# Patient Record
Sex: FEMALE | Race: WHITE | NOT HISPANIC OR LATINO | Employment: OTHER | URBAN - METROPOLITAN AREA
[De-identification: names, ages, dates, MRNs, and addresses within clinical notes are randomized per-mention and may not be internally consistent; named-entity substitution may affect disease eponyms.]

---

## 2021-02-06 ENCOUNTER — OFFICE VISIT (OUTPATIENT)
Dept: URGENT CARE | Facility: URGENT CARE | Age: 71
End: 2021-02-06
Payer: COMMERCIAL

## 2021-02-06 VITALS
OXYGEN SATURATION: 94 % | TEMPERATURE: 98.4 F | DIASTOLIC BLOOD PRESSURE: 83 MMHG | WEIGHT: 142 LBS | RESPIRATION RATE: 22 BRPM | HEART RATE: 110 BPM | SYSTOLIC BLOOD PRESSURE: 127 MMHG

## 2021-02-06 DIAGNOSIS — L03.818 CELLULITIS OF OTHER SPECIFIED SITE: ICD-10-CM
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
## 2021-02-06 PROCEDURE — 90471 IMMUNIZATION ADMIN: CPT | Performed by: PHYSICIAN ASSISTANT

## 2021-02-06 PROCEDURE — 90714 TD VACC NO PRESV 7 YRS+ IM: CPT | Performed by: PHYSICIAN ASSISTANT

## 2021-02-06 PROCEDURE — 99203 OFFICE O/P NEW LOW 30 MIN: CPT | Mod: 25 | Performed by: PHYSICIAN ASSISTANT

## 2021-02-06 RX ORDER — MIRTAZAPINE 15 MG/1
7.5 TABLET, FILM COATED ORAL AT BEDTIME
Status: ON HOLD | COMMUNITY
Start: 2021-01-25 | End: 2023-04-27

## 2021-02-06 RX ORDER — LEVALBUTEROL TARTRATE 45 UG/1
2 AEROSOL, METERED ORAL EVERY 4 HOURS PRN
COMMUNITY
Start: 2021-01-25

## 2021-02-06 RX ORDER — IPRATROPIUM BROMIDE AND ALBUTEROL SULFATE 2.5; .5 MG/3ML; MG/3ML
1 SOLUTION RESPIRATORY (INHALATION) EVERY 6 HOURS PRN
COMMUNITY
Start: 2021-01-25

## 2021-02-06 RX ORDER — CLOBETASOL PROPIONATE 0.5 MG/G
CREAM TOPICAL
COMMUNITY
Start: 2019-09-30 | End: 2023-03-19

## 2021-02-06 RX ORDER — LEVOTHYROXINE SODIUM 88 UG/1
88 TABLET ORAL DAILY
COMMUNITY
Start: 2021-01-25

## 2021-02-06 RX ORDER — ZOLPIDEM TARTRATE 5 MG/1
5 TABLET ORAL
Status: ON HOLD | COMMUNITY
Start: 2021-01-25 | End: 2023-04-07

## 2021-02-06 RX ORDER — ATORVASTATIN CALCIUM 10 MG/1
10 TABLET, FILM COATED ORAL DAILY
COMMUNITY
Start: 2021-01-25

## 2021-02-06 RX ORDER — TRIAMCINOLONE ACETONIDE 1 MG/G
CREAM TOPICAL
COMMUNITY
Start: 2020-01-09 | End: 2023-03-19

## 2021-02-06 RX ORDER — ALBUTEROL SULFATE 0.83 MG/ML
2.5 SOLUTION RESPIRATORY (INHALATION) 3 TIMES DAILY PRN
COMMUNITY
Start: 2021-01-25

## 2021-02-06 RX ORDER — ALPRAZOLAM 0.5 MG
0.25 TABLET ORAL DAILY PRN
Status: ON HOLD | COMMUNITY
Start: 2021-01-25 | End: 2023-04-07

## 2021-02-06 RX ORDER — ALENDRONATE SODIUM 70 MG/1
70 TABLET ORAL
COMMUNITY
Start: 2020-01-09

## 2021-02-06 RX ORDER — DIAZEPAM 5 MG
TABLET ORAL
COMMUNITY
Start: 2020-09-14 | End: 2023-03-19

## 2021-02-06 RX ORDER — AMOXICILLIN AND CLAVULANATE POTASSIUM 500; 125 MG/1; MG/1
1 TABLET, FILM COATED ORAL 3 TIMES DAILY
Qty: 30 TABLET | Refills: 0 | Status: SHIPPED | OUTPATIENT
Start: 2021-02-06 | End: 2021-02-16

## 2021-02-06 RX ORDER — HYDROXYZINE HYDROCHLORIDE 10 MG/1
10 TABLET, FILM COATED ORAL EVERY 8 HOURS PRN
COMMUNITY
Start: 2021-01-25

## 2021-02-06 RX ORDER — PREDNISONE 20 MG/1
TABLET ORAL
COMMUNITY
Start: 2020-04-28 | End: 2023-03-19

## 2021-02-06 RX ORDER — CLOBETASOL PROPIONATE 0.5 MG/G
OINTMENT TOPICAL
COMMUNITY
Start: 2021-01-25 | End: 2023-03-19

## 2021-02-06 SDOH — HEALTH STABILITY: MENTAL HEALTH: HOW OFTEN DO YOU HAVE 6 OR MORE DRINKS ON ONE OCCASION?: NOT ASKED

## 2021-02-06 SDOH — HEALTH STABILITY: MENTAL HEALTH: HOW OFTEN DO YOU HAVE A DRINK CONTAINING ALCOHOL?: NOT ASKED

## 2021-02-06 SDOH — HEALTH STABILITY: MENTAL HEALTH: HOW MANY STANDARD DRINKS CONTAINING ALCOHOL DO YOU HAVE ON A TYPICAL DAY?: NOT ASKED

## 2021-02-06 NOTE — PATIENT INSTRUCTIONS
Patient Education     Cat Bite    A cat bite can cause a wound deep enough to break the skin. In such cases, the wound is cleaned and then sometimes closed. If the wound is closed it is usually not closed completely. This is so that fluid can drain if the wound becomes infected. Often the wound is left open to heal. In addition to wound care, a tetanus shot may be given, if needed.  Home care    Wash your hands well with soap and warm water before and after caring for the wound. This helps lower the risk of infection.    Care for the wound as directed. If a dressing was applied to the wound, be sure to change it as directed.    If the wound bleeds, place a clean, soft cloth on the wound. Then firmly apply pressure until the bleeding stops. This may take up to 5 minutes. Don't release the pressure and look at the wound during this time.    Always get medical attention for cat bites on the hand. They are highly likely to become infected.    Most wounds heal within 10 days. But an infection can occur even with proper treatment. So be sure to check the wound daily for signs of infection (see below).    Antibiotics may be prescribed. These help prevent or treat infection. If you re given antibiotics, take them as directed. Also be sure to complete the medicines.  Rabies prevention  Rabies is a virus that can be carried in certain animals. These can include domestic animals such as cats and dogs. Pets fully vaccinated against rabies (2 shots) are at very low risk of infection. But because human rabies is almost always fatal, any biting pet should be confined for 10 days as an extra precaution. In general, if there is a risk for rabies, the following steps may need to be taken:    If someone s pet cat has bitten you, it should be kept in a secure area for the next 10 days to watch for signs of illness. If the pet owner won t allow this, contact your local animal control center. If the cat becomes ill or dies during that  time, contact your local animal control center at once so the animal may be tested for rabies. If the cat stays healthy for the next 10 days, there is no danger of rabies in the animal or you.    If a stray cat bit you, contact your local animal control center. They can give information on capture, quarantine, and animal rabies testing.    If you can t find the animal that bit you in the next 2 days, and if rabies exists in your area, you may need to receive the rabies vaccine series. Call your healthcare provider right away. Or return to the emergency department promptly.    All animal bites should be reported to the local animal control center. If you were not given a form to fill out, you can report this yourself.  Follow-up care  Follow up with your healthcare provider, or as directed.  When to seek medical advice  Call your healthcare provider right away if any of these occur:    Signs of infection:  ? Spreading redness or warmth from the wound  ? Increased pain or swelling  ? Fever of 100.4 F (38 C) or higher, or as directed by your healthcare provider  ? Colored fluid or pus draining from the wound  ? Enlarged lymph nodes above the area that was bitten, such as lymph nodes in the armpit if you were bitten on the hand or arm. This may be a sign of cat-scratch disease (cat-scratch fever).    Signs of rabies infection:  ? Headache  ? Confusion  ? Strange behavior  ? Increased salivating or drooling  ? Seizure    Decreased ability to move any body part near the bite area    Bleeding that can't be stopped after 5 minutes of firm pressure  Ryan last reviewed this educational content on 5/1/2018 2000-2020 [employer]. [address] [place] [address], [place] [address]. All rights reserved. This information is not intended as a substitute for professional medical care. Always follow your healthcare professional's instructions.

## 2021-02-06 NOTE — PROGRESS NOTES
Assessment & Plan     Cat bite, initial encounter  Cat bite with infection  Started on Augmentin  Td updated  Monitor for any worsening symptoms, follow up appropriately  - amoxicillin-clavulanate (AUGMENTIN) 500-125 MG tablet; Take 1 tablet by mouth 3 times daily for 10 days  - TD PRESERV FREE, IM (7+ YRS)    Cellulitis of other specified site  Start on augmentin  - amoxicillin-clavulanate (AUGMENTIN) 500-125 MG tablet; Take 1 tablet by mouth 3 times daily for 10 days      Independent interpretation of a test performed by another physician/other qualified health care professional (not separately reported) - CMP      As needed for worsening symptoms    Lobo James PA-C  [facility] JOSHUA Burton is a 70 year old who presents to clinic today for the following health issues  accompanied by her daughter:    HPI     Cat bite  Infection, started yesterday    Review of Systems   Constitutional, HEENT, cardiovascular, pulmonary, gi and gu systems are negative, except as otherwise noted.      Objective    /83   Pulse 110   Temp 98.4  F (36.9  C)   Resp 22   Wt 64.4 kg (142 lb)   SpO2 94%   There is no height or weight on file to calculate BMI.  Physical Exam   GENERAL: healthy, alert and no distress  EYES: Eyes grossly normal to inspection, PERRL and conjunctivae and sclerae normal  HENT: ear canals and TM's normal, nose and mouth without ulcers or lesions  NECK: no adenopathy, no asymmetry, masses, or scars and thyroid normal to palpation  MS: Positive for left hand tenderness  SKIN: Positive for erythema, warmth and tenderness of left hand  NEURO: Normal strength and tone, mentation intact and speech normal  PSYCH: mentation appears normal, affect normal/bright  LYMPH: no cervical, supraclavicular, axillary, or inguinal adenopathy

## 2021-03-05 ENCOUNTER — IMMUNIZATION (OUTPATIENT)
Dept: NURSING | Facility: CLINIC | Age: 71
End: 2021-03-05
Payer: COMMERCIAL

## 2021-03-05 PROCEDURE — 0011A PR COVID VAC MODERNA 100 MCG/0.5 ML IM: CPT

## 2021-03-05 PROCEDURE — 91301 PR COVID VAC MODERNA 100 MCG/0.5 ML IM: CPT

## 2021-04-02 ENCOUNTER — IMMUNIZATION (OUTPATIENT)
Dept: NURSING | Facility: CLINIC | Age: 71
End: 2021-04-02
Attending: INTERNAL MEDICINE
Payer: COMMERCIAL

## 2021-04-02 PROCEDURE — 91301 PR COVID VAC MODERNA 100 MCG/0.5 ML IM: CPT

## 2021-04-02 PROCEDURE — 0012A PR COVID VAC MODERNA 100 MCG/0.5 ML IM: CPT

## 2022-03-08 DIAGNOSIS — Z11.59 ENCOUNTER FOR SCREENING FOR OTHER VIRAL DISEASES: Primary | ICD-10-CM

## 2022-03-23 ENCOUNTER — OFFICE VISIT (OUTPATIENT)
Dept: URGENT CARE | Facility: URGENT CARE | Age: 72
End: 2022-03-23
Payer: COMMERCIAL

## 2022-03-23 ENCOUNTER — ANCILLARY PROCEDURE (OUTPATIENT)
Dept: GENERAL RADIOLOGY | Facility: CLINIC | Age: 72
End: 2022-03-23
Attending: FAMILY MEDICINE
Payer: COMMERCIAL

## 2022-03-23 VITALS
SYSTOLIC BLOOD PRESSURE: 116 MMHG | RESPIRATION RATE: 26 BRPM | HEART RATE: 105 BPM | OXYGEN SATURATION: 94 % | DIASTOLIC BLOOD PRESSURE: 75 MMHG | TEMPERATURE: 99.9 F

## 2022-03-23 DIAGNOSIS — R05.9 COUGH: ICD-10-CM

## 2022-03-23 DIAGNOSIS — R05.8 PRODUCTIVE COUGH: Primary | ICD-10-CM

## 2022-03-23 DIAGNOSIS — J44.1 COPD EXACERBATION (H): ICD-10-CM

## 2022-03-23 DIAGNOSIS — R07.0 THROAT PAIN: ICD-10-CM

## 2022-03-23 DIAGNOSIS — R50.9 FEVER AND CHILLS: ICD-10-CM

## 2022-03-23 LAB
DEPRECATED S PYO AG THROAT QL EIA: NEGATIVE
FLUAV AG SPEC QL IA: NEGATIVE
FLUBV AG SPEC QL IA: NEGATIVE

## 2022-03-23 PROCEDURE — U0003 INFECTIOUS AGENT DETECTION BY NUCLEIC ACID (DNA OR RNA); SEVERE ACUTE RESPIRATORY SYNDROME CORONAVIRUS 2 (SARS-COV-2) (CORONAVIRUS DISEASE [COVID-19]), AMPLIFIED PROBE TECHNIQUE, MAKING USE OF HIGH THROUGHPUT TECHNOLOGIES AS DESCRIBED BY CMS-2020-01-R: HCPCS | Performed by: FAMILY MEDICINE

## 2022-03-23 PROCEDURE — U0005 INFEC AGEN DETEC AMPLI PROBE: HCPCS | Performed by: FAMILY MEDICINE

## 2022-03-23 PROCEDURE — 99214 OFFICE O/P EST MOD 30 MIN: CPT | Mod: CS | Performed by: FAMILY MEDICINE

## 2022-03-23 PROCEDURE — 71046 X-RAY EXAM CHEST 2 VIEWS: CPT | Performed by: RADIOLOGY

## 2022-03-23 PROCEDURE — 87651 STREP A DNA AMP PROBE: CPT | Performed by: FAMILY MEDICINE

## 2022-03-23 PROCEDURE — 87804 INFLUENZA ASSAY W/OPTIC: CPT | Performed by: FAMILY MEDICINE

## 2022-03-23 RX ORDER — CODEINE PHOSPHATE AND GUAIFENESIN 10; 100 MG/5ML; MG/5ML
1-2 SOLUTION ORAL EVERY 4 HOURS PRN
Qty: 118 ML | Refills: 0 | Status: SHIPPED | OUTPATIENT
Start: 2022-03-23 | End: 2022-03-28

## 2022-03-23 RX ORDER — DOXYCYCLINE 100 MG/1
100 TABLET ORAL 2 TIMES DAILY
Qty: 20 TABLET | Refills: 0 | Status: SHIPPED | OUTPATIENT
Start: 2022-03-23 | End: 2022-04-02

## 2022-03-24 LAB
GROUP A STREP BY PCR: NOT DETECTED
SARS-COV-2 RNA RESP QL NAA+PROBE: NEGATIVE

## 2022-03-24 NOTE — PROGRESS NOTES
SUBJECTIVE: Josephine Nicole is a 72 year old female presenting with a chief complaint of cough .  Onset of symptoms was 3 day(s) ago.  Course of illness is worsening.    Severity moderate  Current and Associated symptoms: cough - productive  Treatment measures tried include Inhaler (name: alb) and prednisone 20mg once yesterday.  Predisposing factors include HX of COPD.    No past medical history on file.  Allergies   Allergen Reactions     Bupropion Anaphylaxis, Hives and Shortness Of Breath     Social History     Tobacco Use     Smoking status: Former Smoker     Years: 50.00     Types: Cigarettes     Smokeless tobacco: Never Used   Substance Use Topics     Alcohol use: Not Currently       ROS:  SKIN: no rash  GI: no vomiting    OBJECTIVE:  /75   Pulse 105   Temp 99.9  F (37.7  C)   Resp 26   SpO2 94% GENERAL APPEARANCE: healthy, alert and no distress  EYES: EOMI,  PERRL, conjunctiva clear  HENT: ear canals and TM's normal.  Nose and mouth without ulcers, erythema or lesions  RESP: lungs clear to auscultation - no rales, rhonchi or wheezes  SKIN: no suspicious lesions or rashes      ICD-10-CM    1. Productive cough  R05.8 XR Chest 2 Views     doxycycline monohydrate (ADOXA) 100 MG tablet     guaiFENesin-codeine (ROBITUSSIN AC) 100-10 MG/5ML solution   2. COPD exacerbation (H)  J44.1 XR Chest 2 Views     doxycycline monohydrate (ADOXA) 100 MG tablet     guaiFENesin-codeine (ROBITUSSIN AC) 100-10 MG/5ML solution   3. Fever and chills  R50.9 doxycycline monohydrate (ADOXA) 100 MG tablet   4. Cough  R05.9 Symptomatic; Unknown COVID-19 Virus (Coronavirus) by PCR Nose     Influenza A & B Antigen     XR Chest 2 Views     guaiFENesin-codeine (ROBITUSSIN AC) 100-10 MG/5ML solution   5. Throat pain  R07.0 Influenza A & B Antigen     Streptococcus A Rapid Scr w Reflx to PCR - Lab Collect     Group A Streptococcus PCR Throat Swab     Prednisone 20mg bid for 5 days  Cont inhalers and O2  Fluids/Rest, f/u if worse/not  any better

## 2022-04-15 ENCOUNTER — LAB (OUTPATIENT)
Dept: URGENT CARE | Facility: URGENT CARE | Age: 72
End: 2022-04-15
Attending: INTERNAL MEDICINE
Payer: COMMERCIAL

## 2022-04-15 DIAGNOSIS — Z11.59 ENCOUNTER FOR SCREENING FOR OTHER VIRAL DISEASES: ICD-10-CM

## 2022-04-15 PROCEDURE — U0003 INFECTIOUS AGENT DETECTION BY NUCLEIC ACID (DNA OR RNA); SEVERE ACUTE RESPIRATORY SYNDROME CORONAVIRUS 2 (SARS-COV-2) (CORONAVIRUS DISEASE [COVID-19]), AMPLIFIED PROBE TECHNIQUE, MAKING USE OF HIGH THROUGHPUT TECHNOLOGIES AS DESCRIBED BY CMS-2020-01-R: HCPCS

## 2022-04-15 PROCEDURE — U0005 INFEC AGEN DETEC AMPLI PROBE: HCPCS

## 2022-04-16 LAB — SARS-COV-2 RNA RESP QL NAA+PROBE: NEGATIVE

## 2022-04-18 ENCOUNTER — ANESTHESIA EVENT (OUTPATIENT)
Dept: GASTROENTEROLOGY | Facility: CLINIC | Age: 72
End: 2022-04-18
Payer: COMMERCIAL

## 2022-04-18 ENCOUNTER — HOSPITAL ENCOUNTER (OUTPATIENT)
Facility: CLINIC | Age: 72
Discharge: HOME | End: 2022-04-18
Attending: INTERNAL MEDICINE | Admitting: INTERNAL MEDICINE
Payer: COMMERCIAL

## 2022-04-18 ENCOUNTER — ANESTHESIA (OUTPATIENT)
Dept: GASTROENTEROLOGY | Facility: CLINIC | Age: 72
End: 2022-04-18
Payer: COMMERCIAL

## 2022-04-18 VITALS
BODY MASS INDEX: 27.05 KG/M2 | HEART RATE: 80 BPM | HEIGHT: 62 IN | DIASTOLIC BLOOD PRESSURE: 84 MMHG | OXYGEN SATURATION: 96 % | SYSTOLIC BLOOD PRESSURE: 124 MMHG | WEIGHT: 147 LBS | RESPIRATION RATE: 16 BRPM

## 2022-04-18 LAB — COLONOSCOPY: NORMAL

## 2022-04-18 PROCEDURE — 250N000009 HC RX 250: Performed by: REGISTERED NURSE

## 2022-04-18 PROCEDURE — 250N000011 HC RX IP 250 OP 636: Performed by: REGISTERED NURSE

## 2022-04-18 PROCEDURE — 45378 DIAGNOSTIC COLONOSCOPY: CPT | Performed by: INTERNAL MEDICINE

## 2022-04-18 PROCEDURE — 999N000010 HC STATISTIC ANES STAT CODE-CRNA PER MINUTE: Performed by: INTERNAL MEDICINE

## 2022-04-18 PROCEDURE — G0105 COLORECTAL SCRN; HI RISK IND: HCPCS | Performed by: INTERNAL MEDICINE

## 2022-04-18 PROCEDURE — 370N000017 HC ANESTHESIA TECHNICAL FEE, PER MIN: Performed by: INTERNAL MEDICINE

## 2022-04-18 PROCEDURE — 258N000003 HC RX IP 258 OP 636: Performed by: REGISTERED NURSE

## 2022-04-18 RX ORDER — ONDANSETRON 2 MG/ML
4 INJECTION INTRAMUSCULAR; INTRAVENOUS
Status: DISCONTINUED | OUTPATIENT
Start: 2022-04-18 | End: 2022-04-18 | Stop reason: HOSPADM

## 2022-04-18 RX ORDER — SODIUM CHLORIDE, SODIUM LACTATE, POTASSIUM CHLORIDE, CALCIUM CHLORIDE 600; 310; 30; 20 MG/100ML; MG/100ML; MG/100ML; MG/100ML
INJECTION, SOLUTION INTRAVENOUS CONTINUOUS PRN
Status: DISCONTINUED | OUTPATIENT
Start: 2022-04-18 | End: 2022-04-18

## 2022-04-18 RX ORDER — PROPOFOL 10 MG/ML
INJECTION, EMULSION INTRAVENOUS CONTINUOUS PRN
Status: DISCONTINUED | OUTPATIENT
Start: 2022-04-18 | End: 2022-04-18

## 2022-04-18 RX ORDER — NALOXONE HYDROCHLORIDE 0.4 MG/ML
0.4 INJECTION, SOLUTION INTRAMUSCULAR; INTRAVENOUS; SUBCUTANEOUS
Status: CANCELLED | OUTPATIENT
Start: 2022-04-18

## 2022-04-18 RX ORDER — ONDANSETRON 4 MG/1
4 TABLET, ORALLY DISINTEGRATING ORAL EVERY 6 HOURS PRN
Status: CANCELLED | OUTPATIENT
Start: 2022-04-18

## 2022-04-18 RX ORDER — ONDANSETRON 2 MG/ML
INJECTION INTRAMUSCULAR; INTRAVENOUS PRN
Status: DISCONTINUED | OUTPATIENT
Start: 2022-04-18 | End: 2022-04-18

## 2022-04-18 RX ORDER — FLUMAZENIL 0.1 MG/ML
0.2 INJECTION, SOLUTION INTRAVENOUS
Status: CANCELLED | OUTPATIENT
Start: 2022-04-18 | End: 2022-04-18

## 2022-04-18 RX ORDER — LIDOCAINE 40 MG/G
CREAM TOPICAL
Status: DISCONTINUED | OUTPATIENT
Start: 2022-04-18 | End: 2022-04-18 | Stop reason: HOSPADM

## 2022-04-18 RX ORDER — NALOXONE HYDROCHLORIDE 0.4 MG/ML
0.2 INJECTION, SOLUTION INTRAMUSCULAR; INTRAVENOUS; SUBCUTANEOUS
Status: CANCELLED | OUTPATIENT
Start: 2022-04-18

## 2022-04-18 RX ORDER — ONDANSETRON 2 MG/ML
4 INJECTION INTRAMUSCULAR; INTRAVENOUS EVERY 6 HOURS PRN
Status: CANCELLED | OUTPATIENT
Start: 2022-04-18

## 2022-04-18 RX ORDER — PROCHLORPERAZINE MALEATE 5 MG
5 TABLET ORAL EVERY 6 HOURS PRN
Status: CANCELLED | OUTPATIENT
Start: 2022-04-18

## 2022-04-18 RX ORDER — LIDOCAINE HYDROCHLORIDE 20 MG/ML
INJECTION, SOLUTION INFILTRATION; PERINEURAL PRN
Status: DISCONTINUED | OUTPATIENT
Start: 2022-04-18 | End: 2022-04-18

## 2022-04-18 RX ADMIN — SODIUM CHLORIDE, POTASSIUM CHLORIDE, SODIUM LACTATE AND CALCIUM CHLORIDE: 600; 310; 30; 20 INJECTION, SOLUTION INTRAVENOUS at 08:59

## 2022-04-18 RX ADMIN — ONDANSETRON 4 MG: 2 INJECTION INTRAMUSCULAR; INTRAVENOUS at 09:15

## 2022-04-18 RX ADMIN — PROPOFOL 150 MCG/KG/MIN: 10 INJECTION, EMULSION INTRAVENOUS at 09:05

## 2022-04-18 RX ADMIN — LIDOCAINE HYDROCHLORIDE 50 MG: 20 INJECTION, SOLUTION INFILTRATION; PERINEURAL at 09:05

## 2022-04-18 ASSESSMENT — LIFESTYLE VARIABLES: TOBACCO_USE: 1

## 2022-04-18 ASSESSMENT — COPD QUESTIONNAIRES: COPD: 1

## 2022-04-18 NOTE — ANESTHESIA POSTPROCEDURE EVALUATION
Patient: Josephine Nicole    Procedure: Procedure(s):  COLONOSCOPY       Anesthesia Type:  MAC    Note:  Disposition: Outpatient   Postop Pain Control: Uneventful            Sign Out: Well controlled pain   PONV: No   Neuro/Psych: Uneventful            Sign Out: Acceptable/Baseline neuro status   Airway/Respiratory: Uneventful            Sign Out: Acceptable/Baseline resp. status   CV/Hemodynamics: Uneventful            Sign Out: Acceptable CV status; No obvious hypovolemia; No obvious fluid overload   Other NRE: NONE   DID A NON-ROUTINE EVENT OCCUR? No           Last vitals:  Vitals Value Taken Time   /82 04/18/22 1010   Temp     Pulse 73 04/18/22 1010   Resp 23 04/18/22 1004   SpO2 79 % 04/18/22 1004   Vitals shown include unvalidated device data.    Electronically Signed By: Aldo Bauer MD  April 18, 2022  12:12 PM

## 2022-04-18 NOTE — ANESTHESIA CARE TRANSFER NOTE
Patient: Josephine Nicole    Procedure: Procedure(s):  COLONOSCOPY       Diagnosis: Screen for colon cancer [Z12.11]  Diagnosis Additional Information: No value filed.    Anesthesia Type:   MAC     Note:    Oropharynx: oropharynx clear of all foreign objects  Level of Consciousness: drowsy  Oxygen Supplementation: nasal cannula  Level of Supplemental Oxygen (L/min / FiO2): 2  Independent Airway: airway patency satisfactory and stable  Dentition: dentition unchanged  Vital Signs Stable: post-procedure vital signs reviewed and stable  Report to RN Given: handoff report given  Patient transferred to: PACU  Comments: Patient comfortable  Handoff Report: Identifed the Patient, Identified the Reponsible Provider, Reviewed the pertinent medical history, Discussed the surgical course, Reviewed Intra-OP anesthesia mangement and issues during anesthesia, Set expectations for post-procedure period and Allowed opportunity for questions and acknowledgement of understanding      Vitals:  Vitals Value Taken Time   BP     Temp     Pulse     Resp     SpO2         Electronically Signed By: MIGNON Laughlin CRNA  April 18, 2022  9:35 AM

## 2022-04-18 NOTE — ANESTHESIA PREPROCEDURE EVALUATION
Anesthesia Pre-Procedure Evaluation    Patient: Josephine Nicole   MRN: [id number] : 1950        Procedure : Procedure(s):  COLONOSCOPY          No past medical history on file.   No past surgical history on file.   Allergies   Allergen Reactions     Bupropion Anaphylaxis, Hives and Shortness Of Breath      Social History     Tobacco Use     Smoking status: Former Smoker     Years: 50.00     Types: Cigarettes     Smokeless tobacco: Never Used   Substance Use Topics     Alcohol use: Not Currently      Wt Readings from Last 1 Encounters:   21 64.4 kg (142 lb)        Anesthesia Evaluation   Pt has had prior anesthetic.     No history of anesthetic complications       ROS/MED HX  ENT/Pulmonary:     (+) tobacco use, Past use, COPD, O2 dependent, 1-2L liters/min,  (-) sleep apnea   Neurologic: Comment: Meningioma      Cardiovascular:     (+) Dyslipidemia -----    METS/Exercise Tolerance:     Hematologic:       Musculoskeletal:       GI/Hepatic:    (-) GERD   Renal/Genitourinary:       Endo:     (+) thyroid problem, hypothyroidism,     Psychiatric/Substance Use:     (+) psychiatric history depression     Infectious Disease:       Malignancy:       Other:            Physical Exam    Airway        Mallampati: II   TM distance: > 3 FB   Neck ROM: full   Mouth opening: > 3 cm    Respiratory Devices and Support         Dental  no notable dental history         Cardiovascular   cardiovascular exam normal       Rhythm and rate: regular and normal     Pulmonary           (+) decreased breath sounds           OUTSIDE LABS:  CBC: No results found for: WBC, HGB, HCT, PLT  BMP: No results found for: NA, POTASSIUM, CHLORIDE, CO2, BUN, CR, GLC  COAGS: No results found for: PTT, INR, FIBR  POC: No results found for: BGM, HCG, HCGS  HEPATIC: No results found for: ALBUMIN, PROTTOTAL, ALT, AST, GGT, ALKPHOS, BILITOTAL, BILIDIRECT, DELON  OTHER: No results found for: PH, LACT, A1C, CHAO, PHOS, MAG, LIPASE, AMYLASE, TSH, T4, T3,  CRP, SED    Anesthesia Plan    ASA Status:  3   NPO Status:  NPO Appropriate    Anesthesia Type: MAC.     - Reason for MAC: straight local not clinically adequate              Consents    Anesthesia Plan(s) and associated risks, benefits, and realistic alternatives discussed. Questions answered and patient/representative(s) expressed understanding.    - Discussed:     - Discussed with:  Patient         Postoperative Care    Pain management: IV analgesics.   PONV prophylaxis: Ondansetron (or other 5HT-3)     Comments:    Other Comments: H&P reviewed            Aldo Bauer MD

## 2022-04-18 NOTE — H&P
PRE-PROCEDURE H&P    CHIEF COMPLAINT / REASON FOR PROCEDURE:  Colon cancer screening, history of polyp    PERTINENT HISTORY :    No past medical history on file.   No past surgical history on file.      Bleeding tendencies:  No    Relevant Family History:  NONE     Relevant Social History:  NONE      A relevant review of systems was performed and was negative    Current symptoms include: none    ALLERGIES/SENSITIVITIES:   Allergies   Allergen Reactions     Bupropion Anaphylaxis, Hives and Shortness Of Breath       CURRENT MEDICATIONS:   Prior to Admission Medications   Prescriptions Last Dose Informant Patient Reported? Taking?   ALPRAZolam (XANAX) 0.5 MG tablet   Yes No   Sig: Take one half of the tablet for anxiety once daily as needed.   albuterol (PROVENTIL) (2.5 MG/3ML) 0.083% neb solution   Yes No   Sig: Inhale 2.5 mg into the lungs   alendronate (FOSAMAX) 70 MG tablet   Yes No   Sig: Take 70 mg by mouth   atorvastatin (LIPITOR) 10 MG tablet   Yes No   Sig: Take 10 mg by mouth   clobetasol (TEMOVATE) 0.05 % external cream   Yes No   clobetasol (TEMOVATE) 0.05 % external ointment   Yes No   diazepam (VALIUM) 5 MG tablet   Yes No   Sig: TAKE 1 TABLET BY MOUTH 30 MINUTES PRIOR TO MRI. MAY REPEAT 30 MINUTES LATER IF NEEDED.   fluticasone-salmeterol (ADVAIR) 500-50 MCG/DOSE inhaler   Yes No   Sig: Inhale 1 puff into the lungs   hydrOXYzine (ATARAX) 10 MG tablet   Yes No   Sig: Take 10 mg by mouth   ipratropium - albuterol 0.5 mg/2.5 mg/3 mL (DUONEB) 0.5-2.5 (3) MG/3ML neb solution   Yes No   Si neb every 4-6 hours as needed.   Patient not taking: Reported on 3/23/2022   levalbuterol (XOPENEX HFA) 45 MCG/ACT inhaler   Yes No   Sig: Inhale 2 puffs into the lungs   levothyroxine (SYNTHROID/LEVOTHROID) 88 MCG tablet   Yes No   Sig: Take 88 mcg by mouth   mirtazapine (REMERON) 15 MG tablet   Yes No   Sig: Take 15 mg by mouth   predniSONE (DELTASONE) 20 MG tablet   Yes No   Sig: TK 1 T PO QD WITH A MEAL. TAKES ONLY  1-2 TIMES PER YEAR FOR EXACERBATIONS   triamcinolone (KENALOG) 0.1 % external cream   Yes No   zolpidem (AMBIEN) 5 MG tablet   Yes No   Sig: Take 5 mg by mouth      Facility-Administered Medications: None        PRE-SEDATION ASSESSMENT:    Lung Exam: on oxygen, decreased lung sounds bilaterally  Heart Exam:  Rrr, diminished heart sounds  Airway Exam: normal  Previous reaction to anesthesia/sedation:   No  Sedation plan based on assessment: mac  ASA Classification:  4 - Severe systemic disease that is a constant threat to life    Comments: COPD, oxygen dependent.  History of colon polyp.    IMPRESSION:  Rule out polyps    PLAN:  colonoscopy     Abimbola Handley MD, MD  [facility]  Office: [phone]

## 2022-05-24 ENCOUNTER — ANCILLARY PROCEDURE (OUTPATIENT)
Dept: GENERAL RADIOLOGY | Facility: CLINIC | Age: 72
End: 2022-05-24
Attending: PHYSICIAN ASSISTANT
Payer: COMMERCIAL

## 2022-05-24 ENCOUNTER — OFFICE VISIT (OUTPATIENT)
Dept: URGENT CARE | Facility: URGENT CARE | Age: 72
End: 2022-05-24
Payer: COMMERCIAL

## 2022-05-24 VITALS
TEMPERATURE: 98.7 F | HEART RATE: 112 BPM | DIASTOLIC BLOOD PRESSURE: 70 MMHG | WEIGHT: 145 LBS | SYSTOLIC BLOOD PRESSURE: 120 MMHG | BODY MASS INDEX: 26.52 KG/M2 | OXYGEN SATURATION: 92 % | RESPIRATION RATE: 18 BRPM

## 2022-05-24 DIAGNOSIS — R05.8 PRODUCTIVE COUGH: ICD-10-CM

## 2022-05-24 DIAGNOSIS — R05.8 PRODUCTIVE COUGH: Primary | ICD-10-CM

## 2022-05-24 DIAGNOSIS — J44.1 COPD EXACERBATION (H): ICD-10-CM

## 2022-05-24 PROCEDURE — U0005 INFEC AGEN DETEC AMPLI PROBE: HCPCS | Performed by: PHYSICIAN ASSISTANT

## 2022-05-24 PROCEDURE — 71046 X-RAY EXAM CHEST 2 VIEWS: CPT | Mod: TC | Performed by: RADIOLOGY

## 2022-05-24 PROCEDURE — U0003 INFECTIOUS AGENT DETECTION BY NUCLEIC ACID (DNA OR RNA); SEVERE ACUTE RESPIRATORY SYNDROME CORONAVIRUS 2 (SARS-COV-2) (CORONAVIRUS DISEASE [COVID-19]), AMPLIFIED PROBE TECHNIQUE, MAKING USE OF HIGH THROUGHPUT TECHNOLOGIES AS DESCRIBED BY CMS-2020-01-R: HCPCS | Performed by: PHYSICIAN ASSISTANT

## 2022-05-24 PROCEDURE — 99214 OFFICE O/P EST MOD 30 MIN: CPT | Mod: CS | Performed by: PHYSICIAN ASSISTANT

## 2022-05-24 RX ORDER — AZITHROMYCIN 250 MG/1
TABLET, FILM COATED ORAL
Qty: 6 TABLET | Refills: 0 | Status: SHIPPED | OUTPATIENT
Start: 2022-05-24 | End: 2022-05-29

## 2022-05-25 LAB — SARS-COV-2 RNA RESP QL NAA+PROBE: POSITIVE

## 2022-05-25 NOTE — PROGRESS NOTES
Assessment & Plan     Productive cough  Acute problem.  On exam patient is in no acute respiratory distress.  Chest x-ray is negative for acute infiltrates.  We have elected to treat for acute bacterial bronchitis given length and worsening of symptoms.  Zithromax is prescribed.  Covid test is pending. Keep monitoring symptoms.  Follow-up if any worsening symptoms.  Patient agrees with the plan.  - Symptomatic; Yes; 5/21/2022 COVID-19 Virus (Coronavirus) by PCR Nose  - XR Chest 2 Views  - azithromycin (ZITHROMAX Z-NADEEM) 250 MG tablet  Dispense: 6 tablet; Refill: 0    COPD exacerbation (H)  Okay to continue prednisone 20 mg daily for an additional 4 days. Keep monitoring symptoms.  Follow-up if any worsening symptoms.  Patient agrees with the plan.     30 minutes spent on the date of the encounter doing chart review, history and exam, documentation and further activities per the note        Return in about 1 week (around 5/31/2022) for Symptoms failing to improve.    Estefania Adorno PA-C  [facility] is a 72 year old female who presents to clinic today for the following health issues:  Chief Complaint   Patient presents with     Pneumonia     HPI      URI Adult    Onset of symptoms was 1 week(s) ago.  Course of illness is worsening.    Severity moderate  Current and Associated symptoms: cough - productive of green phlegm, sob. Had a fever last week. None since then.   Treatment measures tried include: Took 20 mg of prednisone last night.  Predisposing factors include HX of COPD. On Advair, Xopenex, and  Duoneb. On 1 L oxygen at home. Her daughter tested positive for Covid last week.         Review of Systems  Constitutional, HEENT, cardiovascular, pulmonary, GI, , musculoskeletal, neuro, skin, endocrine and psych systems are negative, except as otherwise noted.      Objective    /70 (BP Location: Right arm, Patient Position: Chair, Cuff Size: Adult Small)    Pulse 112   Temp 98.7  F (37.1  C) (Oral)   Resp 18   Wt 65.8 kg (145 lb)   SpO2 92%   Breastfeeding No   BMI 26.52 kg/m    Physical Exam   GENERAL: healthy, alert and no distress, on 1.5 L of oxygen via nasal cannula   HENT: ear canals and TM's normal, mouth without ulcers or lesions  RESP: lungs clear to auscultation - no rales, rhonchi or wheezes  CV: regular rate and rhythm, normal S1 S2  MS: no gross musculoskeletal defects noted, no edema  SKIN: no suspicious lesions or rashes    EXAM: XR CHEST 2 VW  LOCATION: [facility]  DATE/TIME: 5/24/2022 8:07 PM     INDICATION:  Productive cough  COMPARISON: 03/23/2022                                                                      IMPRESSION: Heart size and pulmonary vascularity normal. Emphysema with destructive changes both upper lung fields, unchanged. No acute infiltrates or pleural effusions.

## 2022-05-26 ENCOUNTER — TELEPHONE (OUTPATIENT)
Dept: NURSING | Facility: CLINIC | Age: 72
End: 2022-05-26
Payer: COMMERCIAL

## 2022-05-26 NOTE — TELEPHONE ENCOUNTER
Coronavirus (COVID-19) Notification    Caller Name (Patient, parent, daughter/son, grandparent, etc)  Patient    Reason for call  Notify of Positive Coronavirus (COVID-19) lab results, assess symptoms,  review [facility] recommendations    Lab Result    Lab test:  2019-nCoV rRt-PCR or SARS-CoV-2 PCR    Oropharyngeal AND/OR nasopharyngeal swabs is POSITIVE for 2019-nCoV RNA/SARS-COV-2 PCR (COVID-19 virus)      Gather patient reported symptoms   Assessment   Current Symptoms at time of phone call, reported by patient: (if no symptoms, document: No symptoms] Cough   Date of symptom(s) onset (if applicable) 5/18     If at time of call, Patients symptoms have worsened, the Patient should contact 911 or have someone drive them to Emergency Dept promptly:      If Patient calling 911, inform 911 personal that you have tested positive for the Coronavirus (COVID-19).  Place mask on and await 911 to arrive.    If Emergency Dept, If possible, please have another adult drive you to the Emergency Dept but you need to wear mask when in contact with other people.      Treatment Options:   Patient classified as COVID treatment eligible by Epic high risk algorithm: Yes  Is the patient symptomatic at the time of result notification? Yes. Was the onset of symptoms within the last 5 days? No. Was the onset of symptoms within the last 7 days? No.     Review information with Patient    Your result was positive. This means you have COVID-19 (coronavirus).    How can I protect others?    These guidelines are for isolating before returning to work, school or .    If you DO have symptoms    Stay home and away from others     For at least 5 days after your symptoms started, AND    You are fever free for 24 hours (with no medicine that reduces fever), AND    Your other symptoms are better    Wear a mask for 10 full days anytime you are around others    If you DON'T have symptoms    Stay home and away from others for at least 5  days after your positive test    Wear a mask for 10 full days anytime you are around others    There may be different guidelines for healthcare facilities.  Please check with the specific sites before arriving.    If you have been told by a doctor that you were severely ill with COVID-19 or are immunocompromised, you should isolate for at least 10 days.    You should not go back to work until you meet the guidelines above for ending your home isolation. You don't need to be retested for COVID-19 before going back to work--studies show that you won't spread the virus if it's been at least 10 days since your symptoms started (or 20 days, if you have a weak immune system).    Employers, schools, and daycares: This is an official notice for this person's medical guidelines for returning in-person.  They must meet the above guidelines before going back to work, school or  in person.    You will receive a positive COVID-19 letter via [employer] or the mail soon with additional self-care information (exception, no letters will be sent to presurgical/preprocedure patients).    Would you like me to review some of that information with you now?  No    If you were tested for an upcoming procedure, please contact your provider for next steps.    Concha Marie

## 2022-11-21 ENCOUNTER — OFFICE VISIT (OUTPATIENT)
Dept: URGENT CARE | Facility: URGENT CARE | Age: 72
End: 2022-11-21
Attending: FAMILY MEDICINE
Payer: COMMERCIAL

## 2022-11-21 VITALS
HEART RATE: 96 BPM | RESPIRATION RATE: 18 BRPM | OXYGEN SATURATION: 98 % | DIASTOLIC BLOOD PRESSURE: 60 MMHG | SYSTOLIC BLOOD PRESSURE: 119 MMHG | TEMPERATURE: 99.6 F

## 2022-11-21 DIAGNOSIS — R05.1 ACUTE COUGH: Primary | ICD-10-CM

## 2022-11-21 DIAGNOSIS — J44.1 COPD EXACERBATION (H): ICD-10-CM

## 2022-11-21 LAB
FLUAV AG SPEC QL IA: NEGATIVE
FLUBV AG SPEC QL IA: NEGATIVE

## 2022-11-21 PROCEDURE — U0003 INFECTIOUS AGENT DETECTION BY NUCLEIC ACID (DNA OR RNA); SEVERE ACUTE RESPIRATORY SYNDROME CORONAVIRUS 2 (SARS-COV-2) (CORONAVIRUS DISEASE [COVID-19]), AMPLIFIED PROBE TECHNIQUE, MAKING USE OF HIGH THROUGHPUT TECHNOLOGIES AS DESCRIBED BY CMS-2020-01-R: HCPCS | Performed by: PHYSICIAN ASSISTANT

## 2022-11-21 PROCEDURE — 87804 INFLUENZA ASSAY W/OPTIC: CPT | Performed by: PHYSICIAN ASSISTANT

## 2022-11-21 PROCEDURE — 99214 OFFICE O/P EST MOD 30 MIN: CPT | Mod: CS | Performed by: PHYSICIAN ASSISTANT

## 2022-11-21 PROCEDURE — U0005 INFEC AGEN DETEC AMPLI PROBE: HCPCS | Performed by: PHYSICIAN ASSISTANT

## 2022-11-21 RX ORDER — PREDNISONE 20 MG/1
TABLET ORAL
Qty: 13 TABLET | Refills: 0 | Status: SHIPPED | OUTPATIENT
Start: 2022-11-21 | End: 2023-03-19

## 2022-11-21 RX ORDER — DOXYCYCLINE 100 MG/1
100 CAPSULE ORAL 2 TIMES DAILY
Qty: 20 CAPSULE | Refills: 0 | Status: SHIPPED | OUTPATIENT
Start: 2022-11-21 | End: 2023-03-19

## 2022-11-21 NOTE — PROGRESS NOTES
Assessment & Plan     Acute cough    Coughing from bronchial infection  Concern for bacterial bronchitis with exacerbation of COPD  Start on antibiotics    - Symptomatic; Yes; 11/21/2022 COVID-19 Virus (Coronavirus) by PCR Nose  - Influenza A/B antigen    COPD exacerbation (H)    You have been diagnosed with chronic obstructive pulmonary disease (COPD). This is a name given to a group of diseases that limit the flow of air in and out of your lungs. This makes it harder to breathe. With COPD, you are also more likely to get lung infections. COPD includes chronic bronchitis and emphysema. COPD is most often caused by heavy, long-term cigarette smoking.  Home care  Quit smoking    If you smoke, get help to quit. It's the best thing you can do for your COPD and your overall health.    Join a stop-smoking program. There are even telephone, text message, and online programs to help you quit.    Ask your healthcare provider about medicines or other methods to help you quit.    Ask family members to quit smoking as well.    Don't allow people to smoke in your home, in your car, or when they are around you.    Don't use e-cigarettes or vaping products because they have harmful side effects.    Bronchitis is an infection of the air passages (bronchial tubes) in your lungs. It often occurs when you have a cold. This illness is contagious during the first few days and is spread through the air by coughing and sneezing, or by direct contact (touching the sick person and then touching your own eyes, nose, or mouth).  Symptoms of bronchitis include cough with mucus (phlegm) and low-grade fever. Bronchitis usually lasts 7 to 14 days. Mild cases can be treated with simple home remedies. More severe infection is treated with an antibiotic.    - doxycycline monohydrate (MONODOX) 100 MG capsule; Take 1 capsule (100 mg) by mouth 2 times daily  - predniSONE (DELTASONE) 20 MG tablet; 1 tab po bid for 5 days, then 1 tab po every day for  "2 days, then 1/2 tab po every day for 2 days      BMI:   Estimated body mass index is 26.52 kg/m  as calculated from the following:    Height as of 4/18/22: 1.575 m (5' 2\").    Weight as of 5/24/22: 65.8 kg (145 lb).       At today's visit with Josephine Nicole , we discussed results, diagnosis, medications and formulated a plan.  We also discussed red flags for immediate return to clinic/ER, as well as indications for follow up with PCP if not improved in 3 days. Patient understood and agreed to plan. Josephine Nicole was discharged with stable vitals and has no further questions.       No follow-ups on file.    Lobo James, [facility], PA-C  [facility] URGENT [facility]    Fredo Burton is a 72 year old, presenting for the following health issues:  Cough (Cough and shortness of breath X 1 day (Pt is on COPD). Pt is coughing up thick green mucus )      HPI   Review of Systems   Constitutional, HEENT, cardiovascular, pulmonary, GI, , musculoskeletal, neuro, skin, endocrine and psych systems are negative, except as otherwise noted.      Objective    /60   Pulse 96   Temp 99.6  F (37.6  C) (Tympanic)   Resp 18   SpO2 98%   There is no height or weight on file to calculate BMI.  Physical Exam   GENERAL: healthy, alert and no distress  EYES: Eyes grossly normal to inspection, PERRL and conjunctivae and sclerae normal  HENT: ear canals and TM's normal, nose and mouth without ulcers or lesions  NECK: no adenopathy, no asymmetry, masses, or scars and thyroid normal to palpation  RESP: expiratory wheezes generalized  CV: regular rate and rhythm, normal S1 S2, no S3 or S4, no murmur, click or rub, no peripheral edema and peripheral pulses strong  MS: no gross musculoskeletal defects noted, no edema  SKIN: no suspicious lesions or rashes  NEURO: Normal strength and tone, mentation intact and speech normal  PSYCH: mentation appears normal, affect normal/bright        Results for orders placed or performed " in visit on 11/21/22   Influenza A/B antigen     Status: Normal    Specimen: Nose; Swab   Result Value Ref Range    Influenza A antigen Negative Negative    Influenza B antigen Negative Negative    Narrative    Test results must be correlated with clinical data. If necessary, results should be confirmed by a molecular assay or viral culture.

## 2022-11-22 LAB — SARS-COV-2 RNA RESP QL NAA+PROBE: NEGATIVE

## 2023-03-19 ENCOUNTER — APPOINTMENT (OUTPATIENT)
Dept: CT IMAGING | Facility: CLINIC | Age: 73
DRG: 521 | End: 2023-03-19
Attending: EMERGENCY MEDICINE
Payer: COMMERCIAL

## 2023-03-19 ENCOUNTER — APPOINTMENT (OUTPATIENT)
Dept: GENERAL RADIOLOGY | Facility: CLINIC | Age: 73
DRG: 521 | End: 2023-03-19
Attending: PHYSICIAN ASSISTANT
Payer: COMMERCIAL

## 2023-03-19 ENCOUNTER — APPOINTMENT (OUTPATIENT)
Dept: GENERAL RADIOLOGY | Facility: CLINIC | Age: 73
DRG: 521 | End: 2023-03-19
Attending: STUDENT IN AN ORGANIZED HEALTH CARE EDUCATION/TRAINING PROGRAM
Payer: COMMERCIAL

## 2023-03-19 ENCOUNTER — APPOINTMENT (OUTPATIENT)
Dept: GENERAL RADIOLOGY | Facility: CLINIC | Age: 73
DRG: 521 | End: 2023-03-19
Attending: EMERGENCY MEDICINE
Payer: COMMERCIAL

## 2023-03-19 ENCOUNTER — HOSPITAL ENCOUNTER (INPATIENT)
Facility: CLINIC | Age: 73
LOS: 4 days | Discharge: ANOTHER HOSPITAL | DRG: 521 | End: 2023-03-23
Attending: EMERGENCY MEDICINE | Admitting: INTERNAL MEDICINE
Payer: COMMERCIAL

## 2023-03-19 DIAGNOSIS — D32.0 CEREBRAL MENINGIOMA (H): ICD-10-CM

## 2023-03-19 DIAGNOSIS — S72.001A CLOSED DISPLACED FRACTURE OF RIGHT FEMORAL NECK (H): ICD-10-CM
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
## 2023-03-19 LAB
ABO/RH(D): NORMAL
ANION GAP SERPL CALCULATED.3IONS-SCNC: 8 MMOL/L (ref 7–15)
ANTIBODY SCREEN: NEGATIVE
APTT PPP: 29 SECONDS (ref 22–38)
BASOPHILS # BLD AUTO: 0 10E3/UL (ref 0–0.2)
BASOPHILS NFR BLD AUTO: 0 %
BUN SERPL-MCNC: 9.2 MG/DL (ref 8–23)
CALCIUM SERPL-MCNC: 8.9 MG/DL (ref 8.8–10.2)
CHLORIDE SERPL-SCNC: 98 MMOL/L (ref 98–107)
CREAT SERPL-MCNC: 0.52 MG/DL (ref 0.51–0.95)
DEPRECATED HCO3 PLAS-SCNC: 33 MMOL/L (ref 22–29)
EOSINOPHIL # BLD AUTO: 0.1 10E3/UL (ref 0–0.7)
EOSINOPHIL NFR BLD AUTO: 2 %
ERYTHROCYTE [DISTWIDTH] IN BLOOD BY AUTOMATED COUNT: 12.1 % (ref 10–15)
GFR SERPL CREATININE-BSD FRML MDRD: >90 ML/MIN/1.73M2
GLUCOSE SERPL-MCNC: 122 MG/DL (ref 70–99)
HCT VFR BLD AUTO: 33.8 % (ref 35–47)
HGB BLD-MCNC: 10.5 G/DL (ref 11.7–15.7)
IMM GRANULOCYTES # BLD: 0.2 10E3/UL
IMM GRANULOCYTES NFR BLD: 2 %
INR PPP: 0.96 (ref 0.85–1.15)
LYMPHOCYTES # BLD AUTO: 1.1 10E3/UL (ref 0.8–5.3)
LYMPHOCYTES NFR BLD AUTO: 13 %
MAGNESIUM SERPL-MCNC: 1.6 MG/DL (ref 1.7–2.3)
MCH RBC QN AUTO: 29.8 PG (ref 26.5–33)
MCHC RBC AUTO-ENTMCNC: 31.1 G/DL (ref 31.5–36.5)
MCV RBC AUTO: 96 FL (ref 78–100)
MONOCYTES # BLD AUTO: 0.7 10E3/UL (ref 0–1.3)
MONOCYTES NFR BLD AUTO: 8 %
NEUTROPHILS # BLD AUTO: 6.2 10E3/UL (ref 1.6–8.3)
NEUTROPHILS NFR BLD AUTO: 75 %
NRBC # BLD AUTO: 0 10E3/UL
NRBC BLD AUTO-RTO: 0 /100
PLATELET # BLD AUTO: 273 10E3/UL (ref 150–450)
POTASSIUM SERPL-SCNC: 3.9 MMOL/L (ref 3.4–5.3)
RBC # BLD AUTO: 3.52 10E6/UL (ref 3.8–5.2)
SODIUM SERPL-SCNC: 139 MMOL/L (ref 136–145)
SPECIMEN EXPIRATION DATE: NORMAL
WBC # BLD AUTO: 8.3 10E3/UL (ref 4–11)

## 2023-03-19 PROCEDURE — G0463 HOSPITAL OUTPT CLINIC VISIT: HCPCS

## 2023-03-19 PROCEDURE — 99223 1ST HOSP IP/OBS HIGH 75: CPT | Mod: AI | Performed by: PHYSICIAN ASSISTANT

## 2023-03-19 PROCEDURE — 86901 BLOOD TYPING SEROLOGIC RH(D): CPT | Performed by: EMERGENCY MEDICINE

## 2023-03-19 PROCEDURE — 99285 EMERGENCY DEPT VISIT HI MDM: CPT | Mod: 25

## 2023-03-19 PROCEDURE — 258N000003 HC RX IP 258 OP 636: Performed by: PHYSICIAN ASSISTANT

## 2023-03-19 PROCEDURE — 80048 BASIC METABOLIC PNL TOTAL CA: CPT | Performed by: EMERGENCY MEDICINE

## 2023-03-19 PROCEDURE — 120N000001 HC R&B MED SURG/OB

## 2023-03-19 PROCEDURE — 250N000011 HC RX IP 250 OP 636: Performed by: PHYSICIAN ASSISTANT

## 2023-03-19 PROCEDURE — 250N000011 HC RX IP 250 OP 636: Performed by: NURSE PRACTITIONER

## 2023-03-19 PROCEDURE — 85025 COMPLETE CBC W/AUTO DIFF WBC: CPT | Performed by: EMERGENCY MEDICINE

## 2023-03-19 PROCEDURE — 93005 ELECTROCARDIOGRAM TRACING: CPT

## 2023-03-19 PROCEDURE — 99222 1ST HOSP IP/OBS MODERATE 55: CPT | Performed by: NURSE PRACTITIONER

## 2023-03-19 PROCEDURE — 36415 COLL VENOUS BLD VENIPUNCTURE: CPT | Performed by: EMERGENCY MEDICINE

## 2023-03-19 PROCEDURE — 250N000011 HC RX IP 250 OP 636: Performed by: EMERGENCY MEDICINE

## 2023-03-19 PROCEDURE — 250N000011 HC RX IP 250 OP 636: Performed by: INTERNAL MEDICINE

## 2023-03-19 PROCEDURE — 94640 AIRWAY INHALATION TREATMENT: CPT

## 2023-03-19 PROCEDURE — 96375 TX/PRO/DX INJ NEW DRUG ADDON: CPT

## 2023-03-19 PROCEDURE — 85610 PROTHROMBIN TIME: CPT | Performed by: EMERGENCY MEDICINE

## 2023-03-19 PROCEDURE — 999N000157 HC STATISTIC RCP TIME EA 10 MIN

## 2023-03-19 PROCEDURE — 71046 X-RAY EXAM CHEST 2 VIEWS: CPT

## 2023-03-19 PROCEDURE — 73502 X-RAY EXAM HIP UNI 2-3 VIEWS: CPT

## 2023-03-19 PROCEDURE — 70450 CT HEAD/BRAIN W/O DYE: CPT

## 2023-03-19 PROCEDURE — 96374 THER/PROPH/DIAG INJ IV PUSH: CPT

## 2023-03-19 PROCEDURE — 250N000009 HC RX 250: Performed by: PHYSICIAN ASSISTANT

## 2023-03-19 PROCEDURE — 258N000003 HC RX IP 258 OP 636: Performed by: INTERNAL MEDICINE

## 2023-03-19 PROCEDURE — 96376 TX/PRO/DX INJ SAME DRUG ADON: CPT

## 2023-03-19 PROCEDURE — 250N000013 HC RX MED GY IP 250 OP 250 PS 637: Performed by: PHYSICIAN ASSISTANT

## 2023-03-19 PROCEDURE — 73552 X-RAY EXAM OF FEMUR 2/>: CPT | Mod: RT

## 2023-03-19 PROCEDURE — 85730 THROMBOPLASTIN TIME PARTIAL: CPT | Performed by: EMERGENCY MEDICINE

## 2023-03-19 PROCEDURE — 83735 ASSAY OF MAGNESIUM: CPT | Performed by: PHYSICIAN ASSISTANT

## 2023-03-19 RX ORDER — ACETAMINOPHEN 325 MG/1
975 TABLET ORAL EVERY 8 HOURS
Status: DISCONTINUED | OUTPATIENT
Start: 2023-03-19 | End: 2023-03-20 | Stop reason: DRUGHIGH

## 2023-03-19 RX ORDER — MORPHINE SULFATE 4 MG/ML
4 INJECTION, SOLUTION INTRAMUSCULAR; INTRAVENOUS
Status: DISCONTINUED | OUTPATIENT
Start: 2023-03-19 | End: 2023-03-19

## 2023-03-19 RX ORDER — DEXAMETHASONE SODIUM PHOSPHATE 10 MG/ML
10 INJECTION, SOLUTION INTRAMUSCULAR; INTRAVENOUS ONCE
Status: COMPLETED | OUTPATIENT
Start: 2023-03-19 | End: 2023-03-19

## 2023-03-19 RX ORDER — AMOXICILLIN 250 MG
2 CAPSULE ORAL 2 TIMES DAILY PRN
Status: DISCONTINUED | OUTPATIENT
Start: 2023-03-19 | End: 2023-03-23

## 2023-03-19 RX ORDER — DEXAMETHASONE SODIUM PHOSPHATE 4 MG/ML
4 INJECTION, SOLUTION INTRA-ARTICULAR; INTRALESIONAL; INTRAMUSCULAR; INTRAVENOUS; SOFT TISSUE ONCE
Status: COMPLETED | OUTPATIENT
Start: 2023-03-19 | End: 2023-03-19

## 2023-03-19 RX ORDER — SERTRALINE HYDROCHLORIDE 25 MG/1
25 TABLET, FILM COATED ORAL EVERY MORNING
Status: DISCONTINUED | OUTPATIENT
Start: 2023-03-19 | End: 2023-03-22

## 2023-03-19 RX ORDER — HYDROMORPHONE HCL IN WATER/PF 6 MG/30 ML
0.4 PATIENT CONTROLLED ANALGESIA SYRINGE INTRAVENOUS
Status: DISCONTINUED | OUTPATIENT
Start: 2023-03-19 | End: 2023-03-20

## 2023-03-19 RX ORDER — SERTRALINE HYDROCHLORIDE 25 MG/1
25 TABLET, FILM COATED ORAL EVERY MORNING
COMMUNITY

## 2023-03-19 RX ORDER — FLUTICASONE FUROATE AND VILANTEROL 200; 25 UG/1; UG/1
1 POWDER RESPIRATORY (INHALATION) DAILY
Status: DISCONTINUED | OUTPATIENT
Start: 2023-03-19 | End: 2023-03-23 | Stop reason: HOSPADM

## 2023-03-19 RX ORDER — HYDROMORPHONE HCL IN WATER/PF 6 MG/30 ML
0.2 PATIENT CONTROLLED ANALGESIA SYRINGE INTRAVENOUS
Status: DISCONTINUED | OUTPATIENT
Start: 2023-03-19 | End: 2023-03-20

## 2023-03-19 RX ORDER — MIRTAZAPINE 7.5 MG/1
7.5 TABLET, FILM COATED ORAL AT BEDTIME
Status: DISCONTINUED | OUTPATIENT
Start: 2023-03-19 | End: 2023-03-22

## 2023-03-19 RX ORDER — AMOXICILLIN 250 MG
1 CAPSULE ORAL 2 TIMES DAILY PRN
Status: DISCONTINUED | OUTPATIENT
Start: 2023-03-19 | End: 2023-03-23

## 2023-03-19 RX ORDER — SODIUM CHLORIDE, SODIUM LACTATE, POTASSIUM CHLORIDE, CALCIUM CHLORIDE 600; 310; 30; 20 MG/100ML; MG/100ML; MG/100ML; MG/100ML
INJECTION, SOLUTION INTRAVENOUS CONTINUOUS
Status: DISCONTINUED | OUTPATIENT
Start: 2023-03-19 | End: 2023-03-20

## 2023-03-19 RX ORDER — IPRATROPIUM BROMIDE AND ALBUTEROL SULFATE 2.5; .5 MG/3ML; MG/3ML
1 SOLUTION RESPIRATORY (INHALATION)
Status: DISCONTINUED | OUTPATIENT
Start: 2023-03-19 | End: 2023-03-23 | Stop reason: HOSPADM

## 2023-03-19 RX ORDER — CYANOCOBALAMIN 1000 UG/ML
1 INJECTION, SOLUTION INTRAMUSCULAR; SUBCUTANEOUS
COMMUNITY

## 2023-03-19 RX ORDER — SODIUM CHLORIDE 9 MG/ML
INJECTION, SOLUTION INTRAVENOUS CONTINUOUS
Status: DISCONTINUED | OUTPATIENT
Start: 2023-03-19 | End: 2023-03-19

## 2023-03-19 RX ORDER — LIDOCAINE 40 MG/G
CREAM TOPICAL
Status: DISCONTINUED | OUTPATIENT
Start: 2023-03-19 | End: 2023-03-20

## 2023-03-19 RX ORDER — DEXAMETHASONE SODIUM PHOSPHATE 4 MG/ML
2 INJECTION, SOLUTION INTRA-ARTICULAR; INTRALESIONAL; INTRAMUSCULAR; INTRAVENOUS; SOFT TISSUE DAILY
Status: DISCONTINUED | OUTPATIENT
Start: 2023-03-20 | End: 2023-03-21

## 2023-03-19 RX ORDER — ONDANSETRON 2 MG/ML
4 INJECTION INTRAMUSCULAR; INTRAVENOUS ONCE
Status: COMPLETED | OUTPATIENT
Start: 2023-03-19 | End: 2023-03-19

## 2023-03-19 RX ORDER — LIDOCAINE 40 MG/G
CREAM TOPICAL
Status: DISCONTINUED | OUTPATIENT
Start: 2023-03-19 | End: 2023-03-23 | Stop reason: HOSPADM

## 2023-03-19 RX ORDER — LEVOTHYROXINE SODIUM 88 UG/1
88 TABLET ORAL
Status: DISCONTINUED | OUTPATIENT
Start: 2023-03-19 | End: 2023-03-22

## 2023-03-19 RX ORDER — ALBUTEROL SULFATE 0.83 MG/ML
2.5 SOLUTION RESPIRATORY (INHALATION)
Status: DISCONTINUED | OUTPATIENT
Start: 2023-03-19 | End: 2023-03-23 | Stop reason: HOSPADM

## 2023-03-19 RX ORDER — ONDANSETRON 2 MG/ML
4 INJECTION INTRAMUSCULAR; INTRAVENOUS EVERY 6 HOURS PRN
Status: DISCONTINUED | OUTPATIENT
Start: 2023-03-19 | End: 2023-03-20 | Stop reason: DRUGHIGH

## 2023-03-19 RX ORDER — OXYCODONE HYDROCHLORIDE 5 MG/1
5 TABLET ORAL EVERY 4 HOURS PRN
Status: DISCONTINUED | OUTPATIENT
Start: 2023-03-19 | End: 2023-03-20 | Stop reason: DRUGHIGH

## 2023-03-19 RX ORDER — BUTALBITAL, ACETAMINOPHEN AND CAFFEINE 50; 325; 40 MG/1; MG/1; MG/1
1 TABLET ORAL EVERY 6 HOURS PRN
Status: ON HOLD | COMMUNITY
End: 2023-04-27

## 2023-03-19 RX ORDER — FAMOTIDINE 20 MG/1
20 TABLET, FILM COATED ORAL 2 TIMES DAILY
Status: DISCONTINUED | OUTPATIENT
Start: 2023-03-19 | End: 2023-03-23 | Stop reason: HOSPADM

## 2023-03-19 RX ORDER — ONDANSETRON 4 MG/1
4 TABLET, ORALLY DISINTEGRATING ORAL EVERY 6 HOURS PRN
Status: DISCONTINUED | OUTPATIENT
Start: 2023-03-19 | End: 2023-03-20 | Stop reason: DRUGHIGH

## 2023-03-19 RX ADMIN — SODIUM CHLORIDE: 900 INJECTION INTRAVENOUS at 10:33

## 2023-03-19 RX ADMIN — IPRATROPIUM BROMIDE AND ALBUTEROL SULFATE 3 ML: 2.5; .5 SOLUTION RESPIRATORY (INHALATION) at 15:28

## 2023-03-19 RX ADMIN — HYDROMORPHONE HYDROCHLORIDE 0.2 MG: 0.2 INJECTION, SOLUTION INTRAMUSCULAR; INTRAVENOUS; SUBCUTANEOUS at 13:45

## 2023-03-19 RX ADMIN — MORPHINE SULFATE 4 MG: 4 INJECTION, SOLUTION INTRAMUSCULAR; INTRAVENOUS at 05:22

## 2023-03-19 RX ADMIN — DEXAMETHASONE SODIUM PHOSPHATE 10 MG: 10 INJECTION INTRAMUSCULAR; INTRAVENOUS at 20:55

## 2023-03-19 RX ADMIN — IPRATROPIUM BROMIDE AND ALBUTEROL SULFATE 3 ML: 2.5; .5 SOLUTION RESPIRATORY (INHALATION) at 10:24

## 2023-03-19 RX ADMIN — MIRTAZAPINE 7.5 MG: 7.5 TABLET, FILM COATED ORAL at 22:25

## 2023-03-19 RX ADMIN — FAMOTIDINE 20 MG: 10 INJECTION, SOLUTION INTRAVENOUS at 20:55

## 2023-03-19 RX ADMIN — DEXAMETHASONE SODIUM PHOSPHATE 4 MG: 4 INJECTION, SOLUTION INTRAMUSCULAR; INTRAVENOUS at 07:19

## 2023-03-19 RX ADMIN — ONDANSETRON 4 MG: 2 INJECTION INTRAMUSCULAR; INTRAVENOUS at 05:22

## 2023-03-19 RX ADMIN — MORPHINE SULFATE 4 MG: 4 INJECTION, SOLUTION INTRAMUSCULAR; INTRAVENOUS at 06:22

## 2023-03-19 RX ADMIN — IPRATROPIUM BROMIDE AND ALBUTEROL SULFATE 3 ML: 2.5; .5 SOLUTION RESPIRATORY (INHALATION) at 19:16

## 2023-03-19 RX ADMIN — SODIUM CHLORIDE, POTASSIUM CHLORIDE, SODIUM LACTATE AND CALCIUM CHLORIDE: 600; 310; 30; 20 INJECTION, SOLUTION INTRAVENOUS at 14:41

## 2023-03-19 RX ADMIN — IPRATROPIUM BROMIDE AND ALBUTEROL SULFATE 3 ML: 2.5; .5 SOLUTION RESPIRATORY (INHALATION) at 13:44

## 2023-03-19 RX ADMIN — HYDROMORPHONE HYDROCHLORIDE 0.2 MG: 0.2 INJECTION, SOLUTION INTRAMUSCULAR; INTRAVENOUS; SUBCUTANEOUS at 22:57

## 2023-03-19 ASSESSMENT — ACTIVITIES OF DAILY LIVING (ADL)
ADLS_ACUITY_SCORE: 33
ADLS_ACUITY_SCORE: 35

## 2023-03-19 ASSESSMENT — ENCOUNTER SYMPTOMS
DIZZINESS: 1
ARTHRALGIAS: 1
SHORTNESS OF BREATH: 0
HEADACHES: 0

## 2023-03-19 NOTE — H&P
[facility]  Internal Medicine  History and Physical      Patient Name: Josephine Nicole MRN# [id number]   Age: 73 year old YOB: 1950     Date of Admission:3/19/2023    Primary care provider: No Ref-Primary, Physician  Date of Service: 3/19/2023         Assessment and Plan:   Josephine Nicole is a 73 year old female with a history of B12 Deficiency, Melanoma, COPD, Former Smoker, Oxygen Dependent, Depression, Anxiety, HLD, Hypothyroidism, Osteoporosis, Cerebral Meningioma who presented to the ED today with right hip pain after a fall.      Displaced Right Femoral Neck Fracture - s/p mechanical fall.  Xray revealed a displaced right femoral neck fracture and a subtle irregularity of the parasymphyseal aspect of the left pubic bone, potentially representing an age indeterminant fracture.   - non weight bearing to RLE  - analgesics prn  - Orthopedic Surgery consult    Encephalopathy  Cerebral Meningioma - pt was diagnosed 9/2019 with a cerebral meningioma and monitored.  Given progression of the tumor, patient underwent 15 sessions of radiation 11/2022.  She is followed by Dr. Godoy, a radiation Oncologist through [facility].  Pt was on oral steroids which were weaned off about 4 weeks ago.  Since that time, patient has had increased confusion, poor balance, changes in mood.  Recent MRI Brain 3/17/23 revealed enlargement of meningioma up to 45 mm with a significant mass effect and the midline shift right to left.  Patient was instructed by her PCP to follow up with outpatient Neurology and Radiation Oncology this week.  - CT head on admission today with no acute hemorrhage, but notes the known mass surrounding vasogenic edema predominantly involving the right frontal lobe with right to left midline shift/subfalcine herniation measuring up to 1.1 cm.  - Neurosurgery was consulted in the ED, ok to stay at [facility] and start IV Decadron  - formal Neurosurgery consult requested  - [facility]  "consult    COPD, Oxygen Dependent  Acute on Chronic Hypoxic Respiratory Failure - former smoker of 50 years; quit around 5 years go.  On 2 liters of oxygen at baseline for the past 5 years.  Requiring 3-4 liters on admission.  No significant wheezing, increased sedation from baseline due to brain mass and after narcotics is likely contributing.  - continue pta Advair, schedule Duonebs, Albuterol neb prn  - obtain CXR     Chronic Anemia - hgb 10.5 at baseline 10-11.     HLD - resume Atorvastatin upon discharge    Hypothyroidism - continue Levothyroxine    Depression/Anxiety - continue Sertraline and Remeron.  Hold prn Ambien, prn Atarax, prn Xanax pta due to increased sedation currently after receiving narcotics.    CODE: full  Diet/IVF: npo, NS  DVT ppx: scd    Jovana Ceja MS PA-C  Physician Assistant   Hospitalist Service  Pager: [phone]           Chief Complaint:   Right Hip pain, fall         HPI:   73 year old female with a history of B12 Deficiency, Melanoma, COPD, Former Smoker, Oxygen Dependent, Depression, Anxiety, HLD, Hypothyroidism, Osteoporosis, Cerebral Meningioma who presented to the ED today with right hip pain after a fall.    Patient lives with her daughter.  History obtained from the daughter as patient is not able to give a history.  Patient is arousable, follows simple commands, knows she is at [place] and oriented to self and daughter.  Has baseline confusion and has received IV Morphine.    Per daughter, patient was diagnosed with a meningioma in 2019.  This was monitored by her providers.  She continued to have headaches and progression of the mass and eventually required 15 sessions of radiation which was completed in 11/2022.  She was on oral steroids which were tapered off about 4 weeks ago.  Since being off the steroids, patient has had increased confusion, poor memory, some changes in her mood, poor balance, \"nodding off\", \"not making sense\".  She was evaluated by her PCP last week " for these concerns and an MRI was ordered and completed on 3/17 which revealed enlargement of meningioma up to 45 mm with a significant mass effect and the midline shift right to left.  Patient was instructed by her PCP to follow up with outpatient Neurology and Radiation Oncology this week.      Patient lives with her daughter and sleeps in a recliner.  Early this morning around 3am, patient was walking and tripped over her own feet and feel on to her right side.  Daughter notes increased sedation currently from her baseline after receiving narcotics.         Past Medical History:     Past Medical History:   Diagnosis Date     Cerebral meningioma      COPD (chronic obstructive pulmonary disease)      Depressive disorder      Eczema      Eczema      Hyperlipidemia      Hypothyroidism      Melanoma of skin      Osteoporosis           Past Surgical History:     Past Surgical History:   Procedure Laterality Date     COLONOSCOPY N/A 04/18/2022    Procedure: COLONOSCOPY;  Surgeon: Abimbola Handley MD;  Location:  GI     Tubal ligation NOS            Social History:     Social History     Socioeconomic History     Marital status:      Spouse name: Not on file     Number of children: Not on file     Years of education: Not on file     Highest education level: Not on file   Occupational History     Not on file   Tobacco Use     Smoking status: Former     Years: 50.00     Types: Cigarettes     Smokeless tobacco: Never   Substance and Sexual Activity     Alcohol use: Not Currently     Drug use: Never     Sexual activity: Not on file   Other Topics Concern     Not on file   Social History Narrative     Not on file     Social Determinants of Health     Financial Resource Strain: Not on file   Food Insecurity: Not on file   Transportation Needs: Not on file   Physical Activity: Not on file   Stress: Not on file   Social Connections: Not on file   Intimate Partner Violence: Not on file   Housing Stability: Not on  file          Family History:   No family history on file.       Allergies:      Allergies   Allergen Reactions     Bupropion Anaphylaxis, Hives and Shortness Of Breath          Medications:     Prior to Admission medications    Medication Sig Last Dose Taking? Auth Provider Long Term End Date   albuterol (PROVENTIL) (2.5 MG/3ML) 0.083% neb solution Inhale 2.5 mg into the lungs   Reported, Patient Yes    alendronate (FOSAMAX) 70 MG tablet Take 70 mg by mouth   Reported, Patient Yes    ALPRAZolam (XANAX) 0.5 MG tablet Take one half of the tablet for anxiety once daily as needed.   Reported, Patient     atorvastatin (LIPITOR) 10 MG tablet Take 10 mg by mouth   Reported, Patient Yes    clobetasol (TEMOVATE) 0.05 % external cream    Reported, Patient     clobetasol (TEMOVATE) 0.05 % external ointment    Reported, Patient     diazepam (VALIUM) 5 MG tablet TAKE 1 TABLET BY MOUTH 30 MINUTES PRIOR TO MRI. MAY REPEAT 30 MINUTES LATER IF NEEDED.   Reported, Patient     fluticasone-salmeterol (ADVAIR) 500-50 MCG/DOSE inhaler Inhale 1 puff into the lungs   Reported, Patient Yes    hydrOXYzine (ATARAX) 10 MG tablet Take 10 mg by mouth   Reported, Patient     ipratropium - albuterol 0.5 mg/2.5 mg/3 mL (DUONEB) 0.5-2.5 (3) MG/3ML neb solution 1 neb every 4-6 hours as needed.   Reported, Patient Yes    levalbuterol (XOPENEX HFA) 45 MCG/ACT inhaler Inhale 2 puffs into the lungs   Reported, Patient Yes    levothyroxine (SYNTHROID/LEVOTHROID) 88 MCG tablet Take 88 mcg by mouth   Reported, Patient Yes    mirtazapine (REMERON) 15 MG tablet Take 15 mg by mouth   Reported, Patient Yes    triamcinolone (KENALOG) 0.1 % external cream    Reported, Patient     zolpidem (AMBIEN) 5 MG tablet Take 5 mg by mouth   Reported, Patient            Review of Systems:   A complete ROS was performed and is negative other than what is stated in the HPI.       Physical Exam:   Blood pressure 110/76, pulse 86, temperature 98.6  F (37  C), temperature source  Temporal, resp. rate 18, weight 65.8 kg (145 lb), SpO2 91 %, not currently breastfeeding.  General: sleeping, awakens to voice, quickly drifts back to sleep  HEENT: AT/NC, sclera anicteric, PERRL  Chest/Resp: clear to auscultation bilaterally, no crackles or wheezes  Heart/CV: regular rate and rhythm, no murmur  Abdomen/GI: Soft, nontender, nondistended. +BS.  No rebound or guarding.  Extremities/MSK: Trace LE edema.  Right lower extremity is externally rotated and shortened.  Intermittent twitching of BLE  Skin: Warm and dry  Neuro: Alert & oriented to person, family and place.  Able to follow simple commands.  Not oriented to time,  Unable to give a history or tell me how she fell.  Quickly drifts back to sleep.         Labs:   ROUTINE ICU LABS (Last four results)  CMP  Recent Labs   Lab 03/19/23 0518      POTASSIUM 3.9   CHLORIDE 98   CO2 33*   ANIONGAP 8   *   BUN 9.2   CR 0.52   GFRESTIMATED >90   CHAO 8.9     CBC  Recent Labs   Lab 03/19/23 0518   WBC 8.3   RBC 3.52*   HGB 10.5*   HCT 33.8*   MCV 96   MCH 29.8   MCHC 31.1*   RDW 12.1        INR  Recent Labs   Lab 03/19/23 0518   INR 0.96     Arterial Blood GasNo lab results found in last 7 days.       Imaging/Procedures:     Results for orders placed or performed during the hospital encounter of 03/19/23   Head CT w/o contrast    Narrative    EXAM: CT HEAD WITHOUT CONTRAST  LOCATION: [facility]  DATE/TIME: 03/19/2023, 5:56 AM    INDICATION: Fall. Hip pain. Traumatic injury. Known brain tumor.  COMPARISON: None.  TECHNIQUE: Routine CT Head without IV contrast. Multiplanar reformats. Dose reduction techniques were used.    FINDINGS:  INTRACRANIAL CONTENTS: No acute hemorrhage or abnormal extra-axial fluid collection. There is a large partially calcified and mildly hyperdense presumed extra-axial mass directly abutting the right anterior/inferior falx measuring 3.8 cm in AP   dimensions, 2.8 cm in transverse dimensions  and 3.2 cm in craniocaudal dimensions. There is moderate surrounding vasogenic edema, predominantly involving the right frontal lobe. There is associated mass effect with right to left subfalcine herniation   measuring approximately 1.1 cm. The anterior horns of the bilateral lateral ventricles are effaced, right worse than left. No definite hydrocephalus identified. No CT evidence of acute infarct. Minimal presumed chronic small vessel ischemic changes. Mild   generalized volume loss. No hydrocephalus.     VISUALIZED ORBITS/SINUSES/MASTOIDS: No intraorbital abnormality. No paranasal sinus mucosal disease. Trace left mastoid effusion.    BONES/SOFT TISSUES: No calvarial fracture. Bilateral temporomandibular joint osteoarthritis. There is partial subluxation of the bilateral mandibular condyles anteriorly, likely related to open-mouth positioning versus fixed subluxation.        Impression    IMPRESSION:  1.  No acute intracranial hemorrhage or abnormal extra-axial fluid collection.  2.  Prominent partially calcified extra-axial mass along the right anterior/inferior falx measuring up to 3.8 cm in greatest axial dimensions. There is moderate surrounding vasogenic edema predominantly involving the right frontal lobe with right to left   midline shift/subfalcine herniation measuring up to 1.1 cm. Recommend comparison with prior imaging to assess for stability. Consider neurosurgical consultation for further assessment, as clinically indicated.  3.  Partial subluxation of the bilateral mandible condyles anteriorly, possibly related to open-mouth positioning versus fixed subluxation. Correlation with physical examination is recommended.      Dr. Mino Hartley discussed results with DR. KIERA Britton on 03/19/2023 at 6:11 AM.       XR Pelvis w Hip Right 1 View    Narrative    EXAM: XR PELVIS AND HIP RIGHT 1 VIEW  LOCATION: [facility]  DATE/TIME: 3/19/2023 6:14 AM    INDICATION: Fall, right hip  pain  COMPARISON: None.      Impression    IMPRESSION: There is a displaced right femoral neck fracture. The femoral head remains seated within the acetabulum. The left hip is intact. Subtle irregularity of the parasymphyseal aspect of the left pubic bone, potentially representing an age   indeterminant fracture. CT may be beneficial for further evaluation.

## 2023-03-19 NOTE — PHARMACY-ADMISSION MEDICATION HISTORY
Admission medication history interview status for this patient is complete. See [facility] admission navigator for allergy information, prior to admission medications and immunization status.     Medication history interview done, indicate source(s): Daughter in room   Medication history resources (including written lists, pill bottles, clinic record):[facility] Care Everywhere  Pharmacy: [id number] - [place], [place] - [address] [place] [facility] [address]    Changes made to PTA medication list:  Added: sertarline  Changed: added sigs to majority of med entries   Reported as Not Taking: none  Removed: clobetasol cream/ointment, triamcinolone cream    Actions taken by pharmacist (provider contacted, etc):Left provider sticky note     Additional medication history information: Daughter in room stated that pt manages her own medications but pt was too confused to review medications. Daughter had medication list that was same list as in Epic, she was able to review most medications with me, though could not provide more information than medication name. States pt gets majority of medications via mail order which would explain lack of surescripts information.   -Pt had a wellness visit at [facility] in Jan 2023 so updated PTA list to match [facility] medication list.     -Daughter reports pt only uses nebs when ill.   -Has been off steroids for 2-3 weeks.       Medication reconciliation/reorder completed by provider prior to medication history?  N   (Y/N)     Medication Affordability: Unable to assess.     Prior to Admission medications    Medication Sig Last Dose Taking? Auth Provider Long Term End Date   alendronate (FOSAMAX) 70 MG tablet Take 70 mg by mouth every 7 days Past Week at - Yes Reported, Patient Yes    atorvastatin (LIPITOR) 10 MG tablet Take 10 mg by mouth daily 3/18/2023 at am Yes Reported, Patient Yes    butalbital-acetaminophen-caffeine (ESGIC) -40 MG tablet Take 1 tablet by mouth every 6 hours  as needed for headaches Max 6 doses per day. prn at prn Yes Unknown, Entered By History     cyanocobalamin (CYANOCOBALAMIN) 1000 MCG/ML injection Inject 1 mL into the muscle every 30 days due for injection at due for injection Yes Unknown, Entered By History     fluticasone-salmeterol (ADVAIR) 500-50 MCG/DOSE inhaler Inhale 1 puff into the lungs 2 times daily 3/18/2023 at am Yes Reported, Patient Yes    levothyroxine (SYNTHROID/LEVOTHROID) 88 MCG tablet Take 88 mcg by mouth daily 3/18/2023 at am Yes Reported, Patient Yes    mirtazapine (REMERON) 15 MG tablet Take 7.5 mg by mouth At Bedtime 3/17/2023 at hs Yes Reported, Patient Yes    sertraline (ZOLOFT) 25 MG tablet Take 25 mg by mouth every morning 3/19/2023 at am Yes Unknown, Entered By History Yes    zolpidem (AMBIEN) 5 MG tablet Take 5 mg by mouth nightly as needed for sleep Past Week at - Yes Reported, Patient     albuterol (PROVENTIL) (2.5 MG/3ML) 0.083% neb solution Take 2.5 mg by nebulization 3 times daily as needed for shortness of breath, wheezing or cough prn at prn  Reported, Patient Yes    ALPRAZolam (XANAX) 0.5 MG tablet Take 0.25 mg by mouth daily as needed for anxiety prn at prn  Reported, Patient     hydrOXYzine (ATARAX) 10 MG tablet Take 10 mg by mouth every 8 hours as needed for anxiety prn at prn  Reported, Patient     ipratropium - albuterol 0.5 mg/2.5 mg/3 mL (DUONEB) 0.5-2.5 (3) MG/3ML neb solution Take 1 vial by nebulization every 6 hours as needed for shortness of breath, wheezing or cough prn at prn  Reported, Patient Yes    levalbuterol (XOPENEX HFA) 45 MCG/ACT inhaler Inhale 2 puffs into the lungs every 4 hours as needed for shortness of breath or wheezing prn at prn  Reported, Patient Yes

## 2023-03-19 NOTE — ED NOTES
"[facility] [place]  ED Nurse Handoff Report    Josephine Nicole is a 73 year old female   ED Chief complaint: Fall  . ED Diagnosis:   Final diagnoses:   Fall, initial encounter   Closed displaced fracture of right femoral neck (H)   Cerebral meningioma (H)     Allergies:   Allergies   Allergen Reactions     Bupropion Anaphylaxis, Hives and Shortness Of Breath       Code Status: Full Code  Activity level - Baseline/Home:  Independent. Activity Level - Current:   Assist X 2. Lift room needed: No. Bariatric: No   Needed: No   Isolation: No. Infection: Not Applicable.     Vital Signs:   Vitals:    03/19/23 0730 03/19/23 0750 03/19/23 0800 03/19/23 0820   BP: 102/58 101/70 109/67 101/65   Pulse: 88  94 97   Resp:       Temp:       TempSrc:       SpO2: 93% 91% (!) 87% 91%   Weight:           Cardiac Rhythm:  ,      Pain level:    Patient confused: Yes. Patient Falls Risk: Yes.   Elimination Status: has not yet voided; purewick in place   Patient Report - Initial Complaint: fall, right hip pain. Focused Assessment: Josephine Nicole is a 73 year old female with a history of cerebral meningioma s/p radiation therapy, hyperlipidemia, and hypothyroidism who presents following a fall with right medial hip pain. Denies hitting her head. She was standing at her card table and turned around, and states \"something happened with my feet,\" causing her to fall. She fell directly onto her right side. EMS delivered 25 of fentanyl en route. Denies any shortness of breath, chest pain, or headache prior to falling. She reports she had another fall about a month ago.      Independent Historian: Her daughter states the patient is not on blood thinners. She reports she underwent radiation therapy for the brain tumor 4 months ago. An MRI on Monday showed the tumor had grown since stopping radiation therapy. They stopped the steroid therapy she had been on about 4 weeks ago.  Since that time, she has been having increasing " balance issues and confusion.   MRI was to help determine plan of action in terms of next steps in treatment regarding steroids and possible neurosurgical consultation if needed.    Tests Performed: imaging, labs. Abnormal Results:   XR Pelvis w Hip Right 1 View   Final Result   IMPRESSION: There is a displaced right femoral neck fracture. The femoral head remains seated within the acetabulum. The left hip is intact. Subtle irregularity of the parasymphyseal aspect of the left pubic bone, potentially representing an age    indeterminant fracture. CT may be beneficial for further evaluation.      Head CT w/o contrast   Final Result   IMPRESSION:   1.  No acute intracranial hemorrhage or abnormal extra-axial fluid collection.   2.  Prominent partially calcified extra-axial mass along the right anterior/inferior falx measuring up to 3.8 cm in greatest axial dimensions. There is moderate surrounding vasogenic edema predominantly involving the right frontal lobe with right to left    midline shift/subfalcine herniation measuring up to 1.1 cm. Recommend comparison with prior imaging to assess for stability. Consider neurosurgical consultation for further assessment, as clinically indicated.   3.  Partial subluxation of the bilateral mandible condyles anteriorly, possibly related to open-mouth positioning versus fixed subluxation. Correlation with physical examination is recommended.         Dr. Mino Hartley discussed results with DR. KIERA Britton on 03/19/2023 at 6:11 AM.              Labs Ordered and Resulted from Time of ED Arrival to Time of ED Departure   BASIC METABOLIC PANEL - Abnormal       Result Value    Sodium 139      Potassium 3.9      Chloride 98      Carbon Dioxide (CO2) 33 (*)     Anion Gap 8      Urea Nitrogen 9.2      Creatinine 0.52      Calcium 8.9      Glucose 122 (*)     GFR Estimate >90     CBC WITH PLATELETS AND DIFFERENTIAL - Abnormal    WBC Count 8.3      RBC Count 3.52 (*)     Hemoglobin 10.5  (*)     Hematocrit 33.8 (*)     MCV 96      MCH 29.8      MCHC 31.1 (*)     RDW 12.1      Platelet Count 273      % Neutrophils 75      % Lymphocytes 13      % Monocytes 8      % Eosinophils 2      % Basophils 0      % Immature Granulocytes 2      NRBCs per 100 WBC 0      Absolute Neutrophils 6.2      Absolute Lymphocytes 1.1      Absolute Monocytes 0.7      Absolute Eosinophils 0.1      Absolute Basophils 0.0      Absolute Immature Granulocytes 0.2      Absolute NRBCs 0.0     INR - Normal    INR 0.96     PARTIAL THROMBOPLASTIN TIME - Normal    aPTT 29     TYPE AND SCREEN, ADULT    ABO/RH(D) A POS      Antibody Screen Negative      SPECIMEN EXPIRATION DATE [id number]     ABO/RH TYPE AND SCREEN   .   Treatments provided: decadron, zofran, morphine  Family Comments: daughter with pt  OBS brochure/video discussed/provided to patient:  N/A  ED Medications:   Medications   lidocaine 1 % 0.1-1 mL (has no administration in time range)   lidocaine (LMX4) cream (has no administration in time range)   sodium chloride (PF) 0.9% PF flush 3 mL (3 mLs Intracatheter $Given 3/19/23 0720)   sodium chloride (PF) 0.9% PF flush 3 mL (has no administration in time range)   morphine (PF) injection 4 mg (4 mg Intravenous $Given 3/19/23 0622)   ondansetron (ZOFRAN) injection 4 mg (4 mg Intravenous $Given 3/19/23 0522)   dexamethasone (DECADRON) injection 4 mg (4 mg Intravenous $Given 3/19/23 0719)     Drips infusing:  No  For the majority of the shift, the patient's behavior Green. Interventions performed were NA.    Sepsis treatment initiated: No     Patient tested for COVID 19 prior to admission: NO    ED Nurse Name/Phone Number: Kathy Mg RN,   8:35 AM    RECEIVING UNIT ED HANDOFF REVIEW    Above ED Nurse Handoff Report was reviewed: Yes  Reviewed by: Lyn Caicedo RN on March 19, 2023 at 5:34 PM

## 2023-03-19 NOTE — PROGRESS NOTES
Spoke to Jovana Ceja from primary team.  Patient is hospitalized due to complications from an enlarging meningioma with vasogenic edema.  There is unfortunately nothing we can offer from a medical oncology standpoint.    The patient has seen Dr. Godoy from radiation oncology at [facility].  We will update Dr. Godoy's team and put her in touch with neurosurgery on Monday.  Agree with starting IV steroids.  If in person radiation oncology consult is needed this weekend, would place urgent consult for O radiation oncology.    Jorge Jean M.D.  [facility]  [phone]

## 2023-03-19 NOTE — PLAN OF CARE
Orthopedic surgery    Josephine Nicole is a 73-year-old female with a history of meningioma with vasogenic edema, COPD, hypothyroid, chronic anemia who presents with a right femoral neck fracture.  Patient lives with her daughter and is ambulatory.  She tripped over her own feet and fell on her right side.  Radiographs reveal a displaced femoral neck fracture.  Recommend a right hip hemiarthroplasty to allow for function and pain relief.  Full consult to follow.  Plan for potential surgery tomorrow pending medical optimization    Update:  I spoke with the family regarding her femoral neck fracture.  They informed me she has not been ambulating much recently and has had frequent falls recently.  We discussed possible treatment options including nonoperative and operative intervention along with the expected risks and recovery.  After thorough discussion, they are in agreement to proceed with surgery.     Plan for a right hip hemiarthroplasty tomorrow pending medical optimization   NPO at midnight      JEREMIAH ZAVALA MD

## 2023-03-19 NOTE — ED PROVIDER NOTES
"  History     Chief Complaint:  Fall     The history is provided by the patient and a relative.      Josephine Nicole is a 73 year old female with a history of cerebral meningioma s/p radiation therapy, hyperlipidemia, and hypothyroidism who presents following a fall with right medial hip pain. Denies hitting her head. She was standing at her card table and turned around, and states \"something happened with my feet,\" causing her to fall. She fell directly onto her right side. EMS delivered 25 of fentanyl en route. Denies any shortness of breath, chest pain, or headache prior to falling. She reports she had another fall about a month ago.     Independent Historian: Her daughter states the patient is not on blood thinners. She reports she underwent radiation therapy for the brain tumor 4 months ago. An MRI on Monday showed the tumor had grown since stopping radiation therapy. They stopped the steroid therapy she had been on about 4 weeks ago.  Since that time, she has been having increasing balance issues and confusion.   MRI was to help determine plan of action in terms of next steps in treatment regarding steroids and possible neurosurgical consultation if needed.     Review of External Notes: I reviewed Care Everywhere.  MRI result from 3/17 in [facility] system reviewed.     Review of Systems   Respiratory: Negative for shortness of breath.    Cardiovascular: Negative for chest pain.   Musculoskeletal: Positive for arthralgias (right hip).   Neurological: Positive for dizziness. Negative for headaches.   All other systems reviewed and are negative.    Allergies:  Bupropion     Medications:    Proventil   Fosamax  Xanax  Lipitor  Valium   Advair  Atarax  Duoneb  Xopenex HFA  Synthroid  Remeron  Ambien    Past Medical History:    Cerebral meningioma  COPD   Depressive disorder   Eczema  Hyperlipidemia   Hypothyroidism   Melanoma  Osteoporosis   Anxiety disorder     Past Surgical History:    Colonoscopy   Tubal ligation "     Family History:    Brother- diabetes mellitus, hypothyroidism  Daughter- hypothyroidism   Mother- osteoporosis, scoliosis, stroke  Sister- lung cancer     Social History:  The patient presents to the ED with her daughter and two other family members via EMS.  Patient does not drink alcohol.  PCP: No Ref-Primary, Physician     Physical Exam     Patient Vitals for the past 24 hrs:   BP Temp Temp src Pulse Resp SpO2 Weight   03/19/23 0631 -- -- -- -- -- 93 % --   03/19/23 0630 95/56 -- -- 80 -- 93 % --   03/19/23 0508 142/78 98.6  F (37  C) Temporal 103 24 90 % 65.8 kg (145 lb)      Physical Exam  Nursing note and vitals reviewed.  Constitutional: Cooperative.   HENT:   Mouth/Throat: Mucous membranes are normal.   Full ROM of neck.   Eyes: Pupils are equal, round, and reactive to light. EOMI  Cardiovascular: Normal rate, regular rhythm and normal heart sounds.  No murmur.  Pulmonary/Chest: Effort normal and breath sounds normal. No respiratory distress. No wheezes. No rales.   Abdominal: Soft. Normal appearance. There is no tenderness. There is no rigidity and no guarding.   Musculoskeletal: Normal range of motion of UE's and LLE. Right lower extremity is externally rotated and foreshortened.  Neurological: Alert. GCS 15. Oriented x4.  LLE: Normal sensation. Able to wiggle toes.  Skin: Skin is warm and dry.   Psychiatric: Normal mood and affect.     Emergency Department Course     Imaging:  XR Pelvis w Hip Right 1 View   Final Result   IMPRESSION: There is a displaced right femoral neck fracture. The femoral head remains seated within the acetabulum. The left hip is intact. Subtle irregularity of the parasymphyseal aspect of the left pubic bone, potentially representing an age    indeterminant fracture. CT may be beneficial for further evaluation.      Head CT w/o contrast   Preliminary Result   IMPRESSION:   1.  No acute intracranial hemorrhage or abnormal extra-axial fluid collection.   2.  Prominent partially  calcified extra-axial mass along the right anterior/inferior falx measuring up to 3.8 cm in greatest axial dimensions. There is moderate surrounding vasogenic edema predominantly involving the right frontal lobe with right to left    midline shift/subfalcine herniation measuring up to 1.1 cm. Recommend comparison with prior imaging to assess for stability. Consider neurosurgical consultation for further assessment.   3.  Partial subluxation of the bilateral mandible condyles anteriorly, possibly related to open-mouth positioning versus fixed subluxation. Correlation with physical examination is recommended.   Report per radiology    Laboratory:  Labs Ordered and Resulted from Time of ED Arrival to Time of ED Departure   BASIC METABOLIC PANEL - Abnormal       Result Value    Sodium 139      Potassium 3.9      Chloride 98      Carbon Dioxide (CO2) 33 (*)     Anion Gap 8      Urea Nitrogen 9.2      Creatinine 0.52      Calcium 8.9      Glucose 122 (*)     GFR Estimate >90     CBC WITH PLATELETS AND DIFFERENTIAL - Abnormal    WBC Count 8.3      RBC Count 3.52 (*)     Hemoglobin 10.5 (*)     Hematocrit 33.8 (*)     MCV 96      MCH 29.8      MCHC 31.1 (*)     RDW 12.1      Platelet Count 273      % Neutrophils 75      % Lymphocytes 13      % Monocytes 8      % Eosinophils 2      % Basophils 0      % Immature Granulocytes 2      NRBCs per 100 WBC 0      Absolute Neutrophils 6.2      Absolute Lymphocytes 1.1      Absolute Monocytes 0.7      Absolute Eosinophils 0.1      Absolute Basophils 0.0      Absolute Immature Granulocytes 0.2      Absolute NRBCs 0.0     INR - Normal    INR 0.96     PARTIAL THROMBOPLASTIN TIME - Normal    aPTT 29     TYPE AND SCREEN, ADULT    ABO/RH(D) A POS      Antibody Screen Negative      SPECIMEN EXPIRATION DATE [id number]          ED Course as of 03/19/23 0650   Sun Mar 19, 2023   0508 I obtained the history and examined the patient as noted above.    0616 I rechecked and updated the patient  and family.    0645 I rechecked and updated the patient and family.      Interventions:  Medications   morphine (PF) injection 4 mg (4 mg Intravenous $Given 3/19/23 0622)   dexamethasone (DECADRON) injection 4 mg (has no administration in time range)   ondansetron (ZOFRAN) injection 4 mg (4 mg Intravenous $Given 3/19/23 0522)      Independent Interpretation (X-rays, CTs, rhythm strip):  Hip X-ray reviewed:  Femoral neck displaced fracture.    CT Head reviewed: Left frontal tumor with vasogenic edema and shift.     Consultations/Discussion of Management or Tests:  0611 I spoke with [facility] regarding the patient.  0628 I spoke with Jovana Perdomo NP, of the neurosurgery team, regarding the patient.  0715 I consulted with the hospitalist team, regarding the patient. They accepted the patient for admission.     Social Determinants of Health affecting care:  None     Disposition:  The patient was admitted to the hospital.     Impression & Plan      Medical Decision Making:  Ms. Nicole is a 73 year old female who presents after a mechanical fall. Sounds like she simply got her feet crossed. No concern for an acute cardiogenic or neurologic cause. That said, she is being managed for an enlarging right meningioma with surrounding edema which has likely been increasing since cessation of steroid therapy 4 weeks ago. Her MRI from Friday showed midline shift of 1.1 cm, which is consistent with today's CT. There is no new acute bleed or fracture today as a result of the fall. She is otherwise neuro intact and at her baseline. It sounds like the plan was to follow up with their oncology team to discuss further options later this week. I have discussed the findings with our neurosurgical PA consultants. From their perspective after initial discussion the patient would be stable to stay at [facility] as there does not appear to be an acute neurosurgical need for transfer. This will be confirmed with the neurosurgical  attending. I will start IV Decadron given the clinical story to help reduce edema.  Patient's pain has been well-controlled in regards to her hip fracture. She's neurovascularly intact distally in that extremity and will be likely admitted later today for orthopedic consultation.    Diagnosis:    ICD-10-CM    1. Fall, initial encounter  W19.XXXA       2. Closed displaced fracture of right femoral neck (H)  S72.001A       3. Cerebral meningioma (H)  D32.0           Scribe Disclosure:  I, Brooklyn Cross, am serving as a scribe at 6:14 AM on 3/19/2023 to document services personally performed by Nate Britton MD based on my observations and the provider's statements to me.     3/19/2023   Nate Britton MD Amdahl, John, MD  03/19/23 0709

## 2023-03-19 NOTE — ED NOTES
[facility]  ED Nurse Handoff Report    Josephine Nicole is a 73 year old female   ED Chief complaint: Fall  . ED Diagnosis:   Final diagnoses:   None     Allergies:   Allergies   Allergen Reactions    Bupropion Anaphylaxis, Hives and Shortness Of Breath       Code Status: Full Code  Activity level - Baseline/Home:  Independent. Activity Level - Current:   Total Care. Lift room needed: No. Bariatric: No   Needed: No   Isolation: No. Infection: Not Applicable.     Vital Signs:   Vitals:    03/19/23 0508   BP: (!) 142/78   Pulse: 103   Resp: 24   Temp: 98.6  F (37  C)   TempSrc: Temporal   SpO2: 90%   Weight: 65.8 kg (145 lb)       Cardiac Rhythm:  ,      Pain level:    Patient confused: No. Patient Falls Risk: Yes.   Elimination Status:  has purewik in place    Patient Report - Initial Complaint:   Pt was turning to walk to the bathroom and fell. Pt c/o right hip pain. Pt has hx of COPD on O2 at 2liters per NC    . Focused Assessment: fall right hip pain  Tests Performed:   Labs Ordered and Resulted from Time of ED Arrival to Time of ED Departure   BASIC METABOLIC PANEL - Abnormal       Result Value    Sodium 139      Potassium 3.9      Chloride 98      Carbon Dioxide (CO2) 33 (*)     Anion Gap 8      Urea Nitrogen 9.2      Creatinine 0.52      Calcium 8.9      Glucose 122 (*)     GFR Estimate >90     CBC WITH PLATELETS AND DIFFERENTIAL - Abnormal    WBC Count 8.3      RBC Count 3.52 (*)     Hemoglobin 10.5 (*)     Hematocrit 33.8 (*)     MCV 96      MCH 29.8      MCHC 31.1 (*)     RDW 12.1      Platelet Count 273      % Neutrophils 75      % Lymphocytes 13      % Monocytes 8      % Eosinophils 2      % Basophils 0      % Immature Granulocytes 2      NRBCs per 100 WBC 0      Absolute Neutrophils 6.2      Absolute Lymphocytes 1.1      Absolute Monocytes 0.7      Absolute Eosinophils 0.1      Absolute Basophils 0.0      Absolute Immature Granulocytes 0.2      Absolute NRBCs 0.0     INR - Normal     INR 0.96     PARTIAL THROMBOPLASTIN TIME - Normal    aPTT 29     TYPE AND SCREEN, ADULT    ABO/RH(D) A POS      Antibody Screen Negative      SPECIMEN EXPIRATION DATE [id number]     ABO/RH TYPE AND SCREEN      . Abnormal Results:   XR Pelvis w Hip Right 1 View   Final Result   IMPRESSION: There is a displaced right femoral neck fracture. The femoral head remains seated within the acetabulum. The left hip is intact. Subtle irregularity of the parasymphyseal aspect of the left pubic bone, potentially representing an age    indeterminant fracture. CT may be beneficial for further evaluation.      Head CT w/o contrast   Preliminary Result   IMPRESSION:   1.  No acute intracranial hemorrhage or abnormal extra-axial fluid collection.   2.  Prominent partially calcified extra-axial mass along the right anterior/inferior falx measuring up to 3.8 cm in greatest axial dimensions. There is moderate surrounding vasogenic edema predominantly involving the right frontal lobe with right to left    midline shift/subfalcine herniation measuring up to 1.1 cm. Recommend comparison with prior imaging to assess for stability. Consider neurosurgical consultation for further assessment.   3.  Partial subluxation of the bilateral mandible condyles anteriorly, possibly related to open-mouth positioning versus fixed subluxation. Correlation with physical examination is recommended.         Dr. Mino Hartley discussed results with on 03/19/2023 at 6:08 AM.                 Treatments provided: EMS gave 25mcg of fentanyl and Morphine   Family Comments:   OBS brochure/video discussed/provided to patient:  N/A  ED Medications:   Medications   lidocaine 1 % 0.1-1 mL (has no administration in time range)   lidocaine (LMX4) cream (has no administration in time range)   sodium chloride (PF) 0.9% PF flush 3 mL (has no administration in time range)   sodium chloride (PF) 0.9% PF flush 3 mL (has no administration in time range)   morphine (PF)  injection 4 mg (4 mg Intravenous $Given 3/19/23 0522)   ondansetron (ZOFRAN) injection 4 mg (4 mg Intravenous $Given 3/19/23 0522)     Drips infusing:  No  For the majority of the shift, the patient's behavior Green. Interventions performed were .    Sepsis treatment initiated: No     Patient tested for COVID 19 prior to admission: NO    ED Nurse Name/Phone Number: Tierra Matthews RN,   5:48 AM

## 2023-03-19 NOTE — ED TRIAGE NOTES
Pt was turning to walk to the bathroom and fell. Pt c/o right hip pain. Pt has hx of COPD on O2 at 2liters per NC.

## 2023-03-19 NOTE — CONSULTS
[facility]    Neurosurgery Consultation     Date of Admission:  3/19/2023  Date of Consult (When I saw the patient): 03/19/23    Assessment & Plan   Josephine Nicole is a 73 year old female who was admitted on 3/19/2023 with right hip pain and right femoral neck fracture s/p mechanical fall. Neurosurgery was consulted for known cerebral meningioma. Radiographic findings include an extra-axial mass along the right anterior/inferior falx measuring up to 3.8 cm, moderate surrounding vasogenic edema and mass effect. Patient follows with radiation oncologist Dr. Godoy at [facility]. Per family and chart review, patient had radiation therapy about 4 months ago and stopped steroids about 4 weeks ago. Since that time, patient has had balance issues, confusion, and increased headaches. Discussed options with patient's family. Dr. Murphy recommends surgery for resection of meningioma. Surgery could be scheduled on Wednesday, patient would need to transfer to [facility] on Tuesday. Ortho likely planning for right hip surgery tomorrow. Family would like to further discuss with Oncology tomorrow. Will plan to continue Decadron and closely monitor neuro exam. Will also consult Neurology for further evaluation of tremors. Brain MRI was completed on 3/17/23 at [facility], care team getting images pushed to PACS. NSG will continue to follow.     Principal Problem:    Cerebral meningioma (H)  Active Problems:    Fall, initial encounter    Closed displaced fracture of right femoral neck (H)    I have discussed the following assessment and plan with Dr. Murphy.     Candace Perdomo, CNP  [facility] Neurosurgery  [address] [address]  Tel [phone]  Pager [phone]    Code Status    Full Code    Reason for Consult   Reason for consult: I was asked by Jovana Ceja PA-C to evaluate this patient for cerebral meningioma.    Primary Care Physician    Physician No Ref-Primary    Chief Complaint   Fall     History is obtained from the electronic health record, emergency department physician and patient's family    History of Present Illness   Josephine Nicole is a 73 year old female with history of cerebral meningioma, melanoma, COPD, depression, anxiety, osteoporosis, who was admitted on 3/19/2023 with right hip pain s/p mechanical fall. Per chart review, patient was standing at her table, turned around, and then fell. She fell on her right side and developed right hip pain. She presented to the ED via EMS. Imaging showed a right femoral neck fracture. Head CT was completed due to the fall and showed an extra-axial mass along the right anterior/inferior falx measuring up to 3.8 cm, moderate surrounding vasogenic edema and mass effect. This is a known meningioma, and patient had recent visit and MRI with [facility] last week. Patient follows with radiation oncologist Dr. Godoy at [facility]. Per family and chart review, patient had radiation therapy about 4 months ago and stopped steroids about 4 weeks ago. Since that time, patient has had balance issues, confusion, increased headaches, and tremors.      EXAM: CT HEAD WITHOUT CONTRAST  LOCATION: [facility]  DATE/TIME: 03/19/2023, 5:56 AM  IMPRESSION:  1.  No acute intracranial hemorrhage or abnormal extra-axial fluid collection.  2.  Prominent partially calcified extra-axial mass along the right anterior/inferior falx measuring up to 3.8 cm in greatest axial dimensions. There is moderate surrounding vasogenic edema predominantly involving the right frontal lobe with right to left   midline shift/subfalcine herniation measuring up to 1.1 cm. Recommend comparison with prior imaging to assess for stability. Consider neurosurgical consultation for further assessment, as clinically indicated.  3.  Partial subluxation of the bilateral mandible condyles anteriorly, possibly related to  open-mouth positioning versus fixed subluxation. Correlation with physical examination is recommended.     Past Medical History   I have reviewed this patient's medical history and updated it with pertinent information if needed.   Past Medical History:   Diagnosis Date     Cerebral meningioma      COPD (chronic obstructive pulmonary disease)      Depressive disorder      Eczema      Eczema      Hyperlipidemia      Hypothyroidism      Melanoma of skin      Osteoporosis        Past Surgical History   I have reviewed this patient's surgical history and updated it with pertinent information if needed.  Past Surgical History:   Procedure Laterality Date     COLONOSCOPY N/A 04/18/2022    Procedure: COLONOSCOPY;  Surgeon: Abimbola Handley MD;  Location:  GI     Tubal ligation NOS         Prior to Admission Medications   Prior to Admission Medications   Prescriptions Last Dose Informant Patient Reported? Taking?   ALPRAZolam (XANAX) 0.5 MG tablet prn at prn  Yes No   Sig: Take 0.25 mg by mouth daily as needed for anxiety   albuterol (PROVENTIL) (2.5 MG/3ML) 0.083% neb solution prn at prn  Yes No   Sig: Take 2.5 mg by nebulization 3 times daily as needed for shortness of breath, wheezing or cough   alendronate (FOSAMAX) 70 MG tablet Past Week at -  Yes Yes   Sig: Take 70 mg by mouth every 7 days   atorvastatin (LIPITOR) 10 MG tablet 3/18/2023 at am  Yes Yes   Sig: Take 10 mg by mouth daily   butalbital-acetaminophen-caffeine (ESGIC) -40 MG tablet prn at prn  Yes Yes   Sig: Take 1 tablet by mouth every 6 hours as needed for headaches Max 6 doses per day.   cyanocobalamin (CYANOCOBALAMIN) 1000 MCG/ML injection due for injection at due for injection  Yes Yes   Sig: Inject 1 mL into the muscle every 30 days   fluticasone-salmeterol (ADVAIR) 500-50 MCG/DOSE inhaler 3/18/2023 at am  Yes Yes   Sig: Inhale 1 puff into the lungs 2 times daily   hydrOXYzine (ATARAX) 10 MG tablet prn at prn  Yes No   Sig: Take 10 mg by  mouth every 8 hours as needed for anxiety   ipratropium - albuterol 0.5 mg/2.5 mg/3 mL (DUONEB) 0.5-2.5 (3) MG/3ML neb solution prn at prn  Yes No   Sig: Take 1 vial by nebulization every 6 hours as needed for shortness of breath, wheezing or cough   levalbuterol (XOPENEX HFA) 45 MCG/ACT inhaler prn at prn  Yes No   Sig: Inhale 2 puffs into the lungs every 4 hours as needed for shortness of breath or wheezing   levothyroxine (SYNTHROID/LEVOTHROID) 88 MCG tablet 3/18/2023 at am  Yes Yes   Sig: Take 88 mcg by mouth daily   mirtazapine (REMERON) 15 MG tablet 3/17/2023 at hs  Yes Yes   Sig: Take 7.5 mg by mouth At Bedtime   sertraline (ZOLOFT) 25 MG tablet 3/19/2023 at am  Yes Yes   Sig: Take 25 mg by mouth every morning   zolpidem (AMBIEN) 5 MG tablet Past Week at -  Yes Yes   Sig: Take 5 mg by mouth nightly as needed for sleep      Facility-Administered Medications: None     Allergies   Allergies   Allergen Reactions     Bupropion Anaphylaxis, Hives and Shortness Of Breath       Social History   I have reviewed this patient's social history and updated it with pertinent information if needed. Josephine Nicole  reports that she has quit smoking. Her smoking use included cigarettes. She has never used smokeless tobacco. She reports that she does not currently use alcohol. She reports that she does not use drugs.    Family History   I have reviewed this patient's family history and updated it with pertinent information if needed.   No family history on file.    Review of Systems   10 point ROS negative other than symptoms noted in HPI.    Physical Exam   Temp: 98.1  F (36.7  C) Temp src: Temporal BP: 122/58 Pulse: 106   Resp: 18 SpO2: 92 % O2 Device: Nasal cannula Oxygen Delivery: 7 LPM  Vital Signs with Ranges  Temp:  [98.1  F (36.7  C)-98.6  F (37  C)] 98.1  F (36.7  C)  Pulse:  [] 106  Resp:  [18-24] 18  BP: ()/(56-96) 122/58  SpO2:  [86 %-98 %] 92 %  145 lbs 0 oz     , Blood pressure 122/58, pulse 106,  temperature 98.1  F (36.7  C), temperature source Temporal, resp. rate 18, weight 65.8 kg (145 lb), SpO2 92 %, not currently breastfeeding.  145 lbs 0 oz    NEUROLOGICAL EXAMINATION:   Drowsy   Opens eyes to voice  PERRL  Minimal speech   Able to follow some simple commands  Moves BUE and LLE with appropriate strength  RLE exam limited due to right femoral neck fracture     Data     CBC RESULTS:   Recent Labs   Lab Test 03/19/23 0518   WBC 8.3   RBC 3.52*   HGB 10.5*   HCT 33.8*   MCV 96   MCH 29.8   MCHC 31.1*   RDW 12.1        Basic Metabolic Panel:  Lab Results   Component Value Date     03/19/2023      Lab Results   Component Value Date    POTASSIUM 3.9 03/19/2023     Lab Results   Component Value Date    CHLORIDE 98 03/19/2023     Lab Results   Component Value Date    CHAO 8.9 03/19/2023     Lab Results   Component Value Date    CO2 33 03/19/2023     Lab Results   Component Value Date    BUN 9.2 03/19/2023     Lab Results   Component Value Date    CR 0.52 03/19/2023     Lab Results   Component Value Date     03/19/2023     INR:  Lab Results   Component Value Date    INR 0.96 03/19/2023

## 2023-03-20 ENCOUNTER — APPOINTMENT (OUTPATIENT)
Dept: GENERAL RADIOLOGY | Facility: CLINIC | Age: 73
DRG: 521 | End: 2023-03-20
Attending: STUDENT IN AN ORGANIZED HEALTH CARE EDUCATION/TRAINING PROGRAM
Payer: COMMERCIAL

## 2023-03-20 ENCOUNTER — ANESTHESIA (OUTPATIENT)
Dept: SURGERY | Facility: CLINIC | Age: 73
DRG: 521 | End: 2023-03-20
Payer: COMMERCIAL

## 2023-03-20 ENCOUNTER — ANESTHESIA EVENT (OUTPATIENT)
Dept: SURGERY | Facility: CLINIC | Age: 73
DRG: 521 | End: 2023-03-20
Payer: COMMERCIAL

## 2023-03-20 LAB
ANION GAP SERPL CALCULATED.3IONS-SCNC: 7 MMOL/L (ref 7–15)
ATRIAL RATE - MUSE: 94 BPM
BASE EXCESS BLDV CALC-SCNC: 3.2 MMOL/L (ref -7.7–1.9)
BASE EXCESS BLDV CALC-SCNC: 8.2 MMOL/L (ref -7.7–1.9)
BUN SERPL-MCNC: 14.5 MG/DL (ref 8–23)
CALCIUM SERPL-MCNC: 8.5 MG/DL (ref 8.8–10.2)
CHLORIDE SERPL-SCNC: 101 MMOL/L (ref 98–107)
CREAT SERPL-MCNC: 0.62 MG/DL (ref 0.51–0.95)
CREAT SERPL-MCNC: 0.77 MG/DL (ref 0.51–0.95)
DEPRECATED HCO3 PLAS-SCNC: 33 MMOL/L (ref 22–29)
DIASTOLIC BLOOD PRESSURE - MUSE: NORMAL MMHG
ERYTHROCYTE [DISTWIDTH] IN BLOOD BY AUTOMATED COUNT: 12.2 % (ref 10–15)
ERYTHROCYTE [DISTWIDTH] IN BLOOD BY AUTOMATED COUNT: 12.4 % (ref 10–15)
GFR SERPL CREATININE-BSD FRML MDRD: 81 ML/MIN/1.73M2
GFR SERPL CREATININE-BSD FRML MDRD: >90 ML/MIN/1.73M2
GLUCOSE SERPL-MCNC: 137 MG/DL (ref 70–99)
HCO3 BLDV-SCNC: 32 MMOL/L (ref 21–28)
HCO3 BLDV-SCNC: 35 MMOL/L (ref 21–28)
HCT VFR BLD AUTO: 30.6 % (ref 35–47)
HCT VFR BLD AUTO: 31.6 % (ref 35–47)
HGB BLD-MCNC: 9.3 G/DL (ref 11.7–15.7)
HGB BLD-MCNC: 9.8 G/DL (ref 11.7–15.7)
INTERPRETATION ECG - MUSE: NORMAL
LACTATE SERPL-SCNC: 3.3 MMOL/L (ref 0.7–2)
MAGNESIUM SERPL-MCNC: 1.6 MG/DL (ref 1.7–2.3)
MCH RBC QN AUTO: 29.9 PG (ref 26.5–33)
MCH RBC QN AUTO: 30.3 PG (ref 26.5–33)
MCHC RBC AUTO-ENTMCNC: 30.4 G/DL (ref 31.5–36.5)
MCHC RBC AUTO-ENTMCNC: 31 G/DL (ref 31.5–36.5)
MCV RBC AUTO: 98 FL (ref 78–100)
MCV RBC AUTO: 98 FL (ref 78–100)
O2/TOTAL GAS SETTING VFR VENT: 6 %
O2/TOTAL GAS SETTING VFR VENT: 70 %
P AXIS - MUSE: 72 DEGREES
PCO2 BLDV: 60 MM HG (ref 40–50)
PCO2 BLDV: 72 MM HG (ref 40–50)
PH BLDV: 7.26 [PH] (ref 7.32–7.43)
PH BLDV: 7.37 [PH] (ref 7.32–7.43)
PLATELET # BLD AUTO: 210 10E3/UL (ref 150–450)
PLATELET # BLD AUTO: 215 10E3/UL (ref 150–450)
PO2 BLDV: 39 MM HG (ref 25–47)
PO2 BLDV: 89 MM HG (ref 25–47)
POTASSIUM SERPL-SCNC: 4.6 MMOL/L (ref 3.4–5.3)
PR INTERVAL - MUSE: 166 MS
QRS DURATION - MUSE: 64 MS
QT - MUSE: 362 MS
QTC - MUSE: 452 MS
R AXIS - MUSE: 50 DEGREES
RBC # BLD AUTO: 3.11 10E6/UL (ref 3.8–5.2)
RBC # BLD AUTO: 3.23 10E6/UL (ref 3.8–5.2)
SODIUM SERPL-SCNC: 141 MMOL/L (ref 136–145)
SYSTOLIC BLOOD PRESSURE - MUSE: NORMAL MMHG
T AXIS - MUSE: 78 DEGREES
VENTRICULAR RATE- MUSE: 94 BPM
WBC # BLD AUTO: 13.5 10E3/UL (ref 4–11)
WBC # BLD AUTO: 19.5 10E3/UL (ref 4–11)

## 2023-03-20 PROCEDURE — 120N000001 HC R&B MED SURG/OB

## 2023-03-20 PROCEDURE — 36415 COLL VENOUS BLD VENIPUNCTURE: CPT | Performed by: HOSPITALIST

## 2023-03-20 PROCEDURE — 258N000003 HC RX IP 258 OP 636: Performed by: ANESTHESIOLOGY

## 2023-03-20 PROCEDURE — 360N000077 HC SURGERY LEVEL 4, PER MIN: Performed by: STUDENT IN AN ORGANIZED HEALTH CARE EDUCATION/TRAINING PROGRAM

## 2023-03-20 PROCEDURE — G0463 HOSPITAL OUTPT CLINIC VISIT: HCPCS

## 2023-03-20 PROCEDURE — 250N000011 HC RX IP 250 OP 636

## 2023-03-20 PROCEDURE — 94660 CPAP INITIATION&MGMT: CPT

## 2023-03-20 PROCEDURE — 36415 COLL VENOUS BLD VENIPUNCTURE: CPT | Performed by: INTERNAL MEDICINE

## 2023-03-20 PROCEDURE — 85027 COMPLETE CBC AUTOMATED: CPT | Performed by: PHYSICIAN ASSISTANT

## 2023-03-20 PROCEDURE — 250N000011 HC RX IP 250 OP 636: Performed by: INTERNAL MEDICINE

## 2023-03-20 PROCEDURE — 250N000011 HC RX IP 250 OP 636: Performed by: STUDENT IN AN ORGANIZED HEALTH CARE EDUCATION/TRAINING PROGRAM

## 2023-03-20 PROCEDURE — 250N000025 HC SEVOFLURANE, PER MIN: Performed by: STUDENT IN AN ORGANIZED HEALTH CARE EDUCATION/TRAINING PROGRAM

## 2023-03-20 PROCEDURE — 94640 AIRWAY INHALATION TREATMENT: CPT | Mod: 76

## 2023-03-20 PROCEDURE — C1776 JOINT DEVICE (IMPLANTABLE): HCPCS | Performed by: STUDENT IN AN ORGANIZED HEALTH CARE EDUCATION/TRAINING PROGRAM

## 2023-03-20 PROCEDURE — C1713 ANCHOR/SCREW BN/BN,TIS/BN: HCPCS | Performed by: STUDENT IN AN ORGANIZED HEALTH CARE EDUCATION/TRAINING PROGRAM

## 2023-03-20 PROCEDURE — 250N000011 HC RX IP 250 OP 636: Performed by: NURSE PRACTITIONER

## 2023-03-20 PROCEDURE — 85027 COMPLETE CBC AUTOMATED: CPT | Performed by: INTERNAL MEDICINE

## 2023-03-20 PROCEDURE — 250N000009 HC RX 250: Performed by: NURSE ANESTHETIST, CERTIFIED REGISTERED

## 2023-03-20 PROCEDURE — 250N000011 HC RX IP 250 OP 636: Performed by: NURSE ANESTHETIST, CERTIFIED REGISTERED

## 2023-03-20 PROCEDURE — 250N000009 HC RX 250: Performed by: INTERNAL MEDICINE

## 2023-03-20 PROCEDURE — 258N000001 HC RX 258: Performed by: STUDENT IN AN ORGANIZED HEALTH CARE EDUCATION/TRAINING PROGRAM

## 2023-03-20 PROCEDURE — 94640 AIRWAY INHALATION TREATMENT: CPT

## 2023-03-20 PROCEDURE — 258N000003 HC RX IP 258 OP 636: Performed by: NURSE ANESTHETIST, CERTIFIED REGISTERED

## 2023-03-20 PROCEDURE — 258N000003 HC RX IP 258 OP 636: Performed by: INTERNAL MEDICINE

## 2023-03-20 PROCEDURE — 710N000010 HC RECOVERY PHASE 1, LEVEL 2, PER MIN: Performed by: STUDENT IN AN ORGANIZED HEALTH CARE EDUCATION/TRAINING PROGRAM

## 2023-03-20 PROCEDURE — 36415 COLL VENOUS BLD VENIPUNCTURE: CPT | Performed by: STUDENT IN AN ORGANIZED HEALTH CARE EDUCATION/TRAINING PROGRAM

## 2023-03-20 PROCEDURE — 272N000001 HC OR GENERAL SUPPLY STERILE: Performed by: STUDENT IN AN ORGANIZED HEALTH CARE EDUCATION/TRAINING PROGRAM

## 2023-03-20 PROCEDURE — 250N000009 HC RX 250: Performed by: PHYSICIAN ASSISTANT

## 2023-03-20 PROCEDURE — 370N000017 HC ANESTHESIA TECHNICAL FEE, PER MIN: Performed by: STUDENT IN AN ORGANIZED HEALTH CARE EDUCATION/TRAINING PROGRAM

## 2023-03-20 PROCEDURE — 80048 BASIC METABOLIC PNL TOTAL CA: CPT | Performed by: PHYSICIAN ASSISTANT

## 2023-03-20 PROCEDURE — 99233 SBSQ HOSP IP/OBS HIGH 50: CPT | Performed by: HOSPITALIST

## 2023-03-20 PROCEDURE — 82803 BLOOD GASES ANY COMBINATION: CPT | Performed by: INTERNAL MEDICINE

## 2023-03-20 PROCEDURE — 250N000013 HC RX MED GY IP 250 OP 250 PS 637: Performed by: ANESTHESIOLOGY

## 2023-03-20 PROCEDURE — 999N000157 HC STATISTIC RCP TIME EA 10 MIN

## 2023-03-20 PROCEDURE — 88300 SURGICAL PATH GROSS: CPT | Mod: TC | Performed by: STUDENT IN AN ORGANIZED HEALTH CARE EDUCATION/TRAINING PROGRAM

## 2023-03-20 PROCEDURE — 250N000009 HC RX 250: Performed by: STUDENT IN AN ORGANIZED HEALTH CARE EDUCATION/TRAINING PROGRAM

## 2023-03-20 PROCEDURE — 36415 COLL VENOUS BLD VENIPUNCTURE: CPT | Performed by: PHYSICIAN ASSISTANT

## 2023-03-20 PROCEDURE — 99231 SBSQ HOSP IP/OBS SF/LOW 25: CPT | Performed by: PHYSICIAN ASSISTANT

## 2023-03-20 PROCEDURE — 0SRR0J9 REPLACEMENT OF RIGHT HIP JOINT, FEMORAL SURFACE WITH SYNTHETIC SUBSTITUTE, CEMENTED, OPEN APPROACH: ICD-10-PCS | Performed by: STUDENT IN AN ORGANIZED HEALTH CARE EDUCATION/TRAINING PROGRAM

## 2023-03-20 PROCEDURE — 999N000156 HC STATISTIC RCP CONSULT EA 30 MIN

## 2023-03-20 PROCEDURE — 250N000011 HC RX IP 250 OP 636: Performed by: PHYSICIAN ASSISTANT

## 2023-03-20 PROCEDURE — 82565 ASSAY OF CREATININE: CPT | Performed by: STUDENT IN AN ORGANIZED HEALTH CARE EDUCATION/TRAINING PROGRAM

## 2023-03-20 PROCEDURE — 83735 ASSAY OF MAGNESIUM: CPT | Performed by: HOSPITALIST

## 2023-03-20 PROCEDURE — 88300 SURGICAL PATH GROSS: CPT | Mod: 26 | Performed by: PATHOLOGY

## 2023-03-20 PROCEDURE — 999N000065 XR PELVIS AND HIP PORTABLE RIGHT 1 VIEW

## 2023-03-20 PROCEDURE — 83605 ASSAY OF LACTIC ACID: CPT | Performed by: HOSPITALIST

## 2023-03-20 PROCEDURE — 258N000003 HC RX IP 258 OP 636: Performed by: STUDENT IN AN ORGANIZED HEALTH CARE EDUCATION/TRAINING PROGRAM

## 2023-03-20 PROCEDURE — 999N000141 HC STATISTIC PRE-PROCEDURE NURSING ASSESSMENT: Performed by: STUDENT IN AN ORGANIZED HEALTH CARE EDUCATION/TRAINING PROGRAM

## 2023-03-20 PROCEDURE — 250N000013 HC RX MED GY IP 250 OP 250 PS 637: Performed by: PHYSICIAN ASSISTANT

## 2023-03-20 DEVICE — BONE CEMENT SIMPLEX FULL DOSE 6191-1-001: Type: IMPLANTABLE DEVICE | Site: HIP | Status: FUNCTIONAL

## 2023-03-20 DEVICE — IMPLANTABLE DEVICE: Type: IMPLANTABLE DEVICE | Site: HIP | Status: FUNCTIONAL

## 2023-03-20 DEVICE — IMP SPACER OSTEONICS DISTAL CEMENT 10MM 1067-0010: Type: IMPLANTABLE DEVICE | Site: HIP | Status: FUNCTIONAL

## 2023-03-20 DEVICE — BONE CEMENT RESTRICTOR BUCK FEMORAL 18.5MM 129418: Type: IMPLANTABLE DEVICE | Site: HIP | Status: FUNCTIONAL

## 2023-03-20 RX ORDER — ONDANSETRON 2 MG/ML
4 INJECTION INTRAMUSCULAR; INTRAVENOUS EVERY 30 MIN PRN
Status: DISCONTINUED | OUTPATIENT
Start: 2023-03-20 | End: 2023-03-20 | Stop reason: HOSPADM

## 2023-03-20 RX ORDER — ACETAMINOPHEN 325 MG/1
975 TABLET ORAL EVERY 8 HOURS
Status: COMPLETED | OUTPATIENT
Start: 2023-03-20 | End: 2023-03-23

## 2023-03-20 RX ORDER — ALBUTEROL SULFATE 0.83 MG/ML
2.5 SOLUTION RESPIRATORY (INHALATION) EVERY 4 HOURS PRN
Status: DISCONTINUED | OUTPATIENT
Start: 2023-03-20 | End: 2023-03-20 | Stop reason: HOSPADM

## 2023-03-20 RX ORDER — ONDANSETRON 4 MG/1
4 TABLET, ORALLY DISINTEGRATING ORAL EVERY 30 MIN PRN
Status: DISCONTINUED | OUTPATIENT
Start: 2023-03-20 | End: 2023-03-20 | Stop reason: HOSPADM

## 2023-03-20 RX ORDER — HYDROMORPHONE HCL IN WATER/PF 6 MG/30 ML
0.4 PATIENT CONTROLLED ANALGESIA SYRINGE INTRAVENOUS
Status: DISCONTINUED | OUTPATIENT
Start: 2023-03-20 | End: 2023-03-21

## 2023-03-20 RX ORDER — CEFAZOLIN SODIUM 2 G/100ML
2 INJECTION, SOLUTION INTRAVENOUS
Status: COMPLETED | OUTPATIENT
Start: 2023-03-20 | End: 2023-03-20

## 2023-03-20 RX ORDER — MAGNESIUM SULFATE HEPTAHYDRATE 40 MG/ML
INJECTION, SOLUTION INTRAVENOUS PRN
Status: DISCONTINUED | OUTPATIENT
Start: 2023-03-20 | End: 2023-03-20

## 2023-03-20 RX ORDER — SODIUM CHLORIDE, SODIUM LACTATE, POTASSIUM CHLORIDE, CALCIUM CHLORIDE 600; 310; 30; 20 MG/100ML; MG/100ML; MG/100ML; MG/100ML
INJECTION, SOLUTION INTRAVENOUS CONTINUOUS
Status: DISCONTINUED | OUTPATIENT
Start: 2023-03-20 | End: 2023-03-20 | Stop reason: DRUGHIGH

## 2023-03-20 RX ORDER — SODIUM CHLORIDE, SODIUM LACTATE, POTASSIUM CHLORIDE, CALCIUM CHLORIDE 600; 310; 30; 20 MG/100ML; MG/100ML; MG/100ML; MG/100ML
INJECTION, SOLUTION INTRAVENOUS CONTINUOUS
Status: DISCONTINUED | OUTPATIENT
Start: 2023-03-20 | End: 2023-03-20 | Stop reason: HOSPADM

## 2023-03-20 RX ORDER — NALOXONE HYDROCHLORIDE 0.4 MG/ML
0.2 INJECTION, SOLUTION INTRAMUSCULAR; INTRAVENOUS; SUBCUTANEOUS
Status: DISCONTINUED | OUTPATIENT
Start: 2023-03-20 | End: 2023-03-23 | Stop reason: HOSPADM

## 2023-03-20 RX ORDER — LABETALOL HYDROCHLORIDE 5 MG/ML
10 INJECTION, SOLUTION INTRAVENOUS
Status: DISCONTINUED | OUTPATIENT
Start: 2023-03-20 | End: 2023-03-20 | Stop reason: HOSPADM

## 2023-03-20 RX ORDER — HALOPERIDOL 5 MG/ML
2 INJECTION INTRAMUSCULAR EVERY 6 HOURS PRN
Status: DISCONTINUED | OUTPATIENT
Start: 2023-03-20 | End: 2023-03-20

## 2023-03-20 RX ORDER — HALOPERIDOL 5 MG/ML
2 INJECTION INTRAMUSCULAR EVERY 4 HOURS PRN
Status: DISCONTINUED | OUTPATIENT
Start: 2023-03-20 | End: 2023-03-23 | Stop reason: HOSPADM

## 2023-03-20 RX ORDER — METOPROLOL TARTRATE 1 MG/ML
1-2 INJECTION, SOLUTION INTRAVENOUS EVERY 5 MIN PRN
Status: DISCONTINUED | OUTPATIENT
Start: 2023-03-20 | End: 2023-03-20 | Stop reason: HOSPADM

## 2023-03-20 RX ORDER — PROCHLORPERAZINE MALEATE 5 MG
5 TABLET ORAL EVERY 6 HOURS PRN
Status: DISCONTINUED | OUTPATIENT
Start: 2023-03-20 | End: 2023-03-23 | Stop reason: HOSPADM

## 2023-03-20 RX ORDER — ACETAMINOPHEN 325 MG/1
650 TABLET ORAL EVERY 4 HOURS PRN
Status: DISCONTINUED | OUTPATIENT
Start: 2023-03-23 | End: 2023-03-23 | Stop reason: HOSPADM

## 2023-03-20 RX ORDER — ACETAMINOPHEN 325 MG/1
975 TABLET ORAL ONCE
Status: COMPLETED | OUTPATIENT
Start: 2023-03-20 | End: 2023-03-20

## 2023-03-20 RX ORDER — VANCOMYCIN HYDROCHLORIDE 1 G/20ML
INJECTION, POWDER, LYOPHILIZED, FOR SOLUTION INTRAVENOUS PRN
Status: DISCONTINUED | OUTPATIENT
Start: 2023-03-20 | End: 2023-03-20 | Stop reason: HOSPADM

## 2023-03-20 RX ORDER — ONDANSETRON 4 MG/1
4 TABLET, ORALLY DISINTEGRATING ORAL EVERY 6 HOURS PRN
Status: DISCONTINUED | OUTPATIENT
Start: 2023-03-20 | End: 2023-03-23 | Stop reason: HOSPADM

## 2023-03-20 RX ORDER — MAGNESIUM SULFATE HEPTAHYDRATE 40 MG/ML
2 INJECTION, SOLUTION INTRAVENOUS
Status: DISCONTINUED | OUTPATIENT
Start: 2023-03-20 | End: 2023-03-20 | Stop reason: HOSPADM

## 2023-03-20 RX ORDER — KETOROLAC TROMETHAMINE 15 MG/ML
15 INJECTION, SOLUTION INTRAMUSCULAR; INTRAVENOUS
Status: DISCONTINUED | OUTPATIENT
Start: 2023-03-20 | End: 2023-03-20 | Stop reason: HOSPADM

## 2023-03-20 RX ORDER — BISACODYL 10 MG
10 SUPPOSITORY, RECTAL RECTAL DAILY PRN
Status: DISCONTINUED | OUTPATIENT
Start: 2023-03-20 | End: 2023-03-23 | Stop reason: HOSPADM

## 2023-03-20 RX ORDER — KETOROLAC TROMETHAMINE 30 MG/ML
INJECTION, SOLUTION INTRAMUSCULAR; INTRAVENOUS PRN
Status: DISCONTINUED | OUTPATIENT
Start: 2023-03-20 | End: 2023-03-20

## 2023-03-20 RX ORDER — HYDRALAZINE HYDROCHLORIDE 20 MG/ML
2.5-5 INJECTION INTRAMUSCULAR; INTRAVENOUS EVERY 10 MIN PRN
Status: DISCONTINUED | OUTPATIENT
Start: 2023-03-20 | End: 2023-03-20 | Stop reason: HOSPADM

## 2023-03-20 RX ORDER — NALOXONE HYDROCHLORIDE 0.4 MG/ML
0.4 INJECTION, SOLUTION INTRAMUSCULAR; INTRAVENOUS; SUBCUTANEOUS
Status: DISCONTINUED | OUTPATIENT
Start: 2023-03-20 | End: 2023-03-23 | Stop reason: HOSPADM

## 2023-03-20 RX ORDER — CEFAZOLIN SODIUM 1 G/3ML
1 INJECTION, POWDER, FOR SOLUTION INTRAMUSCULAR; INTRAVENOUS EVERY 8 HOURS
Status: COMPLETED | OUTPATIENT
Start: 2023-03-20 | End: 2023-03-21

## 2023-03-20 RX ORDER — CEFAZOLIN SODIUM 2 G/100ML
2 INJECTION, SOLUTION INTRAVENOUS SEE ADMIN INSTRUCTIONS
Status: DISCONTINUED | OUTPATIENT
Start: 2023-03-20 | End: 2023-03-20

## 2023-03-20 RX ORDER — LIDOCAINE HYDROCHLORIDE 20 MG/ML
INJECTION, SOLUTION INFILTRATION; PERINEURAL PRN
Status: DISCONTINUED | OUTPATIENT
Start: 2023-03-20 | End: 2023-03-20

## 2023-03-20 RX ORDER — METHADONE HYDROCHLORIDE 10 MG/ML
INJECTION, SOLUTION INTRAMUSCULAR; INTRAVENOUS; SUBCUTANEOUS PRN
Status: DISCONTINUED | OUTPATIENT
Start: 2023-03-20 | End: 2023-03-20

## 2023-03-20 RX ORDER — AMOXICILLIN 250 MG
1 CAPSULE ORAL 2 TIMES DAILY
Status: DISCONTINUED | OUTPATIENT
Start: 2023-03-20 | End: 2023-03-23 | Stop reason: HOSPADM

## 2023-03-20 RX ORDER — OXYCODONE HYDROCHLORIDE 5 MG/1
5 TABLET ORAL EVERY 4 HOURS PRN
Status: DISCONTINUED | OUTPATIENT
Start: 2023-03-20 | End: 2023-03-23 | Stop reason: HOSPADM

## 2023-03-20 RX ORDER — TRANEXAMIC ACID 10 MG/ML
1 INJECTION, SOLUTION INTRAVENOUS ONCE
Status: COMPLETED | OUTPATIENT
Start: 2023-03-20 | End: 2023-03-20

## 2023-03-20 RX ORDER — LIDOCAINE 40 MG/G
CREAM TOPICAL
Status: DISCONTINUED | OUTPATIENT
Start: 2023-03-20 | End: 2023-03-20

## 2023-03-20 RX ORDER — GLYCOPYRROLATE 0.2 MG/ML
INJECTION, SOLUTION INTRAMUSCULAR; INTRAVENOUS PRN
Status: DISCONTINUED | OUTPATIENT
Start: 2023-03-20 | End: 2023-03-20

## 2023-03-20 RX ORDER — FENTANYL CITRATE 50 UG/ML
25 INJECTION, SOLUTION INTRAMUSCULAR; INTRAVENOUS EVERY 5 MIN PRN
Status: DISCONTINUED | OUTPATIENT
Start: 2023-03-20 | End: 2023-03-20 | Stop reason: HOSPADM

## 2023-03-20 RX ORDER — FENTANYL CITRATE 50 UG/ML
50 INJECTION, SOLUTION INTRAMUSCULAR; INTRAVENOUS EVERY 5 MIN PRN
Status: DISCONTINUED | OUTPATIENT
Start: 2023-03-20 | End: 2023-03-20

## 2023-03-20 RX ORDER — OXYCODONE HYDROCHLORIDE 5 MG/1
10 TABLET ORAL EVERY 4 HOURS PRN
Status: DISCONTINUED | OUTPATIENT
Start: 2023-03-20 | End: 2023-03-23 | Stop reason: HOSPADM

## 2023-03-20 RX ORDER — DEXAMETHASONE SODIUM PHOSPHATE 4 MG/ML
INJECTION, SOLUTION INTRA-ARTICULAR; INTRALESIONAL; INTRAMUSCULAR; INTRAVENOUS; SOFT TISSUE PRN
Status: DISCONTINUED | OUTPATIENT
Start: 2023-03-20 | End: 2023-03-20

## 2023-03-20 RX ORDER — ONDANSETRON 2 MG/ML
INJECTION INTRAMUSCULAR; INTRAVENOUS PRN
Status: DISCONTINUED | OUTPATIENT
Start: 2023-03-20 | End: 2023-03-20

## 2023-03-20 RX ORDER — METHADONE HYDROCHLORIDE 10 MG/ML
2 INJECTION, SOLUTION INTRAMUSCULAR; INTRAVENOUS; SUBCUTANEOUS 3 TIMES DAILY PRN
Status: DISCONTINUED | OUTPATIENT
Start: 2023-03-20 | End: 2023-03-20 | Stop reason: HOSPADM

## 2023-03-20 RX ORDER — HYDROMORPHONE HCL IN WATER/PF 6 MG/30 ML
0.4 PATIENT CONTROLLED ANALGESIA SYRINGE INTRAVENOUS EVERY 5 MIN PRN
Status: DISCONTINUED | OUTPATIENT
Start: 2023-03-20 | End: 2023-03-20

## 2023-03-20 RX ORDER — EPHEDRINE SULFATE 50 MG/ML
INJECTION, SOLUTION INTRAMUSCULAR; INTRAVENOUS; SUBCUTANEOUS PRN
Status: DISCONTINUED | OUTPATIENT
Start: 2023-03-20 | End: 2023-03-20

## 2023-03-20 RX ORDER — PROPOFOL 10 MG/ML
INJECTION, EMULSION INTRAVENOUS PRN
Status: DISCONTINUED | OUTPATIENT
Start: 2023-03-20 | End: 2023-03-20

## 2023-03-20 RX ORDER — LIDOCAINE 40 MG/G
CREAM TOPICAL
Status: DISCONTINUED | OUTPATIENT
Start: 2023-03-20 | End: 2023-03-22

## 2023-03-20 RX ORDER — HALOPERIDOL 5 MG/ML
INJECTION INTRAMUSCULAR
Status: COMPLETED
Start: 2023-03-20 | End: 2023-03-20

## 2023-03-20 RX ORDER — IPRATROPIUM BROMIDE AND ALBUTEROL SULFATE 2.5; .5 MG/3ML; MG/3ML
3 SOLUTION RESPIRATORY (INHALATION)
Status: DISCONTINUED | OUTPATIENT
Start: 2023-03-20 | End: 2023-03-23

## 2023-03-20 RX ORDER — SODIUM CHLORIDE, SODIUM LACTATE, POTASSIUM CHLORIDE, CALCIUM CHLORIDE 600; 310; 30; 20 MG/100ML; MG/100ML; MG/100ML; MG/100ML
INJECTION, SOLUTION INTRAVENOUS CONTINUOUS
Status: DISCONTINUED | OUTPATIENT
Start: 2023-03-20 | End: 2023-03-23

## 2023-03-20 RX ORDER — LORAZEPAM 2 MG/ML
0.5 INJECTION INTRAMUSCULAR EVERY 6 HOURS PRN
Status: DISCONTINUED | OUTPATIENT
Start: 2023-03-20 | End: 2023-03-21

## 2023-03-20 RX ORDER — POLYETHYLENE GLYCOL 3350 17 G/17G
17 POWDER, FOR SOLUTION ORAL DAILY
Status: DISCONTINUED | OUTPATIENT
Start: 2023-03-21 | End: 2023-03-23 | Stop reason: HOSPADM

## 2023-03-20 RX ORDER — ONDANSETRON 2 MG/ML
4 INJECTION INTRAMUSCULAR; INTRAVENOUS EVERY 6 HOURS PRN
Status: DISCONTINUED | OUTPATIENT
Start: 2023-03-20 | End: 2023-03-23 | Stop reason: HOSPADM

## 2023-03-20 RX ORDER — ENOXAPARIN SODIUM 100 MG/ML
40 INJECTION SUBCUTANEOUS EVERY 24 HOURS
Status: DISCONTINUED | OUTPATIENT
Start: 2023-03-21 | End: 2023-03-23 | Stop reason: HOSPADM

## 2023-03-20 RX ORDER — NEOSTIGMINE METHYLSULFATE 1 MG/ML
VIAL (ML) INJECTION PRN
Status: DISCONTINUED | OUTPATIENT
Start: 2023-03-20 | End: 2023-03-20

## 2023-03-20 RX ORDER — HYDROMORPHONE HCL IN WATER/PF 6 MG/30 ML
0.2 PATIENT CONTROLLED ANALGESIA SYRINGE INTRAVENOUS EVERY 5 MIN PRN
Status: DISCONTINUED | OUTPATIENT
Start: 2023-03-20 | End: 2023-03-20

## 2023-03-20 RX ORDER — HYDROMORPHONE HCL IN WATER/PF 6 MG/30 ML
0.2 PATIENT CONTROLLED ANALGESIA SYRINGE INTRAVENOUS
Status: DISCONTINUED | OUTPATIENT
Start: 2023-03-20 | End: 2023-03-23 | Stop reason: HOSPADM

## 2023-03-20 RX ADMIN — ONDANSETRON 4 MG: 2 INJECTION INTRAMUSCULAR; INTRAVENOUS at 16:15

## 2023-03-20 RX ADMIN — SODIUM CHLORIDE, POTASSIUM CHLORIDE, SODIUM LACTATE AND CALCIUM CHLORIDE: 600; 310; 30; 20 INJECTION, SOLUTION INTRAVENOUS at 14:26

## 2023-03-20 RX ADMIN — HALOPERIDOL LACTATE 2 MG: 5 INJECTION, SOLUTION INTRAMUSCULAR at 20:40

## 2023-03-20 RX ADMIN — IPRATROPIUM BROMIDE AND ALBUTEROL SULFATE 3 ML: 2.5; .5 SOLUTION RESPIRATORY (INHALATION) at 21:01

## 2023-03-20 RX ADMIN — PHENYLEPHRINE HYDROCHLORIDE 100 MCG: 10 INJECTION INTRAVENOUS at 15:15

## 2023-03-20 RX ADMIN — DEXMEDETOMIDINE HYDROCHLORIDE 0.5 MCG/KG/HR: 100 INJECTION, SOLUTION INTRAVENOUS at 14:47

## 2023-03-20 RX ADMIN — IPRATROPIUM BROMIDE AND ALBUTEROL SULFATE 3 ML: 2.5; .5 SOLUTION RESPIRATORY (INHALATION) at 05:47

## 2023-03-20 RX ADMIN — TRANEXAMIC ACID 1 G: 10 INJECTION, SOLUTION INTRAVENOUS at 14:39

## 2023-03-20 RX ADMIN — Medication 10 MG: at 15:53

## 2023-03-20 RX ADMIN — Medication 5 MG: at 16:24

## 2023-03-20 RX ADMIN — SODIUM CHLORIDE, POTASSIUM CHLORIDE, SODIUM LACTATE AND CALCIUM CHLORIDE: 600; 310; 30; 20 INJECTION, SOLUTION INTRAVENOUS at 15:53

## 2023-03-20 RX ADMIN — PHENYLEPHRINE HYDROCHLORIDE 150 MCG: 10 INJECTION INTRAVENOUS at 15:31

## 2023-03-20 RX ADMIN — DEXAMETHASONE SODIUM PHOSPHATE 2 MG: 4 INJECTION, SOLUTION INTRAMUSCULAR; INTRAVENOUS at 08:31

## 2023-03-20 RX ADMIN — SODIUM CHLORIDE, POTASSIUM CHLORIDE, SODIUM LACTATE AND CALCIUM CHLORIDE: 600; 310; 30; 20 INJECTION, SOLUTION INTRAVENOUS at 21:07

## 2023-03-20 RX ADMIN — IPRATROPIUM BROMIDE AND ALBUTEROL SULFATE 3 ML: 2.5; .5 SOLUTION RESPIRATORY (INHALATION) at 07:12

## 2023-03-20 RX ADMIN — LEVOTHYROXINE SODIUM 88 MCG: 0.09 TABLET ORAL at 08:32

## 2023-03-20 RX ADMIN — Medication 5 MG: at 16:08

## 2023-03-20 RX ADMIN — HYDROMORPHONE HYDROCHLORIDE 0.2 MG: 0.2 INJECTION, SOLUTION INTRAMUSCULAR; INTRAVENOUS; SUBCUTANEOUS at 04:30

## 2023-03-20 RX ADMIN — IPRATROPIUM BROMIDE AND ALBUTEROL SULFATE 3 ML: 2.5; .5 SOLUTION RESPIRATORY (INHALATION) at 19:39

## 2023-03-20 RX ADMIN — PHENYLEPHRINE HYDROCHLORIDE 100 MCG: 10 INJECTION INTRAVENOUS at 15:04

## 2023-03-20 RX ADMIN — SODIUM CHLORIDE, POTASSIUM CHLORIDE, SODIUM LACTATE AND CALCIUM CHLORIDE 250 ML: 600; 310; 30; 20 INJECTION, SOLUTION INTRAVENOUS at 22:31

## 2023-03-20 RX ADMIN — PHENYLEPHRINE HYDROCHLORIDE 100 MCG: 10 INJECTION INTRAVENOUS at 14:44

## 2023-03-20 RX ADMIN — MAGNESIUM SULFATE HEPTAHYDRATE 2 G: 40 INJECTION, SOLUTION INTRAVENOUS at 14:47

## 2023-03-20 RX ADMIN — PHENYLEPHRINE HYDROCHLORIDE 200 MCG: 10 INJECTION INTRAVENOUS at 14:37

## 2023-03-20 RX ADMIN — TRANEXAMIC ACID 1 G: 10 INJECTION, SOLUTION INTRAVENOUS at 16:09

## 2023-03-20 RX ADMIN — ACETAMINOPHEN 975 MG: 325 TABLET ORAL at 14:09

## 2023-03-20 RX ADMIN — LIDOCAINE HYDROCHLORIDE 50 MG: 20 INJECTION, SOLUTION INFILTRATION; PERINEURAL at 14:33

## 2023-03-20 RX ADMIN — Medication 10 MG: at 14:33

## 2023-03-20 RX ADMIN — PHENYLEPHRINE HYDROCHLORIDE 150 MCG: 10 INJECTION INTRAVENOUS at 15:40

## 2023-03-20 RX ADMIN — FAMOTIDINE 20 MG: 10 INJECTION, SOLUTION INTRAVENOUS at 08:31

## 2023-03-20 RX ADMIN — HYDROMORPHONE HYDROCHLORIDE 0.2 MG: 0.2 INJECTION, SOLUTION INTRAMUSCULAR; INTRAVENOUS; SUBCUTANEOUS at 23:56

## 2023-03-20 RX ADMIN — ROCURONIUM BROMIDE 50 MG: 50 INJECTION, SOLUTION INTRAVENOUS at 14:33

## 2023-03-20 RX ADMIN — CEFAZOLIN SODIUM 2 G: 2 INJECTION, SOLUTION INTRAVENOUS at 14:26

## 2023-03-20 RX ADMIN — CEFAZOLIN 1 G: 1 INJECTION, POWDER, FOR SOLUTION INTRAMUSCULAR; INTRAVENOUS at 21:53

## 2023-03-20 RX ADMIN — DEXAMETHASONE SODIUM PHOSPHATE 8 MG: 4 INJECTION, SOLUTION INTRA-ARTICULAR; INTRALESIONAL; INTRAMUSCULAR; INTRAVENOUS; SOFT TISSUE at 14:33

## 2023-03-20 RX ADMIN — GLYCOPYRROLATE 0.4 MG: 0.2 INJECTION, SOLUTION INTRAMUSCULAR; INTRAVENOUS at 16:25

## 2023-03-20 RX ADMIN — PHENYLEPHRINE HYDROCHLORIDE 100 MCG: 10 INJECTION INTRAVENOUS at 14:50

## 2023-03-20 RX ADMIN — SERTRALINE HYDROCHLORIDE 25 MG: 25 TABLET ORAL at 08:32

## 2023-03-20 RX ADMIN — KETOROLAC TROMETHAMINE 15 MG: 30 INJECTION, SOLUTION INTRAMUSCULAR at 14:33

## 2023-03-20 RX ADMIN — PHENYLEPHRINE HYDROCHLORIDE 100 MCG: 10 INJECTION INTRAVENOUS at 15:43

## 2023-03-20 RX ADMIN — NEOSTIGMINE METHYLSULFATE 3 MG: 1 INJECTION, SOLUTION INTRAVENOUS at 16:25

## 2023-03-20 RX ADMIN — PROPOFOL 140 MG: 10 INJECTION, EMULSION INTRAVENOUS at 14:33

## 2023-03-20 ASSESSMENT — LIFESTYLE VARIABLES: TOBACCO_USE: 1

## 2023-03-20 ASSESSMENT — ACTIVITIES OF DAILY LIVING (ADL)
ADLS_ACUITY_SCORE: 35
ADLS_ACUITY_SCORE: 39
ADLS_ACUITY_SCORE: 39
ADLS_ACUITY_SCORE: 37
ADLS_ACUITY_SCORE: 35
ADLS_ACUITY_SCORE: 37
ADLS_ACUITY_SCORE: 35
ADLS_ACUITY_SCORE: 35

## 2023-03-20 ASSESSMENT — COPD QUESTIONNAIRES
CAT_SEVERITY: SEVERE
COPD: 1

## 2023-03-20 NOTE — PROGRESS NOTES
RT note: paged to PACU for cpap, due to WOB and an increase in sats switched from CPAP to BiPAP. Placed pt on 10/5 50% via small face mask for sats 89-94%. Skin integrity intact.

## 2023-03-20 NOTE — PROVIDER NOTIFICATION
RCAT Treatment Plan    Patient Score: 14    Patient Acuity: 3    Clinical Indication for Therapy: history of COPD    Therapy Ordered: Duoneb QID    Assessment Summary:        03/19/23 1932   RCAT Assessment   Reason for Assessment COPD  (history of COPD, here for ortho surgery`)   Pulmonary Status 3   Surgical Status 0   Chest X-ray 3   Respiratory Pattern 0   Mental Status 2   Breath Sounds 2   Cough Effectiveness 0   Level of Activity 1   O2 Required for SpO2>=92% 3   Acuity Level (points) 14   Acuity Level  3   Re-eval Interval Guideline Every 3 days   Re-evaluation Date 03/22/23     Molly Kelsey, RT  3/19/2023

## 2023-03-20 NOTE — ANESTHESIA CARE TRANSFER NOTE
Patient: Josephine Nicole    Procedure: Procedure(s):  HEMIARTHROPLASTY, RIGHT HIP, UNIPOLAR       Diagnosis: Closed displaced fracture of right femoral neck (H) [S72.001A]  Diagnosis Additional Information: No value filed.    Anesthesia Type:   General     Note:    Oropharynx: oral airway in place  Level of Consciousness: awake and drowsy  Oxygen Supplementation: face mask  Level of Supplemental Oxygen (L/min / FiO2): 6  Independent Airway: airway patency satisfactory and stable  Dentition: dentition unchanged  Vital Signs Stable: post-procedure vital signs reviewed and stable  Report to RN Given: handoff report given  Patient transferred to: PACU    Handoff Report: Identifed the Patient, Identified the Reponsible Provider, Reviewed the pertinent medical history, Discussed the surgical course, Reviewed Intra-OP anesthesia mangement and issues during anesthesia, Set expectations for post-procedure period and Allowed opportunity for questions and acknowledgement of understanding      Vitals:  Vitals Value Taken Time   /67 03/20/23 1653   Temp 97.9  F (36.6  C) 03/20/23 1653   Pulse 90 03/20/23 1655   Resp 16 03/20/23 1655   SpO2 95 % 03/20/23 1655   Vitals shown include unvalidated device data.    Electronically Signed By: MIGNON Gonzalez CRNA  March 20, 2023  4:56 PM

## 2023-03-20 NOTE — ANESTHESIA PREPROCEDURE EVALUATION
Anesthesia Pre-Procedure Evaluation    Patient: Josephine Nicole   MRN: [id number] : 1950        Procedure : Procedure(s):  HEMIARTHROPLASTY, RIGHT HIP, UNIPOLAR          Past Medical History:   Diagnosis Date     Cerebral meningioma      COPD (chronic obstructive pulmonary disease)      Depressive disorder      Eczema      Eczema      Hyperlipidemia      Hypothyroidism      Melanoma of skin      Osteoporosis       Past Surgical History:   Procedure Laterality Date     COLONOSCOPY N/A 2022    Procedure: COLONOSCOPY;  Surgeon: Abimbola Handley MD;  Location:  GI     Tubal ligation NOS        Allergies   Allergen Reactions     Bupropion Anaphylaxis, Hives and Shortness Of Breath      Social History     Tobacco Use     Smoking status: Former     Years: 50.00     Types: Cigarettes     Smokeless tobacco: Never   Substance Use Topics     Alcohol use: Not Currently      Wt Readings from Last 1 Encounters:   23 65.8 kg (145 lb)        Anesthesia Evaluation            ROS/MED HX  ENT/Pulmonary: Comment: COPD, Oxygen Dependent. Acute on Chronic Hypoxic Respiratory Failure - former smoker of 50 years; quit around 5 years go.  On 2-3 liters of oxygen at baseline for the past 5 years.  Requiring 3-4 liters on admission with nocturnal hypoxemia noted 3/19-3/20.  Chest XR, blood gas and elevation of bicarb on BMP all indicate relatively severe, chronic baseline COPD.  She is not having significant wheezing and no significant WOB on 3/20.      (+) tobacco use, Past use, severe,  COPD, O2 dependent, during Both,     Neurologic: Comment: Cerebral meningioma    (+) delerium, resolved No.     Cardiovascular:       METS/Exercise Tolerance:     Hematologic: Comments: Lab Test        23                       0610          0518          WBC          13.5*        8.3           HGB          9.8*         10.5*         MCV          98           96            PLT          215          273            INR           --          0.96           Lab Test        03/20/23 03/19/23                       0610          0518          NA           141          139           POTASSIUM    4.6          3.9           CHLORIDE     101          98            CO2          33*          33*           BUN          14.5         9.2           CR           0.62         0.52          ANIONGAP     7            8             CHAO          8.5*         8.9           GLC          137*         122*              (+) anemia,     Musculoskeletal: Comment: Osteoporosis  (+) arthritis, fracture, Fracture location: RLE,     GI/Hepatic:  - neg GI/hepatic ROS     Renal/Genitourinary:  - neg Renal ROS     Endo:     (+) thyroid problem, hypothyroidism,     Psychiatric/Substance Use:     (+) psychiatric history depression and anxiety     Infectious Disease:  - neg infectious disease ROS     Malignancy:  - neg malignancy ROS (+) Malignancy, History of Skin.Skin CA Remission status post Surgery.        Other:            Physical Exam    Airway        Mallampati: II   TM distance: > 3 FB   Neck ROM: full   Mouth opening: > 3 cm    Respiratory Devices and Support         Dental       (+) Modest Abnormalities - crowns, retainers, 1 or 2 missing teeth      Cardiovascular   cardiovascular exam normal          Pulmonary   pulmonary exam normal                OUTSIDE LABS:  CBC:   Lab Results   Component Value Date    WBC 13.5 (H) 03/20/2023    WBC 8.3 03/19/2023    HGB 9.8 (L) 03/20/2023    HGB 10.5 (L) 03/19/2023    HCT 31.6 (L) 03/20/2023    HCT 33.8 (L) 03/19/2023     03/20/2023     03/19/2023     BMP:   Lab Results   Component Value Date     03/20/2023     03/19/2023    POTASSIUM 4.6 03/20/2023    POTASSIUM 3.9 03/19/2023    CHLORIDE 101 03/20/2023    CHLORIDE 98 03/19/2023    CO2 33 (H) 03/20/2023    CO2 33 (H) 03/19/2023    BUN 14.5 03/20/2023    BUN 9.2 03/19/2023    CR 0.62 03/20/2023    CR 0.52 03/19/2023     (H)  03/20/2023     (H) 03/19/2023     COAGS:   Lab Results   Component Value Date    PTT 29 03/19/2023    INR 0.96 03/19/2023     POC: No results found for: BGM, HCG, HCGS  HEPATIC: No results found for: ALBUMIN, PROTTOTAL, ALT, AST, GGT, ALKPHOS, BILITOTAL, BILIDIRECT, DELON  OTHER:   Lab Results   Component Value Date    CHAO 8.5 (L) 03/20/2023    MAG 1.6 (L) 03/20/2023       Anesthesia Plan    ASA Status:  4   NPO Status:  NPO Appropriate    Anesthesia Type: General.     - Airway: ETT   Induction: Propofol.   Maintenance: Balanced.   Techniques and Equipment:       - Drips/Meds: Dexmed. infusion     Consents    Anesthesia Plan(s) and associated risks, benefits, and realistic alternatives discussed. Questions answered and patient/representative(s) expressed understanding.    - Discussed:     - Discussed with:  Patient      - Extended Intubation/Ventilatory Support Discussed: No.      - Patient is DNR/DNI Status: No    Use of blood products discussed: Yes.     - Discussed with: Patient.     - Consented: consented to blood products            Reason for refusal: other.     Postoperative Care    Pain management: IV analgesics, Oral pain medications, Multi-modal analgesia.     - Plan for long acting post-op opioid use   PONV prophylaxis: Ondansetron (or other 5HT-3), Dexamethasone or Solumedrol     Comments:                Dev Don MD

## 2023-03-20 NOTE — PLAN OF CARE
Oriented to self and time. VSS except on 3L of O2 and sats at 90%. New IV placed in L arm and is SL. NPO. Bedrest. Lungs clear. Bowel sounds hypoactive. Bladder scanned for 156.     Goal Outcome Evaluation:      Plan of Care Reviewed With: patient, child, family, sibling    Overall Patient Progress: no change    Outcome Evaluation: Pt transported to pre-op at 13:00.

## 2023-03-20 NOTE — PROGRESS NOTES
Neurosurgery progress note    Ms. Nicole is seen today lying in bed, plans are for hip surgery later today.  Her sister is with her and provides most of the history.  She does not report any acute events overnight.  The patient herself does not answer questions readily, and is mostly looking about the room and fidgeting in bed.    Exam    Awake, alert, does not respond verbally to questions, not following commands  Moving all extremities    Assessment    Right anterior/inferior falx meningioma with moderate surrounding vasogenic edema and mass effect  Status post radiation treatment 4 months ago    Plan    Awaiting on final decision from family as to whether to proceed with surgery or not, if family decides to proceed with surgery would recommend transfer to [facility] with surgery that could be performed on Wednesday.  Continue low-dose Decadron 2 mg daily.  Family wanting to speak with her outpatient radiation oncologist prior to making further decisions.    If the family decides to hold off on surgery, they could certainly discharge on low-dose Decadron, and follow-up either with our clinic or clinic of your choosing.  We will await their further decision making.    Discussed with Dr. Murphy

## 2023-03-20 NOTE — CONSULTS
[facility]    Orthopedics Consultation    Date of Admission:  3/19/2023    Assessment & Plan     PLAN:     Josephine Nicole  is a 73 year old female with a history of meningioma with vasogenic edema, COPD, hypothyroid, chronic anemia who presents with a right femoral neck fracture.  She's a minimal ambulator at baseline.  Radiographs reveal a displaced femoral neck fracture.  We discussed possible treatment options including nonoperative and operative intervention along with the expected risks and recovery.   I recommended a right hip hemiarthroplasty to allow for pain relief and improved function.  They are aware she is at risk for surgery due to her comorbidities and surgery would come with risk to her health and life.  However they are aware of the benefits and quality of life surgery could provide.     Plan for right hip hemiarthroplasty pending medical optimization  NPO    Principal Problem:    Cerebral meningioma (H)  Active Problems:    Fall, initial encounter    Closed displaced fracture of right femoral neck (H)    JEREMIAH ZAVALA MD     Code Status    Full Code    Reason for Consult   Reason for consult: Right hip pain    Primary Care Physician   Physician No Ref-Primary    Chief Complaint   Right hip fracture    History is obtained from family    History of Present Illness   Josephine Nicole is a 73 year old female who presents with a right hip femoral neck fracture after a fall.  She has a history of meningioma.  Neurosurgery is involved regarding its progression and has been considering possible resection.  She also has a history of COPD and is on oxygen.  She presented to [facility] emergency department where radiographs revealed her fracture.  She previously has been ambulatory but has had a decline in her ambulatory status over the past few weeks.  Her family reports she does not walk much anymore.  She sustained a which presented her to the hospital.  Unable to obtain  history from the patient.    Past Medical History   I have reviewed this patient's medical history and updated it with pertinent information if needed.   Past Medical History:   Diagnosis Date     Cerebral meningioma      COPD (chronic obstructive pulmonary disease)      Depressive disorder      Eczema      Eczema      Hyperlipidemia      Hypothyroidism      Melanoma of skin      Osteoporosis        Past Surgical History   I have reviewed this patient's surgical history and updated it with pertinent information if needed.  Past Surgical History:   Procedure Laterality Date     COLONOSCOPY N/A 04/18/2022    Procedure: COLONOSCOPY;  Surgeon: Abimbola Handley MD;  Location:  GI     Tubal ligation NOS         Prior to Admission Medications   Prior to Admission Medications   Prescriptions Last Dose Informant Patient Reported? Taking?   ALPRAZolam (XANAX) 0.5 MG tablet prn at prn  Yes No   Sig: Take 0.25 mg by mouth daily as needed for anxiety   albuterol (PROVENTIL) (2.5 MG/3ML) 0.083% neb solution prn at prn  Yes No   Sig: Take 2.5 mg by nebulization 3 times daily as needed for shortness of breath, wheezing or cough   alendronate (FOSAMAX) 70 MG tablet Past Week at -  Yes Yes   Sig: Take 70 mg by mouth every 7 days   atorvastatin (LIPITOR) 10 MG tablet 3/18/2023 at am  Yes Yes   Sig: Take 10 mg by mouth daily   butalbital-acetaminophen-caffeine (ESGIC) -40 MG tablet prn at prn  Yes Yes   Sig: Take 1 tablet by mouth every 6 hours as needed for headaches Max 6 doses per day.   cyanocobalamin (CYANOCOBALAMIN) 1000 MCG/ML injection due for injection at due for injection  Yes Yes   Sig: Inject 1 mL into the muscle every 30 days   fluticasone-salmeterol (ADVAIR) 500-50 MCG/DOSE inhaler 3/18/2023 at am  Yes Yes   Sig: Inhale 1 puff into the lungs 2 times daily   hydrOXYzine (ATARAX) 10 MG tablet prn at prn  Yes No   Sig: Take 10 mg by mouth every 8 hours as needed for anxiety   ipratropium - albuterol 0.5 mg/2.5  mg/3 mL (DUONEB) 0.5-2.5 (3) MG/3ML neb solution prn at prn  Yes No   Sig: Take 1 vial by nebulization every 6 hours as needed for shortness of breath, wheezing or cough   levalbuterol (XOPENEX HFA) 45 MCG/ACT inhaler prn at prn  Yes No   Sig: Inhale 2 puffs into the lungs every 4 hours as needed for shortness of breath or wheezing   levothyroxine (SYNTHROID/LEVOTHROID) 88 MCG tablet 3/18/2023 at am  Yes Yes   Sig: Take 88 mcg by mouth daily   mirtazapine (REMERON) 15 MG tablet 3/17/2023 at hs  Yes Yes   Sig: Take 7.5 mg by mouth At Bedtime   sertraline (ZOLOFT) 25 MG tablet 3/19/2023 at am  Yes Yes   Sig: Take 25 mg by mouth every morning   zolpidem (AMBIEN) 5 MG tablet Past Week at -  Yes Yes   Sig: Take 5 mg by mouth nightly as needed for sleep      Facility-Administered Medications: None     Allergies   Allergies   Allergen Reactions     Bupropion Anaphylaxis, Hives and Shortness Of Breath       Social History   I have reviewed this patient's social history and updated it with pertinent information if needed. Josephine Nicole  reports that she has quit smoking. Her smoking use included cigarettes. She has never used smokeless tobacco. She reports that she does not currently use alcohol. She reports that she does not use drugs.    Family History   I have reviewed this patient's family history and updated it with pertinent information if needed.   No family history on file.    Review of Systems   The 10 point Review of Systems is negative other than noted in the HPI or here.     Physical Exam   Temp: 98.1  F (36.7  C) Temp src: Temporal BP: 134/50 Pulse: 99   Resp: 18 SpO2: 97 % O2 Device: Oxymizer cannula Oxygen Delivery: 5 LPM  Vital Signs with Ranges  Temp:  [98.1  F (36.7  C)] 98.1  F (36.7  C)  Pulse:  [] 99  Resp:  [18-20] 18  BP: ()/(50-96) 134/50  SpO2:  [70 %-98 %] 97 %  145 lbs 0 oz    Constitutional: awake, alert, cooperative, no apparent distress, and appears stated age  Eyes: extra-ocular  muscles intact, no scleral icterus  ENT: normocepalic, atraumatic without obvious abnormality  Respiratory: no increased work of breathing, symmetric chest wall expansion    MSK:  Right lower extremity:  Skin is intact.  Diffuse swelling around the GT  Tenderness to palpation over the GT  Hip pain with logroll  Hip range of motion limited secondary to pain  Sensations: unable to assess  Grossly moving foot  Dorsalis pedis pulse 2+, good capillary refill    Left lower extremity:  Skin is intact.  No tenderness to palpation over the long bones or joints.  No pain with range of motion of the hip, knee, ankle  Sensations: unable to assess  Grossly moving foot  Dorsalis pedis pulse 2+, good capillary refill      Data   Most Recent 3 CBC's:Recent Labs   Lab Test 03/20/23  0610 03/19/23  0518   WBC 13.5* 8.3   HGB 9.8* 10.5*   MCV 98 96    273     Most Recent 3 BMP's:Recent Labs   Lab Test 03/20/23  0610 03/19/23  0518    139   POTASSIUM 4.6 3.9   CHLORIDE 101 98   CO2 33* 33*   BUN 14.5 9.2   CR 0.62 0.52   ANIONGAP 7 8   CHAO 8.5* 8.9   * 122*     Most Recent 3 INR's:Recent Labs   Lab Test 03/19/23  0518   INR 0.96

## 2023-03-20 NOTE — CONSULTS
Neurology Consult Note  [facility]  [2023]     Admission Date:  3/19/2023  Hospital Day: 2  Code Status: Full Code    Patient: Josephine Nicole     : 1950  MRN: [id number]     CC:      Chief Complaint   Patient presents with     Fall     Consult Request:  Referring Provider:  Jaciel Laurent MD    Indication for Consultation:   Which Neurology Group will follow this patient? [facility]   Patient to be seen Routine within 24 hrs   Reason for Consult tremors, history of meningioma   Note: Specific question for consultant   Requesting provider? Other supervising provider   Name neurosurgery     Primary Care Provider:  No Ref-Primary, Physician MD           HPI:  I came to evaluate Josephine Nicole, a 73 year old year old woman admitted for hip pain after a fall. I was asked to evaluate her for tremor. ER evaluation documented a closed displaced fracture of her right femoral neck, and she was off the floor in surgery. I will return to evaluate her tomorrow for her tremor.        Gary Moncada M.D., Ph.D.    [facility]      A complete review of symptoms was performed including vascular, infectious, cardiovascular, pulmonary, gastrointestinal, endocrinological, hematologic, dermatologic, musculoskeletal, and neurological. All were normal except as above.    PAST MEDICAL HISTORY:  Allergy:   Allergies   Allergen Reactions     Bupropion Anaphylaxis, Hives and Shortness Of Breath     Tobacco:   History   Smoking Status     Former     Years: 50.00     Types: Cigarettes   Smokeless Tobacco     Never     Alcohol: Social History    Substance and Sexual Activity      Alcohol use: Not Currently      MEDICATIONS:       Currently Scheduled Medications     acetaminophen  975 mg Oral Q8H     dexamethasone  2 mg Intravenous Daily     famotidine  20 mg Intravenous Q12H     [Held by provider] famotidine  20 mg Oral BID      fluticasone-vilanterol  1 puff Inhalation Daily     ipratropium - albuterol 0.5 mg/2.5 mg/3 mL  1 vial Nebulization 4x daily     levothyroxine  88 mcg Oral QAM AC     mirtazapine  7.5 mg Oral At Bedtime     sertraline  25 mg Oral QAM     sodium chloride (PF)  3 mL Intracatheter Q8H     sodium chloride (PF)  3 mL Intracatheter Q8H          Home Medications  Medications Prior to Admission   Medication Sig Dispense Refill Last Dose     alendronate (FOSAMAX) 70 MG tablet Take 70 mg by mouth every 7 days   Past Week at -     atorvastatin (LIPITOR) 10 MG tablet Take 10 mg by mouth daily   3/18/2023 at am     butalbital-acetaminophen-caffeine (ESGIC) -40 MG tablet Take 1 tablet by mouth every 6 hours as needed for headaches Max 6 doses per day.   prn at prn     cyanocobalamin (CYANOCOBALAMIN) 1000 MCG/ML injection Inject 1 mL into the muscle every 30 days   due for injection at due for injection     fluticasone-salmeterol (ADVAIR) 500-50 MCG/DOSE inhaler Inhale 1 puff into the lungs 2 times daily   3/18/2023 at am     levothyroxine (SYNTHROID/LEVOTHROID) 88 MCG tablet Take 88 mcg by mouth daily   3/18/2023 at am     mirtazapine (REMERON) 15 MG tablet Take 7.5 mg by mouth At Bedtime   3/17/2023 at hs     sertraline (ZOLOFT) 25 MG tablet Take 25 mg by mouth every morning   3/19/2023 at am     zolpidem (AMBIEN) 5 MG tablet Take 5 mg by mouth nightly as needed for sleep   Past Week at -     albuterol (PROVENTIL) (2.5 MG/3ML) 0.083% neb solution Take 2.5 mg by nebulization 3 times daily as needed for shortness of breath, wheezing or cough   prn at prn     ALPRAZolam (XANAX) 0.5 MG tablet Take 0.25 mg by mouth daily as needed for anxiety   prn at prn     hydrOXYzine (ATARAX) 10 MG tablet Take 10 mg by mouth every 8 hours as needed for anxiety   prn at prn     ipratropium - albuterol 0.5 mg/2.5 mg/3 mL (DUONEB) 0.5-2.5 (3) MG/3ML neb solution Take 1 vial by nebulization every 6 hours as needed for shortness of breath,  "wheezing or cough   prn at prn     levalbuterol (XOPENEX HFA) 45 MCG/ACT inhaler Inhale 2 puffs into the lungs every 4 hours as needed for shortness of breath or wheezing   prn at prn     MEDICAL HISTORY  Past Medical History:   Diagnosis Date     Cerebral meningioma      COPD (chronic obstructive pulmonary disease)      Depressive disorder      Eczema      Eczema      Hyperlipidemia      Hypothyroidism      Melanoma of skin      Osteoporosis      SURGICAL HISTORY  Past Surgical History:   Procedure Laterality Date     COLONOSCOPY N/A 04/18/2022    Procedure: COLONOSCOPY;  Surgeon: Abimbola Handley MD;  Location:  GI     Tubal ligation NOS       FAMILY HISTORY    No family history on file.  SOCIAL HISTORY  Social History     Socioeconomic History     Marital status:    Tobacco Use     Smoking status: Former     Years: 50.00     Types: Cigarettes     Smokeless tobacco: Never   Substance and Sexual Activity     Alcohol use: Not Currently     Drug use: Never       GENERAL EXAMINATION    She is a middle-aged / an elderly, well-developed, well-nourished woman, Data Unavailable and 145 lbs 0 oz. Blood pressure is 105/53. Her peripheral pulses are intact at 99 and regular. She has no carotid bruits. Conjunctivae are normal. Her oropharynx is without lesions or inflammation. Skin examination is unremarkable. She has no pretibial edema, rashes, or unusual lesions. Nail examination shows no clubbing, cyanosis, or deformities. Respiratory examination is unremarkable, with rate of 18, unlabored. She is afebrile. She has no CVA tenderness to percussion.              Temp: 97.4  F (36.3  C)   Weight: 65.8 kg (145 lb)   Temp src: Temporal         BP: 105/53         Estimated body mass index is 26.52 kg/m  as calculated from the following:    Height as of 4/18/22: 1.575 m (5' 2\").    Weight as of this encounter: 65.8 kg (145 lb).    Resp: Resp: 18   SpO2: SpO2: 90 %   O2 D: O2 Device: Oxymizer cannula "     NEUROLOGICAL EXAMINATION  Mental Status:  She is awake, alert, and oriented X3. Her speech is spontaneous and fluent. She has no dysarthria or aphasia. Short- and long-term memory are intact. Fund of knowledge is appropriate. Mood is normal, and affect is appropriate. Judgment and insight appear intact.    Station and Gait: She has a narrow-based gait with free arm swings. She can stand from a seated position without pushing off with her hands.     Skull and Spine: Her head is atraumatic. Her spine is non-tender to palpation. She has no cervical sensory level. She has no tenderness to palpation over her lumbar spine.     Cranial Nerves: Her visual fields are full to confrontation. Extraocular movements are intact. Pursuits are smooth, and saccades are of normal speed. Her versions and ductions are normal. She has no nystagmus in the horizontal or vertical planes. Her pupils are reactive to light. Her face is symmetric at rest and with movement. She has no ptosis. Sensation is normal over V1, V2, and V3 bilaterally. Her tongue is midline, with normal strength and speed. Her palate elevates equally. Shoulder shrug is symmetric. Orbicularis oculi and oris are normal. Neck extension strength is normal. Hearing is intact.    Motor: Muscle bulk is good, and tone is normal. Muscles are non-tender to palpation. Deep tendon reflexes are 2+ and symmetric in her arms, knees, and ankles. Plantars are flexor. She has no pronator drift. Muscle percussion causes no contractions or fasciculations. She has pathological post-percussion contractions in ___ , with/ but no post-percussion fasciculations. Individual muscle testing shows no focal or fatigable weakness proximally or distally in her arms or legs.     Sensory: Sensation is symmetric in her arms and legs. Cold and pinprick perception is normal in her arms and legs.  She has a moderate/severe symmetric loss of cold and pinprick perception distally in her arms and legs.  Vibration perception by quantitative Rydel-Seiffer tuning fork testing is 10/10 in her index fingers and 10/10 in her great toes. Radicular sensory loss is not present/ noted over the X dermatome. Tinel s is positive/negative over her wrists, ulnar grooves, fibular heads, and tarsal tunnels.     Coordination: She has no resting, postural, or action tremor. She can perform finger-nose-finger quickly and accurately, and can touch her nose with eyes closed without difficulty.    LABORATORY RESULTS    SMA-7:  Recent Labs   Lab Test 03/20/23  0610 03/19/23 0518    139   POTASSIUM 4.6 3.9   CHLORIDE 101 98   CO2 33* 33*   * 122*   BUN 14.5 9.2   CR 0.62 0.52   CHAO 8.5* 8.9     CMP:  Recent Labs   Lab 03/20/23  0758 03/20/23 0610 03/19/23 0518   CHAO  --  8.5* 8.9   MAG 1.6*  --  1.6*     CBC:    Recent Labs   Lab 03/20/23 0610 03/19/23 0518   WBC 13.5* 8.3   RBC 3.23* 3.52*   HGB 9.8* 10.5*   HCT 31.6* 33.8*   MCV 98 96    273   )@  @LABALLVALUES(WBC:1,HGB:1,HCT:1,PLT:1,MCV:1,MCH:1,MCHC:1,RDW:1,NEUT  P:1,LYMPHP:1,MONOSP:1,EOSP:1,BASOP:1,NEUTABS:1,LYMPHABS:1,MONOABS:  1,EOSABS:1,BASOABS:1)@  No results for input(s): IRON, IRONSAT, RETICABSCT, RETP, FEB, MARY, FOLIC, EPOE, MORPH in the last 168 hours.  U/A:    No components found for: URINE    No results for input(s): COLOR, APPEARANCE, URINEGLC, URINEBILI, URINEKETONE, SG, UBLD, URINEPH, PROTEIN, UROBILINOGEN, NITRITE, LEUKEST, RBCU, WBCU in the last 58001 hours.  No results found for: MICROALBUMIN, CREATCONC  Cholesterol Panel:  No results found for: CHOL, HDL, LDL, TRIG, CHOLHDLRATIO  Lipase: No results found for: LIPASE  HgA1c: No results found for: A1C  TSH: No results for input(s): TSH in the last 168 hours.  CK: No results found for: CKTOTAL  Ammonia:  No lab results found.  Vitamin B12: No lab results found.  Homocysteine: No results found for: HOMOCYSTEINE  Vitamin D: No results for input(s): VITDT in the last 168 hours.  RPR:  @LABALLVALUES(RPR:1)@  ESR: No lab results found.  CRP: No results found for: CRP  LUZMARIA: No lab results found.    ANCA: No lab results found.   No results for input(s): NTBNPI, NTBNP in the last 168 hours.  No lab results found in last 7 days.    Invalid input(s): TROP, TROPONINIES    INR:    Recent Labs   Lab 03/19/23  0518   INR 0.96     Antithrombin III: No results found for: ANTCH  Protein C:  No results found for: PCCH  Activated Protein C: No results found for: ARPCR, No results found for: ARPCN, No results found for: APCINT  Protein S:  No results found for: PSF  Cardiolipin Antibodies: No results found for: CARG No results found for: ERNESTO    ABG:   Recent Labs   Lab 03/20/23  0453   O2PER 6     Blood Cultures: No results for input(s): CULT in the last 168 hours.  CSF Results: No results found for: CTYP, No results found for: CSFPROTEIN, No components found for: CGLU1      Unresulted Labs Ordered in the Past 30 Days of this Admission     No orders found for last 31 day(s).          Depakote level: No lab results found.  Dilantin Level: No lab results found.  Keppra  Level:  No lab results found.  Lamotrigine  Level:  No lab results found.  Lamotrigine (Free) Level:  No lab results found.  Phenobarbital Level: No lab results found.  Tegretol level: No lab results found.    IMAGING RESULTS   XR Chest 2 Views    Result Date: 3/19/2023  EXAM: XR CHEST 2 VIEWS LOCATION: [facility] DATE/TIME: 3/19/2023 1:21 PM INDICATION: acute on chronic hypoxic respiratory failure. Hypoxia. COMPARISON: 05/24/2022     IMPRESSION: Large lung volumes compatible with COPD. Prominence bullous changes in the upper lungs is unchanged. Normal heart size. Pulmonary vascularity is not well assessed. There are increased interstitial opacities in the perihilar and lower lungs when compared to the previous exam. This pattern could be seen with pulmonary edema and/or infectious infiltrates. No significant pleural  effusions are identified. Bones are demineralized.    XR Pelvis w Hip Right 1 View    Result Date: 3/19/2023  EXAM: XR PELVIS AND HIP RIGHT 1 VIEW LOCATION: [facility] DATE/TIME: 3/19/2023 6:14 AM INDICATION: Fall, right hip pain COMPARISON: None.     IMPRESSION: There is a displaced right femoral neck fracture. The femoral head remains seated within the acetabulum. The left hip is intact. Subtle irregularity of the parasymphyseal aspect of the left pubic bone, potentially representing an age indeterminant fracture. CT may be beneficial for further evaluation.    XR Femur Right 2 Views    Result Date: 3/19/2023  EXAM: XR FEMUR RIGHT 2 VIEWS LOCATION: [facility] DATE/TIME: 3/19/2023 9:54 PM INDICATION: pre op planning COMPARISON: Same-day radiograph.     IMPRESSION: Again seen is a displaced right femoral neck fracture with impaction and varus angulation. Bones are demineralized. No distal femoral fracture. Mild underlying degenerative changes of the right hip. Nondisplaced age-indeterminate fracture of the right inferior pubic ramus.    Head CT w/o contrast    Result Date: 3/19/2023  EXAM: CT HEAD WITHOUT CONTRAST LOCATION: [facility] DATE/TIME: 03/19/2023, 5:56 AM INDICATION: Fall. Hip pain. Traumatic injury. Known brain tumor. COMPARISON: None. TECHNIQUE: Routine CT Head without IV contrast. Multiplanar reformats. Dose reduction techniques were used. FINDINGS: INTRACRANIAL CONTENTS: No acute hemorrhage or abnormal extra-axial fluid collection. There is a large partially calcified and mildly hyperdense presumed extra-axial mass directly abutting the right anterior/inferior falx measuring 3.8 cm in AP dimensions, 2.8 cm in transverse dimensions and 3.2 cm in craniocaudal dimensions. There is moderate surrounding vasogenic edema, predominantly involving the right frontal lobe. There is associated mass effect with right to left subfalcine herniation  measuring approximately 1.1 cm. The anterior horns of the bilateral lateral ventricles are effaced, right worse than left. No definite hydrocephalus identified. No CT evidence of acute infarct. Minimal presumed chronic small vessel ischemic changes. Mild  generalized volume loss. No hydrocephalus. VISUALIZED ORBITS/SINUSES/MASTOIDS: No intraorbital abnormality. No paranasal sinus mucosal disease. Trace left mastoid effusion. BONES/SOFT TISSUES: No calvarial fracture. Bilateral temporomandibular joint osteoarthritis. There is partial subluxation of the bilateral mandibular condyles anteriorly, likely related to open-mouth positioning versus fixed subluxation.      IMPRESSION: 1.  No acute intracranial hemorrhage or abnormal extra-axial fluid collection. 2.  Prominent partially calcified extra-axial mass along the right anterior/inferior falx measuring up to 3.8 cm in greatest axial dimensions. There is moderate surrounding vasogenic edema predominantly involving the right frontal lobe with right to left  midline shift/subfalcine herniation measuring up to 1.1 cm. Recommend comparison with prior imaging to assess for stability. Consider neurosurgical consultation for further assessment, as clinically indicated. 3.  Partial subluxation of the bilateral mandible condyles anteriorly, possibly related to open-mouth positioning versus fixed subluxation. Correlation with physical examination is recommended. Dr. Mino Hartley discussed results with DR. KIERA Britton on 03/19/2023 at 6:11 AM.     Recent Results (from the past 24 hour(s))   XR Chest 2 Views    Narrative    EXAM: XR CHEST 2 VIEWS  LOCATION: [facility]  DATE/TIME: 3/19/2023 1:21 PM    INDICATION: acute on chronic hypoxic respiratory failure. Hypoxia.  COMPARISON: 05/24/2022      Impression    IMPRESSION: Large lung volumes compatible with COPD. Prominence bullous changes in the upper lungs is unchanged. Normal heart size. Pulmonary  vascularity is not well assessed. There are increased interstitial opacities in the perihilar and lower lungs   when compared to the previous exam. This pattern could be seen with pulmonary edema and/or infectious infiltrates. No significant pleural effusions are identified. Bones are demineralized.   XR Femur Right 2 Views    Narrative    EXAM: XR FEMUR RIGHT 2 VIEWS  LOCATION: [facility]  DATE/TIME: 3/19/2023 9:54 PM    INDICATION: pre op planning  COMPARISON: Same-day radiograph.      Impression    IMPRESSION: Again seen is a displaced right femoral neck fracture with impaction and varus angulation. Bones are demineralized. No distal femoral fracture. Mild underlying degenerative changes of the right hip.     Nondisplaced age-indeterminate fracture of the right inferior pubic ramus.

## 2023-03-20 NOTE — PROGRESS NOTES
[facility]    Hospitalist Progress Note      Assessment & Plan   Josephine Nicole is a 73 year old female with a history of B12 Deficiency, Melanoma, COPD, Former Smoker, Oxygen Dependent, Depression, Anxiety, HLD, Hypothyroidism, Osteoporosis, Cerebral Meningioma who presented to the ED with right hip pain after a fall.     #Displaced Right Femoral Neck Fracture - s/p mechanical fall.  Xray revealed a displaced right femoral neck fracture and a subtle irregularity of the parasymphyseal aspect of the left pubic bone, potentially representing an age indeterminant fracture.   - non weight bearing to RLE.  Analgesics prn  - Orthopedic Surgery consulted. Planning for OR today.  - Pt does have baseline severe COPD at baseline requiring 2-3L via NC chronically.  Her labs indicate chronic retention of CO2 with blood gas 7.37/60.  Her CXR done on admission shows findings also c/w severe COPD with bullous changes.  She is already on steroids as below.  She is also on scheduled nebulizer treatments as I'm not sure she would be able to manage her inhalers at this time given encephalopathy.    - Pt did have some nocturnal hypoxemia but weaned back down to 3-4L via NC on AM of 3/20.  She is moving air fairly well and I don't think other intervention to optimize prior to surgery.      #Encephalopathy. Cerebral Meningioma - pt was diagnosed 9/2019 with a cerebral meningioma and monitored.  Given progression of the tumor, patient underwent 15 sessions of radiation 11/2022.  She is followed by Dr. Godoy, a radiation Oncologist through [facility].  Pt was on oral steroids which were weaned off about 4 weeks ago.  Since that time, patient has had increased confusion, poor balance, changes in mood.  -Recent MRI Brain 3/17/23 revealed enlargement of meningioma up to 45 mm with a significant mass effect and the midline shift right to left.  Patient was instructed by her PCP to follow up with outpatient Neurology  and Radiation Oncology this week.  - CT head on admission with no acute hemorrhage, but notes the known mass surrounding vasogenic edema predominantly involving the right frontal lobe with right to left midline shift/subfalcine herniation measuring up to 1.1 cm.  - Neurosurgery was consulted.  They recommend transfer to [facility] on Tuesday for planned surgical resection of meningioma on Wednesday, 3/22.   -I discussed with on call NSG on 3/20, they do not recommend increasing dose of dexamethasone at this time or initiating anti-epileptics for ppx. They want to await neurology eval.   - Neurosurgery has also consulted neurology for evaluation of tremors which is likely related to her meningioma.    - [facility] consulted.  Preliminarily they agree with steroids but no other recommendations from medical oncology standpoint.      #COPD, Oxygen Dependent. Acute on Chronic Hypoxic Respiratory Failure - former smoker of 50 years; quit around 5 years go.  On 2-3 liters of oxygen at baseline for the past 5 years.  Requiring 3-4 liters on admission with nocturnal hypoxemia noted 3/19-3/20.  Chest XR, blood gas and elevation of bicarb on BMP all indicate relatively severe, chronic baseline COPD.  She is not having significant wheezing and no significant WOB on 3/20.    - She is on steroids as above. Schedule duonebs as unsure she is able to get full benefit from home inhalers.       #Chronic Anemia - Monitor blood counts in setting of surgery.   #HLD - resume Atorvastatin upon discharge  #Hypothyroidism - continue Levothyroxine  #Depression/Anxiety - continue Sertraline and Remeron.  Hold prn Ambien, prn Atarax, prn Xanax pta due to increased sedation currently after receiving narcotics.     CODE: full  Dispo: Pt will need to transfer to [facility] on Tuesday, 3/21 per NSG team.  I have reached out to patient placement on AM of 3/20.  Plan for OR for hip fracture on 3/20 here at [facility].   DVT ppx: scd    I updated  sister at bedside.     Antwan Cadet MD    Interval History   Seen at bedside. Pt weaned to 3-4L via oxymizer.  Not in distress but only nodding to questions and periodically smiling.  Does not appear to be in pain. Sister at bedside and provide much of history.     -Data reviewed today: I reviewed all new labs and imaging results over the last 24 hours.     Physical Exam   Temp: 98.1  F (36.7  C) Temp src: Temporal BP: 134/50 Pulse: 99   Resp: 18 SpO2: 97 % O2 Device: Oxymizer cannula Oxygen Delivery: 5 LPM  Vitals:    03/19/23 0508   Weight: 65.8 kg (145 lb)     Vital Signs with Ranges  Temp:  [98.1  F (36.7  C)] 98.1  F (36.7  C)  Pulse:  [] 99  Resp:  [18-20] 18  BP: ()/(50-96) 134/50  SpO2:  [70 %-98 %] 97 %  No intake/output data recorded.    General: Awake. Nods and shakes head to questions. She does answer simple questions (yes/no) and is able to tell me her name and knows she is in [place].   HEENT: AT/NC, sclera anicteric,No elevation of JVD noted.   Chest/Resp: Nl WOB. Distant BS bilaterally. No wheeze with prolonged expiratory phase noted. No crackles.   Heart/CV: regular rate and rhythm, no murmur  Abdomen/GI: Soft, nontender, nondistended. +BS.  No rebound or guarding.  Extremities/MSK: Trace LE edema. Keeping RLE still secondary to fracture.   Neuro: Some periodic tremor noted. Moves extremities but keeps RLE still due to pain. A&Ox2. Able to state her name and knows she is in [place].  Does not know year. Confused to situation.     Medications     lactated ringers 75 mL/hr at 03/19/23 1848       acetaminophen  975 mg Oral Q8H     dexamethasone  2 mg Intravenous Daily     famotidine  20 mg Intravenous Q12H     [Held by provider] famotidine  20 mg Oral BID     fluticasone-vilanterol  1 puff Inhalation Daily     ipratropium - albuterol 0.5 mg/2.5 mg/3 mL  1 vial Nebulization 4x daily     levothyroxine  88 mcg Oral QAM AC     mirtazapine  7.5 mg Oral At Bedtime     sertraline  25 mg Oral  QAM     sodium chloride (PF)  3 mL Intracatheter Q8H     sodium chloride (PF)  3 mL Intracatheter Q8H       Data   Recent Labs   Lab 03/20/23  0610 03/19/23  0518   WBC 13.5* 8.3   HGB 9.8* 10.5*   MCV 98 96    273   INR  --  0.96    139   POTASSIUM 4.6 3.9   CHLORIDE 101 98   CO2 33* 33*   BUN 14.5 9.2   CR 0.62 0.52   ANIONGAP 7 8   CHAO 8.5* 8.9   * 122*       Recent Results (from the past 24 hour(s))   XR Chest 2 Views    Narrative    EXAM: XR CHEST 2 VIEWS  LOCATION: [facility]  DATE/TIME: 3/19/2023 1:21 PM    INDICATION: acute on chronic hypoxic respiratory failure. Hypoxia.  COMPARISON: 05/24/2022      Impression    IMPRESSION: Large lung volumes compatible with COPD. Prominence bullous changes in the upper lungs is unchanged. Normal heart size. Pulmonary vascularity is not well assessed. There are increased interstitial opacities in the perihilar and lower lungs   when compared to the previous exam. This pattern could be seen with pulmonary edema and/or infectious infiltrates. No significant pleural effusions are identified. Bones are demineralized.   XR Femur Right 2 Views    Narrative    EXAM: XR FEMUR RIGHT 2 VIEWS  LOCATION: [facility]  DATE/TIME: 3/19/2023 9:54 PM    INDICATION: pre op planning  COMPARISON: Same-day radiograph.      Impression    IMPRESSION: Again seen is a displaced right femoral neck fracture with impaction and varus angulation. Bones are demineralized. No distal femoral fracture. Mild underlying degenerative changes of the right hip.     Nondisplaced age-indeterminate fracture of the right inferior pubic ramus.

## 2023-03-20 NOTE — PROGRESS NOTES
Upon pt. Arriving to the floor pt. Had tremors and was  lethargic. NP and nurse assessed pt. Pt. Was unable to answer questions or follow prompts. VSS except O2. Pt. Was ranging from 80%-90% with use of NC or oxymask on 10 LPM. With use of oximyzer pt. Was able to wean to 5 LPM. RT in the room.     B/P: 122/58, T: 98.1, P: 105, R: 20, O2 5 LPM oxymizer cannula - 96%

## 2023-03-20 NOTE — ANESTHESIA PROCEDURE NOTES
Airway       Patient location during procedure: OR       Procedure Start/Stop Times: 3/20/2023 2:35 PM  Staff -        CRNA: Kelsey Gabriel APRN CRNA       Performed By: CRNA  Consent for Airway        Urgency: elective  Indications and Patient Condition       Indications for airway management: murphy-procedural       Induction type:intravenous       Mask difficulty assessment: 1 - vent by mask    Final Airway Details       Final airway type: endotracheal airway       Successful airway: ETT - single  Endotracheal Airway Details        ETT size (mm): 7.0       Cuffed: yes       Successful intubation technique: direct laryngoscopy       DL Blade Type: Clark 2       Grade View of Cords: 1       Adjucts: stylet       Position: Right       Measured from: gums/teeth       Secured at (cm): 21       Bite block used: None    Post intubation assessment        Placement verified by: capnometry, equal breath sounds and chest rise        Number of attempts at approach: 1       Number of other approaches attempted: 0       Secured with: plastic tape       Ease of procedure: easy       Dentition: Intact and Unchanged    Medication(s) Administered   Medication Administration Time: 3/20/2023 2:35 PM

## 2023-03-20 NOTE — PROGRESS NOTES
Cross cover.  Notified patient was hypoxic desatting 70s on 2 L work.  Patient history of COPD and oxygen dependent at baseline.  -Order stat ABG.  -DuoNeb every 2 hours.  -RT is already called per RN  -If he is retaining CO2 may require BiPAP.

## 2023-03-20 NOTE — PLAN OF CARE
Goal Outcome Evaluation:    Pt Alert disoriented x4, VS stable except pt hypoxic desating to 70's  needed 15L of 02. 02 improved to upper 80 and 90's on 6L now. PRN deunob given. Deep breathing and cough encouraged. RT aware and seen pt. Pt confused and removing O2. Mitts in place to help prevent pt from removing the oxygen. Pt received dilaudid x2 for anticipated pain.@2040. Straight cath 600ml. Last BS amount 26ml @6:00am. Family at bedside. Surgical bath and javier wipes completed. Pt NPO for surgery this morning.       Plan of Care Reviewed With: patient

## 2023-03-20 NOTE — OR NURSING
Patient had visible signs of retraction when breathing with assisted oral airway present. When moved, patient began to flail about and oxygen desaturation and tachycardia ensued. [facility] notified. Patient was bag/mask assisted breathing and respiratory was called.  Patient placed initially on CPAP but this was inadequate.  Patient now resting comfortably on BiPAP at 50% oxygen. Continuing to monitor.

## 2023-03-20 NOTE — ANESTHESIA POSTPROCEDURE EVALUATION
Patient: Josephine Nicole    Procedure: Procedure(s):  HEMIARTHROPLASTY, RIGHT HIP, UNIPOLAR       Anesthesia Type:  General    Note:  Disposition: Inpatient   Postop Pain Control: Uneventful            Sign Out: Well controlled pain   PONV: No   Neuro/Psych:             Sign Out: Acceptable/Baseline neuro status (pt reportedly not communicative but cooperative)   Airway/Respiratory:             Sign Out: O2 supplementation; AIRWAY IN SITU/Resp. Support               Oxygen: Face mask               Airway in situ/Resp. Support: Nasal airway; CPAP/BIPAP via mask                 Reason: Unplanned   CV/Hemodynamics: Uneventful            Sign Out: Acceptable CV status; No obvious hypovolemia; No obvious fluid overload   Other NRE: NONE   DID A NON-ROUTINE EVENT OCCUR? No    Event details/Postop Comments:  Requiring BiPAP temporarily in PACU. Able to wean to simple facemask, duo-neb, sats intermittently dip but but mainly maintained low 90's. This appears to be consistent with pre-operative baseline respiratory and neuro status.            Last vitals:  Vitals Value Taken Time   BP 99/60 03/20/23 1755   Temp 97.9  F (36.6  C) 03/20/23 1653   Pulse 98 03/20/23 1756   Resp 11 03/20/23 1756   SpO2 89 % 03/20/23 1756   Vitals shown include unvalidated device data.    Electronically Signed By: Mino Cisneros MD  March 20, 2023  5:58 PM

## 2023-03-20 NOTE — OP NOTE
[facility]  Orthopedic Operative Note    Anterior Lateral Hip Hemiarthroplasty     Josephine Nicole MRN# [id number]   YOB: 1950  Procedure Date:  3/20/2023  Age: 73 year old       PREOPERATIVE DIAGNOSIS:    1. Displaced femoral neck fracture, right hip.  2. Pathologic fracture secondary to osteoporosis    POSTOPERATIVE DIAGNOSIS:    Same    PROCEDURE PERFORMED:  Right hip hemiarthroplasty.  Anterolateral approach.    SURGEON:  JEREMIAH ZAVALA MD    FIRST ASSISTANT:  LUDIN Carrasquillo  whose was critical for positioning, retraction during exposure, placement of implants, and closure.     ANESTHESIA:  General      ESTIMATED BLOOD LOSS:  250 mL.       IMPLANTS:  .  Implant Name Type Inv. Item Serial No.  Lot No. LRB No. Used Action   BONE CEMENT SIMPLEX FULL DOSE 6191-1-001 - [id number] Cement, Bone BONE CEMENT SIMPLEX FULL DOSE 6191-1-001  [employer] [id number] Right 2 Implanted   BONE CEMENT RESTRICTOR BOLAND FEMORAL 18.5MM [id number] - [id number] Cement, Bone BONE CEMENT RESTRICTOR BOLAND FEMORAL 18.5MM [id number]  [employer]-R [id number] Right 1 Implanted   IMP STEM FEMORAL ELIZABETH PANCHITO OMNIFIT SZ 5 127DEG 0475-6725 - [id number] Total Joint Component/Insert IMP STEM FEMORAL ELIZABETH PANCHITO OMNIFIT SZ 5 127DEG 7227-0435  [employer] [id number] Right 1 Implanted   IMP SPACER OSTEONICS DISTAL CEMENT 10MM 4719-6109 - [id number] Metallic Hardware/[place] IMP SPACER OSTEONICS DISTAL CEMENT 10MM 5831-8722  [employer] [id number] Right 1 Implanted   IMP INSERT SLEEVE STRK UNITRAX C-TAPER +5MM 6942-7-075 - [id number] Total Joint Component/Insert IMP INSERT SLEEVE STRK UNITRAX C-TAPER +5MM 6942-7-075  [employer] [id number] Right 1 Implanted   IMP HEAD STRK ENDO UNITRAX MODULAR 46MM 6942-5-046 - [id number] Total Joint Component/Insert IMP HEAD STRK ENDO UNITRAX MODULAR 46MM 6942-5-046  [employer]  Right 1 Implanted        INDICATIONS FOR PROCEDURE:  Josephine GARCIA  Bonnie is a 73 year old female with a history of meningioma with vasogenic edema, COPD, hypothyroid, chronic anemia who presents with a right femoral neck fracture.  She's a minimal ambulator at baseline.  Radiographs reveal a displaced femoral neck fracture.  We discussed possible treatment options including nonoperative and operative intervention along with the expected risks and recovery.   I recommended a right hip hemiarthroplasty to allow for pain relief and improved function.  They are aware she is at risk for surgery due to her comorbidities and surgery would come with risk to her health and life.  However they are aware of the benefits and quality of life surgery could provide.     We discussed nonoperative and various operative treatment options including hemiarthroplasty and total hip arthroplasty and I recommended a hip hemiarthroplasty due to the patients activity level, prior hip function, and minimal osteoarthritis.    Risks of bleeding, infection, damage to surrounding neurovascular structures, hip dislocation, intraoperative or postoperative periprosthetic fracture, leg length inequality, blood clots including deep vein thrombosis, acetabular arthritis and need for revision to a total hip arthroplasty, revision surgery, and pulmonary embolism and anesthetic complications were discussed with patient.  Benefits of surgery discussed included improved function, reduction medical risk of hip fractures, and pain relief.  Alternatives include nonoperative management, which was not recommended.  The patient and family understands and wishes to proceed.      Consent signed by patient.      DESCRIPTION OF PROCEDURE:  The patient was identified in the preoperative holding area per hospital policy and the correct operative site marked. Patient was brought into the to the operating room and placed supine on the operating room table.  After induction of anesthesia he was placed into a lateral decubitus position  on hip positioner and all bony prominences well-padded. Chlorhexidine prescrub was performed followed by prepping and draping in normal sterile fashion.  Antibiotic administration (Ancef) and transexamic acid administration were confirmed and timeout was performed per standard protocol.       A lateral incision was made centered over the greater trochanter proceeding sharply the skin and subtenons tissue down the fascia.  Fascia was incised in line with the incision and a Charnley retractor was placed. A bursectomy was performed.  The anterior one third of the abductors were lifted off the anterior aspect of the femur using cautery and distally inline with the vastus lateralis.   A #5 Ethibond was used to tag of the gluteus medius.  A T-capsulotomy was performed.  The gluteus minimus and capsule were tagged with a #5 Ethibond.  Posteromedial release was performed extending to the superior aspect the lesser trochanter.  The femoral neck was cut maintaining approximately 12 mm of neck proximal to the lesser trochanter. The head was removed with a Schanz pin and Cob.  The hip was found to have no significant osteoarthritis or loss of cartilage.       We then turned our attention to preparation of the acetabulum. The femoral head was measured and femoral head trials were placed. A 46 femoral head was found to have good fit and suction seal.       We then turned our attention to preparation of the femur.  A femoral elevator and a pointed Hohmann were used to visualize the proximal end of the femur and protect the abductors.  The greater trochanter was identified and a  was utilized to remove the lateral femoral neck bone.  The femur was then instrumented with a canal finder followed by a lateralizing reamer.  The femoral canal was then reamed up sequentially to a size 5 and broached to a size 5.  A trial stem was placed on the broach.  The tip of the greater trochanter was in line with the center of the tip of  the trial stem.    The femoral canal was then irrigated and prepared for cementing. A cement plug was placed about 2cm distal to the end of the stem. Bone cement was placed with a cement gun from distal to proximal. The final size 5 stem was then placed. The stem was held in place until the cement was hard.     A femoral head trail was placed and again found to be stable in all direction with good length length. The final 46+5mm size femoral head implant  was then placed and tested to confirm it was secure. The hip was reduced gently.  The hip was ranged in flexion/extension as well as internal and external rotation and felt to be stable in all directions.  There was no impingement.  The patient was found to have good leg lengths.     The wound was then copiously irrigated with pulsavac.  We repaired the capsule with #5 Ethibond.  Vancomycin powder was placed the gluteus medius and minimus were then repaired with 3 Ethibond sutures which were placed in a South-Arian stitch fashion and passed through 3 bone tunnels.  This layer was then oversewn with figure 8 Ethibond suture and a running #1 Ethibond.  The IT band was then closed with #1 Vicryl sutures followed by a running #1 Stratafix suture to create a water-tight seal.  The wound was then copiously irrigated with pulsavac.  The abductors and deep layers of the hip were then injected with potion consisting of bupivacaine, Toradol and tranexamic acid.  The superficial layers were then closed with 0 Vicryl 2-0 Monocryl running 3-0 Monocryl.  Dermabond was placed on the skin.  An Aquacel dressing was then placed over the skin      The patient was awoken from anesthesia and transported to the recovery room in stable condition, sustaining no complications.     Postoperatively:   1.  Ancef x 24 hours.   2.  Lovenox for DVT prophylaxis.   3.  PACU x-ray.  4.  Weightbearing as tolerated with a walker   5.  Physical therapy/occupational therapy.   6.  Keep dressing on for  2 weeks.  OK to shower.  No submerging wound.  7.  Osteoporosis workup and treatment with primary care provider within 2 months.   8.  Discharge:  Case management social work consult for placement     FOLLOWUP:     1.  Plan 2-week wound check with x-rays (AP and Lateral Xrays).  This could also be done at patients rehab facility with x-rays sent to me at [facility].   2.  six-week followup with X-rays.    Please call as soon as possible to make an appointment to be seen in Dr. Shun Cuevas's clinic in 2 weeks.     Dr. Cuevas's care coordinator is Neha Way. Please contact her at [phone] to schedule an appointment.      Dr. Cuevas sees patients at 2 clinic locations:    [facility]  o [address], [place] [id number] [place] [address] Orthoped[facility]   o [address] , [address], [place] [address]      Please call the on-call phone number [phone] during evenings, nights and weekends for any urgent needs. Prescription refills must be done during business hours by calling [phone]    Shun Cuevas MD  [facility]       SHUN CUEVAS MD

## 2023-03-21 ENCOUNTER — APPOINTMENT (OUTPATIENT)
Dept: GENERAL RADIOLOGY | Facility: CLINIC | Age: 73
DRG: 521 | End: 2023-03-21
Attending: HOSPITALIST
Payer: COMMERCIAL

## 2023-03-21 LAB
ANION GAP SERPL CALCULATED.3IONS-SCNC: 11 MMOL/L (ref 7–15)
BASE EXCESS BLDV CALC-SCNC: 6.6 MMOL/L (ref -7.7–1.9)
BUN SERPL-MCNC: 22 MG/DL (ref 8–23)
CALCIUM SERPL-MCNC: 8.2 MG/DL (ref 8.8–10.2)
CHLORIDE SERPL-SCNC: 100 MMOL/L (ref 98–107)
CREAT SERPL-MCNC: 0.71 MG/DL (ref 0.51–0.95)
DEPRECATED HCO3 PLAS-SCNC: 29 MMOL/L (ref 22–29)
GFR SERPL CREATININE-BSD FRML MDRD: 89 ML/MIN/1.73M2
GLUCOSE BLDC GLUCOMTR-MCNC: 103 MG/DL (ref 70–99)
GLUCOSE BLDC GLUCOMTR-MCNC: 110 MG/DL (ref 70–99)
GLUCOSE SERPL-MCNC: 109 MG/DL (ref 70–99)
GLUCOSE SERPL-MCNC: 117 MG/DL (ref 70–99)
HCO3 BLDV-SCNC: 33 MMOL/L (ref 21–28)
HGB BLD-MCNC: 8.8 G/DL (ref 11.7–15.7)
LACTATE SERPL-SCNC: 1.2 MMOL/L (ref 0.7–2)
LACTATE SERPL-SCNC: 2.1 MMOL/L (ref 0.7–2)
MAGNESIUM SERPL-MCNC: 2.1 MG/DL (ref 1.7–2.3)
O2/TOTAL GAS SETTING VFR VENT: 45 %
PCO2 BLDV: 56 MM HG (ref 40–50)
PH BLDV: 7.38 [PH] (ref 7.32–7.43)
PO2 BLDV: 56 MM HG (ref 25–47)
POTASSIUM SERPL-SCNC: 4.6 MMOL/L (ref 3.4–5.3)
POTASSIUM SERPL-SCNC: 4.6 MMOL/L (ref 3.4–5.3)
SODIUM SERPL-SCNC: 140 MMOL/L (ref 136–145)

## 2023-03-21 PROCEDURE — 82947 ASSAY GLUCOSE BLOOD QUANT: CPT | Performed by: STUDENT IN AN ORGANIZED HEALTH CARE EDUCATION/TRAINING PROGRAM

## 2023-03-21 PROCEDURE — 250N000009 HC RX 250: Performed by: STUDENT IN AN ORGANIZED HEALTH CARE EDUCATION/TRAINING PROGRAM

## 2023-03-21 PROCEDURE — 258N000003 HC RX IP 258 OP 636: Performed by: STUDENT IN AN ORGANIZED HEALTH CARE EDUCATION/TRAINING PROGRAM

## 2023-03-21 PROCEDURE — 71045 X-RAY EXAM CHEST 1 VIEW: CPT

## 2023-03-21 PROCEDURE — 94640 AIRWAY INHALATION TREATMENT: CPT

## 2023-03-21 PROCEDURE — 36415 COLL VENOUS BLD VENIPUNCTURE: CPT | Performed by: STUDENT IN AN ORGANIZED HEALTH CARE EDUCATION/TRAINING PROGRAM

## 2023-03-21 PROCEDURE — 83735 ASSAY OF MAGNESIUM: CPT | Performed by: STUDENT IN AN ORGANIZED HEALTH CARE EDUCATION/TRAINING PROGRAM

## 2023-03-21 PROCEDURE — 83605 ASSAY OF LACTIC ACID: CPT | Performed by: STUDENT IN AN ORGANIZED HEALTH CARE EDUCATION/TRAINING PROGRAM

## 2023-03-21 PROCEDURE — 85014 HEMATOCRIT: CPT | Performed by: STUDENT IN AN ORGANIZED HEALTH CARE EDUCATION/TRAINING PROGRAM

## 2023-03-21 PROCEDURE — 82947 ASSAY GLUCOSE BLOOD QUANT: CPT | Performed by: HOSPITALIST

## 2023-03-21 PROCEDURE — 250N000009 HC RX 250: Performed by: PHYSICIAN ASSISTANT

## 2023-03-21 PROCEDURE — 999N000157 HC STATISTIC RCP TIME EA 10 MIN

## 2023-03-21 PROCEDURE — 83605 ASSAY OF LACTIC ACID: CPT | Performed by: HOSPITALIST

## 2023-03-21 PROCEDURE — 85018 HEMOGLOBIN: CPT | Performed by: STUDENT IN AN ORGANIZED HEALTH CARE EDUCATION/TRAINING PROGRAM

## 2023-03-21 PROCEDURE — 250N000011 HC RX IP 250 OP 636: Performed by: STUDENT IN AN ORGANIZED HEALTH CARE EDUCATION/TRAINING PROGRAM

## 2023-03-21 PROCEDURE — 82310 ASSAY OF CALCIUM: CPT | Performed by: HOSPITALIST

## 2023-03-21 PROCEDURE — 258N000003 HC RX IP 258 OP 636: Performed by: HOSPITALIST

## 2023-03-21 PROCEDURE — 94640 AIRWAY INHALATION TREATMENT: CPT | Mod: 76

## 2023-03-21 PROCEDURE — 36415 COLL VENOUS BLD VENIPUNCTURE: CPT | Performed by: HOSPITALIST

## 2023-03-21 PROCEDURE — 120N000001 HC R&B MED SURG/OB

## 2023-03-21 PROCEDURE — 94660 CPAP INITIATION&MGMT: CPT

## 2023-03-21 PROCEDURE — 82803 BLOOD GASES ANY COMBINATION: CPT | Performed by: HOSPITALIST

## 2023-03-21 PROCEDURE — 250N000011 HC RX IP 250 OP 636: Performed by: INTERNAL MEDICINE

## 2023-03-21 PROCEDURE — 80053 COMPREHEN METABOLIC PANEL: CPT | Performed by: STUDENT IN AN ORGANIZED HEALTH CARE EDUCATION/TRAINING PROGRAM

## 2023-03-21 PROCEDURE — 99291 CRITICAL CARE FIRST HOUR: CPT | Performed by: HOSPITALIST

## 2023-03-21 PROCEDURE — 84132 ASSAY OF SERUM POTASSIUM: CPT | Performed by: STUDENT IN AN ORGANIZED HEALTH CARE EDUCATION/TRAINING PROGRAM

## 2023-03-21 RX ORDER — DEXAMETHASONE SODIUM PHOSPHATE 4 MG/ML
4 INJECTION, SOLUTION INTRA-ARTICULAR; INTRALESIONAL; INTRAMUSCULAR; INTRAVENOUS; SOFT TISSUE DAILY
Status: DISCONTINUED | OUTPATIENT
Start: 2023-03-22 | End: 2023-03-21

## 2023-03-21 RX ORDER — ACETAMINOPHEN 325 MG/1
975 TABLET ORAL EVERY 8 HOURS PRN
Qty: 100 TABLET | Refills: 0 | Status: ON HOLD | DISCHARGE
Start: 2023-03-21 | End: 2023-04-03

## 2023-03-21 RX ORDER — POLYETHYLENE GLYCOL 3350 17 G/17G
17 POWDER, FOR SOLUTION ORAL DAILY
Qty: 510 G | Refills: 0 | Status: ON HOLD | DISCHARGE
Start: 2023-03-21 | End: 2023-04-27

## 2023-03-21 RX ORDER — DEXAMETHASONE SODIUM PHOSPHATE 4 MG/ML
1 INJECTION, SOLUTION INTRA-ARTICULAR; INTRALESIONAL; INTRAMUSCULAR; INTRAVENOUS; SOFT TISSUE DAILY
Status: DISCONTINUED | OUTPATIENT
Start: 2023-03-22 | End: 2023-03-22

## 2023-03-21 RX ORDER — ENOXAPARIN SODIUM 100 MG/ML
40 INJECTION SUBCUTANEOUS EVERY 24 HOURS
Qty: 12 ML | Refills: 0 | Status: ON HOLD | DISCHARGE
Start: 2023-03-22 | End: 2023-04-27

## 2023-03-21 RX ADMIN — FAMOTIDINE 20 MG: 10 INJECTION, SOLUTION INTRAVENOUS at 19:11

## 2023-03-21 RX ADMIN — HALOPERIDOL LACTATE 2 MG: 5 INJECTION, SOLUTION INTRAMUSCULAR at 23:39

## 2023-03-21 RX ADMIN — LORAZEPAM 0.5 MG: 2 INJECTION INTRAMUSCULAR; INTRAVENOUS at 07:20

## 2023-03-21 RX ADMIN — SODIUM CHLORIDE, POTASSIUM CHLORIDE, SODIUM LACTATE AND CALCIUM CHLORIDE: 600; 310; 30; 20 INJECTION, SOLUTION INTRAVENOUS at 19:19

## 2023-03-21 RX ADMIN — HYDROMORPHONE HYDROCHLORIDE 0.2 MG: 0.2 INJECTION, SOLUTION INTRAMUSCULAR; INTRAVENOUS; SUBCUTANEOUS at 01:57

## 2023-03-21 RX ADMIN — ENOXAPARIN SODIUM 40 MG: 40 INJECTION SUBCUTANEOUS at 08:33

## 2023-03-21 RX ADMIN — IPRATROPIUM BROMIDE AND ALBUTEROL SULFATE 3 ML: 2.5; .5 SOLUTION RESPIRATORY (INHALATION) at 15:42

## 2023-03-21 RX ADMIN — IPRATROPIUM BROMIDE AND ALBUTEROL SULFATE 3 ML: 2.5; .5 SOLUTION RESPIRATORY (INHALATION) at 07:20

## 2023-03-21 RX ADMIN — IPRATROPIUM BROMIDE AND ALBUTEROL SULFATE 3 ML: 2.5; .5 SOLUTION RESPIRATORY (INHALATION) at 11:47

## 2023-03-21 RX ADMIN — DEXAMETHASONE SODIUM PHOSPHATE 2 MG: 4 INJECTION, SOLUTION INTRAMUSCULAR; INTRAVENOUS at 08:33

## 2023-03-21 RX ADMIN — HYDROMORPHONE HYDROCHLORIDE 0.2 MG: 0.2 INJECTION, SOLUTION INTRAMUSCULAR; INTRAVENOUS; SUBCUTANEOUS at 19:11

## 2023-03-21 RX ADMIN — FAMOTIDINE 20 MG: 10 INJECTION, SOLUTION INTRAVENOUS at 08:37

## 2023-03-21 RX ADMIN — SODIUM CHLORIDE, POTASSIUM CHLORIDE, SODIUM LACTATE AND CALCIUM CHLORIDE: 600; 310; 30; 20 INJECTION, SOLUTION INTRAVENOUS at 08:44

## 2023-03-21 RX ADMIN — HYDROMORPHONE HYDROCHLORIDE 0.4 MG: 0.2 INJECTION, SOLUTION INTRAMUSCULAR; INTRAVENOUS; SUBCUTANEOUS at 05:31

## 2023-03-21 RX ADMIN — HALOPERIDOL LACTATE 2 MG: 5 INJECTION, SOLUTION INTRAMUSCULAR at 18:20

## 2023-03-21 RX ADMIN — HYDROMORPHONE HYDROCHLORIDE 0.4 MG: 0.2 INJECTION, SOLUTION INTRAMUSCULAR; INTRAVENOUS; SUBCUTANEOUS at 07:45

## 2023-03-21 RX ADMIN — HYDROMORPHONE HYDROCHLORIDE 0.2 MG: 0.2 INJECTION, SOLUTION INTRAMUSCULAR; INTRAVENOUS; SUBCUTANEOUS at 01:56

## 2023-03-21 RX ADMIN — IPRATROPIUM BROMIDE AND ALBUTEROL SULFATE 3 ML: 2.5; .5 SOLUTION RESPIRATORY (INHALATION) at 19:38

## 2023-03-21 RX ADMIN — CEFAZOLIN 1 G: 1 INJECTION, POWDER, FOR SOLUTION INTRAMUSCULAR; INTRAVENOUS at 05:38

## 2023-03-21 ASSESSMENT — ACTIVITIES OF DAILY LIVING (ADL)
ADLS_ACUITY_SCORE: 37
ADLS_ACUITY_SCORE: 45
ADLS_ACUITY_SCORE: 49
ADLS_ACUITY_SCORE: 37
ADLS_ACUITY_SCORE: 37
DEPENDENT_IADLS:: CLEANING;COOKING;LAUNDRY;SHOPPING;MEAL PREPARATION;MEDICATION MANAGEMENT;TRANSPORTATION
ADLS_ACUITY_SCORE: 37
ADLS_ACUITY_SCORE: 37
ADLS_ACUITY_SCORE: 45
ADLS_ACUITY_SCORE: 37

## 2023-03-21 NOTE — PROGRESS NOTES
A BiPAP of  14/6 @ 60% was applied to the pt via the mask for an increase in WOB and/or SOB.  The bridge of the nose is intact. Pt not tolerating well. Difficulty keeping mask on due to patient moving constantly and attempting to remove mask. Placed on total face mask which is staying in place somewhat better. Will continue to monitor and assess the pt's current respiratory status and needs.    Molly Kelsey, RT

## 2023-03-21 NOTE — PROGRESS NOTES
Neurology Quick Note  [facility]       [2023]                                                                                       Admission Date: 3/19/2023  Hospital Day: 3  Code Status: Full Code    Patient: Josephine Nicole      : 1950  MRN:  [id number]     Patient now S/P hip surgery. She remains encephalopathic and non-interactive.    I will continue to monitor, and evaluate her tremor once her mental status has improved from the recent surgery.        Gary Moncada M.D., Ph.D.    [facility]

## 2023-03-21 NOTE — PROGRESS NOTES
Pt was on BIPAP until 1115.  At 1115, pt came off BIPAP to 10L oxymask. Sat 91-93%, RR 20-24.  Received Duoneb as order. BS-diminish/clear.  RT to follow.

## 2023-03-21 NOTE — PROGRESS NOTES
[facility]    Hospitalist Progress Note      Assessment & Plan   Josephine Nicole is a 73 year old female with a history of B12 Deficiency, Melanoma, COPD, Former Smoker, Oxygen Dependent, Depression, Anxiety, HLD, Hypothyroidism, Osteoporosis, Cerebral Meningioma who presented to the ED with right hip pain after a fall.    Patient very complex with severe medical issues as below.  She underwent surgical repair for right femoral neck fracture on 3/20 with postoperative complications including worsening respiratory failure, hypotension.  She remains on BiPAP therapy as of the a.m. of 3/21.  She has worsening of her cerebral meningioma which also will require surgical resection but we need to wait for her to clinically improve prior to transfer to [facility] for this procedure.     #Displaced Right Femoral Neck Fracture s/p surgical repair on 3/20 - s/p mechanical fall.  Xray revealed a displaced right femoral neck fracture and a subtle irregularity of the parasymphyseal aspect of the left pubic bone, potentially representing an age indeterminant fracture.   - Orthopedic Surgery consulted. Underwent surgical repair with right hip hemiarthroplasty on 3/20.   -Post-op, pt had increased WOB, hypotension, lactic acidosis and delirium as detailed below.  She had to be placed on bipap support.  She remains on bipap support at 45% FiO2 on 3/21 AM and intermittently agitated.     -prn haldol for agitation. Prn pain meds also available.  Avoid ativan if at all possible.   -She did receive lovenox for ppx on 3/21.  Will place hold on this pending [facility] plans for meningioma.   -We will continue IV fluids as patient on continuous BiPAP and not able to tolerate p.o.  -Roman catheter placed on 3/21 for concern of urinary retention.  We will keep this in place for now particularly given possible need for surgery for meningioma     #Encephalopathy. Cerebral Meningioma.  Suspected acute toxic metabolic  encephalopathy likely from post-op status, steroids, pain meds - pt was diagnosed 9/2019 with a cerebral meningioma and monitored.  Given progression of the tumor, patient underwent 15 sessions of radiation 11/2022.  She is followed by Dr. Godoy, a radiation Oncologist through [facility].  Pt was on oral steroids which were weaned off about 4 weeks ago.  Since that time, patient has had increased confusion, poor balance, changes in mood.  -Recent MRI Brain 3/17/23 revealed enlargement of meningioma up to 45 mm with a significant mass effect and the midline shift right to left.  Patient was instructed by her PCP to follow up with outpatient Neurology and Radiation Oncology this week.  - CT head on admission with no acute hemorrhage, but notes the known mass surrounding vasogenic edema predominantly involving the right frontal lobe with right to left midline shift/subfalcine herniation measuring up to 1.1 cm.  - Neurosurgery was consulted.  They did recommend transfer to [facility] for planned surgical resection of meningioma on Wednesday, 3/22 but given post-op delirium with hypotension, WOB likely will need to be post-poned.  She did receive ppx lovenox on AM of 3/21 and given possible need for meningioma resection will hold this tomorrow until clearer surgical plan.  -Prn haldol for agitation. Pain management. She does have glove restraints as repeatedly pulling off bipap.  Bedside sitter ordered.   - Neurosurgery has also consulted neurology for evaluation of tremors which is likely related to her meningioma.  They are planning to see patient on 3/21.  - [facility] consulted.  Preliminarily they agree with steroids but no other recommendations from medical oncology standpoint.    -I also did discuss with Dr. Godoy from radiation oncology and she discussed with Dr. Murphy.  She agrees with surgery at this point given advancement of tumor.      #Acute on chronic mixed respiratory failure secondary to COPD in  post-operative status: former smoker of 50 years; quit around 5 years go.  On 2-3 liters of oxygen at baseline for the past 5 years. Chest XR, blood gas and elevation of bicarb on BMP on admission all indicate relatively severe, chronic baseline COPD.  -Post-op, patient with increased WOB and hypercarbia requiring bipap therapy.  -On 3/21, she is on 45% FiO2 on bipap.  Will repeat CXR this AM and obtain VBG.  Try to wean to high flow if possible.  She is moving air fairly well.      -Suspect related to severity of baseline COPD with anesthesia, pain meds suppressing respiratory drive. Cautious with pain meds.   -Continue steroids as above. Schedule duonebs. Home inhalers ordered but she is not coordinated enough to be able to perform these tasks.     #Lactic acidosis: Post-operative setting with increased WOB.  Improved AM of 3/21.  We will continue IV fluids given need for BiPAP and no p.o. intake.     #Acute on Chronic Anemia - Monitor blood counts in setting of surgery. Down a bit on 3/21.    #HLD - resume Atorvastatin upon discharge  #Hypothyroidism - continue Levothyroxine  #Depression/Anxiety - continue Sertraline and Remeron.  Hold prn Ambien, prn Atarax, prn Xanax pta due to increased sedation currently after receiving narcotics.     CODE: full  Dispo: Unclear. Will need to stabilize post-hip procedure prior to another major operation for meningioma.  If she remains on bipap early afternoon will transfer to [facility] vs. ICU.   DVT ppx: Pt did receive ppx dose of lovenox on 3/21 per post-op orders.  Given possible need for meningioma resection I will place hold on this.     Sister updated extensively at bedside.    40 minutes of critical care time spent in care of this patient.    Antwan Cadet MD    Interval History   RRT called post-op due to hypotension and WOB. Placed on bipap.  She remains encephalopathic and less interactive than yesterday but moving extremities. Sister at bedside.     -Data reviewed today: I  reviewed all new labs and imaging results over the last 24 hours.     Physical Exam   Temp: 97  F (36.1  C) Temp src: Temporal BP: 114/55 Pulse: 102   Resp: 24 SpO2: (!) 88 % O2 Device: BiPAP/CPAP Oxygen Delivery: 15 LPM  Vitals:    03/19/23 0508   Weight: 65.8 kg (145 lb)     Vital Signs with Ranges  Temp:  [96.1  F (35.6  C)-98.6  F (37  C)] 97  F (36.1  C)  Pulse:  [] 102  Resp:  [11-46] 24  BP: ()/() 114/55  FiO2 (%):  [45 %-70 %] 45 %  SpO2:  [73 %-100 %] 88 %  I/O last 3 completed shifts:  In: 1400 [I.V.:1400]  Out: 250 [Blood:250]    Constitutional: Bipap in place. Delirious. Moves arms and legs but not following commands.   HEENT: Bipap in place, full mask. No elevation of JVD  Respiratory: Distant BS. No crackles. Prolonged expiratory phase but moving air bilaterally.   Cardiovascular: Regular, no murmur  GI: BS+, NT, ND  Skin/Integumen: WWP, dressing clean.   Neuro: Delirious. Moves extremities but does not answer questions.     Medications     lactated ringers 75 mL/hr at 03/21/23 0844       acetaminophen  975 mg Oral Q8H     dexamethasone  2 mg Intravenous Daily     enoxaparin ANTICOAGULANT  40 mg Subcutaneous Q24H     famotidine  20 mg Intravenous Q12H     [Held by provider] famotidine  20 mg Oral BID     fluticasone-vilanterol  1 puff Inhalation Daily     ipratropium - albuterol 0.5 mg/2.5 mg/3 mL  1 vial Nebulization 4x daily     levothyroxine  88 mcg Oral QAM AC     mirtazapine  7.5 mg Oral At Bedtime     polyethylene glycol  17 g Oral Daily     senna-docusate  1 tablet Oral BID     sertraline  25 mg Oral QAM     sodium chloride (PF)  3 mL Intracatheter Q8H     sodium chloride (PF)  3 mL Intracatheter Q8H       Data   Recent Labs   Lab 03/21/23  0631 03/20/23 2053 03/20/23 2042 03/20/23  0610 03/19/23  0518   WBC  --   --  19.5* 13.5* 8.3   HGB 8.8*  --  9.3* 9.8* 10.5*   MCV  --   --  98 98 96   PLT  --   --  210 215 273   INR  --   --   --   --  0.96     --   --  141 139    POTASSIUM 4.6  4.6  --   --  4.6 3.9   CHLORIDE 100  --   --  101 98   CO2 29  --   --  33* 33*   BUN 22.0  --   --  14.5 9.2   CR 0.71 0.77  --  0.62 0.52   ANIONGAP 11  --   --  7 8   CHAO 8.2*  --   --  8.5* 8.9   *  --   --  137* 122*       Recent Results (from the past 24 hour(s))   XR Pelvis w Hip Port Right 1 View    Narrative    EXAM: XR PELVIS AND HIP PORTABLE RIGHT 1 VIEW  LOCATION: [facility]  DATE/TIME: 3/20/2023 7:19 PM    INDICATION: Status post Hip surgery  COMPARISON: 03/19/2023      Impression    IMPRESSION: Recent bipolar right hip arthroplasty. Alignment is satisfactory. Acute displaced fractures of the superior pubic ramus laterally along the pubic acetabular junction and the right inferior pubic ramus have developed. Mildly displaced fracture   of the left pubic bone and adjacent superior pubic ramus. Postoperative gas in the soft tissues adjacent to the right hip..

## 2023-03-21 NOTE — PROGRESS NOTES
0.6 mg dilaudid administered at 0745 in error.  Patient monitored, no adverse effects to dose of dilaudid. Note left for provider. Compass entered.

## 2023-03-21 NOTE — PLAN OF CARE
Sitter at bedside due to restlessness and agitation; pt continuously waving her arms around. Ativan given this AM without any reduction in restlessness. Ativan has since been discontinued.   Unable to assess orientation; pt not answering questions appropriately and usually does not respond by saying anything. IV infusing. Lungs have wheezes. Drsg CDI. Hypoactive bowel sounds.     Patient vital signs are at baseline: No,  Reason:  Pt weaned off of Bipap to 1oL with the oxymask. HR Tachy.   Patient able to ambulate as they were prior to admission or with assist devices provided by therapies during their stay:  No,  Reason:  Pt not ambulating at this time. Repositioning in bed.   Patient MUST void prior to discharge:  No,  Reason:  Pt has not voided since arriving to the hospital. Straight cathed x1 before surgery. Bladder scanned for 321. Indwelling baldwin placed. Due to pt agitation and inability to understand what is going on around her, 6 staff members assisted in baldwin insertion and calming the pt during the procedure.   Patient able to tolerate oral intake:  No,  Reason:  Pt has had sips of water today since Bipap was removed.   Pain has adequate pain control using Oral analgesics:  No,  Reason:  Pt unable to verbalize pain; pt continuously restless even with IV Dilaudid.   Does patient have an identified :  No,  Reason:  Discharge plans pending based on pt progress.   Has goal D/C date and time been discussed with patient:  No,  Reason:  Discharge plans pending based on pt progress.       Goal Outcome Evaluation:      Plan of Care Reviewed With: patient    Overall Patient Progress: improving    Outcome Evaluation: Transfer to [facility] pending pt progress and stability.

## 2023-03-21 NOTE — PLAN OF CARE
Goal Outcome Evaluation:    Patient vital signs are at baseline: Yes  Patient able to ambulate as they were prior to admission or with assist devices provided by therapies during their stay:  No,  Reason:  bedrest  Patient MUST void prior to discharge:  No,  Reason:  due to void  Patient able to tolerate oral intake: yes  Pain has adequate pain control using Oral analgesics:  Yes  Does patient have an identified :  Yes  Has goal D/C date and time been discussed with patient:  No,  Reason:  TBD    Patient is alert but not respond verbally to questions, pt on  BiPAP RT is aware and seen pt for the night, Pt was put in restraints for protection from pulling off IV and BiPAP. Prn Iv dilaudid given for pain management, Lactic acid was checked and was elevated at 2.1. Pt was bladder scanned for 240 ml,  Pt is due to void pure wick in placed. Family at bedside.   Will continue to provide supportive care.        Plan of Care Reviewed With: patient, family

## 2023-03-21 NOTE — PROGRESS NOTES
[facility]  Neurosurgery Daily Progress Note    Assessment & Plan   Josephine Nicole is a 73 year old female who was admitted on 3/19/2023 with right hip pain and right femoral neck fracture s/p mechanical fall. Neurosurgery was consulted for known cerebral meningioma. Radiographic findings include an extra-axial mass along the right anterior/inferior falx measuring up to 3.8 cm, moderate surrounding vasogenic edema and mass effect. Patient follows with radiation oncologist Dr. Godoy at [facility]. Patient had radiation therapy about 4 months ago and stopped steroids about 4 weeks ago. Since that time, patient has had balance issues, confusion, and increased headaches.    Patient is now POD1 right hip hemiarthroplasty with Ortho. RRT was called last night due to agitation, hypoxia, hypotension, and encephalopathy after surgery. Patient is currently on BiPAP. She appears restless, opens eyes to voice, minimal speech, able to lift BUE to command, negative pronator drift. Family is hesitant to proceed with any surgery for meningioma. They are waiting to hear back from Oncology as well.     Plan:  - No surgical intervention planned at this time  - NSG will continue to follow over the next few days and re-discuss possible surgery once patient is medically stable   - Continue Decadron 1mg daily   - Continue to monitor neuro exam   - Appreciate assistance from specialties     Discussed with Dr. Murphy and family    Candace Perdomo CNP  [facility]  [address] [place] [id number]  Tel [phone]  Pager [phone]    Interval History   RRT called last night due to agitation and hypoxia after hip surgery     Physical Exam   Temp: 97  F (36.1  C) Temp src: Temporal BP: 114/55 Pulse: 102   Resp: 24 SpO2: (!) 88 % O2 Device: BiPAP/CPAP Oxygen Delivery: 15 LPM  Vitals:    03/19/23 0508   Weight: 145 lb (65.8 kg)     Appears restless,  on BiPAP  Opens eyes to voice  Minimal speech   Able to lift BUE to command  Negative pronator drift   Moving feet spontaneously     Medications     lactated ringers 75 mL/hr at 03/21/23 0844        acetaminophen  975 mg Oral Q8H     [START ON 3/22/2023] dexamethasone  4 mg Intravenous Daily     [Held by provider] enoxaparin ANTICOAGULANT  40 mg Subcutaneous Q24H     famotidine  20 mg Intravenous Q12H     [Held by provider] famotidine  20 mg Oral BID     fluticasone-vilanterol  1 puff Inhalation Daily     ipratropium - albuterol 0.5 mg/2.5 mg/3 mL  1 vial Nebulization 4x daily     levothyroxine  88 mcg Oral QAM AC     mirtazapine  7.5 mg Oral At Bedtime     polyethylene glycol  17 g Oral Daily     senna-docusate  1 tablet Oral BID     sertraline  25 mg Oral QAM     sodium chloride (PF)  3 mL Intracatheter Q8H     sodium chloride (PF)  3 mL Intracatheter Q8H

## 2023-03-21 NOTE — SIGNIFICANT EVENT
DATE:  3/20/2023   TIME OF RECEIPT FROM LAB:  2113  LAB TEST:  Lactic  LAB VALUE: 3.3  RESULTS GIVEN WITH READ-BACK TO (PROVIDER):  Bedside SAIGE Johnson paged Dr Cadet  TIME LAB VALUE REPORTED TO PROVIDER:   2114

## 2023-03-21 NOTE — CONSULTS
Care Management Initial Consult    General Information  Assessment completed with: Family, SisterAlise  Type of CM/SW Visit: Initial Assessment    Primary Care Provider verified and updated as needed: No (sister unsure who primary is)   Readmission within the last 30 days: no previous admission in last 30 days      Reason for Consult: discharge planning, care coordination/care conference  Advance Care Planning:            Communication Assessment  Patient's communication style: spoken language (English or Bilingual)             Cognitive  Cognitive/Neuro/Behavioral: .WDL except  Level of Consciousness: other (see comments) (FRANCK; pt not speaking at this time)  Arousal Level: opens eyes spontaneously  Orientation: other (see comments) (FRANCK; pt not speaking at this time)  Mood/Behavior: agitated  Best Language: 0 - No aphasia  Speech: FRANCK (unable to assess) (pt not speaking at this time)    Living Environment:   People in home: child(tristen), adult  Jordan Burton  Current living Arrangements: house      Able to return to prior arrangements: other (see comments)  Living Arrangement Comments: TBD    Family/Social Support:  Care provided by: self  Provides care for: no one     Children, Sibling(s)          Description of Support System: Involved, Supportive    Support Assessment: Adequate social supports, Adequate family and caregiver support    Current Resources:   Patient receiving home care services: No     Community Resources: None  Equipment currently used at home: none  Supplies currently used at home: None            Lifestyle & Psychosocial Needs:  Social Determinants of Health     Tobacco Use: Medium Risk     Smoking Tobacco Use: Former     Smokeless Tobacco Use: Never     Passive Exposure: Not on file   Alcohol Use: Not on file   Financial Resource Strain: Not on file   Food Insecurity: Not on file   Transportation Needs: Not on file   Physical Activity: Not on file   Stress: Not on file   Social Connections:  Not on file   Intimate Partner Violence: Not on file   Depression: Not on file   Housing Stability: Not on file       Functional Status:  Prior to admission patient needed assistance:   Dependent ADLs:: Independent  Dependent IADLs:: Cleaning, Cooking, Laundry, Shopping, Meal Preparation, Medication Management, Transportation  Assesssment of Functional Status: Not at  functional baseline, Not at baseline with mobility, Not at baseline with ADL Functioning                 Additional Information:  Patient admitted with Right hip pain after fall. She underwent surgery on 3/20/23.  She also has a cerebral meningioma which will require surgical resection once clinically improved and patient can transfer to [facility] for this surgery.  Met with patient's sister Alise at the bedside. Patient is agitated and unable to respond to questions. Alise reports prior to admission, patient was independent in ADL's.  Her daughter, Jordan, whom she lives with, assisted with IADL's- cooking, cleaning, laundry, shopping. The last 4 weeks, she reports the patient was only ambulating short distances in the home without any assistive devices. Transportation to be determined.Information was verified with call to daughter Jordan.  Jordan stated she knows her mom will need to go to rehab after second surgery.  CM will continue to follow for discharge planning.    Bea Hardy, RN, BSN, CM  Inpatient Care Coordination  [facility]  [phone]

## 2023-03-21 NOTE — PROGRESS NOTES
Orthopedic Surgery  Josephine Nicole  03/21/2023     Admit Date:  3/19/2023    POD: 1 Day Post-Op   Procedure(s):  Right hip hemiarthroplasty anterolateral approach    Patient with meningioma and worsening mental status/uncoordinated motions.  Discussed postoperative expectations with patient's sister at bedside.  Patient requiring increased O2 support.   Plans for transfer to [facility] for neurosurgery management unclear and likely postponed at this time until patient stabilizations following right hemiarthroplasty.    Temp:  [96.1  F (35.6  C)-98.6  F (37  C)] 97  F (36.1  C)  Pulse:  [] 108  Resp:  [11-46] 20  BP: ()/() 114/55  FiO2 (%):  [45 %-70 %] 45 %  SpO2:  [73 %-100 %] 91 %    Patient restless and agitated, uncoordinated movements of bilateral upper and lower extremities, opens eyes at times to voice.  Right hip dressing is clean, dry, and intact.   Abduction pillow in place.  No erythema of the surrounding skin.   Bilateral calves are soft.  Spontaneously wiggles toes at times.  DP pulse palpable.    Labs:  Recent Labs   Lab Test 03/21/23  0631 03/20/23  2042 03/20/23  0610 03/19/23  0518   WBC  --  19.5* 13.5* 8.3   HGB 8.8* 9.3* 9.8* 10.5*   PLT  --  210 215 273     Recent Labs   Lab Test 03/19/23  0518   INR 0.96     A/P    1. S/p right hip hemiarthoplasty for a femoral neck fracture   Continue Lovenox for DVT prophylaxis.     Mobilize with PT/OT when able.   WBAT RLE with walker whenever the patient is able.   Continue current pain regimen.   Abduction pillow should remain in place at all times aside from Q8H skin checks and hygiene due to altered mental status to avoid extremes of motion.    Dressings: Keep intact. Change if >50% saturated or peeling off.    Follow-up: 2 weeks post-op with Dr. Shun Cuevas    2. Disposition   Anticipate d/c to TCU when medically cleared and progressing in PT.     *Patient with vasogenic edema and mass effect from a large meningioma pending possible  transfer to [facility] and surgery with neurosurgery team. Patient also requiring increased respiratory support due to hypoxia and COPD.    Guerline Ac PA-C  [facility]

## 2023-03-21 NOTE — PROGRESS NOTES
Sepsis Evaluation     I was called to see Josephine Nicole due to a change in vital signs. She is not known to have an infection.     PHYSICAL EXAM  Vital Signs:  Temp: (!) 96.1  F (35.6  C) Temp src: Temporal BP: (!) 82/55 Pulse: 93   Resp: 20 SpO2: 99 % O2 Device: BiPAP/CPAP Oxygen Delivery: 15 LPM    General: acutely ill appearing  Mental Status: altered level of consciousness based on Agitation difficulty answering questions.     Remainder of physical exam is significant for difficulty breathing, pulling at lines and BiPAP mask, and agitation    DATA  Lactic Acid   Date Value Ref Range Status   03/20/2023 3.3 (H) 0.7 - 2.0 mmol/L Final       ASSESSMENT AND PLAN  NO EVIDENCE OF SEPSIS at this time.  Vital sign, physical exam, and lab findings are due to COPD exacerbation and encephalopathy in the postoperative setting.    Disposition: The patient will remain on the current unit. We will continue to monitor this patient closely..  Jaciel Laurent MD  03/20/23, 9:22 PM    Sepsis Criteria   Sepsis: The body's generalized inflammatory state as a response to an infection. Sepsis Predictive Model includes >80 variable to alert to potential sepsis.  Severe Sepsis: Sepsis plus one or more variables of acute organ dysfunction (Note: lactic acid >2 or acute encephalopathy each qualify as organ dysfunction)  Septic Shock: Sepsis AND hypotension despite adequate volume resuscitation with crystalloid or lactic acid >=4  Note: HYPOTENSION is defined as 2 BP readings measured 3 hrs apart that have a SBP <90, MAP <65, or decrease >40 mmHg, occurring 6 hrs before or after t-zero

## 2023-03-21 NOTE — PROGRESS NOTES
I responded to a code rapid response was called for agitation, hypoxia, and hypotension after surgery.  Patient is a 73-year-old female with history of B12 Deficiency, Melanoma, COPD, Former Smoker, Oxygen Dependent, Depression, Anxiety, HLD, Hypothyroidism, Osteoporosis, Cerebral Meningioma who presented to the ED with right hip pain after a fall.  She has recently had radiation therapy and weaned off of steroids for her cerebral meningioma.  In the last several weeks after steroids were weaned she began having symptoms.  She was to have neurosurgery follow-up.  She presented on 3/19/2023 after a fall.  She was found to have right femoral neck fracture and also worsening cerebral meningioma.  She was started on IV Decadron.  She was admitted to the hospital.  She had surgical repair of right femoral neck fracture earlier today.  In the PACU she required BiPAP.  She had some agitation and some soft blood pressure.  She transferred to the floor.  Upon arrival to the floor she was somewhat agitated, dyspneic, hypoxic, and hypotensive.  Code rapid response was called.  Patient was given lactated ringer bolus.  She was put in restraints for protection from pulling off BiPAP and pulling out IVs.  She was given Haldol 2 mg IV.  Lactic acid was checked and was elevated at 3.3.  With cares patient seemed to calm down was doing better on BiPAP.  Patient is ill with worsening meningioma and hip fracture and surgical repair today.  She has advanced COPD.  I believe she has some degree of encephalopathy after surgery.  Plan is to support her tonight on BiPAP.  I think she will need some Haldol intermittently for agitation.  She may also need some IV Ativan but I would be more cautious with this.  She may need some intermittent IV fluid boluses for hypotension.  If she remains stable she can stay on the floor.  If she is requiring more care she may need to transfer her to a higher level of care.

## 2023-03-21 NOTE — OR NURSING
Patient weaned to oxymask and sats intermediately between 80-90 percent.  No signs of distress or pain.    OK to transfer patient to floor per MDA.

## 2023-03-22 ENCOUNTER — APPOINTMENT (OUTPATIENT)
Dept: GENERAL RADIOLOGY | Facility: CLINIC | Age: 73
DRG: 521 | End: 2023-03-22
Attending: STUDENT IN AN ORGANIZED HEALTH CARE EDUCATION/TRAINING PROGRAM
Payer: COMMERCIAL

## 2023-03-22 LAB
ALBUMIN SERPL BCG-MCNC: 3.3 G/DL (ref 3.5–5.2)
ALLEN'S TEST: YES
ALLEN'S TEST: YES
ALP SERPL-CCNC: 74 U/L (ref 35–104)
ALT SERPL W P-5'-P-CCNC: 16 U/L (ref 10–35)
ANION GAP SERPL CALCULATED.3IONS-SCNC: 11 MMOL/L (ref 7–15)
ANION GAP SERPL CALCULATED.3IONS-SCNC: 9 MMOL/L (ref 7–15)
AST SERPL W P-5'-P-CCNC: 51 U/L (ref 10–35)
BASE EXCESS BLDA CALC-SCNC: 5.1 MMOL/L (ref -9–1.8)
BASE EXCESS BLDA CALC-SCNC: 6.1 MMOL/L (ref -9–1.8)
BILIRUB SERPL-MCNC: 0.4 MG/DL
BUN SERPL-MCNC: 24.8 MG/DL (ref 8–23)
BUN SERPL-MCNC: 25.5 MG/DL (ref 8–23)
CALCIUM SERPL-MCNC: 8.4 MG/DL (ref 8.8–10.2)
CALCIUM SERPL-MCNC: 8.5 MG/DL (ref 8.8–10.2)
CHLORIDE SERPL-SCNC: 102 MMOL/L (ref 98–107)
CHLORIDE SERPL-SCNC: 99 MMOL/L (ref 98–107)
CREAT SERPL-MCNC: 0.54 MG/DL (ref 0.51–0.95)
CREAT SERPL-MCNC: 0.54 MG/DL (ref 0.51–0.95)
DEPRECATED HCO3 PLAS-SCNC: 28 MMOL/L (ref 22–29)
DEPRECATED HCO3 PLAS-SCNC: 30 MMOL/L (ref 22–29)
ERYTHROCYTE [DISTWIDTH] IN BLOOD BY AUTOMATED COUNT: 12.6 % (ref 10–15)
ERYTHROCYTE [DISTWIDTH] IN BLOOD BY AUTOMATED COUNT: 12.7 % (ref 10–15)
GFR SERPL CREATININE-BSD FRML MDRD: >90 ML/MIN/1.73M2
GFR SERPL CREATININE-BSD FRML MDRD: >90 ML/MIN/1.73M2
GLUCOSE BLDC GLUCOMTR-MCNC: 113 MG/DL (ref 70–99)
GLUCOSE BLDC GLUCOMTR-MCNC: 118 MG/DL (ref 70–99)
GLUCOSE BLDC GLUCOMTR-MCNC: 118 MG/DL (ref 70–99)
GLUCOSE BLDC GLUCOMTR-MCNC: 124 MG/DL (ref 70–99)
GLUCOSE BLDC GLUCOMTR-MCNC: 135 MG/DL (ref 70–99)
GLUCOSE BLDC GLUCOMTR-MCNC: 140 MG/DL (ref 70–99)
GLUCOSE SERPL-MCNC: 117 MG/DL (ref 70–99)
GLUCOSE SERPL-MCNC: 149 MG/DL (ref 70–99)
HCO3 BLD-SCNC: 31 MMOL/L (ref 21–28)
HCO3 BLD-SCNC: 32 MMOL/L (ref 21–28)
HCT VFR BLD AUTO: 28 % (ref 35–47)
HCT VFR BLD AUTO: 29.9 % (ref 35–47)
HGB BLD-MCNC: 8.6 G/DL (ref 11.7–15.7)
HGB BLD-MCNC: 9.1 G/DL (ref 11.7–15.7)
LACTATE SERPL-SCNC: 2.3 MMOL/L (ref 0.7–2)
MAGNESIUM SERPL-MCNC: 1.9 MG/DL (ref 1.7–2.3)
MCH RBC QN AUTO: 30.2 PG (ref 26.5–33)
MCH RBC QN AUTO: 30.3 PG (ref 26.5–33)
MCHC RBC AUTO-ENTMCNC: 30.4 G/DL (ref 31.5–36.5)
MCHC RBC AUTO-ENTMCNC: 30.7 G/DL (ref 31.5–36.5)
MCV RBC AUTO: 100 FL (ref 78–100)
MCV RBC AUTO: 98 FL (ref 78–100)
O2/TOTAL GAS SETTING VFR VENT: 40 %
O2/TOTAL GAS SETTING VFR VENT: 45 %
PATH REPORT.COMMENTS IMP SPEC: NORMAL
PATH REPORT.COMMENTS IMP SPEC: NORMAL
PATH REPORT.FINAL DX SPEC: NORMAL
PATH REPORT.GROSS SPEC: NORMAL
PATH REPORT.MICROSCOPIC SPEC OTHER STN: NORMAL
PATH REPORT.RELEVANT HX SPEC: NORMAL
PCO2 BLD: 50 MM HG (ref 35–45)
PCO2 BLD: 53 MM HG (ref 35–45)
PH BLD: 7.38 [PH] (ref 7.35–7.45)
PH BLD: 7.41 [PH] (ref 7.35–7.45)
PHOTO IMAGE: NORMAL
PLATELET # BLD AUTO: 158 10E3/UL (ref 150–450)
PLATELET # BLD AUTO: 186 10E3/UL (ref 150–450)
PO2 BLD: 54 MM HG (ref 80–105)
PO2 BLD: 67 MM HG (ref 80–105)
POTASSIUM SERPL-SCNC: 4.5 MMOL/L (ref 3.4–5.3)
POTASSIUM SERPL-SCNC: 4.6 MMOL/L (ref 3.4–5.3)
PROCALCITONIN SERPL IA-MCNC: 0.12 NG/ML
PROT SERPL-MCNC: 5.6 G/DL (ref 6.4–8.3)
RBC # BLD AUTO: 2.85 10E6/UL (ref 3.8–5.2)
RBC # BLD AUTO: 3 10E6/UL (ref 3.8–5.2)
SODIUM SERPL-SCNC: 138 MMOL/L (ref 136–145)
SODIUM SERPL-SCNC: 141 MMOL/L (ref 136–145)
WBC # BLD AUTO: 14.3 10E3/UL (ref 4–11)
WBC # BLD AUTO: 17.5 10E3/UL (ref 4–11)

## 2023-03-22 PROCEDURE — 999N000185 HC STATISTIC TRANSPORT TIME EA 15 MIN

## 2023-03-22 PROCEDURE — 94640 AIRWAY INHALATION TREATMENT: CPT | Mod: 76

## 2023-03-22 PROCEDURE — 36600 WITHDRAWAL OF ARTERIAL BLOOD: CPT

## 2023-03-22 PROCEDURE — 200N000001 HC R&B ICU

## 2023-03-22 PROCEDURE — 250N000011 HC RX IP 250 OP 636: Performed by: STUDENT IN AN ORGANIZED HEALTH CARE EDUCATION/TRAINING PROGRAM

## 2023-03-22 PROCEDURE — 250N000009 HC RX 250: Performed by: STUDENT IN AN ORGANIZED HEALTH CARE EDUCATION/TRAINING PROGRAM

## 2023-03-22 PROCEDURE — 250N000009 HC RX 250: Performed by: HOSPITALIST

## 2023-03-22 PROCEDURE — 82803 BLOOD GASES ANY COMBINATION: CPT | Performed by: STUDENT IN AN ORGANIZED HEALTH CARE EDUCATION/TRAINING PROGRAM

## 2023-03-22 PROCEDURE — 99291 CRITICAL CARE FIRST HOUR: CPT | Performed by: STUDENT IN AN ORGANIZED HEALTH CARE EDUCATION/TRAINING PROGRAM

## 2023-03-22 PROCEDURE — 36415 COLL VENOUS BLD VENIPUNCTURE: CPT | Performed by: HOSPITALIST

## 2023-03-22 PROCEDURE — 71045 X-RAY EXAM CHEST 1 VIEW: CPT

## 2023-03-22 PROCEDURE — 83735 ASSAY OF MAGNESIUM: CPT | Performed by: HOSPITALIST

## 2023-03-22 PROCEDURE — 99233 SBSQ HOSP IP/OBS HIGH 50: CPT | Performed by: HOSPITALIST

## 2023-03-22 PROCEDURE — 80048 BASIC METABOLIC PNL TOTAL CA: CPT | Performed by: HOSPITALIST

## 2023-03-22 PROCEDURE — 258N000003 HC RX IP 258 OP 636: Performed by: HOSPITALIST

## 2023-03-22 PROCEDURE — 250N000011 HC RX IP 250 OP 636: Performed by: HOSPITALIST

## 2023-03-22 PROCEDURE — 85027 COMPLETE CBC AUTOMATED: CPT | Performed by: HOSPITALIST

## 2023-03-22 PROCEDURE — 99231 SBSQ HOSP IP/OBS SF/LOW 25: CPT | Performed by: PHYSICIAN ASSISTANT

## 2023-03-22 PROCEDURE — 84145 PROCALCITONIN (PCT): CPT | Performed by: HOSPITALIST

## 2023-03-22 PROCEDURE — 94660 CPAP INITIATION&MGMT: CPT

## 2023-03-22 PROCEDURE — 94640 AIRWAY INHALATION TREATMENT: CPT

## 2023-03-22 PROCEDURE — 999N000157 HC STATISTIC RCP TIME EA 10 MIN

## 2023-03-22 RX ORDER — HALOPERIDOL 5 MG/ML
2 INJECTION INTRAMUSCULAR ONCE
Status: COMPLETED | OUTPATIENT
Start: 2023-03-22 | End: 2023-03-22

## 2023-03-22 RX ORDER — CEFTRIAXONE 1 G/1
1 INJECTION, POWDER, FOR SOLUTION INTRAMUSCULAR; INTRAVENOUS EVERY 24 HOURS
Status: DISCONTINUED | OUTPATIENT
Start: 2023-03-22 | End: 2023-03-23 | Stop reason: HOSPADM

## 2023-03-22 RX ORDER — AZITHROMYCIN 500 MG/5ML
500 INJECTION, POWDER, LYOPHILIZED, FOR SOLUTION INTRAVENOUS EVERY 24 HOURS
Status: DISCONTINUED | OUTPATIENT
Start: 2023-03-22 | End: 2023-03-23 | Stop reason: HOSPADM

## 2023-03-22 RX ORDER — DEXAMETHASONE SODIUM PHOSPHATE 10 MG/ML
6 INJECTION, SOLUTION INTRAMUSCULAR; INTRAVENOUS DAILY
Status: DISCONTINUED | OUTPATIENT
Start: 2023-03-22 | End: 2023-03-23 | Stop reason: HOSPADM

## 2023-03-22 RX ORDER — LEVOTHYROXINE SODIUM 20 UG/ML
65 INJECTION, SOLUTION INTRAVENOUS DAILY
Status: DISCONTINUED | OUTPATIENT
Start: 2023-03-22 | End: 2023-03-23 | Stop reason: HOSPADM

## 2023-03-22 RX ADMIN — AZITHROMYCIN MONOHYDRATE 500 MG: 500 INJECTION, POWDER, LYOPHILIZED, FOR SOLUTION INTRAVENOUS at 10:39

## 2023-03-22 RX ADMIN — SODIUM CHLORIDE, POTASSIUM CHLORIDE, SODIUM LACTATE AND CALCIUM CHLORIDE: 600; 310; 30; 20 INJECTION, SOLUTION INTRAVENOUS at 04:26

## 2023-03-22 RX ADMIN — DEXAMETHASONE SODIUM PHOSPHATE 6 MG: 10 INJECTION INTRAMUSCULAR; INTRAVENOUS at 09:20

## 2023-03-22 RX ADMIN — CEFTRIAXONE 1 G: 1 INJECTION, POWDER, FOR SOLUTION INTRAMUSCULAR; INTRAVENOUS at 09:14

## 2023-03-22 RX ADMIN — SODIUM CHLORIDE, POTASSIUM CHLORIDE, SODIUM LACTATE AND CALCIUM CHLORIDE: 600; 310; 30; 20 INJECTION, SOLUTION INTRAVENOUS at 20:38

## 2023-03-22 RX ADMIN — IPRATROPIUM BROMIDE AND ALBUTEROL SULFATE 3 ML: 2.5; .5 SOLUTION RESPIRATORY (INHALATION) at 12:19

## 2023-03-22 RX ADMIN — SODIUM CHLORIDE, POTASSIUM CHLORIDE, SODIUM LACTATE AND CALCIUM CHLORIDE: 600; 310; 30; 20 INJECTION, SOLUTION INTRAVENOUS at 11:08

## 2023-03-22 RX ADMIN — HALOPERIDOL LACTATE 2 MG: 5 INJECTION, SOLUTION INTRAMUSCULAR at 02:43

## 2023-03-22 RX ADMIN — FAMOTIDINE 20 MG: 10 INJECTION, SOLUTION INTRAVENOUS at 09:23

## 2023-03-22 RX ADMIN — HYDROMORPHONE HYDROCHLORIDE 0.2 MG: 0.2 INJECTION, SOLUTION INTRAMUSCULAR; INTRAVENOUS; SUBCUTANEOUS at 06:26

## 2023-03-22 RX ADMIN — IPRATROPIUM BROMIDE AND ALBUTEROL SULFATE 3 ML: 2.5; .5 SOLUTION RESPIRATORY (INHALATION) at 07:18

## 2023-03-22 RX ADMIN — ALBUTEROL SULFATE 2.5 MG: 2.5 SOLUTION RESPIRATORY (INHALATION) at 02:39

## 2023-03-22 RX ADMIN — HYDROMORPHONE HYDROCHLORIDE 0.2 MG: 0.2 INJECTION, SOLUTION INTRAMUSCULAR; INTRAVENOUS; SUBCUTANEOUS at 23:10

## 2023-03-22 RX ADMIN — IPRATROPIUM BROMIDE AND ALBUTEROL SULFATE 3 ML: 2.5; .5 SOLUTION RESPIRATORY (INHALATION) at 19:28

## 2023-03-22 RX ADMIN — ALBUTEROL SULFATE 2.5 MG: 2.5 SOLUTION RESPIRATORY (INHALATION) at 23:39

## 2023-03-22 RX ADMIN — FAMOTIDINE 20 MG: 10 INJECTION, SOLUTION INTRAVENOUS at 20:41

## 2023-03-22 RX ADMIN — IPRATROPIUM BROMIDE AND ALBUTEROL SULFATE 3 ML: 2.5; .5 SOLUTION RESPIRATORY (INHALATION) at 15:59

## 2023-03-22 RX ADMIN — HYDROMORPHONE HYDROCHLORIDE 0.2 MG: 0.2 INJECTION, SOLUTION INTRAMUSCULAR; INTRAVENOUS; SUBCUTANEOUS at 16:14

## 2023-03-22 RX ADMIN — LEVOTHYROXINE SODIUM 65 MCG: 20 INJECTION, SOLUTION INTRAVENOUS at 10:45

## 2023-03-22 ASSESSMENT — ACTIVITIES OF DAILY LIVING (ADL)
ADLS_ACUITY_SCORE: 49
ADLS_ACUITY_SCORE: 45
ADLS_ACUITY_SCORE: 45
ADLS_ACUITY_SCORE: 49
ADLS_ACUITY_SCORE: 45

## 2023-03-22 NOTE — PLAN OF CARE
Goal Outcome Evaluation:        Patient vital signs are at baseline: Yes  Patient able to ambulate as they were prior to admission or with assist devices provided by therapies during their stay:  No,  Reason:  Up with assist of two and lift device.  Patient MUST void prior to discharge:  No,  Reason:  Roman in place, patent  Patient able to tolerate oral intake:  No,  Reason:  Refusing PO meds  Pain has adequate pain control using Oral analgesics:  Yes  Does patient have an identified :  Yes  Has goal D/C date and time been discussed with patient:  Yes

## 2023-03-22 NOTE — PLAN OF CARE
Goal Outcome Evaluation:  Pt agitated most of shift, psc at bedside , mitts on.  Skin , oral and murphy  and baldwin cares done.  Turned, repositioned, ice chips offered.  Pt refusing to take anything orally.  Medicated with iv dilaudid for pain, dressing to right hip intact, abductor pillow in place.  Good capillary refill to toes, pcd's on.  Medicated with iv haldol for agitation.  Lungs diminished, on Oxymizer at hs ,with O2 at 15 lpm with capnography monitoring.  RT following and reevaluating pt.    Plan of Care Reviewed With: patient, child, family

## 2023-03-22 NOTE — PROGRESS NOTES
[facility]    Hospitalist Progress Note      Assessment & Plan   Josephine Nicole is a 73 year old female with a history of B12 Deficiency, Melanoma, COPD, Former Smoker, Oxygen Dependent, Depression, Anxiety, HLD, Hypothyroidism, Osteoporosis, Cerebral Meningioma who presented to the ED with right hip pain after a fall.     Patient very complex with severe medical issues as below.  She underwent surgical repair for right femoral neck fracture on 3/20 with postoperative complications including worsening respiratory failure, hypotension, lactic acidosis, worsening encephalopathy.  She has worsening of her cerebral meningioma which also will require surgical resection but we need to wait for her to clinically improve prior to transfer to [facility] for this procedure.     #Displaced Right Femoral Neck Fracture s/p surgical repair on 3/20 - s/p mechanical fall.  Xray revealed a displaced right femoral neck fracture and a subtle irregularity of the parasymphyseal aspect of the left pubic bone, potentially representing an age indeterminant fracture.   - Orthopedic Surgery consulted. Underwent surgical repair with right hip hemiarthroplasty on 3/20.   -Post-op, pt had increased WOB, hypotension, lactic acidosis and delirium as detailed below.  She had to be placed on bipap support.    She was able to be weaned off BiPAP during the day but again needed BiPAP administration with a repeat RRT called overnight on 3/21 - 3/22.  -prn haldol for agitation. Prn pain meds also available.  Avoid ativan if at all possible.  Cautious with medications given somnolence with repeated Haldol dosing overnight likely contributing to worsening respiratory failure.  -She did receive lovenox for ppx on 3/21.  Given no immediate plan for neurosurgery, we will continue for now.  -We will continue IV fluids as patient on continuous BiPAP and not able to tolerate p.o.  -Roman catheter placed on 3/21 for concern of urinary  retention.  We will keep this in place for now.    #Acute on chronic mixed respiratory failure secondary to COPD in post-operative status: former smoker of 50 years; quit around 5 years ago.  On 2-3 liters of oxygen at baseline for the past 5 years. Chest XR, blood gas and elevation of bicarb on BMP on admission all indicate relatively severe, chronic baseline COPD.  -Post-op, patient with increased WOB and hypercarbia requiring bipap therapy.  -Patient was able to be weaned to oxime mask on the afternoon of 3/21 but again needed BiPAP therapy after repeat RRT called overnight on 3/21 - 3/22 for hypoxemia.  Blood gases seem to show adequate exchange and chest x-ray without any convincing interval change with a few hazy opacities that are nonspecific.  Hyperinflated lungs again noted.  -On 3/22, patient does seem to be more wheezy.  She is on 50% FiO2 and satting in the high 90s.  Going to increase her dose of dexamethasone to 6 mg and continue scheduled DuoNebs to treat probable COPD exacerbation.  We will also add ceftriaxone and azithromycin and check procalcitonin.  -Wean off BiPAP as able but suspect she will need BiPAP anytime she is asleep for respiratory support as she continues to recover postoperatively.     #Encephalopathy. Cerebral Meningioma.  Suspected acute toxic metabolic encephalopathy likely from post-op status, steroids, pain meds - pt was diagnosed 9/2019 with a cerebral meningioma and monitored.  Given progression of the tumor, patient underwent 15 sessions of radiation 11/2022.  She is followed by Dr. Godoy, a radiation Oncologist through [facility].  Pt was on oral steroids which were weaned off about 4 weeks ago.  Since that time, patient has had increased confusion, poor balance, changes in mood.  -Recent MRI Brain 3/17/23 revealed enlargement of meningioma up to 45 mm with a significant mass effect and the midline shift right to left.  Patient was instructed by her PCP to follow up with  outpatient Neurology and Radiation Oncology this week.  - CT head on admission with no acute hemorrhage, but notes the known mass surrounding vasogenic edema predominantly involving the right frontal lobe with right to left midline shift/subfalcine herniation measuring up to 1.1 cm.  - Neurosurgery was consulted.  They did recommend transfer to [facility] for planned surgical resection of meningioma on Wednesday, 3/22 but given post-op delirium with hypotension, WOB will need to be post-poned.    -On the afternoon of 3/21 patient did seem to be doing better and was actually having some interaction with family at bedside and talking some.  -Cautious with medications as to prevent oversedation.  Obviously this is very difficult for this patient given her relatively narrow window of being comfortable but also not compromising respiratory status.  Prn haldol for agitation. Pain management. She does have glove restraints as repeatedly pulling off bipap.  Bedside sitter ordered.   - Neurosurgery has also consulted neurology for evaluation of tremors which is likely related to her meningioma.  I do not have any new recommendations.  - [facility] consulted.  Preliminarily they agree with steroids but no other recommendations from medical oncology standpoint.    -I also did discuss with Dr. Godoy from radiation oncology on 3/21 and she discussed with Dr. Murphy.  She agrees with surgery at this point given advancement of tumor.      #Lactic acidosis: Post-operative setting with increased WOB.  Improved AM of 3/21.  We will continue IV fluids given need for BiPAP and no p.o. intake.    #Goals of care: I have had extensive discussions with patient's sister at bedside.  We discussed the gravity of Ms. Nicole condition.  I expressed my concern for her ability to tolerate another major surgery given her baseline poor functional status and severe COPD.  She did voice understanding.  At this point, we are trying to stabilize her  to the point that she is close to her baseline oxygen requirements where she would be able to potentially transfer to [facility] for definitive management of her meningioma which is at the crux of her delirium as well.     #Acute on Chronic Anemia - Monitor blood counts in setting of surgery. Down a bit on 3/21.    Repeat CBC on 3/22.  #HLD - resume Atorvastatin upon discharge  #Hypothyroidism -we will transition to IV levothyroxine until patient reliably able to take p.o.  #Depression/Anxiety -we will hold p.o. low-dose sertraline and mirtazapine until able to reliably take p.o.     CODE: full  Dispo: Unclear. Will need to stabilize post-hip procedure prior to another major operation for meningioma.  Transferring to [facility] on 3/22.  DVT ppx: We will resume prophylactic Lovenox given no immediate plan for meningioma resection per neurosurgery.     Sister updated at bedside.     40 minutes of critical care time spent in care of this patient.    Antwan Cadet MD    Interval History   Repeat RRT called overnight for hypoxemia in the setting of getting multiple doses of Haldol along with pain meds.  Patient asleep today and minimally arousable.  Blood gases are showing adequate exchange.  Sister remains at bedside.    -Data reviewed today: I reviewed all new labs and imaging results over the last 24 hours.     Physical Exam   Temp: (!) 96.6  F (35.9  C) Temp src: Temporal BP: 136/75 Pulse: 108   Resp: 25 SpO2: 99 % O2 Device: BiPAP/CPAP Oxygen Delivery: 15 LPM  Vitals:    03/19/23 0508   Weight: 65.8 kg (145 lb)     Vital Signs with Ranges  Temp:  [96.6  F (35.9  C)-98.6  F (37  C)] 96.6  F (35.9  C)  Pulse:  [103-136] 108  Resp:  [16-29] 25  BP: ()/() 136/75  FiO2 (%):  [45 %-50 %] 45 %  SpO2:  [67 %-100 %] 99 %  I/O last 3 completed shifts:  In: 4345 [P.O.:20; I.V.:4325]  Out: 750 [Urine:750]    Constitutional: Bipap in place. Delirious.  Continues to moves arms and legs but not following commands.   HEENT:  Bipap in place, full mask. No elevation of JVD  Respiratory: Distant BS. No crackles. Prolonged expiratory phase but moving air bilaterally.   Cardiovascular: Regular, no murmur  GI: BS+, NT, ND  Skin/Integumen: WWP, dressing clean.   Neuro: Delirious. Moves extremities but does not answer questions.     Medications     lactated ringers 100 mL/hr at 03/22/23 0845       acetaminophen  975 mg Oral Q8H     azithromycin  500 mg Intravenous Q24H     cefTRIAXone  1 g Intravenous Q24H     dexamethasone  6 mg Intravenous Daily     enoxaparin ANTICOAGULANT  40 mg Subcutaneous Q24H     famotidine  20 mg Intravenous Q12H     [Held by provider] famotidine  20 mg Oral BID     fluticasone-vilanterol  1 puff Inhalation Daily     ipratropium - albuterol 0.5 mg/2.5 mg/3 mL  1 vial Nebulization 4x daily     levothyroxine  88 mcg Oral QAM AC     mirtazapine  7.5 mg Oral At Bedtime     polyethylene glycol  17 g Oral Daily     senna-docusate  1 tablet Oral BID     sertraline  25 mg Oral QAM     sodium chloride (PF)  3 mL Intracatheter Q8H     sodium chloride (PF)  3 mL Intracatheter Q8H       Data   Recent Labs   Lab 03/22/23  0654 03/22/23  0229 03/21/23  2210 03/21/23  1000 03/21/23  0631 03/21/23  0230 03/20/23  2053 03/20/23  2042 03/20/23  0610 03/19/23  0518   WBC  --   --   --   --   --  17.5*  --  19.5* 13.5* 8.3   HGB  --   --   --   --  8.8* 9.1*  --  9.3* 9.8* 10.5*   MCV  --   --   --   --   --  100  --  98 98 96   PLT  --   --   --   --   --  186  --  210 215 273   INR  --   --   --   --   --   --   --   --   --  0.96     --   --   --  140 138  --   --  141 139   POTASSIUM 4.5  --   --   --  4.6  4.6 4.6  --   --  4.6 3.9   CHLORIDE 102  --   --   --  100 99  --   --  101 98   CO2 30*  --   --   --  29 28  --   --  33* 33*   BUN 25.5*  --   --   --  22.0 24.8*  --   --  14.5 9.2   CR 0.54  --   --   --  0.71 0.54   < >  --  0.62 0.52   ANIONGAP 9  --   --   --  11 11  --   --  7 8   CHAO 8.5*  --   --   --  8.2* 8.4*   --   --  8.5* 8.9   * 140* 110*   < > 117* 149*  --   --  137* 122*   ALBUMIN  --   --   --   --   --  3.3*  --   --   --   --    PROTTOTAL  --   --   --   --   --  5.6*  --   --   --   --    BILITOTAL  --   --   --   --   --  0.4  --   --   --   --    ALKPHOS  --   --   --   --   --  74  --   --   --   --    ALT  --   --   --   --   --  16  --   --   --   --    AST  --   --   --   --   --  51*  --   --   --   --     < > = values in this interval not displayed.       Recent Results (from the past 24 hour(s))   XR Chest Port 1 View    Narrative    CHEST ONE VIEW PORTABLE March 21, 2023 11:30 AM     HISTORY: Shortness of breath.    COMPARISON: 3/19/2023.      Impression    IMPRESSION: No significant interval change. Mild hyperinflation both  lungs, likely related to chronic obstructive pulmonary disease. Mild  bibasilar opacities, greater on the right, are unchanged. No  pneumothorax. Heart size is stable. Pulmonary vascularity is within  normal limits. Degenerative changes of the right glenohumeral joint.    ALCYE BETANCOURT MD         SYSTEM ID:  [id number]   XR Chest Port 1 View    Narrative    EXAM: CHEST SINGLE VIEW PORTABLE  LOCATION: [facility]  DATE/TIME: 3/22/2023 2:51 AM    INDICATION: Acute hypoxia.  COMPARISON: 3/21/2023 at 1115 hours.      Impression    IMPRESSION:   1. No convincing interval change since the recent comparison study.  2. A few hazy opacities in bilateral lung bases, right side greater than left. These are nonspecific, but could relate to atelectasis, pneumonia or scarring.  3. A few curvilinear opacities scattered in both lungs likely represent scarring.   4. Hyperinflated lungs, suggestive of chronic obstructive pulmonary disease.

## 2023-03-22 NOTE — PROGRESS NOTES
Orthopedic Surgery  Josephine Nicole  03/22/2023     Admit Date:  3/19/2023    POD: 2 Days Post-Op   Procedure(s):  Right hip hemiarthroplasty anterolateral approach    Patient with meningioma and worsening mental status/uncoordinated motions.  Patient's sister remians at bedside.  Rapid response called overnight for respiratory distress event.  Josephine is currently being transferred to ICU for COPD exacerbation and stabilization.   Plans for transfer to [facility] for neurosurgery management unclear and likely postponed at this time until patient stabilizations following right hemiarthroplasty.    Temp:  [96.6  F (35.9  C)-98.6  F (37  C)] 96.6  F (35.9  C)  Pulse:  [103-136] 113  Resp:  [16-29] 28  BP: ()/() 136/75  FiO2 (%):  [45 %-50 %] 50 %  SpO2:  [67 %-100 %] 98 %    Patient restless and agitated, uncoordinated movements of bilateral upper and lower extremities, opens eyes at times to voice.  Right hip dressing is clean, dry, and intact.   Abduction pillow in place.  No erythema of the surrounding skin.   Bilateral calves are soft.  Spontaneously wiggles toes at times.  DP pulse palpable.    Labs:  Recent Labs   Lab Test 03/21/23  0631 03/21/23  0230 03/20/23  2042 03/20/23  0610   WBC  --  17.5* 19.5* 13.5*   HGB 8.8* 9.1* 9.3* 9.8*   PLT  --  186 210 215     Recent Labs   Lab Test 03/19/23  0518   INR 0.96     A/P    1. S/p right hip hemiarthoplasty for a femoral neck fracture   Continue Lovenox for DVT prophylaxis.     Mobilize with PT/OT when able.   WBAT RLE with walker whenever the patient is able.   Continue current pain regimen.   Abduction pillow should remain in place at all times aside from Q8H skin checks and hygiene due to altered mental status to avoid extremes of motion.    Dressings: Keep intact. Change if >50% saturated or peeling off.    Follow-up: 2 weeks post-op with Dr. Shun Cuevas    2. Disposition   Anticipate d/c to TCU when medically cleared and progressing in PT.     *Patient  with vasogenic edema and mass effect from a large meningioma pending possible transfer to [facility] and surgery with neurosurgery team. Patient also requiring increased respiratory support due to hypoxia and COPD.    Grace Barton PA-C  [facility]

## 2023-03-22 NOTE — PROGRESS NOTES
Responded to RRT for patient dropping sats to the 60's on 10-15 L oxymask. Placed patient back on BIPAP and sats recovered. On 14/6 and 45%. ABG results: 7.38/53/54/31. PRN albuterol given BS diminished with expiratory wheezing. RT will monitor.    Molly Kelsey, RT

## 2023-03-22 NOTE — SIGNIFICANT EVENT
RN called for O2 sat drop in room 628. Sat at 59% when RN assessed pt. RRT called 0210, arrival time 0215. Interventions included chest XR, blood glucose, neb solution. BIPAP was also started. Pt stable, O2 sat 94%. RRT ended 0255. Sitter at bedside, will continue to monitor O2 sat and provide supportive care.

## 2023-03-22 NOTE — PROGRESS NOTES
An ABG was completed on the pt's right wrist on an FIO2 of 45%  with no complications noted during the procedure.    Molly Kelsey RT

## 2023-03-22 NOTE — PLAN OF CARE
Goal Outcome Evaluation:      Plan of Care Reviewed With: family  ICU End of Shift Summary.  For vital signs and complete assessments, please see documentation flowsheets.      Pertinent assessments: Restless. Lethargic. Confused. Does not follow commands. Opens eyes to speech. Withdrawals from pain. COPT with dilaudid given x 1 and effective for an hour. Afebrile. Tele shows ST. BPs stable. HFNC. Lungs diminished on MOHSEN and clear RUL/RLL.  Roman in place with adequate UOP. BS faint/hypoactive. NPO/oral cares. Upper arms blanchable redness. Pale throughout. Rt hip incision with dressing. Ice applied. PIV x2.     Major Shift Events: -Weaning off O2 as able                                    -Mentation slightly improving    Plan (Upcoming Events): Neurosurgery following to assess for procedure.   Discharge/Transfer Needs: [facility] for procedure     Bedside Shift Report Completed : yes  Bedside Safety Check Completed: yes

## 2023-03-22 NOTE — PROGRESS NOTES
Neurosurgery progress note    Ms. Nicole is seen today after recently being transferred to the intensive care unit for increased respiratory support.  She underwent hip surgery on Monday.  She is seen with her BiPAP machine on, moving all of her extremities rather sporadically and without much control.    Exam    Awake, alert, does not respond verbally to questions, not following commands  Moving all extremities    Assessment    Right anterior/inferior falx meningioma with moderate surrounding vasogenic edema and mass effect  Status post radiation treatment 4 months ago    Plan    Patient was transferred to the intensive care unit today for further support of respiratory issues.  Working on medical stability for possible transfer to [facility] for surgical intervention regarding her meningioma.  Continue Decadron 1 mg daily.

## 2023-03-22 NOTE — PLAN OF CARE
Patient vital signs are at baseline: No,  Reason: Tachycardic. Other VS are stable.  Patient able to ambulate as they were prior to admission or with assist devices provided by therapies during their stay:  No,  Reason: was restless, confused, and unable to walk with PT.  Patient MUST void prior to discharge:  No,  Reason:  Has urinary Catheter due to retention.  Patient able to tolerate oral intake:  No,  Reason:  Unstable and on Bipap due to hypoxia.  Pain has adequate pain control using Oral analgesics:  No,  Reason: On and off restless.  Does patient have an identified :  Yes   Has goal D/C date and time been discussed with patient:  No, To be determined. Redness noted to the left inner upper arm due to Bipap rubbing. Unstable: Report given to ICU nurse and Transferred to ICU. One-to- one and sister at the bed side, Reparatory following, on Bi pap.

## 2023-03-22 NOTE — PROGRESS NOTES
Rapid Response Team Note    Assessment   In assessment a rapid response was called on Josephine Nicole due to respiratory distress and hypoxia . This presentation is likely due to underlying COPD, post operative narcotics decreasing respiratory drive among others. Patient's saturation probe appeared to be worn off and when held down seemed to have better readings. A new probe was placed with some improvement in saturations although continued to have intermittent dips. Patient was placed back on BIPAP and responded well to this. Considered PE vs fat emboli in post operative state, patient with restlessness and some baseline tremor, unable to safely obtain CT scan. Repeat CXR obtained to rule out pneumothorax vs other new pulm process as lung sounds were difficult to auscultate on L. CXR similar to prior. ABG as below. Suspect the patient's oxygenation issues are related to underlying lung disease and she will benefit from increased pressure with BIPAP overnight. Updated bedside and charge nurse who were comfortable with the plan     Patient's family at bedside updated throughout the course of the RRT.     Plan   -  BIPAP overnight   - additional nebs   - additional dose of haldol to assist with restlessness on BIPAP mask   - repeat ABG in AM   -  Disposition: The patient will remain on the current unit. We will continue to monitor this patient closely.  -  Reassessment and plan follow-up will be performed by the primary team    Josephine is critically ill with acute hypoxic respiratory failure. These features are alarming and indicate that the patient is at imminent risk of life-threatening organ failure. Acute deterioration is being managed by the following critical interventions: oxygen by BIPAP and inhaled bronchodilators    Total Critical Care time spent by me, excluding procedures, was 35 minutes.  Molly Cardoso DO  [facility] Paging/[facility]    Hospital Course   Brief Summary of events leading to rapid response:    Hypoxia     Admission Diagnosis:   Cerebral meningioma (H) [D32.0]  Fall, initial encounter [W19.XXXA]  Closed displaced fracture of right femoral neck (H) [S72.001A]    Physical Exam   Temp: 97  F (36.1  C) Temp  Min: 97  F (36.1  C)  Max: 98.6  F (37  C)  Resp: 29 Resp  Min: 16  Max: 29  SpO2: 92 % SpO2  Min: 67 %  Max: 100 %  Pulse: 115 Pulse  Min: 95  Max: 136    No data recorded  BP: (!) 140/101 Systolic (24hrs), Av , Min:92 , Max:140   Diastolic (24hrs), Av, Min:55, Max:101     I/Os: I/O last 3 completed shifts:  In: 4345 [P.O.:20; I.V.:4325]  Out: 525 [Urine:525]     Exam:   Constitutional: chronically ill appearing female, in mild distress, appears quite restless, does not respond to questioning   HEENT: Normocephalic, atraumatic  Wearing high flow mask initially   Respiratory: increased work of breathing, distant lung sounds, decreased L as compared to right, faint expiratory wheezing noted.    Cardiovascular: Regular rate and rhythm, no murmurs appreciated  GI: Soft and nontender to palpation, nondistended, no rebound or guarding  Skin: normal skin color, texture, turgor  Musculoskeletal: No deformities, no edema present  Neurologic: restless, tremor present   Neuropsychiatric: unable to assess       Significant Results and Procedures   Lactic Acid:   Recent Labs   Lab Test 23  0631 23  0230 23  0002   LACT 1.2 2.3* 2.1*     CBC:   Recent Labs   Lab Test 23  0631 23  2042 23  0610 23  0518   WBC  --  19.5* 13.5* 8.3   HGB 8.8* 9.3* 9.8* 10.5*   HCT  --  30.6* 31.6* 33.8*   PLT  --  210 215 273        Sepsis Evaluation   The patient is not known to have an infection.    Molly Cardoso DO 4:24 AM

## 2023-03-23 ENCOUNTER — HOSPITAL ENCOUNTER (INPATIENT)
Facility: CLINIC | Age: 73
LOS: 15 days | Discharge: NURSING, REHAB OR LONG-TERM CARE | DRG: 025 | End: 2023-04-07
Attending: INTERNAL MEDICINE | Admitting: STUDENT IN AN ORGANIZED HEALTH CARE EDUCATION/TRAINING PROGRAM
Payer: COMMERCIAL

## 2023-03-23 ENCOUNTER — APPOINTMENT (OUTPATIENT)
Dept: GENERAL RADIOLOGY | Facility: CLINIC | Age: 73
DRG: 521 | End: 2023-03-23
Attending: HOSPITALIST
Payer: COMMERCIAL

## 2023-03-23 VITALS
OXYGEN SATURATION: 98 % | HEART RATE: 118 BPM | BODY MASS INDEX: 26.98 KG/M2 | DIASTOLIC BLOOD PRESSURE: 87 MMHG | SYSTOLIC BLOOD PRESSURE: 121 MMHG | RESPIRATION RATE: 37 BRPM | TEMPERATURE: 98.7 F | WEIGHT: 147.49 LBS

## 2023-03-23 DIAGNOSIS — D32.0 CEREBRAL MENINGIOMA (H): ICD-10-CM

## 2023-03-23 DIAGNOSIS — K21.9 GASTROESOPHAGEAL REFLUX DISEASE WITHOUT ESOPHAGITIS: ICD-10-CM

## 2023-03-23 DIAGNOSIS — S72.001A CLOSED DISPLACED FRACTURE OF RIGHT FEMORAL NECK (H): Primary | ICD-10-CM

## 2023-03-23 DIAGNOSIS — R60.9 EDEMA, UNSPECIFIED TYPE: ICD-10-CM

## 2023-03-23 DIAGNOSIS — R09.81 NASAL CONGESTION: ICD-10-CM

## 2023-03-23 LAB
ANION GAP SERPL CALCULATED.3IONS-SCNC: 11 MMOL/L (ref 7–15)
BASE EXCESS BLDV CALC-SCNC: 4.8 MMOL/L (ref -7.7–1.9)
BUN SERPL-MCNC: 25.5 MG/DL (ref 8–23)
CALCIUM SERPL-MCNC: 8.3 MG/DL (ref 8.8–10.2)
CHLORIDE SERPL-SCNC: 105 MMOL/L (ref 98–107)
CREAT SERPL-MCNC: 0.53 MG/DL (ref 0.51–0.95)
DEPRECATED HCO3 PLAS-SCNC: 28 MMOL/L (ref 22–29)
ERYTHROCYTE [DISTWIDTH] IN BLOOD BY AUTOMATED COUNT: 12.8 % (ref 10–15)
GFR SERPL CREATININE-BSD FRML MDRD: >90 ML/MIN/1.73M2
GLUCOSE BLDC GLUCOMTR-MCNC: 102 MG/DL (ref 70–99)
GLUCOSE BLDC GLUCOMTR-MCNC: 113 MG/DL (ref 70–99)
GLUCOSE BLDC GLUCOMTR-MCNC: 86 MG/DL (ref 70–99)
GLUCOSE BLDC GLUCOMTR-MCNC: 89 MG/DL (ref 70–99)
GLUCOSE BLDC GLUCOMTR-MCNC: 90 MG/DL (ref 70–99)
GLUCOSE BLDC GLUCOMTR-MCNC: 96 MG/DL (ref 70–99)
GLUCOSE SERPL-MCNC: 102 MG/DL (ref 70–99)
HCO3 BLDV-SCNC: 31 MMOL/L (ref 21–28)
HCT VFR BLD AUTO: 27.2 % (ref 35–47)
HGB BLD-MCNC: 8.2 G/DL (ref 11.7–15.7)
MAGNESIUM SERPL-MCNC: 1.8 MG/DL (ref 1.7–2.3)
MCH RBC QN AUTO: 30 PG (ref 26.5–33)
MCHC RBC AUTO-ENTMCNC: 30.1 G/DL (ref 31.5–36.5)
MCV RBC AUTO: 100 FL (ref 78–100)
O2/TOTAL GAS SETTING VFR VENT: 35 %
PCO2 BLDV: 54 MM HG (ref 40–50)
PH BLDV: 7.37 [PH] (ref 7.32–7.43)
PLATELET # BLD AUTO: 159 10E3/UL (ref 150–450)
PO2 BLDV: 39 MM HG (ref 25–47)
POTASSIUM SERPL-SCNC: 4.8 MMOL/L (ref 3.4–5.3)
RBC # BLD AUTO: 2.73 10E6/UL (ref 3.8–5.2)
SODIUM SERPL-SCNC: 144 MMOL/L (ref 136–145)
WBC # BLD AUTO: 15.5 10E3/UL (ref 4–11)

## 2023-03-23 PROCEDURE — 99221 1ST HOSP IP/OBS SF/LOW 40: CPT | Performed by: ANESTHESIOLOGY

## 2023-03-23 PROCEDURE — 99223 1ST HOSP IP/OBS HIGH 75: CPT | Performed by: PHYSICIAN ASSISTANT

## 2023-03-23 PROCEDURE — 250N000009 HC RX 250: Performed by: HOSPITALIST

## 2023-03-23 PROCEDURE — 85027 COMPLETE CBC AUTOMATED: CPT | Performed by: HOSPITALIST

## 2023-03-23 PROCEDURE — 99239 HOSP IP/OBS DSCHRG MGMT >30: CPT | Performed by: HOSPITALIST

## 2023-03-23 PROCEDURE — 999N000157 HC STATISTIC RCP TIME EA 10 MIN

## 2023-03-23 PROCEDURE — 94640 AIRWAY INHALATION TREATMENT: CPT

## 2023-03-23 PROCEDURE — 94660 CPAP INITIATION&MGMT: CPT

## 2023-03-23 PROCEDURE — 258N000003 HC RX IP 258 OP 636: Performed by: SURGERY

## 2023-03-23 PROCEDURE — 94640 AIRWAY INHALATION TREATMENT: CPT | Mod: 76

## 2023-03-23 PROCEDURE — 250N000013 HC RX MED GY IP 250 OP 250 PS 637: Performed by: PHYSICIAN ASSISTANT

## 2023-03-23 PROCEDURE — 80048 BASIC METABOLIC PNL TOTAL CA: CPT | Performed by: HOSPITALIST

## 2023-03-23 PROCEDURE — 250N000009 HC RX 250: Performed by: PHYSICIAN ASSISTANT

## 2023-03-23 PROCEDURE — 258N000003 HC RX IP 258 OP 636: Performed by: PHYSICIAN ASSISTANT

## 2023-03-23 PROCEDURE — 250N000011 HC RX IP 250 OP 636: Performed by: SURGERY

## 2023-03-23 PROCEDURE — 99231 SBSQ HOSP IP/OBS SF/LOW 25: CPT | Performed by: PHYSICIAN ASSISTANT

## 2023-03-23 PROCEDURE — 71045 X-RAY EXAM CHEST 1 VIEW: CPT

## 2023-03-23 PROCEDURE — 200N000001 HC R&B ICU

## 2023-03-23 PROCEDURE — G0463 HOSPITAL OUTPT CLINIC VISIT: HCPCS

## 2023-03-23 PROCEDURE — 36415 COLL VENOUS BLD VENIPUNCTURE: CPT | Performed by: HOSPITALIST

## 2023-03-23 PROCEDURE — 258N000003 HC RX IP 258 OP 636: Performed by: HOSPITALIST

## 2023-03-23 PROCEDURE — 250N000011 HC RX IP 250 OP 636: Performed by: INTERNAL MEDICINE

## 2023-03-23 PROCEDURE — 250N000011 HC RX IP 250 OP 636: Performed by: HOSPITALIST

## 2023-03-23 PROCEDURE — 5A09457 ASSISTANCE WITH RESPIRATORY VENTILATION, 24-96 CONSECUTIVE HOURS, CONTINUOUS POSITIVE AIRWAY PRESSURE: ICD-10-PCS | Performed by: PHYSICIAN ASSISTANT

## 2023-03-23 PROCEDURE — 83735 ASSAY OF MAGNESIUM: CPT | Performed by: HOSPITALIST

## 2023-03-23 PROCEDURE — 250N000011 HC RX IP 250 OP 636: Performed by: PHYSICIAN ASSISTANT

## 2023-03-23 PROCEDURE — 82803 BLOOD GASES ANY COMBINATION: CPT | Performed by: HOSPITALIST

## 2023-03-23 PROCEDURE — 250N000011 HC RX IP 250 OP 636: Performed by: STUDENT IN AN ORGANIZED HEALTH CARE EDUCATION/TRAINING PROGRAM

## 2023-03-23 PROCEDURE — 250N000009 HC RX 250: Performed by: STUDENT IN AN ORGANIZED HEALTH CARE EDUCATION/TRAINING PROGRAM

## 2023-03-23 PROCEDURE — 82962 GLUCOSE BLOOD TEST: CPT

## 2023-03-23 RX ORDER — NALOXONE HYDROCHLORIDE 0.4 MG/ML
0.4 INJECTION, SOLUTION INTRAMUSCULAR; INTRAVENOUS; SUBCUTANEOUS
Status: DISCONTINUED | OUTPATIENT
Start: 2023-03-23 | End: 2023-04-07 | Stop reason: HOSPADM

## 2023-03-23 RX ORDER — ONDANSETRON 4 MG/1
4 TABLET, ORALLY DISINTEGRATING ORAL EVERY 6 HOURS PRN
Status: DISCONTINUED | OUTPATIENT
Start: 2023-03-23 | End: 2023-03-30

## 2023-03-23 RX ORDER — LORAZEPAM 2 MG/ML
0.5 INJECTION INTRAMUSCULAR EVERY 4 HOURS PRN
Status: DISCONTINUED | OUTPATIENT
Start: 2023-03-23 | End: 2023-03-23 | Stop reason: HOSPADM

## 2023-03-23 RX ORDER — NALOXONE HYDROCHLORIDE 0.4 MG/ML
0.2 INJECTION, SOLUTION INTRAMUSCULAR; INTRAVENOUS; SUBCUTANEOUS
Status: DISCONTINUED | OUTPATIENT
Start: 2023-03-23 | End: 2023-04-07 | Stop reason: HOSPADM

## 2023-03-23 RX ORDER — AZITHROMYCIN 500 MG/1
500 INJECTION, POWDER, LYOPHILIZED, FOR SOLUTION INTRAVENOUS EVERY 24 HOURS
Status: DISCONTINUED | OUTPATIENT
Start: 2023-03-24 | End: 2023-03-27

## 2023-03-23 RX ORDER — KETOROLAC TROMETHAMINE 15 MG/ML
15 INJECTION, SOLUTION INTRAMUSCULAR; INTRAVENOUS EVERY 6 HOURS PRN
Status: DISCONTINUED | OUTPATIENT
Start: 2023-03-23 | End: 2023-03-23 | Stop reason: HOSPADM

## 2023-03-23 RX ORDER — CEFTRIAXONE 2 G/1
2 INJECTION, POWDER, FOR SOLUTION INTRAMUSCULAR; INTRAVENOUS EVERY 24 HOURS
Status: DISCONTINUED | OUTPATIENT
Start: 2023-03-24 | End: 2023-03-28

## 2023-03-23 RX ORDER — AMOXICILLIN 250 MG
2 CAPSULE ORAL 2 TIMES DAILY PRN
Status: DISCONTINUED | OUTPATIENT
Start: 2023-03-23 | End: 2023-04-07 | Stop reason: HOSPADM

## 2023-03-23 RX ORDER — LIDOCAINE 40 MG/G
CREAM TOPICAL
Status: DISCONTINUED | OUTPATIENT
Start: 2023-03-23 | End: 2023-03-30

## 2023-03-23 RX ORDER — HYDROMORPHONE HCL IN WATER/PF 6 MG/30 ML
0.2 PATIENT CONTROLLED ANALGESIA SYRINGE INTRAVENOUS
Status: DISCONTINUED | OUTPATIENT
Start: 2023-03-23 | End: 2023-03-31 | Stop reason: DRUGHIGH

## 2023-03-23 RX ORDER — SODIUM CHLORIDE, SODIUM LACTATE, POTASSIUM CHLORIDE, CALCIUM CHLORIDE 600; 310; 30; 20 MG/100ML; MG/100ML; MG/100ML; MG/100ML
INJECTION, SOLUTION INTRAVENOUS CONTINUOUS
Status: DISCONTINUED | OUTPATIENT
Start: 2023-03-23 | End: 2023-03-23

## 2023-03-23 RX ORDER — PROCHLORPERAZINE MALEATE 5 MG
5 TABLET ORAL EVERY 6 HOURS PRN
Status: DISCONTINUED | OUTPATIENT
Start: 2023-03-23 | End: 2023-03-30

## 2023-03-23 RX ORDER — LEVOTHYROXINE SODIUM ANHYDROUS 100 UG/5ML
65 INJECTION, POWDER, LYOPHILIZED, FOR SOLUTION INTRAVENOUS DAILY
Status: DISCONTINUED | OUTPATIENT
Start: 2023-03-24 | End: 2023-03-29

## 2023-03-23 RX ORDER — CARBOXYMETHYLCELLULOSE SODIUM 5 MG/ML
1 SOLUTION/ DROPS OPHTHALMIC
Status: DISCONTINUED | OUTPATIENT
Start: 2023-03-23 | End: 2023-04-07 | Stop reason: HOSPADM

## 2023-03-23 RX ORDER — HALOPERIDOL 5 MG/ML
2 INJECTION INTRAMUSCULAR EVERY 6 HOURS PRN
Status: DISCONTINUED | OUTPATIENT
Start: 2023-03-23 | End: 2023-04-07 | Stop reason: HOSPADM

## 2023-03-23 RX ORDER — PROCHLORPERAZINE 25 MG
12.5 SUPPOSITORY, RECTAL RECTAL EVERY 12 HOURS PRN
Status: DISCONTINUED | OUTPATIENT
Start: 2023-03-23 | End: 2023-03-30

## 2023-03-23 RX ORDER — IPRATROPIUM BROMIDE AND ALBUTEROL SULFATE 2.5; .5 MG/3ML; MG/3ML
3 SOLUTION RESPIRATORY (INHALATION)
Status: DISCONTINUED | OUTPATIENT
Start: 2023-03-23 | End: 2023-03-28

## 2023-03-23 RX ORDER — ONDANSETRON 2 MG/ML
4 INJECTION INTRAMUSCULAR; INTRAVENOUS EVERY 6 HOURS PRN
Status: DISCONTINUED | OUTPATIENT
Start: 2023-03-23 | End: 2023-03-30

## 2023-03-23 RX ORDER — DEXTROSE MONOHYDRATE 25 G/50ML
25-50 INJECTION, SOLUTION INTRAVENOUS
Status: DISCONTINUED | OUTPATIENT
Start: 2023-03-23 | End: 2023-04-07 | Stop reason: HOSPADM

## 2023-03-23 RX ORDER — NITROGLYCERIN 0.4 MG/1
0.4 TABLET SUBLINGUAL EVERY 5 MIN PRN
Status: DISCONTINUED | OUTPATIENT
Start: 2023-03-23 | End: 2023-04-07 | Stop reason: HOSPADM

## 2023-03-23 RX ORDER — CEFTRIAXONE 1 G/1
1 INJECTION, POWDER, FOR SOLUTION INTRAMUSCULAR; INTRAVENOUS EVERY 24 HOURS
Status: DISCONTINUED | OUTPATIENT
Start: 2023-03-24 | End: 2023-03-23

## 2023-03-23 RX ORDER — NICOTINE POLACRILEX 4 MG
15-30 LOZENGE BUCCAL
Status: DISCONTINUED | OUTPATIENT
Start: 2023-03-23 | End: 2023-04-07 | Stop reason: HOSPADM

## 2023-03-23 RX ORDER — KETOROLAC TROMETHAMINE 15 MG/ML
15 INJECTION, SOLUTION INTRAMUSCULAR; INTRAVENOUS ONCE
Status: COMPLETED | OUTPATIENT
Start: 2023-03-23 | End: 2023-03-23

## 2023-03-23 RX ORDER — DEXAMETHASONE SODIUM PHOSPHATE 4 MG/ML
6 INJECTION, SOLUTION INTRA-ARTICULAR; INTRALESIONAL; INTRAMUSCULAR; INTRAVENOUS; SOFT TISSUE DAILY
Status: DISCONTINUED | OUTPATIENT
Start: 2023-03-24 | End: 2023-03-28

## 2023-03-23 RX ORDER — MAGNESIUM HYDROXIDE/ALUMINUM HYDROXICE/SIMETHICONE 120; 1200; 1200 MG/30ML; MG/30ML; MG/30ML
30 SUSPENSION ORAL EVERY 4 HOURS PRN
Status: DISCONTINUED | OUTPATIENT
Start: 2023-03-23 | End: 2023-04-07 | Stop reason: HOSPADM

## 2023-03-23 RX ORDER — SODIUM CHLORIDE 9 MG/ML
INJECTION, SOLUTION INTRAVENOUS CONTINUOUS
Status: DISCONTINUED | OUTPATIENT
Start: 2023-03-23 | End: 2023-03-24

## 2023-03-23 RX ORDER — AMOXICILLIN 250 MG
1 CAPSULE ORAL 2 TIMES DAILY PRN
Status: DISCONTINUED | OUTPATIENT
Start: 2023-03-23 | End: 2023-04-07 | Stop reason: HOSPADM

## 2023-03-23 RX ADMIN — LEVOTHYROXINE SODIUM 65 MCG: 20 INJECTION, SOLUTION INTRAVENOUS at 09:22

## 2023-03-23 RX ADMIN — SODIUM CHLORIDE 500 ML: 9 INJECTION, SOLUTION INTRAVENOUS at 03:58

## 2023-03-23 RX ADMIN — FAMOTIDINE 20 MG: 10 INJECTION, SOLUTION INTRAVENOUS at 09:23

## 2023-03-23 RX ADMIN — ENOXAPARIN SODIUM 40 MG: 40 INJECTION SUBCUTANEOUS at 09:22

## 2023-03-23 RX ADMIN — CEFTRIAXONE 1 G: 1 INJECTION, POWDER, FOR SOLUTION INTRAMUSCULAR; INTRAVENOUS at 09:11

## 2023-03-23 RX ADMIN — IPRATROPIUM BROMIDE AND ALBUTEROL SULFATE 3 ML: 2.5; .5 SOLUTION RESPIRATORY (INHALATION) at 20:09

## 2023-03-23 RX ADMIN — IPRATROPIUM BROMIDE AND ALBUTEROL SULFATE 3 ML: 2.5; .5 SOLUTION RESPIRATORY (INHALATION) at 08:08

## 2023-03-23 RX ADMIN — IPRATROPIUM BROMIDE AND ALBUTEROL SULFATE 3 ML: 2.5; .5 SOLUTION RESPIRATORY (INHALATION) at 12:17

## 2023-03-23 RX ADMIN — DEXAMETHASONE SODIUM PHOSPHATE 6 MG: 10 INJECTION INTRAMUSCULAR; INTRAVENOUS at 09:16

## 2023-03-23 RX ADMIN — SODIUM CHLORIDE 500 ML: 9 INJECTION, SOLUTION INTRAVENOUS at 02:15

## 2023-03-23 RX ADMIN — FAMOTIDINE 20 MG: 10 INJECTION INTRAVENOUS at 21:02

## 2023-03-23 RX ADMIN — CARBOXYMETHYLCELLULOSE SODIUM 1 DROP: 5 SOLUTION/ DROPS OPHTHALMIC at 17:49

## 2023-03-23 RX ADMIN — IPRATROPIUM BROMIDE AND ALBUTEROL SULFATE 3 ML: 2.5; .5 SOLUTION RESPIRATORY (INHALATION) at 16:33

## 2023-03-23 RX ADMIN — AZITHROMYCIN MONOHYDRATE 500 MG: 500 INJECTION, POWDER, LYOPHILIZED, FOR SOLUTION INTRAVENOUS at 09:23

## 2023-03-23 RX ADMIN — SODIUM CHLORIDE, PRESERVATIVE FREE: 5 INJECTION INTRAVENOUS at 17:02

## 2023-03-23 RX ADMIN — LORAZEPAM 0.5 MG: 2 INJECTION INTRAMUSCULAR; INTRAVENOUS at 13:37

## 2023-03-23 RX ADMIN — KETOROLAC TROMETHAMINE 15 MG: 15 INJECTION, SOLUTION INTRAMUSCULAR; INTRAVENOUS at 13:37

## 2023-03-23 RX ADMIN — KETOROLAC TROMETHAMINE 15 MG: 15 INJECTION, SOLUTION INTRAMUSCULAR; INTRAVENOUS at 01:12

## 2023-03-23 RX ADMIN — HALOPERIDOL LACTATE 2 MG: 5 INJECTION, SOLUTION INTRAMUSCULAR at 02:15

## 2023-03-23 ASSESSMENT — ACTIVITIES OF DAILY LIVING (ADL)
ADLS_ACUITY_SCORE: 33
ADLS_ACUITY_SCORE: 49
ADLS_ACUITY_SCORE: 45
ADLS_ACUITY_SCORE: 49
ADLS_ACUITY_SCORE: 45
ADLS_ACUITY_SCORE: 49
ADLS_ACUITY_SCORE: 49
ADLS_ACUITY_SCORE: 45

## 2023-03-23 NOTE — PLAN OF CARE
Goal Outcome Evaluation:     Plan of Care Reviewed With: family  ICU End of Shift Summary.  For vital signs and complete assessments, please see documentation flowsheets.      Pertinent assessments: Restless. Lethargic. Confused. Intermittently follows commands. Began using words and speaking small phrases. Withdrawals from pain. Denies pain and COPT 0. Low grade fevers. Tele shows ST. BPs stable. HFNC. Lungs diminished. Roman in place with low UOP. Urine tea-colored. BS faint/hypoactive. NPO/oral cares. Upper arms blanchable redness. Pale throughout. Rt hip incision with dressing. Ice applied. PIV x2.      Major Shift Events: -Weaning off O2 as able                                    -Mentation improving                                   -Transferred to [facility] ICU     Plan (Upcoming Events): Neurosurgery following to assess for procedure.   Discharge/Transfer Needs: [facility] for procedure     1145- Telephone report given to nurse Costello at [facility]. Family informed of transfer plans  1400- EMS transferred patient

## 2023-03-23 NOTE — CONSULTS
73-year-old female admitted to the ICU on the hospitalist service status post right hip fracture with postsurgical repair, progressive altered mental status secondary to meningioma.  Patient also has an acute mixed respiratory failure in setting of severe O2 dependent COPD at baseline.  I saw the patient given concern that the patient was requiring noninvasive ventilation and possible worsening mental status.  When I examined the patient, I found a frail-appearing 73-year-old female lying in bed she was able to wiggle toes to command but was not following on upper extremities.  She would not open her eyes.  She was on noninvasive ventilation, BiPAP 14/5 and maintaining O2 saturations.  I was concerned about her altered mental status and I was notified that the patient had been given benzodiazepine prior to transfer.  Patient did not verbalize any complaints, I was unable to do review of systems secondary to patient's altered mental status.  Upon chart review she also has a history of tobacco use, anxiety, hyperlipidemia, hypothyroidism  Temp: 99.5  F (37.5  C)   BP: 94/58 Pulse: 103     SpO2: 100 %      Frail-appearing female lying in bed in no acute distress  BiPAP mask in place  Does not open eyes to commands will only follow commands on lower extremities  Chest clear to auscultation with diminished breath sounds  CV regular rate and rhythm  Abdomen soft       Lab Results   Component Value Date     03/23/2023    Lab Results   Component Value Date    CHLORIDE 105 03/23/2023    Lab Results   Component Value Date    BUN 25.5 03/23/2023      Lab Results   Component Value Date    POTASSIUM 4.8 03/23/2023    Lab Results   Component Value Date    CO2 28 03/23/2023    Lab Results   Component Value Date    CR 0.53 03/23/2023      Assessment and plan 73-year-old woman status post hip repair with past medical history of COPD and meningioma.  She has worsening mental status necessitating transfer to [facility] for neuro  monitoring and neurosurgical evaluation of meningioma.  Overall the patient is minimally responsive however she does appear to protect airway at this time.  She appears to be ventilating adequately.  Given her meningioma and her age would avoid any further benzodiazepines and try to allow patient to awaken to assess true neuro exam.  Would consider Decadron given meningioma.  We will need to follow respiratory status if patient gets to the point where she cannot protect her airway or is unable to adequately ventilate we will need an artificial airway.  Urine output appears marginal with dark concentrated urine.  Would recommend continue gentle fluid hydration with normal saline given vasogenic edema around meningioma.  There is a concern for protein calorie malnutritio given the patient's current mental status issue been able to tolerate p.o.  If patient's mental status does not improve would recommend placing nasojejunal tube and starting tube feeds.  We will continue to follow from the periphery while the patient is in the ICU.    Mino Latham MD  Critical Care Medicine

## 2023-03-23 NOTE — PROGRESS NOTES
Neurosurgery progress note    Patient seen today after arriving transferring [facility] intensive care unit.  Per RN report oxygen ran out during the transport and the patient's oxygen saturations in the 70s for period time.  She is now on BiPAP.  She is resting calmly.  The patient's sister said earlier in the day she spoke a few words to her, and states she has been better today than last few days.    Exam    Sleeping, grimaces and withdraws from sternal rub    Assessment    Right anterior/inferior falx meningioma with moderate surrounding vasogenic edema and mass effect  Status post radiation treatment 4 months ago    Plan    Patient was transferred to [facility] today.  Here for further medical support, once medically stable surgical intervention for her brain tumor could be considered possibly next week.  Neurosurgery will continue to follow.    Discussed with Dr. Murphy

## 2023-03-23 NOTE — PHARMACY-ADMISSION MEDICATION HISTORY
Admission medication history completed at [facility]. Please see Pharmacy - Admission Medication History note from 03/19/2023.    Note:  Acetaminophen, Enoxaparin and Miralax are new orders on discharge from [facility] at time of transfer

## 2023-03-23 NOTE — PROGRESS NOTES
Pt remains on a BiPAP of  14/6 @ 40% was applied to the pt via the mask for an increase in WOB and/or SOB.  The bridge of the nose looks good at this time. Pt was on HFNC 60-80%, 40LPM for about 6 hours pt is tolerating it well. Will continue to monitor and assess the pt's current respiratory status and needs.    Beatris Owens, RT, RT  3/22/2023 11:00 PM

## 2023-03-23 NOTE — PROGRESS NOTES
Orthopedic Surgery  Josephine Nicole  03/23/2023     Admit Date:  3/19/2023    POD: 3 Days Post-Op   Procedure(s):  Right hip hemiarthroplasty anterolateral approach    Patient with meningioma and resulting vasogenic edema and mass effect. Possible future to [facility] for neurosurgery management once patient stabilizes.  Sister outside of patient's room on phone.  Patient transferred to ICU yesterday due to needing increased respiratory support.  Pain is seemingly well-controlled.   Afebrile.    Temp:  [98  F (36.7  C)-98.3  F (36.8  C)] 98.1  F (36.7  C)  Pulse:  [] 110  Resp:  [13-57] 34  BP: ()/() 148/84  FiO2 (%):  [40 %-80 %] 60 %  SpO2:  [79 %-100 %] 98 %    Patient restless, uncoordinated movements of bilateral upper and lower extremities, opening eyes more often than previously.   Right hip dressing is clean, dry, and intact.   Abduction pillow in place.  SCDs in place.  No erythema of the surrounding skin.   Bilateral calves are soft through the SCDs.  Spontaneously wiggles toes and DF and PF the ankles.  No grimace with passive ankle DF and PF, bilaterally.  DP pulse palpable.  Toes are warm and well-perfused.    Labs:  Recent Labs   Lab Test 03/23/23  0508 03/22/23  1045 03/21/23  0631 03/21/23  0230   WBC 15.5* 14.3*  --  17.5*   HGB 8.2* 8.6* 8.8* 9.1*    158  --  186     Recent Labs   Lab Test 03/19/23  0518   INR 0.96     A/P    1. S/p right hip hemiarthoplasty for a femoral neck fracture   Continue Lovenox for DVT prophylaxis.     Continue ceftriaxone and azithromycin for community-acquired pneumonia per primary team.   Mobilize with PT/OT when able.   WBAT RLE with walker whenever the patient is able.   Continue current pain regimen.   Abduction pillow should remain in place at all times aside from Q8H skin checks and hygiene due to altered mental status to avoid extremes of motion.    Dressings: Keep intact. Change if >50% saturated or peeling off.    Follow-up: 2 weeks post-op  with Dr. Shun Cuevas    2. Disposition   Anticipate d/c to TCU when medically cleared and progressing in PT.     *Patient with vasogenic edema and mass effect from a large meningioma pending possible transfer to [facility] and surgery with neurosurgery team.    Guerline Ac PA-C  [facility]

## 2023-03-23 NOTE — PROGRESS NOTES
Pt arrives to [facility] ICU at approx 1445. Pt on BIPAP at 90% . Transitioned to in room BIPAP. Report received from EMS.  Neuro:Pt follows some simple commands, not opening eyes or verbalizing. ROBI. Bilateral mitts in place upon arrival. Pt lifts arms off bed with purpose but not to command. Bilateral arms are sv ecchymotic/reddened.   Resp: as above. LS dim with exp wz.   GI: abdo with dim BS.  : baldwin in place. Returns are darkly tea colored, red tinged.  CV: ST. CMS+2.   Surgical: right hip drsg CDI. Ecchymotic. Edema 1-2+.  Social: pts sister arrives shortly after pt. Updated as able.  Skin: bilateral facial abrasions. Being followed by WOC at [facility] will request them to follow here as   Report given to bedside SAIGE Robles MD called for admission orders, baldwin orders, WOC orders. csccrn

## 2023-03-23 NOTE — PROGRESS NOTES
0150: Tele Hub called, Pt low UOP Approximately 25cc/hr.     500cc bolus ordered     0345: Tele hub called. Pt has 25CC UOP after administration of 500cc bolus. Poor skin turgor, LS are  Clear, no coarseness. +1 Edema in inner thigh.     0350: Another 500cc Bolus ordered.

## 2023-03-23 NOTE — PROGRESS NOTES
A BiPAP of  14/6 @ 45% was applied to the pt via the mask for an increase in WOB and/or SOB.  The bridge of the nose is intact.Pt is tolerating it well. Will continue to monitor and assess the pt's current respiratory status and needs.    Molly Kelsey, RT

## 2023-03-23 NOTE — PROGRESS NOTES
"Pt on Bipap 14/6 12RR 50%, skin intact, neb given inline. RT will continue to monitor.   Vital signs:  Temp: 99.4  F (37.4  C) Temp src: Axillary BP: 92/77 Pulse: 98   Resp: 17 SpO2: 100 % O2 Device: BiPAP/CPAP        Estimated body mass index is 26.98 kg/m  as calculated from the following:    Height as of 4/18/22: 1.575 m (5' 2\").    Weight as of 3/22/23: 66.9 kg (147 lb 7.8 oz).      Radha Jennings, RT 4:59 PM 03/23/23    "

## 2023-03-23 NOTE — DISCHARGE SUMMARY
[facility]    Discharge Summary  Hospitalist    Date of Admission:  3/19/2023  Date of Discharge:  3/23/2023  Discharging Provider: Antwan Cadet MD  Date of Service (when I saw the patient): 03/23/23    Discharge Diagnoses   #Acute mixed respiratory failure in the setting of severe, O2 dependent baseline COPD in postoperative setting  #Fall complicated by right hip fracture status postsurgical repair on 3/20  #Progressive meningioma with associated altered mental status and worsening functional status at home  #History of tobacco use disorder  #History of anxiety  #History of hyperlipidemia  #History of hypothyroidism    Hospital Course   Josephine Nicole is a 73 year old female with a history of B12 Deficiency, Melanoma, COPD, Former Smoker, Oxygen Dependent, Depression, Anxiety, HLD, Hypothyroidism, Osteoporosis, Cerebral Meningioma who presented to the ED with right hip pain after a fall.     Patient very complex with severe medical issues as below.  She underwent surgical repair for right femoral neck fracture on 3/20 with postoperative complications including worsening respiratory failure, hypotension, lactic acidosis, worsening encephalopathy.  She has worsening of her cerebral meningioma which also will require surgical resection.  In discussion with family, they note over the last 3 to 4 weeks she has significantly worsened functionally.  She has had worsening confusion and coordination issues including the cause of her fall at home.    I discussed with Dr. Murphy from neurosurgery on 3/23.  He does agree with transfer to [facility] and tentatively planning for OR early next week.  He would like her respiratory status optimized, obviously prior to going to the OR is much as possible.  I discussed with Dr. Narvaez from the hospitalist service who does accept the patient to ICU at [facility] given need for BiPAP and concern for respiratory issues particularly in the setting of  neurosurgery imminent.     #Displaced Right Femoral Neck Fracture s/p surgical repair on 3/20 - s/p mechanical fall.  Xray revealed a displaced right femoral neck fracture and a subtle irregularity of the parasymphyseal aspect of the left pubic bone, potentially representing an age indeterminant fracture.   - Orthopedic Surgery consulted. Underwent surgical repair with right hip hemiarthroplasty on 3/20.   -Post-op, pt had increased WOB, hypotension, lactic acidosis and delirium as detailed below.  She had to be placed on bipap support.    She was able to be weaned off BiPAP during the day but again needed BiPAP administration with a repeat RRT called overnight on 3/21 - 3/22.  She was transferred to the ICU on 3/22 for closer monitoring  -prn haldol for agitation. Prn pain meds also available.  Avoid ativan if at all possible patient does have some baseline anxiety.  -She has been on Lovenox for prophylaxis given immobility postoperatively.  Depending on timing of surgery will need to hold prior to surgery for meningioma.  -Roman catheter placed on 3/21 for concern of urinary retention.  We will keep this in place for now.     #Acute on chronic mixed respiratory failure secondary to COPD in post-operative status: former smoker of 50 years; quit around 5 years ago.  On 2-3 liters of oxygen at baseline for the past 5 years. Chest XR, blood gas and elevation of bicarb on BMP on admission all indicate relatively severe, chronic baseline COPD.  -Post-op, patient with increased WOB and hypercarbia requiring bipap therapy.  -Patient was able to be weaned to oxime mask on the afternoon of 3/21 but again needed BiPAP therapy after repeat RRT called overnight on 3/21 - 3/22 for hypoxemia.  Blood gases seem to show adequate exchange and chest x-ray without any convincing interval change with a few hazy opacities that are nonspecific.  Hyperinflated lungs again noted.  -Patient was transferred to the ICU on 3/22 for closer  monitoring.  Increased dose of her dexamethasone to 6 mg daily.  Added ceftriaxone and azithromycin.  Continue nebulizers to help airway management.  Her repeat blood gases have shown adequate gas exchange.  -Patient did have another episode of sudden desaturation on the a.m. of 3/23 while on high flow nasal cannula.  She needed to be placed back on BiPAP with recovery.  Repeat chest x-ray without any significant change.  -Continue to treat for COPD with probable exacerbation in the postoperative setting.  Wean O2 as able.     #Encephalopathy. Cerebral Meningioma.  Suspected acute toxic metabolic encephalopathy likely from post-op status, steroids, pain meds - pt was diagnosed 9/2019 with a cerebral meningioma and monitored.  Given progression of the tumor, patient underwent 15 sessions of radiation 11/2022.  She is followed by Dr. Godoy, a radiation Oncologist through [facility].  Pt was on oral steroids which were weaned off about 4 weeks ago.  Since that time, patient has had increased confusion, poor balance, changes in mood.  -Recent MRI Brain 3/17/23 revealed enlargement of meningioma up to 45 mm with a significant mass effect and the midline shift right to left.  Patient was instructed by her PCP to follow up with outpatient Neurology and Radiation Oncology this week.  - CT head on admission with no acute hemorrhage, but notes the known mass surrounding vasogenic edema predominantly involving the right frontal lobe with right to left midline shift/subfalcine herniation measuring up to 1.1 cm.  - Neurosurgery was consulted.  They did recommend transfer to [facility] for planned surgical resection of meningioma on Wednesday, 3/22 but given post-op delirium with hypotension, WOB needed to be post-poned.    -Patient has had postop delirium at times quite agitated and confused but then will become somnolent.  This did seem to improve over the last couple of days at the time of transfer.  She was more  interactive with her sister at bedside and was starting to verbally respond to questions as well.  -Cautious with medications as to prevent oversedation.  Obviously this is very difficult for this patient given her relatively narrow window of being comfortable but also not compromising respiratory status.  Prn haldol for agitation. Pain management. She does have glove restraints as repeatedly pulling off bipap.  Bedside sitter ordered.   - Neurosurgery has also consulted neurology for evaluation of tremors which is likely related to her meningioma.  They do not have any new recommendations.  - [facility] consulted.  Preliminarily they agree with steroids but no other recommendations from medical oncology standpoint.    -I also did discuss with Dr. Godoy from radiation oncology on 3/21 and she discussed with Dr. Murphy.  She agrees with surgery at this point given advancement of tumor.   -I discussed with Dr. Plascencia from neurosurgery.  Given her functional decline with confusion at home in the weeks prior to her presentation, principal concern is for meningioma as a large contributor to her poor functional status and confusion.  Transferring to [facility] as above.     #Lactic acidosis: Post-operative setting with increased WOB.  Improved AM of 3/21.  We will continue IV fluids given need for BiPAP and no p.o. intake.     #Goals of care: I have had extensive discussions with patient's sister at bedside.  We discussed the gravity of Ms. Nicole condition.  I expressed my concern for her ability to tolerate another major surgery given her baseline poor functional status and severe COPD.  She did voice understanding.  At this point, we are trying to stabilize her to the point that she is close to her baseline oxygen requirements where she would be able to potentially transfer to [facility] for definitive management of her meningioma which is at the crux of her delirium as well.     #Acute on Chronic Anemia - Monitor blood  counts in setting of surgery. Down a bit on 3/21.    Repeat CBC on 3/22.  #HLD - resume Atorvastatin upon discharge  #Hypothyroidism -we will transition to IV levothyroxine until patient reliably able to take p.o.  #Depression/Anxiety -we will hold p.o. low-dose sertraline and mirtazapine until able to reliably take p.o.     CODE: full  Dispo: Unclear. Will need to stabilize post-hip procedure prior to another major operation for meningioma.  Transferring to [facility] on 3/22.  DVT ppx: We will resume prophylactic Lovenox given no immediate plan for meningioma resection per neurosurgery.     Sister updated at bedside.    Antwan Cadet MD    Code Status   Full Code       Primary Care Physician   Physician No Ref-Primary    Physical Exam   Temp: 98.4  F (36.9  C) Temp src: Temporal BP: (!) 127/93 Pulse: 113   Resp: (!) 31 SpO2: 100 % O2 Device: BiPAP/CPAP (placing on at this time) Oxygen Delivery: 40 LPM  Vitals:    03/19/23 0508 03/22/23 1100   Weight: 65.8 kg (145 lb) 66.9 kg (147 lb 7.8 oz)     Vital Signs with Ranges  Temp:  [98.1  F (36.7  C)-98.4  F (36.9  C)] 98.4  F (36.9  C)  Pulse:  [] 113  Resp:  [13-57] 31  BP: ()/() 127/93  FiO2 (%):  [40 %-100 %] 100 %  SpO2:  [67 %-100 %] 100 %  I/O last 3 completed shifts:  In: 3225.34 [I.V.:3225.34]  Out: 785 [Urine:785]    Patient is awake with high flow in place.  She does make meaningful movements of her arms today periodically scratching her forehead.  She has also periodically verbally answered nursing staff and sister at bedside.  Right lung with improved aeration but prolonged expiratory phase.  Left lung with less air movement and wheeze noted.  Heart is tachycardic but regular.  Abdomen is soft and nondistended.  Extremities are warm and well-perfused.    Discharge Disposition   Transferred to [facility]    Consultations This Hospital Stay   ORTHOPEDIC SURGERY IP CONSULT  NEUROSURGERY IP CONSULT  HEMATOLOGY & ONCOLOGY IP CONSULT  PHYSICAL  THERAPY ADULT IP CONSULT  SOCIAL WORK IP CONSULT  NEUROLOGY IP CONSULT  CARE MANAGEMENT / SOCIAL WORK IP CONSULT  PHYSICAL THERAPY ADULT IP CONSULT  OCCUPATIONAL THERAPY ADULT IP CONSULT  PHYSICAL THERAPY ADULT IP CONSULT  OCCUPATIONAL THERAPY ADULT IP CONSULT  WOUND OSTOMY CONTINENCE NURSE  IP CONSULT    Time Spent on this Encounter   IAntwan MD, personally saw the patient today and spent greater than 30 minutes discharging this patient.    Discharge Orders      General info for SNF    Length of Stay Estimate: Short Term Care: Estimated # of Days <30  Condition at Discharge: Declining  Level of care:skilled   Rehabilitation Potential: Poor  Admission H&P remains valid and up-to-date: Yes  Recent Chemotherapy: N/A  Use Nursing Home Standing Orders: Yes     Mantoux instructions    Give two-step Mantoux (PPD) Per Facility Policy Yes     Follow Up and recommended labs and tests    Please call as soon as possible to make an appointment to be seen in Dr. Shun Cuevas's clinic at 2 weeks postop for a recheck of your surgical site, possible repeat x-rays, and wound care. If you are at a TCU at this time and they have x-ray capabilities, you may complete your wound care and x-rays at your TCU and have them send your images to Dr. Cuevas's office.     Dr. Cuevas's care coordinator is Neha Way. Please contact her at [phone] to schedule an appointment.       Dr. Cuevas sees patients at 2 clinic locations:  [facility]  [address] [place] [address]  [facility]   [address], [address], [place] [address]        Please call the on-call phone number [phone] during evenings, nights and weekends for any urgent needs. Prescription refills must be done during business hours by calling [phone].     Reason for your hospital stay    S/p right hemiarthroplasty for a femoral neck fracture (DOS: 3/20/23)     Wound care    Please leave dressing  intact for 2 weeks postoperatively. Okay to change if saturated >50% or peeling. Okay to shower. No soaking or submersion. Please contact your orthopedic surgical team if persistent drainage or redness develops around the surgical site.     Additional Discharge Instructions    Pain after surgery is normal and expected.  You will have some amount of pain for several weeks after surgery.  Your pain will improve with time.  There are several things you can do to help reduce your pain including: rest, ice, elevation, and using pain medications as needed. Contact your Surgeon Team if you have pain that persists or worsens after surgery despite rest, ice, elevation, and taking your medication(s) as prescribed. Contact your Surgeon Team if you have new numbness, tingling, or weakness in your operative extremity.    Swelling and/or bruising of the surgical extremity is common and may persist for several months after surgery. In addition to frequent icing and elevation, gentle compressive support with an ACE wrap or tubigrip may help with swelling. Apply compression regularly, removing at least twice daily to perform skin checks. Contact your Surgeon Team if your swelling increases and is NOT associated with an increase in your activity level, or if your swelling increases and is associated with redness and pain.    Ice can be used to control swelling and discomfort after surgery. Place a thin towel over your operative site and apply the ice pack overtop. Leave ice pack in place for 20 minutes, then remove for 20 minutes. Repeat this 20 minutes on/20 minutes off routine as often as tolerated.    Please contact your surgical team with any concerns for infection including increasing redness around your surgical site, pus-like drainage, fever, chills, or flu-like symptoms.     Activity - Up with assistive device     Activity - Up with nursing assistance     Weight bearing status    WBAT RLE with walker.    Abduction pillow in  place while sleeping and while having decreased mental status.     Physical Therapy Adult Consult    Evaluate and treat as clinically indicated.    Reason: S/p right hemiarthroplasty for a femoral neck fracture (DOS: 3/20/23)     Occupational Therapy Adult Consult    Evaluate and treat as clinically indicated.    Reason: S/p right hemiarthroplasty for a femoral neck fracture (DOS: 3/20/23)     Fall precautions     Crutches DME    DME Documentation: Describe the reason for need to support medical necessity: Impaired gait status post hip surgery. I, the undersigned, certify that the above prescribed supplies are medically necessary for this patient and is both reasonable and necessary in reference to accepted standards of medical practice in the treatment of this patient's condition and is not prescribed as a convenience.     Cane DME    DME Documentation: Describe the reason for need to support medical necessity: Impaired gait status post hip surgery. I, the undersigned, certify that the above prescribed supplies are medically necessary for this patient and is both reasonable and necessary in reference to accepted standards of medical practice in the treatment of this patient's condition and is not prescribed as a convenience.     Walker DME    : DME Documentation: Describe the reason for need to support medical necessity: Impaired gait status post hip surgery. I, the undersigned, certify that the above prescribed supplies are medically necessary for this patient and is both reasonable and necessary in reference to accepted standards of medical practice in the treatment of this patient's condition and is not prescribed as a convenience.     Discharge Medications   Current Discharge Medication List      START taking these medications    Details   acetaminophen (TYLENOL) 325 MG tablet Take 3 tablets (975 mg) by mouth every 8 hours as needed for mild pain  Qty: 100 tablet, Refills: 0    Associated Diagnoses: Closed  displaced fracture of right femoral neck (H)      enoxaparin ANTICOAGULANT (LOVENOX) 40 MG/0.4ML syringe Inject 0.4 mLs (40 mg) Subcutaneous every 24 hours for 30 days  Qty: 12 mL, Refills: 0    Associated Diagnoses: Closed displaced fracture of right femoral neck (H)      polyethylene glycol (MIRALAX) 17 GM/Dose powder Take 17 g by mouth daily  Qty: 510 g, Refills: 0    Associated Diagnoses: Closed displaced fracture of right femoral neck (H)         CONTINUE these medications which have NOT CHANGED    Details   alendronate (FOSAMAX) 70 MG tablet Take 70 mg by mouth every 7 days      atorvastatin (LIPITOR) 10 MG tablet Take 10 mg by mouth daily      butalbital-acetaminophen-caffeine (ESGIC) -40 MG tablet Take 1 tablet by mouth every 6 hours as needed for headaches Max 6 doses per day.      cyanocobalamin (CYANOCOBALAMIN) 1000 MCG/ML injection Inject 1 mL into the muscle every 30 days      fluticasone-salmeterol (ADVAIR) 500-50 MCG/DOSE inhaler Inhale 1 puff into the lungs 2 times daily      levothyroxine (SYNTHROID/LEVOTHROID) 88 MCG tablet Take 88 mcg by mouth daily      mirtazapine (REMERON) 15 MG tablet Take 7.5 mg by mouth At Bedtime      sertraline (ZOLOFT) 25 MG tablet Take 25 mg by mouth every morning      zolpidem (AMBIEN) 5 MG tablet Take 5 mg by mouth nightly as needed for sleep      albuterol (PROVENTIL) (2.5 MG/3ML) 0.083% neb solution Take 2.5 mg by nebulization 3 times daily as needed for shortness of breath, wheezing or cough      ALPRAZolam (XANAX) 0.5 MG tablet Take 0.25 mg by mouth daily as needed for anxiety      hydrOXYzine (ATARAX) 10 MG tablet Take 10 mg by mouth every 8 hours as needed for anxiety      ipratropium - albuterol 0.5 mg/2.5 mg/3 mL (DUONEB) 0.5-2.5 (3) MG/3ML neb solution Take 1 vial by nebulization every 6 hours as needed for shortness of breath, wheezing or cough      levalbuterol (XOPENEX HFA) 45 MCG/ACT inhaler Inhale 2 puffs into the lungs every 4 hours as needed for  shortness of breath or wheezing           Allergies   Allergies   Allergen Reactions     Bupropion Anaphylaxis, Hives and Shortness Of Breath     Data   Most Recent 3 CBC's:Recent Labs   Lab Test 03/23/23  0508 03/22/23  1045 03/21/23  0631 03/21/23  0230   WBC 15.5* 14.3*  --  17.5*   HGB 8.2* 8.6* 8.8* 9.1*    98  --  100    158  --  186      Most Recent 3 BMP's:  Recent Labs   Lab Test 03/23/23  0957 03/23/23  0508 03/23/23  0400 03/22/23  1013 03/22/23  0654 03/21/23  1000 03/21/23  0631   NA  --  144  --   --  141  --  140   POTASSIUM  --  4.8  --   --  4.5  --  4.6  4.6   CHLORIDE  --  105  --   --  102  --  100   CO2  --  28  --   --  30*  --  29   BUN  --  25.5*  --   --  25.5*  --  22.0   CR  --  0.53  --   --  0.54  --  0.71   ANIONGAP  --  11  --   --  9  --  11   CHAO  --  8.3*  --   --  8.5*  --  8.2*   GLC 86 102* 102*   < > 117*   < > 117*    < > = values in this interval not displayed.     Most Recent 2 LFT's:  Recent Labs   Lab Test 03/21/23  0230   AST 51*   ALT 16   ALKPHOS 74   BILITOTAL 0.4     Most Recent INR's and Anticoagulation Dosing History:  Anticoagulation Dose History     Recent Dosing and Labs Latest Ref Rng & Units 3/19/2023    INR 0.85 - 1.15 0.96        Most Recent 3 Troponin's:No lab results found.  Most Recent Cholesterol Panel:No lab results found.  Most Recent 6 Bacteria Isolates From Any Culture (See EPIC Reports for Culture Details):No lab results found.  Most Recent TSH, T4 and A1c Labs:No lab results found.

## 2023-03-23 NOTE — PLAN OF CARE
ICU End of Shift Summary.  For vital signs and complete assessments, please see documentation flowsheets.      Pertinent assessments: Confused, restless, does not follow command, frequent involuntary movements of arms. LS clear and diminished, normotensive tachy throughout night, gets up in the 130s. Roman in place, with marginal UOP despite bolus administered. Poor skin turgor, +edema on inner thighs. No BM this shift.   Major Shift Events: Haldox1 gven for agitation with HR in the 140s. IV dilaudid x1 given for pain. Called to have  Toradol ordered as pain management since pt Is not able to take any PO tylenol  Plan (Upcoming Events): Continue to  monitor pt. Wean of BIPAP as able.   Discharge/Transfer Needs: TBD     Bedside Shift Report Completed : Y  Bedside Safety Check Completed: Y

## 2023-03-23 NOTE — CONSULTS
[facility] Nurse Inpatient Assessment     Consulted for: Bilateral cheeks     Patient History (according to provider note(s):      Josephine Nicole is a 73 year old female with a history of B12 Deficiency, Melanoma, COPD, Former Smoker, Oxygen Dependent, Depression, Anxiety, HLD, Hypothyroidism, Osteoporosis, Cerebral Meningioma who presented to the ED with right hip pain after a fall.     Patient very complex with severe medical issues as below.  She underwent surgical repair for right femoral neck fracture on 3/20 with postoperative complications including worsening respiratory failure, hypotension, lactic acidosis, worsening encephalopathy.  She has worsening of her cerebral meningioma which also will require surgical resection.  In discussion with family, they note over the last 3 to 4 weeks she has significantly worsened functionally.  She has had worsening confusion and coordination issues including the cause of her fall at home.    Areas Assessed:      Areas visualized during today's visit: Focused:    Wound location: Bilateral cheeks  Last photo: 3/23/23    Left cheek      Right cheek    Wound due to: Friction  Wound history/plan of care: Friction injury which occurred when patient was wearing total face BiPAP mask.  Spoke with RN who reports patient was constantly pulling on mask and trying to remove.    Wound base: Left is 4x0.7cm, 70% pink reepithelializing tissue, 30% dry drainage.  Right is 2x1cm ruptured serous blister, 90% pink moist tissue, 10% dry drainage     Palpation of the wound bed: normal      Drainage: small     Description of drainage: serosanguinous     Measurements (length x width x depth, in cm): see above     Tunneling: N/A     Undermining: N/A  Periwound skin: Intact      Color: normal and consistent with surrounding tissue      Temperature: normal   Odor: none  Pain: unable to assess due to  sedation   Pain interventions prior to dressing change:  N/A  Treatment goal: Heal  and Protection  STATUS: initial assessment  Supplies ordered: supplies stored on unit and discussed with RN       Treatment Plan:     Bilateral cheek wound(s): BID-assess under dressing BID, change dressing every other day   1. Cleanse with wound cleanser and dry  2. Apply Mepilex border lite (\[facility]\""#[id number])     Orders: Written    RECOMMEND PRIMARY TEAM ORDER: None, at this time  Education provided: plan of care and Off-loading pressure  Discussed plan of care with: Patient and Nurse  [facility] nurse follow-up plan: weekly  Notify WO if wound(s) deteriorate.  Nursing to notify the Provider(s) and re-consult the [facility] Nurse if new skin concern.    DATA:     Current support surface: Standard  Low air loss (ASHISH pump, Isolibrium, Pulsate, skin guard, etc)  Containment of urine/stool: Indwelling catheter  BMI: Body mass index is 26.98 kg/m .   Active diet order: Orders Placed This Encounter      Advance Diet as Tolerated: Regular Diet Adult     Output: I/O last 3 completed shifts:  In: 3225.34 [I.V.:3225.34]  Out: 785 [Urine:785]     Labs: Recent Labs   Lab 03/23/23  0508 03/21/23  0631 03/21/23  0230 03/20/23  0610 03/19/23  0518   ALBUMIN  --   --  3.3*  --   --    HGB 8.2*   < > 9.1*   < > 10.5*   INR  --   --   --   --  0.96   WBC 15.5*   < > 17.5*   < > 8.3    < > = values in this interval not displayed.     Pressure injury risk assessment:   Sensory Perception: 2-->very limited  Moisture: 4-->rarely moist  Activity: 1-->bedfast  Mobility: 2-->very limited  Nutrition: 1-->very poor  Friction and Shear: 2-->potential problem  Jose Score: 12    SAIGE Calvo [facility] [employer]  Dept. Office Number: [phone]

## 2023-03-23 NOTE — H&P
[facility]    History and Physical  Hospitalist       Date of Admission:  3/23/2023  Date of Service (when I saw the patient): 03/23/23    Assessment & Plan    Josephine Nicole is a 73 year old female with a history of B12 Deficiency, Melanoma, COPD, Former Smoker, Oxygen Dependent, Depression, Anxiety, HLD, Hypothyroidism, Osteoporosis, Cerebral Meningioma who presented to the [facility] ED on 3/19/2023 with right hip pain after a fall.     Patient has a very complex medical history.  She underwent surgical repair for right femoral neck fracture on 3/20 with postoperative complications including worsening respiratory failure, hypotension, lactic acidosis, worsening encephalopathy.  She has worsening of her cerebral meningioma which also requires surgical resection.  Per discharge summary, hospitalist provider had discussed with the family, and they noted that over the last 3-4 weeks the patient has had functionally significantly worsened.  She has had worsening confusion and coordination issues including the cause of her fall at home.     Dr. Murphy from neurosurgery was contacted on 3/23.  He recommended transfer to [facility] and tentatively planning for OR early next week for resection of meningioma.  He would like her respiratory status optimized, obviously prior to going to the OR is much as possible. Patient has been admitted to the ICU at [facility] given need for BiPAP and concern for respiratory issues particularly in the setting of imminent neurosurgery.    #Displaced Right Femoral Neck Fracture s/p surgical repair on 3/20 - s/p mechanical fall.  Xray revealed a displaced right femoral neck fracture and a subtle irregularity of the parasymphyseal aspect of the left pubic bone, potentially representing an age indeterminant fracture.   - Orthopedic Surgery consulted. Underwent surgical repair with right hip hemiarthroplasty on 3/20.   - Post-op, pt had  increased WOB, hypotension, lactic acidosis and delirium as detailed below.  She had to be placed on bipap support.    She was able to be weaned off BiPAP during the day but again needed BiPAP administration with a repeat RRT called overnight on 3/21 - 3/22.  She was transferred to the ICU on 3/22 for closer monitoring  - prn haldol for agitation.   - Prn pain meds also available.  Avoid ativan if at all possible.  -She has been on Lovenox for prophylaxis given immobility postoperatively.  Depending on timing of surgery will need to hold prior to surgery for meningioma.  -Roman catheter placed on 3/21 for concern of urinary retention.  We will keep this in place for now.     #Acute on chronic mixed respiratory failure secondary to COPD in post-operative status: former smoker of 50 years; quit around 5 years ago.  On 2-3 liters of oxygen at baseline for the past 5 years. Chest XR, blood gas and elevation of bicarb on previous admission all indicate relatively severe, chronic baseline COPD.  -Post-op, patient with increased WOB and hypercarbia requiring bipap therapy.  -Patient was able to be weaned to oxygen mask on the afternoon of 3/21 but again needed BiPAP therapy after repeat RRT called overnight on 3/21 - 3/22 for hypoxemia.  Blood gases seem to show adequate exchange and chest x-ray without any convincing interval change with a few hazy opacities that are nonspecific.  Hyperinflated lungs again noted.  -Patient was transferred to the ICU on 3/22 for closer monitoring.  Increased dose of her dexamethasone to 6 mg daily.    - Added ceftriaxone and azithromycin, will continue these here.    - Continue nebulizers to help airway management.    - Her repeat blood gases have shown adequate gas exchange.  RT consulted to monitor.  -Patient did have another episode of sudden desaturation on the a.m. of 3/23 while on high flow nasal cannula.  She needed to be placed back on BiPAP with recovery.  Repeat chest x-ray  without any significant change.  -Continue to treat for COPD with probable exacerbation in the postoperative setting.  Wean O2 as able.  -Appreciate evaluation by intensivist  -Currently patient is full code per discussion with the patient's sister at bedside.  Family plans to discuss CODE STATUS further in the coming days.     #Encephalopathy. Cerebral Meningioma.  Suspected acute toxic metabolic encephalopathy likely from post-op status, steroids, pain meds   Pt was diagnosed 9/2019 with a cerebral meningioma and monitored.  Given progression of the tumor, patient underwent 15 sessions of radiation 11/2022.  She is followed by Dr. Godoy, a Radiation Oncologist through [facility].  Pt was on oral steroids which were weaned off about 4 weeks ago.  Since that time, patient has had increased confusion, poor balance, changes in mood.  * Recent MRI Brain 3/17/23 revealed enlargement of meningioma up to 45 mm with a significant mass effect and the midline shift right to left.  Patient was instructed by her PCP to follow up with outpatient Neurology and Radiation Oncology this week.  * CT head on admission with no acute hemorrhage, but notes the known mass surrounding vasogenic edema predominantly involving the right frontal lobe with right to left midline shift/subfalcine herniation measuring up to 1.1 cm.  - Neurosurgery was consulted, and recommended transfer to [facility] for planned surgical resection of meningioma on Wednesday, 3/22 but given post-op delirium with hypotension, needed to be post-poned.    - Patient has had postop delirium at times quite agitated and confused but then will become somnolent.  This had seemed to improve over the last couple of days at the time of transfer per previous hospitalist report.  She was more interactive with her sister at bedside and was starting to verbally respond to questions as well.  - Cautious with medications as to prevent oversedation.  Obviously this is very  difficult for this patient given her relatively narrow window of being comfortable but also not compromising respiratory status.  Prn haldol for agitation. Pain management. She does have glove restraints as repeatedly pulling off bipap.  Patient requires one-to-one bedside attendant.  - Neurosurgery consulted neurology for evaluation of tremors which is likely related to her meningioma.  They do not have any new recommendations.  - [facility] consulted.  Preliminarily they agree with steroids but no other recommendations from medical oncology standpoint.    - Discussion with Dr. Godoy from radiation oncology on 3/21 and she discussed with Dr. Murphy.  She agrees with surgery at this point given advancement of tumor.   - Per neurosurgery- given her functional decline with confusion at home in the weeks prior to her presentation, principal concern is for meningioma as a large contributor to her poor functional status and confusion.       #Lactic acidosis: Post-operative setting with increased WOB.  Improved AM of 3/21 down to 1.2.  - Continue IV fluids given need for BiPAP and no p.o. intake.  - Repeat lactic acid now     #Goals of care: Previous hospitalist/care team had extensive discussions with patient's sister at bedside, expressing concern for her ability to tolerate another major surgery given her baseline poor functional status and severe COPD.  Reported that family voiced understanding, and I confirmed with sister at bedside on 3/23.  At this point, we are trying to stabilize her to the point that she is close to her baseline oxygen requirements where she would be able to potentially get definitive management of her meningioma which is at the crux of her delirium as well.     #Acute on Chronic Anemia - Monitor blood counts in setting of surgery.   Recent Labs   Lab 03/23/23  0508 03/22/23  1045 03/21/23  0631 03/21/23  0230 03/20/23  2042   HGB 8.2* 8.6* 8.8* 9.1* 9.3*     #HLD - resume Atorvastatin when  able to take p.o.  #Hypothyroidism -transitioned to IV levothyroxine until patient reliably able to take p.o.  #Depression/Anxiety -hold p.o. low-dose sertraline and mirtazapine until able to reliably take p.o.      Diet: NPO for Medical/Clinical Reasons Except for: Meds, Ice Chips    DVT Prophylaxis: Pneumatic Compression Devices, Lovenox on hold  Orman Catheter: PRESENT, indication: Retention  Lines: None     Cardiac Monitoring: ACTIVE order. Indication: ICU  Code Status: Currently patient is full code per discussion with the patient's sister at bedside.  Family plans to discuss CODE STATUS again in the coming days.    Disposition Plan      Expected Discharge Date: 03/25/2023                  Inpatient status, anticipate greater than 2 days of hospitalization on top of her previous admission time at the outside hospital.    Clinically Significant Risk Factors Present on Admission               # Drug Induced Coagulation Defect: home medication list includes an anticoagulant medication      # Non-Invasive mechanical ventilation: current O2 Device: BiPAP/CPAP  # Acute hypoxic respiratory failure: continue supplemental O2 as needed           The patient's care was discussed with the Bedside Nurse, Patient and Patient's Family.    The patient has been discussed with Dr. Holt, who agrees with the assessment and plan at this time.     Securely message with Vocera (more info)  Text page via [facility] Paging/Directory     ABIEL Wilson    Primary Care Physician   *Physician No Ref-Primary    Chief Complaint   Progressive meningioma with associated altered mental status and worsening functional status requiring surgery    History of Present Illness   Josephine Nicole is a 73 year old female with a history of B12 Deficiency, Melanoma, COPD, Former Smoker, Oxygen Dependent, Depression, Anxiety, HLD, Hypothyroidism, Osteoporosis, Cerebral Meningioma who presented to the ED with right hip pain after a  fall.     Patient very complex with severe medical issues as below.  She underwent surgical repair for right femoral neck fracture on 3/20 with postoperative complications including worsening respiratory failure, hypotension, lactic acidosis, worsening encephalopathy.  She has worsening of her cerebral meningioma which also will require surgical resection.  In discussion with family, they note over the last 3 to 4 weeks she has significantly worsened functionally.  She has had worsening confusion and coordination issues including the cause of her fall at home.     I discussed with Dr. Murphy from neurosurgery on 3/23.  He does agree with transfer to [facility] and tentatively planning for OR early next week.  He would like her respiratory status optimized, obviously prior to going to the OR is much as possible.  I discussed with Dr. Narvaez from the hospitalist service who does accept the patient to ICU at [facility] given need for BiPAP and concern for respiratory issues particularly in the setting of neurosurgery imminent.    On arrival to the ICU, patient is on BiPAP.  She is obtunded, briefly awakens but with altered mental status.  Sister is present at bedside.  She provides history consistent with previous providers' documentation.  Patient's vital signs are currently stable, albeit with some low BP in the 90s over 70s.  Temperature is mildly elevated 99.5 Fahrenheit, and pulse is ranging in the 90s to low 100s.  She has been seen by the intensivist as well.    Past Medical History    I have reviewed this patient's medical history and updated it with pertinent information if needed.   Past Medical History:   Diagnosis Date     Cerebral meningioma      COPD (chronic obstructive pulmonary disease)      Depressive disorder      Eczema      Eczema      Hyperlipidemia      Hypothyroidism      Melanoma of skin      Osteoporosis        Past Surgical History   I have reviewed this patient's surgical  history and updated it with pertinent information if needed.  Past Surgical History:   Procedure Laterality Date     COLONOSCOPY N/A 04/18/2022    Procedure: COLONOSCOPY;  Surgeon: Abimbola Handley MD;  Location:  GI     OPEN REDUCTION INTERNAL FIXATION HIP UNIPOLAR Right 3/20/2023    Procedure: Right hip hemiarthroplasty anterolateral approach;  Surgeon: Shun Cuevas MD;  Location: [facility] OR     Tubal ligation NOS         Social History   I have reviewed this patient's social history and updated it with pertinent information if needed.    Social History     Tobacco Use     Smoking status: Former     Years: 50.00     Types: Cigarettes     Smokeless tobacco: Never   Substance Use Topics     Alcohol use: Not Currently     Drug use: Never         Medications   Prior to Admission Medications   Prescriptions Last Dose Informant Patient Reported? Taking?   ALPRAZolam (XANAX) 0.5 MG tablet   Yes Yes   Sig: Take 0.25 mg by mouth daily as needed for anxiety   acetaminophen (TYLENOL) 325 MG tablet   No No   Sig: Take 3 tablets (975 mg) by mouth every 8 hours as needed for mild pain   albuterol (PROVENTIL) (2.5 MG/3ML) 0.083% neb solution   Yes Yes   Sig: Take 2.5 mg by nebulization 3 times daily as needed for shortness of breath, wheezing or cough   alendronate (FOSAMAX) 70 MG tablet   Yes Yes   Sig: Take 70 mg by mouth every 7 days   atorvastatin (LIPITOR) 10 MG tablet   Yes Yes   Sig: Take 10 mg by mouth daily   butalbital-acetaminophen-caffeine (ESGIC) -40 MG tablet   Yes Yes   Sig: Take 1 tablet by mouth every 6 hours as needed for headaches Max 6 doses per day.   cyanocobalamin (CYANOCOBALAMIN) 1000 MCG/ML injection   Yes Yes   Sig: Inject 1 mL into the muscle every 30 days   enoxaparin ANTICOAGULANT (LOVENOX) 40 MG/0.4ML syringe   No No   Sig: Inject 0.4 mLs (40 mg) Subcutaneous every 24 hours for 30 days   fluticasone-salmeterol (ADVAIR) 500-50 MCG/DOSE inhaler   Yes Yes   Sig: Inhale 1 puff into the  lungs 2 times daily   hydrOXYzine (ATARAX) 10 MG tablet   Yes Yes   Sig: Take 10 mg by mouth every 8 hours as needed for anxiety   ipratropium - albuterol 0.5 mg/2.5 mg/3 mL (DUONEB) 0.5-2.5 (3) MG/3ML neb solution   Yes Yes   Sig: Take 1 vial by nebulization every 6 hours as needed for shortness of breath, wheezing or cough   levalbuterol (XOPENEX HFA) 45 MCG/ACT inhaler   Yes Yes   Sig: Inhale 2 puffs into the lungs every 4 hours as needed for shortness of breath or wheezing   levothyroxine (SYNTHROID/LEVOTHROID) 88 MCG tablet   Yes Yes   Sig: Take 88 mcg by mouth daily   mirtazapine (REMERON) 15 MG tablet   Yes Yes   Sig: Take 7.5 mg by mouth At Bedtime   polyethylene glycol (MIRALAX) 17 GM/Dose powder   No No   Sig: Take 17 g by mouth daily   sertraline (ZOLOFT) 25 MG tablet   Yes Yes   Sig: Take 25 mg by mouth every morning   zolpidem (AMBIEN) 5 MG tablet   Yes Yes   Sig: Take 5 mg by mouth nightly as needed for sleep      Facility-Administered Medications: None     Allergies   Allergies   Allergen Reactions     Bupropion Anaphylaxis, Hives and Shortness Of Breath       Review of Systems   The 10 point Review of Systems is negative other than noted in the HPI.    Physical Exam   Temp: 99.4  F (37.4  C) Temp src: Axillary BP: 92/77 Pulse: 98   Resp: 17 SpO2: 100 % O2 Device: BiPAP/CPAP    Vital Signs with Ranges  Temp:  [98.1  F (36.7  C)-99.5  F (37.5  C)] 99.4  F (37.4  C)  Pulse:  [] 98  Resp:  [13-57] 17  BP: ()/() 92/77  FiO2 (%):  [40 %-100 %] 50 %  SpO2:  [67 %-100 %] 100 %  0 lbs 0 oz    Constitutional: Obtunded, on BiPAP, lying in bed in NAD.  Unable to answer questions or follow commands currently.  ENT: Normocephalic, atraumatic.  Neck: Supple, symmetrical, trachea midline, no adenopathy.  Pulmonary: On BiPAP.  Bibasilar expiratory wheezes, right greater than left.  Cardiovascular: Regular rhythm, mildly tachycardic, normal S1 and S2, and no murmur noted.  GI: Normal bowel sounds,  soft, non-distended.  Skin/Integumen: Warm, dry, no rashes on exposed skin.  Neuro: Unable to assess secondary to mental status.  Psych: Unable to assess secondary to mental status.  Extremities: Warm and well-perfused.    Data   Data reviewed today:  I personally reviewed telemetry monitoring, lab results, and imaging reports from the last 24 hours.  Recent Labs   Lab 03/23/23  1446 03/23/23  1212 03/23/23  0957 03/23/23  0508 03/22/23  1149 03/22/23  1045 03/22/23  1013 03/22/23  0654 03/21/23  1000 03/21/23  0631 03/21/23  0230 03/20/23  0610 03/19/23  0518   WBC  --   --   --  15.5*  --  14.3*  --   --   --   --  17.5*   < > 8.3   HGB  --   --   --  8.2*  --  8.6*  --   --   --  8.8* 9.1*   < > 10.5*   MCV  --   --   --  100  --  98  --   --   --   --  100   < > 96   PLT  --   --   --  159  --  158  --   --   --   --  186   < > 273   INR  --   --   --   --   --   --   --   --   --   --   --   --  0.96   NA  --   --   --  144  --   --   --  141  --  140 138   < > 139   POTASSIUM  --   --   --  4.8  --   --   --  4.5  --  4.6  4.6 4.6   < > 3.9   CHLORIDE  --   --   --  105  --   --   --  102  --  100 99   < > 98   CO2  --   --   --  28  --   --   --  30*  --  29 28   < > 33*   BUN  --   --   --  25.5*  --   --   --  25.5*  --  22.0 24.8*   < > 9.2   CR  --   --   --  0.53  --   --   --  0.54  --  0.71 0.54   < > 0.52   ANIONGAP  --   --   --  11  --   --   --  9  --  11 11   < > 8   CHAO  --   --   --  8.3*  --   --   --  8.5*  --  8.2* 8.4*   < > 8.9   * 89 86 102*   < >  --    < > 117*   < > 117* 149*   < > 122*   ALBUMIN  --   --   --   --   --   --   --   --   --   --  3.3*  --   --    PROTTOTAL  --   --   --   --   --   --   --   --   --   --  5.6*  --   --    BILITOTAL  --   --   --   --   --   --   --   --   --   --  0.4  --   --    ALKPHOS  --   --   --   --   --   --   --   --   --   --  74  --   --    ALT  --   --   --   --   --   --   --   --   --   --  16  --   --    AST  --   --   --   --   --    --   --   --   --   --  51*  --   --     < > = values in this interval not displayed.         I have personally reviewed the following data over the past 24 hrs:    15.5 (H)  \   8.2 (L)   / 159     144 105 25.5 (H) /  113 (H)   4.8 28 0.53 \       Imaging results reviewed over the past 24 hrs:   Recent Results (from the past 24 hour(s))   XR Chest Port 1 View    Narrative    CHEST ONE VIEW PORTABLE   3/23/2023 11:10 AM     HISTORY: Shortness of breath.    COMPARISON: 3/22/2023.      Impression    IMPRESSION: No significant interval change. Hazy opacities at both  lung bases, greater on the right. Mild hyperinflation both lungs,  likely related to COPD. No pneumothorax. The heart size is stable.  Pulmonary vascularity is within normal limits. Degenerative changes in  the right glenohumeral joint.    ALYCE BETANCOURT MD         SYSTEM ID:  [id number]

## 2023-03-23 NOTE — PROGRESS NOTES
Tele-ICU   DOS: 3/23/2023     Called re: postoperative pain. RN concerned about giving hydromorphone because of patient mental status. OK for single dose of ketorolac as renal function appears to be good. Order placed.    Mino George MD, PhD  Surgical critical care  3/23/2023, 12:33 AM    Addendum 1:56 AM, 3/23/2023  Called for low UOP.  Pt has made ~25 mL/2h over the last four hours or so.  Wrote for gentle fluid challenge.  Follow for effect.  -RB    Addendum 3/23/2023, 3:48 AM  Not much change in UOP after fluid.  I/O show +6L, but patient didn't have baldwin until 3/21 and it's unclear if output was being closely tracked; weight is only up ~1 kg.   Can repeat 500 mL fluid.  -RB

## 2023-03-24 ENCOUNTER — APPOINTMENT (OUTPATIENT)
Dept: GENERAL RADIOLOGY | Facility: CLINIC | Age: 73
DRG: 025 | End: 2023-03-24
Attending: INTERNAL MEDICINE
Payer: COMMERCIAL

## 2023-03-24 LAB
ANION GAP SERPL CALCULATED.3IONS-SCNC: 11 MMOL/L (ref 7–15)
BUN SERPL-MCNC: 25.1 MG/DL (ref 8–23)
CALCIUM SERPL-MCNC: 8.4 MG/DL (ref 8.8–10.2)
CHLORIDE SERPL-SCNC: 106 MMOL/L (ref 98–107)
CREAT SERPL-MCNC: 0.51 MG/DL (ref 0.51–0.95)
DEPRECATED HCO3 PLAS-SCNC: 27 MMOL/L (ref 22–29)
ERYTHROCYTE [DISTWIDTH] IN BLOOD BY AUTOMATED COUNT: 12.7 % (ref 10–15)
GFR SERPL CREATININE-BSD FRML MDRD: >90 ML/MIN/1.73M2
GLUCOSE BLDC GLUCOMTR-MCNC: 122 MG/DL (ref 70–99)
GLUCOSE BLDC GLUCOMTR-MCNC: 75 MG/DL (ref 70–99)
GLUCOSE BLDC GLUCOMTR-MCNC: 75 MG/DL (ref 70–99)
GLUCOSE BLDC GLUCOMTR-MCNC: 81 MG/DL (ref 70–99)
GLUCOSE BLDC GLUCOMTR-MCNC: 82 MG/DL (ref 70–99)
GLUCOSE BLDC GLUCOMTR-MCNC: 95 MG/DL (ref 70–99)
GLUCOSE BLDC GLUCOMTR-MCNC: 95 MG/DL (ref 70–99)
GLUCOSE SERPL-MCNC: 85 MG/DL (ref 70–99)
HCT VFR BLD AUTO: 28.5 % (ref 35–47)
HGB BLD-MCNC: 8.9 G/DL (ref 11.7–15.7)
LACTATE SERPL-SCNC: 1.2 MMOL/L (ref 0.7–2)
MCH RBC QN AUTO: 30.7 PG (ref 26.5–33)
MCHC RBC AUTO-ENTMCNC: 31.2 G/DL (ref 31.5–36.5)
MCV RBC AUTO: 98 FL (ref 78–100)
PLATELET # BLD AUTO: 251 10E3/UL (ref 150–450)
POTASSIUM SERPL-SCNC: 4.8 MMOL/L (ref 3.4–5.3)
RBC # BLD AUTO: 2.9 10E6/UL (ref 3.8–5.2)
SODIUM SERPL-SCNC: 144 MMOL/L (ref 136–145)
WBC # BLD AUTO: 18.5 10E3/UL (ref 4–11)

## 2023-03-24 PROCEDURE — 85027 COMPLETE CBC AUTOMATED: CPT | Performed by: PHYSICIAN ASSISTANT

## 2023-03-24 PROCEDURE — 250N000009 HC RX 250: Performed by: PHYSICIAN ASSISTANT

## 2023-03-24 PROCEDURE — 80048 BASIC METABOLIC PNL TOTAL CA: CPT | Performed by: PHYSICIAN ASSISTANT

## 2023-03-24 PROCEDURE — 258N000001 HC RX 258: Performed by: HOSPITALIST

## 2023-03-24 PROCEDURE — 999N000157 HC STATISTIC RCP TIME EA 10 MIN

## 2023-03-24 PROCEDURE — 250N000011 HC RX IP 250 OP 636: Performed by: PHYSICIAN ASSISTANT

## 2023-03-24 PROCEDURE — 200N000001 HC R&B ICU

## 2023-03-24 PROCEDURE — 71045 X-RAY EXAM CHEST 1 VIEW: CPT

## 2023-03-24 PROCEDURE — 94660 CPAP INITIATION&MGMT: CPT

## 2023-03-24 PROCEDURE — 36415 COLL VENOUS BLD VENIPUNCTURE: CPT | Performed by: PHYSICIAN ASSISTANT

## 2023-03-24 PROCEDURE — 99233 SBSQ HOSP IP/OBS HIGH 50: CPT | Performed by: HOSPITALIST

## 2023-03-24 PROCEDURE — 94640 AIRWAY INHALATION TREATMENT: CPT

## 2023-03-24 PROCEDURE — 94640 AIRWAY INHALATION TREATMENT: CPT | Mod: 76

## 2023-03-24 PROCEDURE — 83605 ASSAY OF LACTIC ACID: CPT | Performed by: PHYSICIAN ASSISTANT

## 2023-03-24 PROCEDURE — 250N000011 HC RX IP 250 OP 636: Performed by: HOSPITALIST

## 2023-03-24 RX ORDER — ACETAMINOPHEN 325 MG/1
650 TABLET ORAL EVERY 4 HOURS PRN
Status: DISCONTINUED | OUTPATIENT
Start: 2023-03-24 | End: 2023-03-30

## 2023-03-24 RX ORDER — ACETAMINOPHEN 650 MG/1
650 SUPPOSITORY RECTAL EVERY 4 HOURS PRN
Status: DISCONTINUED | OUTPATIENT
Start: 2023-03-24 | End: 2023-03-30

## 2023-03-24 RX ORDER — ENOXAPARIN SODIUM 100 MG/ML
40 INJECTION SUBCUTANEOUS EVERY 24 HOURS
Status: DISCONTINUED | OUTPATIENT
Start: 2023-03-24 | End: 2023-03-29

## 2023-03-24 RX ADMIN — IPRATROPIUM BROMIDE AND ALBUTEROL SULFATE 3 ML: 2.5; .5 SOLUTION RESPIRATORY (INHALATION) at 15:54

## 2023-03-24 RX ADMIN — AZITHROMYCIN MONOHYDRATE 500 MG: 500 INJECTION, POWDER, LYOPHILIZED, FOR SOLUTION INTRAVENOUS at 09:59

## 2023-03-24 RX ADMIN — DEXTROSE AND SODIUM CHLORIDE: 5; 450 INJECTION, SOLUTION INTRAVENOUS at 15:24

## 2023-03-24 RX ADMIN — DEXAMETHASONE SODIUM PHOSPHATE 6 MG: 4 INJECTION, SOLUTION INTRAMUSCULAR; INTRAVENOUS at 08:26

## 2023-03-24 RX ADMIN — IPRATROPIUM BROMIDE AND ALBUTEROL SULFATE 3 ML: 2.5; .5 SOLUTION RESPIRATORY (INHALATION) at 10:06

## 2023-03-24 RX ADMIN — FAMOTIDINE 20 MG: 10 INJECTION INTRAVENOUS at 19:42

## 2023-03-24 RX ADMIN — IPRATROPIUM BROMIDE AND ALBUTEROL SULFATE 3 ML: 2.5; .5 SOLUTION RESPIRATORY (INHALATION) at 07:37

## 2023-03-24 RX ADMIN — CEFTRIAXONE SODIUM 2 G: 2 INJECTION, POWDER, FOR SOLUTION INTRAMUSCULAR; INTRAVENOUS at 08:53

## 2023-03-24 RX ADMIN — IPRATROPIUM BROMIDE AND ALBUTEROL SULFATE 3 ML: 2.5; .5 SOLUTION RESPIRATORY (INHALATION) at 19:55

## 2023-03-24 RX ADMIN — HYDROMORPHONE HYDROCHLORIDE 0.2 MG: 0.2 INJECTION, SOLUTION INTRAMUSCULAR; INTRAVENOUS; SUBCUTANEOUS at 14:04

## 2023-03-24 RX ADMIN — HYDROMORPHONE HYDROCHLORIDE 0.2 MG: 0.2 INJECTION, SOLUTION INTRAMUSCULAR; INTRAVENOUS; SUBCUTANEOUS at 04:17

## 2023-03-24 RX ADMIN — FAMOTIDINE 20 MG: 10 INJECTION INTRAVENOUS at 08:07

## 2023-03-24 RX ADMIN — ENOXAPARIN SODIUM 40 MG: 40 INJECTION SUBCUTANEOUS at 12:30

## 2023-03-24 RX ADMIN — LEVOTHYROXINE SODIUM 65 MCG: 20 INJECTION, SOLUTION INTRAVENOUS at 09:28

## 2023-03-24 ASSESSMENT — ACTIVITIES OF DAILY LIVING (ADL)
ADLS_ACUITY_SCORE: 45
ADLS_ACUITY_SCORE: 45
ADLS_ACUITY_SCORE: 49
ADLS_ACUITY_SCORE: 45
ADLS_ACUITY_SCORE: 45
ADLS_ACUITY_SCORE: 49
ADLS_ACUITY_SCORE: 45

## 2023-03-24 NOTE — PLAN OF CARE
Pt. Remains on BiPAP this 12H shift, diminished lung sounds, brief HFNC trial failed after 10 minutes, MD aware, continuing on nebs, chest x-ray obtained, CV: Sr to ST, adequate BP and perfusion, adequate urine output through baldwin, remains NPO, deferred NG tube placement today as pt. Likely to not tolerate BIPAP mask removal in order to place one, pt's daughter and sister were aware of this and in agreement not to place one at this time, pt. Made DNR/DNI, pt's HR and BP elevated X1 and treated with dilaudid prn with good result lowering BP, HR and easier WOB afterward, neuro status unchanged with pt. Moving all extremities and answering yes/no questions for the ost part, saying a few words sporadically, able to move fingers and toes with repeated prompting and slow to follow commands, pt's daughter and sister both at bedside this shift and were supportive.

## 2023-03-24 NOTE — PROGRESS NOTES
Orthopedic Surgery  Josephine Nicole  03/24/2023     Admit Date:  3/19/2023    POD: 4 day post-op  Procedure(s):  Right hip hemiarthroplasty anterolateral approach    Patient with meningioma and resulting vasogenic edema and mass effect. Possible tumor resection early next week. Transferred to [facility] yesterday per neurosurgery.  Sister in room talking to RN.  Patient BiPAP dependent. Not sedated.  Pain is seemingly well-controlled. Currently sleeping.  Afebrile.    Temp:  [97.5  F (36.4  C)-99.7  F (37.6  C)] 99.7  F (37.6  C)  Pulse:  [] 116  Resp:  [16-52] 25  BP: ()/() 135/107  FiO2 (%):  [40 %-50 %] 40 %  SpO2:  [89 %-100 %] 95 %    Patient sleeping. BiPAP in place.  Right hip dressing is clean, dry, and intact.   Abduction pillow in place.  SCDs in place.  No erythema of the surrounding skin.   Bilateral calves are soft through the SCDs.  Spontaneously wiggles toes and DF and PF the ankles.  No grimace with passive ankle DF and PF, bilaterally.  DP pulse palpable.  Toes are warm and well-perfused.    Labs:  Recent Labs   Lab Test 03/24/23  0502 03/23/23  0508 03/22/23  1045   WBC 18.5* 15.5* 14.3*   HGB 8.9* 8.2* 8.6*    159 158     Recent Labs   Lab Test 03/19/23  0518   INR 0.96     A/P    1. S/p right hip hemiarthoplasty for a femoral neck fracture   Continue Lovenox for DVT prophylaxis. Defer to primary/intensivist for holding Lovenox in the event patient has meningioma resection next week.   Continue ceftriaxone and azithromycin for community-acquired pneumonia per primary team.   Mobilize with PT/OT when able.   WBAT RLE with walker whenever the patient is able.   Continue current pain regimen.   Abduction pillow should remain in place at all times aside from Q8H skin checks and hygiene due to altered mental status to avoid extremes of motion. Discussed this with RN.    Dressings: Keep intact. Change if >50% saturated or peeling off.    Follow-up: 2 weeks post-op with Dr. Hoffmann  Mason    2. Disposition   Anticipate d/c to TCU when medically cleared and progressing in PT. Ortho stable. Please contact ortho trauma with any questions or concerns, otherwise we will see her at 2 weeks postop (4/3/23) for routine postop wound cares if still inpatient.     *Patient with vasogenic edema and mass effect from a large meningioma. Transferred from [facility] to [facility] on 3/23/23 for neurosurgery intervention.    Guerline Ac PA-C  [facility]

## 2023-03-24 NOTE — PLAN OF CARE
Neuro: Pt follows some simple commands intemitent, not opening eyes or verbalizing. ROBI. Bilateral mitts in place upon arrival. Pt lifts arms off bed with purpose but not to command.    CV: SR, HR 90 -115  Pulmonary: LS dim with exp wz. On BiPAP 50% 14/6.  /GI: abdo with dim BS.Roman in place. Returns are darkly tea colored, red tinged.  Surgical: right hip drsg CDI. Ecchymotic. Edema 1-2+.  Skin: bilateral facial abrasions. Bilateral arms are sv ecchymotic/reddened.   Family: sister and daughter at bedside updated with POC.

## 2023-03-24 NOTE — PROGRESS NOTES
[facility]    Medicine Progress Note - Hospitalist Service    Date of Admission:  3/23/2023    Assessment & Plan    Josephine Nicole is a 73 year old female with a history of B12 Deficiency, Melanoma, COPD, Former Smoker, Oxygen Dependent, Depression, Anxiety, HLD, Hypothyroidism, Osteoporosis, Cerebral Meningioma who presented to the [facility] ED on 3/19/2023 with right hip pain after a fall.     Patient has a very complex medical history.  She underwent surgical repair for right femoral neck fracture on 3/20 with postoperative complications including worsening respiratory failure, hypotension, lactic acidosis, worsening encephalopathy.  She has worsening of her cerebral meningioma which also requires surgical resection.  Per discharge summary, hospitalist provider had discussed with the family, and they noted that over the last 3-4 weeks the patient has had functionally significantly worsened.  She has had worsening confusion and coordination issues including the cause of her fall at home.     Dr. Murphy from neurosurgery was contacted on 3/23.  He recommended transfer to [facility] and tentatively planning for OR early next week for resection of meningioma.  He would like her respiratory status optimized, obviously prior to going to the OR is much as possible. Patient has been admitted to the ICU at [facility] given need for BiPAP and concern for respiratory issues particularly in the setting of imminent neurosurgery.     #Displaced Right Femoral Neck Fracture s/p surgical repair on 3/20 - s/p mechanical fall.  Xray revealed a displaced right femoral neck fracture and a subtle irregularity of the parasymphyseal aspect of the left pubic bone, potentially representing an age indeterminant fracture.   - Orthopedic Surgery consulted. Underwent surgical repair with right hip hemiarthroplasty on 3/20.   - Post-op, pt had increased WOB, hypotension, lactic acidosis  and delirium as detailed below.  She had to be placed on bipap support.    She was able to be weaned off BiPAP during the day but again needed BiPAP administration with a repeat RRT called overnight on 3/21 - 3/22.  She was transferred to the ICU on 3/22 for closer monitoring  - prn haldol for agitation.   - Prn pain meds also available.  Avoid ativan if at all possible.  -She has been on Lovenox for prophylaxis given immobility postoperatively.  Depending on timing of surgery will need to hold prior to surgery for meningioma.  -Roman catheter placed on 3/21 for concern of urinary retention.  We will keep this in place for now.     #Acute on chronic mixed respiratory failure secondary to COPD in post-operative status: former smoker of 50 years; quit around 5 years ago.  On 2-3 liters of oxygen at baseline for the past 5 years. Chest XR, blood gas and elevation of bicarb on previous admission all indicate relatively severe, chronic baseline COPD.  -Post-op, patient with increased WOB and hypercarbia requiring bipap therapy.  -Patient was able to be weaned to oxygen mask on the afternoon of 3/21 but again needed BiPAP therapy after repeat RRT called overnight on 3/21 - 3/22 for hypoxemia.  Blood gases seem to show adequate exchange and chest x-ray without any convincing interval change with a few hazy opacities that are nonspecific.  Hyperinflated lungs again noted.  -Patient was transferred to the ICU on 3/22 for closer monitoring.  Increased dose of her dexamethasone to 6 mg daily.    - Added ceftriaxone and azithromycin, will continue these here.    - Continue nebulizers to help airway management.    - Her repeat blood gases have shown adequate gas exchange.  RT consulted to monitor.  -Patient did have another episode of sudden desaturation on the a.m. of 3/23 while on high flow nasal cannula.  She needed to be placed back on BiPAP with recovery.  Repeat chest x-ray without any significant change.  -3/24/2023,  continues to desat and now BiPAP dependent.  -Continue to treat for COPD with probable exacerbation in the postoperative setting.  Wean O2 as able.  -Appreciate evaluation by intensivist  -Discussed with daughter- Jordan hager CODE STATUS, now switched to DNR/DNI on 3/24/23.  Patients daughter in agreement to the especially now patient is dependent on BiPAP, if she is intubated she is unlikely to be extubateble   -Pulmonology consult to assist in optimizing respiratory status in anticipation for neurosurgery sometime next week.    #Encephalopathy. Cerebral Meningioma.  Suspected acute toxic metabolic encephalopathy likely from post-op status, steroids, pain meds   Pt was diagnosed 9/2019 with a cerebral meningioma and monitored.  Given progression of the tumor, patient underwent 15 sessions of radiation 11/2022.  She is followed by Dr. Godoy, a Radiation Oncologist through [facility].  Pt was on oral steroids which were weaned off about 4 weeks ago.  Since that time, patient has had increased confusion, poor balance, changes in mood.  * Recent MRI Brain 3/17/23 revealed enlargement of meningioma up to 45 mm with a significant mass effect and the midline shift right to left.  Patient was instructed by her PCP to follow up with outpatient Neurology and Radiation Oncology this week.  * CT head on admission with no acute hemorrhage, but notes the known mass surrounding vasogenic edema predominantly involving the right frontal lobe with right to left midline shift/subfalcine herniation measuring up to 1.1 cm.  - Neurosurgery was consulted, and recommended transfer to [facility] for planned surgical resection of meningioma on Wednesday, 3/22 but given post-op delirium with hypotension, needed to be post-poned.    - Patient has had postop delirium at times quite agitated and confused but then will become somnolent.  This had seemed to improve over the last couple of days at the time of transfer per previous  hospitalist report.  She was more interactive with her sister at bedside and was starting to verbally respond to questions as well.  - Cautious with medications as to prevent oversedation.  Obviously this is very difficult for this patient given her relatively narrow window of being comfortable but also not compromising respiratory status.  Prn haldol for agitation. Pain management. She does have glove restraints as repeatedly pulling off bipap.  Patient requires one-to-one bedside attendant.  - Neurosurgery consulted neurology for evaluation of tremors which is likely related to her meningioma.  They do not have any new recommendations.  - [facility] consulted.  Preliminarily they agree with steroids but no other recommendations from medical oncology standpoint.    - Discussion with Dr. Godoy from radiation oncology on 3/21 and she discussed with Dr. Murphy.  She agrees with surgery at this point given advancement of tumor.   - Per neurosurgery- given her functional decline with confusion at home in the weeks prior to her presentation, principal concern is for meningioma as a large contributor to her poor functional status and confusion.       #Lactic acidosis: Post-operative setting with increased WOB.  Improved AM of 3/21 down to 1.2.  - Continue IV fluids given need for BiPAP and no p.o. intake.  - Repeat lactic acid now     #Goals of care: Previous hospitalist/care team had extensive discussions with patient's sister at bedside, expressing concern for her ability to tolerate another major surgery given her baseline poor functional status and severe COPD.  Reported that family voiced understanding, and I confirmed with sister at bedside on 3/23.  At this point, we are trying to stabilize her to the point that she is close to her baseline oxygen requirements where she would be able to potentially get definitive management of her meningioma which is at the crux of her delirium as well.     #Acute on Chronic  Anemia - Monitor blood counts in setting of surgery.           Recent Labs   Lab 03/23/23  0508 03/22/23  1045 03/21/23  0631 03/21/23  0230 03/20/23  2042   HGB 8.2* 8.6* 8.8* 9.1* 9.3*      #HLD - resume Atorvastatin when able to take p.o.  #Hypothyroidism -transitioned to IV levothyroxine until patient reliably able to take p.o.  #Depression/Anxiety -hold p.o. low-dose sertraline and mirtazapine until able to reliably take p.o.            Diet: NPO for Medical/Clinical Reasons Except for: Meds, Ice Chips    DVT Prophylaxis: Pneumatic Compression Devices  Roman Catheter: PRESENT, indication: Retention  Lines: None     Cardiac Monitoring: ACTIVE order. Indication: ICU  Code Status: Full Code      Clinically Significant Risk Factors                                Disposition Plan     Expected Discharge Date: 03/25/2023                  Sandra Hutson MD  Hospitalist Service  [facility]  Securely message with [employer] (more info)  Text page via [employer] Paging/Directory   ______________________________________________________________________    Interval History   Patient resting in bed at this time, BiPAP on.  Per nursing staff, she desats when off the BiPAP.  Endorses pain but is unable to localize where her pain is.  Daughter agreeable to DNR/DNI.    Physical Exam   Vital Signs: Temp: 99.3  F (37.4  C) Temp src: Axillary BP: (!) 129/115 Pulse: 97   Resp: 25 SpO2: 96 % O2 Device: BiPAP/CPAP Oxygen Delivery: 40 LPM  Weight: 153 lbs 14.1 oz    General Appearance: Well appearing for stated age.  Respiratory: CTAB, no rales or ronchi  Cardiovascular: S1, S2 normal, no murmurs  GI: non-tender on palpation, BS present      Medical Decision Making       55 MINUTES SPENT BY ME on the date of service doing chart review, history, exam, documentation & further activities per the note.      Data     I have personally reviewed the following data over the past 24 hrs:    18.5 (H)  \   8.9 (L)   / 251     144 106  25.1 (H) /  95   4.8 27 0.51 \       Procal: N/A CRP: N/A Lactic Acid: 1.2         Imaging results reviewed over the past 24 hrs:   No results found for this or any previous visit (from the past 24 hour(s)).

## 2023-03-24 NOTE — PLAN OF CARE
Neuro: Very restless overnight. Disoriented x3. Follows commands. Moves all extremities.     Cardiac: SR/ST. Afebrile. Normotensive    Respiratory: Tachypnea. BiPAP 40% O2.     GI/: NPO. Roman catheter for retention, 50-100mL output q2 hours.     Shift Events: Restlessness, tachycardia, and tachypnea relieved with PRN dilaudid.     Plan of Care: Plan for surgery next week.

## 2023-03-24 NOTE — PROGRESS NOTES
RT overnight report:    Pt remains on bipap overnight. Settings weaned to 10/5 r12 40%.   LS - Diminished t/o  All nebs given as ordered.   Patient may be able to tolerate HFNC today.   RT to follow    K[facility] Grant, RT  03/24/23  4:36 AM

## 2023-03-24 NOTE — PROGRESS NOTES
[facility]    Neurosurgery  Daily Note    Assessment & Plan   Patient seen resting in ICU. On bipap. Nursing states she has not been getting periods of rest overnight.     Plan:  -Continue supportive and symptomatic treatment  Tentative plan for surgical resection next week, if medically optimized.     Mook Barnett PA-C    Interval History   Stable.     Physical Exam   Temp: 99.3  F (37.4  C) Temp src: Axillary BP: (!) 129/115 Pulse: 97   Resp: 25 SpO2: 96 % O2 Device: BiPAP/CPAP Oxygen Delivery: 40 LPM  Vitals:    03/24/23 0000   Weight: 69.8 kg (153 lb 14.1 oz)     Vital Signs with Ranges  Temp:  [97.5  F (36.4  C)-99.7  F (37.6  C)] 99.3  F (37.4  C)  Pulse:  [] 97  Resp:  [14-52] 25  BP: (102-155)/() 129/115  FiO2 (%):  [40 %-45 %] 40 %  SpO2:  [89 %-100 %] 96 %  I/O last 3 completed shifts:  In: 1922.5 [I.V.:1922.5]  Out: 810 [Urine:810]        Medications     dextrose 5% and 0.45% NaCl 75 mL/hr at 03/24/23 1524     - MEDICATION INSTRUCTIONS -          azithromycin  500 mg Intravenous Q24H     cefTRIAXone  2 g Intravenous Q24H     dexamethasone  6 mg Intravenous Daily     enoxaparin ANTICOAGULANT  40 mg Subcutaneous Q24H     famotidine  20 mg Intravenous Q12H     insulin aspart  1-6 Units Subcutaneous Q4H     ipratropium - albuterol 0.5 mg/2.5 mg/3 mL  3 mL Nebulization 4x daily     levothyroxine  65 mcg Intravenous Daily     sodium chloride (PF)  3 mL Intracatheter Q8H           Mook Barnett PA-C  [facility] Neurosurgery  [address]  [address]  [place] [address]    Tel [phone]

## 2023-03-24 NOTE — PROGRESS NOTES
"Brief Intensivist follow up note  Pulmonary consult request per hospitalist but intensivist service has seen her already.     She has chosen DNR/DNI which is appropriate medically given severe underlying COPD. She is currently BIPAP dependent and serial CXR's show no obvious infiltrates but severe COPD with flattening of diaphragms. ECHO last week at [facility] was unremarkable with normal LVF. She continues on duonebs, steroids, and antibiotics (Rocephin and Zithromax). I had discussed possible NG feeding tube and patient adamantly shook her head \"no.\"     We will follow up CXR to rule out other complications but suspect it will be unchanged.     At this point her care seems entirely optimized and I see nothing further to add at this time. I shared this with family at bedside including daughter and sister.     teresa danielle  March 24, 2023  "

## 2023-03-24 NOTE — PROGRESS NOTES
Notified provider about indwelling baldwin catheter discussed removal or continued need.    Did provider choose to remove indwelling abldwin catheter? No    Provider's baldwin indication for keeping indwelling baldwin catheter: retention    Is there an order for indwelling baldwin catheter? Yes    *If there is a plan to keep baldwin catheter in place at discharge daily notification with provider is not necessary, but please add a notation in the treatment team sticky note that the patient will be discharging with the catheter.

## 2023-03-25 LAB
GLUCOSE BLDC GLUCOMTR-MCNC: 115 MG/DL (ref 70–99)
GLUCOSE BLDC GLUCOMTR-MCNC: 115 MG/DL (ref 70–99)
GLUCOSE BLDC GLUCOMTR-MCNC: 125 MG/DL (ref 70–99)
GLUCOSE BLDC GLUCOMTR-MCNC: 143 MG/DL (ref 70–99)
GLUCOSE BLDC GLUCOMTR-MCNC: 145 MG/DL (ref 70–99)
GLUCOSE BLDC GLUCOMTR-MCNC: 148 MG/DL (ref 70–99)
GLUCOSE BLDC GLUCOMTR-MCNC: 155 MG/DL (ref 70–99)

## 2023-03-25 PROCEDURE — 94640 AIRWAY INHALATION TREATMENT: CPT

## 2023-03-25 PROCEDURE — 99233 SBSQ HOSP IP/OBS HIGH 50: CPT | Performed by: INTERNAL MEDICINE

## 2023-03-25 PROCEDURE — 250N000011 HC RX IP 250 OP 636: Performed by: HOSPITALIST

## 2023-03-25 PROCEDURE — 94660 CPAP INITIATION&MGMT: CPT

## 2023-03-25 PROCEDURE — 999N000157 HC STATISTIC RCP TIME EA 10 MIN

## 2023-03-25 PROCEDURE — 94640 AIRWAY INHALATION TREATMENT: CPT | Mod: 76

## 2023-03-25 PROCEDURE — 250N000009 HC RX 250: Performed by: PHYSICIAN ASSISTANT

## 2023-03-25 PROCEDURE — 250N000011 HC RX IP 250 OP 636: Performed by: PHYSICIAN ASSISTANT

## 2023-03-25 PROCEDURE — 200N000001 HC R&B ICU

## 2023-03-25 PROCEDURE — 250N000012 HC RX MED GY IP 250 OP 636 PS 637: Performed by: PHYSICIAN ASSISTANT

## 2023-03-25 PROCEDURE — 258N000001 HC RX 258: Performed by: HOSPITALIST

## 2023-03-25 RX ADMIN — FAMOTIDINE 20 MG: 10 INJECTION INTRAVENOUS at 08:27

## 2023-03-25 RX ADMIN — AZITHROMYCIN MONOHYDRATE 500 MG: 500 INJECTION, POWDER, LYOPHILIZED, FOR SOLUTION INTRAVENOUS at 10:06

## 2023-03-25 RX ADMIN — CEFTRIAXONE SODIUM 2 G: 2 INJECTION, POWDER, FOR SOLUTION INTRAMUSCULAR; INTRAVENOUS at 08:39

## 2023-03-25 RX ADMIN — IPRATROPIUM BROMIDE AND ALBUTEROL SULFATE 3 ML: 2.5; .5 SOLUTION RESPIRATORY (INHALATION) at 11:05

## 2023-03-25 RX ADMIN — DEXTROSE AND SODIUM CHLORIDE: 5; 450 INJECTION, SOLUTION INTRAVENOUS at 17:54

## 2023-03-25 RX ADMIN — IPRATROPIUM BROMIDE AND ALBUTEROL SULFATE 3 ML: 2.5; .5 SOLUTION RESPIRATORY (INHALATION) at 14:58

## 2023-03-25 RX ADMIN — INSULIN ASPART 1 UNITS: 100 INJECTION, SOLUTION INTRAVENOUS; SUBCUTANEOUS at 20:07

## 2023-03-25 RX ADMIN — IPRATROPIUM BROMIDE AND ALBUTEROL SULFATE 3 ML: 2.5; .5 SOLUTION RESPIRATORY (INHALATION) at 07:27

## 2023-03-25 RX ADMIN — INSULIN ASPART 1 UNITS: 100 INJECTION, SOLUTION INTRAVENOUS; SUBCUTANEOUS at 16:02

## 2023-03-25 RX ADMIN — INSULIN ASPART 1 UNITS: 100 INJECTION, SOLUTION INTRAVENOUS; SUBCUTANEOUS at 13:59

## 2023-03-25 RX ADMIN — FAMOTIDINE 20 MG: 10 INJECTION INTRAVENOUS at 20:12

## 2023-03-25 RX ADMIN — DEXAMETHASONE SODIUM PHOSPHATE 6 MG: 4 INJECTION, SOLUTION INTRAMUSCULAR; INTRAVENOUS at 08:32

## 2023-03-25 RX ADMIN — LEVOTHYROXINE SODIUM 65 MCG: 20 INJECTION, SOLUTION INTRAVENOUS at 09:36

## 2023-03-25 RX ADMIN — IPRATROPIUM BROMIDE AND ALBUTEROL SULFATE 3 ML: 2.5; .5 SOLUTION RESPIRATORY (INHALATION) at 19:50

## 2023-03-25 RX ADMIN — ENOXAPARIN SODIUM 40 MG: 40 INJECTION SUBCUTANEOUS at 12:23

## 2023-03-25 ASSESSMENT — ACTIVITIES OF DAILY LIVING (ADL)
ADLS_ACUITY_SCORE: 45
ADLS_ACUITY_SCORE: 45
ADLS_ACUITY_SCORE: 49
ADLS_ACUITY_SCORE: 45
ADLS_ACUITY_SCORE: 49
ADLS_ACUITY_SCORE: 49

## 2023-03-25 NOTE — PLAN OF CARE
Pt. Remains successfully transitioned from BiPap to HFNC for approximately 9/12 hours of this shift, noted to desatt after a turn and could not recover so placed on BiPAP this 12H shift, diminished lung sounds,CV: Sr to ST, adequate BP and perfusion, adequate urine output through baldwin, remains NPO, neuro status unchanged with pt. Moving all extremities and answering yes/no questions for the ost part, saying a few words sporadically, able to move fingers and toes with repeated prompting and slow to follow commands, palliative consult placed by hospital MD pt's daughter and sister and family at bedside this shift and were supportive.

## 2023-03-25 NOTE — PROGRESS NOTES
Notified provider about indwelling baldwin catheter discussed removal or continued need.    Did provider choose to remove indwelling baldwin catheter? No    Provider's baldwin indication for keeping indwelling baldwin catheter: retention    Is there an order for indwelling baldwin catheter? Yes    *If there is a plan to keep baldwin catheter in place at discharge daily notification with provider is not necessary, but please add a notation in the treatment team sticky note that the patient will be discharging with the catheter.

## 2023-03-25 NOTE — PROGRESS NOTES
[facility]    Medicine Progress Note - Hospitalist Service    Date of Admission:  3/23/2023    Assessment & Plan    Josephine Nicole is a 73 year old female with a history of B12 Deficiency, Melanoma, COPD, Former Smoker, Oxygen Dependent, Depression, Anxiety, HLD, Hypothyroidism, Osteoporosis, Cerebral Meningioma who presented to the [facility] ED on 3/19/2023 with right hip pain after a fall.     Patient has a very complex medical history.  She underwent surgical repair for right femoral neck fracture on 3/20 with postoperative complications including worsening respiratory failure, hypotension, lactic acidosis, worsening encephalopathy.  She has worsening of her cerebral meningioma which also requires surgical resection.  Per discharge summary, hospitalist provider had discussed with the family, and they noted that over the last 3-4 weeks the patient has had functionally significantly worsened.  She has had worsening confusion and coordination issues including the cause of her fall at home.     Dr. Murphy from neurosurgery was contacted on 3/23.  He recommended transfer to [facility] and tentatively planning for OR early next week for resection of meningioma.  He would like her respiratory status optimized.    Patient has been admitted to the ICU at [facility] on 3/23/2  due to acute on chronic mixed respiratory failure secondary to severe advanced emphysema requiring BiPAP support. She remains on bipap and unable to wean her off bipap. Patient did have episode of sudden desaturation on the a.m. of 3/23 while on high flow nasal cannula.  She needed to be placed back on BiPAP with recovery.  Repeat chest x-ray without any significant change. This morning, she desaturated to 84% while doing oral care very briefly (secs). SPo2 improved after pt placed back on Bipap.      #Displaced Right Femoral Neck Fracture s/p surgical repair on 3/20 - s/p mechanical fall.  Xray  revealed a displaced right femoral neck fracture and a subtle irregularity of the parasymphyseal aspect of the left pubic bone, potentially representing an age indeterminant fracture.   - Orthopedic Surgery consulted. Underwent surgical repair with right hip hemiarthroplasty on 3/20.   - Prn pain meds also available.  Avoid ativan if at all possible.  - She has been on Lovenox for prophylaxis given immobility postoperatively.    - Depending on timing of surgery will need to hold prior to surgery for meningioma.  - Roman catheter placed on 3/21 for concern of urinary retention.  We will keep this in place for now.  - prn haldol for agitation.     #Acute on chronic mixed respiratory failure secondary to COPD in post-operative status: former smoker of 50 years; quit around 5 years ago.  On 2-3 liters of oxygen at baseline for the past 5 years. Chest XR, blood gas and elevation of bicarb on previous admission all indicate relatively severe, chronic baseline COPD.  -Post-op, patient with increased WOB and hypercarbia requiring bipap therapy.  -Patient was able to be weaned to oxygen mask on the afternoon of 3/21 but again needed BiPAP therapy after repeat RRT called overnight on 3/21 - 3/22 for hypoxemia.  Blood gases seem to show adequate exchange and chest x-ray without any convincing interval change with a few hazy opacities that are nonspecific.  Hyperinflated lungs again noted.  -Patient was transferred to the ICU on 3/22 for closer monitoring.  Increased dose of her dexamethasone to 6 mg daily.    - Added ceftriaxone and azithromycin, will continue for now   - Continue nebulizers to help airway management.    - Her repeat blood gases have shown adequate gas exchange.  RT consulted to monitor.  -Patient did have another episode of sudden desaturation on the a.m. of 3/23 while on high flow nasal cannula.  She needed to be placed back on BiPAP with recovery.  Repeat chest x-ray without any significant change.  -As of  3/24/2023, continues to desat and now BiPAP dependent.  -Pulmonary consult nothing further to add at this time  - Palliative consult placed on 3/25/23. Family leaning on comfort measure and declined artificial nutrition        #Encephalopathy. Multifactorial see below  # Cerebral Meningioma.    # Acute toxic metabolic encephalopathy likely from post-op status, steroids, pain meds, ?infection Pt was diagnosed 9/2019 with a cerebral meningioma and monitored.  Given progression of the tumor, patient underwent 15 sessions of radiation 11/2022.  She is followed by Dr. Godoy, a Radiation Oncologist through [facility].  Pt was on oral steroids which were weaned off about 4 weeks ago.  Since that time, patient has had increased confusion, poor balance, changes in mood.  * Recent MRI Brain 3/17/23 revealed enlargement of meningioma up to 45 mm with a significant mass effect and the midline shift right to left.  Patient was instructed by her PCP to follow up with outpatient Neurology and Radiation Oncology this week.  * CT head on admission with no acute hemorrhage, but notes the known mass surrounding vasogenic edema predominantly involving the right frontal lobe with right to left midline shift/subfalcine herniation measuring up to 1.1 cm.  - Discussion with Dr. Godoy from radiation oncology on 3/21 and she discussed with Dr. Murphy.  She agrees with surgery at this point given advancement of tumor.   - Per neurosurgery- given her functional decline with confusion at home in the weeks prior to her presentation, principal concern is for meningioma as a large contributor to her poor functional status and confusion.    - tentatively planning for OR early next week for resection of meningioma once respiratory status improved.        #Lactic acidosis: resolved with IV fluids     #Goals of care: DNR/DNI. Family aware of poor prognosis given her Bipap dependence. Daughter is considering hospice/comfort measure if no  improvement of respiratory status by Monday and declined any artificial nutrition inline with pt's wishes.        #Acute on Chronic Anemia - Monitor blood counts in setting of surgery.           Recent Labs   Lab 03/23/23  0508 03/22/23  1045 03/21/23  0631 03/21/23  0230 03/20/23  2042   HGB 8.2* 8.6* 8.8* 9.1* 9.3*      #HLD - resume Atorvastatin when able to take p.o.  #Hypothyroidism -transitioned to IV levothyroxine until patient reliably able to take p.o.  #Depression/Anxiety -hold p.o. low-dose sertraline and mirtazapine until able to reliably take p.o.            Diet: NPO for Medical/Clinical Reasons Except for: Meds, Ice Chips    DVT Prophylaxis: Pneumatic Compression Devices  Roman Catheter: PRESENT, indication: Retention  Lines: None     Cardiac Monitoring: ACTIVE order. Indication: ICU  Code Status: No CPR- Do NOT Intubate      Clinically Significant Risk Factors                                Disposition Plan     Expected Discharge Date: 03/25/2023                  David Way MD  Hospitalist Service  [facility]  Securely message with [employer] (more info)  Text page via [employer] Paging/Directory   ______________________________________________________________________    Interval History   Confused. On Nasal BIPAP.   Daughter agreeable to DNR/DNI. Declined artifical nutrition. Palliative care consult.     Physical Exam   Vital Signs: Temp: 98.6  F (37  C) Temp src: Axillary BP: 119/78 Pulse: 95   Resp: 25 SpO2: 95 % O2 Device: BiPAP/CPAP    Weight: 158 lbs 1.12 oz    General Appearance: Well appearing for stated age.  Respiratory: CTAB, no rales or ronchi  Cardiovascular: S1, S2 normal, no murmurs  GI: non-tender on palpation, BS present      Medical Decision Making       55 MINUTES SPENT BY ME on the date of service doing chart review, history, exam, documentation & further activities per the note.      Data         Imaging results reviewed over the past 24 hrs:   Recent Results  (from the past 24 hour(s))   XR Chest Port 1 View    Narrative    EXAM: XR CHEST PORT 1 VIEW  LOCATION: [facility]  DATE/TIME: 3/24/2023 6:52 PM    INDICATION: Respiratory failure.  COMPARISON: 03/23/2023      Impression    IMPRESSION:     Hyperinflated lungs. Unchanged hazy bibasilar opacities, either atelectasis or infiltrate. No pleural effusion or pneumothorax.    The cardiomediastinal silhouette is unremarkable.

## 2023-03-25 NOTE — CONSULTS
PULMONARY NOTE    I am aware of the request for Pulmonary consultation. The patient is currently admitted to the ICU. Please call back when the patient is on the floor if Pulmonary consultation is still desired. Would be happy to see the patient at that time.     Thank you.       Isidoro Garber MD    [facility]  Office: [phone]  Pager: [phone]

## 2023-03-25 NOTE — PROGRESS NOTES
[facility]    Neurosurgery Progress Note     Assessment & Plan   73-year-old female who is status post right hip hemiarthroplasty.  An RRT was called postoperatively due to agitation, hypoxia, hypotension, and encephalopathy after surgery.  Imaging revealed known cerebral meningioma with moderate surrounding vasogenic edema and mass effect.  She has undergone radiation therapy and stopped steroids about 1 month ago.  Since that time has had balance issues, confusion, and increased headache.  She was transferred from [facility] to [facility] for possible surgical intervention regarding her meningioma with Dr. Murphy.  Today she continues on BiPAP and is now BiPAP dependent.  Palliative care consult placed today.  Per hospitalist note the family is leaning toward comfort measures at this time.    Plan:  Appreciate assistance from other services  Await decision from family after palliative care consult.  We will continue to follow for now    Sushila Richard CNP  [facility] Neurosurgery  [facility]  [address]  [address]  [place] [address]    Tel [phone]  Text page via [facility] Paging/Directory      Physical Exam   Temp: 98.6  F (37  C) Temp src: Axillary BP: 119/78 Pulse: 95   Resp: 25 SpO2: 98 % O2 Device: High Flow Nasal Cannula (HFNC) Oxygen Delivery: 40 LPM  Vitals:    03/24/23 0000 03/25/23 0600   Weight: 69.8 kg (153 lb 14.1 oz) 71.7 kg (158 lb 1.1 oz)     Vital Signs with Ranges  Temp:  [98  F (36.7  C)-99.3  F (37.4  C)] 98.6  F (37  C)  Pulse:  [] 95  Resp:  [15-55] 25  BP: (119-144)/() 119/78  FiO2 (%):  [35 %-45 %] 45 %  SpO2:  [90 %-100 %] 98 %  I/O last 3 completed shifts:  In: 1345 [I.V.:1345]  Out: 900 [Urine:900]     , Blood pressure 119/78, pulse 95, temperature 98.6  F (37  C), temperature source Axillary, resp. rate 25, weight 71.7 kg (158 lb 1.1 oz), SpO2 98 %, not currently  breastfeeding.  158 lbs 1.12 oz    Medications     dextrose 5% and 0.45% NaCl 75 mL/hr at 03/25/23 0734     - MEDICATION INSTRUCTIONS -         azithromycin  500 mg Intravenous Q24H     cefTRIAXone  2 g Intravenous Q24H     dexamethasone  6 mg Intravenous Daily     enoxaparin ANTICOAGULANT  40 mg Subcutaneous Q24H     famotidine  20 mg Intravenous Q12H     insulin aspart  1-6 Units Subcutaneous Q4H     ipratropium - albuterol 0.5 mg/2.5 mg/3 mL  3 mL Nebulization 4x daily     levothyroxine  65 mcg Intravenous Daily     sodium chloride (PF)  3 mL Intracatheter Q8H       Data     CBC RESULTS:   Recent Labs   Lab Test 03/24/23  0502   WBC 18.5*   RBC 2.90*   HGB 8.9*   HCT 28.5*   MCV 98   MCH 30.7   MCHC 31.2*   RDW 12.7        Basic Metabolic Panel:  Lab Results   Component Value Date     03/24/2023      Lab Results   Component Value Date    POTASSIUM 4.8 03/24/2023     Lab Results   Component Value Date    CHLORIDE 106 03/24/2023     Lab Results   Component Value Date    CHAO 8.4 03/24/2023     Lab Results   Component Value Date    CO2 27 03/24/2023     Lab Results   Component Value Date    BUN 25.1 03/24/2023     Lab Results   Component Value Date    CR 0.51 03/24/2023     Lab Results   Component Value Date     03/25/2023     INR:  Lab Results   Component Value Date    INR 0.96 03/19/2023

## 2023-03-25 NOTE — PROGRESS NOTES
Pt remains on continuous  Bipap . Desaturated to 84% while doing oral care. SPo2 improved after pt placed back on Bipap. Neb given per order. BS diminished. Alternate under the nose and full face mask every 4 hours. Skin intact. Liquicell on bridge of nose. Abrasions on bilateral cheeks. Will continue to monitor.    Andrez West, RT

## 2023-03-25 NOTE — PROGRESS NOTES
Assumed care at 0400. Pt following commands and seems to nod appropriately. SR. Remains on Bipap at 40%. Roman in place. Sitter remains at bedside. Continue with plan of care.

## 2023-03-26 ENCOUNTER — APPOINTMENT (OUTPATIENT)
Dept: SPEECH THERAPY | Facility: CLINIC | Age: 73
DRG: 025 | End: 2023-03-26
Attending: INTERNAL MEDICINE
Payer: COMMERCIAL

## 2023-03-26 LAB
ABO/RH(D): NORMAL
ANION GAP SERPL CALCULATED.3IONS-SCNC: 7 MMOL/L (ref 7–15)
ANTIBODY SCREEN: NEGATIVE
BASOPHILS # BLD AUTO: 0 10E3/UL (ref 0–0.2)
BASOPHILS NFR BLD AUTO: 0 %
BLD PROD TYP BPU: NORMAL
BLOOD COMPONENT TYPE: NORMAL
BUN SERPL-MCNC: 15.4 MG/DL (ref 8–23)
CALCIUM SERPL-MCNC: 8.1 MG/DL (ref 8.8–10.2)
CHLORIDE SERPL-SCNC: 108 MMOL/L (ref 98–107)
CODING SYSTEM: NORMAL
CREAT SERPL-MCNC: 0.44 MG/DL (ref 0.51–0.95)
CROSSMATCH: NORMAL
DEPRECATED HCO3 PLAS-SCNC: 31 MMOL/L (ref 22–29)
EOSINOPHIL # BLD AUTO: 0 10E3/UL (ref 0–0.7)
EOSINOPHIL NFR BLD AUTO: 0 %
ERYTHROCYTE [DISTWIDTH] IN BLOOD BY AUTOMATED COUNT: 13.2 % (ref 10–15)
GFR SERPL CREATININE-BSD FRML MDRD: >90 ML/MIN/1.73M2
GLUCOSE BLDC GLUCOMTR-MCNC: 128 MG/DL (ref 70–99)
GLUCOSE BLDC GLUCOMTR-MCNC: 129 MG/DL (ref 70–99)
GLUCOSE BLDC GLUCOMTR-MCNC: 130 MG/DL (ref 70–99)
GLUCOSE BLDC GLUCOMTR-MCNC: 134 MG/DL (ref 70–99)
GLUCOSE BLDC GLUCOMTR-MCNC: 148 MG/DL (ref 70–99)
GLUCOSE BLDC GLUCOMTR-MCNC: 154 MG/DL (ref 70–99)
GLUCOSE SERPL-MCNC: 133 MG/DL (ref 70–99)
HCT VFR BLD AUTO: 25.7 % (ref 35–47)
HGB BLD-MCNC: 7.8 G/DL (ref 11.7–15.7)
IMM GRANULOCYTES # BLD: 0.1 10E3/UL
IMM GRANULOCYTES NFR BLD: 1 %
ISSUE DATE AND TIME: NORMAL
LYMPHOCYTES # BLD AUTO: 0.4 10E3/UL (ref 0.8–5.3)
LYMPHOCYTES NFR BLD AUTO: 4 %
MCH RBC QN AUTO: 29.9 PG (ref 26.5–33)
MCHC RBC AUTO-ENTMCNC: 30.4 G/DL (ref 31.5–36.5)
MCV RBC AUTO: 99 FL (ref 78–100)
MONOCYTES # BLD AUTO: 0.7 10E3/UL (ref 0–1.3)
MONOCYTES NFR BLD AUTO: 8 %
NEUTROPHILS # BLD AUTO: 8.2 10E3/UL (ref 1.6–8.3)
NEUTROPHILS NFR BLD AUTO: 87 %
NRBC # BLD AUTO: 0 10E3/UL
NRBC BLD AUTO-RTO: 0 /100
PLATELET # BLD AUTO: 184 10E3/UL (ref 150–450)
POTASSIUM SERPL-SCNC: 3.8 MMOL/L (ref 3.4–5.3)
RBC # BLD AUTO: 2.61 10E6/UL (ref 3.8–5.2)
SODIUM SERPL-SCNC: 146 MMOL/L (ref 136–145)
SPECIMEN EXPIRATION DATE: NORMAL
UNIT ABO/RH: NORMAL
UNIT NUMBER: NORMAL
UNIT STATUS: NORMAL
UNIT TYPE ISBT: 6200
WBC # BLD AUTO: 9.4 10E3/UL (ref 4–11)

## 2023-03-26 PROCEDURE — 86923 COMPATIBILITY TEST ELECTRIC: CPT | Performed by: INTERNAL MEDICINE

## 2023-03-26 PROCEDURE — 250N000011 HC RX IP 250 OP 636: Performed by: PHYSICIAN ASSISTANT

## 2023-03-26 PROCEDURE — 92526 ORAL FUNCTION THERAPY: CPT | Mod: GN | Performed by: SPEECH-LANGUAGE PATHOLOGIST

## 2023-03-26 PROCEDURE — 250N000011 HC RX IP 250 OP 636: Performed by: HOSPITALIST

## 2023-03-26 PROCEDURE — 258N000001 HC RX 258: Performed by: HOSPITALIST

## 2023-03-26 PROCEDURE — 92610 EVALUATE SWALLOWING FUNCTION: CPT | Mod: GN | Performed by: SPEECH-LANGUAGE PATHOLOGIST

## 2023-03-26 PROCEDURE — 85025 COMPLETE CBC W/AUTO DIFF WBC: CPT | Performed by: INTERNAL MEDICINE

## 2023-03-26 PROCEDURE — 250N000013 HC RX MED GY IP 250 OP 250 PS 637: Performed by: INTERNAL MEDICINE

## 2023-03-26 PROCEDURE — 94640 AIRWAY INHALATION TREATMENT: CPT | Mod: 76

## 2023-03-26 PROCEDURE — 99233 SBSQ HOSP IP/OBS HIGH 50: CPT | Performed by: INTERNAL MEDICINE

## 2023-03-26 PROCEDURE — 86901 BLOOD TYPING SEROLOGIC RH(D): CPT | Performed by: INTERNAL MEDICINE

## 2023-03-26 PROCEDURE — 82310 ASSAY OF CALCIUM: CPT | Performed by: INTERNAL MEDICINE

## 2023-03-26 PROCEDURE — 86850 RBC ANTIBODY SCREEN: CPT | Performed by: INTERNAL MEDICINE

## 2023-03-26 PROCEDURE — 999N000157 HC STATISTIC RCP TIME EA 10 MIN

## 2023-03-26 PROCEDURE — 250N000011 HC RX IP 250 OP 636: Performed by: INTERNAL MEDICINE

## 2023-03-26 PROCEDURE — P9016 RBC LEUKOCYTES REDUCED: HCPCS | Performed by: INTERNAL MEDICINE

## 2023-03-26 PROCEDURE — 36415 COLL VENOUS BLD VENIPUNCTURE: CPT | Performed by: INTERNAL MEDICINE

## 2023-03-26 PROCEDURE — 250N000009 HC RX 250: Performed by: PHYSICIAN ASSISTANT

## 2023-03-26 PROCEDURE — 200N000001 HC R&B ICU

## 2023-03-26 PROCEDURE — 94660 CPAP INITIATION&MGMT: CPT

## 2023-03-26 RX ORDER — QUETIAPINE FUMARATE 25 MG/1
25 TABLET, FILM COATED ORAL ONCE
Status: COMPLETED | OUTPATIENT
Start: 2023-03-26 | End: 2023-03-26

## 2023-03-26 RX ORDER — FUROSEMIDE 10 MG/ML
20 INJECTION INTRAMUSCULAR; INTRAVENOUS ONCE
Status: COMPLETED | OUTPATIENT
Start: 2023-03-26 | End: 2023-03-26

## 2023-03-26 RX ADMIN — DEXTROSE AND SODIUM CHLORIDE: 5; 450 INJECTION, SOLUTION INTRAVENOUS at 22:03

## 2023-03-26 RX ADMIN — DEXAMETHASONE SODIUM PHOSPHATE 6 MG: 4 INJECTION, SOLUTION INTRAMUSCULAR; INTRAVENOUS at 08:38

## 2023-03-26 RX ADMIN — IPRATROPIUM BROMIDE AND ALBUTEROL SULFATE 3 ML: 2.5; .5 SOLUTION RESPIRATORY (INHALATION) at 16:11

## 2023-03-26 RX ADMIN — CEFTRIAXONE SODIUM 2 G: 2 INJECTION, POWDER, FOR SOLUTION INTRAMUSCULAR; INTRAVENOUS at 08:49

## 2023-03-26 RX ADMIN — FAMOTIDINE 20 MG: 10 INJECTION INTRAVENOUS at 08:16

## 2023-03-26 RX ADMIN — DEXTROSE AND SODIUM CHLORIDE: 5; 450 INJECTION, SOLUTION INTRAVENOUS at 04:36

## 2023-03-26 RX ADMIN — ENOXAPARIN SODIUM 40 MG: 40 INJECTION SUBCUTANEOUS at 12:24

## 2023-03-26 RX ADMIN — IPRATROPIUM BROMIDE AND ALBUTEROL SULFATE 3 ML: 2.5; .5 SOLUTION RESPIRATORY (INHALATION) at 06:56

## 2023-03-26 RX ADMIN — LEVOTHYROXINE SODIUM 65 MCG: 20 INJECTION, SOLUTION INTRAVENOUS at 09:26

## 2023-03-26 RX ADMIN — AZITHROMYCIN MONOHYDRATE 500 MG: 500 INJECTION, POWDER, LYOPHILIZED, FOR SOLUTION INTRAVENOUS at 09:52

## 2023-03-26 RX ADMIN — INSULIN ASPART 1 UNITS: 100 INJECTION, SOLUTION INTRAVENOUS; SUBCUTANEOUS at 20:19

## 2023-03-26 RX ADMIN — QUETIAPINE FUMARATE 25 MG: 25 TABLET ORAL at 22:03

## 2023-03-26 RX ADMIN — FUROSEMIDE 20 MG: 10 INJECTION, SOLUTION INTRAMUSCULAR; INTRAVENOUS at 15:30

## 2023-03-26 RX ADMIN — FAMOTIDINE 20 MG: 10 INJECTION INTRAVENOUS at 20:19

## 2023-03-26 RX ADMIN — INSULIN ASPART 1 UNITS: 100 INJECTION, SOLUTION INTRAVENOUS; SUBCUTANEOUS at 16:26

## 2023-03-26 RX ADMIN — IPRATROPIUM BROMIDE AND ALBUTEROL SULFATE 3 ML: 2.5; .5 SOLUTION RESPIRATORY (INHALATION) at 20:57

## 2023-03-26 RX ADMIN — IPRATROPIUM BROMIDE AND ALBUTEROL SULFATE 3 ML: 2.5; .5 SOLUTION RESPIRATORY (INHALATION) at 11:12

## 2023-03-26 ASSESSMENT — ACTIVITIES OF DAILY LIVING (ADL)
ADLS_ACUITY_SCORE: 45

## 2023-03-26 NOTE — PLAN OF CARE
Pt. Remains successfully transitioned from BiPap to HFNC for the majority of this 12 hour shift. ,FiO2 titrated minimaly, diminished lung sounds,CV: SR, adequate BP and perfusion, adequate urine output through baldwin with good response to lasix (given after one unit of PRBCs), cleared by speech for pureed diet and ate the better part of two good meals, drinking some fluid, , neuro status improved with pt. Conversing more and showing more extremity strength and coordination over shift,  confused to nay and situation, moves all extremities, palliative team to meet with pt. And family tomorrow, pt's daughter and sister at bedside and were supportive

## 2023-03-26 NOTE — PLAN OF CARE
Disoriented x3, follows commands inconsistently, slow to respond. Neuro exams unchanged. NSR. Bipap 40% overnight. Roman catheter in place.

## 2023-03-26 NOTE — PROGRESS NOTES
[facility]    Medicine Progress Note - Hospitalist Service    Date of Admission:  3/23/2023    Assessment & Plan    Josephine Nicole is a 73 year old female with a history of B12 Deficiency, Melanoma, COPD, Former Smoker, Oxygen Dependent, Depression, Anxiety, HLD, Hypothyroidism, Osteoporosis, Cerebral Meningioma who presented to the [facility] ED on 3/19/2023 with right hip pain after a fall.     Patient has a very complex medical history.  She underwent surgical repair for right femoral neck fracture on 3/20 with postoperative complications including worsening respiratory failure, hypotension, lactic acidosis, worsening encephalopathy.  She has worsening of her cerebral meningioma which also requires surgical resection.  Per discharge summary, hospitalist provider had discussed with the family, and they noted that over the last 3-4 weeks the patient has had functionally significantly worsened.  She has had worsening confusion and coordination issues including the cause of her fall at home.     Dr. Murphy from neurosurgery was contacted on 3/23.  He recommended transfer to [facility] and tentatively planning for OR early next week for resection of meningioma.  He would like her respiratory status optimized.    Patient has been admitted to the ICU at [facility] on 3/23/2  due to acute on chronic mixed respiratory failure secondary to severe advanced emphysema requiring BiPAP support. She remains on bipap and unable to wean her off bipap. Patient did have episode of sudden desaturation on the a.m. of 3/23 while on high flow nasal cannula.  She needed to be placed back on BiPAP with recovery.  Repeat chest x-ray without any significant change. This morning, she desaturated to 84% while doing oral care very briefly (secs). SPo2 improved after pt placed back on Bipap.      Displaced Right Femoral Neck Fracture, S/P right hip hemiarthroplasty 3/20 - mechanical fall.   Xray revealed a displaced right femoral neck fracture and a subtle irregularity of the parasymphyseal aspect of the left pubic bone, potentially representing an age indeterminant fracture.     Orthopedic Surgery consult requested,.S/P right hip hemiarthroplasty on 3/20. I appreciate Ortho follow up    Prn pain meds also available.  Avoid opioids and sedatives as much as possible due to encephalopathy    Continue DVT prophylaxis with Lovenox    need to hold Lovenox prior to brain surgery, but timing for surgery is unknown    Roman catheter placed on 3/21 for concern of urinary retention. Continue for now.  Please note that Josephine desats, even on high flow oxygen, with virtually any movement or paroxysm of cough.    prn haldol for agitation.     Acute on chronic mixed respiratory failure secondary to COPD in post-operative status: former smoker of 50 years; quit around 5 years ago.  On 2-3 liters of oxygen at baseline for the past 5 years. Chest XR, blood gas and elevation of bicarb on previous admission all indicate relatively severe, chronic baseline COPD.    Post-op, patient with increased WOB and hypercarbia requiring bipap therapy.    Patient was able to be weaned to oxygen mask on the afternoon of 3/21 but again needed BiPAP therapy after repeat RRT called overnight on 3/21 - 3/22 for hypoxemia.  Blood gases seem to show adequate exchange and chest x-ray without any convincing interval change with a few hazy opacities that are nonspecific.  Hyperinflated lungs again noted    Patient was transferred to the ICU on 3/22 for closer monitoring.  Increased dexamethasone to 6 mg daily.      Added ceftriaxone and azithromycin, continue for now     Continue nebulized bronchodilator    repeat blood gases have shown adequate gas exchange.     Patient did have another episode of sudden desaturation on the a.m. of 3/23 while on high flow nasal cannula.  She needed to be placed back on BiPAP with recovery.  Repeat chest x-ray  "without any significant change    As of 3/26/2023, continues to desat and intermittently requires BiPAP    Pulmonary consult requested, please see Dr. Garber's note, \"The patient is currently admitted to the ICU. Please call back when the patient is on the floor if Pulmonary consultation is still desired. \"    Palliative consult requested 3/25/23. Family declined feeding tube, they are leaning toward change to comfort emphasis     Encephalopathy. Multifactorial see below  Cerebral Meningioma.    Acute toxic metabolic encephalopathy likely from post-op status, steroids, pain meds, ?infection Pt was diagnosed 9/2019 with a cerebral meningioma and monitored.  Given progression of the tumor, patient underwent 15 sessions of radiation 11/2022.  She is followed by Dr. Godoy, a Radiation Oncologist through [facility].  Pt was on oral steroids which were weaned off about 4 weeks ago.  Since that time, patient has had increased confusion, poor balance, changes in mood.  * Recent MRI Brain 3/17/23 revealed enlargement of meningioma up to 45 mm with a significant mass effect and the midline shift right to left.  Patient was instructed by her PCP to follow up with outpatient Neurology and Radiation Oncology this week.  * CT head on admission with no acute hemorrhage, but notes the known mass surrounding vasogenic edema predominantly involving the right frontal lobe with right to left midline shift/subfalcine herniation measuring up to 1.1 cm.    Discussion with Dr. Godoy from radiation oncology on 3/21 and she discussed with Dr. Murphy.  She agrees with surgery at this point given advancement of tumor.     Per neurosurgery- given her functional decline with confusion at home in the weeks prior to her presentation, principal concern is for meningioma as a large contributor to her poor functional status and confusion.      tentative plan is craniotomy early next week if respiratory status improves.      Suspected " "malnutrition    Patient has been n.p.o. due to obtundation and intermittent use of noninvasive ventilation    She is more alert 3/26, requests oral intake    SLP consult requested    Lactic acidosis: resolved with IV fluids     Goals of care: DNR/DNI. Family aware of poor prognosis given her Bipap dependence. Daughter is considering hospice/comfort measure if no improvement of respiratory status by Monday and declined any artificial nutrition inline with pt's wishes.        Acute on Chronic Anemia    Preop hemoglobin 10.5 g/dL    Hemoglobin 3/26 7.8 g/dL    Discussed with family, transfuse PRBCs due to severe hypoxic respiratory failure, wish to provide additional oxygen carrying capacity.  Consent obtained.    Follow hemoglobin, consider transfusion for hemoglobin less than 8 g/dL due to ongoing respiratory failure, desire to optimize if at all possible if planning for surgery     HLD - resume Atorvastatin when able to take p.o.  Hypothyroidism -transitioned to IV levothyroxine until patient reliably able to take p.o.  Depression/Anxiety -hold p.o. low-dose sertraline and mirtazapine until able to reliably take p.o.     Diet: Combination Diet Pureed Diet (level 4); Thin Liquids (level 0) (straws ok)  Room Service    DVT Prophylaxis: Pneumatic Compression Devices  Roman Catheter: PRESENT, indication: Retention  Lines: None     Cardiac Monitoring: ACTIVE order. Indication: ICU  Code Status: No CPR- Do NOT Intubate      Clinically Significant Risk Factors         # Hypernatremia: Highest Na = 146 mmol/L in last 2 days, will monitor as appropriate                        Disposition Plan           Jennifer Ge MD  Hospitalist Service  [facility]  Securely message with [employer] (more info)  Text page via [employer] Paging/Directory   ______________________________________________________________________    Interval History   \"I would like something to eat.\"  Josephine is awake, she is asking to eat and " drink.  Her daughter, Jordan and patient's sister are at the bedside.  We discussed risks and benefits of PRBC transfusion, including increased oxygen carrying capacity.  They gave consent.  They agree with SLP consult, evaluate swallowing ability.    Physical Exam   Vital Signs: Temp: 98.9  F (37.2  C) Temp src: Axillary BP: 119/68 Pulse: 80   Resp: 18 SpO2: 99 % O2 Device: High Flow Nasal Cannula (HFNC) Oxygen Delivery: 40 LPM  Weight: 157 lbs 6.54 oz    Constitutional: Drowsy, wakes to voice  Respiratory: Decreased breath sounds throughout.  Cardiovascular: Regular rate and rhythm  GI: Normal bowel sounds, soft, non-distended, non-tender  Skin/Integumen: No rash on exposed skin.  No lower extremity edema  Other: Mood is calm, but she is confused        Medical Decision Making       55 MINUTES SPENT BY ME on the date of service doing chart review, history, exam, documentation & further activities per the note.      Data     I have personally reviewed the following data over the past 24 hrs:    9.4  \   7.8 (L)   / 184     146 (H) 108 (H) 15.4 /  134 (H)   3.8 31 (H) 0.44 (L) \       Imaging results reviewed over the past 24 hrs:   No results found for this or any previous visit (from the past 24 hour(s)).

## 2023-03-26 NOTE — PROGRESS NOTES
[facility]    Neurosurgery Progress Note     Assessment & Plan   73-year-old female who is status post right hip hemiarthroplasty.  An RRT was called postoperatively due to agitation, hypoxia, hypotension, and encephalopathy after surgery.  Imaging revealed known cerebral meningioma with moderate surrounding vasogenic edema and mass effect.  She has undergone radiation therapy and stopped steroids about 1 month ago.  Since that time has had balance issues, confusion, and increased headache.  She was transferred from [facility] to [facility] for possible surgical intervention regarding her meningioma with Dr. Murphy. Transitioned to high flow O2 yesterday and required BiPAP overnight. Again switched to high flow O2 this AM. Palliative care consult placed yesterday, although they won't likely be able to see her until tomorrow.      Plan:  Appreciate assistance from other services  Await decision from family after palliative care consult.  We will continue to follow for now    Sushila Richard CNP  [facility] Neurosurgery  [facility]  [address] [place] [id number]    Tel [phone]  Text page via [employer] Paging/Directory      Physical Exam   Temp: 98.9  F (37.2  C) Temp src: Axillary BP: 119/68 Pulse: 80   Resp: 18 SpO2: 99 % O2 Device: High Flow Nasal Cannula (HFNC) Oxygen Delivery: 40 LPM  Vitals:    03/24/23 0000 03/25/23 0600 03/26/23 0000   Weight: 69.8 kg (153 lb 14.1 oz) 71.7 kg (158 lb 1.1 oz) 71.4 kg (157 lb 6.5 oz)     Vital Signs with Ranges  Temp:  [98.1  F (36.7  C)-99.2  F (37.3  C)] 98.9  F (37.2  C)  Pulse:  [70-96] 80  Resp:  [15-37] 18  BP: ()/() 119/68  FiO2 (%):  [40 %-45 %] 40 %  SpO2:  [93 %-100 %] 99 %  I/O last 3 completed shifts:  In: 1800 [I.V.:1800]  Out: 800 [Urine:800]     , Blood pressure 119/68, pulse 80, temperature 98.9  F (37.2  C), temperature source Axillary, resp.  rate 18, weight 71.4 kg (157 lb 6.5 oz), SpO2 99 %, not currently breastfeeding.  157 lbs 6.54 oz     Neurologic status:  Alert and oriented to person, place. Disoriented to time or situation.  Able to lift arms above head to command.  Wiggles toes.    Medications     dextrose 5% and 0.45% NaCl 75 mL/hr at 03/26/23 0436     - MEDICATION INSTRUCTIONS -         azithromycin  500 mg Intravenous Q24H     cefTRIAXone  2 g Intravenous Q24H     dexamethasone  6 mg Intravenous Daily     enoxaparin ANTICOAGULANT  40 mg Subcutaneous Q24H     famotidine  20 mg Intravenous Q12H     furosemide  20 mg Intravenous Once     insulin aspart  1-6 Units Subcutaneous Q4H     ipratropium - albuterol 0.5 mg/2.5 mg/3 mL  3 mL Nebulization 4x daily     levothyroxine  65 mcg Intravenous Daily     sodium chloride (PF)  3 mL Intracatheter Q8H       Data     CBC RESULTS:   Recent Labs   Lab Test 03/24/23  0502   WBC 18.5*   RBC 2.90*   HGB 8.9*   HCT 28.5*   MCV 98   MCH 30.7   MCHC 31.2*   RDW 12.7        Basic Metabolic Panel:  Lab Results   Component Value Date     03/24/2023      Lab Results   Component Value Date    POTASSIUM 4.8 03/24/2023     Lab Results   Component Value Date    CHLORIDE 106 03/24/2023     Lab Results   Component Value Date    CHAO 8.4 03/24/2023     Lab Results   Component Value Date    CO2 27 03/24/2023     Lab Results   Component Value Date    BUN 25.1 03/24/2023     Lab Results   Component Value Date    CR 0.51 03/24/2023     Lab Results   Component Value Date     03/25/2023     INR:  Lab Results   Component Value Date    INR 0.96 03/19/2023

## 2023-03-27 ENCOUNTER — APPOINTMENT (OUTPATIENT)
Dept: GENERAL RADIOLOGY | Facility: CLINIC | Age: 73
DRG: 025 | End: 2023-03-27
Attending: INTERNAL MEDICINE
Payer: COMMERCIAL

## 2023-03-27 ENCOUNTER — APPOINTMENT (OUTPATIENT)
Dept: SPEECH THERAPY | Facility: CLINIC | Age: 73
DRG: 025 | End: 2023-03-27
Attending: INTERNAL MEDICINE
Payer: COMMERCIAL

## 2023-03-27 ENCOUNTER — APPOINTMENT (OUTPATIENT)
Dept: CARDIOLOGY | Facility: CLINIC | Age: 73
DRG: 025 | End: 2023-03-27
Attending: INTERNAL MEDICINE
Payer: COMMERCIAL

## 2023-03-27 LAB
ALLEN'S TEST: YES
ANION GAP SERPL CALCULATED.3IONS-SCNC: 6 MMOL/L (ref 7–15)
BASE EXCESS BLDA CALC-SCNC: 8.3 MMOL/L (ref -9–1.8)
BUN SERPL-MCNC: 16.1 MG/DL (ref 8–23)
CALCIUM SERPL-MCNC: 8.1 MG/DL (ref 8.8–10.2)
CHLORIDE SERPL-SCNC: 105 MMOL/L (ref 98–107)
CREAT SERPL-MCNC: 0.51 MG/DL (ref 0.51–0.95)
DEPRECATED HCO3 PLAS-SCNC: 32 MMOL/L (ref 22–29)
ERYTHROCYTE [DISTWIDTH] IN BLOOD BY AUTOMATED COUNT: 14.7 % (ref 10–15)
GFR SERPL CREATININE-BSD FRML MDRD: >90 ML/MIN/1.73M2
GLUCOSE BLDC GLUCOMTR-MCNC: 113 MG/DL (ref 70–99)
GLUCOSE BLDC GLUCOMTR-MCNC: 115 MG/DL (ref 70–99)
GLUCOSE BLDC GLUCOMTR-MCNC: 128 MG/DL (ref 70–99)
GLUCOSE BLDC GLUCOMTR-MCNC: 128 MG/DL (ref 70–99)
GLUCOSE BLDC GLUCOMTR-MCNC: 140 MG/DL (ref 70–99)
GLUCOSE BLDC GLUCOMTR-MCNC: 197 MG/DL (ref 70–99)
GLUCOSE SERPL-MCNC: 120 MG/DL (ref 70–99)
HCO3 BLD-SCNC: 33 MMOL/L (ref 21–28)
HCT VFR BLD AUTO: 31.7 % (ref 35–47)
HGB BLD-MCNC: 9.8 G/DL (ref 11.7–15.7)
LVEF ECHO: NORMAL
MCH RBC QN AUTO: 29.3 PG (ref 26.5–33)
MCHC RBC AUTO-ENTMCNC: 30.9 G/DL (ref 31.5–36.5)
MCV RBC AUTO: 95 FL (ref 78–100)
O2/TOTAL GAS SETTING VFR VENT: 30 %
OXYHGB MFR BLD: 97 % (ref 92–100)
PCO2 BLD: 44 MM HG (ref 35–45)
PH BLD: 7.48 [PH] (ref 7.35–7.45)
PLATELET # BLD AUTO: 194 10E3/UL (ref 150–450)
PO2 BLD: 89 MM HG (ref 80–105)
POTASSIUM SERPL-SCNC: 3.7 MMOL/L (ref 3.4–5.3)
RBC # BLD AUTO: 3.35 10E6/UL (ref 3.8–5.2)
SODIUM SERPL-SCNC: 143 MMOL/L (ref 136–145)
WBC # BLD AUTO: 10.9 10E3/UL (ref 4–11)

## 2023-03-27 PROCEDURE — 999N000208 ECHOCARDIOGRAM COMPLETE

## 2023-03-27 PROCEDURE — 82805 BLOOD GASES W/O2 SATURATION: CPT | Performed by: INTERNAL MEDICINE

## 2023-03-27 PROCEDURE — 999N000157 HC STATISTIC RCP TIME EA 10 MIN

## 2023-03-27 PROCEDURE — 94640 AIRWAY INHALATION TREATMENT: CPT | Mod: 76

## 2023-03-27 PROCEDURE — 250N000011 HC RX IP 250 OP 636: Performed by: HOSPITALIST

## 2023-03-27 PROCEDURE — 250N000011 HC RX IP 250 OP 636: Performed by: PHYSICIAN ASSISTANT

## 2023-03-27 PROCEDURE — 255N000002 HC RX 255 OP 636: Performed by: INTERNAL MEDICINE

## 2023-03-27 PROCEDURE — 93306 TTE W/DOPPLER COMPLETE: CPT | Mod: 26 | Performed by: INTERNAL MEDICINE

## 2023-03-27 PROCEDURE — 200N000001 HC R&B ICU

## 2023-03-27 PROCEDURE — 92526 ORAL FUNCTION THERAPY: CPT | Mod: GN | Performed by: SPEECH-LANGUAGE PATHOLOGIST

## 2023-03-27 PROCEDURE — 250N000009 HC RX 250: Performed by: PHYSICIAN ASSISTANT

## 2023-03-27 PROCEDURE — 94660 CPAP INITIATION&MGMT: CPT

## 2023-03-27 PROCEDURE — 80048 BASIC METABOLIC PNL TOTAL CA: CPT | Performed by: INTERNAL MEDICINE

## 2023-03-27 PROCEDURE — 71045 X-RAY EXAM CHEST 1 VIEW: CPT

## 2023-03-27 PROCEDURE — 99233 SBSQ HOSP IP/OBS HIGH 50: CPT | Performed by: STUDENT IN AN ORGANIZED HEALTH CARE EDUCATION/TRAINING PROGRAM

## 2023-03-27 PROCEDURE — 85027 COMPLETE CBC AUTOMATED: CPT | Performed by: INTERNAL MEDICINE

## 2023-03-27 PROCEDURE — 250N000011 HC RX IP 250 OP 636: Performed by: STUDENT IN AN ORGANIZED HEALTH CARE EDUCATION/TRAINING PROGRAM

## 2023-03-27 PROCEDURE — 99223 1ST HOSP IP/OBS HIGH 75: CPT | Performed by: NURSE PRACTITIONER

## 2023-03-27 PROCEDURE — 36415 COLL VENOUS BLD VENIPUNCTURE: CPT | Performed by: INTERNAL MEDICINE

## 2023-03-27 PROCEDURE — 36600 WITHDRAWAL OF ARTERIAL BLOOD: CPT

## 2023-03-27 RX ORDER — FUROSEMIDE 10 MG/ML
20 INJECTION INTRAMUSCULAR; INTRAVENOUS ONCE
Status: COMPLETED | OUTPATIENT
Start: 2023-03-27 | End: 2023-03-27

## 2023-03-27 RX ORDER — FUROSEMIDE 20 MG
20 TABLET ORAL DAILY
Status: DISCONTINUED | OUTPATIENT
Start: 2023-03-27 | End: 2023-03-27

## 2023-03-27 RX ORDER — FUROSEMIDE 10 MG/ML
10 INJECTION INTRAMUSCULAR; INTRAVENOUS ONCE
Status: DISCONTINUED | OUTPATIENT
Start: 2023-03-27 | End: 2023-03-27

## 2023-03-27 RX ADMIN — AZITHROMYCIN MONOHYDRATE 500 MG: 500 INJECTION, POWDER, LYOPHILIZED, FOR SOLUTION INTRAVENOUS at 10:36

## 2023-03-27 RX ADMIN — IPRATROPIUM BROMIDE AND ALBUTEROL SULFATE 3 ML: 2.5; .5 SOLUTION RESPIRATORY (INHALATION) at 12:32

## 2023-03-27 RX ADMIN — IPRATROPIUM BROMIDE AND ALBUTEROL SULFATE 3 ML: 2.5; .5 SOLUTION RESPIRATORY (INHALATION) at 15:04

## 2023-03-27 RX ADMIN — LEVOTHYROXINE SODIUM 65 MCG: 20 INJECTION, SOLUTION INTRAVENOUS at 10:36

## 2023-03-27 RX ADMIN — INSULIN ASPART 1 UNITS: 100 INJECTION, SOLUTION INTRAVENOUS; SUBCUTANEOUS at 19:45

## 2023-03-27 RX ADMIN — FAMOTIDINE 20 MG: 10 INJECTION INTRAVENOUS at 19:45

## 2023-03-27 RX ADMIN — ENOXAPARIN SODIUM 40 MG: 40 INJECTION SUBCUTANEOUS at 12:14

## 2023-03-27 RX ADMIN — FUROSEMIDE 20 MG: 10 INJECTION, SOLUTION INTRAMUSCULAR; INTRAVENOUS at 17:58

## 2023-03-27 RX ADMIN — HUMAN ALBUMIN MICROSPHERES AND PERFLUTREN 9 ML: 10; .22 INJECTION, SOLUTION INTRAVENOUS at 14:27

## 2023-03-27 RX ADMIN — DEXAMETHASONE SODIUM PHOSPHATE 6 MG: 4 INJECTION, SOLUTION INTRAMUSCULAR; INTRAVENOUS at 08:37

## 2023-03-27 RX ADMIN — CEFTRIAXONE SODIUM 2 G: 2 INJECTION, POWDER, FOR SOLUTION INTRAMUSCULAR; INTRAVENOUS at 08:37

## 2023-03-27 RX ADMIN — INSULIN ASPART 2 UNITS: 100 INJECTION, SOLUTION INTRAVENOUS; SUBCUTANEOUS at 15:50

## 2023-03-27 RX ADMIN — HALOPERIDOL LACTATE 2 MG: 5 INJECTION, SOLUTION INTRAMUSCULAR at 23:49

## 2023-03-27 RX ADMIN — IPRATROPIUM BROMIDE AND ALBUTEROL SULFATE 3 ML: 2.5; .5 SOLUTION RESPIRATORY (INHALATION) at 19:37

## 2023-03-27 RX ADMIN — FAMOTIDINE 20 MG: 10 INJECTION INTRAVENOUS at 08:37

## 2023-03-27 RX ADMIN — IPRATROPIUM BROMIDE AND ALBUTEROL SULFATE 3 ML: 2.5; .5 SOLUTION RESPIRATORY (INHALATION) at 07:28

## 2023-03-27 ASSESSMENT — ACTIVITIES OF DAILY LIVING (ADL)
ADLS_ACUITY_SCORE: 45

## 2023-03-27 NOTE — PROVIDER NOTIFICATION
moo galvan contacted about medication for increased confusion. Received orders for Seroquel x1.

## 2023-03-27 NOTE — PROGRESS NOTES
AVSS on HFNC and bipap while sleeping. Denies pain. Right hip incision intact. LS dim throughout. Diet NPO. Bedrest per orders. BS active; INC of stool x2. Roman for retention.

## 2023-03-27 NOTE — PLAN OF CARE
Care provided from [id number]    Plan of care reviewed with patient and family.     Patient alert but confused. Moves all extremities purposefully. SR with stable blood pressures. Patient on CPAP overnight and then highflow this morning. Diminished lung sounds with inspiratory wheezes. Roman with good urine output and hypoactive bowel sounds. Plan to continue weaning oxygen when able.

## 2023-03-27 NOTE — PROGRESS NOTES
[facility]    Neurosurgery  Daily Note    Assessment & Plan   73-year-old female who is status post right hip hemiarthroplasty.  An RRT was called postoperatively due to agitation, hypoxia, hypotension, and encephalopathy after surgery.  Imaging revealed known cerebral meningioma with moderate surrounding vasogenic edema and mass effect.  She has undergone radiation therapy and stopped steroids about 1 month ago.  Since that time has had balance issues, confusion, and increased headache.  She was transferred from [facility] to [facility] for possible surgical intervention regarding her meningioma.     Plan:  -Palliative consult pending today.   We will continue to follow.       Mook Barnett PA-C    Interval History   Stable.     Physical Exam   Temp: 98.7  F (37.1  C) Temp src: Oral BP: 109/77 Pulse: 70   Resp: 14 SpO2: 96 % O2 Device: High Flow Nasal Cannula (HFNC) Oxygen Delivery: 40 LPM  Vitals:    03/25/23 0600 03/26/23 0000 03/27/23 0600   Weight: 71.7 kg (158 lb 1.1 oz) 71.4 kg (157 lb 6.5 oz) 70.5 kg (155 lb 6.8 oz)     Vital Signs with Ranges  Temp:  [98.3  F (36.8  C)-98.8  F (37.1  C)] 98.7  F (37.1  C)  Pulse:  [63-89] 70  Resp:  [14-29] 14  BP: (105-132)/(66-94) 109/77  FiO2 (%):  [40 %-50 %] 40 %  SpO2:  [90 %-100 %] 96 %  I/O last 3 completed shifts:  In: 3420 [P.O.:1320; I.V.:1800]  Out: 1400 [Urine:1400]    Alert and oriented.  Moves all extremities equally.      Medications     dextrose 5% and 0.45% NaCl 75 mL/hr at 03/26/23 2203     - MEDICATION INSTRUCTIONS -          azithromycin  500 mg Intravenous Q24H     cefTRIAXone  2 g Intravenous Q24H     dexamethasone  6 mg Intravenous Daily     enoxaparin ANTICOAGULANT  40 mg Subcutaneous Q24H     famotidine  20 mg Intravenous Q12H     insulin aspart  1-6 Units Subcutaneous Q4H     ipratropium - albuterol 0.5 mg/2.5 mg/3 mL  3 mL Nebulization 4x daily     levothyroxine  65 mcg Intravenous Daily      sodium chloride (PF)  3 mL Intracatheter Q8H           Mook Barnett PA-C  [facility]  [address]  [address] [place] [address], [place] [address]    Tel [phone]

## 2023-03-27 NOTE — PROVIDER NOTIFICATION
Notified provider about indwelling baldwin catheter discussed removal or continued need.    Did provider choose to remove indwelling baldwin catheter? No    Provider's baldwin indication for keeping indwelling baldwin catheter: Strict 1-2 Hour I & O if external catheters are not an option.    Is there an order for indwelling baldwin catheter? Yes    *If there is a plan to keep baldwin catheter in place at discharge daily notification with provider is not necessary, but please add a notation in the treatment team sticky note that the patient will be discharging with the catheter.

## 2023-03-27 NOTE — CONSULTS
[facility]  Palliative Care Consultation Note    Patient: Josephine Nicole  Date of Admission:  3/23/2023    Requesting Clinician / Team: Dr. Way/Hospitalist   Reason for consult: Goals of care    Recommendations:    Goals of care remain restorative. Pt and family are pleased with improvements Josephine has made in the last couple days. They want to continue to given this time to see what further progress is possible     Pt and family affirm DNR/DNI status     No determination on neurosurgery at this time. This will be contingent on her respiratory improvements/progress     Will follow along and check in periodically for support, particularly as we wait and see what recovery is possible     Pt and family welcome spiritual health support     This case and these recommendations have been extensively discussed with nursing staff and [facility]  Contact information available via [facility] Paging/Directory      Thank you for the opportunity to participate in the care of this patient and family. Our team: will continue to follow.     During regular M-F work hours ([phone]) -- if you are not sure who specifically to contact -- please contact us on [facility] Smart Web.     After regular work hours and on weekends/holidays, you can call our answering service at [phone].     Assessments:  Josephine Nicole is a 73 year old female with PMH significant for B12 deficiency, melanoma, COPD on home O2 support 2-3L, anxiety, HLD, hypothyroidism, osteoporosis, and cerebral meningioma who presented to [facility] with right hip pain after a fall. She underwent surgical repair for right femoral neck fracture on 3/20 with postoperative complications including worsening respiratory failure, hypotension, lactic acidosis, and worsening encephalopathy. Upon further assessment, it was noted that over the last 3-4 weeks the patient has functionally significantly worsened,  including worsening confusion and coordination issues, concerning for worsening of her known meningioma. CT Head did demonstrate the known mass with surrounding vasogenic edema predominantly involving the right frontal lobe, with right to left midline shift/subfalcine herniation. Neurosurgery was contacted and requested transfer to [facility] for respiratory stabilization/optimization with consideration for surgical resection of her meningioma. Upon arrival to [facility], she was admitted to the ICU with mixed respiratory failure 2/2 severe advanced emphysema requiring BiPAP alternating with high flow.     Medical Decision Making and Advanced Care Planning:  Visited with Josephine, sister Alise, and dtr Jordan via phone. Introduced myself and our services; assistance in pain and symptom management, emotional and spiritual support, and complex medical decision making.  Pt and family note that Josephine is sick from COPD. Pt thinks she has/had covid too, which may be contributing.   Family notes an improvement in Josephine's condition. She is tapering her O2 support. Although family acknowledges that she hasn't been out of bed (uncertain of her activity tolerance), and her condition remains tenuous (could require bipap again).   They continue to desire to see how she does with time, letting Josephine's body take the lead. We will support her accordingly. Josephine and family affirm that Josephine would not want CPR, intubation.   We acknowledge that while on high flow O2 support, she will remain in the hospital, but may not need to be the ICU.   We talk about how weaning O2 support can take weeks for some people. We also note we likely cannot anticipate that Josephine will return to her baseline; oftentimes, there is functional decline with each exacerbation/setback.   Pt and family in agreement with me following along their course. We may visit periodically, particularly if she is not making the steps toward recovery/or recovery stalls out.   As far as  surgery, they note that this is completely hinging on her respiratory recovery.   We talk about postop rehab for her hip; ideally this starts as soon as her breathing can tolerate the activity.     Management Discussed with the following over the past 24 hours: O2 support, restorative care.       Notes/Medical records reviewed over the past 24hrs: H&P, hospitalist notes, nursing notes, neurosurgery notes.       Tests reviewed in the last 24 hours: See lab/imaging results included in the data section of this note.        Supplemental History, in addition to the patient's history, over the past 24 hours obtained from: Bedside RN Des Oquendo RT, sister Alise, dtr Jordan.       Medical complexity over the past 24 hours: Decision for restorative cares.       Functional Status just prior to hospitalization: 3 (Capable of only limited self-care; needs help with ADLs; in bed/chair >50% of waking hours)      Prognosis, Goals, and/or Advance Care Planning were addressed today: Yes, as noted above.       Patient's decision making preferences: with input from medical clinicians and loved ones            I have concerns about the patient/family's health literacy today: No           Patient has a completed Health Care Directive: No.       Code status: No CPR / No Intubation    Coping, Meaning, & Spirituality:   Mood, coping, and/or meaning in the context of serious illness were addressed today: Yes  Summary/Comments: Ciarra/spirituality important to her. Welcomes  support.     Social:     Living situation: Lives with grace Ortega, who is her primary caregiver     Key family / caregivers: Supportive dtsarah Ortega, sister Alise, niece     Substance use history: Former tobacco use     History of Present Illness:  History gathered today from: patient, family/loved ones, medical chart, medical team members, unit team members    Josephine Nicole is a 73 year old female with PMH significant for B12 deficiency, melanoma, COPD on home  O2 support 2-3L, anxiety, HLD, hypothyroidism, osteoporosis, and cerebral meningioma who presented to [facility] with right hip pain after a fall. She underwent surgical repair for right femoral neck fracture on 3/20 with postoperative complications including worsening respiratory failure, hypotension, lactic acidosis, and worsening encephalopathy. Upon further assessment, it was noted that over the last 3-4 weeks the patient has functionally significantly worsened, including worsening confusion and coordination issues, concerning for worsening of her known meningioma. CT Head did demonstrate the known mass with surrounding vasogenic edema predominantly involving the right frontal lobe, with right to left midline shift/subfalcine herniation. Neurosurgery was contacted and requested transfer to [facility] for respiratory stabilization/optimization with consideration for surgical resection of her meningioma. Upon arrival to [facility], she was admitted to the ICU with mixed respiratory failure 2/2 severe advanced emphysema requiring BiPAP alternating with high flow.     Key Palliative Symptom Data:  # Pain severity the last 12 hours: none  # Dyspnea severity the last 12 hours: low    Patient is on opioids: bowels not assessed today.    ROS:  Comprehensive ROS is reviewed and is negative except as here & per HPI: N/A     Past Medical History:  Past Medical History:   Diagnosis Date     Cerebral meningioma      COPD (chronic obstructive pulmonary disease)      Depressive disorder      Eczema      Eczema      Hyperlipidemia      Hypothyroidism      Melanoma of skin      Osteoporosis         Past Surgical History:  Past Surgical History:   Procedure Laterality Date     COLONOSCOPY N/A 04/18/2022    Procedure: COLONOSCOPY;  Surgeon: Abimbola Handley MD;  Location:  GI     OPEN REDUCTION INTERNAL FIXATION HIP UNIPOLAR Right 3/20/2023    Procedure: Right hip hemiarthroplasty anterolateral approach;  Surgeon: Shun Cuevas MD;   Location: [facility] OR     Tubal ligation NOS           Family History:  No family history on file.      Allergies:  Allergies   Allergen Reactions     Bupropion Anaphylaxis, Hives and Shortness Of Breath        Medications:  I have reviewed this patient's medication profile and medications from this hospitalization.   Noted scheduled meds are:  Ceftriaxone   Dexamethasone 6mg IV daily   Ppx lovenox   Pepcid   Insulin   Duonebs   Synthroid   IVF    Noted PRN meds are:  Haldol 2mg IV Q6hrs PRN agitation   Dilaudid 0.2mg IV Q2hrs PRN pain     Physical Exam:  Vital Signs: Temp: 98.7  F (37.1  C) Temp src: Oral BP: 109/77 Pulse: 70   Resp: 14 SpO2: 96 % O2 Device: High Flow Nasal Cannula (HFNC) Oxygen Delivery: 40 LPM  Weight: 155 lbs 6.79 oz  CONSTITUTIONAL: Chronically ill woman seen resting in ICU bed in NAD, alert, conversant, a bit distracted (picking at arms, pulling apart napkin). She is calm and cooperative. Sister at bedside   HEENT: NCAT  RESPIRATORY: NL respiratory effort on high flow O2 40L  NEUROLOGIC: Appropriately responsive during interview, yet distracted   PSYCH: Affect a bit flat     Data reviewed:  Recent imaging independently reviewed, my comments on pertinents:   3/19 CT Head with right frontal mass and edema.     Recent lab data independently reviewed, my comments on pertinents:   Na 143  K 3.7  Creat 0.51  WBC 10.9  Hgb 9.8  Plt 194  Albumin 3.3  INR 0.96

## 2023-03-27 NOTE — PROGRESS NOTES
[facility]    Medicine Progress Note - Hospitalist Service    Date of Admission:  3/23/2023    Assessment & Plan        Patient is a 73-year-old female with a history of COPD on home oxygen, depression, anxiety, hyperlipidemia, hypothyroidism, B12 deficiency, melanoma, osteoporosis, cerebral meningioma presented to [facility] on 3/19/2023 with right hip pain after a fall    Patient had surgical repair for right femoral neck fracture on 3/20 with postoperative complication including worsening respiratory failure, worsening encephalopathy, lactic acidosis and hypotension.  She also had worsening of her cerebral meningioma which would require surgical resection.  As per the discharging hospitalist at [facility] patient had discussion with the patient's family and she has been having worsening functional status with increasing, confusion and coordination issues including because of a fall at home.  Neurosurgery Dr. Murphy was contacted on 3/23 who recommended transfer to [facility] for possible resection of meningioma.      Patient has been admitted to the ICU at [facility] on 3/23//23 due to acute on chronic respiratory failure secondary to advanced COPD/emphysema requiring BiPAP support.  She was initially on BiPAP and had episodes of desaturation on 3/23 on high flow nasal cannula and had to be placed back on BiPAP with recovery.  She had desaturations to 84% while doing oral care on 3/26.  Currently she is on BiPAP as needed her oxygenation status has been stable on high flow nasal cannula    #Acute on chronic hypoxic and hypercapnic respiratory failure    Patient is a former smoker of 50 years.  She has advanced COPD on 2 to 3 L of oxygen at home for past 5 years..  Patient had increased work of breathing and hypercarbia requiring BiPAP therapy.  Patient has been transferred to ICU on 3/22 for closer monitoring    -Patient finished course of azithromycin.  Last day  on 3/27/2023  -Continue ceftriaxone  -Continue DuoNebs.  -Continue BiPAP as needed  -Patient needed high flow on 40 L 40% FiO2  -Will monitor in ICU today and can be transferred to [facility]MC for respiratory status status stable.  -Continue Decadron 6 mg daily ( On decadron due to meniongoma) -Can increase her dose of steriods if worsening respiratoy status  -Palliative care will see the patient today  -Pulmonology consult   -Lasix 20mg iv given( Patient net positive of 4.8 litres since admission)      #Dysphagia  -Keep n.p.o. while on BiPAP  Speech therapy saw the patient and diet changed to level 4 purée and thin liquids  -Continue aspiration precautions      #Displaced right femoral neck fracture s/p right hip hemiarthroplasty  mechanical fall.  Xray revealed a displaced right femoral neck fracture and a subtle irregularity of the parasymphyseal aspect of the left pubic bone, potentially representing an age indeterminant fracture.   -Orthopedic surgery consult appreciated-continue as needed pain meds  -Continue DVT prophylaxis with Lovenox  Needs to hold Lovenox prior to brain surgery but timing for surgery is unknown.  -As needed Haldol for agitation  -Roman catheter placed on 3/24 for concern of urinary retention.  Continue for now        # Encephalopathy. Multifactorial see below  Cerebral Meningioma.    Acute toxic metabolic encephalopathy likely from post-op status, steroids, pain meds, ?infection Pt was diagnosed 9/2019 with a cerebral meningioma and monitored.  Given progression of the tumor, patient underwent 15 sessions of radiation 11/2022.  She is followed by Dr. Godoy, a Radiation Oncologist through [facility].  Pt was on oral steroids which were weaned off about 4 weeks ago.  Since that time, patient has had increased confusion, poor balance, changes in mood.  * Recent MRI Brain 3/17/23 revealed enlargement of meningioma up to 45 mm with a significant mass effect and the midline shift right to left.   Patient was instructed by her PCP to follow up with outpatient Neurology and Radiation Oncology this week.  * CT head on admission with no acute hemorrhage, but notes the known mass surrounding vasogenic edema predominantly involving the right frontal lobe with right to left midline shift/subfalcine herniation measuring up to 1.1 cm.  ? Discussion with Dr. Godoy from radiation oncology on 3/21 and she discussed with Dr. Murphy.  She agrees with surgery at this point given advancement of tumor.   ? Per neurosurgery- given her functional decline with confusion at home in the weeks prior to her presentation, principal concern is for meningioma as a large contributor to her poor functional status and confusion.    ? tentative plan is craniotomy early next week if respiratory status improves.           #Suspected malnutrition  -SLP has been consulted and had diet advanced to puréed with thin liquids.  -Will also consult nutrition      #Lactic acidosis  Resolved with IV fluids         Acute on Chronic Anemia  ? Preop hemoglobin 10.5 g/dL  ? Hemoglobin 3/26 7.8 g/dL  Follow hemoglobin, consider transfusion for hemoglobin less than 8 g/dL due to ongoing respiratory failure, desire to optimize if at all possible if planning for surgery  -Prbc given on 3/26/23  Hb 9.8 on 3/27/23     HLD - resume Atorvastatin when able to take p.o.  Hypothyroidism -transitioned to IV levothyroxine until patient reliably able to take p.o.  Depression/Anxiety -hold p.o. low-dose sertraline and mirtazapine until able to reliably take       # Hypernatremia  Resolved          #Goals of care DNR/DNI.  Family aware of her poor prognosis.  Respiratory status has improved and family currently not ready for comfort care.  Appreciate palliative care's help  Family sister Alise updated           Diet: Combination Diet Pureed Diet (level 4); Thin Liquids (level 0) (straws ok)  Room Service    DVT Prophylaxis: Enoxaparin (Lovenox) SQ  Roman Catheter:  PRESENT, indication: Strict 1-2 Hour I&O  Lines: None     Cardiac Monitoring: ACTIVE order. Indication: ICU  Code Status: No CPR- Do NOT Intubate      Clinically Significant Risk Factors         # Hypernatremia: Highest Na = 146 mmol/L in last 2 days, will monitor as appropriate                        Disposition Plan      Expected Discharge Date: 03/29/2023                  Jia Cuellar MD  Hospitalist Service  [facility]  Securely message with [employer] (more info)  Text page via [employer] Paging/Directory   ______________________________________________________________________    Interval History     Patient seen and examined at bedside.  Her mentation has improved.  She is more awake.  She is more interactive in the conversation.  Denies shortness of breath.  Sister present at bedside.  Her diet was advanced last evening               Physical Exam   Vital Signs: Temp: 98.7  F (37.1  C) Temp src: Oral BP: 114/68 Pulse: 70   Resp: 21 SpO2: 97 % O2 Device: BiPAP/CPAP Oxygen Delivery: 40 LPM  Weight: 155 lbs 6.79 oz  Physical Exam  Cardiovascular:      Rate and Rhythm: Normal rate.      Heart sounds: Normal heart sounds.   Pulmonary:      Effort: Respiratory distress present.   Abdominal:      General: There is no distension.      Palpations: Abdomen is soft.      Tenderness: There is no abdominal tenderness.       Medical Decision Making       Data     I have personally reviewed the following data over the past 24 hrs:    10.9  \   9.8 (L)   / 194     143 105 16.1 /  197 (H)   3.7 32 (H) 0.51 \

## 2023-03-28 LAB
GLUCOSE BLDC GLUCOMTR-MCNC: 121 MG/DL (ref 70–99)
GLUCOSE BLDC GLUCOMTR-MCNC: 163 MG/DL (ref 70–99)
GLUCOSE BLDC GLUCOMTR-MCNC: 177 MG/DL (ref 70–99)
GLUCOSE BLDC GLUCOMTR-MCNC: 92 MG/DL (ref 70–99)
GLUCOSE BLDC GLUCOMTR-MCNC: 99 MG/DL (ref 70–99)
LACTATE SERPL-SCNC: 2.1 MMOL/L (ref 0.7–2)

## 2023-03-28 PROCEDURE — 94640 AIRWAY INHALATION TREATMENT: CPT | Mod: 76

## 2023-03-28 PROCEDURE — 200N000001 HC R&B ICU

## 2023-03-28 PROCEDURE — 250N000011 HC RX IP 250 OP 636: Performed by: INTERNAL MEDICINE

## 2023-03-28 PROCEDURE — 94640 AIRWAY INHALATION TREATMENT: CPT

## 2023-03-28 PROCEDURE — 250N000011 HC RX IP 250 OP 636: Performed by: PHYSICIAN ASSISTANT

## 2023-03-28 PROCEDURE — 250N000009 HC RX 250: Performed by: PHYSICIAN ASSISTANT

## 2023-03-28 PROCEDURE — 999N000157 HC STATISTIC RCP TIME EA 10 MIN

## 2023-03-28 PROCEDURE — 250N000011 HC RX IP 250 OP 636: Performed by: HOSPITALIST

## 2023-03-28 PROCEDURE — 99233 SBSQ HOSP IP/OBS HIGH 50: CPT | Performed by: INTERNAL MEDICINE

## 2023-03-28 PROCEDURE — 250N000009 HC RX 250: Performed by: INTERNAL MEDICINE

## 2023-03-28 PROCEDURE — 83605 ASSAY OF LACTIC ACID: CPT | Performed by: INTERNAL MEDICINE

## 2023-03-28 PROCEDURE — 36415 COLL VENOUS BLD VENIPUNCTURE: CPT | Performed by: INTERNAL MEDICINE

## 2023-03-28 RX ORDER — IPRATROPIUM BROMIDE AND ALBUTEROL SULFATE 2.5; .5 MG/3ML; MG/3ML
3 SOLUTION RESPIRATORY (INHALATION)
Status: DISCONTINUED | OUTPATIENT
Start: 2023-03-28 | End: 2023-03-28

## 2023-03-28 RX ORDER — FUROSEMIDE 10 MG/ML
40 INJECTION INTRAMUSCULAR; INTRAVENOUS ONCE
Status: COMPLETED | OUTPATIENT
Start: 2023-03-28 | End: 2023-03-28

## 2023-03-28 RX ORDER — CEFTRIAXONE 2 G/1
2 INJECTION, POWDER, FOR SOLUTION INTRAMUSCULAR; INTRAVENOUS EVERY 24 HOURS
Status: COMPLETED | OUTPATIENT
Start: 2023-03-29 | End: 2023-03-29

## 2023-03-28 RX ORDER — LEVALBUTEROL INHALATION SOLUTION 1.25 MG/3ML
1.25 SOLUTION RESPIRATORY (INHALATION)
Status: DISCONTINUED | OUTPATIENT
Start: 2023-03-28 | End: 2023-03-29

## 2023-03-28 RX ORDER — FUROSEMIDE 10 MG/ML
20 INJECTION INTRAMUSCULAR; INTRAVENOUS ONCE
Status: COMPLETED | OUTPATIENT
Start: 2023-03-28 | End: 2023-03-28

## 2023-03-28 RX ORDER — METHYLPREDNISOLONE SODIUM SUCCINATE 125 MG/2ML
60 INJECTION, POWDER, LYOPHILIZED, FOR SOLUTION INTRAMUSCULAR; INTRAVENOUS EVERY 8 HOURS
Status: DISCONTINUED | OUTPATIENT
Start: 2023-03-28 | End: 2023-03-29

## 2023-03-28 RX ADMIN — IPRATROPIUM BROMIDE 0.5 MG: 0.5 SOLUTION RESPIRATORY (INHALATION) at 15:00

## 2023-03-28 RX ADMIN — INSULIN ASPART 1 UNITS: 100 INJECTION, SOLUTION INTRAVENOUS; SUBCUTANEOUS at 17:15

## 2023-03-28 RX ADMIN — LEVALBUTEROL HYDROCHLORIDE 1.25 MG: 1.25 SOLUTION RESPIRATORY (INHALATION) at 19:34

## 2023-03-28 RX ADMIN — FAMOTIDINE 20 MG: 10 INJECTION INTRAVENOUS at 08:42

## 2023-03-28 RX ADMIN — METHYLPREDNISOLONE SODIUM SUCCINATE 62.5 MG: 125 INJECTION, POWDER, FOR SOLUTION INTRAMUSCULAR; INTRAVENOUS at 10:00

## 2023-03-28 RX ADMIN — METHYLPREDNISOLONE SODIUM SUCCINATE 62.5 MG: 125 INJECTION, POWDER, FOR SOLUTION INTRAMUSCULAR; INTRAVENOUS at 17:09

## 2023-03-28 RX ADMIN — IPRATROPIUM BROMIDE 0.5 MG: 0.5 SOLUTION RESPIRATORY (INHALATION) at 19:34

## 2023-03-28 RX ADMIN — ENOXAPARIN SODIUM 40 MG: 40 INJECTION SUBCUTANEOUS at 11:26

## 2023-03-28 RX ADMIN — LEVALBUTEROL HYDROCHLORIDE 1.25 MG: 1.25 SOLUTION RESPIRATORY (INHALATION) at 15:00

## 2023-03-28 RX ADMIN — FAMOTIDINE 20 MG: 10 INJECTION INTRAVENOUS at 21:02

## 2023-03-28 RX ADMIN — FUROSEMIDE 20 MG: 10 INJECTION, SOLUTION INTRAMUSCULAR; INTRAVENOUS at 13:59

## 2023-03-28 RX ADMIN — INSULIN ASPART 1 UNITS: 100 INJECTION, SOLUTION INTRAVENOUS; SUBCUTANEOUS at 21:10

## 2023-03-28 RX ADMIN — CEFTRIAXONE SODIUM 2 G: 2 INJECTION, POWDER, FOR SOLUTION INTRAMUSCULAR; INTRAVENOUS at 08:42

## 2023-03-28 RX ADMIN — IPRATROPIUM BROMIDE AND ALBUTEROL SULFATE 3 ML: 2.5; .5 SOLUTION RESPIRATORY (INHALATION) at 07:40

## 2023-03-28 RX ADMIN — LEVOTHYROXINE SODIUM 65 MCG: 20 INJECTION, SOLUTION INTRAVENOUS at 08:42

## 2023-03-28 RX ADMIN — FUROSEMIDE 40 MG: 10 INJECTION, SOLUTION INTRAMUSCULAR; INTRAVENOUS at 10:00

## 2023-03-28 ASSESSMENT — ACTIVITIES OF DAILY LIVING (ADL)
ADLS_ACUITY_SCORE: 45
ADLS_ACUITY_SCORE: 49
ADLS_ACUITY_SCORE: 45
ADLS_ACUITY_SCORE: 49
ADLS_ACUITY_SCORE: 49
ADLS_ACUITY_SCORE: 45
ADLS_ACUITY_SCORE: 45

## 2023-03-28 NOTE — PLAN OF CARE
Shift Summary:  Pt scratched off skin on both cheeks; haldol given 1x.       Neuro: Disoriented to time + situation. PERRL.   Cardiac: SR  Pulmonary: HFNC 40/40 Inspiratory wheezing.   GI: 2 large liquid BM.   : Roman.   Skin: Scattered bruising. Skin tear bilateral cheeks (self inflicted)   Lines: PIV SL.   Activity: Bedrest.   Restraints: NA    Plan: Tx to med in AM

## 2023-03-28 NOTE — PROGRESS NOTES
[facility]    Neurosurgery Progress Note     Assessment & Plan   73-year-old female who is status post right hip hemiarthroplasty.  An RRT was called postoperatively due to agitation, hypoxia, hypotension, and encephalopathy after surgery.  Imaging revealed known cerebral meningioma with moderate surrounding vasogenic edema and mass effect.  She has undergone radiation therapy and stopped steroids about 1 month ago.  Since that time has had balance issues, confusion, and increased headache.  She was transferred from [facility] to [facility] for possible surgical intervention regarding her meningioma with Dr. Murphy. Continues on high flow O2. Palliative care consult yesterday with continued restorative cares for now. Dr. Murphy to speak with team to determine surgical plan and timing.    Discussed with Dr. Murphy    Plan:  -Appreciate assistance from other services  -Dr. Murphy to speak with the team today to determine a surgical plan including timing  -We will continue to follow    Sushila Richard CNP  [facility]  [address] [address]    Tel [phone]  Text page via [employer] Paging/Directory      Physical Exam   Temp: 98.2  F (36.8  C) Temp src: Oral BP: 113/65 Pulse: 82   Resp: 19 SpO2: 92 % O2 Device: High Flow Nasal Cannula (HFNC) Oxygen Delivery: 30 LPM  Vitals:    03/26/23 0000 03/27/23 0600 03/28/23 0508   Weight: 71.4 kg (157 lb 6.5 oz) 70.5 kg (155 lb 6.8 oz) 69.8 kg (153 lb 14.1 oz)     Vital Signs with Ranges  Temp:  [98.2  F (36.8  C)-98.5  F (36.9  C)] 98.2  F (36.8  C)  Pulse:  [70-86] 82  Resp:  [14-30] 19  BP: (104-127)/(65-90) 113/65  FiO2 (%):  [30 %-40 %] 30 %  SpO2:  [88 %-100 %] 92 %  I/O last 3 completed shifts:  In: 1400 [I.V.:1400]  Out: 3000 [Urine:3000]     , Blood pressure 113/65, pulse 82, temperature 98.2  F (36.8  C), temperature source Oral, resp. rate  19, weight 69.8 kg (153 lb 14.1 oz), SpO2 92 %, not currently breastfeeding.  153 lbs 14.1 oz     Neurologic status:  Alert and following commands.  Moving all extremities equally    Medications     - MEDICATION INSTRUCTIONS -         [START ON 3/29/2023] cefTRIAXone  2 g Intravenous Q24H     enoxaparin ANTICOAGULANT  40 mg Subcutaneous Q24H     famotidine  20 mg Intravenous Q12H     insulin aspart  1-6 Units Subcutaneous Q4H     ipratropium  0.5 mg Nebulization Q4H While awake    And     levalbuterol  1.25 mg Nebulization Q4H While awake     levothyroxine  65 mcg Intravenous Daily     methylPREDNISolone  62.5 mg Intravenous Q8H     sodium chloride (PF)  3 mL Intracatheter Q8H       Data     CBC RESULTS:   Recent Labs   Lab Test 03/24/23  0502   WBC 18.5*   RBC 2.90*   HGB 8.9*   HCT 28.5*   MCV 98   MCH 30.7   MCHC 31.2*   RDW 12.7        Basic Metabolic Panel:  Lab Results   Component Value Date     03/24/2023      Lab Results   Component Value Date    POTASSIUM 4.8 03/24/2023     Lab Results   Component Value Date    CHLORIDE 106 03/24/2023     Lab Results   Component Value Date    CHAO 8.4 03/24/2023     Lab Results   Component Value Date    CO2 27 03/24/2023     Lab Results   Component Value Date    BUN 25.1 03/24/2023     Lab Results   Component Value Date    CR 0.51 03/24/2023     Lab Results   Component Value Date     03/25/2023     INR:  Lab Results   Component Value Date    INR 0.96 03/19/2023

## 2023-03-28 NOTE — PROGRESS NOTES
Remains on HFNC 40 lpm, 40%Fio2 throughout the night. sats 99%. Skin integrity clear dry intact. BS: diminished bilaterally.    Will cont to monitor.  Jessica Chun, RT

## 2023-03-28 NOTE — PLAN OF CARE
Care provided from [id number]    Plan of care reviewed with patient and family.     Patient is alert but confused. Moves all extremities. SR with stable blood pressures. On 4L NC with wheezes. Diuresed twice today with good urine output. Active bowel sounds. Plan to continue to wean oxygen as able.

## 2023-03-28 NOTE — PROVIDER NOTIFICATION
Notified provider about indwelling baldwin catheter discussed removal or continued need.    Did provider choose to remove indwelling baldwin catheter? No    Provider's baldwin indication for keeping indwelling baldwin catheter: Retention.    Is there an order for indwelling baldwin catheter? Yes    *If there is a plan to keep baldwin catheter in place at discharge daily notification with provider is not necessary, but please add a notation in the treatment team sticky note that the patient will be discharging with the catheter.

## 2023-03-28 NOTE — PROGRESS NOTES
Patient was weaned off of HFNC this morning. Pt is currently on a 4L NC with SpO2 in the mid 90's. Skin intact under oxygen device. BS diminished with some inspiratory and expiratory wheezes. All nebs were given as ordered.  Will cont to follow.  3/28/2023  Sushila Parra, RT

## 2023-03-28 NOTE — PROGRESS NOTES
Spoke with patient and sister and Dr. Ballard today. She continues to improve, but may not quite be ready for surgery.     Will await reassessment tomorrow and discuss with Dr. Ballard again.Otherwise we may delay surgery until she has further recovered.

## 2023-03-28 NOTE — CONSULTS
"Care Management Initial Consult    General Information  Assessment completed with: Patient, Family, Josephine & her sister Alise  Type of CM/SW Visit: Initial Assessment  Primary Care Provider verified and updated as needed: Yes (Dr. Divina Ferguson in [place])   Readmission within the last 30 days: current reason for admission unrelated to previous admission   Return Category: New Diagnosis (worsening of known meningioma plus respiratory failure)  Reason for Consult: discharge planning, care coordination/care conference  Advance Care Planning:    no ACP documents on file in EPIC; provided blank Health Care Directive      Communication Assessment  Patient's communication style:    Spoken language; English      Cognitive  Cognitive/Neuro/Behavioral: .WDL except  Level of Consciousness: alert  Arousal Level: arouses to voice  Orientation: disoriented to, situation, time  Mood/Behavior: restless  Best Language: 0 - No aphasia  Speech: clear, spontaneous    Living Environment:   People in home: child(tristen), adult  daughter Jordan  Current living Arrangements: house      Able to return to prior arrangements: yes  Living Arrangement Comments: 2 steps to enter the back door, then 1 level living / rambler style home    Family/Social Support:  Care provided by: self, child(tristen)  Provides care for: no one  Marital Status:  (spouse left her 2017)  Support system: Children, Sibling(s) (names daughter Jordan, sisters Alise and Bertha, niece Kiley, nephews JR Betty, and Pascual)          Description of Support System: Supportive, Involved    Support Assessment: Adequate family and caregiver support    Current Resources:   Patient receiving home care services: No   Community Resources: None  Equipment currently used at home: wheelchair, manual (does not have a walker or any DME in the bathroom)  Supplies currently used at home: Oxygen Tubing/Supplies ([facility]")    Employment/Financial:  Employment Status: retired   " "  Employment/ Comments: has [employer] benefits from [facility]  Financial Concerns: No concerns identified    Finance Comments: active UCARE / UCARE MEDICARE    Lifestyle & Psychosocial Needs:  Social Determinants of Health     Tobacco Use: Medium Risk     Smoking Tobacco Use: Former     Smokeless Tobacco Use: Never     Passive Exposure: Not on file     Functional Status:  Prior to admission patient needed assistance: none (however was experiencing increasing unsteadiness)    Assesssment of Functional Status: Not at  functional baseline    Mental Health Status:  Mental Health Status: No Current Concerns       Chemical Dependency Status:  Chemical Dependency Status: No Current Concerns           Values/Beliefs:  Spiritual, Cultural Beliefs, Yazdanism Practices, Values that affect care: no             Additional Information:  Met with patient and her sister Alise in room, introduced self and role in discharge planning. Confirmed the information in the above assessment, please see each section for helpful details.     Pt lives in [place] with her daughter Jordan.  She reports excellent support system including daughter, sisters, and niece/nephews.  They provide transportation as needed.  She appreciates CM role introduction for navigation of post-hospital recommendations and options (home, home with help, home with outpatient services, or facility) acknowledging no dispo recommendations are in place right now.      Pt confirms her [facility] MEDICARE insurance and states she also has [facility] [employer] [facility] insurance, she gets her meds via mail and her home oxygen \"through the [facility].\"  She has a wheelchair in the home but no other DME.    Pt met the Palliative care team yesterday 3/27/23 and goals of care remain restorative, with code status of DNR/DNI.     CM team will continue to monitor for discharge planning.       Eulalia Ashby RN, BSN, PHN  [facility]  Inpatient Care " Management - FLOAT  ICU  RN Mobile: [phone] daily 7:30-4:00

## 2023-03-28 NOTE — PROGRESS NOTES
[facility]    Medicine Progress Note - Hospitalist Service    Date of Admission:  3/23/2023    Assessment & Plan        Patient is a 73-year-old female with a history of COPD on home oxygen, depression, anxiety, hyperlipidemia, hypothyroidism, B12 deficiency, melanoma, osteoporosis, cerebral meningioma presented to [facility] on 3/19/2023 with right hip pain after a fall    Patient had surgical repair for right femoral neck fracture on 3/20 with postoperative complication including worsening respiratory failure, worsening encephalopathy, lactic acidosis and hypotension.  She also had worsening of her cerebral meningioma which would require surgical resection.  As per the discharging hospitalist at [facility] patient had discussion with the patient's family and she has been having worsening functional status with increasing, confusion and coordination issues including because of a fall at home.  Neurosurgery Dr. Murphy was contacted on 3/23 who recommended transfer to [facility] for possible resection of meningioma.      Patient has been admitted to the ICU at [facility] on 3/23//23 due to acute on chronic respiratory failure secondary to advanced COPD/emphysema requiring BiPAP support.  She was initially on BiPAP and had episodes of desaturation on 3/23 on high flow nasal cannula and had to be placed back on BiPAP with recovery.  She had desaturations to 84% while doing oral care on 3/26.  Currently she is on High flow nasal canula     #Acute on chronic hypoxic and hypercapnic respiratory failure Secondary to PNeumonia community acquired, Underlying COPD, Fluid overload from POst op state     Patient is a former smoker of 50 years.  She has advanced COPD on 2 to 3 L of oxygen at home for past 5 years..  Patient had increased work of breathing and hypercarbia requiring BiPAP therapy.  Patient has been transferred to ICU on 3/22 for closer monitoring    -Patient  finished course of azithromycin.  Last day on 3/27/2023  -Continue ceftriaxone 7/7 day course today   -Continue DuoNebs.  -Continue BiPAP as needed  -Patient needed high flow on 40 L 40% FiO2  Wean O2 as tolerated     -switched  Decadron 6 mg daily ( On decadron due to meniongoma) to IV solumedrol with h/o cOPD and diffuse wheezing on exam    -Lasix 20mg iv given on 3/27. Pt with 2+ LOWER EXTREMITH EDEMA 40mg IV lasix given on 3/28   Encourage IS and acapella as tolerated       #Dysphagia  -Keep n.p.o. while on BiPAP  Speech therapy saw the patient and diet changed to level 4 purée and thin liquids  -Continue aspiration precautions      #Displaced right femoral neck fracture s/p right hip hemiarthroplasty  mechanical fall.  Xray revealed a displaced right femoral neck fracture and a subtle irregularity of the parasymphyseal aspect of the left pubic bone, potentially representing an age indeterminant fracture.   -Orthopedic surgery consult appreciated-continue as needed pain meds  -Continue DVT prophylaxis with Lovenox  Needs to hold Lovenox prior to brain surgery but timing for surgery is unknown.  -As needed Haldol for agitation  -Roman catheter placed on 3/24 for concern of urinary retention.  Continue for now        # Encephalopathy. Multifactorial see below  Cerebral Meningioma.    Acute toxic metabolic encephalopathy likely from post-op status, steroids, pain meds, ?infection Pt was diagnosed 9/2019 with a cerebral meningioma and monitored.  Given progression of the tumor, patient underwent 15 sessions of radiation 11/2022.  She is followed by Dr. Godoy, a Radiation Oncologist through [facility].  Pt was on oral steroids which were weaned off about 4 weeks ago.  Since that time, patient has had increased confusion, poor balance, changes in mood.  * Recent MRI Brain 3/17/23 revealed enlargement of meningioma up to 45 mm with a significant mass effect and the midline shift right to left.  Patient was  instructed by her PCP to follow up with outpatient Neurology and Radiation Oncology this week.  * CT head on admission with no acute hemorrhage, but notes the known mass surrounding vasogenic edema predominantly involving the right frontal lobe with right to left midline shift/subfalcine herniation measuring up to 1.1 cm.  ? Discussion with Dr. Godoy from radiation oncology on 3/21 and she discussed with Dr. Murphy.  She agrees with surgery at this point given advancement of tumor.   ? Per neurosurgery- given her functional decline with confusion at home in the weeks prior to her presentation, principal concern is for meningioma as a large contributor to her poor functional status and confusion.    ? tentative plan is craniotomy early next week if respiratory status improves.       switched decadron to IV solumedrol with respiratory issues with COPD     #Suspected malnutrition  -SLP has been consulted and had diet advanced to puréed with thin liquids.  -Will also consult nutrition      #Lactic acidosis  Resolved with IV fluids         Acute on Chronic Anemia  ? Preop hemoglobin 10.5 g/dL  ? Hemoglobin 3/26 7.8 g/dL  Follow hemoglobin, consider transfusion for hemoglobin less than 8 g/dL due to ongoing respiratory failure, desire to optimize if at all possible if planning for surgery  -Prbc given on 3/26/23  Hb 9.8 on 3/27/23     HLD - resume Atorvastatin when able to take p.o.  Hypothyroidism -transitioned to IV levothyroxine until patient reliably able to take p.o.  Depression/Anxiety -hold p.o. low-dose sertraline and mirtazapine until able to reliably take       # Hypernatremia  Resolved          #Goals of care DNR/DNI.  Family aware of her poor prognosis.  .  Appreciate palliative care's help. Goals of care remain restorative at this point   Family sister Alise updated at bedside            Diet: Combination Diet Pureed Diet (level 4); Thin Liquids (level 0) (straws ok)  Room Service    DVT Prophylaxis:  Enoxaparin (Lovenox) SQ  Roman Catheter: PRESENT, indication: Strict 1-2 Hour I&O  Lines: None     Cardiac Monitoring: ACTIVE order. Indication: ICU  Code Status: No CPR- Do NOT Intubate      Clinically Significant Risk Factors                                Disposition Plan     Expected Discharge Date: 03/29/2023                  Nicky Ballard MD  Hospitalist Service  [facility]  Securely message with [employer] (more info)  Text page via "[employer]" Paging/Directory   ______________________________________________________________________    Interval History     Patient seen and examined at bedside.  Her mentation has improved.  She is more awake.  She is more interactive in the conversation.  Denies shortness of breath.  Sister present at bedside. On high flow nasal canula             Physical Exam   Vital Signs: Temp: 98.2  F (36.8  C) Temp src: Oral BP: 113/65 Pulse: 82   Resp: 19 SpO2: 92 % O2 Device: High Flow Nasal Cannula (HFNC) Oxygen Delivery: 30 LPM  Weight: 153 lbs 14.1 oz  Physical Exam  Cardiovascular:      Rate and Rhythm: Normal rate.      Heart sounds: Normal heart sounds.   Pulmonary:      Effort: No respiratory distress.      Breath sounds: Wheezing present.   Abdominal:      General: There is no distension.      Palpations: Abdomen is soft.      Tenderness: There is no abdominal tenderness.   Musculoskeletal:      Right lower leg: Edema present.      Left lower leg: Edema present.       Medical Decision Making       Data

## 2023-03-28 NOTE — CONSULTS
While the patient is in the  intensive care unit, the respiratory needs will be addressed by the treating intensivist. Once patient transfers out of the ICU, will be happy to see.    Brannon Pratt  [facility]  Office: [phone]  Pager: [phone]

## 2023-03-28 NOTE — CONSULTS
"BRIEF NUTRITION ASSESSMENT      REASON FOR ASSESSMENT:  Josephine Nicole is a 73 year old female seen by Registered Dietitian for Provider Order - poor oral intake     NUTRITION HISTORY:  Patient states that her appetite and intake are good at baseline.  She denies any significant weight loss.     CURRENT DIET AND INTAKE:  Diet:  Pureed diet (level 4), thin liquids (level 0)              Patient states that she ate 100% of breakfast this morning:  Pureed eggs, pureed Romanian toast, and applesauce.    She notes that she isn't crazy about the pureed diet but eats in anyway.    She declined a need for supplements at this time - does not care for them in general.     ANTHROPOMETRICS:  Height: 5'2\"  Weight:  69.8 kg (154#)(3/28)  Body mass index is 28.15 kg/m    Weight Status: Overweight BMI 25-29.9  IBW:  50 kg   %IBW: 138%  Weight History:   Wt Readings from Last 10 Encounters:   03/28/23 69.8 kg (153 lb 14.1 oz)   03/22/23 66.9 kg (147 lb 7.8 oz)   05/24/22 65.8 kg (145 lb)   04/18/22 66.7 kg (147 lb)   02/06/21 64.4 kg (142 lb)     No weight loss noted     LABS:  Labs noted    MALNUTRITION:  Visual Nutrition Focused Physical Assessment (NFPA) completed -- no muscle/fat losses noted.  Patient does not meet two of the following criteria necessary for diagnosing malnutrition.     % Weight Loss:  None noted  % Intake:  Decreased intake does not meet criteria for malnutrition (intake was somewhat decreased on admit but now eating well)  Subcutaneous Fat Loss:  None observed  Muscle Loss:  None observed  Fluid Retention:  None noted    NUTRITION INTERVENTION:  Nutrition Diagnosis:  No nutrition diagnosis at this time.    Implementation:  Nutrition Education:  Per Provider order if indicated.    FOLLOW UP/MONITORING:   Will re-evaluate in 7 - 10 days, or sooner, if re-consulted.    Mela Cutler RD, LD, [facility]   Clinical Dietitian - [facility]             "

## 2023-03-29 ENCOUNTER — APPOINTMENT (OUTPATIENT)
Dept: SPEECH THERAPY | Facility: CLINIC | Age: 73
DRG: 025 | End: 2023-03-29
Attending: INTERNAL MEDICINE
Payer: COMMERCIAL

## 2023-03-29 ENCOUNTER — PREP FOR PROCEDURE (OUTPATIENT)
Dept: NEUROLOGY | Facility: CLINIC | Age: 73
End: 2023-03-29
Payer: COMMERCIAL

## 2023-03-29 ENCOUNTER — ANESTHESIA EVENT (OUTPATIENT)
Dept: SURGERY | Facility: CLINIC | Age: 73
DRG: 025 | End: 2023-03-29
Payer: COMMERCIAL

## 2023-03-29 ENCOUNTER — APPOINTMENT (OUTPATIENT)
Dept: MRI IMAGING | Facility: CLINIC | Age: 73
DRG: 025 | End: 2023-03-29
Attending: NURSE PRACTITIONER
Payer: COMMERCIAL

## 2023-03-29 DIAGNOSIS — D32.9 MENINGIOMA (H): Primary | ICD-10-CM

## 2023-03-29 DIAGNOSIS — G93.5 COMPRESSION OF BRAIN (H): ICD-10-CM

## 2023-03-29 DIAGNOSIS — G93.6 VASOGENIC CEREBRAL EDEMA (H): ICD-10-CM

## 2023-03-29 LAB
ANION GAP SERPL CALCULATED.3IONS-SCNC: 8 MMOL/L (ref 7–15)
BUN SERPL-MCNC: 21 MG/DL (ref 8–23)
CALCIUM SERPL-MCNC: 8.4 MG/DL (ref 8.8–10.2)
CHLORIDE SERPL-SCNC: 99 MMOL/L (ref 98–107)
CREAT SERPL-MCNC: 0.53 MG/DL (ref 0.51–0.95)
DEPRECATED HCO3 PLAS-SCNC: 35 MMOL/L (ref 22–29)
ERYTHROCYTE [DISTWIDTH] IN BLOOD BY AUTOMATED COUNT: 14.2 % (ref 10–15)
GFR SERPL CREATININE-BSD FRML MDRD: >90 ML/MIN/1.73M2
GLUCOSE BLDC GLUCOMTR-MCNC: 129 MG/DL (ref 70–99)
GLUCOSE BLDC GLUCOMTR-MCNC: 130 MG/DL (ref 70–99)
GLUCOSE BLDC GLUCOMTR-MCNC: 146 MG/DL (ref 70–99)
GLUCOSE BLDC GLUCOMTR-MCNC: 148 MG/DL (ref 70–99)
GLUCOSE BLDC GLUCOMTR-MCNC: 152 MG/DL (ref 70–99)
GLUCOSE BLDC GLUCOMTR-MCNC: 160 MG/DL (ref 70–99)
GLUCOSE SERPL-MCNC: 135 MG/DL (ref 70–99)
HCT VFR BLD AUTO: 35.7 % (ref 35–47)
HGB BLD-MCNC: 11.5 G/DL (ref 11.7–15.7)
MCH RBC QN AUTO: 29.9 PG (ref 26.5–33)
MCHC RBC AUTO-ENTMCNC: 32.2 G/DL (ref 31.5–36.5)
MCV RBC AUTO: 93 FL (ref 78–100)
PLATELET # BLD AUTO: 272 10E3/UL (ref 150–450)
POTASSIUM SERPL-SCNC: 4 MMOL/L (ref 3.4–5.3)
RBC # BLD AUTO: 3.84 10E6/UL (ref 3.8–5.2)
SODIUM SERPL-SCNC: 142 MMOL/L (ref 136–145)
WBC # BLD AUTO: 17.4 10E3/UL (ref 4–11)

## 2023-03-29 PROCEDURE — 92526 ORAL FUNCTION THERAPY: CPT | Mod: GN

## 2023-03-29 PROCEDURE — 36415 COLL VENOUS BLD VENIPUNCTURE: CPT | Performed by: INTERNAL MEDICINE

## 2023-03-29 PROCEDURE — 999N000157 HC STATISTIC RCP TIME EA 10 MIN

## 2023-03-29 PROCEDURE — 94640 AIRWAY INHALATION TREATMENT: CPT | Mod: 76

## 2023-03-29 PROCEDURE — 36415 COLL VENOUS BLD VENIPUNCTURE: CPT | Performed by: NURSE PRACTITIONER

## 2023-03-29 PROCEDURE — 255N000002 HC RX 255 OP 636: Performed by: INTERNAL MEDICINE

## 2023-03-29 PROCEDURE — 94640 AIRWAY INHALATION TREATMENT: CPT

## 2023-03-29 PROCEDURE — 250N000013 HC RX MED GY IP 250 OP 250 PS 637: Performed by: HOSPITALIST

## 2023-03-29 PROCEDURE — A9585 GADOBUTROL INJECTION: HCPCS | Performed by: INTERNAL MEDICINE

## 2023-03-29 PROCEDURE — 250N000009 HC RX 250: Performed by: INTERNAL MEDICINE

## 2023-03-29 PROCEDURE — 250N000011 HC RX IP 250 OP 636: Performed by: PHYSICIAN ASSISTANT

## 2023-03-29 PROCEDURE — 80048 BASIC METABOLIC PNL TOTAL CA: CPT | Performed by: INTERNAL MEDICINE

## 2023-03-29 PROCEDURE — 200N000001 HC R&B ICU

## 2023-03-29 PROCEDURE — 70552 MRI BRAIN STEM W/DYE: CPT

## 2023-03-29 PROCEDURE — 99232 SBSQ HOSP IP/OBS MODERATE 35: CPT | Performed by: INTERNAL MEDICINE

## 2023-03-29 PROCEDURE — 250N000013 HC RX MED GY IP 250 OP 250 PS 637: Performed by: INTERNAL MEDICINE

## 2023-03-29 PROCEDURE — 250N000009 HC RX 250: Performed by: PHYSICIAN ASSISTANT

## 2023-03-29 PROCEDURE — 250N000011 HC RX IP 250 OP 636: Performed by: INTERNAL MEDICINE

## 2023-03-29 PROCEDURE — 85027 COMPLETE CBC AUTOMATED: CPT | Performed by: NURSE PRACTITIONER

## 2023-03-29 RX ORDER — FLUTICASONE FUROATE AND VILANTEROL 200; 25 UG/1; UG/1
1 POWDER RESPIRATORY (INHALATION) DAILY
Status: DISCONTINUED | OUTPATIENT
Start: 2023-03-29 | End: 2023-04-07 | Stop reason: HOSPADM

## 2023-03-29 RX ORDER — POLYETHYLENE GLYCOL 3350 17 G/17G
17 POWDER, FOR SOLUTION ORAL DAILY
Status: DISCONTINUED | OUTPATIENT
Start: 2023-03-29 | End: 2023-03-30

## 2023-03-29 RX ORDER — SERTRALINE HYDROCHLORIDE 25 MG/1
25 TABLET, FILM COATED ORAL EVERY MORNING
Status: DISCONTINUED | OUTPATIENT
Start: 2023-03-29 | End: 2023-04-07 | Stop reason: HOSPADM

## 2023-03-29 RX ORDER — LEVOTHYROXINE SODIUM 88 UG/1
88 TABLET ORAL DAILY
Status: DISCONTINUED | OUTPATIENT
Start: 2023-03-30 | End: 2023-04-07 | Stop reason: HOSPADM

## 2023-03-29 RX ORDER — ATORVASTATIN CALCIUM 10 MG/1
10 TABLET, FILM COATED ORAL DAILY
Status: DISCONTINUED | OUTPATIENT
Start: 2023-03-29 | End: 2023-04-07 | Stop reason: HOSPADM

## 2023-03-29 RX ORDER — DEXAMETHASONE SODIUM PHOSPHATE 4 MG/ML
6 INJECTION, SOLUTION INTRA-ARTICULAR; INTRALESIONAL; INTRAMUSCULAR; INTRAVENOUS; SOFT TISSUE EVERY 24 HOURS
Status: DISCONTINUED | OUTPATIENT
Start: 2023-03-29 | End: 2023-03-30

## 2023-03-29 RX ORDER — MIRTAZAPINE 7.5 MG/1
7.5 TABLET, FILM COATED ORAL AT BEDTIME
Status: DISCONTINUED | OUTPATIENT
Start: 2023-03-29 | End: 2023-04-07 | Stop reason: HOSPADM

## 2023-03-29 RX ORDER — GADOBUTROL 604.72 MG/ML
15 INJECTION INTRAVENOUS ONCE
Status: COMPLETED | OUTPATIENT
Start: 2023-03-29 | End: 2023-03-29

## 2023-03-29 RX ORDER — LEVALBUTEROL INHALATION SOLUTION 1.25 MG/3ML
1.25 SOLUTION RESPIRATORY (INHALATION) 4 TIMES DAILY
Status: DISCONTINUED | OUTPATIENT
Start: 2023-03-29 | End: 2023-03-29

## 2023-03-29 RX ORDER — LEVALBUTEROL INHALATION SOLUTION 1.25 MG/3ML
1.25 SOLUTION RESPIRATORY (INHALATION) 4 TIMES DAILY
Status: DISCONTINUED | OUTPATIENT
Start: 2023-03-29 | End: 2023-03-31

## 2023-03-29 RX ORDER — HYDROXYZINE HYDROCHLORIDE 10 MG/1
10 TABLET, FILM COATED ORAL EVERY 8 HOURS PRN
Status: DISCONTINUED | OUTPATIENT
Start: 2023-03-29 | End: 2023-04-07 | Stop reason: HOSPADM

## 2023-03-29 RX ORDER — DIAZEPAM 5 MG
5 TABLET ORAL ONCE
Status: COMPLETED | OUTPATIENT
Start: 2023-03-29 | End: 2023-03-29

## 2023-03-29 RX ADMIN — MIRTAZAPINE 7.5 MG: 7.5 TABLET, FILM COATED ORAL at 21:16

## 2023-03-29 RX ADMIN — INSULIN ASPART 1 UNITS: 100 INJECTION, SOLUTION INTRAVENOUS; SUBCUTANEOUS at 15:43

## 2023-03-29 RX ADMIN — IPRATROPIUM BROMIDE 0.5 MG: 0.5 SOLUTION RESPIRATORY (INHALATION) at 19:21

## 2023-03-29 RX ADMIN — SERTRALINE HYDROCHLORIDE 25 MG: 25 TABLET ORAL at 10:17

## 2023-03-29 RX ADMIN — METHYLPREDNISOLONE SODIUM SUCCINATE 62.5 MG: 125 INJECTION, POWDER, FOR SOLUTION INTRAMUSCULAR; INTRAVENOUS at 03:09

## 2023-03-29 RX ADMIN — FAMOTIDINE 20 MG: 10 INJECTION INTRAVENOUS at 07:39

## 2023-03-29 RX ADMIN — ATORVASTATIN CALCIUM 10 MG: 10 TABLET, FILM COATED ORAL at 09:20

## 2023-03-29 RX ADMIN — LEVOTHYROXINE SODIUM 65 MCG: 20 INJECTION, SOLUTION INTRAVENOUS at 08:33

## 2023-03-29 RX ADMIN — CEFTRIAXONE SODIUM 2 G: 2 INJECTION, POWDER, FOR SOLUTION INTRAMUSCULAR; INTRAVENOUS at 08:46

## 2023-03-29 RX ADMIN — DIAZEPAM 5 MG: 5 TABLET ORAL at 14:32

## 2023-03-29 RX ADMIN — LEVALBUTEROL HYDROCHLORIDE 1.25 MG: 1.25 SOLUTION RESPIRATORY (INHALATION) at 15:53

## 2023-03-29 RX ADMIN — ACETAMINOPHEN 650 MG: 325 TABLET, FILM COATED ORAL at 21:16

## 2023-03-29 RX ADMIN — LEVALBUTEROL HYDROCHLORIDE 1.25 MG: 1.25 SOLUTION RESPIRATORY (INHALATION) at 19:21

## 2023-03-29 RX ADMIN — ACETAMINOPHEN 650 MG: 325 TABLET, FILM COATED ORAL at 12:22

## 2023-03-29 RX ADMIN — IPRATROPIUM BROMIDE 0.5 MG: 0.5 SOLUTION RESPIRATORY (INHALATION) at 07:47

## 2023-03-29 RX ADMIN — DEXAMETHASONE SODIUM PHOSPHATE 6 MG: 4 INJECTION, SOLUTION INTRAMUSCULAR; INTRAVENOUS at 09:21

## 2023-03-29 RX ADMIN — INSULIN ASPART 1 UNITS: 100 INJECTION, SOLUTION INTRAVENOUS; SUBCUTANEOUS at 00:31

## 2023-03-29 RX ADMIN — FLUTICASONE FUROATE AND VILANTEROL TRIFENATATE 1 PUFF: 200; 25 POWDER RESPIRATORY (INHALATION) at 17:14

## 2023-03-29 RX ADMIN — POLYETHYLENE GLYCOL 3350 17 G: 17 POWDER, FOR SOLUTION ORAL at 09:42

## 2023-03-29 RX ADMIN — LEVALBUTEROL HYDROCHLORIDE 1.25 MG: 1.25 SOLUTION RESPIRATORY (INHALATION) at 07:47

## 2023-03-29 RX ADMIN — GADOBUTROL 15 ML: 604.72 INJECTION INTRAVENOUS at 15:23

## 2023-03-29 RX ADMIN — LEVALBUTEROL HYDROCHLORIDE 1.25 MG: 1.25 SOLUTION RESPIRATORY (INHALATION) at 11:36

## 2023-03-29 RX ADMIN — FAMOTIDINE 20 MG: 10 INJECTION INTRAVENOUS at 21:08

## 2023-03-29 RX ADMIN — IPRATROPIUM BROMIDE 0.5 MG: 0.5 SOLUTION RESPIRATORY (INHALATION) at 15:53

## 2023-03-29 RX ADMIN — INSULIN ASPART 1 UNITS: 100 INJECTION, SOLUTION INTRAVENOUS; SUBCUTANEOUS at 05:13

## 2023-03-29 RX ADMIN — INSULIN ASPART 1 UNITS: 100 INJECTION, SOLUTION INTRAVENOUS; SUBCUTANEOUS at 11:53

## 2023-03-29 RX ADMIN — IPRATROPIUM BROMIDE 0.5 MG: 0.5 SOLUTION RESPIRATORY (INHALATION) at 11:36

## 2023-03-29 ASSESSMENT — LIFESTYLE VARIABLES: TOBACCO_USE: 1

## 2023-03-29 ASSESSMENT — ACTIVITIES OF DAILY LIVING (ADL)
ADLS_ACUITY_SCORE: 45
ADLS_ACUITY_SCORE: 47
ADLS_ACUITY_SCORE: 45

## 2023-03-29 ASSESSMENT — COPD QUESTIONNAIRES: COPD: 1

## 2023-03-29 NOTE — PLAN OF CARE
Pt. Remains on nasal cannula this 12 hour shift. 2-4 LPM depending on activity ,diminished lung sounds,CV: SR, adequate BP and perfusion, good urine output through [place] cleared by speech for soft diet - good PO intake, neuro status intact, pt. Moving all extremities equally and alert,  Pre-op MRI obvtained, surgery planned for tomorrow at 0730, NPO at 0000.

## 2023-03-29 NOTE — PROGRESS NOTES
Patient curently on 4 LPM NC. Skin: intact. BS: diminished bilaterally with exp wheezes. Neb tolerated well.    Will cont to monitor. Jessica Chun, RT

## 2023-03-29 NOTE — ANESTHESIA PREPROCEDURE EVALUATION
"Anesthesia Pre-Procedure Evaluation    Patient: Josephine Nicole   MRN: [id number] : 1950        Procedure : Procedure(s):  Bifrontal stealth craniotomy and resection of meningioma          Past Medical History:   Diagnosis Date     Cerebral meningioma      COPD (chronic obstructive pulmonary disease)      Depressive disorder      Eczema      Eczema      Hyperlipidemia      Hypothyroidism      Melanoma of skin      Osteoporosis       Past Surgical History:   Procedure Laterality Date     COLONOSCOPY N/A 2022    Procedure: COLONOSCOPY;  Surgeon: Abimbola Handley MD;  Location:  GI     OPEN REDUCTION INTERNAL FIXATION HIP UNIPOLAR Right 3/20/2023    Procedure: Right hip hemiarthroplasty anterolateral approach;  Surgeon: Shun Cuevas MD;  Location: [facility] OR     Tubal ligation NOS        Allergies   Allergen Reactions     Bupropion Anaphylaxis, Hives and Shortness Of Breath      Social History     Tobacco Use     Smoking status: Former     Years: 50.00     Types: Cigarettes     Smokeless tobacco: Never   Substance Use Topics     Alcohol use: Not Currently      Wt Readings from Last 1 Encounters:   23 69.2 kg (152 lb 8.9 oz)        Anesthesia Evaluation   Pt has had prior anesthetic.     No history of anesthetic complications       ROS/MED HX  ENT/Pulmonary: Comment: Home oxygen.    \"Admitted to the ICU at [facility] on 3/23//23 due to acute on chronic respiratory failure secondary to advanced COPD/emphysema requiring BiPAP support\"  -Currently on oxygen - 4L    \"Acute on chronic hypoxic and hypercapnic respiratory failure Secondary to PNeumonia community acquired, Underlying COPD, Fluid overload from POst op state\"    (+) tobacco use, Current use, COPD,     Neurologic: Comment: Cerebral meningioma  -S/p radiation    \"Acute toxic metabolic encephalopathy likely from post-op status, steroids, pain meds.\"    CT head 3-19-23    IMPRESSION:  1.  No acute intracranial hemorrhage or abnormal " extra-axial fluid collection.  2.  Prominent partially calcified extra-axial mass along the right anterior/inferior falx measuring up to 3.8 cm in greatest axial dimensions. There is moderate surrounding vasogenic edema predominantly involving the right frontal lobe with right to left   midline shift/subfalcine herniation measuring up to 1.1 cm. Recommend comparison with prior imaging to assess for stability. Consider neurosurgical consultation for further assessment, as clinically indicated.  3.  Partial subluxation of the bilateral mandible condyles anteriorly, possibly related to open-mouth positioning versus fixed subluxation. Correlation with physical examination is recommended.      Cardiovascular: Comment: 3/27/23 cardiac echo    Interpretation Summary     The left ventricle is normal in size.  There is normal left ventricular wall thickness.  The visual ejection fraction is 55-60%.  Grade I or early diastolic dysfunction.  Regional wall motion abnormalities cannot be excluded due to limited  visualization.  Poorly visualized valves, but likely no hemodynamically significant valvular  disease by Doppler.  ______________________________________________________________________________  Left Ventricle  The left ventricle is normal in size. There is normal left ventricular wall  thickness. The visual ejection fraction is 55-60%. Grade I or early diastolic  dysfunction. Regional wall motion abnormalities cannot be excluded due to  limited visualization.     Right Ventricle  The right ventricle is not well visualized.     Atria  Normal left atrial size. Right atrial size is normal. There is no color  Doppler evidence of an atrial shunt.     Mitral Valve  The mitral valve is not well visualized. There is trace mitral regurgitation.     Tricuspid Valve  There is trace tricuspid regurgitation. IVC diameter and respiratory changes  fall into an intermediate range suggesting an RA pressure of 8 mmHg.     Aortic  "Valve  The aortic valve is not well visualized. No aortic regurgitation is present.     Pulmonic Valve  There is trace pulmonic valvular regurgitation.     Vessels  The aortic root is not well visualized.     Pericardium  Trivial pericardial effusion.     Rhythm  Sinus rhythm was noted.    (+) Dyslipidemia -----    METS/Exercise Tolerance:     Hematologic:     (+) anemia,     Musculoskeletal: Comment: Osteoporosis.    \"Surgical repair for right femoral neck fracture on 3/20 with postoperative complication including worsening respiratory failure, worsening encephalopathy, lactic acidosis and hypotension\"      GI/Hepatic: Comment: Dysphagia      Renal/Genitourinary:  - neg Renal ROS     Endo: Comment: H/o lactic acidosis - resolved with IV fluids.    (+) thyroid problem, hypothyroidism,     Psychiatric/Substance Use:     (+) psychiatric history anxiety and depression     Infectious Disease:  - neg infectious disease ROS     Malignancy: Comment: Melanoma    Cerebral meningioma  -S/p radiation  (+) Malignancy,     Other:            Physical Exam    Airway  airway exam normal      Mallampati: III   TM distance: > 3 FB   Neck ROM: limited   Mouth opening: > 3 cm    Respiratory Devices and Support         Dental       (+) Modest Abnormalities - crowns, retainers, 1 or 2 missing teeth      Cardiovascular          Rhythm and rate: regular and normal     Pulmonary           breath sounds clear to auscultation           OUTSIDE LABS:  CBC:   Lab Results   Component Value Date    WBC 10.9 03/27/2023    WBC 9.4 03/26/2023    HGB 9.8 (L) 03/27/2023    HGB 7.8 (L) 03/26/2023    HCT 31.7 (L) 03/27/2023    HCT 25.7 (L) 03/26/2023     03/27/2023     03/26/2023     BMP:   Lab Results   Component Value Date     03/29/2023     03/27/2023    POTASSIUM 4.0 03/29/2023    POTASSIUM 3.7 03/27/2023    CHLORIDE 99 03/29/2023    CHLORIDE 105 03/27/2023    CO2 35 (H) 03/29/2023    CO2 32 (H) 03/27/2023    BUN 21.0 " 03/29/2023    BUN 16.1 03/27/2023    CR 0.53 03/29/2023    CR 0.51 03/27/2023     (H) 03/29/2023     (H) 03/29/2023     COAGS:   Lab Results   Component Value Date    PTT 29 03/19/2023    INR 0.96 03/19/2023     POC: No results found for: BGM, HCG, HCGS  HEPATIC:   Lab Results   Component Value Date    ALBUMIN 3.3 (L) 03/21/2023    PROTTOTAL 5.6 (L) 03/21/2023    ALT 16 03/21/2023    AST 51 (H) 03/21/2023    ALKPHOS 74 03/21/2023    BILITOTAL 0.4 03/21/2023     OTHER:   Lab Results   Component Value Date    PH 7.48 (H) 03/27/2023    LACT 2.1 (H) 03/28/2023    CHAO 8.4 (L) 03/29/2023    MAG 1.8 03/23/2023       Anesthesia Plan    ASA Status:  4   NPO Status:  NPO Appropriate    Anesthesia Type: General.     - Airway: ETT   Induction: Intravenous.   Maintenance: Balanced.   Techniques and Equipment:     - Airway: Video-Laryngoscope     - Lines/Monitors: Arterial Line, 2nd IV     Consents    Anesthesia Plan(s) and associated risks, benefits, and realistic alternatives discussed. Questions answered and patient/representative(s) expressed understanding.    - Discussed:     - Discussed with:  Patient, Other (See Comment) (& Family)      - Extended Intubation/Ventilatory Support Discussed: Yes.      - Patient is DNR/DNI Status: Yes             Suspend during perioperative period? Yes.    Use of blood products discussed: Yes.     - Discussed with: Patient, Other (see comment) (& Family).     - Consented: consented to blood products            Reason for refusal: other.     Postoperative Care    Pain management: IV analgesics, Multi-modal analgesia.   PONV prophylaxis: Ondansetron (or other 5HT-3)     Comments:    Other Comments: Explained possibility of extended recovery (including confusion) and possible post-op ventilation because of preop baseline health. Patient and family agree to suspend DNR/I and proceed with surgery.            Francisco Rutledge MD

## 2023-03-29 NOTE — PROGRESS NOTES
[facility]  Neurosurgery Daily Progress Note    Assessment & Plan      73-year-old female who is status post right hip hemiarthroplasty.  An RRT was called postoperatively due to agitation, hypoxia, hypotension, and encephalopathy after surgery.  Imaging revealed known cerebral meningioma with moderate surrounding vasogenic edema and mass effect.  She has undergone radiation therapy and stopped steroids about 1 month ago.  Since that time has had balance issues, confusion, and increased headache.  She was transferred from [facility] to [facility] for possible surgical intervention regarding her meningioma with Dr. Murphy.     Patient remains in ICU today. Neuro exam stable. Denies any headaches or new/worsening symptoms. Dr. Murphy spoke to patient, sister, and Hospitalist yesterday. Plan for OR tomorrow.     Plan:  - Craniotomy for resection of meningioma tomorrow morning with Dr. Murphy  - Stealth MRI today  - Pre op orders in chart   - Appreciate assistance from specialties     Discussed with Dr. Murphy, patient, and nurse     Candace Perdomo, CNP  [facility] [place] [address]  Tel [phone]  Pager [phone]    Interval History   Plan for OR tomorrow with Dr. Murphy     Physical Exam   Temp: 98.3  F (36.8  C) Temp src: Oral BP: 107/80 Pulse: 80   Resp: 26 SpO2: 98 % O2 Device: Nasal cannula Oxygen Delivery: 4 LPM  Vitals:    03/27/23 0600 03/28/23 0508 03/29/23 0600   Weight: 70.5 kg (155 lb 6.8 oz) 69.8 kg (153 lb 14.1 oz) 69.2 kg (152 lb 8.9 oz)       Mental status:  Alert and oriented x 3, speech is fluent.  Cranial nerves:  II-XII intact  Motor:  Moves all extremities  Coordination:  Smooth finger to nose testing.   Negative pronator drift.      Medications     - MEDICATION INSTRUCTIONS -          atorvastatin  10 mg Oral Daily     dexamethasone  6 mg Intravenous Q24H      famotidine  20 mg Intravenous Q12H     fluticasone-vilanterol  1 puff Inhalation Daily     insulin aspart  1-6 Units Subcutaneous Q4H     ipratropium  0.5 mg Nebulization Q4H While awake    And     levalbuterol  1.25 mg Nebulization Q4H While awake     [START ON 3/30/2023] levothyroxine  88 mcg Oral Daily     mirtazapine  7.5 mg Oral At Bedtime     polyethylene glycol  17 g Oral Daily     sertraline  25 mg Oral QAM     sodium chloride (PF)  3 mL Intracatheter Q8H

## 2023-03-29 NOTE — PROGRESS NOTES
Patient alert, disoriented to place. Moves all extremities. SR with adequate blood pressures. 4L Nasal Cannula all shift. Urine output trending down during shift, but acceptable. Thin liquids and minced food. Awaiting decision on surgery.

## 2023-03-29 NOTE — PROGRESS NOTES
[facility]    Medicine Progress Note - Hospitalist Service    Date of Admission:  3/23/2023    Assessment & Plan        Patient is a 73-year-old female with a history of COPD on home oxygen, depression, anxiety, hyperlipidemia, hypothyroidism, B12 deficiency, melanoma, osteoporosis, cerebral meningioma presented to [facility] on 3/19/2023 with right hip pain after a fall    Patient had surgical repair for right femoral neck fracture on 3/20 with postoperative complication including worsening respiratory failure, worsening encephalopathy, lactic acidosis and hypotension.  She also had worsening of her cerebral meningioma which would require surgical resection.  As per the discharging hospitalist at [facility] patient had discussion with the patient's family and she has been having worsening functional status with increasing, confusion and coordination issues including because of a fall at home.  Neurosurgery Dr. Murphy was contacted on 3/23 who recommended transfer to [facility] for possible resection of meningioma.      Patient has been admitted to the ICU at [facility] on 3/23//23 due to acute on chronic respiratory failure secondary to advanced COPD/emphysema requiring BiPAP support.  She was initially on BiPAP and had episodes of desaturation on 3/23 on high flow nasal cannula and had to be placed back on BiPAP with recovery.  She had desaturations to 84% while doing oral care on 3/26.  Currently she is on High flow nasal canula     #Acute on chronic hypoxic and hypercapnic respiratory failure Secondary to PNeumonia community acquired, Underlying COPD, Fluid overload from POst op state     Patient is a former smoker of 50 years.  She has advanced COPD on 2 to 3 L of oxygen at home for past 5 years..  Patient had increased work of breathing and hypercarbia requiring BiPAP therapy.  Patient has been transferred to ICU on 3/22 for closer monitoring    -Patient  finished course of azithromycin.  Last day on 3/27/2023  -Continue ceftriaxone 7/7 day course today   -Continue DuoNebs.  -Continue BiPAP as needed  - Patient is weaned off of high flow nasal cannula and currently on 4 L of oxygen by nasal cannula  Wean O2 as tolerated     Received total of 60 mg of Lasix IV on 3/28/2023 to help with diuresis which seemed to improve her respiratory status  She completed course of ceftriaxone so discontinued on 3/29/2023 IV Solu-Medrol switched to IV daily  Decadron 6 mg to help with meningioma    #Dysphagia  -Keep n.p.o. while on BiPAP  Speech therapy saw the patient and diet changed to level 4 purée and thin liquids  -Continue aspiration precautions  N.p.o. after midnight for craniotomy of meningioma      #Displaced right femoral neck fracture s/p right hip hemiarthroplasty  mechanical fall.  Xray revealed a displaced right femoral neck fracture and a subtle irregularity of the parasymphyseal aspect of the left pubic bone, potentially representing an age indeterminant fracture.   -Orthopedic surgery consult appreciated-continue as needed pain meds  -Continue DVT prophylaxis with Lovenox  Needs to hold Lovenox prior to brain surgery but timing for surgery is unknown.  -As needed Haldol for agitation  -Roman catheter placed on 3/24 for concern of urinary retention.  Continue for now        # Encephalopathy. Multifactorial see below  Cerebral Meningioma.    Acute toxic metabolic encephalopathy likely from post-op status, steroids, pain meds, ?infection Pt was diagnosed 9/2019 with a cerebral meningioma and monitored.  Given progression of the tumor, patient underwent 15 sessions of radiation 11/2022.  She is followed by Dr. Godoy, a Radiation Oncologist through [facility].  Pt was on oral steroids which were weaned off about 4 weeks ago.  Since that time, patient has had increased confusion, poor balance, changes in mood.  * Recent MRI Brain 3/17/23 revealed enlargement of  meningioma up to 45 mm with a significant mass effect and the midline shift right to left.  Patient was instructed by her PCP to follow up with outpatient Neurology and Radiation Oncology this week.  * CT head on admission with no acute hemorrhage, but notes the known mass surrounding vasogenic edema predominantly involving the right frontal lobe with right to left midline shift/subfalcine herniation measuring up to 1.1 cm.  ? Discussion with Dr. Godoy from radiation oncology on 3/21 and she discussed with Dr. Murphy.  She agrees with surgery at this point given advancement of tumor.   ? Per neurosurgery- given her functional decline with confusion at home in the weeks prior to her presentation, principal concern is for meningioma as a large contributor to her poor functional status and confusion.    ?      IV Solu-Medrol  switch to Decadron IV for the to help with the meningioma    Neurosurgery planning on craniotomy tomorrow  Preop MRI ordered today    #Suspected malnutrition  -SLP has been consulted and had diet advanced to puréed with thin liquids.  -Will also consult nutrition      #Lactic acidosis  Resolved with IV fluids         Acute on Chronic Anemia  ? Preop hemoglobin 10.5 g/dL  ? Hemoglobin 3/26 7.8 g/dL  Follow hemoglobin, consider transfusion for hemoglobin less than 8 g/dL due to ongoing respiratory failure, desire to optimize if at all possible if planning for surgery  -Prbc given on 3/26/23  Hb 9.8 on 3/27/23     HLD - resume Atorvastatin when able to take p.o.  Hypothyroidism -transitioned to IV levothyroxine until patient reliably able to take p.o.  Depression/Anxiety -hold p.o. low-dose sertraline and mirtazapine until able to reliably take       # Hypernatremia  Resolved          #Goals of care DNR/DNI.  Family aware of her poor prognosis.  .  Appreciate palliative care's help. Goals of care remain restorative at this point   Family sister Alise updated at bedside            Diet: Room  Service  Combination Diet Easy to Chew (level 7); Thin Liquids (level 0) (straws ok)    DVT Prophylaxis: Enoxaparin (Lovenox) SQ  Roman Catheter: PRESENT, indication: Strict 1-2 Hour I&O  Lines: None     Cardiac Monitoring: ACTIVE order. Indication: ICU  Code Status: No CPR- Do NOT Intubate      Clinically Significant Risk Factors                                Disposition Plan           Nicky Ballard MD  Hospitalist Service  [facility]  Securely message with [employer] (more info)  Text page via [employer] Paging/Directory   ______________________________________________________________________    Interval History     Patient seen and examined at bedside.  Her mentation has improved.  She is more awake.  She is more interactive in the conversation.  Denies shortness of breath.  Sister present at bedside.  Hypoxia much improved today able to be weaned down from high flow nasal cannula to regular nasal cannula oxygen in her legs lungs sound clear today        Physical Exam   Vital Signs: Temp: 98  F (36.7  C) Temp src: Axillary BP: 123/75 Pulse: 78   Resp: 20 SpO2: 93 % O2 Device: Nasal cannula Oxygen Delivery: 2 LPM  Weight: 152 lbs 8.93 oz  Physical Exam  Cardiovascular:      Rate and Rhythm: Normal rate.      Heart sounds: Normal heart sounds.   Pulmonary:      Effort: Pulmonary effort is normal. No respiratory distress.      Breath sounds: Normal breath sounds.   Abdominal:      General: There is no distension.      Palpations: Abdomen is soft.      Tenderness: There is no abdominal tenderness.   Musculoskeletal:      Right lower leg: Edema present.      Left lower leg: Edema present.       Medical Decision Making       Data     I have personally reviewed the following data over the past 24 hrs:    N/A  \   N/A   / N/A     142 99 21.0 /  152 (H)   4.0 35 (H) 0.53 \       Procal: N/A CRP: N/A Lactic Acid: 2.1 (H)

## 2023-03-29 NOTE — PROGRESS NOTES
[facility] ICU  Palliative Care Spiritual Assessment    Patient: Josephine Nicole  Date of Admission:  3/23/2023      Summary and Recommendations:    Visited with Josephine and her sister, Alise, who was present at bedside.    Alise shared that Josephine is anticipating surgery tomorrow for her brain tumor.    The sisters find support within their larger family. Alise (down from [place]) has been with Josephine daily.    They are Anabaptist and find prayer meaningful. We prayed together.    PLAN: Will follow for spiritual support while Palliative Care is consulted. Please contact Spiritual Health should immediate needs arise.      Dominga Salinas  Associate   Palliative Care  [facility] Phone Line: [phone]

## 2023-03-30 ENCOUNTER — ANESTHESIA (OUTPATIENT)
Dept: SURGERY | Facility: CLINIC | Age: 73
DRG: 025 | End: 2023-03-30
Payer: COMMERCIAL

## 2023-03-30 ENCOUNTER — APPOINTMENT (OUTPATIENT)
Dept: GENERAL RADIOLOGY | Facility: CLINIC | Age: 73
DRG: 025 | End: 2023-03-30
Attending: INTERNAL MEDICINE
Payer: COMMERCIAL

## 2023-03-30 LAB
ABO/RH(D): NORMAL
ALLEN'S TEST: ABNORMAL
ANION GAP SERPL CALCULATED.3IONS-SCNC: 10 MMOL/L (ref 7–15)
ANTIBODY SCREEN: NEGATIVE
BASE EXCESS BLDA CALC-SCNC: 12 MMOL/L (ref -9–1.8)
BUN SERPL-MCNC: 19.8 MG/DL (ref 8–23)
CALCIUM SERPL-MCNC: 8.6 MG/DL (ref 8.8–10.2)
CHLORIDE SERPL-SCNC: 97 MMOL/L (ref 98–107)
CREAT SERPL-MCNC: 0.48 MG/DL (ref 0.51–0.95)
DEPRECATED HCO3 PLAS-SCNC: 35 MMOL/L (ref 22–29)
GFR SERPL CREATININE-BSD FRML MDRD: >90 ML/MIN/1.73M2
GLUCOSE BLDC GLUCOMTR-MCNC: 106 MG/DL (ref 70–99)
GLUCOSE BLDC GLUCOMTR-MCNC: 116 MG/DL (ref 70–99)
GLUCOSE BLDC GLUCOMTR-MCNC: 120 MG/DL (ref 70–99)
GLUCOSE BLDC GLUCOMTR-MCNC: 90 MG/DL (ref 70–99)
GLUCOSE BLDC GLUCOMTR-MCNC: 99 MG/DL (ref 70–99)
GLUCOSE SERPL-MCNC: 94 MG/DL (ref 70–99)
HCO3 BLD-SCNC: 38 MMOL/L (ref 21–28)
HOLD SPECIMEN: NORMAL
O2/TOTAL GAS SETTING VFR VENT: 35 %
PCO2 BLD: 54 MM HG (ref 35–45)
PH BLD: 7.45 [PH] (ref 7.35–7.45)
PO2 BLD: 65 MM HG (ref 80–105)
POTASSIUM SERPL-SCNC: 3.6 MMOL/L (ref 3.4–5.3)
SODIUM SERPL-SCNC: 142 MMOL/L (ref 136–145)
SPECIMEN EXPIRATION DATE: NORMAL

## 2023-03-30 PROCEDURE — 99291 CRITICAL CARE FIRST HOUR: CPT | Performed by: INTERNAL MEDICINE

## 2023-03-30 PROCEDURE — 80048 BASIC METABOLIC PNL TOTAL CA: CPT | Performed by: ANESTHESIOLOGY

## 2023-03-30 PROCEDURE — 250N000011 HC RX IP 250 OP 636: Performed by: STUDENT IN AN ORGANIZED HEALTH CARE EDUCATION/TRAINING PROGRAM

## 2023-03-30 PROCEDURE — 250N000011 HC RX IP 250 OP 636

## 2023-03-30 PROCEDURE — C1763 CONN TISS, NON-HUMAN: HCPCS | Performed by: NEUROLOGICAL SURGERY

## 2023-03-30 PROCEDURE — 61781 SCAN PROC CRANIAL INTRA: CPT | Performed by: NEUROLOGICAL SURGERY

## 2023-03-30 PROCEDURE — 250N000009 HC RX 250: Performed by: NEUROLOGICAL SURGERY

## 2023-03-30 PROCEDURE — 88307 TISSUE EXAM BY PATHOLOGIST: CPT | Mod: 26 | Performed by: PATHOLOGY

## 2023-03-30 PROCEDURE — 999N000157 HC STATISTIC RCP TIME EA 10 MIN

## 2023-03-30 PROCEDURE — 258N000003 HC RX IP 258 OP 636: Performed by: ANESTHESIOLOGY

## 2023-03-30 PROCEDURE — 258N000003 HC RX IP 258 OP 636: Performed by: STUDENT IN AN ORGANIZED HEALTH CARE EDUCATION/TRAINING PROGRAM

## 2023-03-30 PROCEDURE — 61512 CRNEC TREPH EXC MNGIOMA STTL: CPT | Performed by: NEUROLOGICAL SURGERY

## 2023-03-30 PROCEDURE — 250N000009 HC RX 250

## 2023-03-30 PROCEDURE — 999N000065 XR CHEST PORT 1 VIEW

## 2023-03-30 PROCEDURE — C1713 ANCHOR/SCREW BN/BN,TIS/BN: HCPCS | Performed by: NEUROLOGICAL SURGERY

## 2023-03-30 PROCEDURE — 69990 MICROSURGERY ADD-ON: CPT | Mod: 59 | Performed by: NEUROLOGICAL SURGERY

## 2023-03-30 PROCEDURE — 8E09XBZ COMPUTER ASSISTED PROCEDURE OF HEAD AND NECK REGION: ICD-10-PCS | Performed by: NEUROLOGICAL SURGERY

## 2023-03-30 PROCEDURE — 250N000009 HC RX 250: Performed by: PHYSICIAN ASSISTANT

## 2023-03-30 PROCEDURE — 36415 COLL VENOUS BLD VENIPUNCTURE: CPT | Performed by: ANESTHESIOLOGY

## 2023-03-30 PROCEDURE — 94640 AIRWAY INHALATION TREATMENT: CPT

## 2023-03-30 PROCEDURE — 94640 AIRWAY INHALATION TREATMENT: CPT | Mod: 76

## 2023-03-30 PROCEDURE — 99232 SBSQ HOSP IP/OBS MODERATE 35: CPT | Performed by: INTERNAL MEDICINE

## 2023-03-30 PROCEDURE — 200N000001 HC R&B ICU

## 2023-03-30 PROCEDURE — 999N000141 HC STATISTIC PRE-PROCEDURE NURSING ASSESSMENT: Performed by: NEUROLOGICAL SURGERY

## 2023-03-30 PROCEDURE — 258N000003 HC RX IP 258 OP 636

## 2023-03-30 PROCEDURE — 94660 CPAP INITIATION&MGMT: CPT

## 2023-03-30 PROCEDURE — 250N000011 HC RX IP 250 OP 636: Performed by: ANESTHESIOLOGY

## 2023-03-30 PROCEDURE — 250N000009 HC RX 250: Performed by: ANESTHESIOLOGY

## 2023-03-30 PROCEDURE — 88307 TISSUE EXAM BY PATHOLOGIST: CPT | Mod: TC | Performed by: NEUROLOGICAL SURGERY

## 2023-03-30 PROCEDURE — 360N000079 HC SURGERY LEVEL 6, PER MIN: Performed by: NEUROLOGICAL SURGERY

## 2023-03-30 PROCEDURE — 86850 RBC ANTIBODY SCREEN: CPT | Performed by: NEUROLOGICAL SURGERY

## 2023-03-30 PROCEDURE — 370N000017 HC ANESTHESIA TECHNICAL FEE, PER MIN: Performed by: NEUROLOGICAL SURGERY

## 2023-03-30 PROCEDURE — 82803 BLOOD GASES ANY COMBINATION: CPT | Performed by: STUDENT IN AN ORGANIZED HEALTH CARE EDUCATION/TRAINING PROGRAM

## 2023-03-30 PROCEDURE — 250N000013 HC RX MED GY IP 250 OP 250 PS 637

## 2023-03-30 PROCEDURE — 250N000011 HC RX IP 250 OP 636: Performed by: NURSE PRACTITIONER

## 2023-03-30 PROCEDURE — 250N000025 HC SEVOFLURANE, PER MIN: Performed by: NEUROLOGICAL SURGERY

## 2023-03-30 PROCEDURE — 272N000004 HC RX 272: Performed by: NEUROLOGICAL SURGERY

## 2023-03-30 PROCEDURE — 250N000011 HC RX IP 250 OP 636: Performed by: NEUROLOGICAL SURGERY

## 2023-03-30 PROCEDURE — 00B00ZZ EXCISION OF BRAIN, OPEN APPROACH: ICD-10-PCS | Performed by: NEUROLOGICAL SURGERY

## 2023-03-30 PROCEDURE — 250N000011 HC RX IP 250 OP 636: Performed by: PHYSICIAN ASSISTANT

## 2023-03-30 PROCEDURE — 36415 COLL VENOUS BLD VENIPUNCTURE: CPT | Performed by: NEUROLOGICAL SURGERY

## 2023-03-30 PROCEDURE — 272N000001 HC OR GENERAL SUPPLY STERILE: Performed by: NEUROLOGICAL SURGERY

## 2023-03-30 DEVICE — IMP SCR SYN MATRIX LOW PRO 1.5X04MM SELF DRILL 04.503.104.01: Type: IMPLANTABLE DEVICE | Site: CRANIAL | Status: FUNCTIONAL

## 2023-03-30 DEVICE — GRAFT DURAGEN DURAL MATRIX 4X5CM ID-4501: Type: IMPLANTABLE DEVICE | Site: CRANIAL | Status: FUNCTIONAL

## 2023-03-30 DEVICE — IMP PLATE SYN BURR HOLE COVER 17MM 04.503.023: Type: IMPLANTABLE DEVICE | Site: CRANIAL | Status: FUNCTIONAL

## 2023-03-30 RX ORDER — OXYCODONE HYDROCHLORIDE 5 MG/1
5 TABLET ORAL EVERY 4 HOURS PRN
Status: DISCONTINUED | OUTPATIENT
Start: 2023-03-30 | End: 2023-04-01

## 2023-03-30 RX ORDER — CEFAZOLIN SODIUM/WATER 2 G/20 ML
2 SYRINGE (ML) INTRAVENOUS SEE ADMIN INSTRUCTIONS
Status: DISCONTINUED | OUTPATIENT
Start: 2023-03-30 | End: 2023-03-30

## 2023-03-30 RX ORDER — ONDANSETRON 2 MG/ML
4 INJECTION INTRAMUSCULAR; INTRAVENOUS EVERY 6 HOURS PRN
Status: DISCONTINUED | OUTPATIENT
Start: 2023-03-30 | End: 2023-04-07 | Stop reason: HOSPADM

## 2023-03-30 RX ORDER — CHLORHEXIDINE GLUCONATE ORAL RINSE 1.2 MG/ML
15 SOLUTION DENTAL EVERY 12 HOURS
Status: DISCONTINUED | OUTPATIENT
Start: 2023-03-30 | End: 2023-03-30

## 2023-03-30 RX ORDER — LABETALOL HYDROCHLORIDE 5 MG/ML
10-40 INJECTION, SOLUTION INTRAVENOUS EVERY 10 MIN PRN
Status: DISCONTINUED | OUTPATIENT
Start: 2023-03-30 | End: 2023-03-30

## 2023-03-30 RX ORDER — HYDROMORPHONE HCL IN WATER/PF 6 MG/30 ML
0.2 PATIENT CONTROLLED ANALGESIA SYRINGE INTRAVENOUS EVERY 5 MIN PRN
Status: DISCONTINUED | OUTPATIENT
Start: 2023-03-30 | End: 2023-03-30

## 2023-03-30 RX ORDER — EPHEDRINE SULFATE 50 MG/ML
INJECTION, SOLUTION INTRAMUSCULAR; INTRAVENOUS; SUBCUTANEOUS PRN
Status: DISCONTINUED | OUTPATIENT
Start: 2023-03-30 | End: 2023-03-30

## 2023-03-30 RX ORDER — CEFAZOLIN SODIUM/WATER 2 G/20 ML
2 SYRINGE (ML) INTRAVENOUS
Status: DISCONTINUED | OUTPATIENT
Start: 2023-03-30 | End: 2023-03-30

## 2023-03-30 RX ORDER — FENTANYL CITRATE 50 UG/ML
25 INJECTION, SOLUTION INTRAMUSCULAR; INTRAVENOUS EVERY 5 MIN PRN
Status: DISCONTINUED | OUTPATIENT
Start: 2023-03-30 | End: 2023-03-30

## 2023-03-30 RX ORDER — ONDANSETRON 2 MG/ML
INJECTION INTRAMUSCULAR; INTRAVENOUS PRN
Status: DISCONTINUED | OUTPATIENT
Start: 2023-03-30 | End: 2023-03-30

## 2023-03-30 RX ORDER — PROCHLORPERAZINE MALEATE 5 MG
5 TABLET ORAL EVERY 6 HOURS PRN
Status: DISCONTINUED | OUTPATIENT
Start: 2023-03-30 | End: 2023-04-07 | Stop reason: HOSPADM

## 2023-03-30 RX ORDER — SODIUM CHLORIDE 9 MG/ML
INJECTION, SOLUTION INTRAVENOUS CONTINUOUS
Status: DISCONTINUED | OUTPATIENT
Start: 2023-03-30 | End: 2023-04-01

## 2023-03-30 RX ORDER — PROPOFOL 10 MG/ML
INJECTION, EMULSION INTRAVENOUS PRN
Status: DISCONTINUED | OUTPATIENT
Start: 2023-03-30 | End: 2023-03-30

## 2023-03-30 RX ORDER — OXYCODONE HYDROCHLORIDE 5 MG/1
10 TABLET ORAL EVERY 4 HOURS PRN
Status: DISCONTINUED | OUTPATIENT
Start: 2023-03-30 | End: 2023-04-01

## 2023-03-30 RX ORDER — ACETAMINOPHEN 325 MG/1
650 TABLET ORAL EVERY 4 HOURS PRN
Status: DISCONTINUED | OUTPATIENT
Start: 2023-04-02 | End: 2023-04-02

## 2023-03-30 RX ORDER — HYDROMORPHONE HCL IN WATER/PF 6 MG/30 ML
.2-.4 PATIENT CONTROLLED ANALGESIA SYRINGE INTRAVENOUS
Status: DISCONTINUED | OUTPATIENT
Start: 2023-03-30 | End: 2023-03-31 | Stop reason: DRUGHIGH

## 2023-03-30 RX ORDER — FENTANYL CITRATE 50 UG/ML
50 INJECTION, SOLUTION INTRAMUSCULAR; INTRAVENOUS EVERY 5 MIN PRN
Status: DISCONTINUED | OUTPATIENT
Start: 2023-03-30 | End: 2023-03-30

## 2023-03-30 RX ORDER — CALCIUM CARBONATE 500 MG/1
1000 TABLET, CHEWABLE ORAL 4 TIMES DAILY PRN
Status: DISCONTINUED | OUTPATIENT
Start: 2023-03-30 | End: 2023-04-07 | Stop reason: HOSPADM

## 2023-03-30 RX ORDER — PROPOFOL 10 MG/ML
INJECTION, EMULSION INTRAVENOUS CONTINUOUS PRN
Status: DISCONTINUED | OUTPATIENT
Start: 2023-03-30 | End: 2023-03-30

## 2023-03-30 RX ORDER — ACETAMINOPHEN 325 MG/1
975 TABLET ORAL EVERY 8 HOURS
Status: COMPLETED | OUTPATIENT
Start: 2023-03-30 | End: 2023-04-02

## 2023-03-30 RX ORDER — LEVALBUTEROL INHALATION SOLUTION 1.25 MG/3ML
1.25 SOLUTION RESPIRATORY (INHALATION) EVERY 4 HOURS
Status: DISCONTINUED | OUTPATIENT
Start: 2023-03-30 | End: 2023-03-30

## 2023-03-30 RX ORDER — BISACODYL 10 MG
10 SUPPOSITORY, RECTAL RECTAL DAILY PRN
Status: DISCONTINUED | OUTPATIENT
Start: 2023-03-30 | End: 2023-04-07 | Stop reason: HOSPADM

## 2023-03-30 RX ORDER — ALBUTEROL SULFATE 90 UG/1
AEROSOL, METERED RESPIRATORY (INHALATION) PRN
Status: DISCONTINUED | OUTPATIENT
Start: 2023-03-30 | End: 2023-03-30

## 2023-03-30 RX ORDER — DEXAMETHASONE 2 MG/1
4 TABLET ORAL EVERY 12 HOURS
Status: DISCONTINUED | OUTPATIENT
Start: 2023-03-30 | End: 2023-04-03 | Stop reason: ALTCHOICE

## 2023-03-30 RX ORDER — HYDRALAZINE HYDROCHLORIDE 20 MG/ML
10-20 INJECTION INTRAMUSCULAR; INTRAVENOUS EVERY 30 MIN PRN
Status: DISCONTINUED | OUTPATIENT
Start: 2023-03-30 | End: 2023-04-07 | Stop reason: HOSPADM

## 2023-03-30 RX ORDER — ONDANSETRON 4 MG/1
4 TABLET, ORALLY DISINTEGRATING ORAL EVERY 30 MIN PRN
Status: DISCONTINUED | OUTPATIENT
Start: 2023-03-30 | End: 2023-03-30

## 2023-03-30 RX ORDER — LIDOCAINE HYDROCHLORIDE 20 MG/ML
INJECTION, SOLUTION INFILTRATION; PERINEURAL PRN
Status: DISCONTINUED | OUTPATIENT
Start: 2023-03-30 | End: 2023-03-30

## 2023-03-30 RX ORDER — SODIUM CHLORIDE, SODIUM LACTATE, POTASSIUM CHLORIDE, CALCIUM CHLORIDE 600; 310; 30; 20 MG/100ML; MG/100ML; MG/100ML; MG/100ML
INJECTION, SOLUTION INTRAVENOUS CONTINUOUS
Status: DISCONTINUED | OUTPATIENT
Start: 2023-03-30 | End: 2023-03-30

## 2023-03-30 RX ORDER — FENTANYL CITRATE 50 UG/ML
INJECTION, SOLUTION INTRAMUSCULAR; INTRAVENOUS PRN
Status: DISCONTINUED | OUTPATIENT
Start: 2023-03-30 | End: 2023-03-30

## 2023-03-30 RX ORDER — ONDANSETRON 2 MG/ML
4 INJECTION INTRAMUSCULAR; INTRAVENOUS EVERY 30 MIN PRN
Status: DISCONTINUED | OUTPATIENT
Start: 2023-03-30 | End: 2023-03-30

## 2023-03-30 RX ORDER — AMOXICILLIN 250 MG
1 CAPSULE ORAL 2 TIMES DAILY
Status: DISCONTINUED | OUTPATIENT
Start: 2023-03-30 | End: 2023-04-07 | Stop reason: HOSPADM

## 2023-03-30 RX ORDER — FENTANYL CITRATE 0.05 MG/ML
25 INJECTION, SOLUTION INTRAMUSCULAR; INTRAVENOUS
Status: DISCONTINUED | OUTPATIENT
Start: 2023-03-30 | End: 2023-03-31

## 2023-03-30 RX ORDER — POLYETHYLENE GLYCOL 3350 17 G/17G
17 POWDER, FOR SOLUTION ORAL DAILY
Status: DISCONTINUED | OUTPATIENT
Start: 2023-03-31 | End: 2023-04-07 | Stop reason: HOSPADM

## 2023-03-30 RX ORDER — BUPIVACAINE HYDROCHLORIDE AND EPINEPHRINE 5; 5 MG/ML; UG/ML
INJECTION, SOLUTION PERINEURAL PRN
Status: DISCONTINUED | OUTPATIENT
Start: 2023-03-30 | End: 2023-03-30 | Stop reason: HOSPADM

## 2023-03-30 RX ORDER — DEXAMETHASONE SODIUM PHOSPHATE 4 MG/ML
INJECTION, SOLUTION INTRA-ARTICULAR; INTRALESIONAL; INTRAMUSCULAR; INTRAVENOUS; SOFT TISSUE PRN
Status: DISCONTINUED | OUTPATIENT
Start: 2023-03-30 | End: 2023-03-30

## 2023-03-30 RX ORDER — ESMOLOL HYDROCHLORIDE 10 MG/ML
INJECTION INTRAVENOUS PRN
Status: DISCONTINUED | OUTPATIENT
Start: 2023-03-30 | End: 2023-03-30

## 2023-03-30 RX ORDER — LABETALOL HYDROCHLORIDE 5 MG/ML
10 INJECTION, SOLUTION INTRAVENOUS EVERY 6 HOURS PRN
Status: DISCONTINUED | OUTPATIENT
Start: 2023-03-30 | End: 2023-04-07 | Stop reason: HOSPADM

## 2023-03-30 RX ORDER — PROPOFOL 10 MG/ML
5-75 INJECTION, EMULSION INTRAVENOUS CONTINUOUS
Status: DISCONTINUED | OUTPATIENT
Start: 2023-03-30 | End: 2023-03-30

## 2023-03-30 RX ORDER — IPRATROPIUM BROMIDE AND ALBUTEROL SULFATE 2.5; .5 MG/3ML; MG/3ML
3 SOLUTION RESPIRATORY (INHALATION) EVERY 4 HOURS
Status: DISCONTINUED | OUTPATIENT
Start: 2023-03-30 | End: 2023-03-30

## 2023-03-30 RX ORDER — LIDOCAINE HYDROCHLORIDE 10 MG/ML
INJECTION, SOLUTION INFILTRATION; PERINEURAL
Status: COMPLETED | OUTPATIENT
Start: 2023-03-30 | End: 2023-03-30

## 2023-03-30 RX ORDER — LIDOCAINE 40 MG/G
CREAM TOPICAL
Status: DISCONTINUED | OUTPATIENT
Start: 2023-03-30 | End: 2023-03-30

## 2023-03-30 RX ORDER — ONDANSETRON 4 MG/1
4 TABLET, ORALLY DISINTEGRATING ORAL EVERY 6 HOURS PRN
Status: DISCONTINUED | OUTPATIENT
Start: 2023-03-30 | End: 2023-04-07 | Stop reason: HOSPADM

## 2023-03-30 RX ORDER — LIDOCAINE 40 MG/G
CREAM TOPICAL
Status: DISCONTINUED | OUTPATIENT
Start: 2023-03-30 | End: 2023-04-07 | Stop reason: HOSPADM

## 2023-03-30 RX ORDER — DEXAMETHASONE SODIUM PHOSPHATE 4 MG/ML
4 INJECTION, SOLUTION INTRA-ARTICULAR; INTRALESIONAL; INTRAMUSCULAR; INTRAVENOUS; SOFT TISSUE EVERY 12 HOURS
Status: DISCONTINUED | OUTPATIENT
Start: 2023-03-30 | End: 2023-04-03

## 2023-03-30 RX ORDER — HYDROMORPHONE HCL IN WATER/PF 6 MG/30 ML
0.4 PATIENT CONTROLLED ANALGESIA SYRINGE INTRAVENOUS EVERY 5 MIN PRN
Status: DISCONTINUED | OUTPATIENT
Start: 2023-03-30 | End: 2023-03-30

## 2023-03-30 RX ADMIN — ROCURONIUM BROMIDE 30 MG: 50 INJECTION, SOLUTION INTRAVENOUS at 07:55

## 2023-03-30 RX ADMIN — NITROGLYCERIN 20 MCG: 5 INJECTION, SOLUTION INTRAVENOUS at 11:00

## 2023-03-30 RX ADMIN — REMIFENTANIL HYDROCHLORIDE 0.1 MCG/KG/MIN: 1 INJECTION, POWDER, LYOPHILIZED, FOR SOLUTION INTRAVENOUS at 08:08

## 2023-03-30 RX ADMIN — PROPOFOL 50 MCG/KG/MIN: 10 INJECTION, EMULSION INTRAVENOUS at 12:22

## 2023-03-30 RX ADMIN — Medication 2.5 MG: at 09:18

## 2023-03-30 RX ADMIN — ESMOLOL HYDROCHLORIDE 20 MG: 10 INJECTION, SOLUTION INTRAVENOUS at 10:36

## 2023-03-30 RX ADMIN — PHENYLEPHRINE HYDROCHLORIDE 100 MCG: 10 INJECTION INTRAVENOUS at 08:18

## 2023-03-30 RX ADMIN — PHENYLEPHRINE HYDROCHLORIDE 200 MCG: 10 INJECTION INTRAVENOUS at 07:59

## 2023-03-30 RX ADMIN — LABETALOL HYDROCHLORIDE 10 MG: 5 INJECTION, SOLUTION INTRAVENOUS at 12:27

## 2023-03-30 RX ADMIN — NITROGLYCERIN 20 MCG: 5 INJECTION, SOLUTION INTRAVENOUS at 11:02

## 2023-03-30 RX ADMIN — ROCURONIUM BROMIDE 10 MG: 50 INJECTION, SOLUTION INTRAVENOUS at 09:23

## 2023-03-30 RX ADMIN — PROPOFOL 30 MG: 10 INJECTION, EMULSION INTRAVENOUS at 11:13

## 2023-03-30 RX ADMIN — PHENYLEPHRINE HYDROCHLORIDE 0.5 MCG/KG/MIN: 10 INJECTION INTRAVENOUS at 08:01

## 2023-03-30 RX ADMIN — IPRATROPIUM BROMIDE 0.5 MG: 0.5 SOLUTION RESPIRATORY (INHALATION) at 19:56

## 2023-03-30 RX ADMIN — ESMOLOL HYDROCHLORIDE 20 MG: 10 INJECTION, SOLUTION INTRAVENOUS at 10:40

## 2023-03-30 RX ADMIN — NITROGLYCERIN 40 MCG: 5 INJECTION, SOLUTION INTRAVENOUS at 11:13

## 2023-03-30 RX ADMIN — NITROGLYCERIN 20 MCG: 5 INJECTION, SOLUTION INTRAVENOUS at 10:29

## 2023-03-30 RX ADMIN — LIDOCAINE HYDROCHLORIDE 2 ML: 10 INJECTION, SOLUTION INFILTRATION; PERINEURAL at 07:10

## 2023-03-30 RX ADMIN — PROPOFOL 40 MG: 10 INJECTION, EMULSION INTRAVENOUS at 08:23

## 2023-03-30 RX ADMIN — IPRATROPIUM BROMIDE 0.5 MG: 0.5 SOLUTION RESPIRATORY (INHALATION) at 15:27

## 2023-03-30 RX ADMIN — ROCURONIUM BROMIDE 10 MG: 50 INJECTION, SOLUTION INTRAVENOUS at 08:57

## 2023-03-30 RX ADMIN — ONDANSETRON 4 MG: 2 INJECTION INTRAMUSCULAR; INTRAVENOUS at 10:09

## 2023-03-30 RX ADMIN — NITROGLYCERIN 40 MCG: 5 INJECTION, SOLUTION INTRAVENOUS at 11:11

## 2023-03-30 RX ADMIN — NITROGLYCERIN 40 MCG: 5 INJECTION, SOLUTION INTRAVENOUS at 11:08

## 2023-03-30 RX ADMIN — PHENYLEPHRINE HYDROCHLORIDE 100 MCG: 10 INJECTION INTRAVENOUS at 08:16

## 2023-03-30 RX ADMIN — NITROGLYCERIN 20 MCG: 5 INJECTION, SOLUTION INTRAVENOUS at 11:01

## 2023-03-30 RX ADMIN — Medication 2.5 MG: at 09:30

## 2023-03-30 RX ADMIN — LIDOCAINE HYDROCHLORIDE 100 MG: 20 INJECTION, SOLUTION INFILTRATION; PERINEURAL at 07:55

## 2023-03-30 RX ADMIN — FENTANYL CITRATE 50 MCG: 50 INJECTION, SOLUTION INTRAMUSCULAR; INTRAVENOUS at 08:26

## 2023-03-30 RX ADMIN — ALBUTEROL SULFATE 6 PUFF: 90 AEROSOL, METERED RESPIRATORY (INHALATION) at 10:17

## 2023-03-30 RX ADMIN — SODIUM CHLORIDE, POTASSIUM CHLORIDE, SODIUM LACTATE AND CALCIUM CHLORIDE: 600; 310; 30; 20 INJECTION, SOLUTION INTRAVENOUS at 06:43

## 2023-03-30 RX ADMIN — ESMOLOL HYDROCHLORIDE 30 MG: 10 INJECTION, SOLUTION INTRAVENOUS at 10:34

## 2023-03-30 RX ADMIN — NITROGLYCERIN 10 MCG: 5 INJECTION, SOLUTION INTRAVENOUS at 08:26

## 2023-03-30 RX ADMIN — NITROGLYCERIN 20 MCG: 5 INJECTION, SOLUTION INTRAVENOUS at 11:03

## 2023-03-30 RX ADMIN — FENTANYL CITRATE 25 MCG: 50 INJECTION, SOLUTION INTRAMUSCULAR; INTRAVENOUS at 07:15

## 2023-03-30 RX ADMIN — DEXAMETHASONE SODIUM PHOSPHATE 10 MG: 4 INJECTION, SOLUTION INTRA-ARTICULAR; INTRALESIONAL; INTRAMUSCULAR; INTRAVENOUS; SOFT TISSUE at 08:25

## 2023-03-30 RX ADMIN — NITROGLYCERIN 30 MCG: 5 INJECTION, SOLUTION INTRAVENOUS at 11:05

## 2023-03-30 RX ADMIN — SODIUM CHLORIDE, POTASSIUM CHLORIDE, SODIUM LACTATE AND CALCIUM CHLORIDE: 600; 310; 30; 20 INJECTION, SOLUTION INTRAVENOUS at 07:36

## 2023-03-30 RX ADMIN — PHENYLEPHRINE HYDROCHLORIDE 200 MCG: 10 INJECTION INTRAVENOUS at 08:13

## 2023-03-30 RX ADMIN — Medication 2 G: at 07:51

## 2023-03-30 RX ADMIN — NITROGLYCERIN 40 MCG: 5 INJECTION, SOLUTION INTRAVENOUS at 11:07

## 2023-03-30 RX ADMIN — ESMOLOL HYDROCHLORIDE 20 MG: 10 INJECTION, SOLUTION INTRAVENOUS at 10:33

## 2023-03-30 RX ADMIN — PHENYLEPHRINE HYDROCHLORIDE 100 MCG: 10 INJECTION INTRAVENOUS at 08:39

## 2023-03-30 RX ADMIN — LEVALBUTEROL HYDROCHLORIDE 1.25 MG: 1.25 SOLUTION RESPIRATORY (INHALATION) at 19:56

## 2023-03-30 RX ADMIN — NITROGLYCERIN 40 MCG: 5 INJECTION, SOLUTION INTRAVENOUS at 11:06

## 2023-03-30 RX ADMIN — HYDRALAZINE HYDROCHLORIDE 10 MG: 20 INJECTION INTRAMUSCULAR; INTRAVENOUS at 12:14

## 2023-03-30 RX ADMIN — PROPOFOL 100 MG: 10 INJECTION, EMULSION INTRAVENOUS at 07:55

## 2023-03-30 RX ADMIN — ESMOLOL HYDROCHLORIDE 10 MG: 10 INJECTION, SOLUTION INTRAVENOUS at 10:43

## 2023-03-30 RX ADMIN — ROCURONIUM BROMIDE 20 MG: 50 INJECTION, SOLUTION INTRAVENOUS at 08:26

## 2023-03-30 RX ADMIN — PROPOFOL 30 MG: 10 INJECTION, EMULSION INTRAVENOUS at 08:27

## 2023-03-30 RX ADMIN — Medication 5 MG: at 08:20

## 2023-03-30 RX ADMIN — NITROGLYCERIN 40 MCG: 5 INJECTION, SOLUTION INTRAVENOUS at 11:04

## 2023-03-30 RX ADMIN — NITROGLYCERIN 10 MCG: 5 INJECTION, SOLUTION INTRAVENOUS at 09:27

## 2023-03-30 RX ADMIN — NITROGLYCERIN 40 MCG: 5 INJECTION, SOLUTION INTRAVENOUS at 11:14

## 2023-03-30 RX ADMIN — NITROGLYCERIN 40 MCG: 5 INJECTION, SOLUTION INTRAVENOUS at 11:09

## 2023-03-30 RX ADMIN — HYDRALAZINE HYDROCHLORIDE 20 MG: 20 INJECTION INTRAMUSCULAR; INTRAVENOUS at 16:01

## 2023-03-30 RX ADMIN — PROPOFOL 40 MCG/KG/MIN: 10 INJECTION, EMULSION INTRAVENOUS at 11:13

## 2023-03-30 RX ADMIN — SODIUM CHLORIDE: 9 INJECTION, SOLUTION INTRAVENOUS at 11:40

## 2023-03-30 RX ADMIN — LEVALBUTEROL HYDROCHLORIDE 1.25 MG: 1.25 SOLUTION RESPIRATORY (INHALATION) at 15:26

## 2023-03-30 RX ADMIN — DEXAMETHASONE SODIUM PHOSPHATE 4 MG: 4 INJECTION, SOLUTION INTRAMUSCULAR; INTRAVENOUS at 20:55

## 2023-03-30 RX ADMIN — SODIUM CHLORIDE, POTASSIUM CHLORIDE, SODIUM LACTATE AND CALCIUM CHLORIDE: 600; 310; 30; 20 INJECTION, SOLUTION INTRAVENOUS at 09:26

## 2023-03-30 RX ADMIN — PROPOFOL 20 MG: 10 INJECTION, EMULSION INTRAVENOUS at 09:27

## 2023-03-30 RX ADMIN — FENTANYL CITRATE 25 MCG: 50 INJECTION, SOLUTION INTRAMUSCULAR; INTRAVENOUS at 07:41

## 2023-03-30 RX ADMIN — NITROGLYCERIN 40 MCG: 5 INJECTION, SOLUTION INTRAVENOUS at 10:59

## 2023-03-30 RX ADMIN — FENTANYL CITRATE 25 MCG: 50 INJECTION, SOLUTION INTRAMUSCULAR; INTRAVENOUS at 07:55

## 2023-03-30 RX ADMIN — PHENYLEPHRINE HYDROCHLORIDE 100 MCG: 10 INJECTION INTRAVENOUS at 08:26

## 2023-03-30 RX ADMIN — NITROGLYCERIN 10 MCG: 5 INJECTION, SOLUTION INTRAVENOUS at 08:29

## 2023-03-30 RX ADMIN — SUGAMMADEX 200 MG: 100 INJECTION, SOLUTION INTRAVENOUS at 10:23

## 2023-03-30 RX ADMIN — NITROGLYCERIN 40 MCG: 5 INJECTION, SOLUTION INTRAVENOUS at 11:10

## 2023-03-30 RX ADMIN — NITROGLYCERIN 40 MCG: 5 INJECTION, SOLUTION INTRAVENOUS at 11:12

## 2023-03-30 ASSESSMENT — ACTIVITIES OF DAILY LIVING (ADL)
ADLS_ACUITY_SCORE: 47
ADLS_ACUITY_SCORE: 45

## 2023-03-30 NOTE — PROGRESS NOTES
[facility]    Medicine Progress Note - Hospitalist Service    Date of Admission:  3/23/2023    Assessment & Plan        Patient is a 73-year-old female with a history of COPD on home oxygen, depression, anxiety, hyperlipidemia, hypothyroidism, B12 deficiency, melanoma, osteoporosis, cerebral meningioma presented to [facility] on 3/19/2023 with right hip pain after a fall    Patient had surgical repair for right femoral neck fracture on 3/20 with postoperative complication including worsening respiratory failure, worsening encephalopathy, lactic acidosis and hypotension.  She also had worsening of her cerebral meningioma which would require surgical resection.  As per the discharging hospitalist at [facility] patient had discussion with the patient's family and she has been having worsening functional status with increasing, confusion and coordination issues including because of a fall at home.  Neurosurgery Dr. Murphy was contacted on 3/23 who recommended transfer to [facility] for possible resection of meningioma.      Patient has been admitted to the ICU at [facility] on 3/23//23 due to acute on chronic respiratory failure secondary to advanced COPD/emphysema requiring BiPAP support.  She was initially on BiPAP and had episodes of desaturation on 3/23 on high flow nasal cannula and had to be placed back on BiPAP with recovery.  She had desaturations to 84% while doing oral care on 3/26.  Respiratory status improved on 3/29/2023 was on nasal cannula at 2 L of oxygen.  Patient underwent craniotomy for resection of meningioma on 3/30/2023.  Patient returned from the OR intubated and sedated on propofol    #Acute on chronic hypoxic and hypercapnic respiratory failure Secondary to PNeumonia community acquired, Underlying COPD, Fluid overload from POst op state     Patient is a former smoker of 50 years.  She has advanced COPD on 2 to 3 L of oxygen at home for past 5  years..  Patient had increased work of breathing and hypercarbia requiring BiPAP therapy.  Patient has been transferred to ICU on 3/22 for closer monitoring    -Patient finished course of azithromycin.  Last day on 3/27/2023  -Continue ceftriaxone 7/7 day course today   -Continue DuoNebs.  -Continue BiPAP as needed  - Patient is weaned off of high flow nasal cannula and currently on 4 L of oxygen by nasal cannula  Wean O2 as tolerated     Received total of 60 mg of Lasix IV on 3/28/2023 to help with diuresis which seemed to improve her respiratory status  She completed course of ceftriaxone so discontinued on 3/29/2023 IV Solu-Medrol switched to IV daily  Decadron 6 mg to help with meningioma    Patient returned from the OR from the craniotomy intubated and sedated on the vent.  Intensivist following and planning on extubation later today    #Dysphagia  -Keep n.p.o. while on BiPAP  Speech therapy saw the patient and diet changed to level 4 purée and thin liquids  -Continue aspiration precautions        #Displaced right femoral neck fracture s/p right hip hemiarthroplasty  mechanical fall.  Xray revealed a displaced right femoral neck fracture and a subtle irregularity of the parasymphyseal aspect of the left pubic bone, potentially representing an age indeterminant fracture.   -Orthopedic surgery consult appreciated-continue as needed pain meds   Lovenox discontinued due to craniotomy on 3/29/2023  Needs to hold Lovenox prior to brain surgery but timing for surgery is unknown.  -As needed Haldol for agitation  -Roman catheter placed on 3/24 for concern of urinary retention.  Continue for now        # Encephalopathy. Multifactorial see below  Cerebral Meningioma status postcraniotomy by neurosurgery on 3/30/2023.    Acute toxic metabolic encephalopathy likely from post-op status, steroids, pain meds, ?infection Pt was diagnosed 9/2019 with a cerebral meningioma and monitored.  Given progression of the tumor, patient  underwent 15 sessions of radiation 11/2022.  She is followed by Dr. Godoy, a Radiation Oncologist through [facility].  Pt was on oral steroids which were weaned off about 4 weeks ago.  Since that time, patient has had increased confusion, poor balance, changes in mood.  * Recent MRI Brain 3/17/23 revealed enlargement of meningioma up to 45 mm with a significant mass effect and the midline shift right to left.  Patient was instructed by her PCP to follow up with outpatient Neurology and Radiation Oncology this week.  * CT head on admission with no acute hemorrhage, but notes the known mass surrounding vasogenic edema predominantly involving the right frontal lobe with right to left midline shift/subfalcine herniation measuring up to 1.1 cm.  ? Discussion with Dr. Godoy from radiation oncology on 3/21 and she discussed with Dr. Murphy.  She agrees with surgery at this point given advancement of tumor.   ? Per neurosurgery- given her functional decline with confusion at home in the weeks prior to her presentation, principal concern is for meningioma as a large contributor to her poor functional status and confusion.    ?      IV Solu-Medrol  switch to Decadron IV for the to help with the meningioma  Patient underwent craniotomy and resection of the meningioma on 3/30/2023 by Dr. Murphy with neurosurgery  Postop management per neurosurgery team    Patient returned from the OR intubated and sedated intensivist following planning on pressure support trial and weaning of propofol extubation later today    #Suspected malnutrition  -SLP has been consulted and had diet advanced to puréed with thin liquids.  -Will also consult nutrition      #Lactic acidosis  Resolved with IV fluids         Acute on Chronic Anemia  ? Preop hemoglobin 10.5 g/dL  ? Hemoglobin 3/26 7.8 g/dL  Follow hemoglobin, consider transfusion for hemoglobin less than 8 g/dL due to ongoing respiratory failure, desire to optimize if at all possible if  planning for surgery  -Prbc given on 3/26/23  Hb 9.8 on 3/27/23  Hemoglobin stable at 11.5 on 3/30/2023     HLD - resume Atorvastatin when able to take p.o.  Hypothyroidism -transitioned to IV levothyroxine until patient reliably able to take p.o.  Depression/Anxiety -hold p.o. low-dose sertraline and mirtazapine until able to reliably take       # Hypernatremia  Resolved          #Goals of care DNR/DNI.   .  Appreciate palliative care's help. Goals of care remain restorative at this point              Diet: Room Service  Advance Diet as Tolerated: Clear Liquid Diet    DVT Prophylaxis: Enoxaparin (Lovenox) SQ  Roman Catheter: PRESENT, indication: Strict 1-2 Hour I&O;Retention  Lines: PRESENT             Cardiac Monitoring: ACTIVE order. Indication: Procedural area  Code Status: No CPR- Do NOT Intubate      Clinically Significant Risk Factors                                Disposition Plan           Nicky Ballard MD  Hospitalist Service  [facility]  Securely message with [employer] (more info)  Text page via [employer] Paging/Directory   ______________________________________________________________________    Interval History     Patient seen and examined at bedside.  Patient just returned from the OR intubated and sedated due to high PCO2 on ABG as per bedside RN.  Intensivist following.  Chest x-ray ordered planning on extubation trial later today    Physical Exam   Vital Signs: Temp: 98  F (36.7  C) Temp src: Temporal BP: 133/74 Pulse: 72   Resp: 14 SpO2: 95 % O2 Device: Nasal cannula Oxygen Delivery: 2 LPM  Weight: 154 lbs 1.62 oz  Physical Exam  HENT:      Head: Normocephalic and atraumatic.   Cardiovascular:      Rate and Rhythm: Normal rate.      Heart sounds: Normal heart sounds.   Pulmonary:      Effort: Pulmonary effort is normal. No respiratory distress.      Breath sounds: Normal breath sounds.   Abdominal:      General: There is no distension.      Palpations: Abdomen is soft.       Tenderness: There is no abdominal tenderness.   Musculoskeletal:      Right lower leg: Edema present.      Left lower leg: Edema present.       Medical Decision Making       Data     I have personally reviewed the following data over the past 24 hrs:    17.4 (H)  \   11.5 (L)   / 272     142 97 (L) 19.8 /  94   3.6 35 (H) 0.48 (L) \

## 2023-03-30 NOTE — ANESTHESIA PROCEDURE NOTES
Airway       Patient location during procedure: OR       Procedure Start/Stop Times: 3/30/2023 7:57 AM  Staff -        Anesthesiologist:  Francisco Rutledge MD       CRNA: Michelle Nicole APRN CRNA       Other Anesthesia Staff: Ann Oswald       Performed By: SRNA  Consent for Airway        Urgency: elective  Indications and Patient Condition       Indications for airway management: murphy-procedural       Induction type:intravenous       Mask difficulty assessment: 1 - vent by mask    Final Airway Details       Final airway type: endotracheal airway       Successful airway: ETT - single  Endotracheal Airway Details        ETT size (mm): 7.0       Cuffed: yes       Successful intubation technique: video laryngoscopy       VL Blade Size: Glidescope 3       Grade View of Cords: 1       Adjucts: stylet       Position: Right       Measured from: lips       Secured at (cm): 21       Bite block used: None    Post intubation assessment        Placement verified by: capnometry, equal breath sounds and chest rise        Number of attempts at approach: 1       Number of other approaches attempted: 0       Secured with: pink tape       Ease of procedure: easy       Dentition: Intact and Unchanged    Medication(s) Administered   Medication Administration Time: 3/30/2023 7:57 AM

## 2023-03-30 NOTE — PROVIDER NOTIFICATION
Paged and spoke to Toño Barnett of neurosurgery via phone - informed Toño that pt. Was not following commands with purposeful extremity movement, trimerous in face and upper extremities, pt. Is looking purposefully and nodding and turning head to yes/no questions inconsistently.      No new orders nor scan at this time.  If by this evening pt. Is not improving I will call back to discuss need for a scan.

## 2023-03-30 NOTE — PROGRESS NOTES
Patient completed SBT and was successfully extubated to 4LPM nasal cannula at 1442. Small amount of secretions suctioned orally post-extubation. Lung sounds are diminished, no stridor present. SpO2: 97%, HR: 86 bpm, RR: 17/min. Ventilator still in the room on standby. Respiratory will continue to monitor.    Rene Lucas, RRT on 3/30/2023 at 2:49 PM

## 2023-03-30 NOTE — PLAN OF CARE
No noted neuro changes this shift.  Pt slept in between cares.  Pt SR with blood pressures within goal without intervention.  Pt's oxygen titrated down to 2L via NC from 4L with diminished lung sounds with noted wheezing.  Pt made NPO at midnight for surgery in AM.  Roman in place for retention with adequate output.  No new noted skin breakdown.  Report was given to Pre-Op RN and Pt was transferred to Pre-Op.  Pt's daughter was called and updated.

## 2023-03-30 NOTE — BRIEF OP NOTE
[facility]    Brief Operative Note    Pre-operative diagnosis: Meningioma (H) [D32.9]  Vasogenic cerebral edema (H) [G93.6]  Compression of brain (H) [G93.5]  Post-operative diagnosis Same as pre-operative diagnosis    Procedure: Procedure(s):  Bifrontal stealth craniotomy and resection of meningioma  Surgeon: Surgeon(s) and Role:     * Ramez Murphy MD - Primary  Anesthesia: General   Estimated Blood Loss: 50ml      Drains: None  Specimens:   ID Type Source Tests Collected by Time Destination   1 : Right frontal brain tumor post radiation Tissue Brain SURGICAL PATHOLOGY EXAM Ramez Murphy MD 3/30/2023  9:34 AM      Findings:   None.  Complications: None .  Implants:   Implant Name Type Inv. Item Serial No.  Lot No. LRB No. Used Action   GRAFT DURAGEN DURAL MATRIX 4X5CM ID-[id number] - [id number] Other GRAFT DURAGEN DURAL MATRIX 4X5CM [id number]  [employer] [id number] N/A 1 Implanted   IMP SCR SYN MATRIX LOW PRO 1.5X04MM SELF DRILL [id number] - [id number] Metallic Hardware/[employer] IMP SCR SYN MATRIX LOW PRO 1.5X04MM SELF DRILL [id number]  [employer] 28 MAR 23   42   10 N/A 12 Implanted   IMP PLATE SYN MIKE HOLE COVER 17MM [id number] - [id number] Metallic Hardware/[employer] IMP PLATE SYN MIKE HOLE COVER 17MM [id number]  [employer]-STRATEC 28 MAR 23   42   10 N/A 3 Implanted     [id number]

## 2023-03-30 NOTE — OP NOTE
Procedure Date: 03/30/2023    PREOPERATIVE DIAGNOSES:    1.  Right frontal meningioma, status post radiotherapy.  2.  Vasogenic cerebral edema.  3.  Brain compression.    POSTOPERATIVE DIAGNOSES:    1.  Right frontal meningioma, status post radiotherapy.  2.  Vasogenic cerebral edema.  3.  Brain compression.    PROCEDURE:    1.  Right bifrontal Stealth craniotomy and resection of meningioma.  2.  Intraoperative microdissection using the operative microscope.    SURGEON:  Ramez Murphy MD    ANESTHESIA:  General endotracheal anesthesia.    ESTIMATED BLOOD LOSS:  50 mL.    INDICATIONS:  The patient is a 73-year-old female with a previously-radiated meningioma a few months prior to being admitted to the hospital.  She was having worsening confusion, vasogenic edema and mass effect from the tumor.  Given this, she was brought for resection of the mass.    PROCEDURE:  The patient was brought to the operating room.  General endotracheal anesthesia was induced.  The patient was positioned supine with the Carr head wright.  The Stealth neuro navigation system was registered and used to plan the craniotomy and incision.  The head was prepped and draped in sterile fashion, a bicoronal incision was made, and the scalp was reflected anteriorly.  Three jayy holes were placed: 2 over the sagittal sinus in the midline and 1 on the right side where the tumor was most prominent.  The bone flap was then turned and elevated in good condition.  At this point then, the dura was opened on both sides of the sagittal sinus, and the area along the falx was dissected free.  The sagittal sinus was ligated both anterior and posterior to the tumor using 0 silk stick ties.  The sagittal sinus and falx were cut isolating the dural base of the tumor.  The tumor was easily identified and serially debulked using the Sonopet.  Previously, the microscope had been sterilely draped and brought into the field.  The tumor was then carefully dissected  off the underlying brain and parenchyma.  This was done using blunt and sharp dissection.  Eventually, the tumor was freed from the surrounding tissues, was removed and sent for permanent pathology, along with the Sonopet contents.  Hemostasis was achieved. Duragen was placed over the dural defect.  The bone flap was plated back into place with the [employer] low-profile cranial plating system.  The galea was closed with 2-0 inverted interrupted Vicryl; 3-0 nylon was used for skin.  The patient was then awakened and taken to the recovery room in good condition.    Ramez Murphy MD        D: 2023   T: 2023   MT: carlos    Name:     ANUJ JENNINGS  MRN:      [id number]        Account:        [id number]   :      1950           Procedure Date: 2023     Document: [id number]

## 2023-03-30 NOTE — ANESTHESIA PROCEDURE NOTES
Arterial Line Procedure Note    Pre-Procedure   Staff -        Anesthesiologist:  Francisco Rutledge MD       Performed By: Anesthesiologist       Location: pre-op       Pre-Anesthestic Checklist: patient identified, IV checked, risks and benefits discussed, informed consent, monitors and equipment checked and pre-op evaluation  Timeout:       Correct Patient: Yes        Correct Procedure: Yes        Correct Site: Yes        Correct Position: Yes   Line Placement:   This line was placed Pre Induction starting at 3/30/2023 7:10 AM and ending at 3/30/2023 7:20 AM  Procedure   Procedure: arterial line       Laterality: right       Insertion Site: radial.  Sterile Prep        All elements of maximal sterile barrier technique followed       Patient Prep/Sterile Barriers: hand hygiene, sterile gloves, hat, mask, draped, sterile gown, draped       Skin prep: Chloraprep  Insertion/Injection        Technique: Seldinger Technique        Catheter Type/Size: 20 gauge, 12 cm  Narrative        Tegaderm dressing used.       Complications: None apparent,        Arterial waveform: Yes        IBP within 10% of NIBP: Yes   Comments:  Pt tolerated well.

## 2023-03-30 NOTE — PROGRESS NOTES
[facility]  Critical Care Service  Progress Note  Date of Service (when I saw the patient): 03/30/2023  Main Plans for Today  Extubate as able postoperatively  Assessment & Plan   Josephine Nicole is a 73 year old female who was admitted on 3/23/2023. She underwent meningioma resection today without incident. She was originally admitted for a R hip fracture. Of note, she has a history of severe COPD  Neuro  1. Acute pain  2. Sedation  Plan:  -- Scheduled acetaminophen,   -- Propofol for sedation as needed until extubation  -- Delirium prevention as able    CV  1. S/p meningioma resestion  2. HTN  Plan:  -- antihypertensive medications as needed    Resp:  1. Post operative ventilator management  2. History of hypercapnia with COPD  Plan:    -- PST when awake.    GI/Nutrition  1. No prior hx  Plan:  -- NPO  -- PPI    Renal  1. No prior hx.  Baseline creatinine appears to be 0.5  Plan:  --monitor function and electrolytes as needed with replacement per ICU protocols. - generally avoid nephrotoxic agents such as NSAID, IV contrast unless specifically required  -- adjust medications as needed for renal clearance  -- follow I/O's as appropriate.  -- maintain euvolemia      Endocrine  1. Stress Hyperglycemia  Plan:  --  Insulin gtt per protocol if indicated  -- Keep BG  <180 for optimal healing    Heme:  1. Acute blood loss anemia  2. Coagulopathy   Plan:  -- Monitor hemoglobin.  -- Transfuse to keep > 7.0    General cares:  DVT Prophylaxis: Pneumatic Compression Devices  GI Prophylaxis: PPI  Restraints: Restraints for medical healing needed: YES  Family update by me today: No  Current lines are required for patient management  Access:  Ángel Marie MD  Time Spent on this Encounter   Billing:  I spent 35 minutes bedside and on the inpatient unit today managing the critical care of Josephine Nicole in relation to the issues listed in this note.  Interval History   S/p surgery  today  Physical Exam   Temp: 97.9  F (36.6  C) Temp src: Axillary Temp  Min: 97.6  F (36.4  C)  Max: 98.3  F (36.8  C) BP: 94/56 Pulse: 82   Resp: 14 SpO2: 95 % O2 Device: Mechanical Ventilator Oxygen Delivery: 2 LPM  Vitals:    03/28/23 0508 03/29/23 0600 03/30/23 0017   Weight: 69.8 kg (153 lb 14.1 oz) 69.2 kg (152 lb 8.9 oz) 69.9 kg (154 lb 1.6 oz)     I/O last 3 completed shifts:  In: 2440 [P.O.:840; I.V.:1600]  Out: 2125 [Urine:2075; Blood:50]    GEN: sedated on ventilator  EYES: PERRL, Anicteric sclera.   HEENT:  Normocephalic, atraumatic, trachea midline, ETT secure. Skull suture line clean and dry  CV: RRR, no gallops, rubs, or murmurs  PULM/CHEST: Clear breath sounds bilaterally without rhonchi, crackles or wheeze, symmetric chest rise  GI: soft, non-distended  : baldwin catheter in place, urine yellow and clear  EXTREMITIES: no peripheral edema, moving all extremities, peripheral pulses intact  NEURO: sedated on ventilator  SKIN: No rashes, sores or ulcerations      Data   Recent Labs   Lab 03/30/23  1313 03/30/23  0403 03/30/23  0331 03/29/23  2113 03/29/23  1914 03/29/23  0735 03/29/23  0514 03/27/23  0844 03/27/23  0531 03/26/23  0822 03/26/23  0501   WBC  --   --   --   --  17.4*  --   --   --  10.9  --  9.4   HGB  --   --   --   --  11.5*  --   --   --  9.8*  --  7.8*   MCV  --   --   --   --  93  --   --   --  95  --  99   PLT  --   --   --   --  272  --   --   --  194  --  184   NA  --  142  --   --   --   --  142  --  143  --  146*   POTASSIUM  --  3.6  --   --   --   --  4.0  --  3.7  --  3.8   CHLORIDE  --  97*  --   --   --   --  99  --  105  --  108*   CO2  --  35*  --   --   --   --  35*  --  32*  --  31*   BUN  --  19.8  --   --   --   --  21.0  --  16.1  --  15.4   CR  --  0.48*  --   --   --   --  0.53  --  0.51  --  0.44*   ANIONGAP  --  10  --   --   --   --  8  --  6*  --  7   CHAO  --  8.6*  --   --   --   --  8.4*  --  8.1*  --  8.1*   * 94 99   < >  --    < > 135*   < > 120*   <  > 133*    < > = values in this interval not displayed.       Ángel Marie MD  [facility] Intensivist Service

## 2023-03-31 ENCOUNTER — APPOINTMENT (OUTPATIENT)
Dept: MRI IMAGING | Facility: CLINIC | Age: 73
DRG: 025 | End: 2023-03-31
Attending: PHYSICIAN ASSISTANT
Payer: COMMERCIAL

## 2023-03-31 ENCOUNTER — APPOINTMENT (OUTPATIENT)
Dept: GENERAL RADIOLOGY | Facility: CLINIC | Age: 73
DRG: 025 | End: 2023-03-31
Attending: INTERNAL MEDICINE
Payer: COMMERCIAL

## 2023-03-31 LAB
ANION GAP SERPL CALCULATED.3IONS-SCNC: 7 MMOL/L (ref 7–15)
BUN SERPL-MCNC: 19.4 MG/DL (ref 8–23)
CALCIUM SERPL-MCNC: 8.6 MG/DL (ref 8.8–10.2)
CHLORIDE SERPL-SCNC: 98 MMOL/L (ref 98–107)
CREAT SERPL-MCNC: 0.54 MG/DL (ref 0.51–0.95)
DEPRECATED HCO3 PLAS-SCNC: 37 MMOL/L (ref 22–29)
ERYTHROCYTE [DISTWIDTH] IN BLOOD BY AUTOMATED COUNT: 14.7 % (ref 10–15)
GFR SERPL CREATININE-BSD FRML MDRD: >90 ML/MIN/1.73M2
GLUCOSE BLDC GLUCOMTR-MCNC: 105 MG/DL (ref 70–99)
GLUCOSE BLDC GLUCOMTR-MCNC: 110 MG/DL (ref 70–99)
GLUCOSE BLDC GLUCOMTR-MCNC: 116 MG/DL (ref 70–99)
GLUCOSE BLDC GLUCOMTR-MCNC: 116 MG/DL (ref 70–99)
GLUCOSE BLDC GLUCOMTR-MCNC: 129 MG/DL (ref 70–99)
GLUCOSE BLDC GLUCOMTR-MCNC: 87 MG/DL (ref 70–99)
GLUCOSE SERPL-MCNC: 94 MG/DL (ref 70–99)
HCT VFR BLD AUTO: 37.6 % (ref 35–47)
HGB BLD-MCNC: 11.8 G/DL (ref 11.7–15.7)
MAGNESIUM SERPL-MCNC: 1.9 MG/DL (ref 1.7–2.3)
MCH RBC QN AUTO: 29.9 PG (ref 26.5–33)
MCHC RBC AUTO-ENTMCNC: 31.4 G/DL (ref 31.5–36.5)
MCV RBC AUTO: 95 FL (ref 78–100)
NT-PROBNP SERPL-MCNC: 1012 PG/ML (ref 0–900)
PATH REPORT.COMMENTS IMP SPEC: ABNORMAL
PATH REPORT.COMMENTS IMP SPEC: YES
PATH REPORT.FINAL DX SPEC: ABNORMAL
PATH REPORT.GROSS SPEC: ABNORMAL
PATH REPORT.MICROSCOPIC SPEC OTHER STN: ABNORMAL
PATH REPORT.RELEVANT HX SPEC: ABNORMAL
PHOSPHATE SERPL-MCNC: 3.3 MG/DL (ref 2.5–4.5)
PHOTO IMAGE: ABNORMAL
PLATELET # BLD AUTO: 291 10E3/UL (ref 150–450)
POTASSIUM SERPL-SCNC: 4.2 MMOL/L (ref 3.4–5.3)
RBC # BLD AUTO: 3.94 10E6/UL (ref 3.8–5.2)
SODIUM SERPL-SCNC: 142 MMOL/L (ref 136–145)
WBC # BLD AUTO: 18.7 10E3/UL (ref 4–11)

## 2023-03-31 PROCEDURE — 258N000003 HC RX IP 258 OP 636: Performed by: INTERNAL MEDICINE

## 2023-03-31 PROCEDURE — 85027 COMPLETE CBC AUTOMATED: CPT | Performed by: STUDENT IN AN ORGANIZED HEALTH CARE EDUCATION/TRAINING PROGRAM

## 2023-03-31 PROCEDURE — 99232 SBSQ HOSP IP/OBS MODERATE 35: CPT | Performed by: INTERNAL MEDICINE

## 2023-03-31 PROCEDURE — 80048 BASIC METABOLIC PNL TOTAL CA: CPT | Performed by: STUDENT IN AN ORGANIZED HEALTH CARE EDUCATION/TRAINING PROGRAM

## 2023-03-31 PROCEDURE — 71045 X-RAY EXAM CHEST 1 VIEW: CPT

## 2023-03-31 PROCEDURE — 200N000001 HC R&B ICU

## 2023-03-31 PROCEDURE — 94660 CPAP INITIATION&MGMT: CPT

## 2023-03-31 PROCEDURE — 255N000002 HC RX 255 OP 636: Performed by: INTERNAL MEDICINE

## 2023-03-31 PROCEDURE — 250N000013 HC RX MED GY IP 250 OP 250 PS 637: Performed by: PHYSICIAN ASSISTANT

## 2023-03-31 PROCEDURE — A9585 GADOBUTROL INJECTION: HCPCS | Performed by: INTERNAL MEDICINE

## 2023-03-31 PROCEDURE — 36415 COLL VENOUS BLD VENIPUNCTURE: CPT | Performed by: STUDENT IN AN ORGANIZED HEALTH CARE EDUCATION/TRAINING PROGRAM

## 2023-03-31 PROCEDURE — 250N000011 HC RX IP 250 OP 636: Performed by: PHYSICIAN ASSISTANT

## 2023-03-31 PROCEDURE — 83735 ASSAY OF MAGNESIUM: CPT | Performed by: STUDENT IN AN ORGANIZED HEALTH CARE EDUCATION/TRAINING PROGRAM

## 2023-03-31 PROCEDURE — 999N000157 HC STATISTIC RCP TIME EA 10 MIN

## 2023-03-31 PROCEDURE — 83880 ASSAY OF NATRIURETIC PEPTIDE: CPT | Performed by: STUDENT IN AN ORGANIZED HEALTH CARE EDUCATION/TRAINING PROGRAM

## 2023-03-31 PROCEDURE — 250N000011 HC RX IP 250 OP 636: Performed by: INTERNAL MEDICINE

## 2023-03-31 PROCEDURE — 70553 MRI BRAIN STEM W/O & W/DYE: CPT

## 2023-03-31 PROCEDURE — 94640 AIRWAY INHALATION TREATMENT: CPT

## 2023-03-31 PROCEDURE — 94640 AIRWAY INHALATION TREATMENT: CPT | Mod: 76

## 2023-03-31 PROCEDURE — 84100 ASSAY OF PHOSPHORUS: CPT | Performed by: STUDENT IN AN ORGANIZED HEALTH CARE EDUCATION/TRAINING PROGRAM

## 2023-03-31 PROCEDURE — 250N000011 HC RX IP 250 OP 636: Performed by: STUDENT IN AN ORGANIZED HEALTH CARE EDUCATION/TRAINING PROGRAM

## 2023-03-31 PROCEDURE — 250N000009 HC RX 250: Performed by: PHYSICIAN ASSISTANT

## 2023-03-31 PROCEDURE — C9113 INJ PANTOPRAZOLE SODIUM, VIA: HCPCS | Performed by: STUDENT IN AN ORGANIZED HEALTH CARE EDUCATION/TRAINING PROGRAM

## 2023-03-31 RX ORDER — FUROSEMIDE 10 MG/ML
20 INJECTION INTRAMUSCULAR; INTRAVENOUS ONCE
Status: DISCONTINUED | OUTPATIENT
Start: 2023-03-31 | End: 2023-03-31

## 2023-03-31 RX ORDER — LEVALBUTEROL INHALATION SOLUTION 1.25 MG/3ML
1.25 SOLUTION RESPIRATORY (INHALATION) EVERY 4 HOURS PRN
Status: DISCONTINUED | OUTPATIENT
Start: 2023-03-31 | End: 2023-04-07 | Stop reason: HOSPADM

## 2023-03-31 RX ORDER — HYDROMORPHONE HYDROCHLORIDE 1 MG/ML
0.3 INJECTION, SOLUTION INTRAMUSCULAR; INTRAVENOUS; SUBCUTANEOUS EVERY 4 HOURS PRN
Status: DISCONTINUED | OUTPATIENT
Start: 2023-03-31 | End: 2023-04-07 | Stop reason: HOSPADM

## 2023-03-31 RX ORDER — LORAZEPAM 2 MG/ML
1 INJECTION INTRAMUSCULAR ONCE
Status: COMPLETED | OUTPATIENT
Start: 2023-03-31 | End: 2023-03-31

## 2023-03-31 RX ORDER — IPRATROPIUM BROMIDE AND ALBUTEROL SULFATE 2.5; .5 MG/3ML; MG/3ML
3 SOLUTION RESPIRATORY (INHALATION) EVERY 4 HOURS PRN
Status: DISCONTINUED | OUTPATIENT
Start: 2023-03-31 | End: 2023-03-31

## 2023-03-31 RX ORDER — SODIUM CHLORIDE 9 MG/ML
INJECTION, SOLUTION INTRAVENOUS CONTINUOUS
Status: DISCONTINUED | OUTPATIENT
Start: 2023-03-31 | End: 2023-04-01

## 2023-03-31 RX ORDER — GADOBUTROL 604.72 MG/ML
7 INJECTION INTRAVENOUS ONCE
Status: COMPLETED | OUTPATIENT
Start: 2023-03-31 | End: 2023-03-31

## 2023-03-31 RX ORDER — DIAZEPAM 5 MG
5 TABLET ORAL ONCE
Status: DISCONTINUED | OUTPATIENT
Start: 2023-03-31 | End: 2023-03-31

## 2023-03-31 RX ADMIN — LEVALBUTEROL HYDROCHLORIDE 1.25 MG: 1.25 SOLUTION RESPIRATORY (INHALATION) at 07:51

## 2023-03-31 RX ADMIN — GADOBUTROL 7 ML: 604.72 INJECTION INTRAVENOUS at 12:56

## 2023-03-31 RX ADMIN — IPRATROPIUM BROMIDE 0.5 MG: 0.5 SOLUTION RESPIRATORY (INHALATION) at 11:31

## 2023-03-31 RX ADMIN — IPRATROPIUM BROMIDE 0.5 MG: 0.5 SOLUTION RESPIRATORY (INHALATION) at 07:50

## 2023-03-31 RX ADMIN — SENNOSIDES AND DOCUSATE SODIUM 1 TABLET: 50; 8.6 TABLET ORAL at 20:03

## 2023-03-31 RX ADMIN — DEXAMETHASONE SODIUM PHOSPHATE 4 MG: 4 INJECTION, SOLUTION INTRAMUSCULAR; INTRAVENOUS at 19:57

## 2023-03-31 RX ADMIN — LORAZEPAM 1 MG: 2 INJECTION INTRAMUSCULAR at 11:56

## 2023-03-31 RX ADMIN — DEXAMETHASONE SODIUM PHOSPHATE 4 MG: 4 INJECTION, SOLUTION INTRAMUSCULAR; INTRAVENOUS at 09:12

## 2023-03-31 RX ADMIN — SERTRALINE HYDROCHLORIDE 25 MG: 25 TABLET ORAL at 11:01

## 2023-03-31 RX ADMIN — SODIUM CHLORIDE: 9 INJECTION, SOLUTION INTRAVENOUS at 19:58

## 2023-03-31 RX ADMIN — ACETAMINOPHEN 975 MG: 325 TABLET, FILM COATED ORAL at 21:56

## 2023-03-31 RX ADMIN — LEVOTHYROXINE SODIUM 88 MCG: 88 TABLET ORAL at 11:02

## 2023-03-31 RX ADMIN — PANTOPRAZOLE SODIUM 40 MG: 40 INJECTION, POWDER, FOR SOLUTION INTRAVENOUS at 09:12

## 2023-03-31 RX ADMIN — FUROSEMIDE 20 MG: 10 INJECTION, SOLUTION INTRAVENOUS at 09:13

## 2023-03-31 RX ADMIN — SENNOSIDES AND DOCUSATE SODIUM 1 TABLET: 50; 8.6 TABLET ORAL at 11:01

## 2023-03-31 RX ADMIN — MIRTAZAPINE 7.5 MG: 7.5 TABLET, FILM COATED ORAL at 21:56

## 2023-03-31 RX ADMIN — LEVALBUTEROL HYDROCHLORIDE 1.25 MG: 1.25 SOLUTION RESPIRATORY (INHALATION) at 11:31

## 2023-03-31 RX ADMIN — ATORVASTATIN CALCIUM 10 MG: 10 TABLET, FILM COATED ORAL at 11:01

## 2023-03-31 ASSESSMENT — ACTIVITIES OF DAILY LIVING (ADL)
ADLS_ACUITY_SCORE: 45

## 2023-03-31 NOTE — PLAN OF CARE
Neuro: Lethargic, opens eyes to voice, intermittently following commands, able to move upper extremities 2-3/5 as night progressed, wiggles toes to commands.   CV: SR, BP WNL   Respiratory: tolerating BIPAP overnight   GI/: Roman intact, no BM   Skin: see flowsheet for assessment   Activity: Turned Q2H   Diet: NPO - on BIPAP overnight   Other: OK per Manasa Mix for MRI to be delayed (it was scheduled at 0000) d/t patient needing BIPAP support.   Plan: Follow up MRI post-surgery need to be completed

## 2023-03-31 NOTE — PLAN OF CARE
Pt. Arrived from meningioma removal surgery at 1140 on ventilator, successfully extubated at 1442 after breathing trial, blood gas, and propofol turned off, , to nasal cannula and did require HFNC and hen BIPAP ultimately (MD aware), some crackles in lower bases, neuro status was found to be decreased after extubation (see previous note regarding paging and speaking to neurosurgery) with pt. Answering some yes/no questions but femorous and not able to do purposeful commands other than wiggling toes bilaterally.  Spontaneous movement of all four extremities present, pupils equal and reactive,  CV: SR, pt. Hypertensive above goal and did respond well to hydralazine X2 and labetolol X1, good urine output through baldwin, pt's sister and daughter at bedside and were very supportive, scalp incision CDI.

## 2023-03-31 NOTE — PROGRESS NOTES
RT overnight report:    Pt on BiPAP 12/6 30% overnight. No issues. BiPAP masks alternated overnight. SpO2 mid-high 90's. All nebs given as ordered.   RT to follow    [facility], RT  03/31/23  4:50 AM

## 2023-03-31 NOTE — PROGRESS NOTES
ICU End of Shift Summary. See flowsheets for vital signs and detailed assessment.    Changes this shift: Patient wakefulness improved throughout the day.  Alert to self, and situation.  Disoriented of location and time.      Plan: Continue to wean O2. As able.  Transfer to 73 when bed is available.      Notified provider about indwelling baldwin catheter discussed removal or continued need.    Did provider choose to remove indwelling baldwin catheter?No    Provider's baldwin indication for keeping indwelling baldwin catheter: Diuresis.    Is there an order for indwelling baldwin catheter? Yes    *If there is a plan to keep baldwin catheter in place at discharge daily notification with provider is not necessary, but please add a notation in the treatment team sticky note that the patient will be discharging with the catheter.

## 2023-03-31 NOTE — PROGRESS NOTES
[facility]    Neurosurgery Progress Note    Date of Service (when I saw the patient): 03/31/2023     Assessment & Plan     Procedure(s):  Bifrontal stealth craniotomy and resection of meningioma   -1 Day Post-Op  Doing well. On oxygen mask. Alert and following commands. Moving all extremities.     Plan:  -Post op brain MRI when able  -Continue frequent neuro checks  -Routine wound care  -Pain management as needed  -Activity as tolerated  -Appreciate assistance from other services  -Continue decadron  -Ok to transfer to floor when medically stable       Discussed imaging with Dr. Ave Richard CNP  [facility] Neurosurgery  [facility]  [address]  [address]  [place] [address]    Tel [phone]  Text page via [employer] Paging/Directory    Interval History   Stable.    Physical Exam   Temp: 98.3  F (36.8  C) Temp src: Axillary BP: 106/67 Pulse: 84   Resp: 26 SpO2: 90 % O2 Device: BiPAP/CPAP Oxygen Delivery: 4 LPM  Vitals:    03/29/23 0600 03/30/23 0017 03/31/23 0600   Weight: 152 lb 8.9 oz (69.2 kg) 154 lb 1.6 oz (69.9 kg) 153 lb 3.5 oz (69.5 kg)     Vital Signs with Ranges  Temp:  [98  F (36.7  C)-98.4  F (36.9  C)] 98.3  F (36.8  C)  Pulse:  [71-96] 84  Resp:  [17-26] 26  BP: ()/(46-91) 106/67  MAP:  [55 mmHg-81 mmHg] 77 mmHg  Arterial Line BP: (100-141)/(39-57) 118/56  FiO2 (%):  [25 %-40 %] 30 %  SpO2:  [86 %-100 %] 90 %  I/O last 3 completed shifts:  In: 480 [I.V.:480]  Out: 2105 [Urine:2105]     , Blood pressure 106/67, pulse 84, temperature 98.3  F (36.8  C), temperature source Axillary, resp. rate 26, weight 153 lb 3.5 oz (69.5 kg), SpO2 90 %, not currently breastfeeding.  153 lbs 3.52 oz  HEENT:  Normocephalic.  PERRLA.    Heart:  No peripheral edema  Lungs:  No SOB  Skin:  Warm and dry, good capillary refill. Incision CDI.  Extremities:  Good radial and dorsalis pedis pulses bilaterally, no edema, cyanosis or  clubbing.    NEUROLOGICAL EXAMINATION:   Mental status:  Alert and following commands.  Motor:  WEN equally     Medications     - MEDICATION INSTRUCTIONS -       sodium chloride 10 mL/hr at 03/30/23 1140       acetaminophen  975 mg Oral Q8H     atorvastatin  10 mg Oral Daily     dexamethasone  4 mg Intravenous Q12H    Or     dexamethasone  4 mg Oral Q12H     fluticasone-vilanterol  1 puff Inhalation Daily     insulin aspart  1-6 Units Subcutaneous Q4H     levothyroxine  88 mcg Oral Daily     mirtazapine  7.5 mg Oral At Bedtime     pantoprazole  40 mg Per Feeding Tube QAM AC    Or     pantoprazole  40 mg Intravenous QAM AC     polyethylene glycol  17 g Oral Daily     senna-docusate  1 tablet Oral BID     sertraline  25 mg Oral QAM     sodium chloride (PF)  3 mL Intracatheter Q8H     sodium chloride (PF)  3 mL Intracatheter Q8H       Data     CBC RESULTS:   Recent Labs   Lab Test 03/31/23  1052   WBC 18.7*   RBC 3.94   HGB 11.8   HCT 37.6   MCV 95   MCH 29.9   MCHC 31.4*   RDW 14.7        Basic Metabolic Panel:  Lab Results   Component Value Date     03/31/2023      Lab Results   Component Value Date    POTASSIUM 4.2 03/31/2023     Lab Results   Component Value Date    CHLORIDE 98 03/31/2023     Lab Results   Component Value Date    CHAO 8.6 03/31/2023     Lab Results   Component Value Date    CO2 37 03/31/2023     Lab Results   Component Value Date    BUN 19.4 03/31/2023     Lab Results   Component Value Date    CR 0.54 03/31/2023     Lab Results   Component Value Date     03/31/2023    GLC 94 03/31/2023     INR:  Lab Results   Component Value Date    INR 0.96 03/19/2023

## 2023-03-31 NOTE — PROGRESS NOTES
[facility]    Medicine Progress Note - Hospitalist Service    Date of Admission:  3/23/2023    Assessment & Plan        Patient is a 73-year-old female with a history of COPD on home oxygen, depression, anxiety, hyperlipidemia, hypothyroidism, B12 deficiency, melanoma, osteoporosis, cerebral meningioma presented to [facility] on 3/19/2023 with right hip pain after a fall    Patient had surgical repair for right femoral neck fracture on 3/20 with postoperative complication including worsening respiratory failure, worsening encephalopathy, lactic acidosis and hypotension.  She also had worsening of her cerebral meningioma which would require surgical resection.  As per the discharging hospitalist at [facility] patient had discussion with the patient's family and she has been having worsening functional status with increasing, confusion and coordination issues including because of a fall at home.  Neurosurgery Dr. Murphy was contacted on 3/23 who recommended transfer to [facility] for possible resection of meningioma.      Patient has been admitted to the ICU at [facility] on 3/23//23 due to acute on chronic respiratory failure secondary to advanced COPD/emphysema requiring BiPAP support.  She was initially on BiPAP and had episodes of desaturation on 3/23 on high flow nasal cannula and had to be placed back on BiPAP with recovery.  She had desaturations to 84% while doing oral care on 3/26.  Respiratory status improved on 3/29/2023 was on nasal cannula at 2 L of oxygen.  Patient underwent craniotomy for resection of meningioma on 3/30/2023.  Patient returned from the OR intubated and sedated on propofol    #Acute on chronic hypoxic and hypercapnic respiratory failure Secondary to PNeumonia community acquired, Underlying COPD, Fluid overload from POst op state     Patient is a former smoker of 50 years.  She has advanced COPD on 2 to 3 L of oxygen at home for past 5  years..  Patient had increased work of breathing and hypercarbia requiring BiPAP therapy.  Patient has been transferred to ICU on 3/22 for closer monitoring    -Patient finished course of azithromycin.  Last day on 3/27/2023  -Continue ceftriaxone 7/7 day course today   -Continue DuoNebs.  -Continue BiPAP as needed  - Patient is weaned off of high flow nasal cannula and currently on 4 L of oxygen by nasal cannula  Wean O2 as tolerated     Received total of 60 mg of Lasix IV on 3/28/2023 to help with diuresis which seemed to improve her respiratory status  She completed course of ceftriaxone so discontinued on 3/29/2023 IV Solu-Medrol switched to Decadron IV dosing per neurosurgery  Patient underwent craniotomy and resection of meningioma on 3/30/2023  Postop management per neurosurgery      #Dysphagia  -Keep n.p.o. while on BiPAP  Speech therapy saw the patient and diet changed to level 4 purée and thin liquids  -Continue aspiration precautions        #Displaced right femoral neck fracture s/p right hip hemiarthroplasty  mechanical fall.  Xray revealed a displaced right femoral neck fracture and a subtle irregularity of the parasymphyseal aspect of the left pubic bone, potentially representing an age indeterminant fracture.   -Orthopedic surgery consult appreciated-continue as needed pain meds   Lovenox discontinued due to craniotomy on 3/29/2023  Needs to hold Lovenox prior to brain surgery but timing for surgery is unknown.  -As needed Haldol for agitation  -Roman catheter placed on 3/24 for concern of urinary retention.  Continue for now        # Encephalopathy. Multifactorial see below  Cerebral Meningioma status postcraniotomy by neurosurgery on 3/30/2023.    Acute toxic metabolic encephalopathy likely from post-op status, steroids, pain meds, ?infection Pt was diagnosed 9/2019 with a cerebral meningioma and monitored.  Given progression of the tumor, patient underwent 15 sessions of radiation 11/2022.  She is  followed by Dr. Godoy, a Radiation Oncologist through [facility].  Pt was on oral steroids which were weaned off about 4 weeks ago.  Since that time, patient has had increased confusion, poor balance, changes in mood.  * Recent MRI Brain 3/17/23 revealed enlargement of meningioma up to 45 mm with a significant mass effect and the midline shift right to left.  Patient was instructed by her PCP to follow up with outpatient Neurology and Radiation Oncology this week.  * CT head on admission with no acute hemorrhage, but notes the known mass surrounding vasogenic edema predominantly involving the right frontal lobe with right to left midline shift/subfalcine herniation measuring up to 1.1 cm.  ? Discussion with Dr. Godoy from radiation oncology on 3/21 and she discussed with Dr. Murphy.  She agrees with surgery at this point given advancement of tumor.   ? Per neurosurgery- given her functional decline with confusion at home in the weeks prior to her presentation, principal concern is for meningioma as a large contributor to her poor functional status and confusion.    ?      IV Solu-Medrol  switch to Decadron IV for the to help with the meningioma  Patient underwent craniotomy and resection of the meningioma on 3/30/2023 by Dr. Murphy with neurosurgery  Postop management per neurosurgery team  Patient is to have postop MRI to be done today    #Suspected malnutrition  -SLP has been consulted and had diet advanced to puréed with thin liquids.  -Will also consult nutrition      #Lactic acidosis  Resolved with IV fluids         Acute on Chronic Anemia  ? Preop hemoglobin 10.5 g/dL  ? Hemoglobin 3/26 7.8 g/dL  Follow hemoglobin, consider transfusion for hemoglobin less than 8 g/dL due to ongoing respiratory failure, desire to optimize if at all possible if planning for surgery  -Prbc given on 3/26/23  Hb 9.8 on 3/27/23  Hemoglobin stable at 11.5 on 3/30/2023     HLD - resume Atorvastatin when able to take  p.o.  Hypothyroidism -transitioned to IV levothyroxine until patient reliably able to take p.o.  Depression/Anxiety -hold p.o. low-dose sertraline and mirtazapine until able to reliably take       # Hypernatremia  Resolved          #Goals of care DNR/DNI.   .  Appreciate palliative care's help. Goals of care remain restorative at this point              Diet: Room Service  Advance Diet as Tolerated: Clear Liquid Diet    DVT Prophylaxis: Enoxaparin (Lovenox) SQ  Roman Catheter: PRESENT, indication: Strict 1-2 Hour I&O  Lines: None     Cardiac Monitoring: ACTIVE order. Indication: Procedural area  Code Status: No CPR- Do NOT Intubate      Clinically Significant Risk Factors                                Disposition Plan      Expected Discharge Date: 04/03/2023                Nicky Ballard MD  Hospitalist Service  [facility]  Securely message with [employer] (more info)  Text page via [facility] Paging/Directory   ______________________________________________________________________    Interval History     Patient seen and examined at bedside.  Patient is currently on BiPAP alert and awake today.  Will trial off of BiPAP today.  Complains of frontal headache.  Needs postop MRI today.  Was extubated yesterday to BiPAP overnight.  No other acute events in the last 24 hours    Physical Exam   Vital Signs: Temp: 98  F (36.7  C) Temp src: Axillary BP: 114/71 Pulse: 87   Resp: 21 SpO2: 95 % O2 Device: Oxymask Oxygen Delivery: 2 LPM  Weight: 153 lbs 3.52 oz  Physical Exam  HENT:      Head: Normocephalic and atraumatic.   Cardiovascular:      Rate and Rhythm: Normal rate.      Heart sounds: Normal heart sounds.   Pulmonary:      Effort: Pulmonary effort is normal. No respiratory distress.      Breath sounds: Normal breath sounds.   Abdominal:      General: There is no distension.      Palpations: Abdomen is soft.      Tenderness: There is no abdominal tenderness.   Musculoskeletal:      Right lower leg:  Edema present.      Left lower leg: Edema present.     Trace edema of the bilateral lower extremities present    Medical Decision Making       Data

## 2023-04-01 LAB
ANION GAP SERPL CALCULATED.3IONS-SCNC: 9 MMOL/L (ref 7–15)
BUN SERPL-MCNC: 18.8 MG/DL (ref 8–23)
CALCIUM SERPL-MCNC: 8.1 MG/DL (ref 8.8–10.2)
CHLORIDE SERPL-SCNC: 98 MMOL/L (ref 98–107)
CREAT SERPL-MCNC: 0.44 MG/DL (ref 0.51–0.95)
DEPRECATED HCO3 PLAS-SCNC: 29 MMOL/L (ref 22–29)
ERYTHROCYTE [DISTWIDTH] IN BLOOD BY AUTOMATED COUNT: 14.2 % (ref 10–15)
GFR SERPL CREATININE-BSD FRML MDRD: >90 ML/MIN/1.73M2
GLUCOSE BLDC GLUCOMTR-MCNC: 101 MG/DL (ref 70–99)
GLUCOSE BLDC GLUCOMTR-MCNC: 106 MG/DL (ref 70–99)
GLUCOSE BLDC GLUCOMTR-MCNC: 119 MG/DL (ref 70–99)
GLUCOSE BLDC GLUCOMTR-MCNC: 149 MG/DL (ref 70–99)
GLUCOSE SERPL-MCNC: 98 MG/DL (ref 70–99)
HCT VFR BLD AUTO: 33.4 % (ref 35–47)
HGB BLD-MCNC: 10.4 G/DL (ref 11.7–15.7)
MAGNESIUM SERPL-MCNC: 1.9 MG/DL (ref 1.7–2.3)
MCH RBC QN AUTO: 30.1 PG (ref 26.5–33)
MCHC RBC AUTO-ENTMCNC: 31.1 G/DL (ref 31.5–36.5)
MCV RBC AUTO: 97 FL (ref 78–100)
PHOSPHATE SERPL-MCNC: 3.2 MG/DL (ref 2.5–4.5)
PLATELET # BLD AUTO: 223 10E3/UL (ref 150–450)
POTASSIUM SERPL-SCNC: 4.3 MMOL/L (ref 3.4–5.3)
RBC # BLD AUTO: 3.45 10E6/UL (ref 3.8–5.2)
SODIUM SERPL-SCNC: 136 MMOL/L (ref 136–145)
WBC # BLD AUTO: 16.8 10E3/UL (ref 4–11)

## 2023-04-01 PROCEDURE — 85027 COMPLETE CBC AUTOMATED: CPT | Performed by: STUDENT IN AN ORGANIZED HEALTH CARE EDUCATION/TRAINING PROGRAM

## 2023-04-01 PROCEDURE — 83735 ASSAY OF MAGNESIUM: CPT | Performed by: STUDENT IN AN ORGANIZED HEALTH CARE EDUCATION/TRAINING PROGRAM

## 2023-04-01 PROCEDURE — 250N000009 HC RX 250: Performed by: INTERNAL MEDICINE

## 2023-04-01 PROCEDURE — 250N000013 HC RX MED GY IP 250 OP 250 PS 637: Performed by: INTERNAL MEDICINE

## 2023-04-01 PROCEDURE — 200N000001 HC R&B ICU

## 2023-04-01 PROCEDURE — 99232 SBSQ HOSP IP/OBS MODERATE 35: CPT | Performed by: INTERNAL MEDICINE

## 2023-04-01 PROCEDURE — 250N000012 HC RX MED GY IP 250 OP 636 PS 637: Performed by: PHYSICIAN ASSISTANT

## 2023-04-01 PROCEDURE — 999N000157 HC STATISTIC RCP TIME EA 10 MIN

## 2023-04-01 PROCEDURE — 250N000011 HC RX IP 250 OP 636: Performed by: STUDENT IN AN ORGANIZED HEALTH CARE EDUCATION/TRAINING PROGRAM

## 2023-04-01 PROCEDURE — 250N000013 HC RX MED GY IP 250 OP 250 PS 637: Performed by: PHYSICIAN ASSISTANT

## 2023-04-01 PROCEDURE — 250N000011 HC RX IP 250 OP 636: Performed by: INTERNAL MEDICINE

## 2023-04-01 PROCEDURE — 80048 BASIC METABOLIC PNL TOTAL CA: CPT | Performed by: STUDENT IN AN ORGANIZED HEALTH CARE EDUCATION/TRAINING PROGRAM

## 2023-04-01 PROCEDURE — C9113 INJ PANTOPRAZOLE SODIUM, VIA: HCPCS | Performed by: STUDENT IN AN ORGANIZED HEALTH CARE EDUCATION/TRAINING PROGRAM

## 2023-04-01 PROCEDURE — 36415 COLL VENOUS BLD VENIPUNCTURE: CPT | Performed by: STUDENT IN AN ORGANIZED HEALTH CARE EDUCATION/TRAINING PROGRAM

## 2023-04-01 PROCEDURE — 94640 AIRWAY INHALATION TREATMENT: CPT

## 2023-04-01 PROCEDURE — 84100 ASSAY OF PHOSPHORUS: CPT | Performed by: STUDENT IN AN ORGANIZED HEALTH CARE EDUCATION/TRAINING PROGRAM

## 2023-04-01 PROCEDURE — 250N000011 HC RX IP 250 OP 636: Performed by: PHYSICIAN ASSISTANT

## 2023-04-01 RX ORDER — FUROSEMIDE 10 MG/ML
20 INJECTION INTRAMUSCULAR; INTRAVENOUS ONCE
Status: COMPLETED | OUTPATIENT
Start: 2023-04-01 | End: 2023-04-01

## 2023-04-01 RX ORDER — PANTOPRAZOLE SODIUM 40 MG/1
40 TABLET, DELAYED RELEASE ORAL
Status: DISCONTINUED | OUTPATIENT
Start: 2023-04-02 | End: 2023-04-07 | Stop reason: HOSPADM

## 2023-04-01 RX ADMIN — ATORVASTATIN CALCIUM 10 MG: 10 TABLET, FILM COATED ORAL at 08:53

## 2023-04-01 RX ADMIN — PANTOPRAZOLE SODIUM 40 MG: 40 INJECTION, POWDER, FOR SOLUTION INTRAVENOUS at 07:39

## 2023-04-01 RX ADMIN — DEXAMETHASONE SODIUM PHOSPHATE 4 MG: 4 INJECTION, SOLUTION INTRAMUSCULAR; INTRAVENOUS at 20:11

## 2023-04-01 RX ADMIN — ACETAMINOPHEN 975 MG: 325 TABLET, FILM COATED ORAL at 05:01

## 2023-04-01 RX ADMIN — INSULIN ASPART 1 UNITS: 100 INJECTION, SOLUTION INTRAVENOUS; SUBCUTANEOUS at 20:06

## 2023-04-01 RX ADMIN — LEVOTHYROXINE SODIUM 88 MCG: 88 TABLET ORAL at 08:53

## 2023-04-01 RX ADMIN — DEXAMETHASONE 4 MG: 2 TABLET ORAL at 07:39

## 2023-04-01 RX ADMIN — ACETAMINOPHEN 975 MG: 325 TABLET, FILM COATED ORAL at 20:11

## 2023-04-01 RX ADMIN — MIRTAZAPINE 7.5 MG: 7.5 TABLET, FILM COATED ORAL at 20:11

## 2023-04-01 RX ADMIN — SERTRALINE HYDROCHLORIDE 25 MG: 25 TABLET ORAL at 08:53

## 2023-04-01 RX ADMIN — OXYCODONE HYDROCHLORIDE 2.5 MG: 5 TABLET ORAL at 18:39

## 2023-04-01 RX ADMIN — SENNOSIDES AND DOCUSATE SODIUM 1 TABLET: 50; 8.6 TABLET ORAL at 08:53

## 2023-04-01 RX ADMIN — LEVALBUTEROL HYDROCHLORIDE 1.25 MG: 1.25 SOLUTION RESPIRATORY (INHALATION) at 11:27

## 2023-04-01 RX ADMIN — POLYETHYLENE GLYCOL 3350 17 G: 17 POWDER, FOR SOLUTION ORAL at 08:53

## 2023-04-01 RX ADMIN — SENNOSIDES AND DOCUSATE SODIUM 1 TABLET: 50; 8.6 TABLET ORAL at 20:11

## 2023-04-01 RX ADMIN — FLUTICASONE FUROATE AND VILANTEROL TRIFENATATE 1 PUFF: 200; 25 POWDER RESPIRATORY (INHALATION) at 18:11

## 2023-04-01 RX ADMIN — FUROSEMIDE 20 MG: 10 INJECTION, SOLUTION INTRAMUSCULAR; INTRAVENOUS at 07:39

## 2023-04-01 RX ADMIN — ACETAMINOPHEN 975 MG: 325 TABLET, FILM COATED ORAL at 14:15

## 2023-04-01 ASSESSMENT — ACTIVITIES OF DAILY LIVING (ADL)
ADLS_ACUITY_SCORE: 45

## 2023-04-01 NOTE — PLAN OF CARE
Goal Outcome Evaluation:  ICU End of Shift Summary. See flowsheets for vital signs and detailed assessment.    Changes this shift: Patient increasing in alertness, tolerated bedside eval, and thin liquids well.  Upgraded to regular diet.      Plan: Transfer to 73.  Continue to monitor respiratory status.  Diurese.        Notified provider about indwelling baldwin catheter discussed removal or continued need.    Did provider choose to remove indwelling baldwin catheter? No    Provider's baldwin indication for keeping indwelling baldwin catheter:Diuresis     Is there an order for indwelling baldwin catheter? Yes     *If there is a plan to keep baldwin catheter in place at discharge daily notification with provider is not necessary, but please add a notation in the treatment team sticky note that the patient will be discharging with the catheter.

## 2023-04-01 NOTE — PROGRESS NOTES
Neurosurgery progress note    Postoperative day 2 status post HepatAmine resection meningioma.    Patient doing well this morning, alert following commands and oriented moving all extremities.    Exam    Alert and oriented no acute distress  Moving all extremities  Finger-nose slow and accurate  Negative pronator drift  Extraocular movements intact  Pupils equal and reactive    Imaging    Brain MRI demonstrated    IMPRESSION:    1. New postoperative changes following resection of enhancing  extra-axial mass compared to MR 3/29/2023. No definite residual  nodular enhancement appreciated, although there is thin enhancement  along the operative bed which may be postsurgical in nature. Continued  follow-up is recommended.  2. Small postoperative contusion in the inferior medial right frontal  lobe along the inferior margin of the resection cavity. No other areas  of infarct. Previous mass effect on the frontal horns of the lateral  ventricles is improved. No hydrocephalus.  3. Persistent leptomeningeal enhancement most pronounced in the medial  frontal lobes bilaterally. Differential is similar to prior MR with  possibility of neoplastic leptomeningeal spread.  4. T2 hyperintense signal in the right frontal lobe adjacent to the  previous extra-axial mass appears similar to previous MR 3/29/2023  given the new postoperative findings.    Plan     -Routine wound care  -PT/  OT  -Pain management as needed  -Activity as tolerated  -Appreciate assistance from other services  -Continue decadron  -Ok to transfer to floor when medically stable

## 2023-04-01 NOTE — PROGRESS NOTES
[facility]    Medicine Progress Note - Hospitalist Service    Date of Admission:  3/23/2023    Assessment & Plan        Patient is a 73-year-old female with a history of COPD on home oxygen, depression, anxiety, hyperlipidemia, hypothyroidism, B12 deficiency, melanoma, osteoporosis, cerebral meningioma presented to [facility] on 3/19/2023 with right hip pain after a fall    Patient had surgical repair for right femoral neck fracture on 3/20 with postoperative complication including worsening respiratory failure, worsening encephalopathy, lactic acidosis and hypotension.  She also had worsening of her cerebral meningioma which would require surgical resection.  As per the discharging hospitalist at [facility] patient had discussion with the patient's family and she has been having worsening functional status with increasing, confusion and coordination issues including because of a fall at home.  Neurosurgery Dr. Murphy was contacted on 3/23 who recommended transfer to [facility] for possible resection of meningioma.      Patient has been admitted to the ICU at [facility] on 3/23//23 due to acute on chronic respiratory failure secondary to advanced COPD/emphysema requiring BiPAP support.  She was initially on BiPAP and had episodes of desaturation on 3/23 on high flow nasal cannula and had to be placed back on BiPAP with recovery.  She had desaturations to 84% while doing oral care on 3/26.  Respiratory status improved on 3/29/2023 was on nasal cannula at 2 L of oxygen.  Patient underwent craniotomy for resection of meningioma on 3/30/2023.  Patient returned from the OR intubated and sedated on propofol. Extubated and recovering now     Encephalopathy. Multifactorial see below  Cerebral Meningioma status postcraniotomy by neurosurgery on 3/30/2023.    Acute toxic metabolic encephalopathy likely from post-op status, steroids, pain meds, ?infection Pt was diagnosed  9/2019 with a cerebral meningioma and monitored.  Given progression of the tumor, patient underwent 15 sessions of radiation 11/2022.  She is followed by Dr. Godoy, a Radiation Oncologist through [facility].  Pt was on oral steroids which were weaned off about 4 weeks ago.  Since that time, patient has had increased confusion, poor balance, changes in mood.  * Recent MRI Brain 3/17/23 revealed enlargement of meningioma up to 45 mm with a significant mass effect and the midline shift right to left.  Patient was instructed by her PCP to follow up with outpatient Neurology and Radiation Oncology this week.  * CT head on admission with no acute hemorrhage, but notes the known mass surrounding vasogenic edema predominantly involving the right frontal lobe with right to left midline shift/subfalcine herniation measuring up to 1.1 cm.  ? Discussion with Dr. Godoy from radiation oncology on 3/21 and she discussed with Dr. Murphy.  She agrees with surgery at this point given advancement of tumor.   ? Per neurosurgery- given her functional decline with confusion at home in the weeks prior to her presentation, principal concern is for meningioma as a large contributor to her poor functional status and confusion.    ?      IV Solu-Medrol  switch to Decadron IV  to help with the meningioma  Patient underwent craniotomy and resection of the meningioma on 3/30/2023 by Dr. Murphy with neurosurgery  Postop management per neurosurgery team  Patient okay to transfer to  as per neurosurgery team.  Transfer orders placed    PT OT orders placed      #Acute on chronic hypoxic and hypercapnic respiratory failure Secondary to PNeumonia community acquired, Underlying COPD, Fluid overload from POst op state     Patient is a former smoker of 50 years.  She has advanced COPD on 2 to 3 L of oxygen at home for past 5 years..  Patient had increased work of breathing and hypercarbia requiring BiPAP therapy.  Patient has been transferred to ICU  on 3/22 for closer monitoring    -Patient finished course of azithromycin.  Last day on 3/27/2023  -Continue ceftriaxone 7/7 day course today   -Continue DuoNebs.  -Continue BiPAP as needed  - Patient is weaned off of high flow nasal cannula and currently on 4 L of oxygen by nasal cannula  Wean O2 as tolerated     Received total of 60 mg of Lasix IV on 3/28/2023 to help with diuresis which seemed to improve her respiratory status  She completed course of ceftriaxone so discontinued on 3/29/2023 IV Solu-Medrol switched to Decadron IV dosing per neurosurgery  Patient underwent craniotomy and resection of meningioma on 3/30/2023  Postop management per neurosurgery      #Dysphagia  Speech therapy saw the patient   -Continue aspiration precautions  Patient is currently tolerating regular diet postoperatively      #Displaced right femoral neck fracture s/p right hip hemiarthroplasty  mechanical fall.  Xray revealed a displaced right femoral neck fracture and a subtle irregularity of the parasymphyseal aspect of the left pubic bone, potentially representing an age indeterminant fracture.   -Orthopedic surgery consult appreciated-continue as needed pain meds   Lovenox discontinued due to craniotomy on 3/29/2023  Needs to hold Lovenox prior to brain surgery but timing for surgery is unknown.  -As needed Haldol for agitation  -Roman catheter placed on 3/24 for concern of urinary retention.  Continue for now      #Suspected malnutrition  -SLP has been consulted and had diet advanced to puréed with thin liquids.  -Will also consult nutrition      #Lactic acidosis  Resolved with IV fluids         Acute on Chronic Anemia  ? Preop hemoglobin 10.5 g/dL  ? Hemoglobin 3/26 7.8 g/dL  Follow hemoglobin, consider transfusion for hemoglobin less than 8 g/dL due to ongoing respiratory failure, desire to optimize if at all possible if planning for surgery  -Prbc given on 3/26/23  Hb 9.8 on 3/27/23  Hemoglobin stable at 11.5 -10.4 on  4/1/2023     HLD - resumed Atorvastatin when able to take p.o.  Hypothyroidism   On p.o. levothyroxine  Depression/Anxiety -hold p.o. low-dose sertraline and mirtazapine until able to reliably take       # Hypernatremia  Resolved          #Goals of care DNR/DNI.   .  Appreciate palliative care's help. Goals of care remain restorative at this point              Diet: Room Service  Advance Diet as Tolerated: Regular Diet Adult    DVT Prophylaxis: Enoxaparin (Lovenox) SQ  Roman Catheter: PRESENT, indication: Strict 1-2 Hour I&O  Lines: None     Cardiac Monitoring: ACTIVE order. Indication: Procedural area  Code Status: No CPR- Do NOT Intubate      Clinically Significant Risk Factors                                Disposition Plan     Expected Discharge Date: 04/03/2023                Nicky Ballard MD  Hospitalist Service  [facility]  Securely message with [employer] (more info)  Text page via [employer] Paging/Directory   ______________________________________________________________________    Interval History     Patient seen and examined at bedside.  Resting comfortably in bed.  Denies any pain currently.  No nausea vomiting.  Tolerating diet.  Blood pressure well controlled.  Waiting to transfer to  when bed is available    Physical Exam   Vital Signs: Temp: 98.2  F (36.8  C) Temp src: Axillary BP: 117/73 Pulse: 72   Resp: 22 SpO2: 95 % O2 Device: Nasal cannula Oxygen Delivery: 3 LPM  Weight: 156 lbs 11.95 oz  Physical Exam  HENT:      Head: Normocephalic and atraumatic.   Cardiovascular:      Rate and Rhythm: Normal rate.      Heart sounds: Normal heart sounds.   Pulmonary:      Effort: Pulmonary effort is normal. No respiratory distress.      Breath sounds: Normal breath sounds.   Abdominal:      General: There is no distension.      Palpations: Abdomen is soft.      Tenderness: There is no abdominal tenderness.   Musculoskeletal:      Right lower leg: Edema present.      Left lower leg: Edema  present.     Trace edema of the bilateral lower extremities present    Medical Decision Making       Data     I have personally reviewed the following data over the past 24 hrs:    16.8 (H)  \   10.4 (L)   / 223     136 98 18.8 /  98   4.3 29 0.44 (L) \       Trop: N/A BNP: N/A

## 2023-04-01 NOTE — PROGRESS NOTES
Resumed care [id number]    Neuro: Progressing throughout shift. Uncooperative at beginning of shift. PERRL. Follows commands. UE>LE weakness. Oriented to person, place and situation.   CV: Remains sinus rhythm. VSS. SBP remains at goal less than 150. Edematous. Pulses 2/2/1.   Respiratory: Diminished. Intermittent labored breathing. Increased O2 needs during shift. On 5L NC. Unsure of accurate reading. Attempted readings at different locations.   GI/: No bowel movement throughout shift. Roman remains in place. UOP adequate.   Skin: Unchanged throughout shift.  Activity: Q2 hour turns  Diet: Clear liquid.   Drips: Maintenance fluid.     Plan: Tx orders in place.

## 2023-04-02 ENCOUNTER — APPOINTMENT (OUTPATIENT)
Dept: PHYSICAL THERAPY | Facility: CLINIC | Age: 73
DRG: 025 | End: 2023-04-02
Attending: INTERNAL MEDICINE
Payer: COMMERCIAL

## 2023-04-02 ENCOUNTER — APPOINTMENT (OUTPATIENT)
Dept: SPEECH THERAPY | Facility: CLINIC | Age: 73
DRG: 025 | End: 2023-04-02
Attending: INTERNAL MEDICINE
Payer: COMMERCIAL

## 2023-04-02 ENCOUNTER — APPOINTMENT (OUTPATIENT)
Dept: OCCUPATIONAL THERAPY | Facility: CLINIC | Age: 73
DRG: 025 | End: 2023-04-02
Attending: INTERNAL MEDICINE
Payer: COMMERCIAL

## 2023-04-02 LAB
ANION GAP SERPL CALCULATED.3IONS-SCNC: 5 MMOL/L (ref 7–15)
BUN SERPL-MCNC: 18.8 MG/DL (ref 8–23)
CALCIUM SERPL-MCNC: 8.2 MG/DL (ref 8.8–10.2)
CHLORIDE SERPL-SCNC: 98 MMOL/L (ref 98–107)
CREAT SERPL-MCNC: 0.32 MG/DL (ref 0.51–0.95)
DEPRECATED HCO3 PLAS-SCNC: 35 MMOL/L (ref 22–29)
ERYTHROCYTE [DISTWIDTH] IN BLOOD BY AUTOMATED COUNT: 14.3 % (ref 10–15)
GFR SERPL CREATININE-BSD FRML MDRD: >90 ML/MIN/1.73M2
GLUCOSE BLDC GLUCOMTR-MCNC: 122 MG/DL (ref 70–99)
GLUCOSE BLDC GLUCOMTR-MCNC: 124 MG/DL (ref 70–99)
GLUCOSE BLDC GLUCOMTR-MCNC: 134 MG/DL (ref 70–99)
GLUCOSE BLDC GLUCOMTR-MCNC: 159 MG/DL (ref 70–99)
GLUCOSE BLDC GLUCOMTR-MCNC: 170 MG/DL (ref 70–99)
GLUCOSE SERPL-MCNC: 126 MG/DL (ref 70–99)
HCT VFR BLD AUTO: 31.6 % (ref 35–47)
HGB BLD-MCNC: 9.8 G/DL (ref 11.7–15.7)
MAGNESIUM SERPL-MCNC: 1.9 MG/DL (ref 1.7–2.3)
MCH RBC QN AUTO: 29.3 PG (ref 26.5–33)
MCHC RBC AUTO-ENTMCNC: 31 G/DL (ref 31.5–36.5)
MCV RBC AUTO: 95 FL (ref 78–100)
PHOSPHATE SERPL-MCNC: 3.8 MG/DL (ref 2.5–4.5)
PLATELET # BLD AUTO: 225 10E3/UL (ref 150–450)
POTASSIUM SERPL-SCNC: 4.2 MMOL/L (ref 3.4–5.3)
RBC # BLD AUTO: 3.34 10E6/UL (ref 3.8–5.2)
SODIUM SERPL-SCNC: 138 MMOL/L (ref 136–145)
WBC # BLD AUTO: 13.9 10E3/UL (ref 4–11)

## 2023-04-02 PROCEDURE — 92526 ORAL FUNCTION THERAPY: CPT | Mod: GN | Performed by: REHABILITATION PRACTITIONER

## 2023-04-02 PROCEDURE — 97530 THERAPEUTIC ACTIVITIES: CPT | Mod: GO

## 2023-04-02 PROCEDURE — 97162 PT EVAL MOD COMPLEX 30 MIN: CPT | Mod: GP

## 2023-04-02 PROCEDURE — 99231 SBSQ HOSP IP/OBS SF/LOW 25: CPT | Performed by: INTERNAL MEDICINE

## 2023-04-02 PROCEDURE — 36415 COLL VENOUS BLD VENIPUNCTURE: CPT | Performed by: STUDENT IN AN ORGANIZED HEALTH CARE EDUCATION/TRAINING PROGRAM

## 2023-04-02 PROCEDURE — 97166 OT EVAL MOD COMPLEX 45 MIN: CPT | Mod: GO

## 2023-04-02 PROCEDURE — 84100 ASSAY OF PHOSPHORUS: CPT | Performed by: STUDENT IN AN ORGANIZED HEALTH CARE EDUCATION/TRAINING PROGRAM

## 2023-04-02 PROCEDURE — 83735 ASSAY OF MAGNESIUM: CPT | Performed by: STUDENT IN AN ORGANIZED HEALTH CARE EDUCATION/TRAINING PROGRAM

## 2023-04-02 PROCEDURE — 250N000013 HC RX MED GY IP 250 OP 250 PS 637: Performed by: PHYSICIAN ASSISTANT

## 2023-04-02 PROCEDURE — 80048 BASIC METABOLIC PNL TOTAL CA: CPT | Performed by: STUDENT IN AN ORGANIZED HEALTH CARE EDUCATION/TRAINING PROGRAM

## 2023-04-02 PROCEDURE — 120N000001 HC R&B MED SURG/OB

## 2023-04-02 PROCEDURE — 250N000013 HC RX MED GY IP 250 OP 250 PS 637: Performed by: INTERNAL MEDICINE

## 2023-04-02 PROCEDURE — 85027 COMPLETE CBC AUTOMATED: CPT | Performed by: STUDENT IN AN ORGANIZED HEALTH CARE EDUCATION/TRAINING PROGRAM

## 2023-04-02 PROCEDURE — 250N000012 HC RX MED GY IP 250 OP 636 PS 637: Performed by: INTERNAL MEDICINE

## 2023-04-02 PROCEDURE — 250N000011 HC RX IP 250 OP 636: Performed by: INTERNAL MEDICINE

## 2023-04-02 PROCEDURE — 97530 THERAPEUTIC ACTIVITIES: CPT | Mod: GP

## 2023-04-02 RX ORDER — ACETAMINOPHEN 325 MG/1
975 TABLET ORAL EVERY 8 HOURS PRN
Status: DISCONTINUED | OUTPATIENT
Start: 2023-04-02 | End: 2023-04-07 | Stop reason: HOSPADM

## 2023-04-02 RX ORDER — ACETAMINOPHEN 325 MG/1
975 TABLET ORAL EVERY 8 HOURS
Status: DISCONTINUED | OUTPATIENT
Start: 2023-04-02 | End: 2023-04-07 | Stop reason: HOSPADM

## 2023-04-02 RX ORDER — ALBUTEROL SULFATE 0.83 MG/ML
2.5 SOLUTION RESPIRATORY (INHALATION) 3 TIMES DAILY PRN
Status: DISCONTINUED | OUTPATIENT
Start: 2023-04-02 | End: 2023-04-02

## 2023-04-02 RX ADMIN — SERTRALINE HYDROCHLORIDE 25 MG: 25 TABLET ORAL at 08:57

## 2023-04-02 RX ADMIN — SENNOSIDES AND DOCUSATE SODIUM 1 TABLET: 50; 8.6 TABLET ORAL at 08:57

## 2023-04-02 RX ADMIN — ACETAMINOPHEN 975 MG: 325 TABLET, FILM COATED ORAL at 04:31

## 2023-04-02 RX ADMIN — DEXAMETHASONE SODIUM PHOSPHATE 4 MG: 4 INJECTION, SOLUTION INTRAMUSCULAR; INTRAVENOUS at 08:57

## 2023-04-02 RX ADMIN — ACETAMINOPHEN 975 MG: 325 TABLET, FILM COATED ORAL at 13:36

## 2023-04-02 RX ADMIN — MIRTAZAPINE 7.5 MG: 7.5 TABLET, FILM COATED ORAL at 21:24

## 2023-04-02 RX ADMIN — ACETAMINOPHEN 975 MG: 325 TABLET, FILM COATED ORAL at 21:24

## 2023-04-02 RX ADMIN — LEVOTHYROXINE SODIUM 88 MCG: 88 TABLET ORAL at 08:57

## 2023-04-02 RX ADMIN — DEXAMETHASONE 4 MG: 2 TABLET ORAL at 21:24

## 2023-04-02 RX ADMIN — INSULIN ASPART 1 UNITS: 100 INJECTION, SOLUTION INTRAVENOUS; SUBCUTANEOUS at 16:51

## 2023-04-02 RX ADMIN — POLYETHYLENE GLYCOL 3350 17 G: 17 POWDER, FOR SOLUTION ORAL at 08:58

## 2023-04-02 RX ADMIN — PANTOPRAZOLE SODIUM 40 MG: 40 TABLET, DELAYED RELEASE ORAL at 08:58

## 2023-04-02 RX ADMIN — SENNOSIDES AND DOCUSATE SODIUM 1 TABLET: 50; 8.6 TABLET ORAL at 21:24

## 2023-04-02 RX ADMIN — FLUTICASONE FUROATE AND VILANTEROL TRIFENATATE 1 PUFF: 200; 25 POWDER RESPIRATORY (INHALATION) at 16:53

## 2023-04-02 RX ADMIN — ATORVASTATIN CALCIUM 10 MG: 10 TABLET, FILM COATED ORAL at 08:57

## 2023-04-02 RX ADMIN — INSULIN ASPART 1 UNITS: 100 INJECTION, SOLUTION INTRAVENOUS; SUBCUTANEOUS at 04:27

## 2023-04-02 ASSESSMENT — ACTIVITIES OF DAILY LIVING (ADL)
ADLS_ACUITY_SCORE: 45

## 2023-04-02 NOTE — PROGRESS NOTES
ICU End of Shift Summary. See flowsheets for vital signs and detailed assessment.    Changes this shift: Up to chair with PT and nursing.  BM today.      Plan: Transfer to 73 when bed is available.  Continue to monitor respiratory status.      Notified provider about indwelling baldwin catheter discussed removal or continued need.    Did provider choose to remove indwelling baldwin catheter? No    Provider's baldwin indication for keeping indwelling baldwin catheter: Kept in place for diuresis    Is there an order for indwelling baldwin catheter?  Yes    *If there is a plan to keep baldwin catheter in place at discharge daily notification with provider is not necessary, but please add a notation in the treatment team sticky note that the patient will be discharging with the catheter.

## 2023-04-02 NOTE — PROGRESS NOTES
"   04/02/23 1000   Appointment Info   Signing Clinician's Name / Credentials (OT) Belle Woo OTR/L   Living Environment   People in Home child(tristen), adult   Current Living Arrangements house   Home Accessibility stairs to enter home   Number of Stairs, Main Entrance 2   Transportation Anticipated family or friend will provide   Living Environment Comments Pt lives w/ daughter in house w/ 2 JOSÉ MIGUEL, all other needs met on the main level once in. Pt has a tub shower and normally takes baths d/t hx of swimmers ear and cannot get ears wet.   Self-Care   Usual Activity Tolerance good   Current Activity Tolerance fair   Equipment Currently Used at Home   (does not have a walker or any DME in the bathroom)   Fall history within last six months yes   Number of times patient has fallen within last six months 2   Activity/Exercise/Self-Care Comment Per patient she was mostly IND prior to the tumor causing falls, dizziness and balance difficulties.   Instrumental Activities of Daily Living (IADL)   IADL Comments Pt mostly IND w/ IADLS, daughter helps with cooking and does most of the cleaning around the home, pt was ambulatory w/o any AD at baseline. IND w/ med mgmt and can prepare simple meals.   General Information   Onset of Illness/Injury or Date of Surgery 03/30/23   Referring Physician Nicky Ballard MD   Patient/Family Therapy Goal Statement (OT) \"to return home\"   Additional Occupational Profile Info/Pertinent History of Current Problem Per chart review: \"Patient is a 73-year-old female with a history of COPD on home oxygen, depression, anxiety, hyperlipidemia, hypothyroidism, B12 deficiency, melanoma, osteoporosis, cerebral meningioma presented to [facility] on 3/19/2023 with right hip pain after a fall; Patient had surgical repair for right femoral neck fracture on 3/20 with postoperative complication including worsening respiratory failure, worsening encephalopathy, lactic acidosis and hypotension.  She " "also had worsening of her cerebral meningioma which would require surgical resection\"   Existing Precautions/Restrictions fall;weight bearing   Right Lower Extremity (Weight-bearing Status) weight-bearing as tolerated (WBAT)  (with walker)   Cognitive Status Examination   Orientation Status orientation to person, place and time   Affect/Mental Status (Cognitive) flat/blunted affect;low arousal/lethargic   Follows Commands 50-74% accuracy   Cognitive Status Comments Pt slow to respond at times, likely d/t [place].   Visual Perception   Impact of Vision Impairment on Function (Vision) reading glasses   Sensory   Sensory Comments reports increased pain at R hip w/ movement but otherwise sensation intact   Pain Assessment   Patient Currently in Pain Yes, see Vital Sign flowsheet   Range of Motion Comprehensive   Comment, General Range of Motion UE WFL, BLE limited by weakness   Strength Comprehensive (MMT)   Comment, General Manual Muscle Testing (MMT) Assessment BUE 4/5, generalized weakness, unable to lift RLE off of bed w/ SLR, difficulty w/ knee and hip flexion   Coordination   Coordination Comments coordination intact   Bed Mobility   Comment (Bed Mobility) Max Ax2   Transfers   Transfer Comments Max Ax2 pivot to chair on L side   Balance   Balance Comments impaired d/t R hip fx, unable to formally assess   Activities of Daily Living   BADL Assessment/Intervention lower body dressing   Lower Body Dressing Assessment/Training   Comment, (Lower Body Dressing) expect pt to be Total A for LB dsg   Clinical Impression   Criteria for Skilled Therapeutic Interventions Met (OT) Yes, treatment indicated   OT Diagnosis impaired ADL/IADL IND   OT Problem List-Impairments impacting ADL problems related to;activity tolerance impaired;balance;strength;pain;post-surgical precautions   Assessment of Occupational Performance 5 or more Performance Deficits   Identified Performance Deficits dressing, bathing, functional transfers, home " mgmt, activity tolerance, g/h   Planned Therapy Interventions (OT) ADL retraining;IADL retraining;strengthening;transfer training;stretching;home program guidelines;progressive activity/exercise   Clinical Decision Making Complexity (OT) moderate complexity   Risk & Benefits of therapy have been explained evaluation/treatment results reviewed;care plan/treatment goals reviewed;current/potential barriers reviewed;risks/benefits reviewed;participants voiced agreement with care plan;participants included;patient   OT Total Evaluation Time   OT Eval, Moderate Complexity Minutes (74623) 10   OT Goals   Therapy Frequency (OT) Daily   OT Predicted Duration/Target Date for Goal Attainment 04/12/23   OT Goals Hygiene/Grooming;Upper Body Dressing;Lower Body Dressing;Toilet Transfer/Toileting;Transfers   OT: Hygiene/Grooming while standing;supervision/stand-by assist   OT: Upper Body Dressing Supervision/stand-by assist   OT: Lower Body Dressing Minimal assist;within precautions;using adaptive equipment   OT: Transfer Minimal assist;within precautions   OT: Toilet Transfer/Toileting Supervision/stand-by assist;Minimal assist;toilet transfer;cleaning and garment management;within precautions   Interventions   Interventions Quick Adds Therapeutic Activity   Therapeutic Activities   Therapeutic Activity Minutes (89560) 45   Symptoms noted during/after treatment fatigue;increased pain   Treatment Detail/Skilled Intervention OT: Co-tx today d/t pt complexity and level of assist required, pt seen in ICU, dependent to don B socks, noted pt to have difficulty w/ engagement in task, requires cues to dorsiflex foot and assist w/ donning sock. Assist for BLE mgmt to swing toward EOB, Max Ax2 semi supine > sit at EOB, CGA throughout sitting EOB d/t height of ICU bed, Pt cued to scoot hips forward towards floor but increased pain limiting pt, Mod A for hips scooting towards EOB. VC for technique, pt very anxious about standing indicating  she is weak, PT/OT blocking B knees, intermittent buckling noted, Max-Mod Ax2 STS, Max A on R side (hip fx side), STS x3 progression of length of standing to ~30s.   OT Discharge Planning   OT Plan start as co-tx d/t BLE weakness and knee blocking, pivot transfers or josé manuel steady for weight shift and standing   OT Discharge Recommendation (DC Rec) [facility]-Motivated patient will benefit from intensive, interdisciplinary therapy.  Anticipate will be able to tolerate 3 hours of therapy per day   OT Rationale for DC Rec Pt functioning well below baseline, typically ambulates w/o AD; pt has good family support and lives w/ daughter who can assist. Pt would benefit from multidisciplinary approach to rehab to progress functional IND prior to returning to home.   OT Brief overview of current status Max Ax2 pivot   Total Session Time   Timed Code Treatment Minutes 45   Total Session Time (sum of timed and untimed services) 55

## 2023-04-02 NOTE — PROGRESS NOTES
04/02/23 1003   Appointment Info   Signing Clinician's Name / Credentials (PT) Shanice Narvaez, PT, DPT   Rehab Comments (PT) WBAT, no formal hip precautions. Pt is [facility]   Living Environment   People in Home child(tristen), adult   Current Living Arrangements house   Home Accessibility stairs to enter home   Number of Stairs, Main Entrance 2   Transportation Anticipated family or friend will provide   Living Environment Comments Lives with daughter who works   Self-Care   Usual Activity Tolerance good   Current Activity Tolerance fair   Equipment Currently Used at Home none   Fall history within last six months yes   Number of times patient has fallen within last six months 2   Activity/Exercise/Self-Care Comment Patient independent at baseline for mobility and self cares without AD prior to a few weeks ago.   General Information   Onset of Illness/Injury or Date of Surgery 03/30/23   Referring Physician Nicky Ballard MD   Patient/Family Therapy Goals Statement (PT) to get better   Pertinent History of Current Problem (include personal factors and/or comorbidities that impact the POC) Cerebral Meningioma status postcraniotomy by neurosurgery on 3/30/2023.  Per chart: Josephine Nicole is a 73 year old female who was admitted on 3/23/2023. She underwent meningioma resection today without incident. She was originally admitted for a R hip fracture. Of note, she has a history of severe COPD   Existing Precautions/Restrictions fall   Weight-Bearing Status - LLE full weight-bearing   Weight-Bearing Status - RLE (S)  weight-bearing as tolerated   General Observations WBAT, no formal hip precautions   Cognition   Orientation Status (Cognition) oriented to;person;situation;time;place;verbal cues/prompts needed for orientation   Follows Commands (Cognition) follows one-step commands   Pain Assessment   Patient Currently in Pain Yes, see Vital Sign flowsheet  (R hip maura. w/ upright activity)   Integumentary/Edema    Integumentary/Edema Comments pitting edema in B feet, ankles, lower legs. Pt reports she has edema at baseline, usually elevates or wears compression stockings to address. Bruising on face, crani incision   Posture    Posture Forward head position   Range of Motion (ROM)   Range of Motion ROM is WFL   Strength (Manual Muscle Testing)   Strength Comments limited by R hip fracture injury; L LE decreased functionally generally 3/5   Bed Mobility   Comment, (Bed Mobility) max assist of 2 supine to seated EOB   Transfers   Comment, (Transfers) mod assist of 2 to stand w/ arm in arm; max assist of 2 pivot transfer bed to chair   Gait/Stairs (Locomotion)   Comment, (Gait/Stairs) only able to pivot transfer d/t weakness/ tolerance   Balance   Balance Comments needs mod assist for standing standing; affected by hip pain   Sensory Examination   Sensory Perception patient reports no sensory changes   Clinical Impression   Criteria for Skilled Therapeutic Intervention Yes, treatment indicated   PT Diagnosis (PT) impaired functional mobility   Influenced by the following impairments decreased activity tolerance, strength, balance, pain   Functional limitations due to impairments bed mobility, transfers, ambulation, stairs   Clinical Presentation (PT Evaluation Complexity) Evolving/Changing   Clinical Presentation Rationale clinical judgement   Clinical Decision Making (Complexity) moderate complexity   Planned Therapy Interventions (PT) balance training;bed mobility training;gait training;home exercise program;neuromuscular re-education;patient/family education;postural re-education;stair training;strengthening;transfer training   Anticipated Equipment Needs at Discharge (PT) walker, rolling   Risk & Benefits of therapy have been explained evaluation/treatment results reviewed;care plan/treatment goals reviewed;risks/benefits reviewed;current/potential barriers reviewed;participants voiced agreement with care plan;participants  included;patient   PT Total Evaluation Time   PT Eval, Moderate Complexity Minutes (84233) 15   Physical Therapy Goals   PT Frequency Daily   PT Predicted Duration/Target Date for Goal Attainment 04/09/23   PT Goals Bed Mobility;Transfers;Gait;Stairs   PT: Bed Mobility Supervision/stand-by assist;Supine to/from sit;Rolling   PT: Transfers Supervision/stand-by assist;Bed to/from chair;Sit to/from stand;Assistive device   PT: Gait Minimal assist;Rolling walker;100 feet   PT: Stairs Minimal assist;2 stairs   Interventions   Interventions Quick Adds Therapeutic Activity   Therapeutic Activity   Therapeutic Activities: dynamic activities to improve functional performance Minutes (95979) 31   Symptoms Noted During/After Treatment Increased pain;Dizziness   Treatment Detail/Skilled Intervention From supine, max assist of 2 (co-tx w/ OT) to sit EOB where pt able to begin scooting LE towards edge and hold railing but not functionally able to start sitting up. Once upright, steady and demonstrates good seated balance w/ CGA for safety. Dizziness at first, BP stable, improved within a few minutes. Worked on scooting forward to feet on floor where pt demonstrates good strategy but not functional, needed max assist to scoot fully forward. Arm in arm mod assist to stand x 3 reps total w/ seated rest in between w/ knee blocking R throughout. Pivot transfer towards L w/ max assist of 2 where pt w/ difficulty lifting either foot and adalid R knee throughout, L knee adalid once. Set up comfortable in chair.   PT Discharge Planning   PT Plan progress sit<>stands, pre gait and gait as able (consider chair follow, second assist)   PT Discharge Recommendation (DC Rec) Acute [facility]hab [facility]-Motivated patient will benefit from intensive, interdisciplinary therapy.  Anticipate will be able to tolerate 3 hours of therapy per day   PT Rationale for DC Rec Patient w/ significantly decreased functional mobility compared to baseline w/ deficits  in strength and balance. She was independent prior, would be appropriate for intensive rehab.   PT Brief overview of current status max assist bed mob, mod assist sit<>stands, max assist of 2 pivot transfer towards L   Total Session Time   Timed Code Treatment Minutes 31   Total Session Time (sum of timed and untimed services) 46

## 2023-04-02 NOTE — PLAN OF CARE
Speech Language Therapy Discharge Summary    Reason for therapy discharge:    Swallow goal met.    Progress towards therapy goal(s). See goals on Care Plan in [facility] electronic health record for goal details.  Goals met    Therapy recommendation(s):    No further therapy is recommended.  Patient tolerating regular solids/thin liquids. Upright positioning, small bites/sips. May benefit from assist/supervision due to cognitive deficits. Recommend comprehensive cognitive linguistic assessment at next level of care.  Please re-order SLP if swallow concerns arise.

## 2023-04-02 NOTE — PROGRESS NOTES
[facility]    Neurosurgery  Daily Post-Op Note    Assessment & Plan   Procedure(s):  Bifrontal stealth craniotomy and resection of meningioma   3 Days Post-Op  Doing well.  Pain well-controlled.  Tolerating physical therapy and rehabilitation well.  Good UE strength, no drift, strong grasp.      Plan:  -Advance activity as tolerated  -Continue supportive and symptomatic treatment  -Start or continue physical therapy  -- Okay to transfer to floor when bed ready    Mook Barnett PA-C    Interval History   Stable.  Doing well.  Improving slowly.  Pain is reasonably controlled.  No fevers.     Physical Exam   Temp: 98.1  F (36.7  C) Temp src: Axillary BP: 123/69 Pulse: 71   Resp: 19 SpO2: 96 % O2 Device: Nasal cannula Oxygen Delivery: 3 LPM  Vitals:    03/31/23 0600 04/01/23 0400 04/02/23 0400   Weight: 69.5 kg (153 lb 3.5 oz) 71.1 kg (156 lb 12 oz) 69.6 kg (153 lb 7 oz)     Vital Signs with Ranges  Temp:  [97.7  F (36.5  C)-98.4  F (36.9  C)] 98.1  F (36.7  C)  Pulse:  [63-87] 71  Resp:  [14-32] 19  BP: ()/(54-74) 123/69  SpO2:  [93 %-100 %] 96 %  I/O last 3 completed shifts:  In: 240 [P.O.:240]  Out: 3140 [Urine:3140]    Alert and oriented.  Moves all extremities equally.  Reflexes symmetrical.     Incision: CDI      Medications     - MEDICATION INSTRUCTIONS -          acetaminophen  975 mg Oral Q8H     atorvastatin  10 mg Oral Daily     dexamethasone  4 mg Intravenous Q12H    Or     dexamethasone  4 mg Oral Q12H     fluticasone-vilanterol  1 puff Inhalation Daily     insulin aspart  1-6 Units Subcutaneous Q4H     levothyroxine  88 mcg Oral Daily     mirtazapine  7.5 mg Oral At Bedtime     pantoprazole  40 mg Oral QAM AC     polyethylene glycol  17 g Oral Daily     senna-docusate  1 tablet Oral BID     sertraline  25 mg Oral QAM     sodium chloride (PF)  3 mL Intracatheter Q8H     sodium chloride (PF)  3 mL Intracatheter Q8H           Mook Barnett PA-C  [facility]  Neurosurgery  [facility]  [address]  [address]  [place] [address]    Tel [phone]

## 2023-04-02 NOTE — PROGRESS NOTES
[facility]    Medicine Progress Note - Hospitalist Service    Date of Admission:  3/23/2023    Assessment & Plan        Patient is a 73-year-old female with a history of COPD on home oxygen, depression, anxiety, hyperlipidemia, hypothyroidism, B12 deficiency, melanoma, osteoporosis, cerebral meningioma presented to [facility] on 3/19/2023 with right hip pain after a fall    Patient had surgical repair for right femoral neck fracture on 3/20 with postoperative complication including worsening respiratory failure, worsening encephalopathy, lactic acidosis and hypotension.  She also had worsening of her cerebral meningioma which would require surgical resection.  As per the discharging hospitalist at [facility] patient had discussion with the patient's family and she has been having worsening functional status with increasing, confusion and coordination issues including because of a fall at home.  Neurosurgery Dr. Murphy was contacted on 3/23 who recommended transfer to [facility] for possible resection of meningioma.      Patient has been admitted to the ICU at [facility] on 3/23//23 due to acute on chronic respiratory failure secondary to advanced COPD/emphysema requiring BiPAP support.  She was initially on BiPAP and had episodes of desaturation on 3/23 on high flow nasal cannula and had to be placed back on BiPAP with recovery.  She had desaturations to 84% while doing oral care on 3/26.  Respiratory status improved on 3/29/2023 was on nasal cannula at 2 L of oxygen.  Patient underwent craniotomy for resection of meningioma on 3/30/2023.  Patient returned from the OR intubated and sedated on propofol. Extubated and recovering now     Encephalopathy. Multifactorial see below  Cerebral Meningioma status postcraniotomy by neurosurgery on 3/30/2023.    Acute toxic metabolic encephalopathy likely from post-op status, steroids, pain meds, ?infection Pt was diagnosed  9/2019 with a cerebral meningioma and monitored.  Given progression of the tumor, patient underwent 15 sessions of radiation 11/2022.  She is followed by Dr. Godoy, a Radiation Oncologist through [facility].  Pt was on oral steroids which were weaned off about 4 weeks ago.  Since that time, patient has had increased confusion, poor balance, changes in mood.  * Recent MRI Brain 3/17/23 revealed enlargement of meningioma up to 45 mm with a significant mass effect and the midline shift right to left.  Patient was instructed by her PCP to follow up with outpatient Neurology and Radiation Oncology this week.  * CT head on admission with no acute hemorrhage, but notes the known mass surrounding vasogenic edema predominantly involving the right frontal lobe with right to left midline shift/subfalcine herniation measuring up to 1.1 cm.  ? Discussion with Dr. Godoy from radiation oncology on 3/21 and she discussed with Dr. Murphy.  She agrees with surgery at this point given advancement of tumor.   ? Per neurosurgery- given her functional decline with confusion at home in the weeks prior to her presentation, principal concern is for meningioma as a large contributor to her poor functional status and confusion.    ?      IV Solu-Medrol  switch to Decadron IV  to help with the meningioma  Patient underwent craniotomy and resection of the meningioma on 3/30/2023 by Dr. Murphy with neurosurgery  Getting dexamethasone per neurosurgery team  Postop management per neurosurgery team  Patient okay to transfer to  as per neurosurgery team.  Transfer orders placed    PT OT orders placed      #Acute on chronic hypoxic and hypercapnic respiratory failure Secondary to PNeumonia community acquired, Underlying COPD, Fluid overload from POst op state     Patient is a former smoker of 50 years.  She has advanced COPD on 2 to 3 L of oxygen at home for past 5 years..  Patient had increased work of breathing and hypercarbia requiring BiPAP  therapy.  Patient has been transferred to ICU on 3/22 for closer monitoring    -Patient finished course of azithromycin.  Last day on 3/27/2023  -Continue ceftriaxone 7/7 day course today   -Continue DuoNebs.  -Continue BiPAP as needed  - Patient is weaned off of high flow nasal cannula and currently on 4 L of oxygen by nasal cannula  Wean O2 as tolerated     Received total of 60 mg of Lasix IV on 3/28/2023 to help with diuresis which seemed to improve her respiratory status  She completed course of ceftriaxone so discontinued on 3/29/2023 IV Solu-Medrol switched to Decadron IV dosing per neurosurgery  Patient underwent craniotomy and resection of meningioma on 3/30/2023  Postop management per neurosurgery      #Dysphagia  Speech therapy saw the patient   -Continue aspiration precautions  Patient is currently tolerating regular diet postoperatively      #Displaced right femoral neck fracture s/p right hip hemiarthroplasty  mechanical fall.  Xray revealed a displaced right femoral neck fracture and a subtle irregularity of the parasymphyseal aspect of the left pubic bone, potentially representing an age indeterminant fracture.   -Orthopedic surgery consult appreciated-continue as needed pain meds   Lovenox discontinued due to craniotomy on 3/29/2023  -As needed Haldol for agitation  -Roman catheter placed on 3/24 for concern of urinary retention.  Continue for now      #Suspected malnutrition  -SLP has been consulted and had diet advanced to puréed with thin liquids.  -Will also consult nutrition      #Lactic acidosis  Resolved with IV fluids         Acute on Chronic Anemia  ? Preop hemoglobin 10.5 g/dL  ? Hemoglobin 3/26 7.8 g/dL  Follow hemoglobin, consider transfusion for hemoglobin less than 8 g/dL due to ongoing respiratory failure, desire to optimize if at all possible if planning for surgery  -Prbc given on 3/26/23  Hb 9.8 on 3/27/23  Hemoglobin stable at 11.5 -10.4 on 4/1/2023     HLD - resumed  Atorvastatin when able to take p.o.  Hypothyroidism   On p.o. levothyroxine  Depression/Anxiety -hold p.o. low-dose sertraline and mirtazapine until able to reliably take       # Hypernatremia  Resolved          #Goals of care DNR/DNI.   .  Appreciate palliative care's help. Goals of care remain restorative at this point              Diet: Room Service  Advance Diet as Tolerated: Regular Diet Adult    DVT Prophylaxis: Enoxaparin (Lovenox) SQ  Roman Catheter: PRESENT, indication: Strict 1-2 Hour I&O  Lines: None     Cardiac Monitoring: None  Code Status: No CPR- Do NOT Intubate      Clinically Significant Risk Factors                                Disposition Plan     Expected Discharge Date: 04/03/2023                Nicky Ballard MD  Hospitalist Service  [facility]  Securely message with [employer] (more info)  Text page via [employer] Paging/Directory   ______________________________________________________________________    Interval History     Patient seen and examined at bedside.  Resting comfortably in bed.  Denies any pain currently.  No nausea vomiting.  Tolerating diet.    Waiting to transfer to  when bed is available.  Patient to work with PT today.  No acute events in the last 24 hours as per bedside RN    Physical Exam   Vital Signs: Temp: 98.1  F (36.7  C) Temp src: Axillary BP: 123/69 Pulse: 71   Resp: 19 SpO2: 96 % O2 Device: Nasal cannula Oxygen Delivery: 3 LPM  Weight: 153 lbs 7.04 oz  Physical Exam  HENT:      Head: Normocephalic and atraumatic.   Cardiovascular:      Rate and Rhythm: Normal rate.      Heart sounds: Normal heart sounds.   Pulmonary:      Effort: Pulmonary effort is normal. No respiratory distress.      Breath sounds: Normal breath sounds.   Abdominal:      General: There is no distension.      Palpations: Abdomen is soft.      Tenderness: There is no abdominal tenderness.   Musculoskeletal:      Right lower leg: Edema present.      Left lower leg: Edema  present.     Trace edema of the bilateral lower extremities present    Medical Decision Making       Data     I have personally reviewed the following data over the past 24 hrs:    13.9 (H)  \   9.8 (L)   / 225     138 98 18.8 /  122 (H)   4.2 35 (H) 0.32 (L) \

## 2023-04-02 NOTE — PROGRESS NOTES
Resumed care [id number]     Neuro: Remains unchanged throughout shift. PERRL. Follows commands. UE>LE weakness. Oriented to person, place and situation.   CV: Remains sinus rhythm. VSS. SBP remains at goal less than 150. Edematous. Pulses 2/2/1.   Respiratory: Diminished. Intermittent labored breathing.  O2 needs remain at 4L during shift.   GI/: No bowel movement throughout shift. Roman remains in place. UOP adequate.   Skin: Unchanged throughout shift.  Activity: Q2 hour turns       Plan: Tx orders in place.

## 2023-04-03 ENCOUNTER — APPOINTMENT (OUTPATIENT)
Dept: OCCUPATIONAL THERAPY | Facility: CLINIC | Age: 73
DRG: 025 | End: 2023-04-03
Attending: INTERNAL MEDICINE
Payer: COMMERCIAL

## 2023-04-03 ENCOUNTER — APPOINTMENT (OUTPATIENT)
Dept: GENERAL RADIOLOGY | Facility: CLINIC | Age: 73
DRG: 025 | End: 2023-04-03
Attending: PHYSICIAN ASSISTANT
Payer: COMMERCIAL

## 2023-04-03 ENCOUNTER — APPOINTMENT (OUTPATIENT)
Dept: PHYSICAL THERAPY | Facility: CLINIC | Age: 73
DRG: 025 | End: 2023-04-03
Attending: INTERNAL MEDICINE
Payer: COMMERCIAL

## 2023-04-03 LAB
ANION GAP SERPL CALCULATED.3IONS-SCNC: 4 MMOL/L (ref 7–15)
BUN SERPL-MCNC: 14.7 MG/DL (ref 8–23)
CALCIUM SERPL-MCNC: 8.6 MG/DL (ref 8.8–10.2)
CHLORIDE SERPL-SCNC: 98 MMOL/L (ref 98–107)
CREAT SERPL-MCNC: 0.46 MG/DL (ref 0.51–0.95)
DEPRECATED HCO3 PLAS-SCNC: 35 MMOL/L (ref 22–29)
ERYTHROCYTE [DISTWIDTH] IN BLOOD BY AUTOMATED COUNT: 14.1 % (ref 10–15)
GFR SERPL CREATININE-BSD FRML MDRD: >90 ML/MIN/1.73M2
GLUCOSE BLDC GLUCOMTR-MCNC: 103 MG/DL (ref 70–99)
GLUCOSE BLDC GLUCOMTR-MCNC: 120 MG/DL (ref 70–99)
GLUCOSE BLDC GLUCOMTR-MCNC: 128 MG/DL (ref 70–99)
GLUCOSE SERPL-MCNC: 96 MG/DL (ref 70–99)
HCT VFR BLD AUTO: 33.9 % (ref 35–47)
HGB BLD-MCNC: 10.7 G/DL (ref 11.7–15.7)
MAGNESIUM SERPL-MCNC: 2 MG/DL (ref 1.7–2.3)
MCH RBC QN AUTO: 29.6 PG (ref 26.5–33)
MCHC RBC AUTO-ENTMCNC: 31.6 G/DL (ref 31.5–36.5)
MCV RBC AUTO: 94 FL (ref 78–100)
PHOSPHATE SERPL-MCNC: 3.8 MG/DL (ref 2.5–4.5)
PLATELET # BLD AUTO: 262 10E3/UL (ref 150–450)
POTASSIUM SERPL-SCNC: 4.4 MMOL/L (ref 3.4–5.3)
RBC # BLD AUTO: 3.62 10E6/UL (ref 3.8–5.2)
SODIUM SERPL-SCNC: 137 MMOL/L (ref 136–145)
WBC # BLD AUTO: 12.3 10E3/UL (ref 4–11)

## 2023-04-03 PROCEDURE — 83735 ASSAY OF MAGNESIUM: CPT | Performed by: INTERNAL MEDICINE

## 2023-04-03 PROCEDURE — 250N000013 HC RX MED GY IP 250 OP 250 PS 637: Performed by: INTERNAL MEDICINE

## 2023-04-03 PROCEDURE — 84100 ASSAY OF PHOSPHORUS: CPT | Performed by: INTERNAL MEDICINE

## 2023-04-03 PROCEDURE — 80048 BASIC METABOLIC PNL TOTAL CA: CPT | Performed by: INTERNAL MEDICINE

## 2023-04-03 PROCEDURE — 250N000012 HC RX MED GY IP 250 OP 636 PS 637: Performed by: INTERNAL MEDICINE

## 2023-04-03 PROCEDURE — 36415 COLL VENOUS BLD VENIPUNCTURE: CPT | Performed by: INTERNAL MEDICINE

## 2023-04-03 PROCEDURE — 250N000012 HC RX MED GY IP 250 OP 636 PS 637: Performed by: PHYSICIAN ASSISTANT

## 2023-04-03 PROCEDURE — 97530 THERAPEUTIC ACTIVITIES: CPT | Mod: GP | Performed by: PHYSICAL THERAPIST

## 2023-04-03 PROCEDURE — 999N000065 XR PELVIS AND HIP RIGHT 1 VIEW

## 2023-04-03 PROCEDURE — 85027 COMPLETE CBC AUTOMATED: CPT | Performed by: INTERNAL MEDICINE

## 2023-04-03 PROCEDURE — 97530 THERAPEUTIC ACTIVITIES: CPT | Mod: GO | Performed by: OCCUPATIONAL THERAPIST

## 2023-04-03 PROCEDURE — 120N000001 HC R&B MED SURG/OB

## 2023-04-03 PROCEDURE — 99232 SBSQ HOSP IP/OBS MODERATE 35: CPT | Performed by: INTERNAL MEDICINE

## 2023-04-03 PROCEDURE — 250N000011 HC RX IP 250 OP 636: Performed by: INTERNAL MEDICINE

## 2023-04-03 PROCEDURE — 97535 SELF CARE MNGMENT TRAINING: CPT | Mod: GO | Performed by: OCCUPATIONAL THERAPIST

## 2023-04-03 RX ORDER — AMOXICILLIN 250 MG
1 CAPSULE ORAL 2 TIMES DAILY
Qty: 21 TABLET | Refills: 0 | Status: ON HOLD | DISCHARGE
Start: 2023-04-03 | End: 2023-04-27

## 2023-04-03 RX ORDER — DEXAMETHASONE 2 MG/1
2 TABLET ORAL DAILY
Status: DISCONTINUED | OUTPATIENT
Start: 2023-04-09 | End: 2023-04-07 | Stop reason: HOSPADM

## 2023-04-03 RX ORDER — FUROSEMIDE 10 MG/ML
20 INJECTION INTRAMUSCULAR; INTRAVENOUS ONCE
Status: COMPLETED | OUTPATIENT
Start: 2023-04-03 | End: 2023-04-03

## 2023-04-03 RX ORDER — DEXAMETHASONE 2 MG/1
4 TABLET ORAL EVERY 12 HOURS SCHEDULED
Status: COMPLETED | OUTPATIENT
Start: 2023-04-03 | End: 2023-04-06

## 2023-04-03 RX ORDER — DEXAMETHASONE 1 MG
1 TABLET ORAL DAILY
Status: DISCONTINUED | OUTPATIENT
Start: 2023-04-12 | End: 2023-04-07 | Stop reason: HOSPADM

## 2023-04-03 RX ORDER — CYANOCOBALAMIN 1000 UG/ML
1000 INJECTION, SOLUTION INTRAMUSCULAR; SUBCUTANEOUS ONCE
Status: COMPLETED | OUTPATIENT
Start: 2023-04-03 | End: 2023-04-03

## 2023-04-03 RX ORDER — ACETAMINOPHEN 325 MG/1
975 TABLET ORAL EVERY 8 HOURS PRN
Qty: 100 TABLET | Refills: 0 | Status: ON HOLD | DISCHARGE
Start: 2023-04-03 | End: 2023-04-27

## 2023-04-03 RX ORDER — ENOXAPARIN SODIUM 100 MG/ML
40 INJECTION SUBCUTANEOUS EVERY 24 HOURS
Status: DISCONTINUED | OUTPATIENT
Start: 2023-04-03 | End: 2023-04-07 | Stop reason: HOSPADM

## 2023-04-03 RX ORDER — DEXAMETHASONE 2 MG/1
4 TABLET ORAL DAILY
Status: DISCONTINUED | OUTPATIENT
Start: 2023-04-06 | End: 2023-04-07 | Stop reason: HOSPADM

## 2023-04-03 RX ADMIN — DEXAMETHASONE 4 MG: 2 TABLET ORAL at 09:14

## 2023-04-03 RX ADMIN — FUROSEMIDE 20 MG: 10 INJECTION, SOLUTION INTRAMUSCULAR; INTRAVENOUS at 13:00

## 2023-04-03 RX ADMIN — FLUTICASONE FUROATE AND VILANTEROL TRIFENATATE 1 PUFF: 200; 25 POWDER RESPIRATORY (INHALATION) at 16:18

## 2023-04-03 RX ADMIN — DEXAMETHASONE 4 MG: 2 TABLET ORAL at 20:28

## 2023-04-03 RX ADMIN — PANTOPRAZOLE SODIUM 40 MG: 40 TABLET, DELAYED RELEASE ORAL at 09:14

## 2023-04-03 RX ADMIN — ACETAMINOPHEN 975 MG: 325 TABLET, FILM COATED ORAL at 22:06

## 2023-04-03 RX ADMIN — FUROSEMIDE 20 MG: 10 INJECTION, SOLUTION INTRAMUSCULAR; INTRAVENOUS at 09:14

## 2023-04-03 RX ADMIN — ACETAMINOPHEN 975 MG: 325 TABLET, FILM COATED ORAL at 05:01

## 2023-04-03 RX ADMIN — SERTRALINE HYDROCHLORIDE 25 MG: 25 TABLET ORAL at 09:14

## 2023-04-03 RX ADMIN — ENOXAPARIN SODIUM 40 MG: 40 INJECTION SUBCUTANEOUS at 16:22

## 2023-04-03 RX ADMIN — ACETAMINOPHEN 975 MG: 325 TABLET, FILM COATED ORAL at 13:00

## 2023-04-03 RX ADMIN — MIRTAZAPINE 7.5 MG: 7.5 TABLET, FILM COATED ORAL at 20:28

## 2023-04-03 RX ADMIN — OXYCODONE HYDROCHLORIDE 5 MG: 5 TABLET ORAL at 00:09

## 2023-04-03 RX ADMIN — ATORVASTATIN CALCIUM 10 MG: 10 TABLET, FILM COATED ORAL at 09:14

## 2023-04-03 RX ADMIN — LEVOTHYROXINE SODIUM 88 MCG: 88 TABLET ORAL at 09:14

## 2023-04-03 RX ADMIN — CYANOCOBALAMIN 1000 MCG: 1000 INJECTION, SOLUTION INTRAMUSCULAR at 16:19

## 2023-04-03 RX ADMIN — SENNOSIDES AND DOCUSATE SODIUM 1 TABLET: 50; 8.6 TABLET ORAL at 20:28

## 2023-04-03 RX ADMIN — SENNOSIDES AND DOCUSATE SODIUM 1 TABLET: 50; 8.6 TABLET ORAL at 09:14

## 2023-04-03 RX ADMIN — POLYETHYLENE GLYCOL 3350 17 G: 17 POWDER, FOR SOLUTION ORAL at 09:15

## 2023-04-03 ASSESSMENT — ACTIVITIES OF DAILY LIVING (ADL)
ADLS_ACUITY_SCORE: 49
ADLS_ACUITY_SCORE: 45
ADLS_ACUITY_SCORE: 49
ADLS_ACUITY_SCORE: 49
ADLS_ACUITY_SCORE: 45
ADLS_ACUITY_SCORE: 49
ADLS_ACUITY_SCORE: 45
ADLS_ACUITY_SCORE: 49
ADLS_ACUITY_SCORE: 45
ADLS_ACUITY_SCORE: 45
ADLS_ACUITY_SCORE: 49
ADLS_ACUITY_SCORE: 49

## 2023-04-03 NOTE — PROGRESS NOTES
Neurosurgery progress note    Postoperative day 4 status post right bifrontal Stealth resection of meningioma.    Patient doing well this morning, alert following commands and oriented moving all extremities.    Exam    Alert and oriented no acute distress  Moving all extremities  Finger-nose slow and accurate  Negative pronator drift  Extraocular movements intact  Pupils equal and reactive      Plan     -Routine wound care  -PT/  OT  -Pain management as needed  -Activity as tolerated  -Appreciate assistance from other services  -Continue decadron, will begin taper to off over 2 weeks  -Okay to begin prophylactic Lovenox at this time  -Okay to discharge from neurosurgery perspective when medically ready, follow-up in 2 weeks for incision check

## 2023-04-03 NOTE — PROGRESS NOTES
Resumed care [id number]  Transferred to Neuroscience at 2300.     Neuro: Remains unchanged throughout shift. PERRL. Follows commands. UE>LE weakness. Oriented to person, place and situation.   CV: Remains sinus rhythm. VSS. SBP remains at goal less than 150. Edematous. Pulses 2/2/1.   Respiratory: Diminished. Intermittent labored breathing.  O2 needs remain at 4L during shift.   GI/: No bowel movement throughout shift. Roman remains in place. UOP adequate.   Skin: Unchanged throughout shift.  Activity: Q2 hour turns        Plan: Comfort. Pt/OT

## 2023-04-03 NOTE — CONSULTS
"Care Management Initial Consult    General Information  Assessment completed with: Patient, Josephine & her sister Alise  Type of CM/SW Visit: Initial Assessment    Primary Care Provider verified and updated as needed: Yes (Dr. Divina Ferguson in [place])   Readmission within the last 30 days: current reason for admission unrelated to previous admission   Return Category: New Diagnosis (worsening of known meningioma plus respiratory failure)  Reason for Consult: discharge planning  Advance Care Planning:    no ACP documents on file in EPIC;     Communication Assessment  Patient's communication style:         Cognitive  Cognitive/Neuro/Behavioral: WDL  Level of Consciousness: alert  Arousal Level: opens eyes spontaneously  Orientation: disoriented to, place, situation  Mood/Behavior: cooperative  Best Language: 1 - Mild to moderate  Speech: slow, spontaneous    Living Environment:   People in home: child(tristen), adult  Daljit  Current living Arrangements: house      Able to return to prior arrangements: yes  Living Arrangement Comments: 2 steps to enter the back door, then 1 level living / rambler style home    Family/Social Support:  Care provided by: self, child(tristen)  Provides care for: no one  Marital Status: Single  Children, Sibling(s) (names daughter Jordan, sisters Alise and Bertha, niece Kiley, nephews JR Betty, and Pascual)          Description of Support System: Supportive, Involved    Support Assessment: Adequate family and caregiver support    Current Resources:   Patient receiving home care services: No     Community Resources: None  Equipment currently used at home: none  Supplies currently used at home: Oxygen Tubing/Supplies ([facility]    Employment/Financial:  Employment Status: retired     Employment/ Comments: has [employer] benefits from [facility] [employer]  Financial Concerns: No concerns identified      Finance Comments: active UCARE / ARE MEDICARE    Lifestyle & Psychosocial " Needs:  Social Determinants of Health     Tobacco Use: Medium Risk (3/30/2023)    Patient History      Smoking Tobacco Use: Former      Smokeless Tobacco Use: Never      Passive Exposure: Not on file   Alcohol Use: Not on file   Financial Resource Strain: Not on file   Food Insecurity: Not on file   Transportation Needs: Not on file   Physical Activity: Not on file   Stress: Not on file   Social Connections: Not on file   Intimate Partner Violence: Not on file   Depression: Not on file   Housing Stability: Not on file       Functional Status:  Prior to admission patient needed assistance:  Pt states that she has been impendent at home until recently. She is limited in what she is able to to with her COPD diagnosis. She has home O2.      Assesssment of Functional Status: Not at  functional baseline    Mental Health Status:  Mental Health Status: No Current Concerns       Chemical Dependency Status:  Chemical Dependency Status: No Current Concerns           Values/Beliefs:  Spiritual, Cultural Beliefs, Adventism Practices, Values that affect care: no               Additional Information:  Pt indicates that her focus is restorative and hopes to discharge home where she lives with her daughter. She states that she is willing  To use walker at home if needed but does not currently use one.  Pt is in ageement with ARU placement and indicates that while her daughter is at work, she is able to have some flexibility in her schedule. Pt states that she also has three cousins that live in the area that are able to support her as needed at home. Pt has had a complicated hospital course and states that her cousins helped before and are willing to help again  Pt in agreement with recommendations for ARU     ARTURO Pugh  [facility]  Care Transitions  [phone]

## 2023-04-03 NOTE — PLAN OF CARE
Problem: Plan of Care - These are the overarching goals to be used throughout the patient stay.    Goal: Optimal Comfort and Wellbeing  Intervention: Monitor Pain and Promote Comfort  Recent Flowsheet Documentation  Taken 4/3/2023 1155 by Caity Rachel RN  Pain Management Interventions: repositioned     Orientations: alert and orientated x 4; patient hard of hearing.   Vitals/Pain: /70 (BP Location: Left arm)   Pulse 65   Temp 97.7  F (36.5  C) (Axillary)   Resp 18   Wt 69.6 kg (153 lb 7 oz)   SpO2 97%   BMI 28.06 kg/m    Tele: heart rate regular   Resp: Diminished lungs sound; improved lungs sounds post IV  lasix. Patient back to PTA oxygen at 2L NC.  Lines/Drains: Baldwin and PIV; baldwin to come out after 1400 lasix dosage. Baldwin out at 1800; due to void in 4-6 hours.   Skin/Wounds: R hip incision open to air; craniotomy site intact.   GI/: continent of bowel; patient had small loose stool this shift.   Labs: Abnormal/Trends, Electrolyte Replacement- no replacement ordered. POC checks discontinued   Ambulation/Assist: EX stand used this morning to move patient from bed to chair  Plan: Pending rehab placement     Caity Rachel, RN

## 2023-04-03 NOTE — PROGRESS NOTES
SPIRITUAL HEALTH SERVICES  SPIRITUAL ASSESSMENT Progress Note (Palliative Focus)  [facility]    REFERRAL SOURCE: Follow-up care    Brief, supportive visit with Josephine. Provided her with a prayer blanket and shared words of encouragement and comfort. She expressed no other needs at this time.    PLAN: Will continue to follow for spiritual support while Palliative Care is consulted. Please contact Spiritual Health should immediate needs arise.    Dominga Salinas  Associate   Palliative Care  [facility] Phone Line: [phone]

## 2023-04-03 NOTE — PROGRESS NOTES
Orthopedic Surgery  Josephine Nicole  04/03/2023     Admit Date:  3/23/2023    POD:   14 days s/p right hip hemiarthroplasty for a femoral neck fracture    4 Days Post-Op   Procedure(s):  Bifrontal stealth craniotomy and resection of meningioma    Patient resting comfortably in bed.    Sister at bedside.  Patient recently s/p bifrontal craniotomy and resection of meningioma.   Regarding her right hip, pain is seemingly well-controlled.  Patient has not been able to mobilize yet due to neurosurgical management of brain tumor.  Tolerating oral intake.    No vomiting.  No acute events overnight.    Temp:  [97.6  F (36.4  C)-98.4  F (36.9  C)] 97.7  F (36.5  C)  Pulse:  [63-80] 65  Resp:  [14-24] 18  BP: (101-134)/(55-78) 134/70  SpO2:  [95 %-100 %] 99 %    Alert and oriented.   Right hip dressing is clean with scant dried saturation and peeling proximally.  Removed dressing. Surgical site is clean, dry, and intact with overlying Dermabond.   No erythema of the surrounding skin.   Mild swelling of right hip and thigh.  Bilateral calves are soft, non-tender.  Right lower extremity is NVI.  Sensation intact bilateral lower extremities.  Patient able to resist dorsi and plantar flexion bilaterally.  DP pulse palpable.    Labs:  Recent Labs   Lab Test 04/03/23  0923 04/02/23  0458 04/01/23  0714   WBC 12.3* 13.9* 16.8*   HGB 10.7* 9.8* 10.4*    225 223     Recent Labs   Lab Test 03/19/23  0518   INR 0.96     A/P    1. S/p right hemiarthroplasty for a femoral neck fracture   Defer VTE pharmacoprophylaxis to primary team and neurosurgery given recent craniotomy. Would recommend either Lovenox or ASA as soon as able. Continue with SCDs.   Will obtain routine postoperative x-rays. These were ordered today and we will follow peripherally for results.   Mobilize with PT/OT.    Okay to WBAT RLE with walker when cleared by neurosurgery to mobilize.   Continue current pain regimen.   Dressings: Surgical dressing removed today.  May keep site open to air. Please allow remaining Dermabond to slough off naturally (no picking at site). Okay to shower, but no soaking/submersion x 1 more week.    Follow-up at 6 weeks postop (~5/1/23) with Dr. Shun Cuevas for repeat x-rays and reevaluation.    2. Disposition   Anticipate d/c to TCU when medically cleared and progressing in PT.    Guerline Ac PA-C  [facility]

## 2023-04-03 NOTE — PROGRESS NOTES
[facility]    Medicine Progress Note - Hospitalist Service    Date of Admission:  3/23/2023    Assessment & Plan        Patient is a 73-year-old female with a history of COPD on home oxygen, depression, anxiety, hyperlipidemia, hypothyroidism, B12 deficiency, melanoma, osteoporosis, cerebral meningioma presented to [facility] on 3/19/2023 with right hip pain after a fall    Patient had surgical repair for right femoral neck fracture on 3/20 with postoperative complication including worsening respiratory failure, worsening encephalopathy, lactic acidosis and hypotension.  She also had worsening of her cerebral meningioma which would require surgical resection.  As per the discharging hospitalist at [facility] patient had discussion with the patient's family and she has been having worsening functional status with increasing, confusion and coordination issues including because of a fall at home.  Neurosurgery Dr. Murphy was contacted on 3/23 who recommended transfer to [facility] for possible resection of meningioma.      Patient has been admitted to the ICU at [facility] on 3/23//23 due to acute on chronic respiratory failure secondary to advanced COPD/emphysema requiring BiPAP support.  She was initially on BiPAP and had episodes of desaturation on 3/23 on high flow nasal cannula and had to be placed back on BiPAP with recovery.  She had desaturations to 84% while doing oral care on 3/26.  Respiratory status improved on 3/29/2023 was on nasal cannula at 2 L of oxygen.  Patient underwent craniotomy for resection of meningioma on 3/30/2023.  Patient returned from the OR intubated and sedated on propofol. Extubated and recovering now     Encephalopathy. Multifactorial see below  Cerebral Meningioma status postcraniotomy by neurosurgery on 3/30/2023.    Acute toxic metabolic encephalopathy likely from post-op status, steroids, pain meds, ?infection Pt was diagnosed  9/2019 with a cerebral meningioma and monitored.  Given progression of the tumor, patient underwent 15 sessions of radiation 11/2022.  She is followed by Dr. Godoy, a Radiation Oncologist through [facility].  Pt was on oral steroids which were weaned off about 4 weeks ago.  Since that time, patient has had increased confusion, poor balance, changes in mood.  * Recent MRI Brain 3/17/23 revealed enlargement of meningioma up to 45 mm with a significant mass effect and the midline shift right to left.  Patient was instructed by her PCP to follow up with outpatient Neurology and Radiation Oncology this week.  * CT head on admission with no acute hemorrhage, but notes the known mass surrounding vasogenic edema predominantly involving the right frontal lobe with right to left midline shift/subfalcine herniation measuring up to 1.1 cm.  ? Discussion with Dr. Godoy from radiation oncology on 3/21 and she discussed with Dr. Murphy.  She agrees with surgery at this point given advancement of tumor.   ? Per neurosurgery- given her functional decline with confusion at home in the weeks prior to her presentation, principal concern is for meningioma as a large contributor to her poor functional status and confusion.    ?      IV Solu-Medrol  switch to Decadron IV  to help with the meningioma  Patient underwent craniotomy and resection of the meningioma on 3/30/2023 by Dr. Murphy with neurosurgery  Getting dexamethasone per neurosurgery team  Postop management per neurosurgery team    Discussed with neurosurgery PA over the phone regarding timing of restarting DVT prophylaxis with her recent hip surgery.  He is going to talk to the neurosurgery attending and decide on the prophylaxis    #Acute on chronic hypoxic and hypercapnic respiratory failure Secondary to PNeumonia community acquired, Underlying COPD, Fluid overload from POst op state     Patient is a former smoker of 50 years.  She has advanced COPD on 2 to 3 L of oxygen  at home for past 5 years..  Patient had increased work of breathing and hypercarbia requiring BiPAP therapy.  Patient has been transferred to ICU on 3/22 for closer monitoring    -Patient finished course of azithromycin.  Last day on 3/27/2023  -Continue ceftriaxone 7/7 day course today   -Continue DuoNebs.  -Continue BiPAP as needed  - Patient is weaned off of high flow nasal cannula and currently on 4 L of oxygen by nasal cannula  Wean O2 as tolerated     Received total of 60 mg of Lasix IV on 3/28/2023 to help with diuresis which seemed to improve her respiratory status  She completed course of ceftriaxone so discontinued on 3/29/2023 IV Solu-Medrol switched to Decadron IV dosing per neurosurgery  Patient underwent craniotomy and resection of meningioma on 3/30/2023  Postop management per neurosurgery    Lasix 20 mg IV 2 doses to help with diuresis and to help with the lower extremity edema  Patient with good urine output with the Lasix      #Dysphagia  Speech therapy saw the patient   -Continue aspiration precautions  Patient is currently tolerating regular diet postoperatively      #Displaced right femoral neck fracture s/p right hip hemiarthroplasty  mechanical fall.  Xray revealed a displaced right femoral neck fracture and a subtle irregularity of the parasymphyseal aspect of the left pubic bone, potentially representing an age indeterminant fracture.   -Orthopedic surgery consult appreciated-continue as needed pain meds   Lovenox discontinued due to craniotomy on 3/29/2023  -As needed Haldol for agitation  -Roman catheter placed on 3/24 for concern of urinary retention.  Roman removal after diuresis today for voiding trial  Restarting of Lovenox versus aspirin for DVT prophylaxis as per neurosurgery    #Suspected malnutrition  -SLP has been consulted and had diet advanced to puréed with thin liquids.  -Will also consult nutrition      #Lactic acidosis  Resolved with IV fluids         Acute on Chronic  Anemia  ? Preop hemoglobin 10.5 g/dL  ? Hemoglobin 3/26 7.8 g/dL  Follow hemoglobin, consider transfusion for hemoglobin less than 8 g/dL due to ongoing respiratory failure, desire to optimize if at all possible if planning for surgery  -Prbc given on 3/26/23  Hb 9.8 on 3/27/23  Hemoglobin stable at 11.5 -10.4 -9.8-10.7 on 4/3/2023     HLD - resumed Atorvastatin when able to take p.o.  Hypothyroidism   On p.o. levothyroxine  Depression/Anxiety restarted PTA sertraline and mirtazapine until able to reliably take       # Hypernatremia  Resolved          #Goals of care DNR/DNI.   .  Appreciate palliative care's help. Goals of care remain restorative at this point              Diet: Room Service  Advance Diet as Tolerated: Regular Diet Adult    DVT Prophylaxis: Enoxaparin (Lovenox) SQ  Roman Catheter: PRESENT, indication: Retention  Lines: None     Cardiac Monitoring: None  Code Status: No CPR- Do NOT Intubate      Clinically Significant Risk Factors                            Patient needs ARU on discharge.  Social work sending referrals    Disposition Plan      Expected Discharge Date: 04/04/2023                Nicky Ballard MD  Hospitalist Service  [facility]  Securely message with [employer] (more info)  Text page via [employer] Paging/Directory   ______________________________________________________________________    Interval History     Patient seen and examined at bedside.  Resting comfortably in bed.  Denies any pain currently.  No nausea vomiting.  Tolerating diet.        No acute events in the last 24 hours as per bedside RN    Physical Exam   Vital Signs: Temp: 97.7  F (36.5  C) Temp src: Axillary BP: 134/70 Pulse: 65   Resp: 18 SpO2: 97 % O2 Device: Nasal cannula Oxygen Delivery: 2 LPM  Weight: 153 lbs 7.04 oz  Physical Exam  HENT:      Head: Normocephalic and atraumatic.   Cardiovascular:      Rate and Rhythm: Normal rate.      Heart sounds: Normal heart sounds.   Pulmonary:      Effort:  Pulmonary effort is normal. No respiratory distress.      Breath sounds: Normal breath sounds.   Abdominal:      General: There is no distension.      Palpations: Abdomen is soft.      Tenderness: There is no abdominal tenderness.   Musculoskeletal:      Right lower leg: Edema present.      Left lower leg: Edema present.     Trace edema of the bilateral lower extremities present    Medical Decision Making       Data     I have personally reviewed the following data over the past 24 hrs:    12.3 (H)  \   10.7 (L)   / 262     137 98 14.7 /  96   4.4 35 (H) 0.46 (L) \

## 2023-04-04 ENCOUNTER — APPOINTMENT (OUTPATIENT)
Dept: CT IMAGING | Facility: CLINIC | Age: 73
DRG: 025 | End: 2023-04-04
Attending: PHYSICIAN ASSISTANT
Payer: COMMERCIAL

## 2023-04-04 LAB
ANION GAP SERPL CALCULATED.3IONS-SCNC: 7 MMOL/L (ref 7–15)
BUN SERPL-MCNC: 15.6 MG/DL (ref 8–23)
CALCIUM SERPL-MCNC: 8.5 MG/DL (ref 8.8–10.2)
CHLORIDE SERPL-SCNC: 97 MMOL/L (ref 98–107)
CREAT SERPL-MCNC: 0.48 MG/DL (ref 0.51–0.95)
DEPRECATED HCO3 PLAS-SCNC: 35 MMOL/L (ref 22–29)
ERYTHROCYTE [DISTWIDTH] IN BLOOD BY AUTOMATED COUNT: 14.3 % (ref 10–15)
GFR SERPL CREATININE-BSD FRML MDRD: >90 ML/MIN/1.73M2
GLUCOSE SERPL-MCNC: 107 MG/DL (ref 70–99)
HCT VFR BLD AUTO: 32.3 % (ref 35–47)
HGB BLD-MCNC: 10.4 G/DL (ref 11.7–15.7)
MCH RBC QN AUTO: 30.2 PG (ref 26.5–33)
MCHC RBC AUTO-ENTMCNC: 32.2 G/DL (ref 31.5–36.5)
MCV RBC AUTO: 94 FL (ref 78–100)
PLATELET # BLD AUTO: 221 10E3/UL (ref 150–450)
POTASSIUM SERPL-SCNC: 3.8 MMOL/L (ref 3.4–5.3)
RBC # BLD AUTO: 3.44 10E6/UL (ref 3.8–5.2)
SODIUM SERPL-SCNC: 139 MMOL/L (ref 136–145)
WBC # BLD AUTO: 11.8 10E3/UL (ref 4–11)

## 2023-04-04 PROCEDURE — 250N000013 HC RX MED GY IP 250 OP 250 PS 637: Performed by: INTERNAL MEDICINE

## 2023-04-04 PROCEDURE — 120N000001 HC R&B MED SURG/OB

## 2023-04-04 PROCEDURE — 250N000011 HC RX IP 250 OP 636: Performed by: INTERNAL MEDICINE

## 2023-04-04 PROCEDURE — 97110 THERAPEUTIC EXERCISES: CPT | Mod: GP | Performed by: PHYSICAL THERAPIST

## 2023-04-04 PROCEDURE — 36415 COLL VENOUS BLD VENIPUNCTURE: CPT | Performed by: INTERNAL MEDICINE

## 2023-04-04 PROCEDURE — 99231 SBSQ HOSP IP/OBS SF/LOW 25: CPT | Performed by: INTERNAL MEDICINE

## 2023-04-04 PROCEDURE — 250N000012 HC RX MED GY IP 250 OP 636 PS 637: Performed by: PHYSICIAN ASSISTANT

## 2023-04-04 PROCEDURE — 85027 COMPLETE CBC AUTOMATED: CPT | Performed by: INTERNAL MEDICINE

## 2023-04-04 PROCEDURE — 82310 ASSAY OF CALCIUM: CPT | Performed by: INTERNAL MEDICINE

## 2023-04-04 PROCEDURE — 250N000009 HC RX 250: Performed by: INTERNAL MEDICINE

## 2023-04-04 PROCEDURE — 70450 CT HEAD/BRAIN W/O DYE: CPT

## 2023-04-04 PROCEDURE — 97530 THERAPEUTIC ACTIVITIES: CPT | Mod: GP | Performed by: PHYSICAL THERAPIST

## 2023-04-04 RX ADMIN — ACETAMINOPHEN 975 MG: 325 TABLET, FILM COATED ORAL at 05:46

## 2023-04-04 RX ADMIN — PANTOPRAZOLE SODIUM 40 MG: 40 TABLET, DELAYED RELEASE ORAL at 08:42

## 2023-04-04 RX ADMIN — FLUTICASONE FUROATE AND VILANTEROL TRIFENATATE 1 PUFF: 200; 25 POWDER RESPIRATORY (INHALATION) at 16:29

## 2023-04-04 RX ADMIN — DEXAMETHASONE 4 MG: 2 TABLET ORAL at 20:15

## 2023-04-04 RX ADMIN — LEVOTHYROXINE SODIUM 88 MCG: 88 TABLET ORAL at 08:41

## 2023-04-04 RX ADMIN — ATORVASTATIN CALCIUM 10 MG: 10 TABLET, FILM COATED ORAL at 08:41

## 2023-04-04 RX ADMIN — ENOXAPARIN SODIUM 40 MG: 40 INJECTION SUBCUTANEOUS at 15:19

## 2023-04-04 RX ADMIN — ACETAMINOPHEN 975 MG: 325 TABLET, FILM COATED ORAL at 20:15

## 2023-04-04 RX ADMIN — DEXAMETHASONE 4 MG: 2 TABLET ORAL at 08:41

## 2023-04-04 RX ADMIN — IPRATROPIUM BROMIDE 0.5 MG: 0.5 SOLUTION RESPIRATORY (INHALATION) at 15:19

## 2023-04-04 RX ADMIN — MIRTAZAPINE 7.5 MG: 7.5 TABLET, FILM COATED ORAL at 20:15

## 2023-04-04 RX ADMIN — ACETAMINOPHEN 975 MG: 325 TABLET, FILM COATED ORAL at 14:17

## 2023-04-04 RX ADMIN — SERTRALINE HYDROCHLORIDE 25 MG: 25 TABLET ORAL at 08:42

## 2023-04-04 RX ADMIN — LEVALBUTEROL HYDROCHLORIDE 1.25 MG: 1.25 SOLUTION RESPIRATORY (INHALATION) at 15:19

## 2023-04-04 ASSESSMENT — ACTIVITIES OF DAILY LIVING (ADL)
ADLS_ACUITY_SCORE: 49
ADLS_ACUITY_SCORE: 43
ADLS_ACUITY_SCORE: 49
ADLS_ACUITY_SCORE: 49
ADLS_ACUITY_SCORE: 43
ADLS_ACUITY_SCORE: 49
ADLS_ACUITY_SCORE: 43
ADLS_ACUITY_SCORE: 43

## 2023-04-04 NOTE — PROGRESS NOTES
Palliative care signing off at this time as goals of care have been determined. We can be recalled if further needs are identified. During the hours of 8AM-4:30PM Monday-Friday, please call the team pager ([phone]) with questions, concerns. For urgent overnight or weekend issues, please call the answering service to reach the on call physician at [phone]. Thanks.

## 2023-04-04 NOTE — PROGRESS NOTES
[facility]    Medicine Progress Note - Hospitalist Service    Date of Admission:  3/23/2023    Assessment & Plan        Patient is a 73-year-old female with a history of COPD on home oxygen, depression, anxiety, hyperlipidemia, hypothyroidism, B12 deficiency, melanoma, osteoporosis, cerebral meningioma presented to [facility] on 3/19/2023 with right hip pain after a fall    Patient had surgical repair for right femoral neck fracture on 3/20 with postoperative complication including worsening respiratory failure, worsening encephalopathy, lactic acidosis and hypotension.  She also had worsening of her cerebral meningioma which would require surgical resection.  As per the discharging hospitalist at [facility] patient had discussion with the patient's family and she has been having worsening functional status with increasing, confusion and coordination issues including because of a fall at home.  Neurosurgery Dr. Murphy was contacted on 3/23 who recommended transfer to [facility] for possible resection of meningioma.      Patient has been admitted to the ICU at [facility] on 3/23//23 due to acute on chronic respiratory failure secondary to advanced COPD/emphysema requiring BiPAP support.  She was initially on BiPAP and had episodes of desaturation on 3/23 on high flow nasal cannula and had to be placed back on BiPAP with recovery.  She had desaturations to 84% while doing oral care on 3/26.  Respiratory status improved on 3/29/2023 was on nasal cannula at 2 L of oxygen.  Patient underwent craniotomy for resection of meningioma on 3/30/2023.  Patient returned from the OR intubated and sedated on propofol. Extubated and recovering now     Encephalopathy. Multifactorial see below  Cerebral Meningioma status postcraniotomy by neurosurgery on 3/30/2023.    Acute toxic metabolic encephalopathy likely from post-op status, steroids, pain meds, ?infection Pt was diagnosed  9/2019 with a cerebral meningioma and monitored.  Given progression of the tumor, patient underwent 15 sessions of radiation 11/2022.  She is followed by Dr. Godoy, a Radiation Oncologist through [facility].  Pt was on oral steroids which were weaned off about 4 weeks ago.  Since that time, patient has had increased confusion, poor balance, changes in mood.  * Recent MRI Brain 3/17/23 revealed enlargement of meningioma up to 45 mm with a significant mass effect and the midline shift right to left.  Patient was instructed by her PCP to follow up with outpatient Neurology and Radiation Oncology this week.  * CT head on admission with no acute hemorrhage, but notes the known mass surrounding vasogenic edema predominantly involving the right frontal lobe with right to left midline shift/subfalcine herniation measuring up to 1.1 cm.  ? Discussion with Dr. Godoy from radiation oncology on 3/21 and she discussed with Dr. Murphy.  She agrees with surgery at this point given advancement of tumor.   ? Per neurosurgery- given her functional decline with confusion at home in the weeks prior to her presentation, principal concern is for meningioma as a large contributor to her poor functional status and confusion.    ?      IV Solu-Medrol  switch to Decadron IV  to help with the meningioma  Patient underwent craniotomy and resection of the meningioma on 3/30/2023 by Dr. Murphy with neurosurgery  Getting dexamethasone per neurosurgery team.  Dexamethasone taper ordered per neurosurgery team  Postop management per neurosurgery team    Discussed with neurosurgery PA over the phone about DVT prophylaxis with recent hip surgery and they are okay with starting Lovenox subcu daily for DVT prophylaxis so it was  ordered on 4/3/23    #Acute on chronic hypoxic and hypercapnic respiratory failure Secondary to PNeumonia community acquired, Underlying COPD, Fluid overload from POst op state     Patient is a former smoker of 50 years.  She  has advanced COPD on 2 to 3 L of oxygen at home for past 5 years..  Patient had increased work of breathing and hypercarbia requiring BiPAP therapy.  Patient has been transferred to ICU on 3/22 for closer monitoring    -Patient finished course of azithromycin.  Last day on 3/27/2023  -Continue ceftriaxone 7/7 day course today   -Continue DuoNebs.  -Continue BiPAP as needed  - Patient is weaned off of high flow nasal cannula and currently on 4 L of oxygen by nasal cannula  Wean O2 as tolerated     Received total of 60 mg of Lasix IV on 3/28/2023 to help with diuresis which seemed to improve her respiratory status  She completed course of ceftriaxone so discontinued on 3/29/2023 IV Solu-Medrol switched to Decadron IV dosing per neurosurgery  Patient underwent craniotomy and resection of meningioma on 3/30/2023  Postop management per neurosurgery    Patient was diuresed with Lasix 40 mg IV on 4/3/2023 with good urine output.  Appears euvolemic today  Patient is chronically on 2 to 2-1/2 L of oxygen by nasal cannula currently on 2 L      #Dysphagia  Speech therapy saw the patient   -Continue aspiration precautions  Patient is currently tolerating regular diet postoperatively      #Displaced right femoral neck fracture s/p right hip hemiarthroplasty  mechanical fall.  Xray revealed a displaced right femoral neck fracture and a subtle irregularity of the parasymphyseal aspect of the left pubic bone, potentially representing an age indeterminant fracture.   -Orthopedic surgery consult appreciated-continue as needed pain meds   Lovenox discontinued due to craniotomy on 3/29/2023  -As needed Haldol for agitation  -Roman catheter placed on 3/24 for concern of urinary retention.  Roman removal after diuresis today for voiding trial  Restarted subcu Lovenox for DVT prophylaxis okay with neurosurgery on 4/3/2020        #Lactic acidosis  Resolved with IV fluids         Acute on Chronic Anemia  ? Preop hemoglobin 10.5  g/dL  ? Hemoglobin 3/26 7.8 g/dL  Follow hemoglobin, consider transfusion for hemoglobin less than 8 g/dL due to ongoing respiratory failure, desire to optimize if at all possible if planning for surgery  -Prbc given on 3/26/23  Hb 9.8 on 3/27/23  Hemoglobin stable at 11.5 -10.4 -9.8-10.7 on 4/3/2023     HLD - resumed Atorvastatin when able to take p.o.  Hypothyroidism   On p.o. levothyroxine  Depression/Anxiety restarted PTA sertraline and mirtazapine until able to reliably take       # Hypernatremia  Resolved          #Goals of care DNR/DNI.   .  Appreciate palliative care's help. Goals of care remain restorative at this point              Diet: Room Service  Advance Diet as Tolerated: Regular Diet Adult  Fluid restriction 2000 ML FLUID    DVT Prophylaxis: Enoxaparin (Lovenox) SQ  Roman Catheter: Not present  Lines: None     Cardiac Monitoring: None  Code Status: No CPR- Do NOT Intubate      Clinically Significant Risk Factors                            Patient needs ARU on discharge.  Social work sending referrals    Disposition Plan      Expected Discharge Date: 04/05/2023    Discharge Delays: *Medically Ready for Discharge  Placement - LTAC/ARU    Discharge Comments: ARU has no staff to admit pt today 4/4        Nicky Ballard MD  Hospitalist Service  [facility]  Securely message with [employer] (more info)  Text page via [employer] Paging/Directory   ______________________________________________________________________    Interval History     Patient seen and examined at bedside.  Resting comfortably in bed.  Denies any pain currently.  No nausea vomiting.  Tolerating diet.        No acute events in the last 24 hours as per bedside RN    Physical Exam   Vital Signs: Temp: 98  F (36.7  C) Temp src: Axillary BP: 127/64 Pulse: 69   Resp: 18 SpO2: 97 % O2 Device: Nasal cannula Oxygen Delivery: 2 LPM  Weight: 153 lbs 7.04 oz  Physical Exam  HENT:      Head: Normocephalic and atraumatic.    Cardiovascular:      Rate and Rhythm: Normal rate.      Heart sounds: Normal heart sounds.   Pulmonary:      Effort: Pulmonary effort is normal. No respiratory distress.      Breath sounds: Normal breath sounds.   Abdominal:      General: There is no distension.      Palpations: Abdomen is soft.      Tenderness: There is no abdominal tenderness.   Musculoskeletal:      Right lower leg: Edema present.      Left lower leg: Edema present.     Trace edema of the bilateral lower extremities present    Medical Decision Making       Data     I have personally reviewed the following data over the past 24 hrs:    11.8 (H)  \   10.4 (L)   / 221     139 97 (L) 15.6 /  107 (H)   3.8 35 (H) 0.48 (L) \

## 2023-04-04 NOTE — PROVIDER NOTIFICATION
Text page to Sabrina BEEBE    Pt seems to be struggling to hold her L eye lid open, did not see this before, pupils equal, all other neuros intact, pt is fatigued tho. Please advise, thank you!     STAT head CT ordered.

## 2023-04-04 NOTE — PROGRESS NOTES
[facility]    Neurosurgery Progress Note    Date of Service (when I saw the patient): 04/04/2023     Assessment & Plan     Procedure(s):  Bifrontal stealth craniotomy and resection of meningioma   -5 Days Post-Op  Doing well.  Just transferred from bed to chair with therapy, appears lower extremities are still weak with difficulty to fully support her weight for transfer- related to recent hip surgery.  Alert and following commands. Moving all extremities.     Plan:  Will obtain head CT as patient with new left eyelid weakness   -Continue frequent neuro checks  -Routine wound care  -Pain management as needed  -Activity as tolerated  -Appreciate assistance from other services  -Continue decadron taper as ordered   - lovenox 40mg daily   Case management to arrange discharge placement     Addendum:   Patient seen and evaluated with noted improvement of prior left eyelid weakness now with good strength no ptosis noted   Head CT reviewed personally   IMPRESSION:  1. Redemonstration of postoperative changes status post right  paramedian anterior frontal craniotomy for resection of the previously  demonstrated extra-axial right anterior parafalcine mass as described.  Small amount of hyperdense material marginating the resection cavity  particularly posteriorly may represent minimal postoperative blood  products and/or pre-existing calcification marginating the previously  demonstrated mass.  2. Similar degree of vasogenic edema in the right frontal lobe with  mild mass effect. No significant midline shift/herniation. No  ventricular entrapment/hydrocephalus.  3. Minimal hyperdensity along the left anterior superior aspect of the  falx cerebri may represent trace subdural blood products. No  associated mass effect.    Continue to monitor and notify of any neurological changes- weakness related to patient lethargy no longer present       Discussed imaging with SORAIDA Messina  [facility] Neurosurgery  [facility]  [address] [place] [address] [address]  [place] [address]  Tel [phone]      Interval History   Stable.    Physical Exam   Temp: 97.8  F (36.6  C) Temp src: Oral BP: 124/69 Pulse: 71   Resp: 16 SpO2: 90 % O2 Device: Nasal cannula Oxygen Delivery: 2 LPM  Vitals:    03/31/23 0600 04/01/23 0400 04/02/23 0400   Weight: 69.5 kg (153 lb 3.5 oz) 71.1 kg (156 lb 12 oz) 69.6 kg (153 lb 7 oz)     Vital Signs with Ranges  Temp:  [97.6  F (36.4  C)-98.8  F (37.1  C)] 97.8  F (36.6  C)  Pulse:  [66-75] 71  Resp:  [16-18] 16  BP: ()/(56-69) 124/69  SpO2:  [90 %-97 %] 90 %  I/O last 3 completed shifts:  In: 480 [P.O.:480]  Out: 3750 [Urine:3750]     , Blood pressure 124/69, pulse 71, temperature 97.8  F (36.6  C), temperature source Oral, resp. rate 16, weight 69.6 kg (153 lb 7 oz), SpO2 90 %, not currently breastfeeding.  153 lbs 7.04 oz  HEENT:  Normocephalic.  PERRLA.    Heart:  No peripheral edema  Lungs:  No SOB  Skin:  Warm and dry, good capillary refill. Incision CDI.  Extremities:  Good radial and dorsalis pedis pulses bilaterally, no edema, cyanosis or clubbing.    NEUROLOGICAL EXAMINATION:   Mental status:  Alert and following commands.  Motor:  WEN equally     Medications     - MEDICATION INSTRUCTIONS -         acetaminophen  975 mg Oral Q8H     atorvastatin  10 mg Oral Daily     dexamethasone  4 mg Oral Q12H [facility] (08/20)    Followed by     [START ON 4/6/2023] dexamethasone  4 mg Oral Daily    Followed by     [START ON 4/9/2023] dexamethasone  2 mg Oral Daily    Followed by     [START ON 4/12/2023] dexamethasone  1 mg Oral Daily     enoxaparin ANTICOAGULANT  40 mg Subcutaneous Q24H     fluticasone-vilanterol  1 puff Inhalation Daily     levothyroxine  88 mcg Oral Daily     mirtazapine  7.5 mg Oral At Bedtime     pantoprazole  40 mg Oral QAM AC     polyethylene glycol  17 g Oral Daily     senna-docusate  1 tablet Oral BID     sertraline  25 mg Oral QAM      sodium chloride (PF)  3 mL Intracatheter Q8H     sodium chloride (PF)  3 mL Intracatheter Q8H       Data     CBC RESULTS:   Recent Labs   Lab Test 03/31/23  1052   WBC 18.7*   RBC 3.94   HGB 11.8   HCT 37.6   MCV 95   MCH 29.9   MCHC 31.4*   RDW 14.7        Basic Metabolic Panel:  Lab Results   Component Value Date     03/31/2023      Lab Results   Component Value Date    POTASSIUM 4.2 03/31/2023     Lab Results   Component Value Date    CHLORIDE 98 03/31/2023     Lab Results   Component Value Date    CHAO 8.6 03/31/2023     Lab Results   Component Value Date    CO2 37 03/31/2023     Lab Results   Component Value Date    BUN 19.4 03/31/2023     Lab Results   Component Value Date    CR 0.54 03/31/2023     Lab Results   Component Value Date     03/31/2023    GLC 94 03/31/2023     INR:  Lab Results   Component Value Date    INR 0.96 03/19/2023

## 2023-04-04 NOTE — PROGRESS NOTES
Resumed care 2300-0730  Code Status: DNR/DNI  Neuro: Remains unchanged throughout shift. PERRL. Follows commands. UE>LE weakness, Max assist/lift. Oriented to person, place and situation.   CV: Remains sinus rhythm. VSS. SBP remains at goal less than 150. Edematous. Pulses 2/2/1.   Respiratory: Coarse LS occasional audible wheezing. Suction at bedside Productive cough.  O2 needs remain at 2L during shift.   GI/: No bowel movement throughout shift. Purwick in place, clear yellow urine. UOP adequate.  Diet: Regular, thin liquids.   Skin: Unchanged throughout shift.  Activity: Refused Q2 hour turns, turn once. PT/OT       Plan: TCU when medically stable.

## 2023-04-04 NOTE — PROGRESS NOTES
Orthopedic Chart Check Note    Routine 2 week postop radiographs of the right hip/pelvis obtained on 4/3/23 were reviewed. Radiology report as follows:    IMPRESSION: Postoperative changes right bipolar hip endoprosthesis. There are fractures of the right superior and inferior pubic rami and the left pubic body extending into the left pubic rami but these are  stable from the previous exam. No new fractures are identified. The left hip is negative for fracture.    Hemiarthroplasty remains well-aligned and unchanged from immediate postop films. No changes in treatment plan outlined during my evaluation on 4/3/23. Will continue to await anticoagulation recommendations from neurosurgery/primary team. Ideally, the patient will be started on  mg QD as soon able for VTE prophylaxis. Will defer prescribing to primary team. Orthopedics will see the patient at 6 weeks postop if she is still inpatient, otherwise, please contact ortho trauma if there are any questions or concerns in the interim.    Guerline Ac PA-C  [facility]

## 2023-04-04 NOTE — PLAN OF CARE
Goal Outcome Evaluation:    Orientations: A&O  x 4; patient hard of hearing.   Neuros intact.   Resp: Diminished, coarse sounding lungs sounds with exp wheezing. On baseline 2L NC. Using Yankauer for self suctioning, has frequent productive cough  Lines/Drains: PIV SL  Skin/Wounds: R hip incision open to air; craniotomy site intact.   GI/: continent of bowel; patient had 2 small loose stool today  Labs: Abnormal/Trends, Electrolyte Replacement- no replacement ordered. POC checks discontinued   Ambulation/Assist: EX stand used this morning to move patient from bed to chair  Plan: Pending rehab placement

## 2023-04-04 NOTE — PLAN OF CARE
Problem: Plan of Care - These are the overarching goals to be used throughout the patient stay.    Goal: Plan of Care Review  Description: The Plan of Care Review/Shift note should be completed every shift.  The Outcome Evaluation is a brief statement about your assessment that the patient is improving, declining, or no change.  This information will be displayed automatically on your shift note.  Outcome: Progressing     Orientations: alert and orientated x4; patient hard of hearing. Patient left eye lid more pronounced droop this afternoon; neurosurgery paged and STAT CT completed. No new findings.   Vitals/Pain: /59 (BP Location: Right arm)   Pulse 70   Temp 98.7  F (37.1  C) (Axillary)   Resp 18   Wt 69.6 kg (153 lb 7 oz)   SpO2 98%   BMI 28.06 kg/m    Tele: regular rate   Resp: course, diminished lungs sounds. Patient has productive cough and managing secretions with suctions. Patient received PRN nebs for wheezing with minimal relief   Lines/Drains: RLE PIV; external cath in place   Skin/Wounds: Right hip incision and craniotomy site intact   GI/: continent of bowel and bladder; external cath while in bed   Labs: Abnormal/Trends, Electrolyte Replacement- none  Ambulation/Assist: up with Niki Moran and assist of two   Plan: Pending discharge to TCU     Caity Rachel RN

## 2023-04-05 ENCOUNTER — APPOINTMENT (OUTPATIENT)
Dept: PHYSICAL THERAPY | Facility: CLINIC | Age: 73
DRG: 025 | End: 2023-04-05
Attending: INTERNAL MEDICINE
Payer: COMMERCIAL

## 2023-04-05 PROCEDURE — 250N000011 HC RX IP 250 OP 636: Performed by: INTERNAL MEDICINE

## 2023-04-05 PROCEDURE — 250N000009 HC RX 250: Performed by: INTERNAL MEDICINE

## 2023-04-05 PROCEDURE — 250N000012 HC RX MED GY IP 250 OP 636 PS 637: Performed by: PHYSICIAN ASSISTANT

## 2023-04-05 PROCEDURE — 250N000013 HC RX MED GY IP 250 OP 250 PS 637: Performed by: INTERNAL MEDICINE

## 2023-04-05 PROCEDURE — 97530 THERAPEUTIC ACTIVITIES: CPT | Mod: GP

## 2023-04-05 PROCEDURE — 120N000001 HC R&B MED SURG/OB

## 2023-04-05 PROCEDURE — 99233 SBSQ HOSP IP/OBS HIGH 50: CPT | Performed by: INTERNAL MEDICINE

## 2023-04-05 RX ORDER — FUROSEMIDE 20 MG
20 TABLET ORAL DAILY
Status: DISCONTINUED | OUTPATIENT
Start: 2023-04-05 | End: 2023-04-07 | Stop reason: HOSPADM

## 2023-04-05 RX ADMIN — SERTRALINE HYDROCHLORIDE 25 MG: 25 TABLET ORAL at 08:44

## 2023-04-05 RX ADMIN — LEVOTHYROXINE SODIUM 88 MCG: 88 TABLET ORAL at 08:44

## 2023-04-05 RX ADMIN — ACETAMINOPHEN 975 MG: 325 TABLET, FILM COATED ORAL at 16:15

## 2023-04-05 RX ADMIN — ATORVASTATIN CALCIUM 10 MG: 10 TABLET, FILM COATED ORAL at 08:44

## 2023-04-05 RX ADMIN — DEXAMETHASONE 4 MG: 2 TABLET ORAL at 08:43

## 2023-04-05 RX ADMIN — IPRATROPIUM BROMIDE 0.5 MG: 0.5 SOLUTION RESPIRATORY (INHALATION) at 10:42

## 2023-04-05 RX ADMIN — ENOXAPARIN SODIUM 40 MG: 40 INJECTION SUBCUTANEOUS at 16:15

## 2023-04-05 RX ADMIN — ACETAMINOPHEN 975 MG: 325 TABLET, FILM COATED ORAL at 20:14

## 2023-04-05 RX ADMIN — SENNOSIDES AND DOCUSATE SODIUM 1 TABLET: 50; 8.6 TABLET ORAL at 20:15

## 2023-04-05 RX ADMIN — ACETAMINOPHEN 975 MG: 325 TABLET, FILM COATED ORAL at 00:11

## 2023-04-05 RX ADMIN — PANTOPRAZOLE SODIUM 40 MG: 40 TABLET, DELAYED RELEASE ORAL at 08:44

## 2023-04-05 RX ADMIN — FUROSEMIDE 20 MG: 20 TABLET ORAL at 10:42

## 2023-04-05 RX ADMIN — MIRTAZAPINE 7.5 MG: 7.5 TABLET, FILM COATED ORAL at 20:15

## 2023-04-05 RX ADMIN — FLUTICASONE FUROATE AND VILANTEROL TRIFENATATE 1 PUFF: 200; 25 POWDER RESPIRATORY (INHALATION) at 16:15

## 2023-04-05 RX ADMIN — LEVALBUTEROL HYDROCHLORIDE 1.25 MG: 1.25 SOLUTION RESPIRATORY (INHALATION) at 10:42

## 2023-04-05 RX ADMIN — ACETAMINOPHEN 975 MG: 325 TABLET, FILM COATED ORAL at 08:43

## 2023-04-05 RX ADMIN — DEXAMETHASONE 4 MG: 2 TABLET ORAL at 20:15

## 2023-04-05 RX ADMIN — SENNOSIDES AND DOCUSATE SODIUM 1 TABLET: 50; 8.6 TABLET ORAL at 08:44

## 2023-04-05 ASSESSMENT — ACTIVITIES OF DAILY LIVING (ADL)
ADLS_ACUITY_SCORE: 43
ADLS_ACUITY_SCORE: 42
ADLS_ACUITY_SCORE: 43
ADLS_ACUITY_SCORE: 43
ADLS_ACUITY_SCORE: 42
ADLS_ACUITY_SCORE: 43
ADLS_ACUITY_SCORE: 43

## 2023-04-05 NOTE — INTERIM SUMMARY
[facility] Pre-Admission Screen    Referral Source:  [facility] NEUROSCIENCE UNIT 1725-01  Admit date to referring facility: 3/23/2023    Physical Medicine and Rehab Consult Completed: No    Rehab Diagnosis:    Brain Dysfunction 02.1 Non-Traumatic: s/p right bifrontal stealth craniotomy and resection of meningioma due to vasogenic cerebral edema and brain compression along with R femoral neck fracture s/p right hip hemiarthroplasty    Justification for Acute Inpatient Rehabilitation  Josephine Nicole is a 73 year old female who presented to the [facility] ED on 3/19/2023 with right hip pain after a fall with increased confusion and worsening functional status over the weeks prior to admit. Patient has a very complex medical history. She underwent surgical repair for right femoral neck fracture with R hip hemiarthroplasty on 3/20 with postoperative complications including worsening respiratory failure, hypotension, lactic acidosis, and worsening encephalopathy. The principal concern was for meningioma as a large contributor to her poor functional status and confusion so this also required surgical resection. Dr. Murphy from neurosurgery was contacted on 3/23. He recommended transfer to [facility] for resection of meningioma. Patient was admitted to the ICU at [facility] on 3/23/2023 given need for BiPAP and concern for respiratory issues particularly in the setting of imminent neurosurgery. On 3/30/2023, pt underwent right bifrontal stealth craniotomy and resection of meningioma due to vasogenic cerebral edema and brain compression. Post op course has been complicated by urinary retention, L eyelid weakness (head CT negative for changes), acute diastolic congestive heart failure exacerbation with fluid overload, acute on chronic hypoxic and hypercapnic respiratory failure secondary to community acquired pneumonia, and acute on chronic anemia. Patient is  currently stable to transfer to inpatient acute rehab.     Patient requires an intensive inpatient rehab program to address the following acute impairments:impaired activity tolerance, impaired balance, impaired cognition, impaired coordination, impaired judgement and safety awareness, impaired strength, impaired tone and impaired weight shifting    Current Active Medical Management Needs/Risks for Clinical Complications  The patient requires the high level of rehabilitation physician supervision that accompanies the provision of intensive rehabilitation therapy.  The patient needs the services of the rehabilitation physician to assess the patient medically and functionally and to modify the course of treatment as needed to maximize the patient's capacity to benefit from the rehabilitation process. Patient requires ongoing medical management and assessment of the following:    Urinary retention: patient will need ongoing assessment as failed TOV 4/4 and urology consulted 4/5. Plan to continue baldwin 7-10 days. Can be removed in ARU or urology clinic for TOV - AVS updated with clinic information. Encourage OOB as able, fluids as able (realize this will be limited due to fluid restriction and s/p hip fracture). Minimize anticholinergic and narcotic mediations as able to maximize bladder function. Would not recommend Flomax at this time given borderline soft blood pressures. Retention likely due to recent anesthesia, immobilization, and narcotic pain medications    Dysphagia: patient will need ongoing assessment as pt is at risk for aspiration; speech therapy saw the patient and patient is currently tolerating a regular diet postoperatively.     Displaced R femoral neck fracture s/p right hip hemiarthroplasty following mechanical fall: patient will need ongoing assessment of surgical hip incision for signs of healing/infection    Cerebral meningioma s/p craniotomy: patient will need ongoing assessment of craniotomy  incision for signs of infection/healing, improvement in encephalopathy likely from post op status, steroids, and pain meds, and assessment for changes in neuro status. IV solumedrol switched to IV decadron and now getting dexamethasone with taper ordered per neurosurgery.     Anticoagulation: neurosurgery PA approved DVT prophylaxis with recent hip surgery and okay with starting Lovenox subcu daily for DVT prophylaxis so this was started on 4/3/23    Respiratory status: patient will need ongoing assessment of respiratory status due to h/o COPD on 2-3L O2 at baseline; patient will worsening during hospitalization with increased work of breathing and hypercarbia requiring BiPAP therapy - pt found to have community acquired pneumonia - finished course of azithromycin with last day on 3/27/2023 and ceftriaxone 7/7 day course completed 4/5. Continue BiPAP as needed. Patient is weaned off of high flow nasal cannula and currently on 2 L of oxygen by nasal cannula which is baseline for her.     Cardiac status: pt with acute diastolic congestive heart failure exacerbation with fluid overload; received total of 60 mg of Lasix IV on 3/28/2023 to help with diuresis which seemed to improve her respiratory status and then received Lasix 40 mg IV on 4/3/2023 with good urine output. Patient still has 1+ edema of the lower extremities and was started on Lasix 20 mg p.o. daily on 4/5/2023 for gentle diuresis.     Acute on chronic anemia: patient will need ongoing assessment of Hgb; consider transfusion for hemoglobin less than 8 g/dL due to ongoing respiratory failure; Prbc given on 3/26/23 and Hgb 9.8 on 3/27/23; Hemoglobin has been stable at 11.5 -10.4 -9.8-10.7-10.4 as of 4/4/2023    Patient is at risk for falls with injury, brain edema, post op infections, worsening mood disturbance in setting of PTA depression/anxiety.     Past Medical/Surgical History   Surgery in the past 100 days: Yes   Additional relevant past medical  history: B12 Deficiency, Melanoma, COPD, gormer smoker of 50 years with advanced COPD on 2 to 3 L of oxygen at home for past 5 years, Oxygen Dependent, Depression, Anxiety, HLD, Hypothyroidism, Osteoporosis, Cerebral Meningioma.     Level of Functioning Prior to Admission:    LIVING ENVIRONMENT   People in Home: child(tristen), adult   Current Living Arrangements: house   Home Accessibility: stairs to enter home    Number of Stairs, Main Entrance: 2   Transportation Anticipated: family or friend will provide   Living Environment Comments: Lives with daughter who works; 3 cousins live nearby and are able to assist    SELF-CARE   Usual Activity Tolerance: good   Equipment Currently Used at Home: none   Activity/Exercise/Self-Care Comment: Patient independent at baseline for mobility and self cares without AD prior to a few weeks ago.    Level of Function: GG Scale (Section GG Functional Ability and Goals; CMS's CHAN Version 3.0 Manual effective 10.1.2019):  PT Current Function Goals for Rehab   Bed Rolling 3 Partial/moderate assistance 6 Independent   Supine to Sit 3 Partial/moderate assistance 6 Independent   Sit to Stand 3 Partial/moderate assistance 6 Independent   Transfer 3 Partial/moderate assistance 6 Independent   Ambulation 88 Not attempted due to safety 4 Supervision or touching assitance   Stairs 88 Not attempted due to safety 3 Partial/moderate assistance     OT Current Function Goals for Rehab   Feeding 5 Setup or clean-up assistance 6 Independent   Grooming Not completed 6 Independent   Bathing Not completed 6 Independent   Upper Body Dressing Not completed 6 Independent   Lower Body Dressing 1 Dependent 6 Independent   Toileting 2 Substantial/maximal assistance 6 Independent   Toilet Transfer 1 Dependent (A of 2) 6 Independent   Tub/Shower Transfer 88 Not attempted due to safety 4 Supervision or touching assitance   Cognition Not Assessed Supervision     SLP Current Function Goals for Rehab   Swallow Not  Impaired Not applicable   Communication Not Impaired Not applicable       Current Diet:  0-Thin and 7-Regular    Summary Statement:  Patient is participating in daily PT and OT and is making progress. Pt will need full cognitive linguistic evaluation with SLP while on Acute Rehab. Currently, pt is able to perform toilet transfer w/ mod A x2 w/ josé manuel stedy and RTS. VCs for safety and hand placement. Max A for clothing management and murphy cares d/t weakness and decreased flexibility. O2 in high 80s w/ activity on 2L, recovered to >90% w/ rest. Pt performs bed mobility supine > seated EOB w/ min-mod assist of 1 to scoot hips, min assist to sit up. Steady once upright and tolerated well. Sit to stand to FWW w/ mod assist of 1, able to stand fully upright for ~2 minutes, tolerated well outside of weakness and feeling she can't hold herself up. Pivot transfer towards L (strong side) w/ FWW w/ mod assist and one instance of knee buckling. Patient has significantly decreased functional mobility compared to baseline w/ deficits in strength and balance.     Expected Therapies and Services Required During Inpatient Rehab Admission  Intensity of Therapy: Patient requires intensive therapies not available in a lesser level of care. Patient is motivated, making gains, and can tolerate 3 hours of therapy a day.  Physical Therapy: 60 minutes per day, 7 days a week for 21 days  Occupational Therapy: 60 minutes per day, 7 days a week for 21 days  Speech and Language Therapy: 60 minutes per day, 7 days a week for 21 days  Rehabilitation Nursing Needs: Patient requires 24 hour Rehab Nursing to manage bladder program, vitals, positioning, carryover of new rehab techniques, care coordination, skin integrity, assess neurologic status, post-surgical incision care to promote healing and prevent infection, provide safe environment for patient at falls risk and edema management.    Precautions/restrictions/special needs:   Precautions: fall  precautions and Craniotomy precautions   Restrictions: no lifting >10lbs   Special Needs: lift and oxygen    Expected Level of Improvement: mod I with short distance gait, ADLs, and transfers; anticipate pt will need assist with IADLs  Expected Length of time to achieve: 21 days    Anticipated Discharge Needs:  Anticipated Discharge Destination: Home  Anticipated Discharge Support: Family member  24/7 support available : Yes  Identified caregiver(s):  Daughter and 3 cousins  Anticipated Discharge Needs: Home with homecare    Identified challenges/barriers:  Does not have known 24 hour assist, JOSÉ MIGUEL    Liaison signature/date/time:    Physician statement of review and agreement:  I have reviewed and am in agreement of the need for IRF stay to address above functional and medical needs. In addition to above statements address, Patient requires intensive active and ongoing therapeutic intervention and multiple therapies; Patient requires medical supervision; Expected to actively participate in the intensive rehab program; Sufficiently stable to actively participate; Expectation for measurable improvement in functional capacity or adaption to impairments.    MD signature/date/time:

## 2023-04-05 NOTE — PROGRESS NOTES
[facility]    Medicine Progress Note - Hospitalist Service    Date of Admission:  3/23/2023    Assessment & Plan        Patient is a 73-year-old female with a history of COPD on home oxygen, depression, anxiety, hyperlipidemia, hypothyroidism, B12 deficiency, melanoma, osteoporosis, cerebral meningioma presented to [facility] on 3/19/2023 with right hip pain after a fall    Patient had surgical repair for right femoral neck fracture on 3/20 with postoperative complication including worsening respiratory failure, worsening encephalopathy, lactic acidosis and hypotension.  She also had worsening of her cerebral meningioma which would require surgical resection.  As per the discharging hospitalist at [facility] patient had discussion with the patient's family and she has been having worsening functional status with increasing, confusion and coordination issues including because of a fall at home.  Neurosurgery Dr. Murphy was contacted on 3/23 who recommended transfer to [facility] for possible resection of meningioma.      Patient has been admitted to the ICU at [facility] on 3/23//23 due to acute on chronic respiratory failure secondary to advanced COPD/emphysema and fluid overload requiring BiPAP support.  She was initially on BiPAP and had episodes of desaturation on 3/23 on high flow nasal cannula and had to be placed back on BiPAP with recovery.  She had desaturations to 84% while doing oral care on 3/26.  Respiratory status improved on 3/29/2023 was on nasal cannula at 2 L of oxygen.  Patient underwent craniotomy for resection of meningioma on 3/30/2023.  Patient returned from the OR intubated and sedated on propofol. Extubated and recovering now     Encephalopathy. Multifactorial see below  Cerebral Meningioma status postcraniotomy by neurosurgery on 3/30/2023.    Acute toxic metabolic encephalopathy likely from post-op status, steroids, pain meds, ?infection  Pt was diagnosed 9/2019 with a cerebral meningioma and monitored.  Given progression of the tumor, patient underwent 15 sessions of radiation 11/2022.  She is followed by Dr. Godoy, a Radiation Oncologist through [facility].  Pt was on oral steroids which were weaned off about 4 weeks ago.  Since that time, patient has had increased confusion, poor balance, changes in mood.  * Recent MRI Brain 3/17/23 revealed enlargement of meningioma up to 45 mm with a significant mass effect and the midline shift right to left.  Patient was instructed by her PCP to follow up with outpatient Neurology and Radiation Oncology this week.  * CT head on admission with no acute hemorrhage, but notes the known mass surrounding vasogenic edema predominantly involving the right frontal lobe with right to left midline shift/subfalcine herniation measuring up to 1.1 cm.  ? Discussion with Dr. Godoy from radiation oncology on 3/21 and she discussed with Dr. Murphy.  She agrees with surgery at this point given advancement of tumor.   ? Per neurosurgery- given her functional decline with confusion at home in the weeks prior to her presentation, principal concern is for meningioma as a large contributor to her poor functional status and confusion.    ?      IV Solu-Medrol  switch to Decadron IV  to help with the meningioma  Patient underwent craniotomy and resection of the meningioma on 3/30/2023 by Dr. Murphy with neurosurgery  Getting dexamethasone per neurosurgery team.  Dexamethasone taper ordered per neurosurgery team  Postop management per neurosurgery team    Discussed with neurosurgery PA over the phone about DVT prophylaxis with recent hip surgery and they are okay with starting Lovenox subcu daily for DVT prophylaxis so it was  Started on 4/3/23    Mental status much improved now    #Acute on chronic hypoxic and hypercapnic respiratory failure Secondary to PNeumonia community acquired, Underlying COPD, Fluid overload from POst op  state   Acute diastolic congestive heart failure exacerbation with fluid overload    Patient is a former smoker of 50 years.  She has advanced COPD on 2 to 3 L of oxygen at home for past 5 years..  Patient had increased work of breathing and hypercarbia requiring BiPAP therapy.  Patient has been transferred to ICU on 3/22 for closer monitoring    -Patient finished course of azithromycin.  Last day on 3/27/2023  -Continue ceftriaxone 7/7 day course today   -Continue DuoNebs.  -Continue BiPAP as needed  - Patient is weaned off of high flow nasal cannula and currently on 2 L of oxygen by nasal cannula which is chronic baseline for her    echo done on 3/27/2023 showed    The left ventricle is normal in size.  There is normal left ventricular wall thickness.  The visual ejection fraction is 55-60%.  Grade I or early diastolic dysfunction.  Regional wall motion abnormalities cannot be excluded due to limited  visualization.  Poorly visualized valves, but likely no hemodynamically significant valvular  disease by Doppler.    Received total of 60 mg of Lasix IV on 3/28/2023 to help with diuresis which seemed to improve her respiratory status  She completed course of ceftriaxone so discontinued on 3/29/2023 IV Solu-Medrol switched to Decadron IV dosing per neurosurgery now oral taper off Decadron ordered neurosurgery  Patient underwent craniotomy and resection of meningioma on 3/30/2023  Postop management per neurosurgery    Patient was diuresed with Lasix 40 mg IV on 4/3/2023 with good urine output.   Patient is chronically on 2 to 2-1/2 L of oxygen by nasal cannula currently on 2 L    Patient still has 1+ edema of the lower extremities started on  Lasix 20 mg p.o. daily on 4/5/2023 for gentle diuresis  Patient drinks lots of fluids continuously so fluid restriction ordered at 2 L/day      Urinary retention;  Patient's Roman catheter was removed on 4/4/2023 for voiding trial  Patient is unable to void overnight needing  straight cath x2 with the 600 mL in the bladder each time  Baldwin replaced on 4/5/2023  Urology consultation requested    As per urology    Replace baldwin. Plan to continue baldwin 7-10 days. Can be removed in ARU or urology clinic for TOV - AVS updated with our clinic information.   - Encourage OOB as able, fluids as able (realize this will be limited due to fluid restriction and s/p hip fracture).   - Minimize anticholinergic and narcotic mediations as able to maximize bladder function  - Would not recommend Flomax at this time given borderline soft blood pressures  - Retention likely due to recent anesthesia, immobilization, and narcotic pain medications. I did discuss with patient that time will be the most important factor in return of bladder function. She is agreeable to discharge with baldwin.      Urology will sign off for now. Please page with any questions or concerns.       #Dysphagia  Speech therapy saw the patient   -Continue aspiration precautions  Patient is currently tolerating regular diet postoperatively      #Displaced right femoral neck fracture s/p right hip hemiarthroplasty  mechanical fall.  Xray revealed a displaced right femoral neck fracture and a subtle irregularity of the parasymphyseal aspect of the left pubic bone, potentially representing an age indeterminant fracture.   -Orthopedic surgery consult appreciated-continue as needed pain meds   Lovenox discontinued due to craniotomy on 3/29/2023  Restarted subcu Lovenox for DVT prophylaxis okay with neurosurgery on 4/3/2020        #Lactic acidosis  Resolved with IV fluids         Acute on Chronic Anemia  ? Preop hemoglobin 10.5 g/dL  ? Hemoglobin 3/26 7.8 g/dL  Follow hemoglobin, consider transfusion for hemoglobin less than 8 g/dL due to ongoing respiratory failure, desire to optimize if at all possible if planning for surgery  -Prbc given on 3/26/23  Hb 9.8 on 3/27/23  Hemoglobin stable at 11.5 -10.4 -9.8-10.7-10.4  on 4/4/2023     HLD  - resumed Atorvastatin when able to take p.o.  Hypothyroidism   On p.o. levothyroxine  Depression/Anxiety restarted PTA sertraline and mirtazapine     # Hypernatremia  Resolved          #Goals of care  DNR/DNI.   .  Appreciate palliative care's help. Goals of care remain restorative at this point              Diet: Room Service  Advance Diet as Tolerated: Regular Diet Adult  Fluid restriction 2000 ML FLUID    DVT Prophylaxis: Enoxaparin (Lovenox) SQ  Roman Catheter: PRESENT, indication: Retention  Lines: None     Cardiac Monitoring: None  Code Status: No CPR- Do NOT Intubate      Clinically Significant Risk Factors                            Patient needs ARU on discharge.  Social work sending referrals    Disposition Plan      Expected Discharge Date: 04/06/2023    Discharge Delays: *Medically Ready for Discharge  Placement - LTAC/ARU    Discharge Comments: ARU has no staff to admit pt today 4/4        Nicky Ballard MD  Hospitalist Service  [facility]  Securely message with [employer] (more info)  Text page via [facility] Paging/Directory   ______________________________________________________________________    Interval History     Patient seen and examined at bedside.  Resting comfortably in bed.  Denies any pain currently.  No nausea vomiting.  Tolerating diet.  Patient failed voiding trial needing Roman replacement.      No other  acute events in the last 24 hours as per bedside RN    Physical Exam   Vital Signs: Temp: 98  F (36.7  C) Temp src: Oral BP: 106/63 Pulse: 80   Resp: 16 SpO2: 96 % O2 Device: Nasal cannula Oxygen Delivery: 2 LPM  Weight: 153 lbs 7.04 oz  Physical Exam  HENT:      Head: Normocephalic and atraumatic.   Cardiovascular:      Rate and Rhythm: Normal rate.      Heart sounds: Normal heart sounds.   Pulmonary:      Effort: Pulmonary effort is normal. No respiratory distress.      Breath sounds: Normal breath sounds.   Abdominal:      General: There is no distension.       Palpations: Abdomen is soft.      Tenderness: There is no abdominal tenderness.   Musculoskeletal:      Right lower leg: Edema present.      Left lower leg: Edema present.     1+ edema of the bilateral lower extremities present    Medical Decision Making       Data

## 2023-04-05 NOTE — PROGRESS NOTES
[facility]    Neurosurgery Progress Note    Date of Service (when I saw the patient): 04/05/2023     Assessment & Plan     Procedure(s):  Bifrontal stealth craniotomy and resection of meningioma   -6 Days Post-Op    Doing well. Exam today is stable including mild left intermittent ptosis. Patient states she has had this >1yr. Given its chronicity, could pursue outpatient workup however no inpatient imaging indicated at this time. Head CT yesterday reviewed, expected postop changes.   Please work on Pt/OTmobilizing and placement for this patient.     Plan:  - neuro checks  -Routine wound care  -Pain management as needed  -Activity as tolerated  -Appreciate assistance from other services  -Continue decadron taper as ordered   - lovenox 40mg daily   Case management to arrange discharge placement , can be discharged anytime from our perspective      Subjective:  Josephine has no complaints. Has to go to the bathroom. Got up to the chair to transfer this morning, did ok. Denies headache, dizziness, n./v, change in vision, or weakness. She reports having left eyelid weakness for over a year, aware and has 'had her eyes checked and they are normal'.      Physical Exam   Temp: 97.6  F (36.4  C) Temp src: Oral BP: 115/69 Pulse: 73   Resp: 16 SpO2: 94 % O2 Device: Nasal cannula Oxygen Delivery: 2 LPM  Vitals:    03/31/23 0600 04/01/23 0400 04/02/23 0400   Weight: 153 lb 3.5 oz (69.5 kg) 156 lb 12 oz (71.1 kg) 153 lb 7 oz (69.6 kg)     Vital Signs with Ranges  Temp:  [97.2  F (36.2  C)-98.7  F (37.1  C)] 97.6  F (36.4  C)  Pulse:  [70-81] 73  Resp:  [16-18] 16  BP: (101-125)/(57-78) 115/69  SpO2:  [90 %-98 %] 94 %  I/O last 3 completed shifts:  In: 1020 [P.O.:1020]  Out: 1750 [Urine:1750]     , Blood pressure 115/69, pulse 73, temperature 97.6  F (36.4  C), temperature source Oral, resp. rate 16, weight 153 lb 7 oz (69.6 kg), SpO2 94 %, not currently breastfeeding.  153 lbs 7.04 oz      NEUROLOGICAL  EXAMINATION:   Mental status:  Alert, oriented x2, not oriented to year. Seated in recliner, appears comfortable     PERRL, EOMs intact, mild left ptosis intermittently, able to open the eye fully at times. Face appears equal, tongue midline moves freely, hearing intact, shoulder shrug equal and strong    Negative pronator drift or dysmetria    Full strength throughout both upper and lower extremities, all planes  Sensation is intact throughout both upper and lower extremities    Incision is CDI with sutures no swelling redness or discharge    Motor:  BEHZAD [place]  Personally reviewed head CT from yesterday  CT SCAN OF THE HEAD WITHOUT CONTRAST   4/4/2023 1:19 PM      HISTORY: Status post intracranial mass resection.     TECHNIQUE:  Axial images of the head and coronal reformations without  IV contrast material. Radiation dose for this scan was reduced using  automated exposure control, adjustment of the mA and/or kV according  to patient size, or iterative reconstruction technique.     COMPARISON: MRI of the brain 3/31/2023 and 3/29/2023. CT of the head  3/19/2023.     FINDINGS: Again seen are postoperative changes status post anterior  right paramedian frontal craniotomy. A resection cavity is again seen  in the paramedian aspect of the anterior right frontal lobe. Mild to  moderate vasogenic edema in the anterior right frontal lobe is not  significantly changed from most recent MRI, accounting for differences  in technique. However, this appears mildly decreased compared to the  prior CT exam of 3/19/2023. There is small amount of hyperdense  material along the margin of the right frontal lobe resection cavity,  particularly posteriorly (series 2 image 17), likely representing a  small amount of postoperative blood products. There also may be a  small amount of residual calcification marginating the resection  cavity, which was present on the preoperative CT of 3/19/2023. Mild  residual local mass  effect with minimal narrowing of the frontal horns  of the lateral ventricles on both sides. No hydrocephalus. Trace  pneumocephalus along the frontal pole regions on both sides, which is  likely postoperative in nature. There is slight hyperdensity along the  left anterior superior aspect of the falx cerebri (series 2 image 26,  series 5 image 23) measuring up to approximately 2-3 mm in thickness.  This could represent trace left parafalcine subdural blood. No  evidence for progressive mass effect. No significant midline  shift/herniation.     The paranasal sinuses are clear. Scattered flow/membrane thickening in  the left greater than right mastoid air cells, as before. Degenerative  changes of the temporomandibular joints. There is air within the scalp  soft tissues overlying the craniotomy flap, likely postoperative in  nature.                                                                      IMPRESSION:  1. Redemonstration of postoperative changes status post right  paramedian anterior frontal craniotomy for resection of the previously  demonstrated extra-axial right anterior parafalcine mass as described.  Small amount of hyperdense material marginating the resection cavity  particularly posteriorly may represent minimal postoperative blood  products and/or pre-existing calcification marginating the previously  demonstrated mass.  2. Similar degree of vasogenic edema in the right frontal lobe with  mild mass effect. No significant midline shift/herniation. No  ventricular entrapment/hydrocephalus.  3. Minimal hyperdensity along the left anterior superior aspect of the  falx cerebri may represent trace subdural blood products. No  associated mass effect.       Concha Corey FNP-C  [facility] neurosurgery

## 2023-04-05 NOTE — PLAN OF CARE
Problem: Urinary Retention  Goal: Effective Urinary Elimination  4/5/2023 1633 by Caity Rachel, RN  Outcome: Not Progressing  4/5/2023 1632 by Caity Rachel RN  Outcome: Not Progressing    Orientations: Alert and orientated x4; patient hard of hearing. Patient denies pain this shift.   Vitals/Pain: /52 (BP Location: Right arm)   Pulse 78   Temp 98  F (36.7  C) (Axillary)   Resp 16   Wt 69.6 kg (153 lb 7 oz)   SpO2 90%   BMI 28.06 kg/m    Tele: regular rate   Resp: Lung sounds course and diminished; patient remaining on PTA 2 L NC  Lines/Drains: Roman inserted this shift for urinary retention this shift. Roman care performed per protocol.   Skin/Wounds: right hip intact; craniotomy intact   GI/: Patient continent of bowel; patient had BM this shift.   Ambulation/Assist: up with Niki Steady and assist of two; patient turned and repositioned q 2 hours   Plan: Pending TCU placement     Caity Rachel, RN

## 2023-04-05 NOTE — CONSULTS
[facility] Inpatient Consultation Note    Josephine Nicole MRN# [id number]   Age: 73 year old YOB: 1950     Date of Admission:  3/23/2023    Reason for consult: Urinary retention       Requesting physician: Dr Ballard                   History of Present Illness:     72 yo F with h/o COPD, cerebral meningioma, hypothyroidism, melanoma admitted to [facility] following a fall. Now s/p femoral neck fracture repair on 3/20, and craniotomy with resection of meningioma on 3/30. Now extubated and recovering, nearing discharge to acute rehab.     Urology consulted for urinary retention. Baldwin was placed 3/24 and removed 2 days ago. Noted to have elevated bladder scans yesterday; straight cath'd twice overnight for 600cc and 650cc, respectively. Patient denies heme/dysuria, flank pain. Does report it has felt difficult to urinate since baldwin removal. Denies h/o urinary issues including hematuria, retention, kidney stones, or UTIs.      Cr and WBC are WNL. Does take mirtazapine, which may contribute to urinary retention. On Lovenox. Per chart review, medically stable for discharge and awaiting ARU bed.          Past Medical History:     Past Medical History:   Diagnosis Date     Cerebral meningioma      COPD (chronic obstructive pulmonary disease)      Depressive disorder      Eczema      Eczema      Hyperlipidemia      Hypothyroidism      Melanoma of skin      Osteoporosis              Past Surgical History:     Past Surgical History:   Procedure Laterality Date     COLONOSCOPY N/A 04/18/2022    Procedure: COLONOSCOPY;  Surgeon: Abimbola Handley MD;  Location:  GI     OPEN REDUCTION INTERNAL FIXATION HIP UNIPOLAR Right 3/20/2023    Procedure: Right hip hemiarthroplasty anterolateral approach;  Surgeon: Shun Cuevas MD;  Location:  OR     [employer] TRACKING SYSTEM CRANIOTOMY, EXCISE TUMOR, COMBINED N/A 3/30/2023    Procedure: Bifrontal stealth craniotomy and resection of meningioma;  Surgeon:  Ramez Murphy MD;  Location: [facility] OR     Tubal ligation NOS               Social History:     Social History     Socioeconomic History     Marital status:      Spouse name: Not on file     Number of children: Not on file     Years of education: Not on file     Highest education level: Not on file   Occupational History     Not on file   Tobacco Use     Smoking status: Former     Years: 50.00     Types: Cigarettes     Smokeless tobacco: Never   Vaping Use     Vaping status: Not on file   Substance and Sexual Activity     Alcohol use: Not Currently     Drug use: Never     Sexual activity: Not on file   Other Topics Concern     Not on file   Social History Narrative     Not on file     Social Determinants of Health     Financial Resource Strain: Not on file   Food Insecurity: Not on file   Transportation Needs: Not on file   Physical Activity: Not on file   Stress: Not on file   Social Connections: Not on file   Intimate Partner Violence: Not on file   Housing Stability: Not on file             Family History:   No family history on file.          Immunizations:     Immunization History   Administered Date(s) Administered     COVID-19 Vaccine 18+ (Moderna) 03/05/2021, 04/02/2021, 11/09/2021     COVID-19 Vaccine Bivalent Booster 18+ (Moderna) 10/24/2022     TD,PF 7+ (Tenivac) 02/06/2021             Allergies:     Allergies   Allergen Reactions     Bupropion Anaphylaxis, Hives and Shortness Of Breath             Medications:     Current Facility-Administered Medications   Medication     acetaminophen (TYLENOL) tablet 975 mg     acetaminophen (TYLENOL) tablet 975 mg     alum & mag hydroxide-simethicone (MAALOX) suspension 30 mL     atorvastatin (LIPITOR) tablet 10 mg     bisacodyl (DULCOLAX) suppository 10 mg     calcium carbonate (TUMS) chewable tablet 1,000 mg     carboxymethylcellulose PF (REFRESH PLUS) 0.5 % ophthalmic solution 1 drop     dexamethasone (DECADRON) tablet 4 mg    Followed by     [START ON  4/6/2023] dexamethasone (DECADRON) tablet 4 mg    Followed by     [START ON 4/9/2023] dexamethasone (DECADRON) tablet 2 mg    Followed by     [START ON 4/12/2023] dexamethasone (DECADRON) tablet 1 mg     glucose gel 15-30 g    Or     dextrose 50 % injection 25-50 mL    Or     glucagon injection 1 mg     enoxaparin ANTICOAGULANT (LOVENOX) injection 40 mg     fluticasone-vilanterol (BREO ELLIPTA) 200-25 MCG/ACT inhaler 1 puff     furosemide (LASIX) tablet 20 mg     haloperidol lactate (HALDOL) injection 2 mg     HOLD: All Oral Medications     hydrALAZINE (APRESOLINE) injection 10-20 mg     HYDROmorphone (PF) (DILAUDID) injection 0.3 mg     hydrOXYzine (ATARAX) tablet 10 mg     ipratropium (ATROVENT) 0.02 % neb solution 0.5 mg    And     levalbuterol (XOPENEX) neb solution 1.25 mg     labetalol (NORMODYNE/TRANDATE) injection 10 mg     levothyroxine (SYNTHROID/LEVOTHROID) tablet 88 mcg     lidocaine (LMX4) cream     lidocaine 1 % 0.1-1 mL     magnesium hydroxide (MILK OF MAGNESIA) suspension 30 mL     mirtazapine (REMERON) tablet TABS 7.5 mg     naloxone (NARCAN) injection 0.2 mg    Or     naloxone (NARCAN) injection 0.4 mg    Or     naloxone (NARCAN) injection 0.2 mg    Or     naloxone (NARCAN) injection 0.4 mg     nitroGLYcerin (NITROSTAT) sublingual tablet 0.4 mg     No lozenges or gum should be given while patient on BIPAP/AVAPS/AVAPS AE     ondansetron (ZOFRAN ODT) ODT tab 4 mg    Or     ondansetron (ZOFRAN) injection 4 mg     oxyCODONE IR (ROXICODONE) half-tab 2.5-5 mg     pantoprazole (PROTONIX) EC tablet 40 mg     polyethylene glycol (MIRALAX) Packet 17 g     prochlorperazine (COMPAZINE) injection 5 mg    Or     prochlorperazine (COMPAZINE) tablet 5 mg     senna-docusate (SENOKOT-S/PERICOLACE) 8.6-50 MG per tablet 1 tablet     senna-docusate (SENOKOT-S/PERICOLACE) 8.6-50 MG per tablet 1 tablet    Or     senna-docusate (SENOKOT-S/PERICOLACE) 8.6-50 MG per tablet 2 tablet     sertraline (ZOLOFT) tablet 25 mg      sodium chloride (PF) 0.9% PF flush 3 mL     sodium chloride (PF) 0.9% PF flush 3 mL     sodium chloride (PF) 0.9% PF flush 3 mL             Review of Systems:   Comprehensive review of systems from the Admission note dated 3/23 at [facility] was reviewed with no changes except per HPI.     Examination:  /69   Pulse 73   Temp 97.6  F (36.4  C) (Oral)   Resp 16   Wt 69.6 kg (153 lb 7 oz)   SpO2 94%   BMI 28.06 kg/m    General: Alert and oriented, no distress  HEENT: Face symmetric, mucous membranes moist and pink, facial bruising present   Respiratory: Breathing unlabored, no audible wheezing  Cardiac: Extremities warm and well perfused  Abdomen soft, non tender, non distended  Neuro:Grossly non focal  Pysch: Normal mood and affect  Skin: No evident rashes or lesions            Data:     Lab Results   Component Value Date    WBC 11.8 04/04/2023     Lab Results   Component Value Date    RBC 3.44 04/04/2023     Lab Results   Component Value Date    HGB 10.4 04/04/2023     Lab Results   Component Value Date    HCT 32.3 04/04/2023     Lab Results   Component Value Date     04/04/2023     Creatinine   Date Value Ref Range Status   04/04/2023 0.48 (L) 0.51 - 0.95 mg/dL Final   ]  Lab Results   Component Value Date    BUN 15.6 04/04/2023         Impression:  74 yo F with urinary retention postoperatively, failed TOV 4/4    Plan:  - Replace baldwin. Plan to continue baldwin 7-10 days. Can be removed in ARU or urology clinic for TOV - AVS updated with our clinic information.   - Encourage OOB as able, fluids as able (realize this will be limited due to fluid restriction and s/p hip fracture).   - Minimize anticholinergic and narcotic mediations as able to maximize bladder function  - Would not recommend Flomax at this time given borderline soft blood pressures  - Retention likely due to recent anesthesia, immobilization, and narcotic pain medications. I did discuss with patient that time will be  the most important factor in return of bladder function. She is agreeable to discharge with baldwin.     Urology will sign off for now. Please page with any questions or concerns.     Mimi Cade PA-C  [facility]   Pager: [phone]  Office: [phone]

## 2023-04-05 NOTE — PLAN OF CARE
Pt here with POD 6 Crani s/p tumor resection. A&Ox4. Neuros slow speech, BUE 4/5 RLE 2/5. VSS 2L NC. Regular diet, thin liquids. Takes pills whole. Up with A2 lift. Pain managed with Tylenol. Pt scoring green on the Aggression Stop Light Tool. Straight cath x2 600, 650. 2,000mL fluid restriction. Discharge pending ARU placement.

## 2023-04-06 ENCOUNTER — APPOINTMENT (OUTPATIENT)
Dept: PHYSICAL THERAPY | Facility: CLINIC | Age: 73
DRG: 025 | End: 2023-04-06
Attending: INTERNAL MEDICINE
Payer: COMMERCIAL

## 2023-04-06 ENCOUNTER — APPOINTMENT (OUTPATIENT)
Dept: OCCUPATIONAL THERAPY | Facility: CLINIC | Age: 73
DRG: 025 | End: 2023-04-06
Attending: INTERNAL MEDICINE
Payer: COMMERCIAL

## 2023-04-06 LAB
ANION GAP SERPL CALCULATED.3IONS-SCNC: 4 MMOL/L (ref 7–15)
BUN SERPL-MCNC: 13.8 MG/DL (ref 8–23)
CALCIUM SERPL-MCNC: 8.2 MG/DL (ref 8.8–10.2)
CHLORIDE SERPL-SCNC: 100 MMOL/L (ref 98–107)
CREAT SERPL-MCNC: 0.44 MG/DL (ref 0.51–0.95)
DEPRECATED HCO3 PLAS-SCNC: 34 MMOL/L (ref 22–29)
GFR SERPL CREATININE-BSD FRML MDRD: >90 ML/MIN/1.73M2
GLUCOSE SERPL-MCNC: 90 MG/DL (ref 70–99)
POTASSIUM SERPL-SCNC: 4.1 MMOL/L (ref 3.4–5.3)
SODIUM SERPL-SCNC: 138 MMOL/L (ref 136–145)

## 2023-04-06 PROCEDURE — 120N000001 HC R&B MED SURG/OB

## 2023-04-06 PROCEDURE — 250N000012 HC RX MED GY IP 250 OP 636 PS 637: Performed by: PHYSICIAN ASSISTANT

## 2023-04-06 PROCEDURE — 97530 THERAPEUTIC ACTIVITIES: CPT | Mod: GP | Performed by: PHYSICAL THERAPIST

## 2023-04-06 PROCEDURE — 36415 COLL VENOUS BLD VENIPUNCTURE: CPT | Performed by: INTERNAL MEDICINE

## 2023-04-06 PROCEDURE — 97535 SELF CARE MNGMENT TRAINING: CPT | Mod: GO | Performed by: OCCUPATIONAL THERAPIST

## 2023-04-06 PROCEDURE — 250N000013 HC RX MED GY IP 250 OP 250 PS 637: Performed by: INTERNAL MEDICINE

## 2023-04-06 PROCEDURE — 250N000011 HC RX IP 250 OP 636: Performed by: INTERNAL MEDICINE

## 2023-04-06 PROCEDURE — 99232 SBSQ HOSP IP/OBS MODERATE 35: CPT | Performed by: INTERNAL MEDICINE

## 2023-04-06 PROCEDURE — 97530 THERAPEUTIC ACTIVITIES: CPT | Mod: GO | Performed by: OCCUPATIONAL THERAPIST

## 2023-04-06 PROCEDURE — 80048 BASIC METABOLIC PNL TOTAL CA: CPT | Performed by: INTERNAL MEDICINE

## 2023-04-06 RX ORDER — DIPHENHYDRAMINE HYDROCHLORIDE AND LIDOCAINE HYDROCHLORIDE AND ALUMINUM HYDROXIDE AND MAGNESIUM HYDRO
10 KIT
Status: DISCONTINUED | OUTPATIENT
Start: 2023-04-06 | End: 2023-04-07 | Stop reason: HOSPADM

## 2023-04-06 RX ADMIN — ATORVASTATIN CALCIUM 10 MG: 10 TABLET, FILM COATED ORAL at 09:04

## 2023-04-06 RX ADMIN — ACETAMINOPHEN 975 MG: 325 TABLET, FILM COATED ORAL at 16:21

## 2023-04-06 RX ADMIN — DIPHENHYDRAMINE HYDROCHLORIDE AND LIDOCAINE HYDROCHLORIDE AND ALUMINUM HYDROXIDE AND MAGNESIUM HYDRO 10 ML: KIT at 12:41

## 2023-04-06 RX ADMIN — FLUTICASONE FUROATE AND VILANTEROL TRIFENATATE 1 PUFF: 200; 25 POWDER RESPIRATORY (INHALATION) at 21:10

## 2023-04-06 RX ADMIN — DIPHENHYDRAMINE HYDROCHLORIDE AND LIDOCAINE HYDROCHLORIDE AND ALUMINUM HYDROXIDE AND MAGNESIUM HYDRO 10 ML: KIT at 18:59

## 2023-04-06 RX ADMIN — SERTRALINE HYDROCHLORIDE 25 MG: 25 TABLET ORAL at 09:04

## 2023-04-06 RX ADMIN — LEVOTHYROXINE SODIUM 88 MCG: 88 TABLET ORAL at 09:04

## 2023-04-06 RX ADMIN — POLYETHYLENE GLYCOL 3350 17 G: 17 POWDER, FOR SOLUTION ORAL at 09:02

## 2023-04-06 RX ADMIN — ACETAMINOPHEN 975 MG: 325 TABLET, FILM COATED ORAL at 09:05

## 2023-04-06 RX ADMIN — DEXAMETHASONE 4 MG: 2 TABLET ORAL at 09:03

## 2023-04-06 RX ADMIN — DEXAMETHASONE 4 MG: 2 TABLET ORAL at 20:26

## 2023-04-06 RX ADMIN — ENOXAPARIN SODIUM 40 MG: 40 INJECTION SUBCUTANEOUS at 16:21

## 2023-04-06 RX ADMIN — MIRTAZAPINE 7.5 MG: 7.5 TABLET, FILM COATED ORAL at 20:26

## 2023-04-06 RX ADMIN — PANTOPRAZOLE SODIUM 40 MG: 40 TABLET, DELAYED RELEASE ORAL at 09:04

## 2023-04-06 RX ADMIN — FUROSEMIDE 20 MG: 20 TABLET ORAL at 09:04

## 2023-04-06 RX ADMIN — ACETAMINOPHEN 975 MG: 325 TABLET, FILM COATED ORAL at 22:35

## 2023-04-06 RX ADMIN — DIPHENHYDRAMINE HYDROCHLORIDE AND LIDOCAINE HYDROCHLORIDE AND ALUMINUM HYDROXIDE AND MAGNESIUM HYDRO 10 ML: KIT at 22:36

## 2023-04-06 RX ADMIN — SENNOSIDES AND DOCUSATE SODIUM 1 TABLET: 50; 8.6 TABLET ORAL at 09:04

## 2023-04-06 ASSESSMENT — ACTIVITIES OF DAILY LIVING (ADL)
ADLS_ACUITY_SCORE: 42
ADLS_ACUITY_SCORE: 43
ADLS_ACUITY_SCORE: 42
ADLS_ACUITY_SCORE: 43

## 2023-04-06 NOTE — H&P
[facility]   Acute Rehabilitation Unit  Admission History and Physical    CHIEF COMPLAINT   Brain Dysfunction 02.1 Non-Traumatic: s/p right bifrontal stealth craniotomy and resection of meningioma due to vasogenic cerebral edema and brain compression along with R femoral neck fracture s/p right hip hemiarthroplasty     HISTORY OF PRESENT ILLNESS  Josephine Nicole is a 73 year old right hand dominant female with past medical history of cerebral meningioma initially diagnosed 2019, s/p radiation in 11/2022 with recent progression including mass effect and midline shift of 3/17/23 MRI, COPD on 2L oxygen at baseline, hypothyroidism, hyperlipidemia, osteoporosis, depression/anxiety, vitamin B12 deficiency, and anemia who presented on 3/19/23 with right hip pain following fall at home in setting of 4-week history of worsening confusion, impaired balance and coordination, and recurrent falls.  Xray revealed displaced right femoral neck fracture.  Patient also noted to have acute on chronic hypoxic respiratory failure requiring increased levels of supplemental oxygen.  She underwent right hip hemiarthroplasty with anterolateral approach on 3/20 with Dr. Cuevas.  Initial post-op course was complicated by worsening respiratory failure (COPD exacerbation), hypotension, lactic acidosis, worsening encephalopathy.      CT head on admission with no acute hemorrhage, but notes known mass surrounding vasogenic edema predominantly involving the right frontal lobe with right to left midline shift/subfalcine herniation measuring up to 1.1 cm.  Neurosurgery was consulted and recommended transfer to [facility] for planned surgical resection of meningioma following optimization of respiratory status.  Transfer to [facility] occurred on 3/23.      She remained on BiPAP initially.  Completed course of azithromycin (thru 3/27/23) and ceftriaxone (thru 3/31/23).  Also received IV diuresis on 3/28 which  "seemed to improve her respiratory status.  Was started on course of decadron for edema/meningioma.  She received pRBC transfusion on 3/26.  Palliative care was consulted and patient/family expressed ongoing restorative goals.    After weaning from BiPAP on 3/29 and improved stability, she underwent bifrontal stealth craniotomy and resection of meningioma on 3/30 with Dr. Murphy.    She was noted to have left eye ptosis on 4/4.  CT head with expected post-operative changes.  Patient reports she has noted this for at least 1 year.  Given chronicity, recommended to defer additional work-up to outpatient setting as appropriate.    She had post-op urinary retention.  Baldwin was placed on 3/24, removed for trial of void on 4/3 but ongoing retention requiring straight catheterization.  Urology was consulted and recommended to replace baldwin, can attempt repeat trial of void in 7-10 days.    Post-op course was further complicated by lactic acidosis (resolved with fluids), hypernatremia (resolved), and pain.    During acute hospitalization, patient was seen and evaluated by PT and OT, who collectively recommended that patient would benefit from ongoing therapies in the acute inpatient rehabilitation setting.      In review of the therapy notes, she is performing toilet transfer with mod Ax2 with amirah dillon for toileting.  She performs bed mobility supine > seated EOB w/ min-mod assist of 1 to scoot hips, min assist to sit up.  She performs sit to stand to FWW with mod Ax2, able to stand upright for 2 minutes.  She can do pivot transfer with mod A.  She has significantly decreased functional mobility compared to baseline w/ deficits in strength and balance.     Upon arrival to the rehab unit, patient reports that she is feeling just \"tired, exhausted\".  Denies any pain, either in her hip or head.  She notes legs remain weak, though moving a little better than earlier this admission.  Notes swelling in legs over past month. " " Has dizziness intermittently with position changes.  She reports chronic cough has been worse lately, more tenuous/thick secretions, has been using oral suction to aid.  Also notes that her cough tends to be worse with humid weather.  She reports to be on 2.5 L oxygen at home.  Reports \"burning\" sore throat and mouth pain, stable throughout this admission.  Reports to be having regular BMs, most recent was yesterday.  States mood has been \"up and down\", but \"does not want her depression\" to come back.    PAST MEDICAL HISTORY   Reviewed and updated in Epic.  Past Medical History:   Diagnosis Date     Cerebral meningioma      COPD (chronic obstructive pulmonary disease)      Depressive disorder      Eczema      Eczema      Hyperlipidemia      Hypothyroidism      Melanoma of skin      Osteoporosis        SURGICAL HISTORY  Reviewed and updated in Epic.  Past Surgical History:   Procedure Laterality Date     COLONOSCOPY N/A 04/18/2022    Procedure: COLONOSCOPY;  Surgeon: Abimbola Handley MD;  Location:  GI     OPEN REDUCTION INTERNAL FIXATION HIP UNIPOLAR Right 3/20/2023    Procedure: Right hip hemiarthroplasty anterolateral approach;  Surgeon: Shun Cuevas MD;  Location:  OR     OPTICAL TRACKING SYSTEM CRANIOTOMY, EXCISE TUMOR, COMBINED N/A 3/30/2023    Procedure: Bifrontal stealth craniotomy and resection of meningioma;  Surgeon: Ramez Murphy MD;  Location:  OR     Tubal ligation NOS         SOCIAL HISTORY  Reviewed and updated in Epic.  Marital Status: legally   Living situation: lives with adult daughter (Jordan) in house with 2 stairs to enter, all needs on main level within  Family support: Lives with daughter who works; 3 cousins live nearby and are able to assist  Vocational History: retired  Tobacco use: quit 6-8 years ago, significant prior smoking history  Alcohol use: none  Illicit drug use: none  Social History     Socioeconomic History     Marital status:      " Spouse name: Not on file     Number of children: Not on file     Years of education: Not on file     Highest education level: Not on file   Occupational History     Not on file   Tobacco Use     Smoking status: Former     Years: 50.00     Types: Cigarettes     Smokeless tobacco: Never   Vaping Use     Vaping status: Not on file   Substance and Sexual Activity     Alcohol use: Not Currently     Drug use: Never     Sexual activity: Not on file   Other Topics Concern     Not on file   Social History Narrative     Not on file     Social Determinants of Health     Financial Resource Strain: Not on file   Food Insecurity: Not on file   Transportation Needs: Not on file   Physical Activity: Not on file   Stress: Not on file   Social Connections: Not on file   Intimate Partner Violence: Not on file   Housing Stability: Not on file       FAMILY HISTORY  Reviewed and updated in Epic.  No family history on file.      PRIOR FUNCTIONAL HISTORY   Pt was independent with all ADLs/IADLs, transfers, mobility and gait.      MEDICATIONS  Scheduled meds  Medications Prior to Admission   Medication Sig Dispense Refill Last Dose     acetaminophen (TYLENOL) 325 MG tablet Take 3 tablets (975 mg) by mouth every 8 hours as needed for mild pain 100 tablet 0      albuterol (PROVENTIL) (2.5 MG/3ML) 0.083% neb solution Take 2.5 mg by nebulization 3 times daily as needed for shortness of breath, wheezing or cough        alendronate (FOSAMAX) 70 MG tablet Take 70 mg by mouth every 7 days        atorvastatin (LIPITOR) 10 MG tablet Take 10 mg by mouth daily        butalbital-acetaminophen-caffeine (ESGIC) -40 MG tablet Take 1 tablet by mouth every 6 hours as needed for headaches Max 6 doses per day.        cyanocobalamin (CYANOCOBALAMIN) 1000 MCG/ML injection Inject 1 mL into the muscle every 30 days        [START ON 4/8/2023] dexamethasone (DECADRON) 4 MG tablet Take 4 mg daily x 2 days, then 2 mg daily x 3 days, then 1 mg daily x 3 days then  stop        enoxaparin ANTICOAGULANT (LOVENOX) 40 MG/0.4ML syringe Inject 0.4 mLs (40 mg) Subcutaneous every 24 hours for 30 days 12 mL 0      fluticasone-salmeterol (ADVAIR) 500-50 MCG/DOSE inhaler Inhale 1 puff into the lungs 2 times daily        [START ON 4/8/2023] furosemide (LASIX) 20 MG tablet Take 1 tablet (20 mg) by mouth daily        guaiFENesin (ROBITUSSIN) 20 mg/mL liquid Take 10 mLs by mouth every 4 hours as needed for cough        hydrOXYzine (ATARAX) 10 MG tablet Take 10 mg by mouth every 8 hours as needed for anxiety        ipratropium - albuterol 0.5 mg/2.5 mg/3 mL (DUONEB) 0.5-2.5 (3) MG/3ML neb solution Take 1 vial by nebulization every 6 hours as needed for shortness of breath, wheezing or cough        levalbuterol (XOPENEX HFA) 45 MCG/ACT inhaler Inhale 2 puffs into the lungs every 4 hours as needed for shortness of breath or wheezing        levothyroxine (SYNTHROID/LEVOTHROID) 88 MCG tablet Take 88 mcg by mouth daily        mirtazapine (REMERON) 15 MG tablet Take 7.5 mg by mouth At Bedtime        [START ON 4/8/2023] pantoprazole (PROTONIX) 40 MG EC tablet Take 1 tablet (40 mg) by mouth every morning (before breakfast)        polyethylene glycol (MIRALAX) 17 GM/Dose powder Take 17 g by mouth daily 510 g 0      senna-docusate (SENOKOT-S/PERICOLACE) 8.6-50 MG tablet Take 1 tablet by mouth 2 times daily 21 tablet 0      sertraline (ZOLOFT) 25 MG tablet Take 25 mg by mouth every morning          ALLERGIES     Allergies   Allergen Reactions     Bupropion Anaphylaxis, Hives and Shortness Of Breath         REVIEW OF SYSTEMS  A 10 point ROS was performed and negative unless otherwise noted in HPI.     Constitutional: Positive for fatigue.  Negative for fever/chills.  Eyes: Negative for visual disturbance, double vision, blurred vision.  Ears, Nose, Throat: Positive for sore throat.  Cardiovascular: Negative for chest pain, palpitations.  Respiratory: Positive for chronic cough with recently increased  "secretions.  Negative for shortness of breath.  Gastrointestinal: Negative for abdominal pain, nausea, vomiting, diarrhea, constipation, appetite disturbance.  Genitourinary: Positive for urinary retention.  Musculoskeletal: Negative for pain.  Neurologic: Positive for intermittent left eyelid weakness, generalized lower extremity weakness, intermittent dizziness with position changes, possible fogginess or confusion.  Negative for headache, numbness or tingling.  Psychiatric: Positive for depressed mood.  Negative for sleep disturbance.      PHYSICAL EXAM  VITAL SIGNS:  /68 (BP Location: Left arm)   Pulse 71   Temp 98.6  F (37  C) (Axillary)   Resp 18   Ht 1.585 m (5' 2.4\")   Wt 63.2 kg (139 lb 5.3 oz)   SpO2 98%   BMI 25.16 kg/m    BMI:  Estimated body mass index is 25.16 kg/m  as calculated from the following:    Height as of this encounter: 1.585 m (5' 2.4\").    Weight as of this encounter: 63.2 kg (139 lb 5.3 oz).     General: NAD, sitting up in bed  HEENT: bifrontal crani incision well-approximated with sutures, dried drainage, no erythema or active drainage  Pulmonary: non-labored on 3L by NC, lungs coarse at bilateral bases  Cardiovascular: RRR, no murmurs  Abdominal: soft, non-tender, non-distended, bowel sounds present, baldwin present draining clear/light yellow urine  Extremities: warm, well perfused, 1+ pitting edema in bilateral lower extremities, no tenderness in calves  MSK/neuro:   Mental Status:  alert and oriented x4   Cranial Nerves:   1. 2nd CN: Pupils equal, round, reactive to light and accomodation. and visual fields intact to confrontation.   2. 3rd,4th,6th CN:  EOMI, left ptosis  3. 5th CN: facial sensation intact   4. 7th CN: face symmetrical   5. 8th CN: functional hearing bilaterally  6. 9th, 10th CN: palate elevates symmetrically   7. 11th CN: sternocleidomastoids and trapezii strong   8. 12th CN: tongue midline and without fasciculations     Sensory: Normal to light touch in " bilateral upper and lower extremities   Strength:    SF  EF  EE  WE  G  HF  KE  DF  PF   R  5 5 4+ 5 5 2 4- 3 4  L  4+ 5 4+ 5 5 2 4- 3 4     Abnormal movements: None    Coordination: No dysmetria on finger to nose b/l    Speech: clear/fluent, naming and repetition intact   Cognition: intact to conversation, follows commands, responds appropriately   Gait: not tested  Skin: crani incision as above, ecchymoses on right cheek, right hip incision not visualized on this date      LABS  CBC RESULTS: Recent Labs   Lab Test 04/04/23  0820 04/03/23 0923 04/02/23  0458   WBC 11.8* 12.3* 13.9*   RBC 3.44* 3.62* 3.34*   HGB 10.4* 10.7* 9.8*   HCT 32.3* 33.9* 31.6*   MCV 94 94 95   MCH 30.2 29.6 29.3   MCHC 32.2 31.6 31.0*   RDW 14.3 14.1 14.3    262 225     Last Basic Metabolic Panel:  Recent Labs   Lab Test 04/06/23 0832 04/04/23 0820 04/03/23 0923    139 137   POTASSIUM 4.1 3.8 4.4   CHLORIDE 100 97* 98   CO2 34* 35* 35*   ANIONGAP 4* 7 4*   GLC 90 107* 96   BUN 13.8 15.6 14.7   CR 0.44* 0.48* 0.46*   GFRESTIMATED >90 >90 >90   CHAO 8.2* 8.5* 8.6*     EXAM: CT HEAD WITHOUT CONTRAST  LOCATION: [facility]  DATE/TIME: 03/19/2023, 5:56 AM  IMPRESSION:  1.  No acute intracranial hemorrhage or abnormal extra-axial fluid collection.  2.  Prominent partially calcified extra-axial mass along the right anterior/inferior falx measuring up to 3.8 cm in greatest axial dimensions. There is moderate surrounding vasogenic edema predominantly involving the right frontal lobe with right to left   midline shift/subfalcine herniation measuring up to 1.1 cm. Recommend comparison with prior imaging to assess for stability. Consider neurosurgical consultation for further assessment, as clinically indicated.  3.  Partial subluxation of the bilateral mandible condyles anteriorly, possibly related to open-mouth positioning versus fixed subluxation. Correlation with physical examination is recommended.    EXAM: XR  PELVIS AND HIP RIGHT 1 VIEW  LOCATION: [facility]  DATE/TIME: 3/19/2023 6:14 AM  IMPRESSION: There is a displaced right femoral neck fracture. The femoral head remains seated within the acetabulum. The left hip is intact. Subtle irregularity of the parasymphyseal aspect of the left pubic bone, potentially representing an age   indeterminant fracture. CT may be beneficial for further evaluation.    EXAM: XR CHEST 2 VIEWS  LOCATION: [facility]  DATE/TIME: 3/19/2023 1:21 PM  IMPRESSION: Large lung volumes compatible with COPD. Prominence bullous changes in the upper lungs is unchanged. Normal heart size. Pulmonary vascularity is not well assessed. There are increased interstitial opacities in the perihilar and lower lungs   when compared to the previous exam. This pattern could be seen with pulmonary edema and/or infectious infiltrates. No significant pleural effusions are identified. Bones are demineralized.    EXAM: XR FEMUR RIGHT 2 VIEWS  LOCATION: [facility]  DATE/TIME: 3/19/2023 9:54 PM  IMPRESSION: Again seen is a displaced right femoral neck fracture with impaction and varus angulation. Bones are demineralized. No distal femoral fracture. Mild underlying degenerative changes of the right hip.   Nondisplaced age-indeterminate fracture of the right inferior pubic ramus.    XR PELVIS AND HIP RIGHT 1 VIEW 4/3/2023 3:29 PM   IMPRESSION: Postoperative changes right bipolar hip endoprosthesis.  There are fractures of the right superior and inferior pubic rami and  the left pubic body extending into the left pubic rami but these are  stable from the previous exam. No new fractures are identified. The  left hip is negative for fracture.    MRI BRAIN WITHOUT AND WITH CONTRAST  3/31/2023 1:00 PM   IMPRESSION:    1. New postoperative changes following resection of enhancing  extra-axial mass compared to MR 3/29/2023. No definite residual  nodular  enhancement appreciated, although there is thin enhancement  along the operative bed which may be postsurgical in nature. Continued  follow-up is recommended.  2. Small postoperative contusion in the inferior medial right frontal  lobe along the inferior margin of the resection cavity. No other areas  of infarct. Previous mass effect on the frontal horns of the lateral  ventricles is improved. No hydrocephalus.  3. Persistent leptomeningeal enhancement most pronounced in the medial  frontal lobes bilaterally. Differential is similar to prior MR with  possibility of neoplastic leptomeningeal spread.  4. T2 hyperintense signal in the right frontal lobe adjacent to the  previous extra-axial mass appears similar to previous MR 3/29/2023  given the new postoperative findings.    CT SCAN OF THE HEAD WITHOUT CONTRAST   4/4/2023 1:19 PM  IMPRESSION:  1. Redemonstration of postoperative changes status post right  paramedian anterior frontal craniotomy for resection of the previously  demonstrated extra-axial right anterior parafalcine mass as described.  Small amount of hyperdense material marginating the resection cavity  particularly posteriorly may represent minimal postoperative blood  products and/or pre-existing calcification marginating the previously  demonstrated mass.  2. Similar degree of vasogenic edema in the right frontal lobe with  mild mass effect. No significant midline shift/herniation. No  ventricular entrapment/hydrocephalus.  3. Minimal hyperdensity along the left anterior superior aspect of the  falx cerebri may represent trace subdural blood products. No  associated mass effect.       IMPRESSION/PLAN:  Josephine Nicole is a 73 year old right hand dominant female with a past medical history of cerebral meningioma initially diagnosed 2019, s/p radiation in 11/2022 with recent progression including mass effect and midline shift of 3/17/23 MRI, COPD on 2L oxygen at baseline, hypothyroidism, hyperlipidemia,  osteoporosis, depression/anxiety, vitamin B12 deficiency, and anemia who was admitted on 3/19/23 after fall at home in setting of several weeks of worsening confusion, impaired balance with imaging revealing right femoral neck fracture s/p right hip hemiarthroplasty.  He was then transferred to [facility] on 3/23/23 due worsening respiratory failure (COPD exacerbation), hypotension, lactic acidosis, and worsening encephalopathy in setting of progressive meningioma now s/p craniotomy and resection of meningioma on 3/30/23 with hospital course further complicated by lactic acidosis, anemia, left eye ptosis, urinary retention, pain, and hypernatremia.  She is now admitted to ARU on 4/7/23 for multidisciplinary rehabilitation and ongoing medical management.      Admission to acute inpatient rehab 4/7/23.    Impairment group code: Brain Dysfunction 02.1 Non-Traumatic: s/p right bifrontal stealth craniotomy and resection of meningioma due to vasogenic cerebral edema and brain compression along with R femoral neck fracture s/p right hip hemiarthroplasty      1. PT, OT and SLP 60 minutes of each on a daily basis, in addition to rehab nursing and close management of physiatrist.      2. Impairment of ADL's: Noted to have impaired activity tolerance, impaired balance, impaired cognition, impaired coordination, impaired judgement and safety awareness, impaired strength, impaired tone and impaired weight shifting, all affecting her ability to safely and independently perform basic ADLs.  Goal for mod I with basic ADLs.    3. Impairment of mobility:  Noted to have impaired activity tolerance, impaired balance, impaired cognition, impaired coordination, impaired judgement and safety awareness, impaired strength, impaired tone and impaired weight shifting, all affecting her ability to safely and independently perform basic mobility.  Goal for mod I with basic mobility.    4. Impairment of cognition/language/swallow:  Noted to have  impaired cognition, will need full cognitive linguistic evaluation with SLP while on ARU with goals for improved cognitive-linguistic skills to meet basic needs.      5. Medical Conditions    S/p right bifrontal craniotomy and resection of meningioma on 3/30/23 with Dr. Murphy  Right anterior/inferior falx meningioma with moderate surrounding vasogenic edema and mass effect  Diagnosed in 2019 after presenting with refractory headaches.  Status post radiation treatment 11/2022.  Recent brain MRI revealing progression.  Patient presented after fall in setting of several week history of worsening confusion, gait impairment, balance deficits.  CT head with known mass surrounding vasogenic edema predominantly involving the right frontal lobe with right to left midline shift/subfalcine herniation measuring up to 1.1 cm.  Neurosurgery consulted and recommended medical optimization and transfer to [facility] for surgical intervention, which she underwent as above on 3/30.  - Decadron taper: 4 mg daily thru 4/9, then 2 mg daily x3 days, then 1 mg daily x3 days, then stop  - Pain management: Tylenol 975 mg TID, wean to PRN as able  - Wound care: keep clean and dry, leave open to air  - Continue PT/OT/SLP  - Follow up with neurosurgery at 2-weeks post-op for incision check and staple removal (currently scheduled on 4/14, anticipate will still be at ARU).  Also has neurosurgery follow-ups on 5/15 ([facility]) and 6/30 (Dr. Murphy)  - Follow up with neuro-oncology?    Displaced right femoral neck fracture s/p right hip hemiarthroplasty (anterolateral approach) on 3/20/23 with Dr. Shun Cuevas  Pathologic fracture secondary to osteoporosis  Fractures of the right superior and inferior pubic rami and the left pubic body extending into the left pubic rami, stable on imaging 4/3  - WBAT RLE with walker  - Wound care: Surgical dressing removed by ortho on 4/3. Per their recs at that time: May keep site open to air. Please allow remaining  Dermabond to slough off naturally (no picking at site). Okay to shower, but no soaking/submersion x 1 more week.   - Pain management as above  - Per ortho, osteoporosis workup and treatment with primary care provider within 2 months.   - Continue PT/OT  - Follow up with Dr. Shun Cuevas at [facility] at six weeks post-op (~5/1/23) with X-rays. If remains at ARU at that time, can have wound check and xrays performed there and sent to Dr. Cuevas for review.    COPD, recent exacerbation  Acute on chronic hypoxic/hypercapnic respiratory failure  Former smoker x50 years.  On 2-3L of oxygen PTA for past ~5 years.  Following hip surgery, had acute on chronic hypoxic/hypercapnic respiratory failure requiring BiPAP.  Completed course of azithromycin 3/27 and ceftriaxone 4/4.  Respiratory status improved with diuresis so in part may be related to heart failure as below.  As of admission to ARU, stable on 2L, which is baseline.  - Monitor respiratory status  - Continue supplemental oxygen to maintain spO2>88%  - Continue Breo Ellipta 200-25 mcg inhaler daily (formulary sub for PTA Advair Diskus 500/50 mcg inhaler)  - Continue Duoneb q4h PRN  - Pulmonary hygiene/toilet  - Continue oral suction PRN for more tenuous secretions    Acute diastolic congestive heart failure exacerbation with fluid overload  Intermittent diuresis during this admission with improved respiratory status.  Echo 3/27/23 with EF 55-60%, grade I or early diastolic dysfunction.  Noted to have lower extremity edema.  Started on Lasix daily on 4/5, also ordered 2L fluid restriction.    - Continue Lasix 20 mg daily  - Continue 2L fluid restriction  - Daily weights, monitor volume status    Acute on chronic anemia  Hgb 10.5 on admission, dropped to benson of 7.8 on 3/26.  Did receive 1 unit pRBC.  Stable in 10s at discharge to ARU (baseline).  - Trend CBC every M/Th    Urinary retention  Roman placed 3/24, failed TOV on 4/4.  Urology consulted 4/5 and recommended to  replace baldwin.  Per urology, retention likely due to recent anesthesia, immobilization, and narcotic pain medications; additional recs as below.  - Plan to continue baldwin 7-10 days (~4/12-4/15). Can be removed in ARU or urology clinic for TOV  - Encourage OOB as able, fluids as able.   - Minimize anticholinergic and narcotic mediations  - Would not recommend Flomax at this time given borderline soft blood pressures    Mild left intermittent ptosis  Noted on 4/4.  CT head with expected post-operative changes.  Patient reports she has noted this for at least 1 year.    - Per sending team, given chronicity, recommended to defer additional work-up to outpatient setting as appropriate.    Hyperlipidemia  - Continue PTA atorvastatin    Depression/anxiety  - Continue mirtazapine 7.5 mg at HS  - Continue Zoloft 25 mg daily  - Monitor mood, consult health psychology as indicated    Hypothyroidism  - Continue PTA levothyroxine    Vitamin B12 deficiency  - Continue PTA B12 injection 1000 mcg q30 days, last given on 4/3    Throat/mouth pain  Patient reports this has been present throughout this admission.    - Continue magic mouthwash      6. Adjustment to disability:  Clinical psychology to eval and treat if indicated  7. FEN: regular diet, thin liquids  8. Bowel: continent, monitor, scheduled and PRN bowel meds available  9. Bladder: baldwin in place at ARU admission  10. DVT Prophylaxis: subcutaneous Lovenox, ok'd to start on 4/3 per neurosurgery  11. GI Prophylaxis: PPI  12. Code: DNR/DNI, discussed with palliative care during inpatient admission  13. Disposition: goal for home  14. ELOS:  21 days  15. Rehab prognosis:  fair  16. Follow up Appointments on Discharge: PCP in 1-2 weeks, neurosurgery in 2 weeks (4/14 may need to be rescheduled pending ARU course; also has follow-up on 5/15 and 6/30), neuro-oncology?, ortho (Dr. Shun Cuevas ~5/1/23; care coordinator is Neha Way at [phone] for scheduling) with repeat  xrays, urology if discharging with baldwin        Patient was discussed with Dr. Ledy Calloway, PM&R staff physician     ABIEL Santana-C  Physical Medicine & Rehabilitation

## 2023-04-06 NOTE — PROGRESS NOTES
[facility]    Neurosurgery  Daily Post-Op Note    Assessment & Plan   Procedure(s):  Bifrontal stealth craniotomy and resection of meningioma   7 Days Post-Op  Doing well.  Pain well-controlled.  Tolerating physical therapy and rehabilitation well.  Awaiting placement    Plan:  -Advance activity as tolerated  -Continue supportive and symptomatic treatment  -Start or continue physical therapy  2-week follow-up for staple removal.      Mook Barnett PA-C    Interval History   Stable.  Doing well.  Improving slowly.  Pain is reasonably controlled.  No fevers.    Physical Exam   Temp: 98  F (36.7  C) Temp src: Oral BP: 108/56 Pulse: 68   Resp: 18 SpO2: 96 % O2 Device: Nasal cannula Oxygen Delivery: 2 LPM  Vitals:    03/31/23 0600 04/01/23 0400 04/02/23 0400   Weight: 69.5 kg (153 lb 3.5 oz) 71.1 kg (156 lb 12 oz) 69.6 kg (153 lb 7 oz)     Vital Signs with Ranges  Temp:  [97.9  F (36.6  C)-98.2  F (36.8  C)] 98  F (36.7  C)  Pulse:  [67-78] 68  Resp:  [16-18] 18  BP: (105-135)/(52-77) 108/56  SpO2:  [90 %-97 %] 96 %  I/O last 3 completed shifts:  In: 1000 [P.O.:1000]  Out: 2175 [Urine:2175]    Alert and oriented.  Moves all extremities equally.      Incision: CDI      Medications     - MEDICATION INSTRUCTIONS -          acetaminophen  975 mg Oral Q8H     atorvastatin  10 mg Oral Daily     dexamethasone  4 mg Oral Daily    Followed by     [START ON 4/9/2023] dexamethasone  2 mg Oral Daily    Followed by     [START ON 4/12/2023] dexamethasone  1 mg Oral Daily     enoxaparin ANTICOAGULANT  40 mg Subcutaneous Q24H     fluticasone-vilanterol  1 puff Inhalation Daily     furosemide  20 mg Oral Daily     levothyroxine  88 mcg Oral Daily     Magic Mouthwash  10 mL Swish & Spit 4x Daily w/meals     mirtazapine  7.5 mg Oral At Bedtime     pantoprazole  40 mg Oral QAM AC     polyethylene glycol  17 g Oral Daily     senna-docusate  1 tablet Oral BID     sertraline  25 mg Oral QAM     sodium chloride (PF)   3 mL Intracatheter Q8H     sodium chloride (PF)  3 mL Intracatheter Q8H           Mook Barnett PA-C  [facility]surgery  [address]  [address] [place] [id number]    Tel [phone]

## 2023-04-06 NOTE — PROGRESS NOTES
[facility]  Hospitalist Progress Note    Admit Date:  3/23/2023  Date of Service (when I saw the patient): 04/06/2023   Provider:  Charo Dumont, DO    Assessment & Plan      Patient is a 73-year-old female with a history of COPD on home oxygen, depression, anxiety, hyperlipidemia, hypothyroidism, B12 deficiency, melanoma, osteoporosis, cerebral meningioma presented to [facility] on 3/19/2023 with right hip pain after a fall     Patient had surgical repair for right femoral neck fracture on 3/20 with postoperative complication including worsening respiratory failure, worsening encephalopathy, lactic acidosis and hypotension.  She also had worsening of her cerebral meningioma which would require surgical resection.  [facility]'s family and she has been having worsening functional status with increasing, confusion and coordination issues including because of a fall at home.  Neurosurgery Dr. Murphy was contacted on 3/23 who recommended transfer to [facility] for possible resection of meningioma.     Patient has been admitted to the ICU at [facility] on 3/23//23 due to acute on chronic respiratory failure secondary to advanced COPD/emphysema and fluid overload requiring BiPAP support.  She was initially on BiPAP and had episodes of desaturation on 3/23 on high flow nasal cannula and had to be placed back on BiPAP with recovery.  She had desaturations to 84% while doing oral care on 3/26.  Respiratory status improved on 3/29/2023 was on nasal cannula at 2 L of oxygen.  Patient underwent craniotomy for resection of meningioma on 3/30/2023.  Patient returned from the OR intubated and sedated on propofol. Extubated and continuing to recover now.    Problem List:    1. Cerebral Meningioma status postcraniotomy by neurosurgery on 3/30/2023.    Acute toxic metabolic encephalopathy likely from  post-op status, steroids, pain meds, ?infection     Pt was diagnosed 9/2019 with a cerebral meningioma and monitored.  Given progression of the tumor, patient underwent 15 sessions of radiation 11/2022.  She is followed by Dr. Godoy, a Radiation Oncologist through [facility].  Pt was on oral steroids which were weaned off about 4 weeks ago.  Since that time, patient had increased confusion, poor balance, changes in mood.  * Recent MRI Brain 3/17/23 revealed enlargement of meningioma up to 45 mm with a significant mass effect and the midline shift right to left.  Patient was instructed by her PCP to follow up with outpatient Neurology and Radiation Oncology this week.  * CT head on admission with no acute hemorrhage, but notes the known mass surrounding vasogenic edema predominantly involving the right frontal lobe with right to left midline shift/subfalcine herniation measuring up to 1.1 cm.  ? Discussion with Dr. Godoy from radiation oncology on 3/21 and she discussed with Dr. Murphy.  She agrees with surgery at this point given advancement of tumor.   ? Per neurosurgery- given her functional decline with confusion at home in the weeks prior to her presentation, principal concern is for meningioma as a large contributor to her poor functional status and confusion.       IV Solu-Medrol  switch to Decadron IV  to help with the meningioma  Patient underwent craniotomy and resection of the meningioma on 3/30/2023 by Dr. Murphy with neurosurgery  Getting dexamethasone per neurosurgery team.  Dexamethasone taper has been ordered per neurosurgery team  Postop management per neurosurgery team     Discussion earlier in stay with neurosurgery PA over the phone about DVT prophylaxis with recent hip surgery and they are okay with starting Lovenox subcu daily for DVT prophylaxis so it was  Started on 4/3/23     Mental status is continuing to improve  Working with therapies and anticipate will benefit from ARU stay     2.  Acute on chronic hypoxic and hypercapnic respiratory failure  Pneumonia community acquired  Acute diastolic congestive heart failure exacerbation with fluid overload, post-op  Oxygen-dependent COPD (2-3L)     She has advanced COPD on 2 to 3 L of oxygen at home for past 5 years..  Patient had increased work of breathing and hypercarbia requiring BiPAP therapy earlier in hospital stay and transfer to the ICU     -Patient finished course of azithromycin and ceftriaxone    -Continue DuoNebs.  - Patient is weaned off of high flow nasal cannula and currently on 2 L of oxygen by nasal cannula which is chronic baseline for her     Echo done on 3/27/2023 showed - EF 55-60%.  Grade I or early diastolic dysfunction.     Patient was diuresed with Lasix 40 mg IV on 4/3/2023 with good urine output.      Patient still has 1+ edema of the lower extremities - started on  Lasix 20 mg p.o. daily on 4/5/2023 for gentle diuresis    Patient drinks lots of fluids continuously so had been placed on a daily fluid restriction ordered at 2 L/day - will continue to monitor I/O's for now but will discharge a daily fluid restriction     3. Urinary retention;  Patient's Baldwin catheter was removed on 4/4/2023 for voiding trial  Patient is unable to void overnight needing straight cath x2 with the 600 mL in the bladder each time  Baldwin replaced on 4/5/2023  Urology consultation requested - Plan to continue baldwin 7-10 days. Can be removed in ARU or urology clinic.  - Retention likely due to recent anesthesia, immobilization, and narcotic pain medications.      4.  Mucositis, oral sores  Notes sores limiting her po intake  Will start magic mouthwash swish and spit today   Continue to monitor clinically     5. Displaced right femoral neck fracture s/p right hip hemiarthroplasty  mechanical fall.  Xray revealed a displaced right femoral neck fracture and a subtle irregularity of the parasymphyseal aspect of the left pubic bone, potentially representing  an age indeterminant fracture.   -Orthopedic surgery consult appreciated-continue as needed pain meds   Lovenox discontinued due to craniotomy on 3/29/2023  Restarted subcu Lovenox for DVT prophylaxis okay with neurosurgery on 4/3/2020     6. Lactic acidosis  Resolved with IV fluids     7.  Acute on Chronic Anemia  ? Preop hemoglobin 10.5 g/dL  ? Hemoglobin 3/26 7.8 g/dL  Follow hemoglobin, consider transfusion for hemoglobin less than 8 g/dL due to ongoing respiratory failure, desire to optimize if at all possible if planning for surgery  -Prbc given on 3/26/23  Hb 9.8 on 3/27/23  Hemoglobin stable at 11.5 -10.4 -9.8-10.7-10.4  on 4/4/2023    8.   Dysphagia  Speech therapy saw the patient   -Continue aspiration precautions  Patient is currently tolerating regular diet postoperatively    HLD - resumed Atorvastatin when able to take p.o.  Hypothyroidism   On p.o. levothyroxine  Depression/Anxiety restarted PTA sertraline and mirtazapine     Hypernatremia - resolved        Medical Decision Making     45 MINUTES SPENT BY ME on the date of service doing chart review, history, exam, documentation & further activities per the note.        Labs/Imaging Reviewed:  See Information above and Data section below    Diet: Room Service  Advance Diet as Tolerated: Regular Diet Adult  Fluid restriction 2000 ML FLUID    DVT Prophylaxis: Enoxaparin (Lovenox) SQ  Roman Catheter: PRESENT, indication: Retention, Retention  Code Status: No CPR- Do NOT Intubate      Disposition Plan   ? ARU when bed available     Entered: Charo Dumont DO 04/06/2023, 6:54 AM       The patient's care was discussed with the Bedside Nurse and Patient.    Interval History     Wanting to know what medical device she can be given to let her know when her oxygen has fallen off after discharge from the hospital    No new HA, CP or SOB.  Feels that her breathing is close to her baseline.  No cough or hemoptysis.  Still feeling tired and  weak.    Reports that it is hard to eat because she has developed some sores in her mouth on both sides ----salty food burn in those areas.     -Data reviewed today: I reviewed all new labs and imaging results over the last 24 hours. I personally reviewed no images or EKG's today.    Physical Exam   Temp: 98.2  F (36.8  C) Temp src: Oral BP: 118/63 Pulse: 71   Resp: 16 SpO2: 96 % O2 Device: Nasal cannula Oxygen Delivery: 2 LPM  Vitals:    03/31/23 0600 04/01/23 0400 04/02/23 0400   Weight: 69.5 kg (153 lb 3.5 oz) 71.1 kg (156 lb 12 oz) 69.6 kg (153 lb 7 oz)     Vital Signs with Ranges  Temp:  [97.6  F (36.4  C)-98.2  F (36.8  C)] 98.2  F (36.8  C)  Pulse:  [71-80] 71  Resp:  [16] 16  BP: (105-118)/(52-69) 118/63  SpO2:  [90 %-96 %] 96 %  I/O last 3 completed shifts:  In: 1200 [P.O.:1200]  Out: 1975 [Urine:1975]    GEN:  Alert and awake,  Appears mildly restless but cooperative.  Appears to be in no overt respiratory distress.  HEENT:  Normocephalic/atraumatic, no scleral icterus, no nasal discharge, nasal cannula sitting outside of her nose currently.  Mild whitish mucosal irritation noted on the lower right inner lip area; some dried blood to the lateral left inner check area  CV:  Somewhat distant but regular rate and rhythm, no clear loud murmur to ausc.  S1 + S2 noted, no S3 or S4.  LUNGS:  Clear to auscultation bilaterally.  No rales/rhonchi/wheezing auscultated bilaterally.  No costal retractions bilaterally.  Symmetric chest rise on inhalation noted.  ABD:  Active bowel sounds, soft, non-tender/non-distended.  No rebound/guarding/rigidity.  No masses palpated.  No obvious HSM to exam.  EXT:  1-2+ pitting edema in the feet bilaterally; 1+ pitting edema up to below the knees bilaterally.  No cyanosis bilaterally.  SKIN:  Dry to touch, no rashes or jaundice noted.  NEURO:  No tremors at rest, speech is clear and appropriate.  She is moving all ext without clear focal difficulty or deficits.      Data    Labs:  Recent Labs   Lab 04/06/23  0832 04/04/23  0820 04/03/23  0923    139 137   POTASSIUM 4.1 3.8 4.4   CHLORIDE 100 97* 98   CO2 34* 35* 35*   ANIONGAP 4* 7 4*   GLC 90 107* 96   BUN 13.8 15.6 14.7   CR 0.44* 0.48* 0.46*   GFRESTIMATED >90 >90 >90   CHAO 8.2* 8.5* 8.6*     Recent Labs   Lab 04/04/23  0820 04/03/23  0923 04/02/23  0458   WBC 11.8* 12.3* 13.9*   HGB 10.4* 10.7* 9.8*   HCT 32.3* 33.9* 31.6*   MCV 94 94 95    262 225     Recent Labs   Lab 04/06/23  0832 04/04/23  0820 04/03/23  0923 04/02/23  0721 04/02/23  0458 04/01/23  1151 04/01/23  0707    139 137  --  138  --  136   POTASSIUM 4.1 3.8 4.4  --  4.2  --  4.3   CHLORIDE 100 97* 98  --  98  --  98   CO2 34* 35* 35*  --  35*  --  29   ANIONGAP 4* 7 4*  --  5*  --  9   GLC 90 107* 96   < > 126*   < > 98   BUN 13.8 15.6 14.7  --  18.8  --  18.8   CR 0.44* 0.48* 0.46*  --  0.32*  --  0.44*   GFRESTIMATED >90 >90 >90  --  >90  --  >90   CHAO 8.2* 8.5* 8.6*  --  8.2*  --  8.1*   MAG  --   --  2.0  --  1.9  --  1.9   PHOS  --   --  3.8  --  3.8  --  3.2    < > = values in this interval not displayed.     No results for input(s): INR in the last 168 hours.   Recent Imaging:   No results found for this or any previous visit (from the past 24 hour(s)).    Medications     - MEDICATION INSTRUCTIONS -         acetaminophen  975 mg Oral Q8H     atorvastatin  10 mg Oral Daily     dexamethasone  4 mg Oral Q12H [facility] (08/20)    Followed by     dexamethasone  4 mg Oral Daily    Followed by     [START ON 4/9/2023] dexamethasone  2 mg Oral Daily    Followed by     [START ON 4/12/2023] dexamethasone  1 mg Oral Daily     enoxaparin ANTICOAGULANT  40 mg Subcutaneous Q24H     fluticasone-vilanterol  1 puff Inhalation Daily     furosemide  20 mg Oral Daily     levothyroxine  88 mcg Oral Daily     mirtazapine  7.5 mg Oral At Bedtime     pantoprazole  40 mg Oral QAM AC     polyethylene glycol  17 g Oral Daily     senna-docusate  1 tablet Oral BID      sertraline  25 mg Oral QAM     sodium chloride (PF)  3 mL Intracatheter Q8H     sodium chloride (PF)  3 mL Intracatheter Q8H

## 2023-04-06 NOTE — PLAN OF CARE
Pt here with POD 7 Crani s/p tumor resection. A&Ox4. Neuros slow speech, BUE 4/5 RLE 2/5. VSS 2L NC. Regular diet, thin liquids. Takes pills whole. Up with A2 lift. Pain managed with Tylenol. Pt scoring green on the Aggression Stop Light Tool. Baldwin in place for retention- will discharge to ARU with baldwin. Requested to not be disturbed after midnight. Refused positioning occasionally. Discharge pending ARU placement.

## 2023-04-07 ENCOUNTER — HOSPITAL ENCOUNTER (INPATIENT)
Facility: CLINIC | Age: 73
LOS: 21 days | Discharge: HOME HEALTH | DRG: 949 | End: 2023-04-28
Attending: PHYSICAL MEDICINE & REHABILITATION | Admitting: PHYSICAL MEDICINE & REHABILITATION
Payer: COMMERCIAL

## 2023-04-07 VITALS
WEIGHT: 153.44 LBS | BODY MASS INDEX: 28.06 KG/M2 | RESPIRATION RATE: 18 BRPM | TEMPERATURE: 98.2 F | HEART RATE: 72 BPM | DIASTOLIC BLOOD PRESSURE: 53 MMHG | SYSTOLIC BLOOD PRESSURE: 111 MMHG | OXYGEN SATURATION: 94 %

## 2023-04-07 DIAGNOSIS — I50.32 CHRONIC DIASTOLIC HEART FAILURE (H): ICD-10-CM

## 2023-04-07 DIAGNOSIS — K59.01 SLOW TRANSIT CONSTIPATION: ICD-10-CM

## 2023-04-07 DIAGNOSIS — Z86.018 S/P RESECTION OF MENINGIOMA: Primary | ICD-10-CM

## 2023-04-07 DIAGNOSIS — G44.229 CHRONIC TENSION-TYPE HEADACHE, NOT INTRACTABLE: ICD-10-CM

## 2023-04-07 DIAGNOSIS — S72.001A CLOSED DISPLACED FRACTURE OF RIGHT FEMORAL NECK (H): ICD-10-CM

## 2023-04-07 DIAGNOSIS — Z98.890 S/P RESECTION OF MENINGIOMA: Primary | ICD-10-CM

## 2023-04-07 PROCEDURE — 250N000011 HC RX IP 250 OP 636: Performed by: PHYSICIAN ASSISTANT

## 2023-04-07 PROCEDURE — 128N000003 HC R&B REHAB

## 2023-04-07 PROCEDURE — 99223 1ST HOSP IP/OBS HIGH 75: CPT | Mod: AI | Performed by: PHYSICIAN ASSISTANT

## 2023-04-07 PROCEDURE — 250N000013 HC RX MED GY IP 250 OP 250 PS 637: Performed by: PHYSICIAN ASSISTANT

## 2023-04-07 PROCEDURE — 250N000013 HC RX MED GY IP 250 OP 250 PS 637: Performed by: INTERNAL MEDICINE

## 2023-04-07 PROCEDURE — 99239 HOSP IP/OBS DSCHRG MGMT >30: CPT | Performed by: INTERNAL MEDICINE

## 2023-04-07 PROCEDURE — 250N000012 HC RX MED GY IP 250 OP 636 PS 637: Performed by: PHYSICIAN ASSISTANT

## 2023-04-07 RX ORDER — ATORVASTATIN CALCIUM 10 MG/1
10 TABLET, FILM COATED ORAL DAILY
Status: DISCONTINUED | OUTPATIENT
Start: 2023-04-08 | End: 2023-04-28 | Stop reason: HOSPADM

## 2023-04-07 RX ORDER — DEXAMETHASONE 1 MG
1 TABLET ORAL DAILY
Status: COMPLETED | OUTPATIENT
Start: 2023-04-12 | End: 2023-04-14

## 2023-04-07 RX ORDER — LEVOTHYROXINE SODIUM 88 UG/1
88 TABLET ORAL DAILY
Status: DISCONTINUED | OUTPATIENT
Start: 2023-04-08 | End: 2023-04-28 | Stop reason: HOSPADM

## 2023-04-07 RX ORDER — SERTRALINE HYDROCHLORIDE 25 MG/1
25 TABLET, FILM COATED ORAL EVERY MORNING
Status: DISCONTINUED | OUTPATIENT
Start: 2023-04-08 | End: 2023-04-28 | Stop reason: HOSPADM

## 2023-04-07 RX ORDER — PANTOPRAZOLE SODIUM 40 MG/1
40 TABLET, DELAYED RELEASE ORAL
Status: DISCONTINUED | OUTPATIENT
Start: 2023-04-08 | End: 2023-04-28 | Stop reason: HOSPADM

## 2023-04-07 RX ORDER — ENOXAPARIN SODIUM 100 MG/ML
40 INJECTION SUBCUTANEOUS EVERY 24 HOURS
Status: DISCONTINUED | OUTPATIENT
Start: 2023-04-07 | End: 2023-04-28 | Stop reason: HOSPADM

## 2023-04-07 RX ORDER — POLYETHYLENE GLYCOL 3350 17 G/17G
17 POWDER, FOR SOLUTION ORAL DAILY
Status: DISCONTINUED | OUTPATIENT
Start: 2023-04-08 | End: 2023-04-28 | Stop reason: HOSPADM

## 2023-04-07 RX ORDER — DIPHENHYDRAMINE HYDROCHLORIDE AND LIDOCAINE HYDROCHLORIDE AND ALUMINUM HYDROXIDE AND MAGNESIUM HYDRO
10 KIT
Status: DISCONTINUED | OUTPATIENT
Start: 2023-04-07 | End: 2023-04-18

## 2023-04-07 RX ORDER — GUAIFENESIN 200 MG/10ML
10 LIQUID ORAL EVERY 4 HOURS PRN
Status: ON HOLD | DISCHARGE
Start: 2023-04-07 | End: 2023-04-27

## 2023-04-07 RX ORDER — DEXAMETHASONE 2 MG/1
4 TABLET ORAL DAILY
Status: COMPLETED | OUTPATIENT
Start: 2023-04-08 | End: 2023-04-08

## 2023-04-07 RX ORDER — FUROSEMIDE 20 MG
20 TABLET ORAL DAILY
Status: ON HOLD | DISCHARGE
Start: 2023-04-08 | End: 2023-04-27

## 2023-04-07 RX ORDER — POLYETHYLENE GLYCOL 3350 17 G/17G
17 POWDER, FOR SOLUTION ORAL DAILY
Status: DISCONTINUED | OUTPATIENT
Start: 2023-04-08 | End: 2023-04-07

## 2023-04-07 RX ORDER — MIRTAZAPINE 7.5 MG/1
7.5 TABLET, FILM COATED ORAL AT BEDTIME
Status: DISCONTINUED | OUTPATIENT
Start: 2023-04-07 | End: 2023-04-12

## 2023-04-07 RX ORDER — BISACODYL 10 MG
10 SUPPOSITORY, RECTAL RECTAL DAILY PRN
Status: DISCONTINUED | OUTPATIENT
Start: 2023-04-07 | End: 2023-04-28 | Stop reason: HOSPADM

## 2023-04-07 RX ORDER — PANTOPRAZOLE SODIUM 40 MG/1
40 TABLET, DELAYED RELEASE ORAL
Status: ON HOLD | DISCHARGE
Start: 2023-04-08 | End: 2023-04-27

## 2023-04-07 RX ORDER — LEVALBUTEROL INHALATION SOLUTION 1.25 MG/3ML
1.25 SOLUTION RESPIRATORY (INHALATION) EVERY 6 HOURS PRN
Status: DISCONTINUED | OUTPATIENT
Start: 2023-04-07 | End: 2023-04-11

## 2023-04-07 RX ORDER — DEXAMETHASONE 2 MG/1
2 TABLET ORAL DAILY
Status: COMPLETED | OUTPATIENT
Start: 2023-04-09 | End: 2023-04-11

## 2023-04-07 RX ORDER — AMOXICILLIN 250 MG
1 CAPSULE ORAL 2 TIMES DAILY
Status: DISCONTINUED | OUTPATIENT
Start: 2023-04-07 | End: 2023-04-18

## 2023-04-07 RX ORDER — DEXAMETHASONE 4 MG/1
TABLET ORAL
Status: ON HOLD | DISCHARGE
Start: 2023-04-08 | End: 2023-04-27

## 2023-04-07 RX ORDER — ACETAMINOPHEN 325 MG/1
975 TABLET ORAL 3 TIMES DAILY
Status: DISCONTINUED | OUTPATIENT
Start: 2023-04-07 | End: 2023-04-28 | Stop reason: HOSPADM

## 2023-04-07 RX ORDER — LEVALBUTEROL INHALATION SOLUTION 1.25 MG/3ML
1.25 SOLUTION RESPIRATORY (INHALATION) 3 TIMES DAILY PRN
Status: DISCONTINUED | OUTPATIENT
Start: 2023-04-07 | End: 2023-04-07

## 2023-04-07 RX ORDER — FLUTICASONE FUROATE AND VILANTEROL 200; 25 UG/1; UG/1
1 POWDER RESPIRATORY (INHALATION) DAILY
Status: DISCONTINUED | OUTPATIENT
Start: 2023-04-08 | End: 2023-04-28 | Stop reason: HOSPADM

## 2023-04-07 RX ORDER — HYDROXYZINE HYDROCHLORIDE 10 MG/1
10 TABLET, FILM COATED ORAL EVERY 8 HOURS PRN
Status: DISCONTINUED | OUTPATIENT
Start: 2023-04-07 | End: 2023-04-28 | Stop reason: HOSPADM

## 2023-04-07 RX ORDER — IPRATROPIUM BROMIDE AND ALBUTEROL SULFATE 2.5; .5 MG/3ML; MG/3ML
1 SOLUTION RESPIRATORY (INHALATION) EVERY 6 HOURS PRN
Status: DISCONTINUED | OUTPATIENT
Start: 2023-04-07 | End: 2023-04-07 | Stop reason: ALTCHOICE

## 2023-04-07 RX ORDER — FUROSEMIDE 20 MG
20 TABLET ORAL DAILY
Status: DISCONTINUED | OUTPATIENT
Start: 2023-04-08 | End: 2023-04-26

## 2023-04-07 RX ADMIN — SERTRALINE HYDROCHLORIDE 25 MG: 25 TABLET ORAL at 09:37

## 2023-04-07 RX ADMIN — ACETAMINOPHEN 325MG 975 MG: 325 TABLET ORAL at 21:45

## 2023-04-07 RX ADMIN — SENNOSIDES AND DOCUSATE SODIUM 1 TABLET: 50; 8.6 TABLET ORAL at 09:37

## 2023-04-07 RX ADMIN — ENOXAPARIN SODIUM 40 MG: 40 INJECTION SUBCUTANEOUS at 17:16

## 2023-04-07 RX ADMIN — MIRTAZAPINE 7.5 MG: 7.5 TABLET, FILM COATED ORAL at 21:45

## 2023-04-07 RX ADMIN — DIPHENHYDRAMINE HYDROCHLORIDE AND LIDOCAINE HYDROCHLORIDE AND ALUMINUM HYDROXIDE AND MAGNESIUM HYDRO 10 ML: KIT at 10:03

## 2023-04-07 RX ADMIN — ACETAMINOPHEN 975 MG: 325 TABLET, FILM COATED ORAL at 09:36

## 2023-04-07 RX ADMIN — ATORVASTATIN CALCIUM 10 MG: 10 TABLET, FILM COATED ORAL at 09:37

## 2023-04-07 RX ADMIN — SENNOSIDES AND DOCUSATE SODIUM 1 TABLET: 50; 8.6 TABLET ORAL at 21:46

## 2023-04-07 RX ADMIN — DIPHENHYDRAMINE HYDROCHLORIDE AND LIDOCAINE HYDROCHLORIDE AND ALUMINUM HYDROXIDE AND MAGNESIUM HYDRO 10 ML: KIT at 21:46

## 2023-04-07 RX ADMIN — DIPHENHYDRAMINE HYDROCHLORIDE AND LIDOCAINE HYDROCHLORIDE AND ALUMINUM HYDROXIDE AND MAGNESIUM HYDRO 10 ML: KIT at 13:05

## 2023-04-07 RX ADMIN — DEXAMETHASONE 4 MG: 2 TABLET ORAL at 09:36

## 2023-04-07 RX ADMIN — PANTOPRAZOLE SODIUM 40 MG: 40 TABLET, DELAYED RELEASE ORAL at 09:37

## 2023-04-07 RX ADMIN — POLYETHYLENE GLYCOL 3350 17 G: 17 POWDER, FOR SOLUTION ORAL at 09:37

## 2023-04-07 RX ADMIN — FUROSEMIDE 20 MG: 20 TABLET ORAL at 09:37

## 2023-04-07 RX ADMIN — ACETAMINOPHEN 325MG 975 MG: 325 TABLET ORAL at 17:18

## 2023-04-07 RX ADMIN — LEVOTHYROXINE SODIUM 88 MCG: 88 TABLET ORAL at 09:36

## 2023-04-07 ASSESSMENT — ACTIVITIES OF DAILY LIVING (ADL)
DIFFICULTY_EATING/SWALLOWING: NO
WEAR_GLASSES_OR_BLIND: NO
DIFFICULTY_EATING/SWALLOWING: NO
TOILETING: 1-->ASSISTANCE (EQUIPMENT/PERSON) NEEDED (NOT DEVELOPMENTALLY APPROPRIATE)
TOILETING_ASSISTANCE: TOILETING DIFFICULTY, ASSISTANCE 1 PERSON
WALKING_OR_CLIMBING_STAIRS_DIFFICULTY: NO
FALL_HISTORY_WITHIN_LAST_SIX_MONTHS: YES
ADLS_ACUITY_SCORE: 31
WEAR_GLASSES_OR_BLIND: YES
ADLS_ACUITY_SCORE: 35
ADLS_ACUITY_SCORE: 27
ADLS_ACUITY_SCORE: 43
ADLS_ACUITY_SCORE: 50
DRESS: 1-->ASSISTANCE (EQUIPMENT/PERSON) NEEDED
ADLS_ACUITY_SCORE: 43
TRANSFERRING: 1-->ASSISTANCE (EQUIPMENT/PERSON) NEEDED
CHANGE_IN_FUNCTIONAL_STATUS_SINCE_ONSET_OF_CURRENT_ILLNESS/INJURY: YES
WALKING_OR_CLIMBING_STAIRS: AMBULATION DIFFICULTY, REQUIRES EQUIPMENT
FALL_HISTORY_WITHIN_LAST_SIX_MONTHS: YES
BATHING: 1-->ASSISTANCE NEEDED
ADLS_ACUITY_SCORE: 43
DRESSING/BATHING_DIFFICULTY: NO
DOING_ERRANDS_INDEPENDENTLY_DIFFICULTY: YES
TRANSFERRING: 1-->ASSISTANCE (EQUIPMENT/PERSON) NEEDED (NOT DEVELOPMENTALLY APPROPRIATE)
TOILETING_ISSUES: NO
TOILETING: 1-->ASSISTANCE (EQUIPMENT/PERSON) NEEDED
WALKING_OR_CLIMBING_STAIRS_DIFFICULTY: YES
ADLS_ACUITY_SCORE: 41
DOING_ERRANDS_INDEPENDENTLY_DIFFICULTY: NO
NUMBER_OF_TIMES_PATIENT_HAS_FALLEN_WITHIN_LAST_SIX_MONTHS: 2
TOILETING_ISSUES: YES
NUMBER_OF_TIMES_PATIENT_HAS_FALLEN_WITHIN_LAST_SIX_MONTHS: 2
CONCENTRATING,_REMEMBERING_OR_MAKING_DECISIONS_DIFFICULTY: YES
ADLS_ACUITY_SCORE: 50
CONCENTRATING,_REMEMBERING_OR_MAKING_DECISIONS_DIFFICULTY: NO
CHANGE_IN_FUNCTIONAL_STATUS_SINCE_ONSET_OF_CURRENT_ILLNESS/INJURY: NO
DRESS: 1-->ASSISTANCE (EQUIPMENT/PERSON) NEEDED (NOT DEVELOPMENTALLY APPROPRIATE)
DRESSING/BATHING_DIFFICULTY: YES
DRESSING/BATHING: BATHING DIFFICULTY, ASSISTANCE 1 PERSON
ADLS_ACUITY_SCORE: 27

## 2023-04-07 NOTE — PLAN OF CARE
Physical Therapy Discharge Summary    Reason for therapy discharge:    Discharged to acute rehabilitation facility.    Progress towards therapy goal(s). See goals on Care Plan in [facility] electronic health record for goal details.  Goals not met.  Barriers to achieving goals:   discharge from facility.    Therapy recommendation(s):    Continued therapy is recommended.  Rationale/Recommendations:  Patient would benefit from continued skilled PT to progress activity tolerance, strength and independence with all functional mobility.    Goal Outcome Evaluation:

## 2023-04-07 NOTE — PLAN OF CARE
Goal Outcome Evaluation:                    Neuros stable. Slow with ADLs related to respiratory issues. Mild-moderate generalized weakness. Head incision CDI, open to air. Right hip incision CDI, open to air. Denies pain. O2 at 3 L, baseline. Discharging with baldwin for retention, last BM 4/6/23.  Up to commode with 2, sera steady and gait belt. Up in chair. Patient  discharged to acute rehab unit. Family here.

## 2023-04-07 NOTE — PLAN OF CARE
Goal Outcome Evaluation:  VSS. Alert and oriented. [place]. Working with therapies. Reports mild incisional pain in scalp and R hip, taking scheduled Tylenol only. Fair appetite on regular diet. BM on days shift. Roman in place for retention with large output of clear yellow urine. Edema in BLE. Up to chair and toilet with 2, belt, and josé manuel steady. Daughter and cousin at bedside. Daughter asking to speak with social work tomorrow regarding discharge planning, recommendations are for ARU. Scoring green on aggression screening tool.

## 2023-04-07 NOTE — PLAN OF CARE
Reason for Admission: POD 8 Crani for tumor resection  Cognitive/Mentation: A/Ox 4   Neuros/CMS: Stable with generalized weakness. BUE 5/5, RLE 3/5, LLE 4/5 strength. [place]. Slow speech.   VS: VSS on 2L NC   GI: BS audible, + flatus, no BM- refused bowel meds. Continent.  : Baldwin in place for retention, good output.  Pulmonary: LS diminished with frequent productive cough- self-suctions   Pain: Scheduled Tylenol given for pain    Drains/Lines: PIV SL  Skin: Intact with scattered bruising. R hip incision YAZ. Crani incision with staples YAZ. T/R- refused positioning occasionally.   Activity: Assist x 2 with GB/Niki-steady.  Diet: Regular diet with thin liquids. Takes pills whole.   Therapies recs: ARU  Discharge: Pending placement  Aggression Stoplight Tool: Green  End of shift summary: No changes this shift. Pt will discharge with baldwin.

## 2023-04-07 NOTE — PROGRESS NOTES
BRIEF NUTRITION REASSESSMENT        CURRENT DIET AND INTAKE:  Diet:  Regular            Room Service with Assist              Chart reviewed  Note possible ARU today  Pt has been tolerating po  Ordering meals and having some food brought in    ANTHROPOMETRICS:  04/02/23 0400 69.6 kg (153 lb 7 oz) Bed scale   04/01/23 0400 71.1 kg (156 lb 12 oz) Bed scale   03/31/23 0600 69.5 kg (153 lb 3.5 oz) --   03/30/23 0017 69.9 kg (154 lb 1.6 oz) Bed scale   03/29/23 0600 69.2 kg (152 lb 8.9 oz) Bed scale   03/28/23 0508 69.8 kg (153 lb 14.1 oz) --   03/27/23 0600 70.5 kg (155 lb 6.8 oz) --   03/26/23 0000 71.4 kg (157 lb 6.5 oz) Bed scale   03/25/23 0600 71.7 kg (158 lb 1.1 oz) Bed scale   03/24/23 0000 69.8 kg (153 lb 14.1 oz) Bed scale         LABS:  Labs noted    MALNUTRITION:  Patient does not meet two of the criteria necessary for diagnosing malnutrition.       NUTRITION INTERVENTION:  Nutrition Diagnosis:  No nutrition diagnosis at this time.    Implementation:  No intervention at this time  Will continue to assist with meal ordering     FOLLOW UP/MONITORING:   Will re-evaluate in 7 - 10 days, or sooner, if re-consulted.

## 2023-04-07 NOTE — DISCHARGE SUMMARY
[facility]  Discharge Summary  Hospitalist    Date of Admission:  3/23/2023  Date of Discharge:  4/7/2023  Provider:  Mani Grullon DO, [facility]    Discharge Diagnoses   1.  Symptomatic meningioma s/p Bifrontal stealth craniotomy and resection of meningioma  2.  Femoral neck fracture s/p right hip hemiarthroplasty just prior to transfer from outside hospital  3.  Urinary retention, failed voiding trial  4.  Acute on chronic hypoxic and hypercapnic respiratory failure  5.  Heart Failure preserved ejection fraction exacerbation  6.  Possible community acquired pneumonia  7.  Acute metabolic encephalopathy related to above  8.  Dysphagia  9.  Deconditioning  10.  Acute on chronic anemia, partly blood loss related with hip surgery    Other medical issues:  Past Medical History:   Diagnosis Date     Cerebral meningioma      COPD (chronic obstructive pulmonary disease)      Depressive disorder      Eczema      Eczema      Hyperlipidemia      Hypothyroidism      Melanoma of skin      Osteoporosis        History of Present Illness   Josephine Nicole is an 73 year old female who presented with a fall/hip fracture.  Please see the admission history and physical for full details.    Hospital Course   Josephine Nicole was admitted on 3/23/2023.  The following problems were addressed during her hospitalization:    Patient had surgical repair for right femoral neck fracture on 3/20 with postoperative complication including worsening respiratory failure, worsening encephalopathy, lactic acidosis and hypotension.  She also had worsening of her cerebral meningioma which would require surgical resection.  As per the discharging hospitalist at [facility] Hospitalist had discussion with the patient's family and she has been having worsening functional status with increasing, confusion and coordination issues including because of a fall at home.  Neurosurgery Dr. Murphy was contacted on 3/23 who recommended transfer to  [facility] for possible resection of meningioma.  Patient has been admitted to the ICU at [facility] on 3/23/23 due to acute on chronic respiratory failure secondary to advanced COPD/emphysema and fluid overload requiring BiPAP support.  She was initially on BiPAP and had episodes of desaturation on 3/23 on high flow nasal cannula and had to be placed back on BiPAP with recovery.  She had desaturations to 84% while doing oral care on 3/26.  Respiratory status improved on 3/29/2023 was on nasal cannula at 2 L of oxygen.  Patient underwent craniotomy for resection of meningioma on 3/30/2023.  Patient returned from the OR intubated and sedated on propofol. Extubated and gradually recovered.    1. Cerebral Meningioma status postcraniotomy by neurosurgery on 3/30/2023.    Acute toxic metabolic encephalopathy likely from post-op status, steroids, pain meds:     Pt was diagnosed 9/2019 with a cerebral meningioma and monitored.  Given progression of the tumor, patient underwent 15 sessions of radiation 11/2022.  She is followed by Dr. Godoy, a Radiation Oncologist through [facility].  Pt was on oral steroids which were weaned off about 4 weeks ago.  Since that time, patient had increased confusion, poor balance, changes in mood.  * Recent MRI Brain 3/17/23 revealed enlargement of meningioma up to 45 mm with a significant mass effect and the midline shift right to left.  Patient was instructed by her PCP to follow up with outpatient Neurology and Radiation Oncology this week.  Given worsening underwent resection.  Initially had more confusion that was multifactorial, but has gradually improved.  Neurosurgery recommended a decadron taper that she is on currently.  She needs outpatient follow-up with them for post-op check/staple removal approx 2 weeks.      2. Acute on chronic hypoxic and hypercapnic respiratory failure  Pneumonia community acquired  Acute diastolic congestive heart failure exacerbation with fluid  overload, post-op  Oxygen-dependent COPD (2-3L):     She has advanced COPD on 2 to 3 L of oxygen at home for past 5 years..  Patient had increased work of breathing and hypercarbia requiring BiPAP therapy earlier in hospital stay and transfer to the ICU.  Patient finished course of azithromycin and ceftriaxone.  She also had some diuresis for suspected HFpEF exacerbation.  She gradually recovered and is back on her baseline O2.  She was drinking excessive liquid but that improved.  She should be monitored as she recovers for more issues with that.     3. Urinary retention;  Patient's Baldwin catheter was removed on 4/4/2023 for voiding trial.  She did not do well and it was replaced.  Urology consultation obtained during stay with recommendation to replace baldwin for at least a week.  Plan to continue baldwin 7-10 days. Can be removed in ARU or urology clinic.  - Retention likely due to recent anesthesia, immobilization, and narcotic pain medications.      4.  Mucositis, oral sores  Notes sores limiting her po intake,  On magic mouthwash with improvement.     5. Displaced right femoral neck fracture s/p right hip hemiarthroplasty  mechanical fall.  Xray revealed a displaced right femoral neck fracture and a subtle irregularity of the parasymphyseal aspect of the left pubic bone, potentially representing an age indeterminant fracture.   -Orthopedic surgery consult appreciated-continue as needed pain meds   Lovenox discontinued due to craniotomy on 3/29/2023.  Restarted subcu Lovenox for DVT prophylaxis okay with neurosurgery on 4/3/2020.  As recovers this can be transitioned off at the discretion of the rehab provider.  Orthopedics would like to see her back for post-op follow-up around end of April/start of May.     6. Lactic acidosis  Resolved with IV fluids     7.  Acute on Chronic Anemia  ? Preop hemoglobin 10.5 g/dL  ? Hemoglobin 3/26 7.8 g/dL  Follow hemoglobin, consider transfusion for hemoglobin less than 8 g/dL  due to ongoing respiratory failure, desire to optimize if at all possible if planning for surgery  -Prbc given on 3/26/23  Hb 9.8 on 3/27/23  Hemoglobin stable at 11.5 -10.4 -9.8-10.7-10.4  on 4/4/2023     8.   Dysphagia  Speech therapy saw the patient   -Continue aspiration precautions  Patient is currently tolerating regular diet postoperatively     Please see the medical record for further details of her stay.  On day of discharge she felt well and was comfortable with the plan for discharge.       Significant Results and Procedures   Recent hip fracture with repair - see op note  Meningioma resection - see op note       Pending Results     Unresulted Labs Ordered in the Past 30 Days of this Admission     No orders found from 2/21/2023 to 3/24/2023.          Code Status   DNR / DNI       Primary Care Physician   Divina Turner    Blood pressure 111/53, pulse 72, temperature 98.2  F (36.8  C), temperature source Oral, resp. rate 18, weight 69.6 kg (153 lb 7 oz), SpO2 94 %, not currently breastfeeding.    Alert, conversant day of discharge.  Heart regular, lungs clear.    Discharge Disposition   Acute Rehab    Consultations This Hospital Stay   NEUROSURGERY IP CONSULT  PULMONARY IP CONSULT  PALLIATIVE CARE ADULT IP CONSULT  SPEECH LANGUAGE PATH ADULT IP CONSULT  NUTRITION SERVICES ADULT IP CONSULT  PULMONARY IP CONSULT  CARE MANAGEMENT / SOCIAL WORK IP CONSULT  PHYSICAL THERAPY ADULT IP CONSULT  OCCUPATIONAL THERAPY ADULT IP CONSULT  CARE MANAGEMENT / SOCIAL WORK IP CONSULT  PHYSICAL THERAPY ADULT IP CONSULT  OCCUPATIONAL THERAPY ADULT IP CONSULT  UROLOGY IP CONSULT  SOCIAL WORK IP CONSULT  INFECTIOUS DISEASES IP CONSULT    Time Spent on this Encounter   IMani DO, personally saw the patient today and spent greater than 30 minutes discharging this patient.    Discharge Orders      General info for SNF    Length of Stay Estimate: Short Term Care: Estimated # of Days <30  Condition at Discharge:  Stable  Level of care:skilled   Rehabilitation Potential: Fair  Admission H&P remains valid and up-to-date: Yes  Recent Chemotherapy: N/A  Use Nursing Home Standing Orders: Yes     Mantoux instructions    Give two-step Mantoux (PPD) Per Facility Policy Yes     Follow Up and recommended labs and tests    Please call as soon as possible to make an appointment to be seen in Dr. Shun Cuevas's clinic at 6 weeks postop (~5/1/23) for a recheck of your surgical site, possible repeat x-rays, and wound care. If you are at a TCU at this time and they have x-ray capabilities, you may complete your wound care and x-rays at your TCU and have them send your images to Dr. Cuevas's office.     Dr. Cuevas's care coordinator is Neha Way. Please contact her at [phone] to schedule an appointment.       Dr. Cuevas sees patients at 2 clinic locations:  [facility]  [address] [place] [id number]  [facility]   [address], [address], [place] [address]        Please call the on-call phone number [phone] during evenings, nights and weekends for any urgent needs. Prescription refills must be done during business hours by calling [phone].     Wound care    Okay to shower over the right hip surgical site uncovered. No soaking/submersion until 4/10/23. Please allow dermabond to slough off naturally.     Activity - Up with assistive device     Activity - Up with nursing assistance     Weight bearing status    WBAT RLE with walker     Follow Up ([facility])    Your baldwin catheter should be removed 1-2 weeks after placement. Your rehab facility may be able to do this. If they are unable to perform this or you would like further urology evaluation, please reach out to [facility] to schedule baldwin catheter removal in 7-10 days.     [facility]  [address] [place] [address]  You may call [phone] with any questions or concerns.    Central Appointment #: [phone]     Reason for your hospital stay    S/p right hemiarthroplasty for a femoral neck fracture (DOS: 3/20/23) and meningioma procedure     Roman catheter    To straight gravity drainage. Change catheter every 2 weeks and PRN for leaking or decreased urine output with signs of bladder distention. DO NOT change catheter without a specific Provider order IF diagnosis of benign prostatic hypertrophy (BPH), neurogenic bladder, or other urological conditions     Wound care    Monitor head and hip surgical site as surgical team recommended, if any worsening discharge or spreading erythema please notify orthopedics for hip concerns and neurosurgery for head concerns.     Brief Discharge Instructions    Recommend weekly platelet and hemoglobin check while on enoxaparin with results to the facility care provider.  Would also recommend one basic metabolic panel in 1 week with results to her PCP/rehab provider.     Physical Therapy Adult Consult    Evaluate and treat as clinically indicated.    Reason: S/p right hemiarthroplasty for a femoral neck fracture (DOS: 3/20/23)     Occupational Therapy Adult Consult    Evaluate and treat as clinically indicated.    Reason: S/p right hemiarthroplasty for a femoral neck fracture (DOS: 3/20/23)     Fall precautions     Diet    Follow this diet upon discharge: Regular Diet Adult     Discharge Medications   Discharge Medication List as of 4/7/2023  2:30 PM      START taking these medications    Details   senna-docusate (SENOKOT-S/PERICOLACE) 8.6-50 MG tablet Take 1 tablet by mouth 2 times daily, Disp-21 tablet, R-0, Transitional         CONTINUE these medications which have CHANGED    Details   acetaminophen (TYLENOL) 325 MG tablet Take 3 tablets (975 mg) by mouth every 8 hours as needed for mild pain, Disp-100 tablet, R-0, Transitional         CONTINUE these medications which have NOT CHANGED    Details   albuterol (PROVENTIL) (2.5 MG/3ML) 0.083% neb  solution Take 2.5 mg by nebulization 3 times daily as needed for shortness of breath, wheezing or cough, Historical      alendronate (FOSAMAX) 70 MG tablet Take 70 mg by mouth every 7 days, Historical      atorvastatin (LIPITOR) 10 MG tablet Take 10 mg by mouth daily, Historical      butalbital-acetaminophen-caffeine (ESGIC) -40 MG tablet Take 1 tablet by mouth every 6 hours as needed for headaches Max 6 doses per day., Historical      cyanocobalamin (CYANOCOBALAMIN) 1000 MCG/ML injection Inject 1 mL into the muscle every 30 days, Historical      fluticasone-salmeterol (ADVAIR) 500-50 MCG/DOSE inhaler Inhale 1 puff into the lungs 2 times daily, Historical      hydrOXYzine (ATARAX) 10 MG tablet Take 10 mg by mouth every 8 hours as needed for anxiety, Historical      ipratropium - albuterol 0.5 mg/2.5 mg/3 mL (DUONEB) 0.5-2.5 (3) MG/3ML neb solution Take 1 vial by nebulization every 6 hours as needed for shortness of breath, wheezing or cough, Historical      levalbuterol (XOPENEX HFA) 45 MCG/ACT inhaler Inhale 2 puffs into the lungs every 4 hours as needed for shortness of breath or wheezing, Historical      levothyroxine (SYNTHROID/LEVOTHROID) 88 MCG tablet Take 88 mcg by mouth daily, Historical      mirtazapine (REMERON) 15 MG tablet Take 7.5 mg by mouth At Bedtime, Historical      sertraline (ZOLOFT) 25 MG tablet Take 25 mg by mouth every morning, Historical      enoxaparin ANTICOAGULANT (LOVENOX) 40 MG/0.4ML syringe Inject 0.4 mLs (40 mg) Subcutaneous every 24 hours for 30 days, Disp-12 mL, R-0, Transitional      polyethylene glycol (MIRALAX) 17 GM/Dose powder Take 17 g by mouth daily, Disp-510 g, R-0, Transitional      ALPRAZolam (XANAX) 0.5 MG tablet Take 0.25 mg by mouth daily as needed for anxiety, Historical      zolpidem (AMBIEN) 5 MG tablet Take 5 mg by mouth nightly as needed for sleep, Historical             Allergies   Allergies   Allergen Reactions     Bupropion Anaphylaxis, Hives and Shortness  Of Breath     Data   Recent Labs   Lab 04/04/23  0820 04/03/23  0923 04/02/23  0458   WBC 11.8* 12.3* 13.9*   HGB 10.4* 10.7* 9.8*   HCT 32.3* 33.9* 31.6*   MCV 94 94 95    262 225     Recent Labs   Lab 04/06/23  0832 04/04/23  0820 04/03/23  0923 04/02/23  0721 04/02/23  0458 04/01/23  1151 04/01/23  0707    139 137  --  138  --  136   POTASSIUM 4.1 3.8 4.4  --  4.2  --  4.3   CHLORIDE 100 97* 98  --  98  --  98   CO2 34* 35* 35*  --  35*  --  29   ANIONGAP 4* 7 4*  --  5*  --  9   GLC 90 107* 96   < > 126*   < > 98   BUN 13.8 15.6 14.7  --  18.8  --  18.8   CR 0.44* 0.48* 0.46*  --  0.32*  --  0.44*   GFRESTIMATED >90 >90 >90  --  >90  --  >90   CHAO 8.2* 8.5* 8.6*  --  8.2*  --  8.1*   MAG  --   --  2.0  --  1.9  --  1.9   PHOS  --   --  3.8  --  3.8  --  3.2    < > = values in this interval not displayed.     Results for orders placed or performed during the hospital encounter of 03/23/23   XR Chest Port 1 View    Narrative    EXAM: XR CHEST PORT 1 VIEW  LOCATION: [facility]  DATE/TIME: 3/24/2023 6:52 PM    INDICATION: Respiratory failure.  COMPARISON: 03/23/2023      Impression    IMPRESSION:     Hyperinflated lungs. Unchanged hazy bibasilar opacities, either atelectasis or infiltrate. No pleural effusion or pneumothorax.    The cardiomediastinal silhouette is unremarkable.   XR Chest Port 1 View    Narrative    CHEST ONE VIEW  3/27/2023 11:38 AM     HISTORY: Pneumonia.    COMPARISON: March 24, 2023      Impression    IMPRESSION: Increased right base infiltrate although this remains  mild. Small amount of pleural fluid on the right slightly increased  from previous as well. Similar minimal atelectasis and/or infiltrate  at the left base.     CAROLINA VASQUEZ MD         SYSTEM ID:  [id number]   MR Brain w Contrast Stereotactic    Narrative    MRI BRAIN WITH CONTRAST  3/29/2023 3:25 PM     HISTORY: Intracranial mass, preoperative planning.    TECHNIQUE: Multiplanar, multisequence  MRI of the brain with gadobutrol  (GADAVIST) injection 15 mL.     COMPARISON: CT of the head 3/19/2023. Correlation is also made with  reports from previous outside brain MRIs performed in the [facility] system (although images from these exams are not available for  direct review), most recent 3/17/2023.    FINDINGS: Scans are obtained mainly for preoperative stereotactic  localization. There is enhancing extra-axial right anterior  parafalcine mass measuring 41 mm x 29 mm x 33 mm (series 3 image 60).  The mass is predominantly homogeneously enhancing, but there are some  areas of scattered heterogeneity/hypoenhancement within the lesion.  Additionally, there is ill-defined parenchymal enhancement surrounding  this mass involving the paramedian right frontal lobe (for example see  series 3 image 62). There is moderate vasogenic edema in the right  frontal lobe. There is also abnormal prominent leptomeningeal  enhancement in the paramedian frontal sulci and also along the  bilateral frontal poles/frontal convexities with associated abnormal  T2 FLAIR hyperintense signal. Given that this mass has been present  for a few years, it likely represents a meningioma rather than an  alternative neoplasm such as metastasis. However, surrounding  enhancement involving the paramedian right frontal lobe parenchyma as  well as adjacent leptomeningeal enhancement would be unusual for a  typical grade 1 meningioma, and this may represent an atypical or  malignant meningioma, possibly with leptomeningeal  dissemination/tumor.    Associated mass effect results in narrowing of the bilateral lateral  ventricles and third ventricle. No findings of ventricular  entrapment/hydrocephalus. Mild leftward midline shift appears  unchanged from most recent CT exam.    There is a background of mild generalized brain parenchymal volume  loss. Mild patchy nonspecific T2 FLAIR hyperintense signal in the  cerebral white matter, likely due to  chronic small vessel ischemic  disease. Scattered fluid/membrane thickening in the left greater than  right mastoid air cells. Presumed small amount of secretions in the  left nasal cavity.      Impression    IMPRESSION:    1. Imaging performed primarily for stereotactic localization purposes.  2. Enhancing right anterior parafalcine extra-axial mass, as  described. There is surrounding amorphous enhancement involving the  paramedian right frontal lobe as well as prominent leptomeningeal  enhancement involving the paramedian frontal sulci and extending along  the frontal poles/frontal convexities. These findings would be unusual  in the case of a typical grade 1 meningioma. Findings raise concern  for atypical or malignant transformation of a meningioma with possible  leptomeningeal dissemination of tumor. The possibility that the  dominant extra-axial mass represents an alternative neoplasm is  thought to be less likely given that it has reportedly been present  for at least a few years. The possibility of meningioma with  coexisting/superimposed leptomeningeal tumor/malignancy is not  entirely excluded, but much less likely. Infectious leptomeningitis is  thought to be less likely. Clinical correlation is recommended.  3. Moderate right frontal lobe vasogenic edema. Local mass effect  results in mild narrowing of the supratentorial ventricular system. No  findings of ventricular entrapment/hydrocephalus.  4. Previous MR imaging of the brain is not available for direct review  at this time. If outside MRI exams are submitted to PACS, these can be  reviewed and an addendum to this report can be issued, as clinically  warranted.    Findings were discussed by myself with Dr. Cortez of the ICU at  approximately 4:35 PM on 3/29/2023.    CAROLINA CASTELLON MD         SYSTEM ID:  [id number]   MR Brain w/o & w Contrast    Narrative    MRI BRAIN WITHOUT AND WITH CONTRAST  3/31/2023 1:00 PM     HISTORY:  Status post craniotomy for  tumor resection.     TECHNIQUE:  Multiplanar, multisequence MRI of the brain without and  with 7 mL Gadavist.     COMPARISON: Preoperative brain MR 3/29/2023.     FINDINGS: New postoperative changes with frontal craniotomy and  resection of previous enhancing extra-axial mass along the right  frontal cerebral convexity. No significant enhancing nodular tissue is  seen near the operative bed, although there is thin enhancement along  the operative bed which may be postsurgical in nature. There is  postoperative fluid collection and/or blood products near the  operative bed. Thin fluid collection deep to the craniotomy margin.  Again seen is leptomeningeal enhancement predominantly along the  medial aspects of the frontal lobes bilaterally. T2 hyperdense signal  abnormality in the medial right frontal lobe appears fairly similar to  prior MR 3/29/2023 given the new postoperative findings. Small  cortical foci of restricted diffusion along the inferior medial right  frontal lobe adjacent to the operative margin likely represents  postoperative contusion.    No additional sites of restricted diffusion in the brain parenchyma to  suggest infarcts. Previous mass effect on the anterior cerebral  convexity and frontal horns of the lateral ventricles is much  improved. No evidence of hydrocephalus or ventricular entrapment.  Basal cisterns are patent.    Small left mastoid effusion, this was also present on prior.       Impression    IMPRESSION:    1. New postoperative changes following resection of enhancing  extra-axial mass compared to MR 3/29/2023. No definite residual  nodular enhancement appreciated, although there is thin enhancement  along the operative bed which may be postsurgical in nature. Continued  follow-up is recommended.  2. Small postoperative contusion in the inferior medial right frontal  lobe along the inferior margin of the resection cavity. No other areas  of infarct. Previous mass effect on the  frontal horns of the lateral  ventricles is improved. No hydrocephalus.  3. Persistent leptomeningeal enhancement most pronounced in the medial  frontal lobes bilaterally. Differential is similar to prior MR with  possibility of neoplastic leptomeningeal spread.  4. T2 hyperintense signal in the right frontal lobe adjacent to the  previous extra-axial mass appears similar to previous MR 3/29/2023  given the new postoperative findings.    BEBE HUANG MD         SYSTEM ID:  [id number]   XR Chest Port 1 View    Narrative    XR CHEST PORT 1 VIEW 3/30/2023 1:14 PM    HISTORY: Endotracheal tube positioning    COMPARISON: 3/27/2023      Impression    IMPRESSION: The endotracheal tube tip is about 5 cm above the tawny.  Mild bibasilar atelectasis. No infiltrate, pleural effusion or thorax.  The cardiac and mediastinal silhouettes are normal.    SB HUGHES MD         SYSTEM ID:  [id number]   XR Chest Port 1 View    Narrative    EXAM: XR CHEST PORT 1 VIEW  LOCATION: [facility]  DATE/TIME: 3/31/2023 5:48 AM    INDICATION: Pulmonary edema.  COMPARISON: Portable chest single view 3/30/2023 at 1240 hours.      Impression    IMPRESSION: The patient has been extubated. Extensive emphysematous changes. Strands of platelike atelectasis in the lung bases with associated likely small bilateral pleural effusions. Cardiac size approaches upper limits of normal with normal pulmonary   vascularity. Degenerative changes both shoulders and the spine. Monitoring leads overlying the chest.   XR Pelvis w Hip Right 1 View    Narrative    XR PELVIS AND HIP RIGHT 1 VIEW 4/3/2023 3:29 PM     HISTORY: Routine postop x-rays, s/p right hip hemiarthroplasty    COMPARISON: 3/20/2023       Impression    IMPRESSION: Postoperative changes right bipolar hip endoprosthesis.  There are fractures of the right superior and inferior pubic rami and  the left pubic body extending into the left pubic rami but these are  stable from the  previous exam. No new fractures are identified. The  left hip is negative for fracture.    SLIM BRADFORD MD         SYSTEM ID:  [id number]   CT Head w/o Contrast    Narrative    CT SCAN OF THE HEAD WITHOUT CONTRAST   4/4/2023 1:19 PM     HISTORY: Status post intracranial mass resection.    TECHNIQUE:  Axial images of the head and coronal reformations without  IV contrast material. Radiation dose for this scan was reduced using  automated exposure control, adjustment of the mA and/or kV according  to patient size, or iterative reconstruction technique.    COMPARISON: MRI of the brain 3/31/2023 and 3/29/2023. CT of the head  3/19/2023.    FINDINGS: Again seen are postoperative changes status post anterior  right paramedian frontal craniotomy. A resection cavity is again seen  in the paramedian aspect of the anterior right frontal lobe. Mild to  moderate vasogenic edema in the anterior right frontal lobe is not  significantly changed from most recent MRI, accounting for differences  in technique. However, this appears mildly decreased compared to the  prior CT exam of 3/19/2023. There is small amount of hyperdense  material along the margin of the right frontal lobe resection cavity,  particularly posteriorly (series 2 image 17), likely representing a  small amount of postoperative blood products. There also may be a  small amount of residual calcification marginating the resection  cavity, which was present on the preoperative CT of 3/19/2023. Mild  residual local mass effect with minimal narrowing of the frontal horns  of the lateral ventricles on both sides. No hydrocephalus. Trace  pneumocephalus along the frontal pole regions on both sides, which is  likely postoperative in nature. There is slight hyperdensity along the  left anterior superior aspect of the falx cerebri (series 2 image 26,  series 5 image 23) measuring up to approximately 2-3 mm in thickness.  This could represent trace left parafalcine subdural  blood. No  evidence for progressive mass effect. No significant midline  shift/herniation.    The paranasal sinuses are clear. Scattered flow/membrane thickening in  the left greater than right mastoid air cells, as before. Degenerative  changes of the temporomandibular joints. There is air within the scalp  soft tissues overlying the craniotomy flap, likely postoperative in  nature.      Impression    IMPRESSION:  1. Redemonstration of postoperative changes status post right  paramedian anterior frontal craniotomy for resection of the previously  demonstrated extra-axial right anterior parafalcine mass as described.  Small amount of hyperdense material marginating the resection cavity  particularly posteriorly may represent minimal postoperative blood  products and/or pre-existing calcification marginating the previously  demonstrated mass.  2. Similar degree of vasogenic edema in the right frontal lobe with  mild mass effect. No significant midline shift/herniation. No  ventricular entrapment/hydrocephalus.  3. Minimal hyperdensity along the left anterior superior aspect of the  falx cerebri may represent trace subdural blood products. No  associated mass effect.    CAROLINA CASTELLON MD         SYSTEM ID:  [id number]   Echocardiogram Complete     Value    LVEF  55-60%    Narrative    [id number]  [id number]  [id number]^HEM                                                                       Version 2     [facility]  Echocardiography Laboratory  [address] [place] [address]     Name: ANUJ JENNINGS  MRN: [id number]  : 1950  Study Date: 2023 02:04 PM  Age: 73 yrs  Gender: Female  Patient Location: [facility]  Reason For Study: Shock  Ordering Physician: RICHIE PLATA  Referring Physician: EVERETTE PRAKASH  Performed By: Tasha Hunter     BSA: 1.7 m2  Height: 62 in  Weight: 155 lb  HR: 79  BP: 106/69  mmHg  ______________________________________________________________________________  Procedure  Complete Portable Echo Adult. Optison (NDC #[id number]) given intravenously.  ______________________________________________________________________________  Interpretation Summary     The left ventricle is normal in size.  There is normal left ventricular wall thickness.  The visual ejection fraction is 55-60%.  Grade I or early diastolic dysfunction.  Regional wall motion abnormalities cannot be excluded due to limited  visualization.  Poorly visualized valves, but likely no hemodynamically significant valvular  disease by Doppler.  ______________________________________________________________________________  Left Ventricle  The left ventricle is normal in size. There is normal left ventricular wall  thickness. The visual ejection fraction is 55-60%. Grade I or early diastolic  dysfunction. Regional wall motion abnormalities cannot be excluded due to  limited visualization.     Right Ventricle  The right ventricle is not well visualized.     Atria  Normal left atrial size. Right atrial size is normal. There is no color  Doppler evidence of an atrial shunt.     Mitral Valve  The mitral valve is not well visualized. There is trace mitral regurgitation.     Tricuspid Valve  There is trace tricuspid regurgitation. IVC diameter and respiratory changes  fall into an intermediate range suggesting an RA pressure of 8 mmHg.     Aortic Valve  The aortic valve is not well visualized. No aortic regurgitation is present.     Pulmonic Valve  There is trace pulmonic valvular regurgitation.     Vessels  The aortic root is not well visualized.     Pericardium  Trivial pericardial effusion.     Rhythm  Sinus rhythm was noted.  ______________________________________________________________________________  MMode/2D Measurements & Calculations  Ao root diam: 3.3 cm     asc Aorta Diam: 3.2 cm  LVOT diam: 1.8 cm  LVOT area: 2.5 cm2      Doppler Measurements & Calculations  MV E max josue: 59.2 cm/sec  MV A max josue: 85.4 cm/sec  MV E/A: 0.69  MV dec time: 0.19 sec  Ao V2 max: 113.0 cm/sec  Ao max P.0 mmHg  Ao V2 mean: 73.8 cm/sec  Ao mean P.8 mmHg  Ao V2 VTI: 20.4 cm  CUCA(I,D): 2.5 cm2  CUCA(V,D): 2.5 cm2  LV V1 max P.0 mmHg  LV V1 max: 111.5 cm/sec  LV V1 VTI: 20.3 cm  SV(LVOT): 51.5 ml  SI(LVOT): 30.0 ml/m2  PA acc time: 0.13 sec  AV Josue Ratio (DI): 0.99  CUCA Index (cm2/m2): 1.5  E/E' av.9  Lateral E/e': 6.8  Medial E/e': 9.1  RV S Josue: 8.9 cm/sec     ______________________________________________________________________________  Report approved by: Papa Edmondson 2023 04:17 PM

## 2023-04-07 NOTE — PLAN OF CARE
Occupational Therapy Discharge Summary    Reason for therapy discharge:    Discharged to acute rehabilitation facility.    Progress towards therapy goal(s). See goals on Care Plan in [facility] electronic health record for goal details.  Goals partially met.  Barriers to achieving goals:   discharge from facility.    Therapy recommendation(s):    Continued therapy is recommended.  Rationale/Recommendations:  Pt functioning well below baseline, typically ambulates w/o AD; pt has good family support and lives w/ daughter who can assist. Pt would benefit from multidisciplinary approach to rehab to progress functional IND prior to returning to home..     Writing therapist did not treat pt, note written based on previous treating therapist notes and recommendations. Please see chart and flow sheet for additional details.

## 2023-04-07 NOTE — PROGRESS NOTES
[facility]    Neurosurgery Progress Note    Date of Service (when I saw the patient): 04/07/2023     Assessment & Plan     Procedure(s):  Bifrontal stealth craniotomy and resection of meningioma   -8 Days Post-Op    Doing well. Exam today is stable including mild left intermittent ptosis. Patient states she has had this >1yr.     Plan:  - neuro checks  -Routine wound care  -Pain management as needed  -Activity as tolerated  -Appreciate assistance from other services  -Continue decadron taper as ordered   - lovenox 40mg daily     Case management to arrange discharge placement , can be discharged anytime from our perspective      Subjective:  Josephine has no complaints. Would like some coffee.  Denies headache, dizziness, n./v, change in vision, or weakness. She reports having left eyelid weakness for over a year, aware and has 'had her eyes checked and they are normal'.      Physical Exam   Temp: 98.2  F (36.8  C) Temp src: Oral BP: 111/53 Pulse: 72   Resp: 18 SpO2: 94 %     NEUROLOGICAL EXAMINATION:   Mental status:  Alert, oriented x2, not oriented to year. Laying in bed. Quite. Very few words responding to questions.     PERRL, EOMs intact, mild left ptosis intermittently, able to open the eye fully at times. Face appears equal, tongue midline moves freely, hearing intact, shoulder shrug equal and strong    Full strength throughout both upper and lower extremities, all planes  Sensation is intact throughout both upper and lower extremities    Incision is CDI with sutures no swelling redness or discharge    MIGNON Kimbrough   [facility] neurosurgery

## 2023-04-07 NOTE — PROGRESS NOTES
Care Management Discharge Note    Discharge Date: 04/07/2023       Discharge Disposition: Acute Rehab at [facility]    Discharge Services:      Discharge DME:      Discharge Transportation: stretcher ride    Private pay costs discussed: transportation costs likely covered as pt need stretcher at this time    PAS Confirmation Code:  n/a  Patient/family educated on Medicare website which has current facility and service quality ratings:      Education Provided on the Discharge Plan:  yest  Persons Notified of Discharge Plans: Hospitalist, IFTIKHAR,RN,HUC,BB pt's daughterJordan  Patient/Family in Agreement with the Plan: yes     Handoff Referral Completed: Yes    Additional Information:  Pt has potential bed at [facility] today pending bed confirmation. Ride requested for 14:30 today.   scheduled ride for 14:15- 14:50.  PCS completed, faxed and placed on pt chart for time of discharge. Pt is on O2 and currently unable to self regulate.  DaughterJordan plans to visit pt this evening at [facility].    14:07: Pt is able to discharge to [facility]. Ride scheduled for approximately 2:30pm.  Hospitalist paged for orders.    ARTURO Pugh  [facility]  Care Transitions  [phone]

## 2023-04-08 ENCOUNTER — APPOINTMENT (OUTPATIENT)
Dept: PHYSICAL THERAPY | Facility: CLINIC | Age: 73
DRG: 949 | End: 2023-04-08
Attending: PHYSICAL MEDICINE & REHABILITATION
Payer: COMMERCIAL

## 2023-04-08 ENCOUNTER — APPOINTMENT (OUTPATIENT)
Dept: SPEECH THERAPY | Facility: CLINIC | Age: 73
DRG: 949 | End: 2023-04-08
Attending: PHYSICAL MEDICINE & REHABILITATION
Payer: COMMERCIAL

## 2023-04-08 ENCOUNTER — APPOINTMENT (OUTPATIENT)
Dept: OCCUPATIONAL THERAPY | Facility: CLINIC | Age: 73
DRG: 949 | End: 2023-04-08
Attending: PHYSICAL MEDICINE & REHABILITATION
Payer: COMMERCIAL

## 2023-04-08 LAB
ANION GAP SERPL CALCULATED.3IONS-SCNC: 6 MMOL/L (ref 7–15)
BUN SERPL-MCNC: 18.7 MG/DL (ref 8–23)
CALCIUM SERPL-MCNC: 8.3 MG/DL (ref 8.8–10.2)
CHLORIDE SERPL-SCNC: 98 MMOL/L (ref 98–107)
CREAT SERPL-MCNC: 0.47 MG/DL (ref 0.51–0.95)
DEPRECATED HCO3 PLAS-SCNC: 33 MMOL/L (ref 22–29)
ERYTHROCYTE [DISTWIDTH] IN BLOOD BY AUTOMATED COUNT: 14.4 % (ref 10–15)
GFR SERPL CREATININE-BSD FRML MDRD: >90 ML/MIN/1.73M2
GLUCOSE SERPL-MCNC: 81 MG/DL (ref 70–99)
HCT VFR BLD AUTO: 33.2 % (ref 35–47)
HGB BLD-MCNC: 10.5 G/DL (ref 11.7–15.7)
MCH RBC QN AUTO: 29.4 PG (ref 26.5–33)
MCHC RBC AUTO-ENTMCNC: 31.6 G/DL (ref 31.5–36.5)
MCV RBC AUTO: 93 FL (ref 78–100)
PLATELET # BLD AUTO: 224 10E3/UL (ref 150–450)
POTASSIUM SERPL-SCNC: 4 MMOL/L (ref 3.4–5.3)
RBC # BLD AUTO: 3.57 10E6/UL (ref 3.8–5.2)
SODIUM SERPL-SCNC: 137 MMOL/L (ref 136–145)
WBC # BLD AUTO: 9.6 10E3/UL (ref 4–11)

## 2023-04-08 PROCEDURE — 97535 SELF CARE MNGMENT TRAINING: CPT | Mod: GO

## 2023-04-08 PROCEDURE — 250N000013 HC RX MED GY IP 250 OP 250 PS 637: Performed by: PHYSICIAN ASSISTANT

## 2023-04-08 PROCEDURE — 96125 COGNITIVE TEST BY HC PRO: CPT | Mod: GN

## 2023-04-08 PROCEDURE — 36415 COLL VENOUS BLD VENIPUNCTURE: CPT | Performed by: PHYSICIAN ASSISTANT

## 2023-04-08 PROCEDURE — 250N000011 HC RX IP 250 OP 636: Performed by: PHYSICIAN ASSISTANT

## 2023-04-08 PROCEDURE — 99232 SBSQ HOSP IP/OBS MODERATE 35: CPT | Performed by: PHYSICAL MEDICINE & REHABILITATION

## 2023-04-08 PROCEDURE — 80048 BASIC METABOLIC PNL TOTAL CA: CPT | Performed by: PHYSICIAN ASSISTANT

## 2023-04-08 PROCEDURE — 97166 OT EVAL MOD COMPLEX 45 MIN: CPT | Mod: GO

## 2023-04-08 PROCEDURE — 97530 THERAPEUTIC ACTIVITIES: CPT | Mod: GP

## 2023-04-08 PROCEDURE — 250N000012 HC RX MED GY IP 250 OP 636 PS 637: Performed by: PHYSICIAN ASSISTANT

## 2023-04-08 PROCEDURE — 97112 NEUROMUSCULAR REEDUCATION: CPT | Mod: GP

## 2023-04-08 PROCEDURE — 85014 HEMATOCRIT: CPT | Performed by: PHYSICIAN ASSISTANT

## 2023-04-08 PROCEDURE — 97163 PT EVAL HIGH COMPLEX 45 MIN: CPT | Mod: GP

## 2023-04-08 PROCEDURE — 97530 THERAPEUTIC ACTIVITIES: CPT | Mod: GO

## 2023-04-08 PROCEDURE — 128N000003 HC R&B REHAB

## 2023-04-08 RX ADMIN — DIPHENHYDRAMINE HYDROCHLORIDE AND LIDOCAINE HYDROCHLORIDE AND ALUMINUM HYDROXIDE AND MAGNESIUM HYDRO 10 ML: KIT at 16:38

## 2023-04-08 RX ADMIN — SENNOSIDES AND DOCUSATE SODIUM 1 TABLET: 50; 8.6 TABLET ORAL at 20:08

## 2023-04-08 RX ADMIN — FLUTICASONE FUROATE AND VILANTEROL TRIFENATATE 1 PUFF: 200; 25 POWDER RESPIRATORY (INHALATION) at 20:02

## 2023-04-08 RX ADMIN — ACETAMINOPHEN 325MG 975 MG: 325 TABLET ORAL at 14:57

## 2023-04-08 RX ADMIN — ATORVASTATIN CALCIUM 10 MG: 10 TABLET, FILM COATED ORAL at 09:35

## 2023-04-08 RX ADMIN — DIPHENHYDRAMINE HYDROCHLORIDE AND LIDOCAINE HYDROCHLORIDE AND ALUMINUM HYDROXIDE AND MAGNESIUM HYDRO 10 ML: KIT at 06:41

## 2023-04-08 RX ADMIN — SENNOSIDES AND DOCUSATE SODIUM 1 TABLET: 50; 8.6 TABLET ORAL at 09:35

## 2023-04-08 RX ADMIN — DIPHENHYDRAMINE HYDROCHLORIDE AND LIDOCAINE HYDROCHLORIDE AND ALUMINUM HYDROXIDE AND MAGNESIUM HYDRO 10 ML: KIT at 11:40

## 2023-04-08 RX ADMIN — ACETAMINOPHEN 325MG 975 MG: 325 TABLET ORAL at 06:37

## 2023-04-08 RX ADMIN — SERTRALINE HYDROCHLORIDE 25 MG: 25 TABLET ORAL at 09:35

## 2023-04-08 RX ADMIN — ENOXAPARIN SODIUM 40 MG: 40 INJECTION SUBCUTANEOUS at 16:39

## 2023-04-08 RX ADMIN — MIRTAZAPINE 7.5 MG: 7.5 TABLET, FILM COATED ORAL at 21:07

## 2023-04-08 RX ADMIN — LEVOTHYROXINE SODIUM 88 MCG: 88 TABLET ORAL at 09:35

## 2023-04-08 RX ADMIN — DIPHENHYDRAMINE HYDROCHLORIDE AND LIDOCAINE HYDROCHLORIDE AND ALUMINUM HYDROXIDE AND MAGNESIUM HYDRO 10 ML: KIT at 21:07

## 2023-04-08 RX ADMIN — ACETAMINOPHEN 325MG 975 MG: 325 TABLET ORAL at 21:07

## 2023-04-08 RX ADMIN — FUROSEMIDE 20 MG: 20 TABLET ORAL at 09:35

## 2023-04-08 RX ADMIN — PANTOPRAZOLE SODIUM 40 MG: 40 TABLET, DELAYED RELEASE ORAL at 06:37

## 2023-04-08 RX ADMIN — DEXAMETHASONE 4 MG: 2 TABLET ORAL at 09:35

## 2023-04-08 ASSESSMENT — ACTIVITIES OF DAILY LIVING (ADL)
ADLS_ACUITY_SCORE: 50
ADLS_ACUITY_SCORE: 56
ADLS_ACUITY_SCORE: 56
BADLS,_PREVIOUS_FUNCTIONAL_LEVEL: INDEPENDENT
ADLS_ACUITY_SCORE: 50
ADLS_ACUITY_SCORE: 56
IADLS,_PREVIOUS_FUNCTIONAL_LEVEL: PARTIAL ASSISTANCE
ADLS_ACUITY_SCORE: 56
ADLS_ACUITY_SCORE: 56
ADLS_ACUITY_SCORE: 50
ADLS_ACUITY_SCORE: 50
ADLS_ACUITY_SCORE: 56
IADLS,_PREVIOUS_FUNCTIONAL_LEVEL: PARTIAL ASSISTANCE
ADLS_ACUITY_SCORE: 60
BADLS,_PREVIOUS_FUNCTIONAL_LEVEL: INDEPENDENT
ADLS_ACUITY_SCORE: 50

## 2023-04-08 NOTE — PROGRESS NOTES
04/08/23 1300   Appointment Info   Signing Clinician's Name / Credentials (PT) Jose Rafael Long, SPT   Student Supervision Direct supervision provided;Therapy services provided with the co-signing licensed therapist guiding and directing the services, and providing the skilled judgement and assessment throughout the session   Living Environment   People in Home child(tristen), adult   Current Living Arrangements house   Home Accessibility stairs to enter home;stairs within home   Number of Stairs, Main Entrance 2   Stair Railings, Main Entrance none   Number of Stairs, Within Home, Primary greater than 10 stairs   Stair Railings, Within Home, Primary railing on right side (ascending)   Transportation Anticipated family or friend will provide   Living Environment Comments PT: Pt lives with daughter in house, all needs on main level. Bathroom has a tub/shower with no grab bars.   Self-Care   Usual Activity Tolerance good   Current Activity Tolerance fair   Regular Exercise No   Equipment Currently Used at Home wheelchair, manual   Fall history within last six months yes   Number of times patient has fallen within last six months 2   Activity/Exercise/Self-Care Comment PT: Baseline 2.5 L O2 via nasal canula. Reports IND with most ADLs, assist from daughter at baseline for tub transfer, daughter assists with most IADLs, pt reports occasionally drives. Walks without AD in home, but reports using manual w/c for community mobility.   Post-Acute Assessment Only   Post-Acute Functional Assessment See below   Previous Level of Function/Home Environm   Bathing/Grooming, Premorbid Functional Level partial assistance   Dressing, Premorbid Functional Level independent   Eating/Feeding, Premorbid Functional Level independent   Toileting, Premorbid Functional Level independent   BADLs, Premorbid Functional Level independent   IADLs, Premorbid Functional Level partial assistance   Bed Mobility, Premorbid Functional Level independent    Transfers, Premorbid Functional Level independent   Household Ambulation, Premorbid Functional Level independent   Stairs, Premorbid Functional Level independent   Community Ambulation, Premorbid Functional Level partial assistance;uses wheelchair  (Simultaneous filing. User may not have seen previous data.)   General Information   Onset of Illness/Injury or Date of Surgery 03/19/23   Referring Physician Kris Ray MD   Patient/Family Therapy Goals Statement (PT) Improve mobility to return to PLOF   Pertinent History of Current Problem (include personal factors and/or comorbidities that impact the POC) R bifrontal craniotomy and resection of meningioma  R femoral neck fracture s/p R hip hemiarthroplasty, hx of COPD, heart failure   Existing Precautions/Restrictions oxygen therapy device and L/min;fall  (3 L O2)   Weight-Bearing Status - RLE weight-bearing as tolerated   Cognition   Cognitive Status Comments [place], able to follow single commands, needs cueing to refocus during session   Pain Assessment   Patient Currently in Pain Yes, see Vital Sign flowsheet  (R and L hip)   Integumentary/Edema   Integumentary/Edema other (describe)   Integumentary/Edema Comments 2+ edema bilateral in feet and lower legs.  (Simultaneous filing. User may not have seen previous data.)   Posture    Posture Forward head position;Kyphosis   Posture Comments Leaned forward in sitting and standing   Range of Motion (ROM)   Range of Motion ROM deficits secondary to weakness   ROM Comment PROM WFL, AROM decreased due to strength in LEs   Strength (Manual Muscle Testing)   Strength (Manual Muscle Testing) Deficits observed during functional mobility   Strength Comments Grossly 3-/5 to 3/5 strength for bilat LEs. Pt able to maintain standing, however very difficult to perform STS transfer. 5xSTS score was 0.  (Simultaneous filing. User may not have seen previous data.)   Balance   Balance other (describe)   Balance Comments Pt able  to sit unsupported for extended period, pt needs heavy assist to maintain standing. Barone 4/56. 10MWT was 0m/s.  (Simultaneous filing. User may not have seen previous data.)   Sensory Examination   Sensory Perception other (describe)   Sensory Perception Comments Light touch and proprioception intact. Pt ~80% accurate with protective sensation. Sharp/dull and vibration not intact bilat LEs.   Coordination   Coordination other (see comments)   Coordination Comments Pt having difficulty with moving feet or taking steps, however this appears due to decrased strength/endurance   Muscle Tone   Muscle Tone no deficits were identified   Clinical Impression   Criteria for Skilled Therapeutic Intervention Yes, treatment indicated   PT Diagnosis (PT) Impaired functional mobility   Influenced by the following impairments Decreased strength, endurance, balance, possibly impaired cognition  (Simultaneous filing. User may not have seen previous data.)   Functional limitations due to impairments Bed mobility, transfers, gait, stairs, car transfer, stairs   Clinical Presentation (PT Evaluation Complexity) Unstable/Unpredictable   Clinical Presentation Rationale Pt has multiple comorbidities in various categories. Pt has had decreased activity for the last 3 weeks due to complications encountered at hospital   Clinical Decision Making (Complexity) high complexity   Planned Therapy Interventions (PT) balance training;bed mobility training;gait training;home exercise program;motor coordination training;neuromuscular re-education;patient/family education;postural re-education;ROM (range of motion);stair training;strengthening;transfer training;wheelchair management/propulsion training;progressive activity/exercise;risk factor education;home program guidelines   Anticipated Equipment Needs at Discharge (PT) walker, rolling  (Pt owns manual wheelchair, will need FWW)   Risk & Benefits of therapy have been explained evaluation/treatment  results reviewed;care plan/treatment goals reviewed;risks/benefits reviewed;current/potential barriers reviewed;participants voiced agreement with care plan;participants included;patient;daughter   Clinical Impression Comments Pt is a 74 y/o female s/p R bifrontal craniotomy and resection of meningioma, R femoral neck fracture resulting in R hip hemiarthroplasty. Pt has hx of COPD which has resulted in respiratory failure during hospitalization, has 2L O2 via nasal canula at baseline. Pt has decreased strength, balance, and overall endurance that is limiting functional mobility. Pt would benefit from skilled PT services to improve strength, ROM, and endurance to help with gait, transfers, stairs. Pt owns manual wheelchair but will most likely need FWW. ELOS 21 days.  (Simultaneous filing. User may not have seen previous data.)   PT Total Evaluation Time   PT Eval, High Complexity Minutes (19853) 40   Physical Therapy Goals   PT Frequency 2x/day   PT Predicted Duration/Target Date for Goal Attainment 04/28/23   PT Goals Bed Mobility;Transfers;Gait;Stairs;Wheelchair Mobility;PT Goal 1;PT Goal 2   PT: Bed Mobility Modified independent;Supine to/from sit  (Simultaneous filing. User may not have seen previous data.)   PT: Transfers Supervision/stand-by assist;Sit to/from stand;Assistive device   PT: Gait Supervision/stand-by assist;50 feet;Rolling walker   PT: Stairs 2 stairs;Minimal assist;Assistive device   PT: Goal 1 PT: Primitivo with car transfer   PT: Goal 2 PT: Pt will verbalize 3 strategies to decrease risk of falls at home   Interventions   Interventions Quick Adds Gait Training;Neuromuscular Re-ed;Therapeutic Activity;Therapeutic Procedure   Therapeutic Activity   Therapeutic Activities: dynamic activities to improve functional performance Minutes (62172) 25   Treatment Detail/Skilled Intervention PT: Performed GG items. Provided pt education on goals of physical therapy and projected timeline and PT interventions  anticipated during time at acute rehab.  (Simultaneous filing. User may not have seen previous data.)   Neuromuscular Re-education   Neuromuscular Re-Education Minutes (73141) 10   Symptoms Noted During/After Treatment fatigue   Treatment Detail/Skilled Intervention PT: Performed standing weight shifts at bedside with HHA max A and in // with chair behind mod A. Pt able to attempt marching in standing, however difficult to lift foot off ground.   PT Discharge Planning   PT Plan PT: Perform OM of 30sec STS or Barone, work on standing tolerance, steps in //, LE strengthening in sitting or standing.  (Simultaneous filing. User may not have seen previous data.)   PT Discharge Recommendation (DC Rec) home with home care physical therapy   Total Session Time   Timed Code Treatment Minutes 35   Total Session Time (sum of timed and untimed services) 75   Post Acute Settings Only   What unit is patient on? Acute Rehab   PT - Acute Rehab Center Time   Individual Time (minutes) - enter zero if not applicable - PT 75  (40 Eval, 25 TA, 10 NM)   Group Time (minutes) - enter zero if not applicable  - PT 0   Concurrent Time (minutes) - enter zero if not applicable  - PT 0   Co-Treatment Time (minutes) - enter zero if not applicable  - PT 0   ARC Total Session Time (minutes) - PT 75   Chair/bed-to-chair Transfer   Complete independence for chair-bed-to-chair transfer no   Physical assistance for getting from chair-bed-to-chair Requires maximal assistance- lifting AND lowering assist provided by staff for getting from chair-bed-to-chair, 51%-75% assist provided by staff   Roll Left and Right   Physical Assistance Level 50%-74%   Roll Left to Right CARE Score 2   Sit to Lying   Physical Assistance Level 50%-74%   Sit to Lying CARE Score 2   Lying to Sitting on Side of Bed   Physical Assistance Level 50%-74%   Lying to Sitting CARE Score 2   Sit to Stand   Physical Assistance Level Total assistance   Sitting to Standing CARE Score 1    Locomotion   Assistive Devices Other assistive device (see comments)  (Parallel bars)   Locomotion Comment PT: Amb 3' in //, modA with chair follow   Walk 10 Feet   Reason if not Attempted Safety concerns   Walk 10 Ft. CARE Score 88   Walk 50 Feet with Two Turns   Reason if not Attempted Safety concerns   Walk 50 Ft. CARE Score 88   Walk 150 Feet   Reason if not Attempted Safety concerns   Walk 150 Ft. CARE Score 88   Walking 10 Feet on Uneven Surfaces   Reason if not Attempted Safety concerns   Walking 10 Feet on Uneven Surfaces CARE Score 88   Wheel 50 Feet with Two Turns   Reason if not Attempted Safety concerns  (Simultaneous filing. User may not have seen previous data.)   Wheel 50 Feet with Two Turns CARE Score 88   Wheel 150 Feet   Reason if not Attempted Safety concerns  (Simultaneous filing. User may not have seen previous data.)   Wheel 150 Feet CARE Score 88   Stairs   Functional Performance Safety concern (see comments)   1 Step (Curb)   Reason if not Attempted Safety concerns   1 Step CARE Score 88   4 Steps   Reason if not Attempted Safety concerns   4 Steps CARE Score 88   12 Steps   Reason if not Attempted Safety concerns   12 Steps CARE Score 88   Picking Up Object   Reason if not Attempted Safety concerns    CARE Score 88   Car Transfer   Reason if not Attempted Safety concerns   Car Transfer CARE Score 88

## 2023-04-08 NOTE — PLAN OF CARE
Goal Outcome Evaluation:     Admitted at start of shift from [facility] post craniotomy and R hip hemiarthroplasty. Alert and oriented x4 but can be forgetful. Calm and cooperative. With hearing difficulty and without hearing aids, able to hear and respond appropriately with good voice volume/modulation.With oxygen inhalation at 3L/min by nasal cannula. VSS, oxygen saturation at 98% then oxygen adjusted to 2Lmin still with oxygen saturation at 96%. Head incision and R hip incision approximated, YAZ. On regular diet, thin liquids, ate fairly with food from outside. With baldwin in place, draining yellow urine, cath wipes done. Mepilex foam on coccyx area for protection. Assisted to turn to sides. Oral suction provided to pt and using independently for productive cough. No bm this shift, reported to have last bm yesterday 4/6. Bed alarms on.

## 2023-04-08 NOTE — CONSULTS
Social Work: Initial Assessment with Discharge Plan    Patient Name: Josephine Nicole  : 1950  Age: 73 year old  MRN: [id number]  Completed assessment with: Chart review and interview with patient   Admitted to ARU: 2023    Presenting Information   Date of SW assessment: 2023  Health Care Directive: Provided education. Pt reported Jordan, daughter, is POA.   Primary Health Care Agent: Patient/self   Secondary Health Care Agent: ED  Living Situation:  Pt lives with adult daughter (Jordan) in house with 2 stairs to enter, all needs on main level within. Pt has a step in tub.   Previous Functional Status: Pt was independent with all ADLs/IADLs, transfers, mobility and gait. Pt reported was driving.   DME available: See therapy evaluation for more information   Patient and family understanding of hospitalization: Pt was pleasant and appropriate.   Cultural/Language/Spiritual Considerations: Pt is a 73 yr old female, , legally , english speaking, Gnosticism lanette. Pt. Is interested in a Paige visit.      Physical Health  Reason for admission: Josephine Nicole is a 73 year old right hand dominant female with a past medical history of cerebral meningioma initially diagnosed 2019, s/p radiation in 2022 with recent progression including mass effect and midline shift of 3/17/23 MRI, COPD on 2L oxygen at baseline, hypothyroidism, hyperlipidemia, osteoporosis, depression/anxiety, vitamin B12 deficiency, and anemia who was admitted on 3/19/23 after fall at home in setting of several weeks of worsening confusion, impaired balance with imaging revealing right femoral neck fracture s/p right hip hemiarthroplasty. She was then transferred to [facility] on 3/23/23 due worsening respiratory failure (COPD exacerbation), hypotension, lactic acidosis, and worsening encephalopathy in setting of progressive meningioma now s/p craniotomy and resection of meningioma on 3/30/23 with hospital  course further complicated by lactic acidosis, anemia, left eye ptosis, urinary retention, pain, and hypernatremia.    Provider Information   Primary Care Physician:Divina Turner   [address] DES[place], [facility] will schedule PCP apt at discharge.   : none reported     Mental Health/Chemical Dependency:   Diagnosis: Pt reported she has Depression, anxiety, panic attacks, claustrophobia, and is afraid of the dark.  Alcohol/Tobacco/Narcotis: quit 6-8 years ago, significant prior smoking history  Support/Services in Place: Pt reported she takes Xanax and Ambien for sleep and is follow by PCP.   Services Needed/Recommended: [place] and Health Psychology support while on ARU available. Pt. Is interested in a  visit. Pt reported not interested in other services at this time.   Sexuality/Intimacy: Not discussed     Support System  Marital Status: legally   Family support: Lives with Jordan (daughter). Pt has 2 sisters Bertha (lives in [place], [place]) and Alise (lives everywhere- [facility]).   Other support available: Pt reported having 2 cousins, nieces, and nephews.     Community Resources  Current in home services: Pt reported none.   Previous services: Pt reported none.     Financial/Employment/Education  Employment Status: retired. Pt was a book keeper and .   Income Source: SSI  Education: High school diploma   Financial Concerns:  no  Insurance: UCARE/UCARE MEDICARE    Discharge Plan   Patient and family discharge goal: TBD, pending progress. Pt wants to go home.   Provided Education on discharge plan: Evaluations and discharge recommendations pending.   Patient agreeable to discharge plan:  Pending further discussion. Evaluations and discharge recommendations pending.   Provided education and attained signature for Medicare IM and IRF Patient Rights and Privacy Information provided to patient : YES  Provided patient  "with [place] [facility] [place] Resources: NA  Barriers to discharge: TBD    Discharge Recommendations   Disposition: See above   Transportation Needs: Family     Additional comments   Discharge TBD, MURALI  21 days    SW will remain available and continue to follow as needs arise.       -------------------------------------------------------------------------------------------------------------  [place] Pain Assessment    Pain Effect on Sleep  Over the past 5 days, how much of the time has pain made it hard for you to sleep at night?\"    0. Does not apply - I have not had any pain or hurting in the past 5 days  1 - Rarely or nor at all  2 - Occasionally  3 - Frequently   4 - Almost Constantly  8 - Unable to Answer    Pain Interference with Therapy Activities  \"Over the past 5 days, how often have you limited your participation in rehabilitation therapy sessions due to pain?\"  0. Does not apply - I have not had any pain or hurting in the past 5 days - Pt reported she get exhausted.   1 - Rarely or nor at all  2 - Occasionally  3 - Frequently   4 - Almost Constantly  8 - Unable to Answer    Pain Interference with Day-to-Day Activities  \"Over the past 5 days, how often have you limited your day-to-day activities (excluding rehabilitation therapy sessions) because of pain?\"  1. Rarely or not at all  2 - Occasionally  3 - Frequently   4 - Almost Constantly  8 - Unable to Answer  -------------------------------------------------------------------------------------------------------------      Nicole Bowman [facility]    Social Work  [address] [place] [id number]  [phone] or [phone])         "

## 2023-04-08 NOTE — PLAN OF CARE
"FOCUS/GOAL  Medical management    ASSESSMENT, INTERVENTIONS AND CONTINUING PLAN FOR GOAL:  Pt is Native. She is hesitant for VS check, pt said \"she wants to sleep\". She is on 2L/nc of oxygen.  Use yunker for oral suctioning. Minimal secretions noted. Takes pills 1 on a time during med pass. Roman catheter is patent. Post craniotomy incision is YAZ. Rt ORIF is intact.  Cont w/ POC.  Goal Outcome Evaluation:      Plan of Care Reviewed With: patient    Overall Patient Progress: no changeOverall Patient Progress: no change    Outcome Evaluation: continue w/ current cares and treatments      "

## 2023-04-08 NOTE — PROGRESS NOTES
PM&R PROGRESS NOTE     Patient seen and examined today.  Coordinated with team.      HPI/ACTIVE ISSUES   Josephine Nicole is a 73 year old female, admitted to [facility] ARU on 4/7/2023, for  rehabilitation in the setting of  cerebral meningioma status post craniotomy by neurosurgery on 3- complicated postoperatively by acute toxic metabolic encephalopathy.    Main issues today;   Patient seated in the chair comfortable in no acute distress.  Has a Roman catheter in place with clear urine.  Has oxygen via nasal cannula which she reports is premorbid for COPD.  Denies any headache.    Functionally, Josephine Nicole will be formally evaluated by PT and OT given the admission yesterday.  Currently she is requiring mod to max assist for mobility, min to max assist for dressing.  Anticipate that she will need a longer length of stay of about 21 days.  She has needs in PT OT and speech, speech evaluation today has shown deficits in higher-level memory and executed function tasks.    Medically  Acute on chronic anemia  -Last hemoglobin was 10.5.  Hemoglobin was 9.8 on 3- 27- 23 following transfusion of packed RBCs on the 26th for a hemoglobin of 7.8 postoperative.  We will continue to monitor    Displaced right femoral neck fracture status post right hip hemiarthroplasty following a mechanical fall.  -Denies any pain while seated.    Roman catheter;   -Roman catheter was removed on 4-4-2023 for trial of voiding.  However due to retention it was replaced.  Urology consult was placed and she was seen and recommended to replace the Roman catheter for at least a week.  -We will plan for removing the Roman catheter in about 7 to 10 days and when her transfers are improved to avoid incontinence and allow skin protection.    Pain  -She is currently on Tylenol and hydroxyzine for as needed pain control.  Will closely monitor her participation in therapies and titrate as needed.    Anticoagulation with Lovenox at a prophylactic  "dose  -Continue for now till she shows improved mobilization.        Physical exam    Vital signs:  Temp: (!) 96.7  F (35.9  C) Temp src: Axillary BP: 111/64 Pulse: 73   Resp: 18 SpO2: 96 % O2 Device: Nasal cannula Oxygen Delivery: 2 LPM Height: 158.5 cm (5' 2.4\") Weight: 63.8 kg (140 lb 10.5 oz)  Estimated body mass index is 25.4 kg/m  as calculated from the following:    Height as of this encounter: 1.585 m (5' 2.4\").    Weight as of this encounter: 63.8 kg (140 lb 10.5 oz).  HEENT NC AT PRTL EOM good   Neck supple  Heart S1S2  Lungs CTA  Abdomen  benign BS positive NT NR   LE no edema            REVIEWED THE MEDICATIONS BELOW   Current Facility-Administered Medications   Medication     acetaminophen (TYLENOL) tablet 975 mg     atorvastatin (LIPITOR) tablet 10 mg     bisacodyl (DULCOLAX) suppository 10 mg     [START ON 4/9/2023] dexamethasone (DECADRON) tablet 2 mg    Followed by     [START ON 4/12/2023] dexamethasone (DECADRON) tablet 1 mg     enoxaparin ANTICOAGULANT (LOVENOX) injection 40 mg     fluticasone-vilanterol (BREO ELLIPTA) 200-25 MCG/ACT inhaler 1 puff     furosemide (LASIX) tablet 20 mg     hydrOXYzine (ATARAX) tablet 10 mg     ipratropium (ATROVENT) 0.02 % neb solution 0.5 mg    And     levalbuterol (XOPENEX) neb solution 1.25 mg     levothyroxine (SYNTHROID/LEVOTHROID) tablet 88 mcg     magic mouthwash suspension (diphenhydramine, lidocaine, aluminum-magnesium & simethicone)     mirtazapine (REMERON) tablet TABS 7.5 mg     pantoprazole (PROTONIX) EC tablet 40 mg     polyethylene glycol (MIRALAX) Packet 17 g     senna-docusate (SENOKOT-S/PERICOLACE) 8.6-50 MG per tablet 1 tablet     sertraline (ZOLOFT) tablet 25 mg       Results for orders placed or performed during the hospital encounter of 04/07/23 (from the past 24 hour(s))   CBC with platelets   Result Value Ref Range    WBC Count 9.6 4.0 - 11.0 10e3/uL    RBC Count 3.57 (L) 3.80 - 5.20 10e6/uL    Hemoglobin 10.5 (L) 11.7 - 15.7 g/dL    " Hematocrit 33.2 (L) 35.0 - 47.0 %    MCV 93 78 - 100 fL    MCH 29.4 26.5 - 33.0 pg    MCHC 31.6 31.5 - 36.5 g/dL    RDW 14.4 10.0 - 15.0 %    Platelet Count 224 150 - 450 10e3/uL   Basic metabolic panel   Result Value Ref Range    Sodium 137 136 - 145 mmol/L    Potassium 4.0 3.4 - 5.3 mmol/L    Chloride 98 98 - 107 mmol/L    Carbon Dioxide (CO2) 33 (H) 22 - 29 mmol/L    Anion Gap 6 (L) 7 - 15 mmol/L    Urea Nitrogen 18.7 8.0 - 23.0 mg/dL    Creatinine 0.47 (L) 0.51 - 0.95 mg/dL    Calcium 8.3 (L) 8.8 - 10.2 mg/dL    Glucose 81 70 - 99 mg/dL    GFR Estimate >90 >60 mL/min/1.73m2       Total of 40 min spent in the encounter, more than 50% in coordination and counseling on topics listed in the HPI

## 2023-04-08 NOTE — PROGRESS NOTES
Discharge Planner Post-Acute Rehab PT:     Discharge Plan: HHPT, lives with daughter    Precautions: Falls    Current Status:  Bed Mobility: modA  Transfer: maxA with therapy, josé manuel steady x2 for nursing  Gait: Able to take small steps up to 3' in //, modA  Stairs: NT due to safety concerns  Balance: Able to sit unsupported, standing needs heavy assist to maintain    Barone:  - 4/8: 4/56    5xSTS:  - 4/8: 0    10MWt:  - 4/8: 0 m/s    Assessment: Pt is a 74 y/o female s/p R bifrontal craniotomy and resection of meningioma, R femoral neck fracture resulting in R hip hemiarthroplasty. Pt has hx of COPD which has resulted in respiratory failure during hospitalization, has 2L O2 via nasal canula at baseline. Pt has decreased strength, balance, and overall endurance that is limiting functional mobility. Pt would benefit from skilled PT services to improve strength, ROM, and endurance to help with gait, transfers, stairs. Pt owns manual wheelchair but will most likely need FWW. ELOS 21 days.    Other Barriers to Discharge (DME, Family Training, etc):  - Needs FWW  - 2STE no railing

## 2023-04-08 NOTE — PHARMACY-MEDICATION REGIMEN REVIEW
Pharmacy Medication Regimen Review  Josephine Nicole is a 73 year old female who is currently in the Acute Rehab Unit.    Assessment: All medications have an appropriate indications, durations and no unnecessary use was found    Plan:   Continue current medications and plan     Attending provider will be sent this note for review.  If there are any emergent issues noted above, pharmacist will contact provider directly by phone.      Pharmacy will periodically review the resident's medication regimen for any PRN medications not administered in > 72 hours and discontinue them. The pharmacist will discuss gradual dose reductions of psychopharmacologic medications with interdisciplinary team on a regular basis.    Please contact pharmacy if the above does not answer specific medication questions/concerns.  Jovana Ferraro [facility] on 4/8/2023 at 3:22 PM    Background:  A pharmacist has reviewed all medications and pertinent medical history today.  Medications were reviewed for appropriate use and any irregularities found are listed with recommendations.      Current Facility-Administered Medications:      acetaminophen (TYLENOL) tablet 975 mg, 975 mg, Oral, TID, Abimbola Thompson PA-C, 975 mg at 04/08/23 1457     atorvastatin (LIPITOR) tablet 10 mg, 10 mg, Oral, Daily, Abimbola Thompson PA-C, 10 mg at 04/08/23 0935     bisacodyl (DULCOLAX) suppository 10 mg, 10 mg, Rectal, Daily PRN, Abimbola Thompson PA-C     [COMPLETED] dexamethasone (DECADRON) tablet 4 mg, 4 mg, Oral, Daily, 4 mg at 04/08/23 0935 **FOLLOWED BY** [START ON 4/9/2023] dexamethasone (DECADRON) tablet 2 mg, 2 mg, Oral, Daily **FOLLOWED BY** [START ON 4/12/2023] dexamethasone (DECADRON) tablet 1 mg, 1 mg, Oral, Daily, Abimbola Thompson PA-C     enoxaparin ANTICOAGULANT (LOVENOX) injection 40 mg, 40 mg, Subcutaneous, Q24H, Abimbola Thompson PA-C, 40 mg at 04/07/23 1716     fluticasone-vilanterol (BREO ELLIPTA) 200-25 MCG/ACT inhaler 1 puff, 1 puff,  Inhalation, Daily, Abimbola Thompson PA-C     furosemide (LASIX) tablet 20 mg, 20 mg, Oral, Daily, Abimbola Thompson PA-C, 20 mg at 04/08/23 0935     hydrOXYzine (ATARAX) tablet 10 mg, 10 mg, Oral, Q8H PRN, Abimbola Thompson PA-C     ipratropium (ATROVENT) 0.02 % neb solution 0.5 mg, 0.5 mg, Nebulization, Q6H PRN **AND** levalbuterol (XOPENEX) neb solution 1.25 mg, 1.25 mg, Nebulization, Q6H PRN, Kris Ray MD     levothyroxine (SYNTHROID/LEVOTHROID) tablet 88 mcg, 88 mcg, Oral, Daily, Abimbola Thompson PA-C, 88 mcg at 04/08/23 0935     magic mouthwash suspension (diphenhydramine, lidocaine, aluminum-magnesium & simethicone), 10 mL, Swish & Swallow, 4x Daily AC & HS, Abimbola Thompson PA-C, 10 mL at 04/08/23 1140     mirtazapine (REMERON) tablet TABS 7.5 mg, 7.5 mg, Oral, At Bedtime, Abimbola Thompson PA-C, 7.5 mg at 04/07/23 2145     pantoprazole (PROTONIX) EC tablet 40 mg, 40 mg, Oral, QAM AC, Abimbola Thompson PA-C, 40 mg at 04/08/23 0637     polyethylene glycol (MIRALAX) Packet 17 g, 17 g, Oral, Daily, Kris Ray MD     senna-docusate (SENOKOT-S/PERICOLACE) 8.6-50 MG per tablet 1 tablet, 1 tablet, Oral, BID, Abimbola Thompson PA-C, 1 tablet at 04/08/23 0935     sertraline (ZOLOFT) tablet 25 mg, 25 mg, Oral, QAM, Abimbola Thompson PA-C, 25 mg at 04/08/23 0935  No current outpatient prescriptions on file.

## 2023-04-08 NOTE — PROGRESS NOTES
[facility] TCU   Cognitive/Linguistic Quick Test + Evaluation    04/08/23 1112   Appointment Info   Signing Clinician's Name / Credentials (SLP) Sabrina Almonte MS CCC SLP   General Information   Onset of Illness/Injury or Date of Surgery 04/07/23   Referring Physician Abimbola Thompson PA-C   Patient/Family Therapy Goal Statement (SLP) none stated   Pertinent History of Current Problem Per provider documentation - Josephine Nicole is a 73 year old right hand dominant female with a past medical history of cerebral meningioma initially diagnosed 2019, s/p radiation in 11/2022 with recent progression including mass effect and midline shift of 3/17/23 MRI, COPD on 2L oxygen at baseline, hypothyroidism, hyperlipidemia, osteoporosis, depression/anxiety, vitamin B12 deficiency, and anemia who was admitted on 3/19/23 after fall at home in setting of several weeks of worsening confusion, impaired balance with imaging revealing right femoral neck fracture s/p right hip hemiarthroplasty.  He was then transferred to [facility] on 3/23/23 due worsening respiratory failure (COPD exacerbation), hypotension, lactic acidosis, and worsening encephalopathy in setting of progressive meningioma now s/p craniotomy and resection of meningioma on 3/30/23 with hospital course further complicated by lactic acidosis, anemia, left eye ptosis, urinary retention, pain, and hypernatremia.  She is now admitted to ARU on 4/7/23 for multidisciplinary rehabilitation and ongoing medical management.   General Observations Pt is agreeable to evaluation. She was evaluated for swallowing during acute care admission but was discharged with recommendations for a regular diet and thin liquids without overt for dysphagia.   Type of Evaluation   Type of Evaluation Speech, Language, Cognition   Cognition   Cognitive Function attention deficit;executive function deficit;memory deficit   Additional cognitive-linguistic evaluation indicated  Completed    Executive Function Deficit (Cognition)   (Suspect deficits in higher level tasks)   Memory Deficit (Cognition)   (Suspect possible deficits in higher level tasks)   General Therapy Interventions   Planned Therapy Interventions Cognitive Treatment   Clinical Impression   Criteria for Skilled Therapeutic Interventions Met (SLP Richard) Yes, treatment indicated   SLP Diagnosis Cognitive/linguistic concerns   Risks & Benefits of therapy have been explained evaluation/treatment results reviewed;care plan/treatment goals reviewed;participants voiced agreement with care plan;participants included;patient   Clinical Impression Comments CLQT+ administered during evaluation. Pt received a score of 4/4 indicating skills WFL. However, suspect ongoing assessment and treatment would be beneficial for higher level tasks based on today's presentation. See daily note for break down of scores.   SLP Total Evaluation Time   Additional SLP Richard Charges Cognitive Performance Testing   Cognitive  Performance Testing Minutes, per hour - includes time for administering test, interp results & prep report ([id number]) 18

## 2023-04-08 NOTE — PROGRESS NOTES
CLINICAL NUTRITION SERVICES - ASSESSMENT NOTE     Nutrition Prescription    RECOMMENDATIONS FOR MDs/PROVIDERS TO ORDER:  None today     Malnutrition Status:    Moderate malnutrition in the context of acute on chronic illness or injury    Recommendations already ordered by Registered Dietitian (RD):  Aury ALVARADO (NSA please offer when taking meal orders)    Future/Additional Recommendations:  Monitor meal intakes, supplement acceptance, wt/lab trends      REASON FOR ASSESSMENT  Josephine Nicole is a/an 73 year old female assessed by the dietitian for Provider Order - assess/optimize nutrition    NUTRITION/MEDICAL HISTORY  Per chart review: Pt admits to ARU for rehabilitation in the setting of fall at home in setting of several weeks of worsening confusion, impaired balance with imaging revealing right femoral neck fracture s/p right hip hemiarthroplasty, worsening respiratory failure (COPD exacerbation), hypotension, lactic acidosis, and worsening encephalopathy in setting of progressive meningioma, now s/p craniotomy and resection of meningioma on 3/30/23 with hospital course further complicated by lactic acidosis, anemia, left eye ptosis, urinary retention, pain, and hypernatremia. Other past medical history noted for cerebral meningioma initially diagnosed 2019, s/p radiation in 11/2022 with recent progression including mass effect and midline shift, COPD on 2L oxygen at baseline, hypothyroidism, hyperlipidemia, osteoporosis, depression/anxiety, vitamin B12 deficiency, and anemia.    Per pt visit: RD visited pt at bedside over noon hour, pt working on lunch meal, reviewed intakes and weight trends, pt reports good appetite at meals, also notes family bringing in outside foods for pt, reviewed weight trends, pt not concerned with loss stating wants to loose more, RD reviewed the importance of adequate nutritional intakes for continued recovery and work with therapies and discussed pt should not be attempting  "to lose weight during continued admission, encouraged adequate meal intakes and offered supplement, pt at first declined, but with encouragement agreeing to orders as above.     CURRENT NUTRITION ORDERS  Diet: Regular, 2 L FR  Intake/Tolerance: 75% thus far per flow sheets     LABS  Labs reviewed    MEDICATIONS  Remeron, Zoloft, Lipitor, Decadron, Lasix, Miralax, Senokot, Magic mouthwash, Levothyroxine, Protonix    ANTHROPOMETRICS  Height: 158.5 cm (5' 2.4\")  Most Recent Weight: 63.8 kg (140 lb 10.5 oz)    IBW: 51.4 kg  BMI: Overweight BMI 25-29.9  Weight History:   04/02/23 69.6 kg (153 lb 7 oz)   03/22/23 66.9 kg (147 lb 7.8 oz)   04/18/22 66.7 kg (147 lb)     Weight assessment: Possibly significant 8.4% weight loss in 1 week vs fluid status changes, not quite significant 4.8% weight loss in almost 1 month, non-significant 4.8% weight loss in 1 year.     Dosing Weight: 63.8 kg    ASSESSED NUTRITION NEEDS  Estimated Energy Needs: 7272-0650 kcals/day (25 - 30 kcals/kg)  Justification: Maintenance  Estimated Protein Needs: 65-75 grams protein/day (1 - 1.2 grams of pro/kg)  Justification: Maintenance vs increased needs   Estimated Fluid Needs: 2000 mL/day   Justification: On a fluid restriction    MALNUTRITION  % Intake: No decreased intake noted  % Weight Loss: 5% in 1 month (moderate)  Subcutaneous Fat Loss: Globally mild vs age related   Muscle Loss: Globally mild vs age related   Fluid Accumulation/Edema: Moderate per flow sheets   Malnutrition Diagnosis: Moderate malnutrition in the context of acute on chronic illness or injury     NUTRITION DIAGNOSIS  Predicted inadequate nutrient intake related to appetite vs other    INTERVENTIONS  Implementation  Nutrition Education: RD reviewed the importance of adequate nutritional intakes for continued recovery at bedside    Medical food supplement therapy - ordered as above      Goals  Patient to consume % of nutritionally adequate meal trays TID, or the equivalent " with supplements/snacks.     Monitoring/Evaluation  Progress toward goals will be monitored and evaluated per protocol.    Lizbeth Thakur RD, CNSC, LD  Weekend/holiday pager: [phone]

## 2023-04-08 NOTE — PLAN OF CARE
Goal Outcome Evaluation:      Plan of Care Reviewed With: patient    Overall Patient Progress: no changeOverall Patient Progress: no change         Orientation: A&O however cognition varies. Can be confused and forgetful. Poor historian. Pt HX of brain tumor   Bowel: cont.   Bladder: baldwin due to retention   Pain: no pain this shift   Ambulation/Transfers: needs lots of encouragement with transfers. Wheelchair based. A2 with gait belt and josé manuel stedy.   Diet/ Liquids: FR 2,000. Regular diet   Tubes/ Lines/ Drains: baldwin, suction for oral secretions   Oxygen: 2-3 L O2 to maintain sats   Skin: scattered bruising, scrapes, scratches. Abrasion/tare on right forearm.       Mepilex on coccyx. [place]. meds whole one at a time. dtsarah nobles will visit daily after 530.    Cecelia John RN on 4/8/2023 at 4:16 PM

## 2023-04-08 NOTE — PROGRESS NOTES
04/08/23 7574   Appointment Info   Signing Clinician's Name / Credentials (OT) Ann Mejias OTR/L   Living Environment   People in Home child(tristen), adult   Current Living Arrangements house   Home Accessibility stairs to enter home;stairs within home   Number of Stairs, Main Entrance 2   Stair Railings, Main Entrance none   Number of Stairs, Within Home, Primary greater than 10 stairs   Stair Railings, Within Home, Primary railings safe and in good condition   Transportation Anticipated family or friend will provide   Living Environment Comments Pt lives with daughter in house, all needs on main level. Bathroom has a tub/shower with no grab bars.   Self-Care   Usual Activity Tolerance good   Current Activity Tolerance fair   Equipment Currently Used at Home wheelchair, manual   Fall history within last six months yes   Number of times patient has fallen within last six months 2   Activity/Exercise/Self-Care Comment Reports IND with most ADLs, assist from daughter at baseline for tub transfer, daughter assists with most IADLs, pt reports occasionally drives. Walks without AD in home, but reports using manual w/c for community mobility.   Post-Acute Assessment Only   Post-Acute Functional Assessment See below   Previous Level of Function/Home Environm   Bathing/Grooming, Premorbid Functional Level partial assistance   Dressing, Premorbid Functional Level independent   Eating/Feeding, Premorbid Functional Level independent   Toileting, Premorbid Functional Level independent   BADLs, Premorbid Functional Level independent   IADLs, Premorbid Functional Level partial assistance   Bed Mobility, Premorbid Functional Level independent   Transfers, Premorbid Functional Level independent   Household Ambulation, Premorbid Functional Level independent   Stairs, Premorbid Functional Level independent   Community Ambulation, Premorbid Functional Level partial assistance;uses wheelchair   Functional Cognition, Premorbid  Functional Level Reports WNL   General Information   Onset of Illness/Injury or Date of Surgery 04/07/23   Referring Physician Abimbola Thompson PA-C   Additional Occupational Profile Info/Pertinent History of Current Problem Josephine Nicole is a 73 year old right hand dominant female with a past medical history of cerebral meningioma initially diagnosed 2019, s/p radiation in 11/2022 with recent progression including mass effect and midline shift of 3/17/23 MRI, COPD on 2L oxygen at baseline, hypothyroidism, hyperlipidemia, osteoporosis, depression/anxiety, vitamin B12 deficiency, and anemia who was admitted on 3/19/23 after fall at home in setting of several weeks of worsening confusion, impaired balance with imaging revealing right femoral neck fracture s/p right hip hemiarthroplasty. She was then transferred to [facility] on 3/23/23 due worsening respiratory failure (COPD exacerbation), hypotension, lactic acidosis, and worsening encephalopathy in setting of progressive meningioma now s/p craniotomy and resection of meningioma on 3/30/23 with hospital course further complicated by lactic acidosis, anemia, left eye ptosis, urinary retention, pain, and hypernatremia.   Existing Precautions/Restrictions fall;other (see comments)  (crani, per chart review, no formal hip precautions)   Left Upper Extremity (Weight-bearing Status) partial weight-bearing (PWB)  (<10lbs)   Right Upper Extremity (Weight-bearing Status) partial weight-bearing (PWB)  (< 10 lbs)   Left Lower Extremity (Weight-bearing Status) full weight-bearing (FWB)   Right Lower Extremity (Weight-bearing Status) weight-bearing as tolerated (WBAT)   Cognitive Status Examination   Orientation Status orientation to person, place and time   Cognitive Status Comments Pt with flat affect t/o, demonstrated decreased problem solving t/o session and required mod encouragement to complete tasks d/t self limiting at times   Visual Perception   Visual  Impairment/Limitations WFL   Sensory   Sensory Quick Adds sensation intact   Pain Assessment   Patient Currently in Pain No   Range of Motion Comprehensive   Comment, General Range of Motion BUE WFL   Strength Comprehensive (MMT)   Comment, General Manual Muscle Testing (MMT) Assessment Not formally tested d/t crani precautions, appears gross 4/5 in BUE   Clinical Impression   Criteria for Skilled Therapeutic Interventions Met (OT) Yes, treatment indicated   OT Diagnosis Decline in ADL/IADL performance   Influenced by the following impairments Decreased endurance, decreased strength, impaired balance, impaired cognition   OT Problem List-Impairments impacting ADL problems related to;activity tolerance impaired;balance;cognition;mobility;strength;pain   Assessment of Occupational Performance 5 or more Performance Deficits   Identified Performance Deficits Dressing, toileting, showering, grooming, household management   Planned Therapy Interventions (OT) ADL retraining;IADL retraining;balance training;bed mobility training;cognition;ROM;strengthening;transfer training;home program guidelines;progressive activity/exercise;risk factor education   Clinical Decision Making Complexity (OT) moderate complexity   Risk & Benefits of therapy have been explained evaluation/treatment results reviewed;care plan/treatment goals reviewed;risks/benefits reviewed;current/potential barriers reviewed;participants voiced agreement with care plan;participants included;patient   Clinical Impression Comments Pt performing well below functional baseline with ADLs/IADLs. Pt IND with ADLs and some IADLs at home, walks without AD in home and uses w/c for community mobility. Pt currently requiring Ax2 with all functional transfers and Ax1-2 with ADLs. Pt demonstrated decreased problem solving and appears self-limiting at times requiring encouragement. Pt currently limited by decreased endurance, impaired balance, and decreased strength as well.  "ELOS ~21 days to maximize safe return to PLOF.   OT Total Evaluation Time   OT Eval, Moderate Complexity Minutes (37960) 10   OT Goals   Therapy Frequency (OT) Daily   OT Predicted Duration/Target Date for Goal Attainment 04/28/23   OT Goals Hygiene/Grooming;Upper Body Dressing;Lower Body Dressing;Upper Body Bathing;Lower Body Bathing;Toilet Transfer/Toileting;Cognition;OT Goal 1   OT: Hygiene/Grooming modified independent   OT: Upper Body Dressing Modified independent   OT: Lower Body Dressing Modified independent   OT: Upper Body Bathing Supervision/stand-by assist   OT: Lower Body Bathing Supervision/stand-by assist   OT: Toilet Transfer/Toileting Modified independent   OT: Cognitive Patient/caregiver will verbalize understanding of cognitive assessment results/recommendations as needed for safe discharge planning   OT: Goal 1 Pt will perform tub/shower transfer with Min A and DME as needed.   Self-Care/Home Management   Self-Care/Home Mgmt/ADL, Compensatory, Meal Prep Minutes (96545) 45   Symptoms Noted During/After Treatment (Meal Preparation/Planning Training) fatigue;shortness of breath   Treatment Detail/Skilled Intervention OT: Upon completion of eval, treatment indicated. Performed dressing and grooming, see details below. Pt initially agreeable to toileting, but then breakfast arrived and pt requesting to eat breakfast prior to using toilet. Educated RN on need to GG showering and toileting today or tomorrow as no therapy on Sunday, with RN agreeable.   Therapeutic Activities   Therapeutic Activity Minutes (13352) 20   Symptoms noted during/after treatment fatigue;shortness of breath   Treatment Detail/Skilled Intervention OT: Pt initially declining mobility to EOB with Ax1 stating, \"I can't do that with only you here.\" Facilitated supine to sit transfer with CGA and mod verbal cues to progress. While seated at EOB, required seated rest break d/t fatigue and SOB with pt on 2L O2 t/o session with SpO2 at " >96%. Performed STS from elevated HOB with Max Ax2 and FWW with pt unable to clear buttocks on first attempt. Facilitated SPT to w/c with Mod Ax2, FWW, and max verbal cues to progress transfer, encouragement, and safety as pt closing eyes during SPT. Pt reported fatigue and declined dizziness or pain during functional transfer. /61 seated in w/c with HR 73. Pt remained seated in w/c at end of session with all needs in reach and w/c alarm on.   OT Discharge Planning   OT Plan Nursing to GG showering and toileting Saturday or Sunday, trial SPT vs Niki Shahriardy to commode overlay, LB dressing AE, monitor cognition   Total Session Time   Timed Code Treatment Minutes 65   Total Session Time (sum of timed and untimed services) 75   Post Acute Settings Only   What unit is patient on? Acute Rehab   OT - Acute Rehab Center Time   Individual Time (minutes) - enter zero if not applicable - OT 75   Group Time (minutes) - enter zero if not applicable  - OT 0   Concurrent Time (minutes) - enter zero if not applicable  - OT 0   Co-Treatment Time (minutes) - enter zero if not applicable  - OT 0   ARC Total Session Time (minutes) - OT 75   Oral Hygiene   Describe performance OT: SBA seated in w/c   Grooming (except oral cares)   Grooming Task Performed Hair grooming;Washing hands;Washing face;Applying make-up   Grooming Comment OT: SBA seated in w/c   Upper Body Dressing   Describe performance OT: Min A with UB dressing supine in bed   Clothing Utilized Shirt   Lower Body Dressing (Pants/Undergarments)   Describe performance OT: Max A to thread BLE in underwear and pants seated at EOB and to pull over hips in standing with FWW   Clothing Utilized Pants;Underwear   Lower Body Dressing putting on/taking off footwear   Describe performance OT: Max A   Toilet Hygiene   Describe performance OT: Pt declined   Toilet Transfer   Describe performance OT: Pt declined   Chair/bed-to-chair Transfer   Describe performance OT: Max Ax2 SPT  EOB<->w/c   Lying to Sitting on Side of Bed   Comment OT: CGA with mod verbal cues to progress   Sit to Stand   Comment OT: Max Ax2 with FWW and EOB elevated

## 2023-04-08 NOTE — PLAN OF CARE
Discharge Planner Post-Acute Rehab SLP:     Discharge Plan: Home with daughter    Precautions: fall    Current Status:  Hearing: [place],  does not use hearing aids  Vision: Uses glasses for close up  Communication: WFL  Cognition: Suspect higher level deficits. Performed WFL on standardized assessment. However, addition evaluation and treatment warranted for higher level tasks.  Swallow: Not assessed - during acute care stay she was recommended a regular diet and thin liquids.     Assessment: SUMMARY OF TEST:    The CLQT assesses visual attention and perception, working memory and language output skills, as well as auditory memory and comprehension.  Non-linguistic tasks can help assess planning, and self-monitoring, visual discrimination and analysis, as well as creativity and mental flexibility.   Together, these subtests assess the cognitive domains of attention, memory, executive function, language, and visuospatial skills using a severity rating of either WNL (within normal limits), Mild, Moderate or Severe.        Cognitive Domain                   Severity Rating/Score  Attention                                   4 - WNL  Memory                                    4 - WNL  Executive Functions   4 - WNL  Language                                 4 - WNL  Visuospatial Skills                    4 - WNL  Composite Severity Rating        4 - WNL  Clock Drawing Severity Rating    4 - WNL    INTERPRETATION OF TEST RESULTS: Pt scored WNL across all subtests. However, there were deficits identified during evaluation concerning for difficulty with more high level memory and executive function tasks. This warrants ongoing treatment and assessment, pt is agreeable to this plan.       TIME FOR INTERPRETATION AND PREPARATION OF REPORT: 20  TOTAL TIME: 60    Reference:  Swetha Dumont, Becky, [facility]-SLP, (2001) [employer]      Other Barriers to Discharge (Family Training, etc): None anticipated by SLP

## 2023-04-08 NOTE — PLAN OF CARE
Discharge Planner Post-Acute Rehab OT:     Discharge Plan: home with assist and HHOT    Precautions: crani, falls, WBAT RLE, per chart review no formal hip precautions, [place] with no hearing aids    Current Status:  ADLs:    Mobility: Max-Mod Ax2 SPT with FWW-with therapy only. Ax2 with SaraStedy with Nursing.     Grooming: SBA seated in w/c    Dressing: UB: Min A supine in bed, LB: Max A, Footwear: Max A    Bathing: TBD    Toileting: TBD  IADLs: Reports daughter assist with some IADLs at baseline, will continue to assess  Vision/Cognition: Demonstrated deficits in problem solving t/o and appears self-limiting at times, will continue to monitor and assess    Assessment: Pt performing well below functional baseline with ADLs/IADLs. Pt IND with ADLs and some IADLs at home, walks without AD in home and uses w/c for community mobility. Pt currently requiring Ax2 with all functional transfers and Ax1-2 with ADLs. Pt demonstrated decreased problem solving and appears self-limiting at times requiring encouragement. Pt currently limited by decreased endurance, impaired balance, and decreased strength as well. ELOS ~21 days to maximize safe return to PLOF.    Other Barriers to Discharge (DME, Family Training, etc):   AE/DME needed for home unclear pending further assessment  Family Training not scheduled

## 2023-04-09 PROCEDURE — 250N000013 HC RX MED GY IP 250 OP 250 PS 637: Performed by: PHYSICAL MEDICINE & REHABILITATION

## 2023-04-09 PROCEDURE — 250N000013 HC RX MED GY IP 250 OP 250 PS 637: Performed by: PHYSICIAN ASSISTANT

## 2023-04-09 PROCEDURE — 128N000003 HC R&B REHAB

## 2023-04-09 PROCEDURE — 250N000012 HC RX MED GY IP 250 OP 636 PS 637: Performed by: PHYSICIAN ASSISTANT

## 2023-04-09 RX ADMIN — LEVOTHYROXINE SODIUM 88 MCG: 88 TABLET ORAL at 09:19

## 2023-04-09 RX ADMIN — FLUTICASONE FUROATE AND VILANTEROL TRIFENATATE 1 PUFF: 200; 25 POWDER RESPIRATORY (INHALATION) at 21:34

## 2023-04-09 RX ADMIN — DIPHENHYDRAMINE HYDROCHLORIDE AND LIDOCAINE HYDROCHLORIDE AND ALUMINUM HYDROXIDE AND MAGNESIUM HYDRO 10 ML: KIT at 09:17

## 2023-04-09 RX ADMIN — SENNOSIDES AND DOCUSATE SODIUM 1 TABLET: 50; 8.6 TABLET ORAL at 21:34

## 2023-04-09 RX ADMIN — PANTOPRAZOLE SODIUM 40 MG: 40 TABLET, DELAYED RELEASE ORAL at 09:16

## 2023-04-09 RX ADMIN — ACETAMINOPHEN 325MG 975 MG: 325 TABLET ORAL at 13:53

## 2023-04-09 RX ADMIN — ATORVASTATIN CALCIUM 10 MG: 10 TABLET, FILM COATED ORAL at 09:16

## 2023-04-09 RX ADMIN — POLYETHYLENE GLYCOL 3350 17 G: 17 POWDER, FOR SOLUTION ORAL at 09:17

## 2023-04-09 RX ADMIN — SENNOSIDES AND DOCUSATE SODIUM 1 TABLET: 50; 8.6 TABLET ORAL at 09:17

## 2023-04-09 RX ADMIN — MIRTAZAPINE 7.5 MG: 7.5 TABLET, FILM COATED ORAL at 21:34

## 2023-04-09 RX ADMIN — DEXAMETHASONE 2 MG: 2 TABLET ORAL at 09:16

## 2023-04-09 RX ADMIN — SERTRALINE HYDROCHLORIDE 25 MG: 25 TABLET ORAL at 09:16

## 2023-04-09 RX ADMIN — ACETAMINOPHEN 325MG 975 MG: 325 TABLET ORAL at 21:34

## 2023-04-09 RX ADMIN — FUROSEMIDE 20 MG: 20 TABLET ORAL at 09:16

## 2023-04-09 ASSESSMENT — ACTIVITIES OF DAILY LIVING (ADL)
ADLS_ACUITY_SCORE: 56

## 2023-04-09 NOTE — PROGRESS NOTES
SPIRITUAL HEALTH SERVICES  SPIRITUAL ASSESSMENT Progress Note  [facility] ([facility] ARU  ON-CALL     REFERRAL SOURCE: Consult request    Pt explained that tomorrow would be a better time for a visit.     PLAN: Will communicate request to spiritual care team. Spiritual health services remains available for any follow-up or requests     Laura Badillo [facility]   Pager: [phone]

## 2023-04-09 NOTE — PROGRESS NOTES
Alert and oriented times three with confusion about situation. [place]. Right hip fracture wound open to air. HX of brain tumor. Incision on scalp open to air. Mepilex on coccyx for protection. Assist of two with gait belt and Niki Steady. On 2.5L of oxygen via nasal cannula. Yankauer used independently for oral secretions. Takes pills whole. 2000ml fluid restriction. Regular diet. Roman catheter cares completed. Urine light yellow. Large BM 4/9. Impatient with staff.     Patient's most recent vital signs are:     Vital signs:  BP: 99/51  Temp: 97.6  HR: 68  RR: 16  SpO2: 97 %     Patient does not have new respiratory symptoms.  Patient does not have new sore throat.  Patient does not have a fever greater than 99.5.

## 2023-04-09 NOTE — PLAN OF CARE
FOCUS/GOAL  Medical management    ASSESSMENT, INTERVENTIONS AND CONTINUING PLAN FOR GOAL:  Pt is seen sleeping most of the time tonight. Use oxygen at 2.5L/nc. Gets upset when awaken to place properly the cannula when it is out of her nose. No complain of pain. Roman catheter is draining well. Pt is [place]. Cont w/ POC.  Goal Outcome Evaluation:      Plan of Care Reviewed With: patient    Overall Patient Progress: no changeOverall Patient Progress: no change

## 2023-04-09 NOTE — PLAN OF CARE
Goal Outcome Evaluation:       Overall Patient Progress: no changeOverall Patient Progress: no change    Outcome Evaluation: Denies pain. Alert and oriented x4. With baldwin cath in place, wipes done. Ate 100% for dinner of food brought by daughter. Still with oxygen inhalation at 2.5L/min, oxygen saturation 96-98%. Still with frequent cough, oral suction used idepedndently as needed.

## 2023-04-10 ENCOUNTER — APPOINTMENT (OUTPATIENT)
Dept: SPEECH THERAPY | Facility: CLINIC | Age: 73
DRG: 949 | End: 2023-04-10
Attending: PHYSICAL MEDICINE & REHABILITATION
Payer: COMMERCIAL

## 2023-04-10 ENCOUNTER — APPOINTMENT (OUTPATIENT)
Dept: OCCUPATIONAL THERAPY | Facility: CLINIC | Age: 73
DRG: 949 | End: 2023-04-10
Attending: PHYSICAL MEDICINE & REHABILITATION
Payer: COMMERCIAL

## 2023-04-10 ENCOUNTER — APPOINTMENT (OUTPATIENT)
Dept: PHYSICAL THERAPY | Facility: CLINIC | Age: 73
DRG: 949 | End: 2023-04-10
Attending: PHYSICAL MEDICINE & REHABILITATION
Payer: COMMERCIAL

## 2023-04-10 LAB
ANION GAP SERPL CALCULATED.3IONS-SCNC: 8 MMOL/L (ref 7–15)
BUN SERPL-MCNC: 13.9 MG/DL (ref 8–23)
CALCIUM SERPL-MCNC: 8.3 MG/DL (ref 8.8–10.2)
CHLORIDE SERPL-SCNC: 97 MMOL/L (ref 98–107)
CREAT SERPL-MCNC: 0.45 MG/DL (ref 0.51–0.95)
DEPRECATED HCO3 PLAS-SCNC: 30 MMOL/L (ref 22–29)
ERYTHROCYTE [DISTWIDTH] IN BLOOD BY AUTOMATED COUNT: 14.7 % (ref 10–15)
GFR SERPL CREATININE-BSD FRML MDRD: >90 ML/MIN/1.73M2
GLUCOSE SERPL-MCNC: 82 MG/DL (ref 70–99)
HCT VFR BLD AUTO: 35 % (ref 35–47)
HGB BLD-MCNC: 11.1 G/DL (ref 11.7–15.7)
MCH RBC QN AUTO: 29.6 PG (ref 26.5–33)
MCHC RBC AUTO-ENTMCNC: 31.7 G/DL (ref 31.5–36.5)
MCV RBC AUTO: 93 FL (ref 78–100)
PLATELET # BLD AUTO: 201 10E3/UL (ref 150–450)
POTASSIUM SERPL-SCNC: 3.9 MMOL/L (ref 3.4–5.3)
RBC # BLD AUTO: 3.75 10E6/UL (ref 3.8–5.2)
SODIUM SERPL-SCNC: 135 MMOL/L (ref 136–145)
WBC # BLD AUTO: 11.7 10E3/UL (ref 4–11)

## 2023-04-10 PROCEDURE — 36415 COLL VENOUS BLD VENIPUNCTURE: CPT | Performed by: PHYSICIAN ASSISTANT

## 2023-04-10 PROCEDURE — 97530 THERAPEUTIC ACTIVITIES: CPT | Mod: GP

## 2023-04-10 PROCEDURE — 97110 THERAPEUTIC EXERCISES: CPT | Mod: GP

## 2023-04-10 PROCEDURE — 94640 AIRWAY INHALATION TREATMENT: CPT

## 2023-04-10 PROCEDURE — 85027 COMPLETE CBC AUTOMATED: CPT | Performed by: PHYSICIAN ASSISTANT

## 2023-04-10 PROCEDURE — 250N000011 HC RX IP 250 OP 636: Performed by: PHYSICIAN ASSISTANT

## 2023-04-10 PROCEDURE — 80048 BASIC METABOLIC PNL TOTAL CA: CPT | Performed by: PHYSICIAN ASSISTANT

## 2023-04-10 PROCEDURE — 250N000009 HC RX 250: Performed by: PHYSICAL MEDICINE & REHABILITATION

## 2023-04-10 PROCEDURE — 97130 THER IVNTJ EA ADDL 15 MIN: CPT | Mod: GN

## 2023-04-10 PROCEDURE — 97110 THERAPEUTIC EXERCISES: CPT | Mod: GO | Performed by: OCCUPATIONAL THERAPIST

## 2023-04-10 PROCEDURE — 99233 SBSQ HOSP IP/OBS HIGH 50: CPT | Mod: FS | Performed by: PHYSICIAN ASSISTANT

## 2023-04-10 PROCEDURE — 97535 SELF CARE MNGMENT TRAINING: CPT | Mod: GO | Performed by: OCCUPATIONAL THERAPIST

## 2023-04-10 PROCEDURE — 250N000013 HC RX MED GY IP 250 OP 250 PS 637: Performed by: PHYSICIAN ASSISTANT

## 2023-04-10 PROCEDURE — 128N000003 HC R&B REHAB

## 2023-04-10 PROCEDURE — 97116 GAIT TRAINING THERAPY: CPT | Mod: GP

## 2023-04-10 PROCEDURE — 250N000012 HC RX MED GY IP 250 OP 636 PS 637: Performed by: PHYSICIAN ASSISTANT

## 2023-04-10 PROCEDURE — 999N000157 HC STATISTIC RCP TIME EA 10 MIN

## 2023-04-10 PROCEDURE — 250N000013 HC RX MED GY IP 250 OP 250 PS 637: Performed by: PHYSICAL MEDICINE & REHABILITATION

## 2023-04-10 PROCEDURE — 97129 THER IVNTJ 1ST 15 MIN: CPT | Mod: GN

## 2023-04-10 RX ADMIN — LEVOTHYROXINE SODIUM 88 MCG: 88 TABLET ORAL at 09:02

## 2023-04-10 RX ADMIN — ATORVASTATIN CALCIUM 10 MG: 10 TABLET, FILM COATED ORAL at 09:03

## 2023-04-10 RX ADMIN — DIPHENHYDRAMINE HYDROCHLORIDE AND LIDOCAINE HYDROCHLORIDE AND ALUMINUM HYDROXIDE AND MAGNESIUM HYDRO 10 ML: KIT at 09:04

## 2023-04-10 RX ADMIN — SENNOSIDES AND DOCUSATE SODIUM 1 TABLET: 50; 8.6 TABLET ORAL at 09:02

## 2023-04-10 RX ADMIN — DIPHENHYDRAMINE HYDROCHLORIDE AND LIDOCAINE HYDROCHLORIDE AND ALUMINUM HYDROXIDE AND MAGNESIUM HYDRO 10 ML: KIT at 21:33

## 2023-04-10 RX ADMIN — FUROSEMIDE 20 MG: 20 TABLET ORAL at 09:02

## 2023-04-10 RX ADMIN — ENOXAPARIN SODIUM 40 MG: 40 INJECTION SUBCUTANEOUS at 16:12

## 2023-04-10 RX ADMIN — MIRTAZAPINE 7.5 MG: 7.5 TABLET, FILM COATED ORAL at 21:29

## 2023-04-10 RX ADMIN — SENNOSIDES AND DOCUSATE SODIUM 1 TABLET: 50; 8.6 TABLET ORAL at 21:29

## 2023-04-10 RX ADMIN — DEXAMETHASONE 2 MG: 2 TABLET ORAL at 09:02

## 2023-04-10 RX ADMIN — FLUTICASONE FUROATE AND VILANTEROL TRIFENATATE 1 PUFF: 200; 25 POWDER RESPIRATORY (INHALATION) at 20:00

## 2023-04-10 RX ADMIN — DIPHENHYDRAMINE HYDROCHLORIDE AND LIDOCAINE HYDROCHLORIDE AND ALUMINUM HYDROXIDE AND MAGNESIUM HYDRO 10 ML: KIT at 16:12

## 2023-04-10 RX ADMIN — SERTRALINE HYDROCHLORIDE 25 MG: 25 TABLET ORAL at 09:01

## 2023-04-10 RX ADMIN — IPRATROPIUM BROMIDE 0.5 MG: 0.5 SOLUTION RESPIRATORY (INHALATION) at 17:07

## 2023-04-10 RX ADMIN — ACETAMINOPHEN 325MG 975 MG: 325 TABLET ORAL at 21:28

## 2023-04-10 RX ADMIN — ACETAMINOPHEN 325MG 975 MG: 325 TABLET ORAL at 09:00

## 2023-04-10 RX ADMIN — LEVALBUTEROL 1.25 MG: 1.25 SOLUTION RESPIRATORY (INHALATION) at 17:07

## 2023-04-10 RX ADMIN — PANTOPRAZOLE SODIUM 40 MG: 40 TABLET, DELAYED RELEASE ORAL at 09:03

## 2023-04-10 RX ADMIN — POLYETHYLENE GLYCOL 3350 17 G: 17 POWDER, FOR SOLUTION ORAL at 09:00

## 2023-04-10 ASSESSMENT — ACTIVITIES OF DAILY LIVING (ADL)
ADLS_ACUITY_SCORE: 56

## 2023-04-10 NOTE — CARE PLAN
Individualized Overall Plan Of Care (IOPOC)      Rehab diagnosis/Impairment Group Code: Brain dysfunction 02.1 non-traumatic: s/p right bifrontal stealth craniotomy and resection of meningioma due to vasogenic cerebral edema and brain compression along with r femoral neck fracture s/p right hip hemiarthroplasty  S/p resection of meningioma       Expected functional outcome: reach a level of mod I     Clinical Impression Comments:     Mobility: Pt is a 72 y/o female s/p R bifrontal craniotomy and resection of meningioma, R femoral neck fracture resulting in R hip hemiarthroplasty. Pt has hx of COPD which has resulted in respiratory failure during hospitalization, has 2L O2 via nasal canula at baseline. Pt has decreased strength, balance, and overall endurance that is limiting functional mobility. Pt would benefit from skilled PT services to improve strength, ROM, and endurance to help with gait, transfers, stairs. Pt owns manual wheelchair but will most likely need FWW. ELOS 21 days.    ADL: Pt performing well below functional baseline with ADLs/IADLs. Pt IND with ADLs and some IADLs at home, walks without AD in home and uses w/c for community mobility. Pt currently requiring Ax2 with all functional transfers and Ax1-2 with ADLs. Pt demonstrated decreased problem solving and appears self-limiting at times requiring encouragement. Pt currently limited by decreased endurance, impaired balance, and decreased strength as well. ELOS ~21 days to maximize safe return to PLOF.    Communication/Cognition/Swallow: CLQT+ administered during evaluation. Pt received a score of 4/4 indicating skills WFL. However, suspect ongoing assessment and treatment would be beneficial for higher level tasks based on today's presentation     Intensity of therapy:   PT 60 minutes, 2x/day, for 3 weeks  OT 60 minutes, Daily, for 3 weeks  SLP 60 minutes, daily, for 3 weeks    Orthotics none  Education general health  Neuropsychology Testing:  No      Medical Prognosis: fair     Physician summary statement: Josephine Nicole is a 73 year old right hand dominant female with a past medical history of cerebral meningioma initially diagnosed 2019, s/p radiation in 11/2022 with recent progression including mass effect and midline shift of 3/17/23 MRI, COPD on 2L oxygen at baseline, hypothyroidism, hyperlipidemia, osteoporosis, depression/anxiety, vitamin B12 deficiency, and anemia who was admitted on 3/19/23 after fall at home in setting of several weeks of worsening confusion, impaired balance with imaging revealing right femoral neck fracture s/p right hip hemiarthroplasty.  He was then transferred to [facility] on 3/23/23 due worsening respiratory failure (COPD exacerbation), hypotension, lactic acidosis, and worsening encephalopathy in setting of progressive meningioma now s/p craniotomy and resection of meningioma on 3/30/23 with hospital course further complicated by lactic acidosis, anemia, left eye ptosis, urinary retention, pain, and hypernatremia.     Goal is to reach a level of mod I  Discharge destination: prior home  Discharge rehabilitation needs: home care and TBD      Estimated length of stay: 3 weeks      Rehabilitation Physician Ledy Calloway MD

## 2023-04-10 NOTE — PLAN OF CARE
FOCUS/GOAL  Medical management    ASSESSMENT, INTERVENTIONS AND CONTINUING PLAN FOR GOAL:  Pt is [place]. Use call light 1 time for need. She is on 3L/nc of oxygen. 2 occasions when pt is out of oxygen cannula from her nose, her saturation dropped to 88%. Cough intermittently. Use oral suction independently. No complain of pain. Roman catheter is draining well. Cont w/ POC.  Goal Outcome Evaluation:      Plan of Care Reviewed With: patient    Overall Patient Progress: no changeOverall Patient Progress: no change    Outcome Evaluation: Contineu w/ current cares and treatments

## 2023-04-10 NOTE — PLAN OF CARE
Discharge Planner Post-Acute Rehab SLP:     Discharge Plan: Home with daughter    Precautions: fall    Current Status:  Hearing: [place],  does not use hearing aids  Vision: Uses glasses for close up  Communication: WFL  Cognition: Suspect higher level deficits. Performed WFL on standardized assessment. However, addition evaluation and treatment warranted for higher level tasks.  Swallow: regular diet, thin liquids. Not assessed on ARU    Assessment:  Pt participated in higher level organization task via Trip to [facility]. Introduced to strategies to aid IND and accuracy. Noting greater difficulty organizing information, developing plan to solve problems, working memory, and alternating attention. Pt able to complete with 100% accuracy following mod-max cues to breakdown information and reason through less straight forward information. Pt reported being IND with all IADLs at baseline.      Other Barriers to Discharge (Family Training, etc): None anticipated by SLP

## 2023-04-10 NOTE — PROGRESS NOTES
Discharge Planner Post-Acute Rehab PT:     Discharge Plan: HHPT, lives with daughter    Precautions: Falls    Current Status:  Bed Mobility: modA  Transfer: maxA SPT, josé manuel steady x2 for nursing  Gait: Able to take small steps up to 10' in //, Primitivo and chair follow  Stairs: NT due to safety concerns  Balance: Able to sit unsupported, standing needs heavy assist to maintain    [place]one:  - 4/8: 4/56    5xSTS:  - 4/8: 0    10MWt:  - 4/8: 0 m/s    Assessment: Performed floor bike today for aerobic endurance training and for LE movement. Performed repeated STS in parallel bars for LE strengthening and walked 10' in parallel bars with bilateral UE assist, Primitivo and chair follow.    Other Barriers to Discharge (DME, Family Training, etc):  - Needs FWW  - 2STE no railing

## 2023-04-10 NOTE — PROGRESS NOTES
"  [facility]   Acute Rehabilitation Unit  Daily progress note    INTERVAL HISTORY  Weekend and therapy notes reviewed, no acute events reported.    Josephine Nicole was seen and examined at bedside this morning.  She reports to be feeling \"ok\".  Notes that she is still feeling very \"tired\".  She thinks she is sleeping well, but does not wake feeling rested.  She denies any pain, no headache or hip pain.  Denies aggravation with therapies (though therapist note complaints of pain with any activity or movement).  She reports breathing has been \"difficult\", endorses aggravation of her COPD symptoms with warmer/more humid weather.  States that she needs \"air conditioning\", room has been too warm, which she feels contributes.  Cough \"comes and goes\".  Has not requested or used nebulizer at all this morning.  She denies any chest pain, palpitations, dizziness or lightheadedness, abdominal pain, nausea.    Functionally, she is requiring a lot of encouragement to participate.  She needs mod to max Ax2 for pivot transfers with FWW with therapy only, using josé manuel conway with Ax2 with nursing.  She needs SBA for seated grooming, min A for upper body dressing in bed, max A for lower body.    MEDICATIONS    acetaminophen  975 mg Oral TID     atorvastatin  10 mg Oral Daily     dexamethasone  2 mg Oral Daily    Followed by     [START ON 4/12/2023] dexamethasone  1 mg Oral Daily     enoxaparin ANTICOAGULANT  40 mg Subcutaneous Q24H     fluticasone-vilanterol  1 puff Inhalation Daily     furosemide  20 mg Oral Daily     levothyroxine  88 mcg Oral Daily     Magic Mouthwash  10 mL Swish & Swallow 4x Daily AC & HS     mirtazapine  7.5 mg Oral At Bedtime     pantoprazole  40 mg Oral QAM AC     polyethylene glycol  17 g Oral Daily     senna-docusate  1 tablet Oral BID     sertraline  25 mg Oral QAM        bisacodyl, hydrOXYzine, ipratropium **AND** levalbuterol     PHYSICAL EXAM  /63 (BP Location: " "Right arm)   Pulse 73   Temp (!) 96.7  F (35.9  C) (Axillary)   Resp 16   Ht 1.585 m (5' 2.4\")   Wt 63.8 kg (140 lb 10.5 oz)   SpO2 96%   BMI 25.40 kg/m    Gen: NAD, lying in bed  HEENT: bifrontal craniotomy incision healing with sutures intact, no erythema or drainage  Cardio: RRR, no murmurs  Pulm: non-labored on 3L by NC, lungs with scattered wheezes and rhonchi bilaterally  Abd: soft, non-tender, non-distended, bowel sounds present  Ext: 1+ pitting edema in bilateral lower extremities, no tenderness in calves  Neuro/MSK: awake, alert, PERRL, EOMI, left ptosis, scattered ecchymoses, moving all extremities in bed, right hip incision not visualized on this date    LABS  Last Basic Metabolic Panel:  Recent Labs   Lab Test 04/10/23  0747 04/08/23  0656 04/06/23  0832   * 137 138   POTASSIUM 3.9 4.0 4.1   CHLORIDE 97* 98 100   CO2 30* 33* 34*   ANIONGAP 8 6* 4*   GLC 82 81 90   BUN 13.9 18.7 13.8   CR 0.45* 0.47* 0.44*   GFRESTIMATED >90 >90 >90   CHAO 8.3* 8.3* 8.2*     CBC RESULTS: Recent Labs   Lab Test 04/10/23  0747 04/08/23  0656 04/04/23  0820   WBC 11.7* 9.6 11.8*   RBC 3.75* 3.57* 3.44*   HGB 11.1* 10.5* 10.4*   HCT 35.0 33.2* 32.3*   MCV 93 93 94   MCH 29.6 29.4 30.2   MCHC 31.7 31.6 32.2   RDW 14.7 14.4 14.3    224 221       Rehabilitation - continue comprehensive acute inpatient rehabilitation program with multidisciplinary approach including therapies, rehab nursing, and physiatry following. See interval history for updates.      ASSESSMENT AND PLAN  Josephine Nicole is a 73 year old right hand dominant female with a past medical history of cerebral meningioma initially diagnosed 2019, s/p radiation in 11/2022 with recent progression including mass effect and midline shift of 3/17/23 MRI, COPD on 2L oxygen at baseline, hypothyroidism, hyperlipidemia, osteoporosis, depression/anxiety, vitamin B12 deficiency, and anemia who was admitted on 3/19/23 after fall at home in setting of several " weeks of worsening confusion, impaired balance with imaging revealing right femoral neck fracture s/p right hip hemiarthroplasty.  He was then transferred to [facility] on 3/23/23 due worsening respiratory failure (COPD exacerbation), hypotension, lactic acidosis, and worsening encephalopathy in setting of progressive meningioma now s/p craniotomy and resection of meningioma on 3/30/23 with hospital course further complicated by lactic acidosis, anemia, left eye ptosis, urinary retention, pain, and hypernatremia.  She is now admitted to ARU on 4/7/23 for multidisciplinary rehabilitation and ongoing medical management.        Admission to acute inpatient rehab 4/7/23.    Impairment group code: Brain Dysfunction 02.1 Non-Traumatic: s/p right bifrontal stealth craniotomy and resection of meningioma due to vasogenic cerebral edema and brain compression along with R femoral neck fracture s/p right hip hemiarthroplasty        1. PT, OT and SLP 60 minutes of each on a daily basis, in addition to rehab nursing and close management of physiatrist.       2. Impairment of ADL's: Noted to have impaired activity tolerance, impaired balance, impaired cognition, impaired coordination, impaired judgement and safety awareness, impaired strength, impaired tone and impaired weight shifting, all affecting her ability to safely and independently perform basic ADLs.  Goal for mod I with basic ADLs.     3. Impairment of mobility:  Noted to have impaired activity tolerance, impaired balance, impaired cognition, impaired coordination, impaired judgement and safety awareness, impaired strength, impaired tone and impaired weight shifting, all affecting her ability to safely and independently perform basic mobility.  Goal for mod I with basic mobility.     4. Impairment of cognition/language/swallow:  Noted to have impaired cognition, will need full cognitive linguistic evaluation with SLP while on ARU with goals for improved  cognitive-linguistic skills to meet basic needs.        5. Medical Conditions  New actions/orders/updates for today are in blue.     S/p right bifrontal craniotomy and resection of meningioma on 3/30/23 with Dr. Murphy  Right anterior/inferior falx meningioma with moderate surrounding vasogenic edema and mass effect  Diagnosed in 2019 after presenting with refractory headaches.  Status post radiation treatment 11/2022.  Recent brain MRI revealing progression.  Patient presented after fall in setting of several week history of worsening confusion, gait impairment, balance deficits.  CT head with known mass surrounding vasogenic edema predominantly involving the right frontal lobe with right to left midline shift/subfalcine herniation measuring up to 1.1 cm.  Neurosurgery consulted and recommended medical optimization and transfer to [facility] for surgical intervention, which she underwent as above on 3/30.  - Decadron taper: 4 mg daily thru 4/9, then 2 mg daily x3 days, then 1 mg daily x3 days, then stop  - Pain management: Tylenol 975 mg TID, wean to PRN as able  - Wound care: keep clean and dry, leave open to air  - Continue PT/OT/SLP  - Follow up with neurosurgery at 2-weeks post-op for incision check and staple removal (currently scheduled on 4/14, anticipate will still be at ARU).  Also has neurosurgery follow-ups on 5/15 ([facility]) and 6/30 (Dr. Murphy)  - Follow up with neuro-oncology?     Displaced right femoral neck fracture s/p right hip hemiarthroplasty (anterolateral approach) on 3/20/23 with Dr. Shun Cuevas  Pathologic fracture secondary to osteoporosis  Fractures of the right superior and inferior pubic rami and the left pubic body extending into the left pubic rami, stable on imaging 4/3  - WBAT RLE with walker  - Wound care: Surgical dressing removed by ortho on 4/3. Per their recs at that time: May keep site open to air. Please allow remaining Dermabond to slough off naturally (no picking at site). Okay  to shower, but no soaking/submersion x 1 more week.   - Pain management as above  - Per ortho, osteoporosis workup and treatment with primary care provider within 2 months.   - Continue PT/OT  - Follow up with Dr. Shun Cuevas at [facility] at six weeks post-op (~5/1/23) with X-rays. If remains at ARU at that time, can have wound check and xrays performed there and sent to Dr. Cuevas for review.     COPD, recent exacerbation  Acute on chronic hypoxic/hypercapnic respiratory failure  Possible community acquired pneumonia, treated  Former smoker x50 years.  On 2-3L of oxygen PTA for past ~5 years.  Following hip surgery, had acute on chronic hypoxic/hypercapnic respiratory failure requiring BiPAP.  Completed course of azithromycin 3/27 and ceftriaxone 4/4.  Respiratory status improved with diuresis so in part may be related to heart failure as below.  As of admission to ARU, stable on 2L, which is baseline.  - Monitor respiratory status  - Continue supplemental oxygen to maintain spO2>88%  - Continue Breo Ellipta 200-25 mcg inhaler daily (formulary sub for PTA Advair Diskus 500/50 mcg inhaler)  - Continue Duoneb q4h PRN  - Noted to have wheezing on exam today, reports COPD symptoms exacerbated in setting of warmer, more humid weather.  Nursing working on improved temperature in environment (fans, shades, potential room change). RT to give nebulizer.  Will also consider consult to RT/chronic pulmonary disease team to optimize meds if symptoms persist.  - Pulmonary hygiene/toilet  - Continue oral suction PRN for more tenuous secretions     HFpEF with recent exacerbation with fluid overload  Intermittent diuresis during this admission with improved respiratory status.  Echo 3/27/23 with EF 55-60%, grade I or early diastolic dysfunction.  Noted to have lower extremity edema.  Started on Lasix daily on 4/5, also ordered 2L fluid restriction.    - Continue Lasix 20 mg daily  - Continue 2L fluid restriction  - Daily weights,  monitor volume status     Acute on chronic anemia  Hgb 10.5 on admission, dropped to benson of 7.8 on 3/26.  Did receive 1 unit pRBC.  Stable in 10s at discharge to ARU (baseline).  - Trend CBC every M/Th.  4/10: Hgb stable at 11.1     Urinary retention  Baldwin placed 3/24, failed TOV on 4/4.  Urology consulted 4/5 and recommended to replace baldwin.  Per urology, retention likely due to recent anesthesia, immobilization, and narcotic pain medications; additional recs as below.  - Plan to continue baldwin 7-10 days (~4/12-4/15). Can be removed in ARU or urology clinic for TOV  - Encourage OOB as able, fluids as able.   - Minimize anticholinergic and narcotic mediations  - Would not recommend Flomax at this time given borderline soft blood pressures     Mild left intermittent ptosis  Noted on 4/4.  CT head with expected post-operative changes.  Patient reports she has noted this for at least 1 year.    - Per sending team, given chronicity, recommended to defer additional work-up to outpatient setting as appropriate.     Hyperlipidemia  - Continue PTA atorvastatin     Depression/anxiety  - Continue mirtazapine 7.5 mg at HS  - Continue Zoloft 25 mg daily  - Monitor mood, consult health psychology as indicated     Hypothyroidism  - Continue PTA levothyroxine     Vitamin B12 deficiency  - Continue PTA B12 injection 1000 mcg q30 days, last given on 4/3     Throat/mouth pain  Patient reports this has been present throughout this admission.    - Continue magic mouthwash    Leukocytosis  WBC mildly elevated at 11.7 on 4/10, has been previously mildly elevated though had normalized to 9.6 on 4/8.  Suspect 2/2 steroids.  No localizing s/sx of infection.  - Continue to trend CBC every M/Th    Mild hyponatremia  Na mildly low at 135 on 4/10.  Asymptomatic.  - Trend BMP        6. Adjustment to disability:  Clinical psychology to eval and treat if indicated  7. FEN: regular diet, thin liquids  8. Bowel: continent, monitor, scheduled and  PRN bowel meds available  9. Bladder: baldwin in place at ARU admission  10. DVT Prophylaxis: subcutaneous Lovenox, ok'd to start on 4/3 per neurosurgery  11. GI Prophylaxis: PPI  12. Code: DNR/DNI, discussed with palliative care during inpatient admission  13. Disposition: goal for home  14. ELOS:  21 days  15. Follow up Appointments on Discharge: PCP in 1-2 weeks, neurosurgery in 2 weeks (4/14 may need to be rescheduled pending ARU course; also has follow-up on 5/15 and 6/30), neuro-oncology?, ortho (Dr. Shun Cuevas ~5/1/23; care coordinator is Neha Way at [phone] for scheduling) with repeat xrays, urology if discharging with baldwin        Patient discussed with Dr. Kris Ray, PM&R Staff Physician    CESAR SantanaC  Physical Medicine & Rehabilitation

## 2023-04-10 NOTE — PROGRESS NOTES
"SPIRITUAL HEALTH SERVICES Progress Note  [facility] ([facility]) 5 ARC  ON CALL    Saw pt Josephine Nicole per routine consult for emotional support.    Patient/Family Understanding of Illness and Goals of Care - Josephine shared that the goal is for her to start walking.    Distress and Loss - Josephine shared about how tired she's been and how difficult it is to breath. She feels very affected by the humidity and room temperature and has requested to move to a cooler room. Josephine described her situation as \"terrible\" and \"difficult.\"     Strengths, Coping, and Resources - Josephine has a daughter who works a lot. She hopes to go home soon, and she reflected on what she likes to do at home - watch tv, play games on computer, etc.     Meaning, Beliefs, and Spirituality - What's bringing her comfort right now is being reminded that God is with her and helping her. We discussed the Holy Spirit being connected to breath. I prayed with Josephine per her request.    Plan of Care - Josephine is aware how to request another  visit should she like additional SHS during her hospital stay.  remains available per pt request.     Maria Luisa Arnold, [facility]  Chaplain Resident  Pager [phone]      * SHS remains available 24/7 for emergent requests/referrals, either by having the switchboard page the on-call  or by entering an ASAP/STAT consult in Epic (this will also page the on-call ). Routine Epic consults receive an initial response within 24 hours.*    "

## 2023-04-10 NOTE — PLAN OF CARE
[id number]    Alert and orientedx4, A2 josé manuel man.  Pt denied pain during late evening.  Continent of bowel on 4/9. Roman cath patent and draining.  Yaunker suction at bedside. 2.5L O2 NC.  No acute changes, continue with POC.

## 2023-04-10 NOTE — PROGRESS NOTES
"Moved from room #554 to #548. Hoping that darkening shade will help keep the room cooler. Two fans directed at patient. Alert and oriented times three with intermittent confusion. Hip incision and scalp incisions open to air. Mepilex on coccyx for protection. [place] with no hearing aides. \"I have swimmer's ear so I cannot wear them.\" Heavy call light usage and disrespectful to all staff. Shortness of breath on exertion. 2-3L via nasal cannula continuous. Bedside Yankauer for self suctioning of secretions. No complaint of pain when resting. Provider ordered respiratory to administer PRN nebulizer treatments. Will be administered once therapy concludes. Regular diet. Ate 50%-75% of both meals. Takes pills one at a time-very slow intake. 2000ml fluid restriction. Roman catheter. Last BM 4/9. Two person assist with josé manuel steady transfer.     Patient's most recent vital signs are:     Vital signs:  BP: 112/63  Temp: 96.7  HR: 73  RR: 16  SpO2: 96 %     Patient does not have new respiratory symptoms.  Patient does not have new sore throat.  Patient does not have a fever greater than 99.5.           "

## 2023-04-10 NOTE — PROGRESS NOTES
Discharge Planner Post-Acute Rehab OT:      Discharge Plan: home with assist and HHOT     Precautions: crani, falls, WBAT RLE, per chart review no formal hip precautions, [place] with no hearing aids     Current Status:  ADLs:    Mobility: Max-Mod Ax2 SPT with FWW-with therapy only. Ax2 with SaraStedy with Nursing.     Grooming: SBA seated in w/c    Dressing: UB: Min A supine in bed, LB: Max A, Footwear: Max A    Bathing: TBD    Toileting: Ax2 with josé manuel steady and commode overlay.   IADLs: Reports daughter assist with some IADLs at baseline, will continue to assess  Vision/Cognition: Demonstrated deficits in problem solving t/o and appears self-limiting at times, will continue to monitor and assess     Assessment: Pt declining all ADLs during AM session and req'd encouragement to participate. Pt c/o of hot room but declining cold pack, change of clothing, spongebath etc. Ordered additional fans for room. Focused on UB strengthening needed for transfers. Pt continues to require Ax2 to stand from elevated surface with josé manuel steady.      Other Barriers to Discharge (DME, Family Training, etc):   AE/DME needed for home unclear pending further assessment  Family Training not scheduled

## 2023-04-10 NOTE — PLAN OF CARE
Individualized Overall Plan Of Care (IOPOC)      Rehab diagnosis/Impairment Group Code: Brain dysfunction 02.1 non-traumatic: s/p right bifrontal stealth craniotomy and resection of meningioma due to vasogenic cerebral edema and brain compression along with r femoral neck fracture s/p right hip hemiarthroplasty  S/p resection of meningioma       Expected functional outcome: mod I with short distance gait, ADLs, and transfers; anticipate pt will need assist with IADLs    Clinical Impression Comments: 73 year old female who presented to the [facility] ED on 3/19/2023 with right hip pain after a fall with increased confusion and worsening functional status over the weeks prior to admit. Patient has a very complex medical history. She underwent surgical repair for right femoral neck fracture with R hip hemiarthroplasty on 3/20 with postoperative complications including worsening respiratory failure, hypotension, lactic acidosis, and worsening encephalopathy. The principal concern was for meningioma as a large contributor to her poor functional status and confusion so this also required surgical resection. Dr. Murphy from neurosurgery was contacted on 3/23. He recommended transfer to [facility] for resection of meningioma. Patient was admitted to the ICU at [facility] on 3/23/2023 given need for BiPAP and concern for respiratory issues particularly in the setting of imminent neurosurgery. On 3/30/2023, pt underwent right bifrontal stealth craniotomy and resection of meningioma due to vasogenic cerebral edema and brain compression. Post op course has been complicated by urinary retention, L eyelid weakness (head CT negative for changes), acute diastolic congestive heart failure exacerbation with fluid overload, acute on chronic hypoxic and hypercapnic respiratory failure secondary to community acquired pneumonia, and acute on chronic anemia.     Mobility: Pt is a 72 y/o female s/p R bifrontal craniotomy  and resection of meningioma, R femoral neck fracture resulting in R hip hemiarthroplasty. Pt has hx of COPD which has resulted in respiratory failure during hospitalization, has 2L O2 via nasal canula at baseline. Pt has decreased strength, balance, and overall endurance that is limiting functional mobility. Pt would benefit from skilled PT services to improve strength, ROM, and endurance to help with gait, transfers, stairs. Pt owns manual wheelchair but will most likely need FWW. ELOS 21 days.    ADL: Pt performing well below functional baseline with ADLs/IADLs. Pt IND with ADLs and some IADLs at home, walks without AD in home and uses w/c for community mobility. Pt currently requiring Ax2 with all functional transfers and Ax1-2 with ADLs. Pt demonstrated decreased problem solving and appears self-limiting at times requiring encouragement. Pt currently limited by decreased endurance, impaired balance, and decreased strength as well. ELOS ~21 days to maximize safe return to PLOF.    Communication/Cognition/Swallow: CLQT+ administered during evaluation. Pt received a score of 4/4 indicating skills WFL. However, suspect ongoing assessment and treatment would be beneficial for higher level tasks based on today's presentation     Intensity of therapy:   PT 60 minutes, 2x/day, for 21 days  OT 60 minutes, Daily, for 21 days  SLP 60 minutes, daily, for 21 days    Orthotics None  Education bladder program, vitals, positioning, carryover of new rehab techniques, care coordination, skin integrity, assess neurologic status, post-surgical incision care to promote healing and prevent infection, provide safe environment for patient at falls risk and edema management.  Neuropsychology Testing: No  Other:  None      Medical Prognosis: Fair    Physician summary statement: In addition to above statements address, Patient requires intensive active and ongoing therapeutic intervention and multiple therapies; Patient requires medical  supervision; Expected to actively participate in the intensive rehab program; Sufficiently stable to actively participate; Expectation for measurable improvement in functional capacity or adaption to impairments.       Discharge destination: prior home  Discharge rehabilitation needs: home care      Estimated length of stay: 21 days      Rehabilitation Physician Kris Ray MD

## 2023-04-11 ENCOUNTER — APPOINTMENT (OUTPATIENT)
Dept: PHYSICAL THERAPY | Facility: CLINIC | Age: 73
DRG: 949 | End: 2023-04-11
Attending: PHYSICAL MEDICINE & REHABILITATION
Payer: COMMERCIAL

## 2023-04-11 ENCOUNTER — APPOINTMENT (OUTPATIENT)
Dept: OCCUPATIONAL THERAPY | Facility: CLINIC | Age: 73
DRG: 949 | End: 2023-04-11
Attending: PHYSICAL MEDICINE & REHABILITATION
Payer: COMMERCIAL

## 2023-04-11 ENCOUNTER — APPOINTMENT (OUTPATIENT)
Dept: SPEECH THERAPY | Facility: CLINIC | Age: 73
DRG: 949 | End: 2023-04-11
Attending: PHYSICAL MEDICINE & REHABILITATION
Payer: COMMERCIAL

## 2023-04-11 PROCEDURE — 97129 THER IVNTJ 1ST 15 MIN: CPT | Mod: GN

## 2023-04-11 PROCEDURE — 97535 SELF CARE MNGMENT TRAINING: CPT | Mod: GO | Performed by: OCCUPATIONAL THERAPIST

## 2023-04-11 PROCEDURE — 250N000011 HC RX IP 250 OP 636: Performed by: PHYSICIAN ASSISTANT

## 2023-04-11 PROCEDURE — 99233 SBSQ HOSP IP/OBS HIGH 50: CPT | Mod: FS | Performed by: PHYSICIAN ASSISTANT

## 2023-04-11 PROCEDURE — 97530 THERAPEUTIC ACTIVITIES: CPT | Mod: GP | Performed by: PHYSICAL THERAPIST

## 2023-04-11 PROCEDURE — 97110 THERAPEUTIC EXERCISES: CPT | Mod: GP | Performed by: PHYSICAL THERAPIST

## 2023-04-11 PROCEDURE — 250N000013 HC RX MED GY IP 250 OP 250 PS 637: Performed by: PHYSICAL MEDICINE & REHABILITATION

## 2023-04-11 PROCEDURE — 97530 THERAPEUTIC ACTIVITIES: CPT | Mod: GP

## 2023-04-11 PROCEDURE — 250N000013 HC RX MED GY IP 250 OP 250 PS 637: Performed by: PHYSICIAN ASSISTANT

## 2023-04-11 PROCEDURE — 97130 THER IVNTJ EA ADDL 15 MIN: CPT | Mod: GN

## 2023-04-11 PROCEDURE — 128N000003 HC R&B REHAB

## 2023-04-11 PROCEDURE — 97110 THERAPEUTIC EXERCISES: CPT | Mod: GO | Performed by: OCCUPATIONAL THERAPIST

## 2023-04-11 PROCEDURE — 250N000012 HC RX MED GY IP 250 OP 636 PS 637: Performed by: PHYSICIAN ASSISTANT

## 2023-04-11 RX ORDER — ALBUTEROL SULFATE 90 UG/1
2 AEROSOL, METERED RESPIRATORY (INHALATION) EVERY 6 HOURS PRN
Status: DISCONTINUED | OUTPATIENT
Start: 2023-04-11 | End: 2023-04-20

## 2023-04-11 RX ADMIN — SERTRALINE HYDROCHLORIDE 25 MG: 25 TABLET ORAL at 09:12

## 2023-04-11 RX ADMIN — DEXAMETHASONE 2 MG: 2 TABLET ORAL at 09:13

## 2023-04-11 RX ADMIN — DIPHENHYDRAMINE HYDROCHLORIDE AND LIDOCAINE HYDROCHLORIDE AND ALUMINUM HYDROXIDE AND MAGNESIUM HYDRO 10 ML: KIT at 09:13

## 2023-04-11 RX ADMIN — POLYETHYLENE GLYCOL 3350 17 G: 17 POWDER, FOR SOLUTION ORAL at 09:16

## 2023-04-11 RX ADMIN — PANTOPRAZOLE SODIUM 40 MG: 40 TABLET, DELAYED RELEASE ORAL at 09:13

## 2023-04-11 RX ADMIN — DIPHENHYDRAMINE HYDROCHLORIDE AND LIDOCAINE HYDROCHLORIDE AND ALUMINUM HYDROXIDE AND MAGNESIUM HYDRO 10 ML: KIT at 21:13

## 2023-04-11 RX ADMIN — ACETAMINOPHEN 325MG 650 MG: 325 TABLET ORAL at 21:12

## 2023-04-11 RX ADMIN — FLUTICASONE FUROATE AND VILANTEROL TRIFENATATE 1 PUFF: 200; 25 POWDER RESPIRATORY (INHALATION) at 21:19

## 2023-04-11 RX ADMIN — FUROSEMIDE 20 MG: 20 TABLET ORAL at 09:13

## 2023-04-11 RX ADMIN — DIPHENHYDRAMINE HYDROCHLORIDE AND LIDOCAINE HYDROCHLORIDE AND ALUMINUM HYDROXIDE AND MAGNESIUM HYDRO 10 ML: KIT at 16:18

## 2023-04-11 RX ADMIN — ACETAMINOPHEN 325MG 975 MG: 325 TABLET ORAL at 09:12

## 2023-04-11 RX ADMIN — SENNOSIDES AND DOCUSATE SODIUM 1 TABLET: 50; 8.6 TABLET ORAL at 09:13

## 2023-04-11 RX ADMIN — MIRTAZAPINE 7.5 MG: 7.5 TABLET, FILM COATED ORAL at 21:13

## 2023-04-11 RX ADMIN — ENOXAPARIN SODIUM 40 MG: 40 INJECTION SUBCUTANEOUS at 16:18

## 2023-04-11 RX ADMIN — ATORVASTATIN CALCIUM 10 MG: 10 TABLET, FILM COATED ORAL at 09:12

## 2023-04-11 RX ADMIN — LEVOTHYROXINE SODIUM 88 MCG: 88 TABLET ORAL at 09:13

## 2023-04-11 RX ADMIN — SENNOSIDES AND DOCUSATE SODIUM 1 TABLET: 50; 8.6 TABLET ORAL at 21:13

## 2023-04-11 RX ADMIN — DIPHENHYDRAMINE HYDROCHLORIDE AND LIDOCAINE HYDROCHLORIDE AND ALUMINUM HYDROXIDE AND MAGNESIUM HYDRO 10 ML: KIT at 11:22

## 2023-04-11 ASSESSMENT — ACTIVITIES OF DAILY LIVING (ADL)
ADLS_ACUITY_SCORE: 56

## 2023-04-11 NOTE — PROGRESS NOTES
Discharge Planner Post-Acute Rehab OT:      Discharge Plan: home with assist and HHOT     Precautions: crani, falls, WBAT RLE, per chart review no formal hip precautions, [place] with no hearing aids     Current Status:  ADLs:    Mobility: Max-Mod Ax2 SPT with FWW-with therapy only. Ax2 with SaraStedy with Nursing.     Grooming: SBA seated in w/c    Dressing: UB: SBA seated at EOB, LB: Ax2 with josé manuel steady, Footwear: Max A    Bathing: TBD    Toileting: Ax2 with josé manuel steady and commode overlay.   IADLs: Reports daughter assist with some IADLs at baseline, will continue to assess  Vision/Cognition: Demonstrated deficits in problem solving t/o and appears self-limiting at times, will continue to monitor and assess     Assessment: Pt required max encouragement to participate in OT session and initiate ADLs. Educated pt on role of OT and goals for OT POC. Pt then agreeable to complete ADLs. Created set schedule to provide structure for pt and ensure full participation in therapy sessions.     Other Barriers to Discharge (DME, Family Training, etc):   AE/DME needed for home unclear pending further assessment  Family Training not scheduled

## 2023-04-11 NOTE — PLAN OF CARE
Pt A&O x4. 2 L O2 via nasal cannula. A of 2 w/ josé manuel steady. Roman patent, continent of bowel. No complaints of pain. Continue w/ plan of care.

## 2023-04-11 NOTE — CARE PLAN
"  Shift: 3:00 PM - 11:30  VS: /65 (BP Location: Left arm, Patient Position: Semi-Goldberg's, Cuff Size: Adult Regular)   Pulse 72   Temp (!) 96.6  F (35.9  C) (Axillary)   Resp 18   Ht 1.585 m (5' 2.4\")   Wt 63.8 kg (140 lb 10.5 oz)   SpO2 97%   BMI 25.40 kg/m       AOx3, intermittent confusion,  pleasant and cooperative. Denies N/T. Denie SOB and cough / Reports productive cough. ORA, Sats >90%. / Stable on 2 LPM O2 via NC. Bilat. Expiratory wheezes in the lungs. NOOB this shift. 2000ml fluid restriction. Family was at bedside,  Last BM 4/9. Two person assist with josé manuel steady transfer.Roman in place.Regular diet, Denies N/V.  Denies Pain. Hip incision and scalp incisions open to air. Mepilex on coccyx for protection. Edema to lower extremities.No IV access.Call light within reach, pt able to make needs known.     "

## 2023-04-11 NOTE — PROGRESS NOTES
Chart review completed. Unable to find information on who supplies O2 at home. Per previous SW notes, pt [facility] and that [facility] has [facility] benefits. This information not listed on face sheet. Notified therapy and will f/u to coordinate.     Alissa Duenas [facility] Acute Rehab   Direct Phone: [phone]  I   Pager: [phone]  I  Fax: [phone]

## 2023-04-11 NOTE — PLAN OF CARE
Discharge Planner Post-Acute Rehab SLP:     Discharge Plan: Home with daughter    Precautions: fall    Current Status:  Hearing: [place],  does not use hearing aids  Vision: Uses glasses for close up  Communication: WFL  Cognition: WFL CLQT. Demonstrating mod-severe difficulty with higher level executive skills.  Swallow: regular diet, thin liquids. Not assessed on ARU    Assessment:  Pt with no IND completion of assigned task from yesterday. redirected pt to deductive reasoning task. Pt with mild recall of completing task but unable to recall strategies to problem solve through less concrete information. Pt required overall mod cues to apply strategies and determine correct solution. Pt participates fully in tasks but participation remains more passive. SLP provided education re: goal of SLP, rationale, and impact across daily life setting. Pt verbalized understanding but further reports contraindicate this, will f/u. May benefit from reduced frequency as pt did express interest to focus more on physical related goals, will also f/u. Reduced insight into deficits, reports memory is 'fine' but  difficulty with recall of daily activities upon probing.      Other Barriers to Discharge (Family Training, etc): None anticipated by SLP. Will need increased support with IADLs from dtr if able to provide that  Support

## 2023-04-11 NOTE — PLAN OF CARE
Discharge Planner Post-Acute Rehab PT:      Discharge Plan: HHPT, lives with daughter     Precautions: Falls, crani, WBAT R LE, 2 L NC 02 at baseline     Current Status:  Bed Mobility: Mod A  Transfer: Variable min<>max A STS pending surface height with FWW. Ax1 SaraStedy with nsg.  Gait: up to 15 ft with FWW, CGA  Stairs: NT due to safety concerns  Balance: Able to sit unsupported, standing needs heavy assist to maintain     Barone:  - 4/8: 4/56     5xSTS:  - 4/8: 0     10MWt:  - 4/8: 0 m/s     Assessment: Continues to require consistent encouragement t/o sessions for active participation, but able to progress to CGA short distance ambulation with FWW per above. R hip pain limiting IND with bed mobility; plan to trial leg  tomorrow.     Other Barriers to Discharge (DME, Family Training, etc):  - Needs FWW  - 2STE no railing

## 2023-04-11 NOTE — PROGRESS NOTES
"  [facility]   Acute Rehabilitation Unit  Daily progress note    INTERVAL HISTORY  Josephine Nicole was seen and examined at bedside this morning.  No acute events reported overnight.  Patient reports that she is feeling better this morning.  Feels COPD symptoms improved in cooler room, also did get nebulizer treatment yesterday.  Also notes she was able to get up and walk around the room this morning with PT, felt this was an improvement.  Denied any increased right hip pain with this activity.  Also denied dizziness or lightheadedness.  Still feels fatigued overall, \"not sure\" how she is sleeping.  Denies headache, chest pain, abdominal pain, nausea.  Reports last BM was today.  Denies other concerns or changes at this time.    Functionally, she is needing SBA for seated grooming, SBA for upper body dressing seated at edge of bed.  She is able to perform pivot transfer with mod to max Ax2 with FWW with therapy only, using josé manuel conway with Ax2 with nursing.    MEDICATIONS    acetaminophen  975 mg Oral TID     atorvastatin  10 mg Oral Daily     dexamethasone  2 mg Oral Daily    Followed by     [START ON 4/12/2023] dexamethasone  1 mg Oral Daily     enoxaparin ANTICOAGULANT  40 mg Subcutaneous Q24H     fluticasone-vilanterol  1 puff Inhalation Daily     furosemide  20 mg Oral Daily     levothyroxine  88 mcg Oral Daily     Magic Mouthwash  10 mL Swish & Swallow 4x Daily AC & HS     mirtazapine  7.5 mg Oral At Bedtime     pantoprazole  40 mg Oral QAM AC     polyethylene glycol  17 g Oral Daily     senna-docusate  1 tablet Oral BID     sertraline  25 mg Oral QAM        bisacodyl, hydrOXYzine, ipratropium **AND** levalbuterol     PHYSICAL EXAM  /63 (BP Location: Left arm)   Pulse 75   Temp (!) 96.6  F (35.9  C) (Axillary)   Resp 16   Ht 1.585 m (5' 2.4\")   Wt 63.8 kg (140 lb 10.5 oz)   SpO2 95%   BMI 25.40 kg/m     Gen: NAD, sitting up in chair  HEENT: bifrontal craniotomy " incision healing with sutures intact, no erythema or drainage  Cardio: RRR, no murmurs  Pulm: non-labored on 3L by NC, lungs coarse at bases, no wheezing, rhonchi, or crackles  Abd: soft, non-tender, non-distended, bowel sounds present  Ext: 1+ pitting edema in bilateral lower extremities, no tenderness in calves  Neuro/MSK: awake, alert, PERRL, EOMI, left ptosis, scattered ecchymoses, moving all extremities in chair, right hip incision not visualized on this date    LABS  Last Basic Metabolic Panel:  Recent Labs   Lab Test 04/10/23  0747 04/08/23  0656 04/06/23  0832   * 137 138   POTASSIUM 3.9 4.0 4.1   CHLORIDE 97* 98 100   CO2 30* 33* 34*   ANIONGAP 8 6* 4*   GLC 82 81 90   BUN 13.9 18.7 13.8   CR 0.45* 0.47* 0.44*   GFRESTIMATED >90 >90 >90   CHAO 8.3* 8.3* 8.2*     CBC RESULTS:   Recent Labs   Lab Test 04/10/23  0747 04/08/23  0656 04/04/23  0820   WBC 11.7* 9.6 11.8*   RBC 3.75* 3.57* 3.44*   HGB 11.1* 10.5* 10.4*   HCT 35.0 33.2* 32.3*   MCV 93 93 94   MCH 29.6 29.4 30.2   MCHC 31.7 31.6 32.2   RDW 14.7 14.4 14.3    224 221       Rehabilitation - continue comprehensive acute inpatient rehabilitation program with multidisciplinary approach including therapies, rehab nursing, and physiatry following. See interval history for updates.      ASSESSMENT AND PLAN  Josephine Nicole is a 73 year old right hand dominant female with a past medical history of cerebral meningioma initially diagnosed 2019, s/p radiation in 11/2022 with recent progression including mass effect and midline shift of 3/17/23 MRI, COPD on 2L oxygen at baseline, hypothyroidism, hyperlipidemia, osteoporosis, depression/anxiety, vitamin B12 deficiency, and anemia who was admitted on 3/19/23 after fall at home in setting of several weeks of worsening confusion, impaired balance with imaging revealing right femoral neck fracture s/p right hip hemiarthroplasty.  He was then transferred to [facility] on 3/23/23 due worsening respiratory  failure (COPD exacerbation), hypotension, lactic acidosis, and worsening encephalopathy in setting of progressive meningioma now s/p craniotomy and resection of meningioma on 3/30/23 with hospital course further complicated by lactic acidosis, anemia, left eye ptosis, urinary retention, pain, and hypernatremia.  She is now admitted to ARU on 4/7/23 for multidisciplinary rehabilitation and ongoing medical management.        Admission to acute inpatient rehab 4/7/23.    Impairment group code: Brain Dysfunction 02.1 Non-Traumatic: s/p right bifrontal stealth craniotomy and resection of meningioma due to vasogenic cerebral edema and brain compression along with R femoral neck fracture s/p right hip hemiarthroplasty        1. PT, OT and SLP 60 minutes of each on a daily basis, in addition to rehab nursing and close management of physiatrist.       2. Impairment of ADL's: Noted to have impaired activity tolerance, impaired balance, impaired cognition, impaired coordination, impaired judgement and safety awareness, impaired strength, impaired tone and impaired weight shifting, all affecting her ability to safely and independently perform basic ADLs.  Goal for mod I with basic ADLs.     3. Impairment of mobility:  Noted to have impaired activity tolerance, impaired balance, impaired cognition, impaired coordination, impaired judgement and safety awareness, impaired strength, impaired tone and impaired weight shifting, all affecting her ability to safely and independently perform basic mobility.  Goal for mod I with basic mobility.     4. Impairment of cognition/language/swallow:  Noted to have impaired cognition, will need full cognitive linguistic evaluation with SLP while on ARU with goals for improved cognitive-linguistic skills to meet basic needs.        5. Medical Conditions  New actions/orders/updates for today are in blue.     S/p bifrontal craniotomy and resection of meningioma on 3/30/23 with Dr. Murphy  CNS WHO  grade 1 meningioma (right frontal) with moderate surrounding vasogenic edema and mass effect  Diagnosed in 2019 after presenting with refractory headaches.  Status post radiation treatment 11/2022.  Recent brain MRI revealing progression.  Patient presented after fall in setting of several week history of worsening confusion, gait impairment, balance deficits.  CT head with known mass surrounding vasogenic edema predominantly involving the right frontal lobe with right to left midline shift/subfalcine herniation measuring up to 1.1 cm.  Neurosurgery consulted and recommended medical optimization and transfer to [facility] for surgical intervention, which she underwent as above on 3/30.  - Decadron taper: 2 mg daily completed today, tomorrow decrease to 1 mg daily x3 days, then stop  - Pain management: Tylenol 975 mg TID, wean to PRN as able  - Wound care: keep clean and dry, leave open to air  - Continue PT/OT/SLP  - Follow up with neurosurgery at 2-weeks post-op for incision check and staple removal (currently scheduled on 4/14, anticipate will still be at ARU).  Also has neurosurgery follow-ups on 5/15 ([facility]) and 6/30 (Dr. Murphy)  - Follow up with neuro-oncology?     Displaced right femoral neck fracture s/p right hip hemiarthroplasty (anterolateral approach) on 3/20/23 with Dr. Shun Cuevas  Pathologic fracture secondary to osteoporosis  Fractures of the right superior and inferior pubic rami and the left pubic body extending into the left pubic rami, stable on imaging 4/3  - WBAT RLE with walker  - Wound care: Surgical dressing removed by ortho on 4/3. Per their recs at that time: May keep site open to air. Please allow remaining Dermabond to slough off naturally (no picking at site). Okay to shower, but no soaking/submersion x 1 more week.   - Pain management as above  - Per ortho, osteoporosis workup and treatment with primary care provider within 2 months.   - Continue PT/OT  - Follow up with Dr. Hoffmann  Mason at [facility] at six weeks post-op (~5/1/23) with X-rays. If remains at ARU at that time, can have wound check and xrays performed there and sent to Dr. Cuevas for review.     COPD, recent exacerbation  Acute on chronic hypoxic/hypercapnic respiratory failure  Possible community acquired pneumonia, treated  Former smoker x50 years.  On 2-3L of oxygen PTA for past ~5 years.  Following hip surgery, had acute on chronic hypoxic/hypercapnic respiratory failure requiring BiPAP.  Completed course of azithromycin 3/27 and ceftriaxone 4/4.  Respiratory status improved with diuresis so in part may be related to heart failure as below.  As of admission to ARU, stable on 2L, which is baseline.  - Monitor respiratory status  - Continue supplemental oxygen to maintain spO2>88% (goal range 88-93%)  - Continue Breo Ellipta 200-25 mcg inhaler daily (formulary sub for PTA Advair Diskus 500/50 mcg inhaler)  - Continue Duoneb q4h PRN  - Wheezing resolved today, breathing more comfortably.  Continue to monitor.  - Pulmonary hygiene/toilet  - Continue oral suction PRN for more tenuous secretions     HFpEF with recent exacerbation with fluid overload  Intermittent diuresis during this admission with improved respiratory status.  Echo 3/27/23 with EF 55-60%, grade I or early diastolic dysfunction.  Noted to have lower extremity edema.  Started on Lasix daily on 4/5, also ordered 2L fluid restriction.    - Continue Lasix 20 mg daily  - Continue 2L fluid restriction  - Daily weights, monitor volume status     Acute on chronic anemia  Hgb 10.5 on admission, dropped to benson of 7.8 on 3/26.  Did receive 1 unit pRBC.  Stable in 10s at discharge to ARU (baseline).  - Trend CBC every M/Th.  4/10: Hgb stable at 11.1     Urinary retention  Baldwin placed 3/24, failed TOV on 4/4.  Urology consulted 4/5 and recommended to replace baldwin.  Per urology, retention likely due to recent anesthesia, immobilization, and narcotic pain medications; additional  recs as below.  - Plan to continue baldwin 7-10 days (~4/12-4/15). Can be removed in ARU or urology clinic for TOV  - Encourage OOB as able, fluids as able.   - Minimize anticholinergic and narcotic mediations  - Would not recommend Flomax at this time given borderline soft blood pressures     Mild left intermittent ptosis  Noted on 4/4.  CT head with expected post-operative changes.  Patient reports she has noted this for at least 1 year.    - Per sending team, given chronicity, recommended to defer additional work-up to outpatient setting as appropriate.     Hyperlipidemia  - Continue PTA atorvastatin     Depression/anxiety  - Continue mirtazapine 7.5 mg at HS  - Continue Zoloft 25 mg daily  - Monitor mood, consult health psychology as indicated     Hypothyroidism  - Continue PTA levothyroxine     Vitamin B12 deficiency  - Continue PTA B12 injection 1000 mcg q30 days, last given on 4/3     Throat/mouth pain  Patient reports this has been present throughout this admission.    - Continue magic mouthwash    Leukocytosis  WBC mildly elevated at 11.7 on 4/10, has been previously mildly elevated though had normalized to 9.6 on 4/8.  Suspect 2/2 steroids.  No localizing s/sx of infection.  - Continue to trend CBC every M/Th    Mild hyponatremia  Na mildly low at 135 on 4/10.  Asymptomatic.  - Trend BMP        6. Adjustment to disability:  Clinical psychology to eval and treat if indicated  7. FEN: regular diet, thin liquids  8. Bowel: continent, monitor, scheduled and PRN bowel meds available  9. Bladder: baldwin in place at ARU admission  10. DVT Prophylaxis: subcutaneous Lovenox, ok'd to start on 4/3 per neurosurgery  11. GI Prophylaxis: PPI  12. Code: DNR/DNI, discussed with palliative care during inpatient admission  13. Disposition: goal for home  14. ELOS:  21 days  15. Follow up Appointments on Discharge: PCP in 1-2 weeks, neurosurgery in 2 weeks (4/14 may need to be rescheduled pending ARU course; also has follow-up  on 5/15 and 6/30), neuro-oncology?, ortho (Dr. Shun Cuevas ~5/1/23; care coordinator is Neha Way at [phone] for scheduling) with repeat xrays, urology if discharging with baldwin        Patient discussed with Dr. Kris Ray, PM&R Staff Physician    Abimbola Thompson PA-C  Physical Medicine & Rehabilitation

## 2023-04-11 NOTE — PLAN OF CARE
Goal Outcome Evaluation:    Alert and orientedx4, A2 josé manuel stedy. Pt denied pain at hs. Continent of bowel,  Roman cath patent and draining. Yaunker suction at bedside. 2.5L O2 NC.  No acute changes, continue with POC.        Patient's most recent vital signs are:     Vital signs:  BP: 105/65  Temp: 96.6  HR: 72  RR: 18  SpO2: 97 %     Patient does not have new respiratory symptoms.  Patient does not have new sore throat.  Patient does not have a fever greater than 99.5.           .

## 2023-04-12 ENCOUNTER — APPOINTMENT (OUTPATIENT)
Dept: PHYSICAL THERAPY | Facility: CLINIC | Age: 73
DRG: 949 | End: 2023-04-12
Attending: PHYSICAL MEDICINE & REHABILITATION
Payer: COMMERCIAL

## 2023-04-12 ENCOUNTER — APPOINTMENT (OUTPATIENT)
Dept: OCCUPATIONAL THERAPY | Facility: CLINIC | Age: 73
DRG: 949 | End: 2023-04-12
Attending: PHYSICAL MEDICINE & REHABILITATION
Payer: COMMERCIAL

## 2023-04-12 ENCOUNTER — APPOINTMENT (OUTPATIENT)
Dept: SPEECH THERAPY | Facility: CLINIC | Age: 73
DRG: 949 | End: 2023-04-12
Attending: PHYSICAL MEDICINE & REHABILITATION
Payer: COMMERCIAL

## 2023-04-12 PROCEDURE — 250N000013 HC RX MED GY IP 250 OP 250 PS 637: Performed by: PHYSICAL MEDICINE & REHABILITATION

## 2023-04-12 PROCEDURE — 97129 THER IVNTJ 1ST 15 MIN: CPT | Mod: GN

## 2023-04-12 PROCEDURE — 999N000150 HC STATISTIC PT MED CONFERENCE < 30 MIN

## 2023-04-12 PROCEDURE — 97110 THERAPEUTIC EXERCISES: CPT | Mod: GP | Performed by: STUDENT IN AN ORGANIZED HEALTH CARE EDUCATION/TRAINING PROGRAM

## 2023-04-12 PROCEDURE — 250N000013 HC RX MED GY IP 250 OP 250 PS 637: Performed by: PHYSICIAN ASSISTANT

## 2023-04-12 PROCEDURE — 250N000011 HC RX IP 250 OP 636: Performed by: PHYSICIAN ASSISTANT

## 2023-04-12 PROCEDURE — 250N000012 HC RX MED GY IP 250 OP 636 PS 637: Performed by: PHYSICIAN ASSISTANT

## 2023-04-12 PROCEDURE — 97535 SELF CARE MNGMENT TRAINING: CPT | Mod: GO | Performed by: OCCUPATIONAL THERAPIST

## 2023-04-12 PROCEDURE — 97130 THER IVNTJ EA ADDL 15 MIN: CPT | Mod: GN

## 2023-04-12 PROCEDURE — 99233 SBSQ HOSP IP/OBS HIGH 50: CPT | Mod: FS | Performed by: PHYSICIAN ASSISTANT

## 2023-04-12 PROCEDURE — 999N000125 HC STATISTIC PATIENT MED CONFERENCE < 30 MIN

## 2023-04-12 PROCEDURE — 97530 THERAPEUTIC ACTIVITIES: CPT | Mod: GP | Performed by: STUDENT IN AN ORGANIZED HEALTH CARE EDUCATION/TRAINING PROGRAM

## 2023-04-12 PROCEDURE — 128N000003 HC R&B REHAB

## 2023-04-12 PROCEDURE — 999N000125 HC STATISTIC PATIENT MED CONFERENCE < 30 MIN: Performed by: OCCUPATIONAL THERAPIST

## 2023-04-12 RX ORDER — MIRTAZAPINE 15 MG/1
15 TABLET, FILM COATED ORAL AT BEDTIME
Status: DISCONTINUED | OUTPATIENT
Start: 2023-04-12 | End: 2023-04-28 | Stop reason: HOSPADM

## 2023-04-12 RX ADMIN — DEXAMETHASONE 1 MG: 1 TABLET ORAL at 08:00

## 2023-04-12 RX ADMIN — SENNOSIDES AND DOCUSATE SODIUM 1 TABLET: 50; 8.6 TABLET ORAL at 08:10

## 2023-04-12 RX ADMIN — PANTOPRAZOLE SODIUM 40 MG: 40 TABLET, DELAYED RELEASE ORAL at 08:00

## 2023-04-12 RX ADMIN — ACETAMINOPHEN 325MG 975 MG: 325 TABLET ORAL at 21:09

## 2023-04-12 RX ADMIN — ACETAMINOPHEN 325MG 975 MG: 325 TABLET ORAL at 08:00

## 2023-04-12 RX ADMIN — ENOXAPARIN SODIUM 40 MG: 40 INJECTION SUBCUTANEOUS at 15:43

## 2023-04-12 RX ADMIN — ATORVASTATIN CALCIUM 10 MG: 10 TABLET, FILM COATED ORAL at 08:00

## 2023-04-12 RX ADMIN — SENNOSIDES AND DOCUSATE SODIUM 1 TABLET: 50; 8.6 TABLET ORAL at 21:10

## 2023-04-12 RX ADMIN — FUROSEMIDE 20 MG: 20 TABLET ORAL at 08:00

## 2023-04-12 RX ADMIN — ACETAMINOPHEN 325MG 975 MG: 325 TABLET ORAL at 14:27

## 2023-04-12 RX ADMIN — DIPHENHYDRAMINE HYDROCHLORIDE AND LIDOCAINE HYDROCHLORIDE AND ALUMINUM HYDROXIDE AND MAGNESIUM HYDRO 10 ML: KIT at 21:08

## 2023-04-12 RX ADMIN — LEVOTHYROXINE SODIUM 88 MCG: 88 TABLET ORAL at 08:00

## 2023-04-12 RX ADMIN — DIPHENHYDRAMINE HYDROCHLORIDE AND LIDOCAINE HYDROCHLORIDE AND ALUMINUM HYDROXIDE AND MAGNESIUM HYDRO 10 ML: KIT at 08:10

## 2023-04-12 RX ADMIN — MIRTAZAPINE 15 MG: 15 TABLET, FILM COATED ORAL at 21:09

## 2023-04-12 RX ADMIN — FLUTICASONE FUROATE AND VILANTEROL TRIFENATATE 1 PUFF: 200; 25 POWDER RESPIRATORY (INHALATION) at 21:08

## 2023-04-12 RX ADMIN — DIPHENHYDRAMINE HYDROCHLORIDE AND LIDOCAINE HYDROCHLORIDE AND ALUMINUM HYDROXIDE AND MAGNESIUM HYDRO 10 ML: KIT at 15:43

## 2023-04-12 RX ADMIN — POLYETHYLENE GLYCOL 3350 17 G: 17 POWDER, FOR SOLUTION ORAL at 08:00

## 2023-04-12 RX ADMIN — SERTRALINE HYDROCHLORIDE 25 MG: 25 TABLET ORAL at 08:10

## 2023-04-12 ASSESSMENT — ACTIVITIES OF DAILY LIVING (ADL)
ADLS_ACUITY_SCORE: 56

## 2023-04-12 NOTE — PLAN OF CARE
Acute Rehab Care Conference/Team Rounds      Type: Team Rounds    Present: Dr. Kris Ray, Abimbola Thompson PA, Dr. Parvin Pond Neuropsychiatrist, Dru España PT, Jose Rafael Long PT Student, Terra Cortez OT, Zahng Escobar SLP, Alissa Duenas [facility], Michelle Contreras RD, Ann Corey RN, and Josephine Nicole Patient.       Discharge Barriers/Treatment/Education    Rehab Diagnosis: Brain Dysfunction 02.1 Non-Traumatic: s/p right bifrontal stealth craniotomy and resection of meningioma due to vasogenic cerebral edema and brain compression along with R femoral neck fracture s/p right hip hemiarthroplasty    Active Medical Co-morbidities/Prognosis:   Patient Active Problem List   Diagnosis     Cerebral meningioma (H)     Fall, initial encounter     Closed displaced fracture of right femoral neck (H)     S/P resection of meningioma        Safety: Patient is alert and oriented but can be forgetful. Is hard of hearing. Uses the call light appropriately. On continuous O2 at 2LPM via nasal cannula. Bed alarm on.    Pain: No complaints of pain.    Medications, Skin, Tubes/Lines: Takes medications whole one at a time. Scalp incision is YAZ, approximated. Right hip incision is approximated with liquid bandage intact. Roman catheter in place, draining well.     Swallowing/Nutrition: No dysphagia related concerns.     Bowel/Bladder: Continent of bowel, LBM 4/10. Roman catheter is staff managed, patent.     Psychosocial: Lives with dtr in a home. All needs met on main level. Indep PTA. Legally  from spouse. Family supportive. No financial concerns. Pt reported she has Depression, anxiety, panic attacks, claustrophobia, and is afraid of the dark. No substance abuse concerns.     ADLs/IADLs: Pt is early in ARU stay and making slow but steady progress with ADLs. Recently initiated use of FWW and AE for ADLs to increase IND. Pt requires set-up and supervision with UBD tasks seated. Pt requires mod A to stand with  walker. Pt requires min/mod A with LBD tasks with walker and AE. Pt still requiring Ax1 with josé manuel steady with nsg for toileting. Will focus on progressing to toileting with walker over next few days. Pt requires set-up and supervision with G/H tasks seated. Pt will need sock aid, LHSH, reacher, long handled sponge, extended tub bench, grab bars and raised toilet set for home. HC OT services upon discharge.     Mobility: Pt is early in rehab stay. Currently mod-A with bed mobility, min to max-A to stand. Recently initiated gait with FWW CGA 15'. Unable to perform stairs at this time. Initial participation difficulties seems to be improving as pt get in more of a routine. Barone 4/56, 5xSTS 0, 10MWT 0 m/s.  PT follow-up. Pt has a w/c, will need FWW.    Cognition/Language: Patient has been working towards moderate-higher level cognitive tasks. Patient has more passive participation and limited interactive engagement but still willing to participate in therapy. Overall, she is doing well but we will continue to progress with cognitive goals. Will require supervision/oversight with IADL's. Ongoing speech therapy recommendations pending progress.     Community Re-Entry: w/c based and assist from daughter per baseline.    Transportation: Goal for family to provide    Decision maker: self    Plan of Care and goals reviewed and updated.    Discharge Plan/Recommendations    Fall Precautions: continue    Patient/Family input to goals: Yes    Anticipated rehab needs following discharge: Home with home care    Anticipated care giver support after discharge: Family    Estimated length of stay: 21 days    Overall plan for the patient: Continue IP Rehabilitation.      Utilization Review and Continued Stay Justification    Medical Necessity Criteria:    For any criteria that is not met, please document reason and plan for discharge, transfer, or modification of plan of care to address.    Requires intensive rehabilitation program to  treat functional deficits?: Yes    Requires 3x per week or greater involvement of rehabilitation physician to oversee rehabilitation program?: Yes    Requires rehabilitation nursing interventions?: Yes    Patient is making functional progress?: Yes    There is a potential for additional functional progress? Yes    Patient is participating in therapy 3 hours per day a minimum of 5 days per week or 15 hours per week in 7 day period?:Yes    Has discharge needs that require coordinated discharge planning approach?:Yes          Final Physician Sign off    Statement of Approval: I approve the plan of care.     Patient Goals  Social Work Goals: Confirm discharge recommendations with therapy, coordinate safe discharge plan and remain available to support and assist as needed.    OT Predicted Duration/Target Date for Goal Attainment: 04/28/23  Therapy Frequency (OT): Daily  OT: Hygiene/Grooming: modified independent  OT: Upper Body Dressing: Modified independent  OT: Lower Body Dressing: Modified independent  OT: Upper Body Bathing: Supervision/stand-by assist  OT: Lower Body Bathing: Supervision/stand-by assist  OT: Toilet Transfer/Toileting: Modified independent  OT: Cognitive: Patient/caregiver will verbalize understanding of cognitive assessment results/recommendations as needed for safe discharge planning  OT: Goal 1: Pt will perform tub/shower transfer with Min A and DME as needed.    PT Predicted Duration/Target Date for Goal Attainment: 04/28/23  PT Frequency: 2x/day  PT: Bed Mobility: Modified independent, Supine to/from sit  PT: Transfers: Supervision/stand-by assist, Sit to/from stand, Assistive device  PT: Gait: Supervision/stand-by assist, 50 feet, Rolling walker  PT: Stairs: 2 stairs, Minimal assist, Assistive device  PT: Goal 1: PT: Primitivo with car transfer  PT: Goal 2: PT: Pt will verbalize 3 strategies to decrease risk of falls at home        Therapy Frequency (SLP Eval): daily     SLP: Goal 1: Pt will  participate in additional cognitive testing as needed. (GOAL MET)  SLP: Goal 2: Pt will complete higher level executive function tasks with 80% accuracy independently.  SLP: Goal 3: Pt will complete functional memory tasks with 80% accuracy independently                                       Goal: Mobility: Pt will progress to MOD I with assistive device as indicated prior to discharge from ARU.  Goal: Skin Integrity: Pt will be free from skin breakdown related to pressure/moisture and will have good healing of incisions throughout ARU stay.                    Goal: Safety Management: Pt will be free from falls/harm throughout ARU stay by using her call light and waiting for assistance as indicated.                          Goal Outcome Evaluation:           Overall Patient Progress: no changeOverall Patient Progress: no change

## 2023-04-12 NOTE — PLAN OF CARE
Goal Outcome Evaluation:  FOCUS/GOAL  Bowel management, Bladder management, Medical management, Skin integrity, Cognition/Memory/Judgment/Problem solving, and Reinforcement of self-care/ADL    ASSESSMENT, INTERVENTIONS AND CONTINUING PLAN FOR GOAL:  Patient is alert/oriented x4, is somewhat withdrawn and needs encouragement to participate and advocate for self.  Patient is able to use call light, was able to use toilet with Ao1 sera steady this am and had a bm, baldwin is intact and draining adequately.  Patient is on fluid restriction and needs reminders for the daily limit.  Patient denies any pain, O2 98% on 2L.  Formal care rounds for patient, see alternate note. New care concerns, continue with POC.

## 2023-04-12 NOTE — PLAN OF CARE
Goal Outcome Evaluation:      Plan of Care Reviewed With: patient     Pt A/O, forgetful. Denied pain this shift, scheduled tylenol given. Baldwin patent, baldwin catheter to be pulled at 6AM for trial void. Up with 1-2x Niki Moran. LBM 4/13. Pt requested PRN nebulizer at 2230, RT called. Continue with POC.

## 2023-04-12 NOTE — PROGRESS NOTES
Called two numbers with the VA to check on pt VA benefits and DME eligibility and left two secure voicemails, pending more information. Will continue to follow and update team once more information determined.     ADDENDUM: Spoke with the [facility] VA Eligibility and Enrollment Dept PH: [phone] and was told that they do not have a record for anyone with pt last name and last 4 numbers of social security number and do not suspect that pt has VA benefits. Need to look into this further.     Alissa Duenas [facility]   [place] [facility]   Direct Phone: [phone]  I   Pager: [phone]  I  Fax: [phone]

## 2023-04-12 NOTE — PROGRESS NOTES
"Discharge Planner Post-Acute Rehab OT:      Discharge Plan: home with assist and HHOT     Precautions: crani, falls, WBAT RLE, per chart review no formal hip precautions, [place] with no hearing aids     Current Status:  ADLs:    Mobility: Min-mod A SPT with FWW-with therapy only. Ax1 with SaraStedy with Nursing.     Grooming: set-up and supervision seated in w/c    Dressing: UB: set-up and supervision seated, Mod A with LBD tasks with walker and AE.     Bathing: TBD    Toileting: Ax1-2 with josé manuel steady and commode overlay.   IADLs: Reports daughter assist with some IADLs at baseline, will continue to assess  Vision/Cognition: Demonstrated deficits in problem solving t/o and appears self-limiting at times, will continue to monitor and assess     Assessment: Focused on progressing morning ADL routine with use of walker and AE to increase IND. Pt requires frequent rest breaks d/t fatigue and SOB. Pt's O2 maintained above 90% while on 2L throughout session. Pt stating several times \"Why am I always so tired?\" Educated pt on fatigue related to recovering from surgeries and deconditioning caused by prolonged hospital stay.      Other Barriers to Discharge (DME, Family Training, etc):   AE/DME needed for home unclear pending further assessment  Family Training not scheduled   "

## 2023-04-12 NOTE — PROGRESS NOTES
"  [facility]   Acute Rehabilitation Unit  Daily progress note    INTERVAL HISTORY  Josephine Nicole was seen and examined at bedside this morning during team rounds.  No acute events reported overnight.  Patient reports that she is still feeling fatigued.  Not sure if she is sleeping well.  She otherwise is noting some functional progress.  Does not feel pain or respiratory symptoms have been limiting.  She is open to meeting with health psychology.  She notes that between her daughter and cousins, she feels she will have adequate support at discharge.    Functionally, participation has been improving over the past several days.  Though patient is not complaining of pain, OT has noted it has been limiting for bed mobility.  Patient does not initiate requesting pain meds, so benefits from ongoing scheduled Tylenol.  Respiratory status has not been limiting.  She is doing better with set schedule.  Able to initiate walker-based ADLs.  Also was able to ambulate about 15' with walker.  Does demonstrate poor insight, likely related to location of resection (right frontal).  For full functional updates, see team rounds note from today.    MEDICATIONS    acetaminophen  975 mg Oral TID     atorvastatin  10 mg Oral Daily     dexamethasone  1 mg Oral Daily     enoxaparin ANTICOAGULANT  40 mg Subcutaneous Q24H     fluticasone-vilanterol  1 puff Inhalation Daily     furosemide  20 mg Oral Daily     levothyroxine  88 mcg Oral Daily     Magic Mouthwash  10 mL Swish & Swallow 4x Daily AC & HS     mirtazapine  7.5 mg Oral At Bedtime     pantoprazole  40 mg Oral QAM AC     polyethylene glycol  17 g Oral Daily     senna-docusate  1 tablet Oral BID     sertraline  25 mg Oral QAM        albuterol, bisacodyl, hydrOXYzine     PHYSICAL EXAM  /67 (BP Location: Left arm)   Pulse 76   Temp 98  F (36.7  C) (Axillary)   Resp 16   Ht 1.585 m (5' 2.4\")   Wt 59.7 kg (131 lb 9.8 oz)   SpO2 95%   BMI " 23.76 kg/m     Gen: NAD, sitting up in chair  HEENT: bifrontal craniotomy incision healing with sutures intact, no erythema or drainage  Cardio: appears well-perfused  Pulm: non-labored on 2L by NC  Abd: soft, non-tender, non-distended  Ext: 1+ pitting edema in bilateral lower extremities, no tenderness in calves  Neuro/MSK: awake, alert, PERRL, EOMI, left ptosis, scattered ecchymoses, moving all extremities in chair, right hip incision not visualized on this date  *Full exam deferred today for conversation    LABS  Last Basic Metabolic Panel:  Recent Labs   Lab Test 04/10/23  0747 04/08/23  0656 04/06/23  0832   * 137 138   POTASSIUM 3.9 4.0 4.1   CHLORIDE 97* 98 100   CO2 30* 33* 34*   ANIONGAP 8 6* 4*   GLC 82 81 90   BUN 13.9 18.7 13.8   CR 0.45* 0.47* 0.44*   GFRESTIMATED >90 >90 >90   CHAO 8.3* 8.3* 8.2*     CBC RESULTS:   Recent Labs   Lab Test 04/10/23  0747 04/08/23  0656 04/04/23  0820   WBC 11.7* 9.6 11.8*   RBC 3.75* 3.57* 3.44*   HGB 11.1* 10.5* 10.4*   HCT 35.0 33.2* 32.3*   MCV 93 93 94   MCH 29.6 29.4 30.2   MCHC 31.7 31.6 32.2   RDW 14.7 14.4 14.3    224 221       Rehabilitation - continue comprehensive acute inpatient rehabilitation program with multidisciplinary approach including therapies, rehab nursing, and physiatry following. See interval history for updates.      ASSESSMENT AND PLAN  Josephine Nicole is a 73 year old right hand dominant female with a past medical history of cerebral meningioma initially diagnosed 2019, s/p radiation in 11/2022 with recent progression including mass effect and midline shift of 3/17/23 MRI, COPD on 2L oxygen at baseline, hypothyroidism, hyperlipidemia, osteoporosis, depression/anxiety, vitamin B12 deficiency, and anemia who was admitted on 3/19/23 after fall at home in setting of several weeks of worsening confusion, impaired balance with imaging revealing right femoral neck fracture s/p right hip hemiarthroplasty.  He was then transferred to  [place] on 3/23/23 due worsening respiratory failure (COPD exacerbation), hypotension, lactic acidosis, and worsening encephalopathy in setting of progressive meningioma now s/p craniotomy and resection of meningioma on 3/30/23 with hospital course further complicated by lactic acidosis, anemia, left eye ptosis, urinary retention, pain, and hypernatremia.  She is now admitted to ARU on 4/7/23 for multidisciplinary rehabilitation and ongoing medical management.        Admission to acute inpatient rehab 4/7/23.    Impairment group code: Brain Dysfunction 02.1 Non-Traumatic: s/p right bifrontal stealth craniotomy and resection of meningioma due to vasogenic cerebral edema and brain compression along with R femoral neck fracture s/p right hip hemiarthroplasty        1. PT, OT and SLP 60 minutes of each on a daily basis, in addition to rehab nursing and close management of physiatrist.       2. Impairment of ADL's: Noted to have impaired activity tolerance, impaired balance, impaired cognition, impaired coordination, impaired judgement and safety awareness, impaired strength, impaired tone and impaired weight shifting, all affecting her ability to safely and independently perform basic ADLs.  Goal for mod I with basic ADLs.     3. Impairment of mobility:  Noted to have impaired activity tolerance, impaired balance, impaired cognition, impaired coordination, impaired judgement and safety awareness, impaired strength, impaired tone and impaired weight shifting, all affecting her ability to safely and independently perform basic mobility.  Goal for mod I with basic mobility.     4. Impairment of cognition/language/swallow:  Noted to have impaired cognition, will need full cognitive linguistic evaluation with SLP while on ARU with goals for improved cognitive-linguistic skills to meet basic needs.        5. Medical Conditions  New actions/orders/updates for today are in blue.     S/p bifrontal craniotomy and resection of  meningioma on 3/30/23 with Dr. Murphy  CNS WHO grade 1 meningioma (right frontal) with moderate surrounding vasogenic edema and mass effect  Diagnosed in 2019 after presenting with refractory headaches.  Status post radiation treatment 11/2022.  Recent brain MRI revealing progression.  Patient presented after fall in setting of several week history of worsening confusion, gait impairment, balance deficits.  CT head with known mass surrounding vasogenic edema predominantly involving the right frontal lobe with right to left midline shift/subfalcine herniation measuring up to 1.1 cm.  Neurosurgery consulted and recommended medical optimization and transfer to [facility] for surgical intervention, which she underwent as above on 3/30.  - Decadron taper: starting today on 1 mg daily x3 days, then stop  - Pain management: Tylenol 975 mg TID, wean to PRN as able  - Wound care: keep clean and dry, leave open to air  - Continue PT/OT/SLP  - Follow up with neurosurgery at 2-weeks post-op for incision check and staple removal (currently scheduled on 4/14, anticipate will still be at ARU).  4/14 is scheduled as nurse visit, so likely can do wound check and removal at ARU.  Will reach out to neurosurgery team tomorrow to clarify.  Also has neurosurgery follow-ups on 5/15 (ANTONIETA) and 6/30 (Dr. Murphy)  - Follow up with neuro-oncology?     Displaced right femoral neck fracture s/p right hip hemiarthroplasty (anterolateral approach) on 3/20/23 with Dr. Shun Cuevas  Pathologic fracture secondary to osteoporosis  Fractures of the right superior and inferior pubic rami and the left pubic body extending into the left pubic rami, stable on imaging 4/3  - WBAT RLE with walker  - Wound care: Surgical dressing removed by ortho on 4/3. Per their recs at that time: May keep site open to air. Please allow remaining Dermabond to slough off naturally (no picking at site). Okay to shower, but no soaking/submersion x 1 more week.   - Pain  management as above  - Per ortho, osteoporosis workup and treatment with primary care provider within 2 months.   - Continue PT/OT  - Follow up with Dr. Shun Cuevas at [facility] at six weeks post-op (~5/1/23) with X-rays. If remains at ARU at that time, can have wound check and xrays performed there and sent to Dr. Cuevas for review.     COPD, recent exacerbation  Acute on chronic hypoxic/hypercapnic respiratory failure  Possible community acquired pneumonia, treated  Former smoker x50 years.  On 2-3L of oxygen PTA for past ~5 years.  Following hip surgery, had acute on chronic hypoxic/hypercapnic respiratory failure requiring BiPAP.  Completed course of azithromycin 3/27 and ceftriaxone 4/4.  Respiratory status improved with diuresis so in part may be related to heart failure as below.  As of admission to ARU, stable on 2L, which is baseline.  - Monitor respiratory status  - Continue supplemental oxygen to maintain spO2>88% (goal range 88-93%)  - Continue Breo Ellipta 200-25 mcg inhaler daily (formulary sub for PTA Advair Diskus 500/50 mcg inhaler)  - Continue Duoneb q4h PRN  - Pulmonary hygiene/toilet  - Continue oral suction PRN for more tenuous secretions     HFpEF with recent exacerbation with fluid overload  Intermittent diuresis during this admission with improved respiratory status.  Echo 3/27/23 with EF 55-60%, grade I or early diastolic dysfunction.  Noted to have lower extremity edema.  Started on Lasix daily on 4/5, also ordered 2L fluid restriction.    - Continue Lasix 20 mg daily  - Continue 2L fluid restriction  - Daily weights, monitor volume status     Acute on chronic anemia  Hgb 10.5 on admission, dropped to benson of 7.8 on 3/26.  Did receive 1 unit pRBC.  Stable in 10s at discharge to ARU (baseline).  - Trend CBC every M/Th.  4/10: Hgb stable at 11.1     Urinary retention  Baldwin placed 3/24, failed TOV on 4/4.  Urology consulted 4/5 and recommended to replace baldwin.  Per urology, retention likely  due to recent anesthesia, immobilization, and narcotic pain medications; additional recs as below.  - Per urology, plan to remove baldwin ~4/12-4/15 for repeat TOV.  Will plan to do so tomorrow.  Bladder scans and SIC protocol ordered.  - Encourage OOB as able, fluids as able.   - Minimize anticholinergic and narcotic mediations  - Would not recommend Flomax at this time given borderline soft blood pressures     Mild left intermittent ptosis  Noted on 4/4.  CT head with expected post-operative changes.  Patient reports she has noted this for at least 1 year.    - Per sending team, given chronicity, recommended to defer additional work-up to outpatient setting as appropriate.     Hyperlipidemia  - Continue PTA atorvastatin     Depression/anxiety  - Patient complaining of feeling fatigued daily, ?poor quality sleep.  Fatigue and mood have been barrier to participation.  Is on steroid taper.  Will trial increase in PTA mirtazapine to 15 mg at HS.  Also will consult health psychology, patient agreeable.  - Continue Zoloft 25 mg daily     Hypothyroidism  - Continue PTA levothyroxine     Vitamin B12 deficiency  - Continue PTA B12 injection 1000 mcg q30 days, last given on 4/3     Throat/mouth pain  Patient reports this has been present throughout this admission.    - Continue magic mouthwash    Leukocytosis  WBC mildly elevated at 11.7 on 4/10, has been previously mildly elevated though had normalized to 9.6 on 4/8.  Suspect 2/2 steroids.  No localizing s/sx of infection.  - Continue to trend CBC every M/Th    Mild hyponatremia  Na mildly low at 135 on 4/10.  Asymptomatic.  - Trend BMP        6. Adjustment to disability:  Clinical psychology to eval and treat if indicated  7. FEN: regular diet, thin liquids  8. Bowel: continent, monitor, scheduled and PRN bowel meds available  9. Bladder: as above, plan to remove baldwin tomorrow for TOV  10. DVT Prophylaxis: subcutaneous Lovenox, ok'd to start on 4/3 per neurosurgery  11. GI  Prophylaxis: PPI  12. Code: DNR/DNI, discussed with palliative care during inpatient admission, confirmed at admission  13. Disposition: goal for home  14. ELOS:  21 days  15. Follow up Appointments on Discharge: PCP in 1-2 weeks, neurosurgery in 2 weeks (4/14 may need to be rescheduled pending ARU course; also has follow-up on 5/15 and 6/30), neuro-oncology?, ortho (Dr. Shun Cuevas ~5/1/23; care coordinator is Neha Way at [phone] for scheduling) with repeat xrays, urology if discharging with baldwin        Patient was seen and discussed with Dr. Kris Ray, PM&R Staff Physician    ABIEL Santana-IMMANUEL  Physical Medicine & Rehabilitation

## 2023-04-12 NOTE — PLAN OF CARE
"Discharge Planner Post-Acute Rehab PT:      Discharge Plan: HHPT, lives with daughter     Precautions: Falls, crani, WBAT R LE, 2 L NC 02 at baseline     Current Status:  Bed Mobility: Mod A - sleeps in recliner at baseline for past 6-7 years d/t COPD  Transfer: Variable min<>max A STS pending surface height with FWW. Ax1 SaraStedy with nsg.  Gait: up to 40 ft with FWW, CGA (Ax2 with wc and O2 tank follow)  Stairs: NT due to safety concerns  Balance: Able to sit unsupported, standing needs heavy assist to maintain     [place]one:  - 4/8: 4/56  4/15: **     5xSTS:  - 4/8: 0    10MWt:  - 4/8: 0 m/s     Assessment: STS still challenging below 24\" but gait improving with CGA (still Ax2 d/t wc and O2 tank follow). Pt verbalizing motivation to push herself and get home.      Other Barriers to Discharge (DME, Family Training, etc):  - Needs FWW  - 2STE no railing - recommend adding at least one rail if possible  Family training   "

## 2023-04-12 NOTE — PLAN OF CARE
"Discharge Planner Post-Acute Rehab SLP:     Discharge Plan: Home with daughter    Precautions: fall    Current Status:  Hearing: [place],  does not use hearing aids  Vision: Uses glasses for close up  Communication: WFL  Cognition: WFL CLQT. Demonstrating mod-severe difficulty with higher level executive skills.  Swallow: regular diet, thin liquids. Not assessed on ARU    Assessment: Pt with no IND completion of assigned task from yesterday's session, pt said \"her eyes were too tired to do it\". Pt then redirected to engage in task #1 of the FAVRES (making a decision). Pt completed with adequate amount of time with 100% accuracy. Pt able to select target response and provide appropriate rationale. When given a verbal fluency task (name things to do with a friend) pt was unable to come up with ideas and said \"I have to stay inside, I have COPD\". More passive participation seems to be related to baseline personality/behavior.      Other Barriers to Discharge (Family Training, etc): None anticipated by SLP. Will need increased support with IADLs from dtr if able to provide that  Support      "

## 2023-04-13 ENCOUNTER — APPOINTMENT (OUTPATIENT)
Dept: PHYSICAL THERAPY | Facility: CLINIC | Age: 73
DRG: 949 | End: 2023-04-13
Attending: PHYSICAL MEDICINE & REHABILITATION
Payer: COMMERCIAL

## 2023-04-13 ENCOUNTER — APPOINTMENT (OUTPATIENT)
Dept: OCCUPATIONAL THERAPY | Facility: CLINIC | Age: 73
DRG: 949 | End: 2023-04-13
Attending: PHYSICAL MEDICINE & REHABILITATION
Payer: COMMERCIAL

## 2023-04-13 ENCOUNTER — DOCUMENTATION ONLY (OUTPATIENT)
Dept: NEUROSURGERY | Facility: CLINIC | Age: 73
End: 2023-04-13
Payer: COMMERCIAL

## 2023-04-13 ENCOUNTER — APPOINTMENT (OUTPATIENT)
Dept: SPEECH THERAPY | Facility: CLINIC | Age: 73
DRG: 949 | End: 2023-04-13
Attending: PHYSICAL MEDICINE & REHABILITATION
Payer: COMMERCIAL

## 2023-04-13 LAB
ANION GAP SERPL CALCULATED.3IONS-SCNC: 7 MMOL/L (ref 7–15)
BUN SERPL-MCNC: 15.6 MG/DL (ref 8–23)
CALCIUM SERPL-MCNC: 8.5 MG/DL (ref 8.8–10.2)
CHLORIDE SERPL-SCNC: 100 MMOL/L (ref 98–107)
CREAT SERPL-MCNC: 0.47 MG/DL (ref 0.51–0.95)
DEPRECATED HCO3 PLAS-SCNC: 31 MMOL/L (ref 22–29)
ERYTHROCYTE [DISTWIDTH] IN BLOOD BY AUTOMATED COUNT: 15.3 % (ref 10–15)
GFR SERPL CREATININE-BSD FRML MDRD: >90 ML/MIN/1.73M2
GLUCOSE SERPL-MCNC: 91 MG/DL (ref 70–99)
HCT VFR BLD AUTO: 32.9 % (ref 35–47)
HGB BLD-MCNC: 10.2 G/DL (ref 11.7–15.7)
MCH RBC QN AUTO: 29.3 PG (ref 26.5–33)
MCHC RBC AUTO-ENTMCNC: 31 G/DL (ref 31.5–36.5)
MCV RBC AUTO: 95 FL (ref 78–100)
PLATELET # BLD AUTO: 149 10E3/UL (ref 150–450)
POTASSIUM SERPL-SCNC: 4 MMOL/L (ref 3.4–5.3)
RBC # BLD AUTO: 3.48 10E6/UL (ref 3.8–5.2)
SODIUM SERPL-SCNC: 138 MMOL/L (ref 136–145)
WBC # BLD AUTO: 8.3 10E3/UL (ref 4–11)

## 2023-04-13 PROCEDURE — 128N000003 HC R&B REHAB

## 2023-04-13 PROCEDURE — 250N000012 HC RX MED GY IP 250 OP 636 PS 637: Performed by: PHYSICIAN ASSISTANT

## 2023-04-13 PROCEDURE — 36415 COLL VENOUS BLD VENIPUNCTURE: CPT | Performed by: PHYSICIAN ASSISTANT

## 2023-04-13 PROCEDURE — 97535 SELF CARE MNGMENT TRAINING: CPT | Mod: GO | Performed by: OCCUPATIONAL THERAPIST

## 2023-04-13 PROCEDURE — 97130 THER IVNTJ EA ADDL 15 MIN: CPT | Mod: GN | Performed by: SPEECH-LANGUAGE PATHOLOGIST

## 2023-04-13 PROCEDURE — 999N000157 HC STATISTIC RCP TIME EA 10 MIN

## 2023-04-13 PROCEDURE — 97129 THER IVNTJ 1ST 15 MIN: CPT | Mod: GN | Performed by: SPEECH-LANGUAGE PATHOLOGIST

## 2023-04-13 PROCEDURE — 94640 AIRWAY INHALATION TREATMENT: CPT

## 2023-04-13 PROCEDURE — 99232 SBSQ HOSP IP/OBS MODERATE 35: CPT | Mod: FS | Performed by: PHYSICAL MEDICINE & REHABILITATION

## 2023-04-13 PROCEDURE — 97530 THERAPEUTIC ACTIVITIES: CPT | Mod: GP | Performed by: STUDENT IN AN ORGANIZED HEALTH CARE EDUCATION/TRAINING PROGRAM

## 2023-04-13 PROCEDURE — 250N000011 HC RX IP 250 OP 636: Performed by: PHYSICIAN ASSISTANT

## 2023-04-13 PROCEDURE — 250N000013 HC RX MED GY IP 250 OP 250 PS 637: Performed by: PHYSICAL MEDICINE & REHABILITATION

## 2023-04-13 PROCEDURE — 250N000013 HC RX MED GY IP 250 OP 250 PS 637: Performed by: PHYSICIAN ASSISTANT

## 2023-04-13 PROCEDURE — 80048 BASIC METABOLIC PNL TOTAL CA: CPT | Performed by: PHYSICIAN ASSISTANT

## 2023-04-13 PROCEDURE — 85027 COMPLETE CBC AUTOMATED: CPT | Performed by: PHYSICIAN ASSISTANT

## 2023-04-13 PROCEDURE — 250N000009 HC RX 250: Performed by: PHYSICIAN ASSISTANT

## 2023-04-13 RX ORDER — LEVALBUTEROL INHALATION SOLUTION 0.63 MG/3ML
0.63 SOLUTION RESPIRATORY (INHALATION) 3 TIMES DAILY PRN
Status: DISCONTINUED | OUTPATIENT
Start: 2023-04-13 | End: 2023-04-28 | Stop reason: HOSPADM

## 2023-04-13 RX ADMIN — ALBUTEROL SULFATE 2 PUFF: 90 AEROSOL, METERED RESPIRATORY (INHALATION) at 08:50

## 2023-04-13 RX ADMIN — LEVOTHYROXINE SODIUM 88 MCG: 88 TABLET ORAL at 07:59

## 2023-04-13 RX ADMIN — MIRTAZAPINE 15 MG: 15 TABLET, FILM COATED ORAL at 21:22

## 2023-04-13 RX ADMIN — ATORVASTATIN CALCIUM 10 MG: 10 TABLET, FILM COATED ORAL at 07:59

## 2023-04-13 RX ADMIN — ACETAMINOPHEN 325MG 975 MG: 325 TABLET ORAL at 21:22

## 2023-04-13 RX ADMIN — ENOXAPARIN SODIUM 40 MG: 40 INJECTION SUBCUTANEOUS at 16:02

## 2023-04-13 RX ADMIN — ACETAMINOPHEN 325MG 975 MG: 325 TABLET ORAL at 07:59

## 2023-04-13 RX ADMIN — SENNOSIDES AND DOCUSATE SODIUM 1 TABLET: 50; 8.6 TABLET ORAL at 08:02

## 2023-04-13 RX ADMIN — POLYETHYLENE GLYCOL 3350 17 G: 17 POWDER, FOR SOLUTION ORAL at 07:59

## 2023-04-13 RX ADMIN — PANTOPRAZOLE SODIUM 40 MG: 40 TABLET, DELAYED RELEASE ORAL at 07:59

## 2023-04-13 RX ADMIN — SENNOSIDES AND DOCUSATE SODIUM 1 TABLET: 50; 8.6 TABLET ORAL at 20:45

## 2023-04-13 RX ADMIN — DIPHENHYDRAMINE HYDROCHLORIDE AND LIDOCAINE HYDROCHLORIDE AND ALUMINUM HYDROXIDE AND MAGNESIUM HYDRO 10 ML: KIT at 16:02

## 2023-04-13 RX ADMIN — FLUTICASONE FUROATE AND VILANTEROL TRIFENATATE 1 PUFF: 200; 25 POWDER RESPIRATORY (INHALATION) at 20:45

## 2023-04-13 RX ADMIN — FUROSEMIDE 20 MG: 20 TABLET ORAL at 07:59

## 2023-04-13 RX ADMIN — DEXAMETHASONE 1 MG: 1 TABLET ORAL at 07:59

## 2023-04-13 RX ADMIN — LEVALBUTEROL HYDROCHLORIDE 0.63 MG: 0.63 SOLUTION RESPIRATORY (INHALATION) at 13:28

## 2023-04-13 RX ADMIN — DIPHENHYDRAMINE HYDROCHLORIDE AND LIDOCAINE HYDROCHLORIDE AND ALUMINUM HYDROXIDE AND MAGNESIUM HYDRO 10 ML: KIT at 07:59

## 2023-04-13 RX ADMIN — DIPHENHYDRAMINE HYDROCHLORIDE AND LIDOCAINE HYDROCHLORIDE AND ALUMINUM HYDROXIDE AND MAGNESIUM HYDRO 10 ML: KIT at 21:24

## 2023-04-13 RX ADMIN — DIPHENHYDRAMINE HYDROCHLORIDE AND LIDOCAINE HYDROCHLORIDE AND ALUMINUM HYDROXIDE AND MAGNESIUM HYDRO 10 ML: KIT at 12:13

## 2023-04-13 RX ADMIN — ACETAMINOPHEN 325MG 975 MG: 325 TABLET ORAL at 14:00

## 2023-04-13 RX ADMIN — SERTRALINE HYDROCHLORIDE 25 MG: 25 TABLET ORAL at 08:02

## 2023-04-13 ASSESSMENT — ACTIVITIES OF DAILY LIVING (ADL)
ADLS_ACUITY_SCORE: 56

## 2023-04-13 NOTE — PLAN OF CARE
"Goal Outcome Evaluation:  FOCUS/GOAL  Bladder management, Medical management, Mobility, and Reinforcement of self-care/ADL    ASSESSMENT, INTERVENTIONS AND CONTINUING PLAN FOR GOAL:  Patient is alert/oriented, withdrawn and needs encouragement to participate, uses call light but alarms still. Patient is able to void today, has voided x3 with 2 PVRs <100, will get one more to clear voiding ability. Patient is forgetful of fluid restriction and requests water frequently. Patient was progressed with mobility to move with walker and gait belt CGAo1, was able to do that with nursing x1 but can do the w/c if fatigued.  Patient has denied any pain, c/o feeling \"wheezy\" today, PRN albuterol inhaler given x1 and new order for xopenex med, med was not available right away and RT did come to administer, continue to f/up.  Patient uses the yaunker at bedside PRN.  No new care concerns at this time, continue with POC.                          "

## 2023-04-13 NOTE — PLAN OF CARE
Discharge Planner Post-Acute Rehab SLP:      Discharge Plan: Home with daughter     Precautions: fall     Current Status:  Hearing: [place],  does not use hearing aids  Vision: Uses glasses for close up  Communication: WFL  Cognition: WFL CLQT. Demonstrating mod-severe difficulty with higher level executive skills.  Swallow: regular diet, thin liquids. Not assessed on ARU     Assessment:  Patient stated she is not able to think like she usually does. Patient participated in time calculation and bus route interpretation exercise. Patient completed with 60% accuracy independently. Patient needed moderate cues to correct errors. Patient had correct operation but did not apply correctly with time measurement.     Other Barriers to Discharge (Family Training, etc): None anticipated by SLP. Will need increased support with IADLs from dtr if able to provide that  Support

## 2023-04-13 NOTE — PROGRESS NOTES
Discharge Planner Post-Acute Rehab OT:      Discharge Plan: home with assist and HHOT     Precautions: crani, falls, WBAT RLE, per chart review no formal hip precautions, [place] with no hearing aids     Current Status:  ADLs:    Mobility: min A with walker. Up to Ax2 to stand with walker when fatigued     Grooming: set-up and supervision seated in w/c    Dressing: UB: set-up and supervision seated, Mod A with LBD tasks with walker and AE.     Bathing: Min-mod A with W/C<>extended tub bench    Toileting: Ax1-2 with walker and commode overlay.    IADLs: Reports daughter assist with some IADLs at baseline, will continue to assess  Vision/Cognition: Demonstrated deficits in problem solving t/o and appears self-limiting at times, will continue to monitor and assess     Assessment: Completed full shower and ADLs with encouragement and extensive time. Pt was on 2L of O2 throughout and decreased to 87% during shower but rebounded above 90% with pursed lip breathing. Pt okay to ambulate to/from bathroom with walker and needs up to Ax2 to stand from toilet with commode overlay with walker when fatiued.      Other Barriers to Discharge (DME, Family Training, etc):   AE/DME needed for home unclear pending further assessment  Family Training not scheduled

## 2023-04-13 NOTE — PROGRESS NOTES
Neurosurgery on call ANTONIETA received text from ABIEL Daily at [facility] requesting suture removal to be done there as patient cannot make it to appointment.     Staff message sent to ABIEL Daily updated that we are OK for suture removal at patients acute rehab as long as there are no incision concerns.     RN appointment scheduled for tomorrow cancelled.

## 2023-04-13 NOTE — PROGRESS NOTES
"  [facility]   Acute Rehabilitation Unit  Daily progress note    INTERVAL HISTORY  Josephine Nicole was seen and examined at bedside this afternoon.  No acute events reported overnight.  Patient reports she is still feeling fatigued.  Felt like she slept well although stills says she is not sure.  She notes minimal pain.  Nursing noted she was feeling more \"wheezy\" this morning, states COPD symptoms flared due to weather.  Got some relief from nebulizer though incomplete.  Oxygen sats have been stable.  Roman was removed this morning and she has been able to void well with little residual.  She is concerned about need for urinate overnight, asking if she can use Purewick.  Discouraged and discussed other behavioral strategies to reduce nocturia.  States she would typically get up once to twice overnight PTA.  No BM today, though feels she may.  Did have one yesterday.  Denies any other concerns or questions.  Notes some gradual progress with therapies.    Functionally, she is needing min A to ambulate with walker, though can require up to Ax2 to stand with walker when fatigued.  She needs set-up and supervision for grooming and upper body dressing seated, mod A for lower body dressing.    MEDICATIONS    acetaminophen  975 mg Oral TID     atorvastatin  10 mg Oral Daily     dexamethasone  1 mg Oral Daily     enoxaparin ANTICOAGULANT  40 mg Subcutaneous Q24H     fluticasone-vilanterol  1 puff Inhalation Daily     furosemide  20 mg Oral Daily     levothyroxine  88 mcg Oral Daily     Magic Mouthwash  10 mL Swish & Swallow 4x Daily AC & HS     mirtazapine  15 mg Oral At Bedtime     pantoprazole  40 mg Oral QAM AC     polyethylene glycol  17 g Oral Daily     senna-docusate  1 tablet Oral BID     sertraline  25 mg Oral QAM        albuterol, bisacodyl, hydrOXYzine, levalbuterol     PHYSICAL EXAM  /62   Pulse 72   Temp (!) 96.7  F (35.9  C) (Axillary)   Resp 18   Ht 1.585 m (5' " "2.4\")   Wt 54.8 kg (120 lb 13 oz)   SpO2 98%   BMI 21.81 kg/m     Gen: NAD, sitting up in chair  HEENT: bifrontal craniotomy incision healing with sutures intact, no erythema or drainage  Cardio: RRR, no murmurs  Pulm: non-labored on 2L by NC, lungs with scattered rhonchi but no wheezes  Abd: soft, non-tender, non-distended, bowel sounds present  Ext: 1+ pitting edema in bilateral lower extremities, no tenderness in calves  Neuro/MSK: awake, alert, PERRL, EOMI, left ptosis, scattered ecchymoses, moving all extremities in chair, right hip incision not visualized on this date    LABS  Last Basic Metabolic Panel:  Recent Labs   Lab Test 04/13/23  0602 04/10/23  0747 04/08/23  0656    135* 137   POTASSIUM 4.0 3.9 4.0   CHLORIDE 100 97* 98   CO2 31* 30* 33*   ANIONGAP 7 8 6*   GLC 91 82 81   BUN 15.6 13.9 18.7   CR 0.47* 0.45* 0.47*   GFRESTIMATED >90 >90 >90   CHAO 8.5* 8.3* 8.3*     CBC RESULTS:   Recent Labs   Lab Test 04/13/23  0602 04/10/23  0747 04/08/23  0656   WBC 8.3 11.7* 9.6   RBC 3.48* 3.75* 3.57*   HGB 10.2* 11.1* 10.5*   HCT 32.9* 35.0 33.2*   MCV 95 93 93   MCH 29.3 29.6 29.4   MCHC 31.0* 31.7 31.6   RDW 15.3* 14.7 14.4   * 201 224       Rehabilitation - continue comprehensive acute inpatient rehabilitation program with multidisciplinary approach including therapies, rehab nursing, and physiatry following. See interval history for updates.      ASSESSMENT AND PLAN  Josephine Nicole is a 73 year old right hand dominant female with a past medical history of cerebral meningioma initially diagnosed 2019, s/p radiation in 11/2022 with recent progression including mass effect and midline shift of 3/17/23 MRI, COPD on 2L oxygen at baseline, hypothyroidism, hyperlipidemia, osteoporosis, depression/anxiety, vitamin B12 deficiency, and anemia who was admitted on 3/19/23 after fall at home in setting of several weeks of worsening confusion, impaired balance with imaging revealing right femoral neck " fracture s/p right hip hemiarthroplasty.  He was then transferred to [facility] on 3/23/23 due worsening respiratory failure (COPD exacerbation), hypotension, lactic acidosis, and worsening encephalopathy in setting of progressive meningioma now s/p craniotomy and resection of meningioma on 3/30/23 with hospital course further complicated by lactic acidosis, anemia, left eye ptosis, urinary retention, pain, and hypernatremia.  She is now admitted to ARU on 4/7/23 for multidisciplinary rehabilitation and ongoing medical management.        Admission to acute inpatient rehab 4/7/23.    Impairment group code: Brain Dysfunction 02.1 Non-Traumatic: s/p right bifrontal stealth craniotomy and resection of meningioma due to vasogenic cerebral edema and brain compression along with R femoral neck fracture s/p right hip hemiarthroplasty        1. PT, OT and SLP 60 minutes of each on a daily basis, in addition to rehab nursing and close management of physiatrist.       2. Impairment of ADL's: Noted to have impaired activity tolerance, impaired balance, impaired cognition, impaired coordination, impaired judgement and safety awareness, impaired strength, impaired tone and impaired weight shifting, all affecting her ability to safely and independently perform basic ADLs.  Goal for mod I with basic ADLs.     3. Impairment of mobility:  Noted to have impaired activity tolerance, impaired balance, impaired cognition, impaired coordination, impaired judgement and safety awareness, impaired strength, impaired tone and impaired weight shifting, all affecting her ability to safely and independently perform basic mobility.  Goal for mod I with basic mobility.     4. Impairment of cognition/language/swallow:  Noted to have impaired cognition, will need full cognitive linguistic evaluation with SLP while on ARU with goals for improved cognitive-linguistic skills to meet basic needs.        5. Medical Conditions  New actions/orders/updates  for today are in blue.     S/p bifrontal craniotomy and resection of meningioma on 3/30/23 with Dr. Murphy  CNS WHO grade 1 meningioma (right frontal) with moderate surrounding vasogenic edema and mass effect  Diagnosed in 2019 after presenting with refractory headaches.  Status post radiation treatment 11/2022.  Recent brain MRI revealing progression.  Patient presented after fall in setting of several week history of worsening confusion, gait impairment, balance deficits.  CT head with known mass surrounding vasogenic edema predominantly involving the right frontal lobe with right to left midline shift/subfalcine herniation measuring up to 1.1 cm.  Neurosurgery consulted and recommended medical optimization and transfer to [facility] for surgical intervention, which she underwent as above on 3/30.  - Decadron taper: on 1 mg daily thru 4/14, then off  - Pain management: Tylenol 975 mg TID, wean to PRN as able  - Wound care: keep clean and dry, leave open to air  - Continue PT/OT/SLP  - Follow up with neurosurgery at 2-weeks post-op for incision check and staple removal (currently scheduled on 4/14, anticipate will still be at ARU).  Per neurosurgery team, given no concerns with incision, ok for rehab team to assess incision tomorrow and remove sutures if appropriate.  Also has neurosurgery follow-ups on 5/15 ([facility]) and 6/30 (Dr. Murphy)  - Follow up with neuro-oncology?     Displaced right femoral neck fracture s/p right hip hemiarthroplasty (anterolateral approach) on 3/20/23 with Dr. Shun Cuevas  Pathologic fracture secondary to osteoporosis  Fractures of the right superior and inferior pubic rami and the left pubic body extending into the left pubic rami, stable on imaging 4/3  - WBAT RLE with walker  - Wound care: Surgical dressing removed by ortho on 4/3. Per their recs at that time: May keep site open to air. Please allow remaining Dermabond to slough off naturally (no picking at site). Okay to shower, but no  soaking/submersion x 1 more week.   - Pain management as above  - Per ortho, osteoporosis workup and treatment with primary care provider within 2 months.   - Continue PT/OT  - Follow up with Dr. Shun Cuevas at [facility] at six weeks post-op (~5/1/23) with X-rays. If remains at ARU at that time, can have wound check and xrays performed there and sent to Dr. Cuevas for review.     COPD, recent exacerbation  Acute on chronic hypoxic/hypercapnic respiratory failure  Possible community acquired pneumonia, treated  Former smoker x50 years.  On 2-3L of oxygen PTA for past ~5 years.  Following hip surgery, had acute on chronic hypoxic/hypercapnic respiratory failure requiring BiPAP.  Completed course of azithromycin 3/27 and ceftriaxone 4/4.  Respiratory status improved with diuresis so in part may be related to heart failure as below.  As of admission to ARU, stable on 2L, which is baseline.  - Monitor respiratory status  - Continue supplemental oxygen to maintain spO2>88% (goal range 88-93%)  - Continue Breo Ellipta 200-25 mcg inhaler daily (formulary sub for PTA Advair Diskus 500/50 mcg inhaler)  - Duoneb not available due to supply issues, ordered PRN xopenex neb.  Notes increased wheezing today, improved after nebulizer, likely related to weather change.  Oxygen needs stable.  Continue to monitor.  - Pulmonary hygiene/toilet  - Continue oral suction PRN for more tenuous secretions     HFpEF with recent exacerbation with fluid overload  Intermittent diuresis during this admission with improved respiratory status.  Echo 3/27/23 with EF 55-60%, grade I or early diastolic dysfunction.  Noted to have lower extremity edema.  Started on Lasix daily on 4/5, also ordered 2L fluid restriction.    - Continue Lasix 20 mg daily  - Continue 2L fluid restriction  - Daily weights, monitor volume status     Acute on chronic anemia  Hgb 10.5 on admission, dropped to benson of 7.8 on 3/26.  Did receive 1 unit pRBC.  Stable in 10s at  discharge to ARU (baseline).  - Trend CBC every M/Th.  4/13: Hgb stable at 10.2     Urinary retention  Baldwin placed 3/24, failed TOV on 4/4.  Urology consulted 4/5 and recommended to replace baldwin.  Per urology, retention likely due to recent anesthesia, immobilization, and narcotic pain medications; additional recs as below.  - Baldwin removed this morning for repeat TOV.  She has been able to void x3 with 2 PVRs <100.  Bladder scans and SIC protocol ordered.  - Encourage OOB as able, fluids as able.   - Minimize anticholinergic and narcotic mediations  - Would not recommend Flomax at this time given borderline soft blood pressures     Mild left intermittent ptosis  Noted on 4/4.  CT head with expected post-operative changes.  Patient reports she has noted this for at least 1 year.    - Per sending team, given chronicity, recommended to defer additional work-up to outpatient setting as appropriate.     Hyperlipidemia  - Continue PTA atorvastatin     Depression/anxiety  - Patient complaining of feeling fatigued daily, ?poor quality sleep.  Fatigue and mood have been barrier to participation.  Is on steroid taper.  Increased PTA mirtazapine to 15 mg at HS on 4/12.  Also consulted health psychology.  Unable to identify whether any improvements in sleep, feels she did sleep well, still feels fatigued.  Continue to monitor.  - Continue Zoloft 25 mg daily     Hypothyroidism  - Continue PTA levothyroxine     Vitamin B12 deficiency  - Continue PTA B12 injection 1000 mcg q30 days, last given on 4/3     Throat/mouth pain  Patient reports this has been present throughout this admission.    - Continue magic mouthwash    Leukocytosis  WBC mildly elevated at 11.7 on 4/10, has been previously mildly elevated though had normalized to 9.6 on 4/8.  Suspect 2/2 steroids.  No localizing s/sx of infection.  - Continue to trend CBC every M/Th.  WBC WNL on 4/13 at 8.3    Mild hyponatremia  Na mildly low at 135 on 4/10.  Asymptomatic.  -  Trend BMP.  Na WNL on 4/13        6. Adjustment to disability:  Clinical psychology to eval and treat if indicated  7. FEN: regular diet, thin liquids  8. Bowel: continent, monitor, scheduled and PRN bowel meds available  9. Bladder: as above, baldwin removed today for TOV and is voiding well so far, encouraged to avoid use of Purewick  10. DVT Prophylaxis: subcutaneous Lovenox, ok'd to start on 4/3 per neurosurgery  11. GI Prophylaxis: PPI  12. Code: DNR/DNI, discussed with palliative care during inpatient admission, confirmed at admission  13. Disposition: goal for home  14. ELOS:  target 4/28/23  15. Follow up Appointments on Discharge: PCP in 1-2 weeks, neurosurgery (scsheduled on 5/15 and 6/30), neuro-oncology?, ortho (Dr. Shun Cuevas ~5/1/23; care coordinator is Neha Way at [phone] for scheduling) with repeat xrays        Patient was discussed with Dr. Kris Ray, PM&R Staff Physician    Abimbola Thompson PA-C  Physical Medicine & Rehabilitation

## 2023-04-13 NOTE — PLAN OF CARE
Discharge Planner Post-Acute Rehab PT:      Discharge Plan: HHPT, lives with daughter     Precautions: Falls, crani, WBAT R LE, 2 L NC 02 at baseline     Current Status:  Bed Mobility: Mod A - sleeps in recliner at baseline for past 6-7 years d/t COPD  Transfer: Variable min<>max A STS pending surface height with FWW. Ax1 SaraStedy with nsg.  Gait: up to 40 ft with FWW, CGA (Ax2 with wc and O2 tank follow)  Stairs: NT due to safety concerns  Balance: Able to sit unsupported, standing needs heavy assist to maintain     Barone:  - 4/8: 4/56  4/15: **     5xSTS:  - 4/8: 0    10MWt:  - 4/8: 0 m/s     Assessment: Pt with good participation today, requesting to stay near toilet d/t having baldwin removed, also feeling urge to have BM. Progressed to walking to bathroom with nsg as well as therapy.     Other Barriers to Discharge (DME, Family Training, etc):  - Needs FWW  - 2STE no railing - recommend adding at least one rail if possible  Family training

## 2023-04-13 NOTE — PLAN OF CARE
FOCUS/GOAL  Medical management    ASSESSMENT, INTERVENTIONS AND CONTINUING PLAN FOR GOAL:  Patient is alert and oriented x 4, Assist of 1- 2 surgical site clean, dry and intact. Crani open to air. On  Head and right hip. Pt. Slept during the night shift. Roman catheter was removed per ordered.  Pt. SPO2 is greater than 90% stable  On 2 liters of oxygen, nasal cannula.  Continue with POC.  Goal Outcome Evaluation:      Plan of Care Reviewed With: patient    Overall Patient Progress: no changeOverall Patient Progress: no change    Outcome Evaluation: No acute change this shift. Cath will be discontinue this morning. Will start doing bladder protocol.

## 2023-04-13 NOTE — PROGRESS NOTES
Assisted pt dtr with getting FMLA ppwk filled out and scanned/emailed it back to her. No other immediate needs at this time.     Alissa Duenas [facility]   [place] [facility]   Direct Phone: [phone]  I   Pager: [phone]  I  Fax: [phone]

## 2023-04-14 ENCOUNTER — APPOINTMENT (OUTPATIENT)
Dept: PHYSICAL THERAPY | Facility: CLINIC | Age: 73
DRG: 949 | End: 2023-04-14
Attending: PHYSICAL MEDICINE & REHABILITATION
Payer: COMMERCIAL

## 2023-04-14 ENCOUNTER — APPOINTMENT (OUTPATIENT)
Dept: OCCUPATIONAL THERAPY | Facility: CLINIC | Age: 73
DRG: 949 | End: 2023-04-14
Attending: PHYSICAL MEDICINE & REHABILITATION
Payer: COMMERCIAL

## 2023-04-14 ENCOUNTER — APPOINTMENT (OUTPATIENT)
Dept: SPEECH THERAPY | Facility: CLINIC | Age: 73
DRG: 949 | End: 2023-04-14
Attending: PHYSICAL MEDICINE & REHABILITATION
Payer: COMMERCIAL

## 2023-04-14 PROCEDURE — 250N000013 HC RX MED GY IP 250 OP 250 PS 637: Performed by: PHYSICAL MEDICINE & REHABILITATION

## 2023-04-14 PROCEDURE — 99232 SBSQ HOSP IP/OBS MODERATE 35: CPT | Mod: FS | Performed by: PHYSICAL MEDICINE & REHABILITATION

## 2023-04-14 PROCEDURE — 97130 THER IVNTJ EA ADDL 15 MIN: CPT | Mod: GN

## 2023-04-14 PROCEDURE — 97110 THERAPEUTIC EXERCISES: CPT | Mod: GP | Performed by: STUDENT IN AN ORGANIZED HEALTH CARE EDUCATION/TRAINING PROGRAM

## 2023-04-14 PROCEDURE — 250N000011 HC RX IP 250 OP 636: Performed by: PHYSICIAN ASSISTANT

## 2023-04-14 PROCEDURE — 97110 THERAPEUTIC EXERCISES: CPT | Mod: GO

## 2023-04-14 PROCEDURE — 94640 AIRWAY INHALATION TREATMENT: CPT

## 2023-04-14 PROCEDURE — 250N000009 HC RX 250: Performed by: PHYSICIAN ASSISTANT

## 2023-04-14 PROCEDURE — 97535 SELF CARE MNGMENT TRAINING: CPT | Mod: GO

## 2023-04-14 PROCEDURE — 250N000013 HC RX MED GY IP 250 OP 250 PS 637: Performed by: PHYSICIAN ASSISTANT

## 2023-04-14 PROCEDURE — 97129 THER IVNTJ 1ST 15 MIN: CPT | Mod: GN

## 2023-04-14 PROCEDURE — 128N000003 HC R&B REHAB

## 2023-04-14 PROCEDURE — 250N000012 HC RX MED GY IP 250 OP 636 PS 637: Performed by: PHYSICIAN ASSISTANT

## 2023-04-14 PROCEDURE — 99232 SBSQ HOSP IP/OBS MODERATE 35: CPT | Performed by: PHYSICIAN ASSISTANT

## 2023-04-14 PROCEDURE — 97530 THERAPEUTIC ACTIVITIES: CPT | Mod: GP | Performed by: STUDENT IN AN ORGANIZED HEALTH CARE EDUCATION/TRAINING PROGRAM

## 2023-04-14 PROCEDURE — 999N000157 HC STATISTIC RCP TIME EA 10 MIN

## 2023-04-14 RX ADMIN — LEVOTHYROXINE SODIUM 88 MCG: 88 TABLET ORAL at 08:15

## 2023-04-14 RX ADMIN — SENNOSIDES AND DOCUSATE SODIUM 1 TABLET: 50; 8.6 TABLET ORAL at 08:15

## 2023-04-14 RX ADMIN — ACETAMINOPHEN 325MG 975 MG: 325 TABLET ORAL at 08:14

## 2023-04-14 RX ADMIN — FUROSEMIDE 20 MG: 20 TABLET ORAL at 08:15

## 2023-04-14 RX ADMIN — SENNOSIDES AND DOCUSATE SODIUM 1 TABLET: 50; 8.6 TABLET ORAL at 21:12

## 2023-04-14 RX ADMIN — FLUTICASONE FUROATE AND VILANTEROL TRIFENATATE 1 PUFF: 200; 25 POWDER RESPIRATORY (INHALATION) at 21:15

## 2023-04-14 RX ADMIN — DIPHENHYDRAMINE HYDROCHLORIDE AND LIDOCAINE HYDROCHLORIDE AND ALUMINUM HYDROXIDE AND MAGNESIUM HYDRO 10 ML: KIT at 08:38

## 2023-04-14 RX ADMIN — ATORVASTATIN CALCIUM 10 MG: 10 TABLET, FILM COATED ORAL at 08:15

## 2023-04-14 RX ADMIN — ENOXAPARIN SODIUM 40 MG: 40 INJECTION SUBCUTANEOUS at 16:29

## 2023-04-14 RX ADMIN — DIPHENHYDRAMINE HYDROCHLORIDE AND LIDOCAINE HYDROCHLORIDE AND ALUMINUM HYDROXIDE AND MAGNESIUM HYDRO 10 ML: KIT at 21:16

## 2023-04-14 RX ADMIN — PANTOPRAZOLE SODIUM 40 MG: 40 TABLET, DELAYED RELEASE ORAL at 08:14

## 2023-04-14 RX ADMIN — ACETAMINOPHEN 325MG 975 MG: 325 TABLET ORAL at 14:01

## 2023-04-14 RX ADMIN — ACETAMINOPHEN 325MG 975 MG: 325 TABLET ORAL at 21:12

## 2023-04-14 RX ADMIN — POLYETHYLENE GLYCOL 3350 17 G: 17 POWDER, FOR SOLUTION ORAL at 08:15

## 2023-04-14 RX ADMIN — DIPHENHYDRAMINE HYDROCHLORIDE AND LIDOCAINE HYDROCHLORIDE AND ALUMINUM HYDROXIDE AND MAGNESIUM HYDRO 10 ML: KIT at 12:53

## 2023-04-14 RX ADMIN — MIRTAZAPINE 15 MG: 15 TABLET, FILM COATED ORAL at 21:12

## 2023-04-14 RX ADMIN — SERTRALINE HYDROCHLORIDE 25 MG: 25 TABLET ORAL at 08:15

## 2023-04-14 RX ADMIN — LEVALBUTEROL HYDROCHLORIDE 0.63 MG: 0.63 SOLUTION RESPIRATORY (INHALATION) at 16:32

## 2023-04-14 RX ADMIN — DIPHENHYDRAMINE HYDROCHLORIDE AND LIDOCAINE HYDROCHLORIDE AND ALUMINUM HYDROXIDE AND MAGNESIUM HYDRO 10 ML: KIT at 16:29

## 2023-04-14 RX ADMIN — DEXAMETHASONE 1 MG: 1 TABLET ORAL at 08:15

## 2023-04-14 ASSESSMENT — ACTIVITIES OF DAILY LIVING (ADL)
ADLS_ACUITY_SCORE: 56
ADLS_ACUITY_SCORE: 52
ADLS_ACUITY_SCORE: 56

## 2023-04-14 NOTE — PROGRESS NOTES
Discharge Planner Post-Acute Rehab OT:      Discharge Plan: home with assist and HHOT     Precautions: crani, falls, WBAT RLE, per chart review no formal hip precautions, [place] with no hearing aids     Current Status:  ADLs:    Mobility: min A with walker. Up to Ax2 to stand with walker when fatigued     Grooming: set-up and supervision seated in w/c    Dressing: UB: set-up and supervision seated, Mod A with LBD tasks with walker and AE.     Bathing: Min-mod A with W/C<>extended tub bench    Toileting: Ax1-2 with walker and commode overlay.    IADLs: Reports daughter assist with some IADLs at baseline, will continue to assess  Vision/Cognition: Demonstrated deficits in problem solving t/o and appears self-limiting at times, will continue to monitor and assess     Assessment: Tx focus on room mobility using FWW and BUE strengthening seated EOB. Pt c/o fatigue throughout session but agreeable to ongoing participation. Benefits from reminders for pursed lip breathing. Continue monitoring BP and O2 levels throughout treatment for safety.       Other Barriers to Discharge (DME, Family Training, etc):   AE/DME needed for home unclear pending further assessment  Family Training not scheduled

## 2023-04-14 NOTE — PROGRESS NOTES
"  [facility]   Acute Rehabilitation Unit  Daily progress note    INTERVAL HISTORY  Josephine Nicole was seen and examined at bedside this morning.  No acute events reported overnight.  She reports that she is feeling more fatigued this morning but attributes to doing more with therapies.  Feels she is sleeping well.  She denies any pain.  Reports breathing more comfortable today compared to yesterday.  Last BM was today, reports \"sort of loose\".  Denies abdominal pain or nausea.  Voiding well s/p baldwin removal, PVRs completed.  Was worried about needing to get up overnight, but emptied before sleep and then did not need to during the night at all.    Functionally, she is needing min to max A for sit to stand pending surface height, Ax1 with josé manuel conway with nursing.  She ambulated up to 40' with FWW and CGA (assist of 2nd person for oxygen tank and wheelchair follow).    MEDICATIONS    acetaminophen  975 mg Oral TID     atorvastatin  10 mg Oral Daily     dexamethasone  1 mg Oral Daily     enoxaparin ANTICOAGULANT  40 mg Subcutaneous Q24H     fluticasone-vilanterol  1 puff Inhalation Daily     furosemide  20 mg Oral Daily     levothyroxine  88 mcg Oral Daily     Magic Mouthwash  10 mL Swish & Swallow 4x Daily AC & HS     mirtazapine  15 mg Oral At Bedtime     pantoprazole  40 mg Oral QAM AC     polyethylene glycol  17 g Oral Daily     senna-docusate  1 tablet Oral BID     sertraline  25 mg Oral QAM        albuterol, bisacodyl, hydrOXYzine, levalbuterol     PHYSICAL EXAM  BP (!) 120/100 (BP Location: Left arm)   Pulse 80   Temp 98.2  F (36.8  C) (Axillary)   Resp 20   Ht 1.585 m (5' 2.4\")   Wt 54.8 kg (120 lb 13 oz)   SpO2 96%   BMI 21.81 kg/m     Gen: NAD, lying in bed  HEENT: bifrontal craniotomy incision healing with sutures intact, no erythema or drainage  Cardio: RRR, no murmurs  Pulm: non-labored on 3L by NC, lungs CTA bilaterally  Abd: soft, non-tender, non-distended, " bowel sounds present  Ext: no appreciable edema in bilateral lower extremities, no tenderness in calves  Neuro/MSK: awake, alert, PERRL, EOMI, left intermittent ptosis, scattered ecchymoses, moving all extremities in bed, right hip incision not visualized on this date    LABS  Last Basic Metabolic Panel:  Recent Labs   Lab Test 04/13/23  0602 04/10/23  0747 04/08/23  0656    135* 137   POTASSIUM 4.0 3.9 4.0   CHLORIDE 100 97* 98   CO2 31* 30* 33*   ANIONGAP 7 8 6*   GLC 91 82 81   BUN 15.6 13.9 18.7   CR 0.47* 0.45* 0.47*   GFRESTIMATED >90 >90 >90   CHAO 8.5* 8.3* 8.3*     CBC RESULTS:   Recent Labs   Lab Test 04/13/23  0602 04/10/23  0747 04/08/23  0656   WBC 8.3 11.7* 9.6   RBC 3.48* 3.75* 3.57*   HGB 10.2* 11.1* 10.5*   HCT 32.9* 35.0 33.2*   MCV 95 93 93   MCH 29.3 29.6 29.4   MCHC 31.0* 31.7 31.6   RDW 15.3* 14.7 14.4   * 201 224       Rehabilitation - continue comprehensive acute inpatient rehabilitation program with multidisciplinary approach including therapies, rehab nursing, and physiatry following. See interval history for updates.      ASSESSMENT AND PLAN  Josephine Nicole is a 73 year old right hand dominant female with a past medical history of cerebral meningioma initially diagnosed 2019, s/p radiation in 11/2022 with recent progression including mass effect and midline shift of 3/17/23 MRI, COPD on 2L oxygen at baseline, hypothyroidism, hyperlipidemia, osteoporosis, depression/anxiety, vitamin B12 deficiency, and anemia who was admitted on 3/19/23 after fall at home in setting of several weeks of worsening confusion, impaired balance with imaging revealing right femoral neck fracture s/p right hip hemiarthroplasty.  He was then transferred to [facility] on 3/23/23 due worsening respiratory failure (COPD exacerbation), hypotension, lactic acidosis, and worsening encephalopathy in setting of progressive meningioma now s/p craniotomy and resection of meningioma on 3/30/23 with hospital course  further complicated by lactic acidosis, anemia, left eye ptosis, urinary retention, pain, and hypernatremia.  She is now admitted to ARU on 4/7/23 for multidisciplinary rehabilitation and ongoing medical management.        Admission to acute inpatient rehab 4/7/23.    Impairment group code: Brain Dysfunction 02.1 Non-Traumatic: s/p right bifrontal stealth craniotomy and resection of meningioma due to vasogenic cerebral edema and brain compression along with R femoral neck fracture s/p right hip hemiarthroplasty        1. PT, OT and SLP 60 minutes of each on a daily basis, in addition to rehab nursing and close management of physiatrist.       2. Impairment of ADL's: Noted to have impaired activity tolerance, impaired balance, impaired cognition, impaired coordination, impaired judgement and safety awareness, impaired strength, impaired tone and impaired weight shifting, all affecting her ability to safely and independently perform basic ADLs.  Goal for mod I with basic ADLs.     3. Impairment of mobility:  Noted to have impaired activity tolerance, impaired balance, impaired cognition, impaired coordination, impaired judgement and safety awareness, impaired strength, impaired tone and impaired weight shifting, all affecting her ability to safely and independently perform basic mobility.  Goal for mod I with basic mobility.     4. Impairment of cognition/language/swallow:  Noted to have impaired cognition, will need full cognitive linguistic evaluation with SLP while on ARU with goals for improved cognitive-linguistic skills to meet basic needs.        5. Medical Conditions  New actions/orders/updates for today are in blue.     S/p bifrontal craniotomy and resection of meningioma on 3/30/23 with Dr. Murphy  CNS WHO grade 1 meningioma (right frontal) with moderate surrounding vasogenic edema and mass effect  Diagnosed in 2019 after presenting with refractory headaches.  Status post radiation treatment 11/2022.  Recent  brain MRI revealing progression.  Patient presented after fall in setting of several week history of worsening confusion, gait impairment, balance deficits.  CT head with known mass surrounding vasogenic edema predominantly involving the right frontal lobe with right to left midline shift/subfalcine herniation measuring up to 1.1 cm.  Neurosurgery consulted and recommended medical optimization and transfer to [facility] for surgical intervention, which she underwent as above on 3/30.  - Decadron taper: on 1 mg daily thru 4/14, then off  - Pain management: Tylenol 975 mg TID, wean to PRN as able  - Wound care: keep clean and dry, leave open to air  - Continue PT/OT/SLP  - Follow up with neurosurgery at 2-weeks post-op for incision check and staple removal (currently scheduled on 4/14, anticipate will still be at ARU).  Per neurosurgery team, given no concerns with incision, ok for rehab team to assess incision.  Appears well-healed, order placed to remove sutures.  Also has neurosurgery follow-ups on 5/15 (ANT[facility]A) and 6/30 (Dr. Murphy)  - Follow up with neuro-oncology?     Displaced right femoral neck fracture s/p right hip hemiarthroplasty (anterolateral approach) on 3/20/23 with Dr. Shun Cuevas  Pathologic fracture secondary to osteoporosis  Fractures of the right superior and inferior pubic rami and the left pubic body extending into the left pubic rami, stable on imaging 4/3  - WBAT RLE with walker  - Wound care: Surgical dressing removed by ortho on 4/3. Per their recs at that time: May keep site open to air. Please allow remaining Dermabond to slough off naturally (no picking at site). Okay to shower, but no soaking/submersion x 1 more week.   - Pain management as above  - Per ortho, osteoporosis workup and treatment with primary care provider within 2 months.   - Continue PT/OT  - Follow up with Dr. Shun Cuevas at [facility] at six weeks post-op (~5/1/23) with X-rays. If remains at ARU at that time, can have  wound check and xrays performed there and sent to Dr. Cuevas for review.     COPD, recent exacerbation  Acute on chronic hypoxic/hypercapnic respiratory failure  Possible community acquired pneumonia, treated  Former smoker x50 years.  On 2-3L of oxygen PTA for past ~5 years.  Following hip surgery, had acute on chronic hypoxic/hypercapnic respiratory failure requiring BiPAP.  Completed course of azithromycin 3/27 and ceftriaxone 4/4.  Respiratory status improved with diuresis so in part may be related to heart failure as below.  As of admission to ARU, stable on 2L, which is baseline.  - Monitor respiratory status  - Continue supplemental oxygen to maintain spO2>88% (goal range 88-93%)  - Continue Breo Ellipta 200-25 mcg inhaler daily (formulary sub for PTA Advair Diskus 500/50 mcg inhaler)  - Duoneb not available due to supply issues, ordered PRN xopenex neb.    - Pulmonary hygiene/toilet  - Continue oral suction PRN for more tenuous secretions     HFpEF with recent exacerbation with fluid overload  Intermittent diuresis during this admission with improved respiratory status.  Echo 3/27/23 with EF 55-60%, grade I or early diastolic dysfunction.  Noted to have lower extremity edema.  Started on Lasix daily on 4/5, also ordered 2L fluid restriction.    - Continue Lasix 20 mg daily  - Continue 2L fluid restriction  - Daily weights, monitor volume status     Acute on chronic anemia  Hgb 10.5 on admission, dropped to benson of 7.8 on 3/26.  Did receive 1 unit pRBC.  Stable in 10s at discharge to ARU (baseline).  - Trend CBC every M/Th.  4/13: Hgb stable at 10.2     Urinary retention  Baldwin placed 3/24, failed TOV on 4/4.  Urology consulted 4/5 and recommended to replace baldwin.  Per urology, retention likely due to recent anesthesia, immobilization, and narcotic pain medications; additional recs as below.  - Baldwin removed 4/13 for repeat TOV.  Voiding well, PVRs x3 consecutive <100mL.  Monitor scans PRN only.  -  Encourage OOB as able, fluids as able.   - Minimize anticholinergic and narcotic mediations  - Would not recommend Flomax at this time given borderline soft blood pressures     Mild left intermittent ptosis  Noted on 4/4.  CT head with expected post-operative changes.  Patient reports she has noted this for at least 1 year.    - Per sending team, given chronicity, recommended to defer additional work-up to outpatient setting as appropriate.     Hyperlipidemia  - Continue PTA atorvastatin     Depression/anxiety  Fatigue  - Patient complaining of feeling fatigued daily, ?poor quality sleep.  Fatigue and mood have been barrier to participation.  Is on steroid taper.  Increased PTA mirtazapine to 15 mg at HS on 4/12.  Also consulted health psychology.  Continue to monitor.  - Continue Zoloft 25 mg daily     Hypothyroidism  - Continue PTA levothyroxine     Vitamin B12 deficiency  - Continue PTA B12 injection 1000 mcg q30 days, last given on 4/3     Throat/mouth pain  Patient reports this has been present throughout this admission.    - Continue magic mouthwash    Leukocytosis  WBC mildly elevated at 11.7 on 4/10, has been previously mildly elevated though had normalized to 9.6 on 4/8.  Suspect 2/2 steroids.  No localizing s/sx of infection.  - Continue to trend CBC every M/Th.  WBC WNL on 4/13 at 8.3    Mild hyponatremia  Na mildly low at 135 on 4/10.  Asymptomatic.  - Trend BMP.  Na WNL on 4/13        6. Adjustment to disability:  Clinical psychology to eval and treat if indicated  7. FEN: regular diet, thin liquids  8. Bowel: continent, monitor, scheduled and PRN bowel meds available  9. Bladder: as above, baldwin removed 4/13 for TOV and is voiding well, encouraged to avoid use of Purewick  10. DVT Prophylaxis: subcutaneous Lovenox, ok'd to start on 4/3 per neurosurgery  11. GI Prophylaxis: PPI  12. Code: DNR/DNI, discussed with palliative care during inpatient admission, confirmed at admission  13. Disposition: goal  for home  14. ELOS:  target 4/28/23  15. Follow up Appointments on Discharge: PCP in 1-2 weeks, neurosurgery (scsheduled on 5/15 and 6/30), neuro-oncology?, ortho (Dr. Shun Cuevas ~5/1/23; care coordinator is Neha Way at [phone] for scheduling) with repeat xrays        Patient was discussed with Dr. Kris Ray, PM&R Staff Physician    Abimbola Thompson PA-C  Physical Medicine & Rehabilitation

## 2023-04-14 NOTE — PLAN OF CARE
FOCUS/GOAL  Medical management    ASSESSMENT, INTERVENTIONS AND CONTINUING PLAN FOR GOAL:  Pt is [place]. Only short interaction w/ staff at night time. Pt wants to sleep. On 2L of oxygen continous. Use yunker at bedside when needed for secretions. Denies pain. Seen sleeping during safety checks.  Goal Outcome Evaluation:

## 2023-04-14 NOTE — PROGRESS NOTES
Emailed pt dtr and inquired about [employer] company that supplies pt's home O2. Waiting for more information.     BASSEM Poon   [facility]   Direct Phone: [phone]  I   Pager: [phone]  I  Fax: [phone]

## 2023-04-14 NOTE — PLAN OF CARE
Discharge Planner Post-Acute Rehab PT:      Discharge Plan: HHPT, lives with daughter     Precautions: Falls, crani, WBAT R LE, 2 L NC 02 at baseline     Current Status:  Bed Mobility: Mod A - sleeps in recliner at baseline for past 6-7 years d/t COPD  Transfer: Variable min<>max A STS pending surface height with FWW. Ax1 SaraStedy with nsg.  Gait: up to 40 ft with FWW, CGA (Ax2 with wc and O2 tank follow)  Stairs: NT due to safety concerns  Balance: Able to sit unsupported, standing needs heavy assist to maintain     [place]:  - 4/8: 4/56  4/15: **     5xSTS:  - 4/8: 0    10MWt:  - 4/8: 0 m/s     Assessment: Pt continues with good participation, actively engaged and asking questions about recovery. STS still challenging needing greatly elevated surfaces,but gait improving to SBA with FWW, still very slow carole.      Other Barriers to Discharge (DME, Family Training, etc):  - Needs FWW, possibly transport wc  - 2STE no railing - recommend adding at least one rail if possible  Family training

## 2023-04-14 NOTE — PLAN OF CARE
FOCUS/GOAL  Bowel management, Bladder management, Pain management, Mobility, Skin integrity, and Safety management    ASSESSMENT, INTERVENTIONS AND CONTINUING PLAN FOR GOAL:  Patient is alert and oriented x 4. [place]. A-1 Niki steady. Regular/thin take pills whole. 2L fluid restriction maintained. Continent of B/B last BM 04/13 PVR completed. Patient denied pain, headache, dizziness, CP or SOB. Uses Yanker independently. No care concern at this time. Daughter visited. Call light is in reach alarm is on.      Goal Outcome Evaluation:

## 2023-04-14 NOTE — PLAN OF CARE
FOCUS/GOAL  Psychosocial needs, Safety management, and Prevention of secondary complications    ASSESSMENT, INTERVENTIONS AND CONTINUING PLAN FOR GOAL:    Goal Outcome Evaluation:      Plan of Care Reviewed With: patient          Outcome Evaluation: no change this shift. Had BM during shift. Uses call light appropriately     Pt Aox4 this shift. Pleasant and cooperative. No concerns for nurse at this time. Calls appropriately. Needs in reach and safety measures in place. Cont POC  Mobility: A1 sera stedy or A1 with FWW  Bowel/Bladder: continent of both. LBM today  Skin: R hip ORIF incision, crani incision. Both YAZ  O2: 2liters O2 via NC  Diet:  R/T/W  Psychosocial: appropriate to situation  Pain: denies  Tubes/Lines/Drains: NC  Misc: hard of hearing  2 liter fluid restriction  Crani sutures removed today. Tolerated well

## 2023-04-14 NOTE — PLAN OF CARE
Discharge Planner Post-Acute Rehab SLP:      Discharge Plan: Home with daughter     Precautions: fall     Current Status:  Hearing: [place],  does not use hearing aids  Vision: Uses glasses for close up  Communication: WFL  Cognition: WFL CLQT. Demonstrating mod-severe difficulty with higher level executive skills.  Swallow: regular diet, thin liquids. Not assessed on ARU     Assessment: Pt frustrated with cognitive tasks in light of reduced visuospatial skills. She was agreeable to less structured task (i.e., learning new game of cards). She participated in mod-complex task ([place]) with overall min cues following initial review of rules/parameters. Min cues were required for initiation of drawing a card each turn, otherwise the pt had great reasoning and problem solving. Some reduced insight into organization strategies, however she was receptive to cues. Discussed functional carryover of strategies utilized during session.     Other Barriers to Discharge (Family Training, etc): None anticipated by SLP. Will need increased support with IADLs from dtr if able to provide that  Support

## 2023-04-15 ENCOUNTER — APPOINTMENT (OUTPATIENT)
Dept: PHYSICAL THERAPY | Facility: CLINIC | Age: 73
DRG: 949 | End: 2023-04-15
Attending: PHYSICAL MEDICINE & REHABILITATION
Payer: COMMERCIAL

## 2023-04-15 ENCOUNTER — APPOINTMENT (OUTPATIENT)
Dept: OCCUPATIONAL THERAPY | Facility: CLINIC | Age: 73
DRG: 949 | End: 2023-04-15
Attending: PHYSICAL MEDICINE & REHABILITATION
Payer: COMMERCIAL

## 2023-04-15 ENCOUNTER — APPOINTMENT (OUTPATIENT)
Dept: SPEECH THERAPY | Facility: CLINIC | Age: 73
DRG: 949 | End: 2023-04-15
Attending: PHYSICAL MEDICINE & REHABILITATION
Payer: COMMERCIAL

## 2023-04-15 PROCEDURE — 250N000013 HC RX MED GY IP 250 OP 250 PS 637: Performed by: PHYSICIAN ASSISTANT

## 2023-04-15 PROCEDURE — 250N000011 HC RX IP 250 OP 636: Performed by: PHYSICIAN ASSISTANT

## 2023-04-15 PROCEDURE — 97530 THERAPEUTIC ACTIVITIES: CPT | Mod: GP

## 2023-04-15 PROCEDURE — 250N000013 HC RX MED GY IP 250 OP 250 PS 637: Performed by: PHYSICAL MEDICINE & REHABILITATION

## 2023-04-15 PROCEDURE — 97535 SELF CARE MNGMENT TRAINING: CPT | Mod: GO | Performed by: OCCUPATIONAL THERAPIST

## 2023-04-15 PROCEDURE — 97750 PHYSICAL PERFORMANCE TEST: CPT | Mod: GP

## 2023-04-15 PROCEDURE — 250N000013 HC RX MED GY IP 250 OP 250 PS 637: Performed by: STUDENT IN AN ORGANIZED HEALTH CARE EDUCATION/TRAINING PROGRAM

## 2023-04-15 PROCEDURE — 97129 THER IVNTJ 1ST 15 MIN: CPT | Mod: GN

## 2023-04-15 PROCEDURE — 97130 THER IVNTJ EA ADDL 15 MIN: CPT | Mod: GN

## 2023-04-15 PROCEDURE — 128N000003 HC R&B REHAB

## 2023-04-15 PROCEDURE — 99232 SBSQ HOSP IP/OBS MODERATE 35: CPT | Mod: GC | Performed by: STUDENT IN AN ORGANIZED HEALTH CARE EDUCATION/TRAINING PROGRAM

## 2023-04-15 RX ORDER — ACETAMINOPHEN 325 MG/1
975 TABLET ORAL DAILY PRN
Status: COMPLETED | OUTPATIENT
Start: 2023-04-15 | End: 2023-04-20

## 2023-04-15 RX ADMIN — ACETAMINOPHEN 325MG 975 MG: 325 TABLET ORAL at 15:01

## 2023-04-15 RX ADMIN — DIPHENHYDRAMINE HYDROCHLORIDE AND LIDOCAINE HYDROCHLORIDE AND ALUMINUM HYDROXIDE AND MAGNESIUM HYDRO 10 ML: KIT at 07:56

## 2023-04-15 RX ADMIN — DIPHENHYDRAMINE HYDROCHLORIDE AND LIDOCAINE HYDROCHLORIDE AND ALUMINUM HYDROXIDE AND MAGNESIUM HYDRO 10 ML: KIT at 21:14

## 2023-04-15 RX ADMIN — FUROSEMIDE 20 MG: 20 TABLET ORAL at 07:57

## 2023-04-15 RX ADMIN — SENNOSIDES AND DOCUSATE SODIUM 1 TABLET: 50; 8.6 TABLET ORAL at 21:11

## 2023-04-15 RX ADMIN — Medication 1 MG: at 21:11

## 2023-04-15 RX ADMIN — ENOXAPARIN SODIUM 40 MG: 40 INJECTION SUBCUTANEOUS at 16:23

## 2023-04-15 RX ADMIN — SERTRALINE HYDROCHLORIDE 25 MG: 25 TABLET ORAL at 07:57

## 2023-04-15 RX ADMIN — DIPHENHYDRAMINE HYDROCHLORIDE AND LIDOCAINE HYDROCHLORIDE AND ALUMINUM HYDROXIDE AND MAGNESIUM HYDRO 10 ML: KIT at 12:35

## 2023-04-15 RX ADMIN — DIPHENHYDRAMINE HYDROCHLORIDE AND LIDOCAINE HYDROCHLORIDE AND ALUMINUM HYDROXIDE AND MAGNESIUM HYDRO 10 ML: KIT at 16:23

## 2023-04-15 RX ADMIN — ATORVASTATIN CALCIUM 10 MG: 10 TABLET, FILM COATED ORAL at 07:57

## 2023-04-15 RX ADMIN — FLUTICASONE FUROATE AND VILANTEROL TRIFENATATE 1 PUFF: 200; 25 POWDER RESPIRATORY (INHALATION) at 21:09

## 2023-04-15 RX ADMIN — SENNOSIDES AND DOCUSATE SODIUM 1 TABLET: 50; 8.6 TABLET ORAL at 07:57

## 2023-04-15 RX ADMIN — ACETAMINOPHEN 325MG 975 MG: 325 TABLET ORAL at 07:56

## 2023-04-15 RX ADMIN — MIRTAZAPINE 15 MG: 15 TABLET, FILM COATED ORAL at 21:11

## 2023-04-15 RX ADMIN — ACETAMINOPHEN 325MG 975 MG: 325 TABLET ORAL at 21:10

## 2023-04-15 RX ADMIN — POLYETHYLENE GLYCOL 3350 17 G: 17 POWDER, FOR SOLUTION ORAL at 07:56

## 2023-04-15 RX ADMIN — PANTOPRAZOLE SODIUM 40 MG: 40 TABLET, DELAYED RELEASE ORAL at 07:57

## 2023-04-15 RX ADMIN — LEVOTHYROXINE SODIUM 88 MCG: 88 TABLET ORAL at 07:56

## 2023-04-15 ASSESSMENT — ACTIVITIES OF DAILY LIVING (ADL)
ADLS_ACUITY_SCORE: 56

## 2023-04-15 NOTE — PLAN OF CARE
FOCUS/GOAL  Medication management, Pain management, Reinforcement of self-care/ADL, Psychosocial needs, Safety management, and Prevention of secondary complications    ASSESSMENT, INTERVENTIONS AND CONTINUING PLAN FOR GOAL:    Goal Outcome Evaluation:      Plan of Care Reviewed With: patient    Overall Patient Progress: improvingOverall Patient Progress: improving    Outcome Evaluation: Pt reporting increased pain in R hip, requested PRN meds for breakthrough pain. Calls appropriately. Educated pt on 2L fluid restriction.    Pt Aox4 this shift. Pleasant and cooperative. No concerns for nurse at this time. Calls appropriately. Needs in reach and safety measures in place. Cont POC  Mobility: A1 sera stedy or A1 with FWW  Bowel/Bladder: continent of both. LBM today  Skin: R hip ORIF incision, crani incision. Both YAZ  O2: 2liters O2 via NC  Diet:  R/T/W  Psychosocial: appropriate to situation  Pain: increased R hip pain. Issued ice packs and notified providers/requested PRN pain med options  Tubes/Lines/Drains: NC  Misc: hard of hearing  2 liter fluid restriction

## 2023-04-15 NOTE — PROGRESS NOTES
"  [facility]   Acute Rehabilitation Unit  Daily progress note    INTERVAL HISTORY  Josephine was complaining of hip pain today to staff. She reports that she especially feels it with transfers, but then relieved after the rolling is completed. Today she endorsed some pain while she was with therapies, but then resolved when laying still. She also reports having trouble sleeping last night. She is agreeable to have a PRN dose of tylenol and a scheduled melatonin added to her orders. Denies n/v, abdominal pain, fevers, chills, SOB, and chest pain.      MEDICATIONS    acetaminophen  975 mg Oral TID     atorvastatin  10 mg Oral Daily     enoxaparin ANTICOAGULANT  40 mg Subcutaneous Q24H     fluticasone-vilanterol  1 puff Inhalation Daily     furosemide  20 mg Oral Daily     levothyroxine  88 mcg Oral Daily     Magic Mouthwash  10 mL Swish & Swallow 4x Daily AC & HS     melatonin  1 mg Oral At Bedtime     mirtazapine  15 mg Oral At Bedtime     pantoprazole  40 mg Oral QAM AC     polyethylene glycol  17 g Oral Daily     senna-docusate  1 tablet Oral BID     sertraline  25 mg Oral QAM        acetaminophen, albuterol, bisacodyl, hydrOXYzine, levalbuterol     PHYSICAL EXAM  BP 91/62 (BP Location: Left arm)   Pulse 83   Temp 97  F (36.1  C) (Oral)   Resp 20   Ht 1.585 m (5' 2.4\")   Wt 56.9 kg (125 lb 6.4 oz)   SpO2 97%   BMI 22.64 kg/m     Gen: NAD, lying in bed  Cardio: RRR  Pulm: non-labored with NC, lungs CTAB  Abd: soft, non-tender, non-distended, bowel sounds present  Ext: no tenderness in calves or behind knees  Neuro/MSK: awake, alert, no slurring of words, follows commands well    LABS  Last Basic Metabolic Panel:  Recent Labs   Lab Test 04/13/23  0602 04/10/23  0747 04/08/23  0656    135* 137   POTASSIUM 4.0 3.9 4.0   CHLORIDE 100 97* 98   CO2 31* 30* 33*   ANIONGAP 7 8 6*   GLC 91 82 81   BUN 15.6 13.9 18.7   CR 0.47* 0.45* 0.47*   GFRESTIMATED >90 >90 >90   CHAO 8.5* " 8.3* 8.3*     CBC RESULTS:   Recent Labs   Lab Test 04/13/23  0602 04/10/23  0747 04/08/23  0656   WBC 8.3 11.7* 9.6   RBC 3.48* 3.75* 3.57*   HGB 10.2* 11.1* 10.5*   HCT 32.9* 35.0 33.2*   MCV 95 93 93   MCH 29.3 29.6 29.4   MCHC 31.0* 31.7 31.6   RDW 15.3* 14.7 14.4   * 201 224       Rehabilitation - continue comprehensive acute inpatient rehabilitation program with multidisciplinary approach including therapies, rehab nursing, and physiatry following. See interval history for updates.      ASSESSMENT AND PLAN  Josephine Nicole is a 73 year old right hand dominant female with a past medical history of cerebral meningioma initially diagnosed 2019, s/p radiation in 11/2022 with recent progression including mass effect and midline shift of 3/17/23 MRI, COPD on 2L oxygen at baseline, hypothyroidism, hyperlipidemia, osteoporosis, depression/anxiety, vitamin B12 deficiency, and anemia who was admitted on 3/19/23 after fall at home in setting of several weeks of worsening confusion, impaired balance with imaging revealing right femoral neck fracture s/p right hip hemiarthroplasty.  He was then transferred to [facility] on 3/23/23 due worsening respiratory failure (COPD exacerbation), hypotension, lactic acidosis, and worsening encephalopathy in setting of progressive meningioma now s/p craniotomy and resection of meningioma on 3/30/23 with hospital course further complicated by lactic acidosis, anemia, left eye ptosis, urinary retention, pain, and hypernatremia.  She is now admitted to ARU on 4/7/23 for multidisciplinary rehabilitation and ongoing medical management.  Admission to acute inpatient rehab 4/7/23.      --Vitals stable. No lab today.  --Added 1 PRN dose of tylenol for hip pain.  --Melatonin 1mg scheduled to help with sleep  --Otherwise, continue ongoing medical management.  --Continue therapies and plan of care.    Patient discussed with attending, Dr. Sullivan.     Molly Oquendo MD

## 2023-04-15 NOTE — PLAN OF CARE
FOCUS/GOAL  Bowel management, Bladder management, Pain management, Mobility, Skin integrity, and Safety management    ASSESSMENT, INTERVENTIONS AND CONTINUING PLAN FOR GOAL:  Patient is alert and oriented x 4. [place]. A-1 walker. Regular/thin take pills whole. 2L fluid restriction maintained. Continent of B/B last BM 04/14 PVR. Patient denied pain, headache, dizziness, CP or SOB. Uses Yanker independently. No care concern at this time. Call light is in reach alarm is on.     Goal Outcome Evaluation:

## 2023-04-15 NOTE — PLAN OF CARE
Discharge Planner Post-Acute Rehab OT:      Discharge Plan: home with assist and HHOT     Precautions: crani, falls, WBAT RLE, per chart review no formal hip precautions, [place] with no hearing aids     Current Status:  ADLs:    Mobility: min A with walker. Up to Ax2 to stand with walker when fatigued     Grooming: SBA with FWW standing at the sink    Dressing: UB: set-up and supervision seated, Mod A with LBD tasks with walker and AE.     Bathing: Min-mod A with W/C<>extended tub bench    Toileting: Ax1-2 with walker and commode overlay.    IADLs: Reports daughter assist with some IADLs at baseline, will continue to assess  Vision/Cognition: Demonstrated deficits in problem solving t/o and appears self-limiting at times, will continue to monitor and assess     Assessment: Pt reporting not sleeping well last night and increased pain in R hip with movement and WBing-nursing notified and provided c ice, requested PRN pain med. Pt agreeable to ADLs at FWW level, multiple standing rest breaks but continuing to progress with activity tolerance. Initiated education in EC with emphasis on ADLs.        Other Barriers to Discharge (DME, Family Training, etc):   AE/DME needed for home unclear pending further assessment  Family Training not scheduled

## 2023-04-15 NOTE — PLAN OF CARE
Discharge Planner Post-Acute Rehab SLP:      Discharge Plan: Home with daughter     Precautions: fall     Current Status:  Hearing: [place],  does not use hearing aids  Vision: Uses glasses for close up  Communication: WFL  Cognition: WFL CLQT. Demonstrating mod-severe difficulty with higher level executive skills.  Swallow: regular diet, thin liquids. Not assessed on ARU     Assessment:  Pt performed  FAVRES  task#2 for making a decision.  Pt performed task with encouragement and stated that due to lack of sleep and pain, her thinking is not great this date.  Pt performed the task to completion with min verbal cues. Pt performed a selective attention task in a moderately distracting environment x 15 minutes with no redirections required.     Other Barriers to Discharge (Family Training, etc): None anticipated by SLP. Will need increased support with IADLs from dtr if able to provide that  Support

## 2023-04-15 NOTE — PLAN OF CARE
Goal Outcome Evaluation:                      Discharge Planner Post-Acute Rehab PT:      Discharge Plan: HHPT, lives with daughter     Precautions: Falls, crani, WBAT R LE, 2 L NC 02 at baseline     Current Status:  Bed Mobility: Mod A - sleeps in recliner at baseline for past 6-7 years d/t COPD  Transfer: Variable min<>max A STS pending surface height with FWW. Ax1 SaraStedy with nsg.  Gait: up to 40 ft with FWW, CGA (Ax2 with wc and O2 tank follow)  Stairs: NT due to safety concerns  Balance: Able to sit unsupported, standing needs heavy assist to maintain     Barone:  - 4/8: 4/56  4/15: **     5xSTS:  - 4/8: 0     10MWt:  - 4/8: 0 m/s     Assessment: patient limited by lack of sleep (woke at 3am) R hip pain, stated Tylenol did not help, nor did the ice pack earlier.  Pt agreeable to ambulation of 120' total with rolling walker, contact guard assist and management for O2 tank along with several standing rest breaks.      Other Barriers to Discharge (DME, Family Training, etc):  - Needs FWW, possibly transport wc  - 2STE no railing - recommend adding at least one rail if possible  Family training

## 2023-04-15 NOTE — PLAN OF CARE
Discharge Planner Post-Acute Rehab PT:      Discharge Plan: HHPT, lives with daughter     Precautions: Falls, crani, WBAT R LE, 2 L NC 02 at baseline     Current Status:  Bed Mobility: Mod A - sleeps in recliner at baseline for past 6-7 years d/t COPD  Transfer: Variable min<>max A STS pending surface height with FWW. Ax1 SaraStedy with nsg.  Gait: up to 40 ft with FWW, CGA (Ax2 with wc and O2 tank follow)  Stairs: NT due to safety concerns  Balance: Able to sit unsupported, standing needs heavy assist to maintain     Barone:  - 4/8: 4/56  4/15: 17/56     5xSTS:  - 4/8: 0    10MWt:  - 4/8: 0 m/s     Assessment: Barone reassessed w/ significant improvement, see note. Limited d/t pain but willing to participate with encouragement.      Other Barriers to Discharge (DME, Family Training, etc):  - Needs FWW, possibly transport wc  - 2STE no railing - recommend adding at least one rail if possible  Family training

## 2023-04-15 NOTE — PROGRESS NOTES
04/15/23 1315   Signing Clinician's Name / Credentials   Signing clinician's name / credentials Bree Low DPT   Barone Balance Scale (CEZAR QUICK, CHARANJIT S, FRAN PEÑA, SPENSER THOMAS: MEASURING BALANCE IN THE ELDERLY: VALIDATION OF AN INSTRUMENT. CAN. J. PUB. HEALTH, JULY/AUGUST SUPPLEMENT 2:S7-11, 1992.)   Sit To Stand 0   Standing Unsupported 3   Sitting Unsupported 4   Stand to Sit 1   Transfers 0   Standing with Eyes Closed 3   Standing Unsupported, Feet Together 1   Reach Forward With Outstretched Arm 1   Retrieve Object From Floor 3   Turning to Look Behind 1   Turn 360 Degrees 0   Placing Alternate Foot on Stool (4-6 inches) 0   Unsupported Tandem Stand (Demonstrate to Subject) 0   One Leg Stand 0   Total Score (A score of 45 or less has been correlated with an increased risk of falls)   Total Score (out of 56) 17     Barone Balance Scale (BBS) Cutoff Scores: A score of < 45 /56 indicates an increased risk for falls.     The BBS is a measure of static and dynamic standing balance that has been validated in community dwelling elderly individuals and individuals who have Parkinson's Disease, MS, and those who are s/p CVA and TBI. The test is administered without an assistive device. Scores from the Barone are used to determine the probability of falling based on the patient's previous history of falls and their test performance.     Minimal Detectable Change = 6.5   & Minimal Detectable Change (Parkinson's Disease) = 5 according to Isidoro & Bernardey 2008    Assessment (rationale for performing, application to patient s function & care plan): Significant improvement in barone score since admission 4/56 improved to 17/56. Pt still at high falls risk and fearful with any task requiring single limb stance without UE support, limited with assessment somewhat d/t hip pain.

## 2023-04-15 NOTE — PLAN OF CARE
"Goal Outcome Evaluation:        No acute issues overnight. Appears to be sleeping well and has no c/o's or requests as of yet.   Continue POC           Patient's most recent vital signs are:  BP 95/57 (BP Location: Right arm)   Pulse 72   Temp (!) 96.4  F (35.8  C) (Oral)   Resp 20   Ht 1.585 m (5' 2.4\")   Wt 52.4 kg (115 lb 8.3 oz)   SpO2 99%   BMI 20.86 kg/m         "

## 2023-04-16 ENCOUNTER — APPOINTMENT (OUTPATIENT)
Dept: OCCUPATIONAL THERAPY | Facility: CLINIC | Age: 73
DRG: 949 | End: 2023-04-16
Attending: PHYSICAL MEDICINE & REHABILITATION
Payer: COMMERCIAL

## 2023-04-16 ENCOUNTER — APPOINTMENT (OUTPATIENT)
Dept: GENERAL RADIOLOGY | Facility: CLINIC | Age: 73
DRG: 949 | End: 2023-04-16
Attending: PHYSICAL MEDICINE & REHABILITATION
Payer: COMMERCIAL

## 2023-04-16 ENCOUNTER — APPOINTMENT (OUTPATIENT)
Dept: PHYSICAL THERAPY | Facility: CLINIC | Age: 73
DRG: 949 | End: 2023-04-16
Attending: PHYSICAL MEDICINE & REHABILITATION
Payer: COMMERCIAL

## 2023-04-16 ENCOUNTER — APPOINTMENT (OUTPATIENT)
Dept: SPEECH THERAPY | Facility: CLINIC | Age: 73
DRG: 949 | End: 2023-04-16
Attending: PHYSICAL MEDICINE & REHABILITATION
Payer: COMMERCIAL

## 2023-04-16 PROCEDURE — 250N000011 HC RX IP 250 OP 636: Performed by: PHYSICIAN ASSISTANT

## 2023-04-16 PROCEDURE — 128N000003 HC R&B REHAB

## 2023-04-16 PROCEDURE — 99232 SBSQ HOSP IP/OBS MODERATE 35: CPT | Mod: GC | Performed by: STUDENT IN AN ORGANIZED HEALTH CARE EDUCATION/TRAINING PROGRAM

## 2023-04-16 PROCEDURE — 97535 SELF CARE MNGMENT TRAINING: CPT | Mod: GO | Performed by: OCCUPATIONAL THERAPIST

## 2023-04-16 PROCEDURE — 97130 THER IVNTJ EA ADDL 15 MIN: CPT | Mod: GN

## 2023-04-16 PROCEDURE — 73502 X-RAY EXAM HIP UNI 2-3 VIEWS: CPT

## 2023-04-16 PROCEDURE — 72190 X-RAY EXAM OF PELVIS: CPT

## 2023-04-16 PROCEDURE — 250N000013 HC RX MED GY IP 250 OP 250 PS 637: Performed by: PHYSICIAN ASSISTANT

## 2023-04-16 PROCEDURE — 97110 THERAPEUTIC EXERCISES: CPT | Mod: GP

## 2023-04-16 PROCEDURE — 97110 THERAPEUTIC EXERCISES: CPT | Mod: GO | Performed by: OCCUPATIONAL THERAPIST

## 2023-04-16 PROCEDURE — 250N000013 HC RX MED GY IP 250 OP 250 PS 637: Performed by: STUDENT IN AN ORGANIZED HEALTH CARE EDUCATION/TRAINING PROGRAM

## 2023-04-16 PROCEDURE — 97129 THER IVNTJ 1ST 15 MIN: CPT | Mod: GN

## 2023-04-16 PROCEDURE — 250N000013 HC RX MED GY IP 250 OP 250 PS 637: Performed by: PHYSICAL MEDICINE & REHABILITATION

## 2023-04-16 RX ORDER — NALOXONE HYDROCHLORIDE 0.4 MG/ML
0.2 INJECTION, SOLUTION INTRAMUSCULAR; INTRAVENOUS; SUBCUTANEOUS
Status: DISCONTINUED | OUTPATIENT
Start: 2023-04-16 | End: 2023-04-28 | Stop reason: HOSPADM

## 2023-04-16 RX ORDER — IBUPROFEN 200 MG
400 TABLET ORAL EVERY 6 HOURS PRN
Status: DISCONTINUED | OUTPATIENT
Start: 2023-04-16 | End: 2023-04-28 | Stop reason: HOSPADM

## 2023-04-16 RX ORDER — NALOXONE HYDROCHLORIDE 0.4 MG/ML
0.4 INJECTION, SOLUTION INTRAMUSCULAR; INTRAVENOUS; SUBCUTANEOUS
Status: DISCONTINUED | OUTPATIENT
Start: 2023-04-16 | End: 2023-04-28 | Stop reason: HOSPADM

## 2023-04-16 RX ORDER — CYCLOBENZAPRINE HCL 10 MG
10 TABLET ORAL 3 TIMES DAILY PRN
Status: DISCONTINUED | OUTPATIENT
Start: 2023-04-16 | End: 2023-04-16

## 2023-04-16 RX ADMIN — MIRTAZAPINE 15 MG: 15 TABLET, FILM COATED ORAL at 21:04

## 2023-04-16 RX ADMIN — SERTRALINE HYDROCHLORIDE 25 MG: 25 TABLET ORAL at 08:04

## 2023-04-16 RX ADMIN — SENNOSIDES AND DOCUSATE SODIUM 1 TABLET: 50; 8.6 TABLET ORAL at 08:04

## 2023-04-16 RX ADMIN — HYDROXYZINE HYDROCHLORIDE 10 MG: 10 TABLET ORAL at 16:32

## 2023-04-16 RX ADMIN — ATORVASTATIN CALCIUM 10 MG: 10 TABLET, FILM COATED ORAL at 08:04

## 2023-04-16 RX ADMIN — FLUTICASONE FUROATE AND VILANTEROL TRIFENATATE 1 PUFF: 200; 25 POWDER RESPIRATORY (INHALATION) at 21:03

## 2023-04-16 RX ADMIN — IBUPROFEN 400 MG: 200 TABLET, FILM COATED ORAL at 17:23

## 2023-04-16 RX ADMIN — POLYETHYLENE GLYCOL 3350 17 G: 17 POWDER, FOR SOLUTION ORAL at 08:04

## 2023-04-16 RX ADMIN — ACETAMINOPHEN 325MG 975 MG: 325 TABLET ORAL at 21:04

## 2023-04-16 RX ADMIN — LEVOTHYROXINE SODIUM 88 MCG: 88 TABLET ORAL at 08:04

## 2023-04-16 RX ADMIN — DIPHENHYDRAMINE HYDROCHLORIDE AND LIDOCAINE HYDROCHLORIDE AND ALUMINUM HYDROXIDE AND MAGNESIUM HYDRO 10 ML: KIT at 08:04

## 2023-04-16 RX ADMIN — SENNOSIDES AND DOCUSATE SODIUM 1 TABLET: 50; 8.6 TABLET ORAL at 21:04

## 2023-04-16 RX ADMIN — PANTOPRAZOLE SODIUM 40 MG: 40 TABLET, DELAYED RELEASE ORAL at 08:04

## 2023-04-16 RX ADMIN — DIPHENHYDRAMINE HYDROCHLORIDE AND LIDOCAINE HYDROCHLORIDE AND ALUMINUM HYDROXIDE AND MAGNESIUM HYDRO 10 ML: KIT at 16:32

## 2023-04-16 RX ADMIN — ACETAMINOPHEN 325MG 975 MG: 325 TABLET ORAL at 08:04

## 2023-04-16 RX ADMIN — FUROSEMIDE 20 MG: 20 TABLET ORAL at 08:04

## 2023-04-16 RX ADMIN — ACETAMINOPHEN 325MG 975 MG: 325 TABLET ORAL at 14:06

## 2023-04-16 RX ADMIN — ENOXAPARIN SODIUM 40 MG: 40 INJECTION SUBCUTANEOUS at 16:32

## 2023-04-16 RX ADMIN — DIPHENHYDRAMINE HYDROCHLORIDE AND LIDOCAINE HYDROCHLORIDE AND ALUMINUM HYDROXIDE AND MAGNESIUM HYDRO 10 ML: KIT at 21:03

## 2023-04-16 RX ADMIN — Medication 1 MG: at 21:04

## 2023-04-16 ASSESSMENT — ACTIVITIES OF DAILY LIVING (ADL)
ADLS_ACUITY_SCORE: 55
ADLS_ACUITY_SCORE: 56

## 2023-04-16 NOTE — PLAN OF CARE
Discharge Planner Post-Acute Rehab PT:      Discharge Plan: HHPT, lives with daughter     Precautions: Falls, crani, WBAT R LE, 2 L NC 02 at baseline     Current Status:  Bed Mobility: Mod A - sleeps in recliner at baseline for past 6-7 years d/t COPD  Transfer: Variable min<>max A STS pending surface height with FWW. Ax1 SaraStedy with nsg.  Gait: up to 40 ft with FWW, CGA (Ax2 with wc and O2 tank follow)  Stairs: NT due to safety concerns  Balance: Able to sit unsupported, standing needs heavy assist to maintain     Barone:  - 4/8: 4/56  4/15: 17/56     5xSTS:  - 4/8: 0    10MWt:  - 4/8: 0 m/s     Assessment: Limited participation in AM session due to R hip pain. Pt X-ray confirmed R pelvic fractures.  Missed PM session secondary to being off the floor for follow-up imaging on R hip.      Other Barriers to Discharge (DME, Family Training, etc):  - Needs FWW, possibly transport wc  - 2STE no railing - recommend adding at least one rail if possible  Family training

## 2023-04-16 NOTE — PLAN OF CARE
FOCUS/GOAL  Bowel management, Bladder management, Pain management, Mobility, Skin integrity, and Safety management    ASSESSMENT, INTERVENTIONS AND CONTINUING PLAN FOR GOAL:  Patient is alert and oriented x 4. Resighini. A-1 walker. Regular/thin take pills whole. 2L fluid restriction. Continent of B/B last BM 04/14 PVR. Patient denied pain, headache, dizziness, CP or SOB. Uses Yanker independently. No care concern at this time. Call light is in reach alarm is on.    Goal Outcome Evaluation:

## 2023-04-16 NOTE — PLAN OF CARE
Goal Outcome Evaluation:      Plan of Care Reviewed With: patient    Overall Patient Progress: no changeOverall Patient Progress: no change    Outcome Evaluation: pt continues to reports increased R hip incision pain, today occurs at rest, as well as during amb. Nurse requested PRN pain meds from providers. Provider also ordered X-ray. Confirmed pelvic fx and order for further x-rays. PRN Flexeril available. Pt calls appropriately.   *Flexeril discontinued and changed to PRN Oxy

## 2023-04-16 NOTE — PLAN OF CARE
Goal Outcome Evaluation:      Plan of Care Reviewed With: patient    Overall Patient Progress: no changeOverall Patient Progress: no change     FOCUS/GOAL: Mobility, Skin integrity and Safety Management      ASSESSMENT, INTERVENTIONS AND CONTINUING PLAN FOR GOAL:  Orientation: A&OX4, able to make needs known, [facility]  Ambulation/Transfer: AX1 with walker  Bladder/Bowel: Continent of both- M-4/15  Pain: Denied  Diet:Regular/thin   Meds: Pills whole  Tubes/Lines/Drains: None  Skin: No new skin concerns  V/S: VSS, on 2L- O2 via N/C  Patient appeared asleep during rounds. Gil PRN Independly. No concern at this time. Safety measures in place, Call light within reach, continue with plan of care.

## 2023-04-16 NOTE — PROGRESS NOTES
"  [facility]   Acute Rehabilitation Unit  Daily progress note    INTERVAL HISTORY  Josephine is having worsening hip pain today. Yesterday she felt the pain in her hip with activities, but today, it has progressed to pain at rest as well. No other concerns at this time. Denies n/v, abdominal pain, fevers, chills, SOB, and chest pain.        MEDICATIONS    acetaminophen  975 mg Oral TID     atorvastatin  10 mg Oral Daily     enoxaparin ANTICOAGULANT  40 mg Subcutaneous Q24H     fluticasone-vilanterol  1 puff Inhalation Daily     furosemide  20 mg Oral Daily     levothyroxine  88 mcg Oral Daily     Magic Mouthwash  10 mL Swish & Swallow 4x Daily AC & HS     melatonin  1 mg Oral At Bedtime     mirtazapine  15 mg Oral At Bedtime     pantoprazole  40 mg Oral QAM AC     polyethylene glycol  17 g Oral Daily     senna-docusate  1 tablet Oral BID     sertraline  25 mg Oral QAM        acetaminophen, albuterol, bisacodyl, hydrOXYzine, levalbuterol     PHYSICAL EXAM  /68 (BP Location: Left arm)   Pulse 73   Temp (!) 95.5  F (35.3  C) (Axillary)   Resp 20   Ht 1.585 m (5' 2.4\")   Wt 54 kg (119 lb 0.8 oz)   SpO2 95%   BMI 21.50 kg/m     Gen: NAD, lying in bed  Cardio: RRR  Pulm: non-labored with NC, lungs CTAB  Abd: soft, non-tender, non-distended, bowel sounds present  Ext: no tenderness in calves or behind knees  Neuro/MSK: awake, alert, no slurring of words, follows commands well    LABS  Last Basic Metabolic Panel:  Recent Labs   Lab Test 04/13/23  0602 04/10/23  0747 04/08/23  0656    135* 137   POTASSIUM 4.0 3.9 4.0   CHLORIDE 100 97* 98   CO2 31* 30* 33*   ANIONGAP 7 8 6*   GLC 91 82 81   BUN 15.6 13.9 18.7   CR 0.47* 0.45* 0.47*   GFRESTIMATED >90 >90 >90   CHAO 8.5* 8.3* 8.3*     CBC RESULTS:   Recent Labs   Lab Test 04/13/23  0602 04/10/23  0747 04/08/23  0656   WBC 8.3 11.7* 9.6   RBC 3.48* 3.75* 3.57*   HGB 10.2* 11.1* 10.5*   HCT 32.9* 35.0 33.2*   MCV 95 93 93   MCH " 29.3 29.6 29.4   [facility] 31.0* 31.7 31.6   RDW 15.3* 14.7 14.4   * 201 224       Rehabilitation - continue comprehensive acute inpatient rehabilitation program with multidisciplinary approach including therapies, rehab nursing, and physiatry following. See interval history for updates.      ASSESSMENT AND PLAN  Josephine Nicole is a 73 year old right hand dominant female with a past medical history of cerebral meningioma initially diagnosed 2019, s/p radiation in 11/2022 with recent progression including mass effect and midline shift of 3/17/23 MRI, COPD on 2L oxygen at baseline, hypothyroidism, hyperlipidemia, osteoporosis, depression/anxiety, vitamin B12 deficiency, and anemia who was admitted on 3/19/23 after fall at home in setting of several weeks of worsening confusion, impaired balance with imaging revealing right femoral neck fracture s/p right hip hemiarthroplasty.  He was then transferred to [facility] on 3/23/23 due worsening respiratory failure (COPD exacerbation), hypotension, lactic acidosis, and worsening encephalopathy in setting of progressive meningioma now s/p craniotomy and resection of meningioma on 3/30/23 with hospital course further complicated by lactic acidosis, anemia, left eye ptosis, urinary retention, pain, and hypernatremia.  She is now admitted to ARU on 4/7/23 for multidisciplinary rehabilitation and ongoing medical management.  Admission to acute inpatient rehab 4/7/23.      --Vitals stable. No lab today.  --Paged ortho and placed ortho consult, asking them to come assess pt in case there is any underlying concern. If cleared by ortho, explained to pt that pain may be starting from sore muscles as therapies are increased. Awaiting ortho recs.  --Otherwise, continue ongoing medical management.  --Continue therapies and plan of care.    Patient discussed with attending, Dr. Sullivan.     Molly Oquendo MD

## 2023-04-16 NOTE — CONSULTS
[facility]  Orthopedic Surgery Consult    Name: Josephine Nicole  Age: 73 year old  MRN: [id number]  YOB: 1950    Reason for Consult: Right groin pain    Requesting Provider: Dr. Ray    Assessment and Plan:     Assessment:  73-year-old female with recent surgical history of right hip hemiarthroplasty, indicated for a displaced right femoral neck fracture on 3/20/2023, as well as initially nondisplaced LC 1 pelvic ring injury, involving the bilateral rami, which has displaced.    Medical Decision Making:  Orthopedic surgery was consulted, to work-up right groin pain.  Although the patient is progressing well from her recent surgery on 3/20, right hip hemiarthroplasty, she continues to have some groin pain, which limits her therapy.  Upon review of this patient's presentation, and radiographic imaging, her hemiarthroplasty appears to be in appropriate position, and is functioning quite well.  At the time of her initial fall, she did sustain a combined injury pattern, manifesting as a right displaced femoral neck fracture, and an LC1 pelvis fracture.  This likely displaced during the time of surgery, as the patient was positioned.  This is apparent on serial radiographs after surgery.    I discussed this with the patient, and reviewed her images with her.  She was relieved to know what was causing her groin pain.  She also has some pain on the right sacroiliac region, likely from the associated injury of an ipsilateral sacral compression fracture.  She is nearly 1 month from surgery, and her stated pain seems appropriate.  Given the patient's COPD, and history of tenuous anesthesia, it would be most reasonable to give this pelvis fracture as much time as possible to heal, prior to electing for any surgical treatment.  I discussed the natural history of the above-stated pathology at length with the patient, and the surgical and nonsurgical treatment modalities.  The  majority of these injuries are treated nonsurgically, with physical therapy, and pain control.  This patient's injury does involve the bilateral pubic rami, and she does have pain about the right sacrum.  I counseled her to allow for at least 3 months of rehabilitation prior to consider surgical treatment which would be in the form of right pelvic ring fixation from the ilium into the sacrum.  She was very happy with this discussion.    Plan:  Weightbearing as tolerated bilateral lower extremities, with an assistive device  Completion of DVT prophylaxis plan, Lovenox, given pelvic ring injury  Recommend referral to outpatient bone health clinic, to optimize severe osteoporosis  Daily physical therapy, I do believe acute inpatient rehab will be of significant help for her  The patient may follow-up with orthopedic surgery here at the [facility] if she wishes, however the patient should follow-up with Shun Cuevas MD-the surgeon who performed her hemiarthroplasty, on a routine basis as well.    Staff: MD Kennedy Mandujano MD  Orthopedic Surgery PGY4  [phone]    Please page me directly with any questions/concerns during regular weekday hours before 5 pm. If there is no response, if it is a weekend, or if it is during evening hours then please page the orthopedic surgery resident on call.    History of Present Illness:     Patient was seen and examined by me. History, PMH, Meds, SH, complete ROS (10 organ systems) and PE reviewed with patient and prior medical records.      73-year-old female who had a fall in mid March, presented to the emergency department, diagnosed with a pelvic ring injury, as well as right displaced femoral neck fracture.  She then underwent surgery, right hip hemiarthroplasty with Dr. Cuevas.    At present time she continues to improve with therapy, and is ambulating with a walker, assistance.  She reports persistent right groin pain, and was unaware of the etiology of  this.    At baseline, the patient walked without assistive device.  Because of her skin conditions, she spends very little time outside.  Stating that she has seldom left her home because of humidity, in the last 7 years.     Past Medical History:     Past Medical History:   Diagnosis Date     Cerebral meningioma      COPD (chronic obstructive pulmonary disease)      Depressive disorder      Eczema      Eczema      Hyperlipidemia      Hypothyroidism      Melanoma of skin      Osteoporosis        Past Surgical History:     Past Surgical History:   Procedure Laterality Date     COLONOSCOPY N/A 04/18/2022    Procedure: COLONOSCOPY;  Surgeon: Abimbola Handley MD;  Location: [facility]     OPEN REDUCTION INTERNAL FIXATION HIP UNIPOLAR Right 3/20/2023    Procedure: Right hip hemiarthroplasty anterolateral approach;  Surgeon: Shun Cuevas MD;  Location: [facility] OR     OPTICAL TRACKING SYSTEM CRANIOTOMY, EXCISE TUMOR, COMBINED N/A 3/30/2023    Procedure: Bifrontal stealth craniotomy and resection of meningioma;  Surgeon: Ramez Murphy MD;  Location: [facility] OR     Tubal ligation NOS         Social History:     Social History     Socioeconomic History     Marital status:    Tobacco Use     Smoking status: Former     Years: 50.00     Types: Cigarettes     Smokeless tobacco: Never   Substance and Sexual Activity     Alcohol use: Not Currently     Drug use: Never       Family History:     No family history on file.    Medications:     Current Facility-Administered Medications   Medication     acetaminophen (TYLENOL) tablet 975 mg     acetaminophen (TYLENOL) tablet 975 mg     albuterol (PROVENTIL HFA/VENTOLIN HFA) inhaler     atorvastatin (LIPITOR) tablet 10 mg     bisacodyl (DULCOLAX) suppository 10 mg     enoxaparin ANTICOAGULANT (LOVENOX) injection 40 mg     fluticasone-vilanterol (BREO ELLIPTA) 200-25 MCG/ACT inhaler 1 puff     furosemide (LASIX) tablet 20 mg     hydrOXYzine (ATARAX) tablet 10 mg     ibuprofen  "(ADVIL/MOTRIN) tablet 400 mg     levalbuterol (XOPENEX) neb solution 0.63 mg     levothyroxine (SYNTHROID/LEVOTHROID) tablet 88 mcg     magic mouthwash suspension (diphenhydramine, lidocaine, aluminum-magnesium & simethicone)     melatonin tablet 1 mg     mirtazapine (REMERON) tablet 15 mg     naloxone (NARCAN) injection 0.2 mg    Or     naloxone (NARCAN) injection 0.4 mg    Or     naloxone (NARCAN) injection 0.2 mg    Or     naloxone (NARCAN) injection 0.4 mg     oxyCODONE IR (ROXICODONE) half-tab 2.5 mg     pantoprazole (PROTONIX) EC tablet 40 mg     polyethylene glycol (MIRALAX) Packet 17 g     senna-docusate (SENOKOT-S/PERICOLACE) 8.6-50 MG per tablet 1 tablet     sertraline (ZOLOFT) tablet 25 mg       Allergies:     Allergies   Allergen Reactions     Bupropion Anaphylaxis, Hives and Shortness Of Breath       Review of Systems:     A comprehensive 10 point review of systems (constitutional, ENT, cardiac, peripheral vascular, respiratory, GI, , musculoskeletal, skin, neurological) was performed and found to be negative except as described in this note.      Physical Exam:     Vital Signs: /56 (BP Location: Left arm, Patient Position: Semi-Goldberg's, Cuff Size: Adult Regular)   Pulse 83   Temp (!) 96.2  F (35.7  C) (Axillary)   Resp 20   Ht 1.585 m (5' 2.4\")   Wt 54 kg (119 lb 0.8 oz)   SpO2 92%   BMI 21.50 kg/m    General: Resting comfortably in bed, awake, alert, cooperative, no apparent distress, appears stated age.  HEENT: Normocephalic, atraumatic, EOMI, no scleral icterus.  Cardiovascular: RRR assessed by peripheral pulse.  Respiratory: Breathing comfortably on room air, no wheezing.  Skin: No rashes or lesions.  Neurologic: A&Ox3, CN II-XII grossly intact  Musculoskeletal:    RLE: No gross deformity. Skin intact.  Well-healed surgical incision.  Full active/passive ROM of all joints w/o pain or crepitus.  Nonpainful logroll.  Nonpainful FADIR, DIGNA.  Tenderness palpation about the right PSIS, " sacroiliac region.  Some reproducible groin pain with pelvic compression. 5/5 SLR. Fires TA/Gastroc/EHL/FHL with 5/5 strength. SILT in femoral, sural, saphenous, deep peroneal, superficial peroneal, and tibial nerve distributions. Dorsalis pedis and posterior tibial arteries 2+ and foot warm and well-perfused with <2 seconds capillary refill.     Imaging:     AP pelvis, lateral right hip, as well as inlet and outlet pelvic radiographs available for my independent review demonstrate bilateral ipsilateral rami fractures, and right sacral alae fracture consistent with an LC1 pelvic ring injury.  Stable position of right hip hemiarthroplasty, cemented.  Satisfactory cement mantle.  No evidence of stem subsidence, acute fracture or dislocation.  No subcutaneous soft tissue air.    Data:     CBC:  Lab Results   Component Value Date    WBC 8.3 04/13/2023    HGB 10.2 (L) 04/13/2023     (L) 04/13/2023     BMP:  Lab Results   Component Value Date     04/13/2023    POTASSIUM 4.0 04/13/2023    CHLORIDE 100 04/13/2023    CO2 31 (H) 04/13/2023    BUN 15.6 04/13/2023    CR 0.47 (L) 04/13/2023    ANIONGAP 7 04/13/2023    CHAO 8.5 (L) 04/13/2023    GLC 91 04/13/2023     Inflammatory Markers:  Lab Results   Component Value Date    WBC 8.3 04/13/2023    WBC 11.7 (H) 04/10/2023    WBC 9.6 04/08/2023

## 2023-04-16 NOTE — PLAN OF CARE
Discharge Planner Post-Acute Rehab OT:      Discharge Plan: home with assist and HHOT     Precautions: crani, falls, WBAT RLE, per chart review no formal hip precautions, [place] with no hearing aids     Current Status:  ADLs:    Mobility: min A with walker. Up to Ax2 to stand with walker when fatigued     Grooming: SBA with FWW standing at the sink    Dressing: UB: set-up and supervision seated, Mod A with LBD tasks with walker and AE.     Bathing: Min-mod A with W/C<>extended tub bench    Toileting: Min A toilet transfer with BSC overlay, GB, and FWW, S murphy hygiene and CGA c/m  IADLs: Reports daughter assist with some IADLs at baseline, will continue to assess  Vision/Cognition: Demonstrated deficits in problem solving t/o and appears self-limiting at times, will continue to monitor and assess     Assessment: Pt reporting sleeping better last night but ongoing pain in R hip- reports now it is present at rest as well as with activity. Ortho will address further and OT provided ice pack and notified nursing in session. Pt participated in UB strengthening EOB and toileting tasks FWW level.     Other Barriers to Discharge (DME, Family Training, etc):   AE/DME needed for home unclear pending further assessment  Family Training not scheduled

## 2023-04-16 NOTE — PLAN OF CARE
Discharge Planner Post-Acute Rehab SLP:      Discharge Plan: Home with daughter     Precautions: fall     Current Status:  Hearing: [place],  does not use hearing aids  Vision: Uses glasses for close up  Communication: WFL  Cognition: WFL CLQT. Demonstrating mod-severe difficulty with higher level executive skills.  Swallow: regular diet, thin liquids. Not assessed on ARU     Assessment:  SLP: Pt reports finding out that pelvic fractures are present, which are causing significant pain but was able to participate in therapy tasks. Pt performed a calendar event planning task to completion with occasional verbal cues.     Other Barriers to Discharge (Family Training, etc): None anticipated by SLP. Will need increased support with IADLs from dtr if able to provide that  Support

## 2023-04-17 ENCOUNTER — APPOINTMENT (OUTPATIENT)
Dept: SPEECH THERAPY | Facility: CLINIC | Age: 73
DRG: 949 | End: 2023-04-17
Attending: PHYSICAL MEDICINE & REHABILITATION
Payer: COMMERCIAL

## 2023-04-17 ENCOUNTER — APPOINTMENT (OUTPATIENT)
Dept: OCCUPATIONAL THERAPY | Facility: CLINIC | Age: 73
DRG: 949 | End: 2023-04-17
Attending: PHYSICAL MEDICINE & REHABILITATION
Payer: COMMERCIAL

## 2023-04-17 ENCOUNTER — APPOINTMENT (OUTPATIENT)
Dept: PHYSICAL THERAPY | Facility: CLINIC | Age: 73
DRG: 949 | End: 2023-04-17
Attending: PHYSICAL MEDICINE & REHABILITATION
Payer: COMMERCIAL

## 2023-04-17 LAB
ANION GAP SERPL CALCULATED.3IONS-SCNC: 7 MMOL/L (ref 7–15)
BUN SERPL-MCNC: 13.1 MG/DL (ref 8–23)
CALCIUM SERPL-MCNC: 8.8 MG/DL (ref 8.8–10.2)
CHLORIDE SERPL-SCNC: 100 MMOL/L (ref 98–107)
CREAT SERPL-MCNC: 0.43 MG/DL (ref 0.51–0.95)
DEPRECATED HCO3 PLAS-SCNC: 31 MMOL/L (ref 22–29)
ERYTHROCYTE [DISTWIDTH] IN BLOOD BY AUTOMATED COUNT: 15.6 % (ref 10–15)
GFR SERPL CREATININE-BSD FRML MDRD: >90 ML/MIN/1.73M2
GLUCOSE SERPL-MCNC: 84 MG/DL (ref 70–99)
HCT VFR BLD AUTO: 31.2 % (ref 35–47)
HGB BLD-MCNC: 9.8 G/DL (ref 11.7–15.7)
MCH RBC QN AUTO: 30 PG (ref 26.5–33)
MCHC RBC AUTO-ENTMCNC: 31.4 G/DL (ref 31.5–36.5)
MCV RBC AUTO: 95 FL (ref 78–100)
PLATELET # BLD AUTO: 156 10E3/UL (ref 150–450)
POTASSIUM SERPL-SCNC: 3.6 MMOL/L (ref 3.4–5.3)
RBC # BLD AUTO: 3.27 10E6/UL (ref 3.8–5.2)
SODIUM SERPL-SCNC: 138 MMOL/L (ref 136–145)
WBC # BLD AUTO: 7.5 10E3/UL (ref 4–11)

## 2023-04-17 PROCEDURE — 97130 THER IVNTJ EA ADDL 15 MIN: CPT | Mod: GN

## 2023-04-17 PROCEDURE — 99232 SBSQ HOSP IP/OBS MODERATE 35: CPT | Performed by: PHYSICIAN ASSISTANT

## 2023-04-17 PROCEDURE — 82310 ASSAY OF CALCIUM: CPT | Performed by: PHYSICIAN ASSISTANT

## 2023-04-17 PROCEDURE — 250N000011 HC RX IP 250 OP 636: Performed by: PHYSICIAN ASSISTANT

## 2023-04-17 PROCEDURE — 99232 SBSQ HOSP IP/OBS MODERATE 35: CPT | Mod: FS | Performed by: PHYSICAL MEDICINE & REHABILITATION

## 2023-04-17 PROCEDURE — 97129 THER IVNTJ 1ST 15 MIN: CPT | Mod: GN

## 2023-04-17 PROCEDURE — 85027 COMPLETE CBC AUTOMATED: CPT | Performed by: PHYSICIAN ASSISTANT

## 2023-04-17 PROCEDURE — 128N000003 HC R&B REHAB

## 2023-04-17 PROCEDURE — 250N000013 HC RX MED GY IP 250 OP 250 PS 637: Performed by: PHYSICIAN ASSISTANT

## 2023-04-17 PROCEDURE — 97535 SELF CARE MNGMENT TRAINING: CPT | Mod: GO | Performed by: OCCUPATIONAL THERAPIST

## 2023-04-17 PROCEDURE — 97110 THERAPEUTIC EXERCISES: CPT | Mod: GO | Performed by: OCCUPATIONAL THERAPIST

## 2023-04-17 PROCEDURE — 250N000013 HC RX MED GY IP 250 OP 250 PS 637: Performed by: STUDENT IN AN ORGANIZED HEALTH CARE EDUCATION/TRAINING PROGRAM

## 2023-04-17 PROCEDURE — 36415 COLL VENOUS BLD VENIPUNCTURE: CPT | Performed by: PHYSICIAN ASSISTANT

## 2023-04-17 PROCEDURE — 97110 THERAPEUTIC EXERCISES: CPT | Mod: GP

## 2023-04-17 PROCEDURE — 250N000013 HC RX MED GY IP 250 OP 250 PS 637: Performed by: PHYSICAL MEDICINE & REHABILITATION

## 2023-04-17 RX ORDER — LIDOCAINE 4 G/G
1 PATCH TOPICAL
Status: DISCONTINUED | OUTPATIENT
Start: 2023-04-17 | End: 2023-04-24

## 2023-04-17 RX ADMIN — PANTOPRAZOLE SODIUM 40 MG: 40 TABLET, DELAYED RELEASE ORAL at 08:41

## 2023-04-17 RX ADMIN — IBUPROFEN 400 MG: 200 TABLET, FILM COATED ORAL at 10:26

## 2023-04-17 RX ADMIN — ATORVASTATIN CALCIUM 10 MG: 10 TABLET, FILM COATED ORAL at 08:41

## 2023-04-17 RX ADMIN — Medication 1 MG: at 21:01

## 2023-04-17 RX ADMIN — ENOXAPARIN SODIUM 40 MG: 40 INJECTION SUBCUTANEOUS at 15:46

## 2023-04-17 RX ADMIN — DIPHENHYDRAMINE HYDROCHLORIDE AND LIDOCAINE HYDROCHLORIDE AND ALUMINUM HYDROXIDE AND MAGNESIUM HYDRO 10 ML: KIT at 12:25

## 2023-04-17 RX ADMIN — DIPHENHYDRAMINE HYDROCHLORIDE AND LIDOCAINE HYDROCHLORIDE AND ALUMINUM HYDROXIDE AND MAGNESIUM HYDRO 10 ML: KIT at 15:46

## 2023-04-17 RX ADMIN — FLUTICASONE FUROATE AND VILANTEROL TRIFENATATE 1 PUFF: 200; 25 POWDER RESPIRATORY (INHALATION) at 19:52

## 2023-04-17 RX ADMIN — ACETAMINOPHEN 325MG 975 MG: 325 TABLET ORAL at 21:01

## 2023-04-17 RX ADMIN — IBUPROFEN 400 MG: 200 TABLET, FILM COATED ORAL at 18:13

## 2023-04-17 RX ADMIN — SENNOSIDES AND DOCUSATE SODIUM 1 TABLET: 50; 8.6 TABLET ORAL at 19:52

## 2023-04-17 RX ADMIN — DIPHENHYDRAMINE HYDROCHLORIDE AND LIDOCAINE HYDROCHLORIDE AND ALUMINUM HYDROXIDE AND MAGNESIUM HYDRO 10 ML: KIT at 21:01

## 2023-04-17 RX ADMIN — DIPHENHYDRAMINE HYDROCHLORIDE AND LIDOCAINE HYDROCHLORIDE AND ALUMINUM HYDROXIDE AND MAGNESIUM HYDRO 10 ML: KIT at 08:41

## 2023-04-17 RX ADMIN — POLYETHYLENE GLYCOL 3350 17 G: 17 POWDER, FOR SOLUTION ORAL at 08:39

## 2023-04-17 RX ADMIN — HYDROXYZINE HYDROCHLORIDE 10 MG: 10 TABLET ORAL at 15:46

## 2023-04-17 RX ADMIN — MIRTAZAPINE 15 MG: 15 TABLET, FILM COATED ORAL at 21:01

## 2023-04-17 RX ADMIN — Medication 2.5 MG: at 19:52

## 2023-04-17 RX ADMIN — SENNOSIDES AND DOCUSATE SODIUM 1 TABLET: 50; 8.6 TABLET ORAL at 08:40

## 2023-04-17 RX ADMIN — ACETAMINOPHEN 325MG 975 MG: 325 TABLET ORAL at 13:36

## 2023-04-17 RX ADMIN — ACETAMINOPHEN 325MG 975 MG: 325 TABLET ORAL at 08:41

## 2023-04-17 RX ADMIN — SERTRALINE HYDROCHLORIDE 25 MG: 25 TABLET ORAL at 08:40

## 2023-04-17 RX ADMIN — LEVOTHYROXINE SODIUM 88 MCG: 88 TABLET ORAL at 08:41

## 2023-04-17 RX ADMIN — LIDOCAINE PATCH 4% 1 PATCH: 40 PATCH TOPICAL at 09:21

## 2023-04-17 RX ADMIN — FUROSEMIDE 20 MG: 20 TABLET ORAL at 08:41

## 2023-04-17 ASSESSMENT — ACTIVITIES OF DAILY LIVING (ADL)
ADLS_ACUITY_SCORE: 55

## 2023-04-17 NOTE — PLAN OF CARE
Discharge Planner Post-Acute Rehab OT:      Discharge Plan: Home with assist and HHOT     Precautions: crani, falls, WBAT RLE, no formal hip precautions, [place] with no hearing aids, COPD 1L O2 NC.      Current Status:  ADLs:    Mobility: Min A with walker.     Grooming: SBA standing.    Dressing: UB Set up. LB Mod A walker, reacher, and sock aid.    Bathing: Mod A extended tub bench    Toileting: Min A transfer walker and commode overlay, SBA hygiene/clothing.  IADLs: Reports daughter assist with some IADLs at baseline.  Vision/Cognition: Mod deficits executive functioning.      Assessment: 4/16 X-ray confirmed new pelvic fxs. Per orthopedic note, continue WBAT without new activity restrictions. Continued to focus on progressing ambulatory ADLs and functional endurance.    Other Barriers to Discharge (DME, Family Training, etc):   Equipment: Anticipate needs for tub bench, reacher, walker, commode overlay, transport w/c, O2.  Caregiver support: Lives with daughter. Recommend caregiver training.

## 2023-04-17 NOTE — PROGRESS NOTES
DX: craniotomy, Right hip FX, pelvic FX    Orientation: A&O x4; forgetful, intermittent confusion   Bowel: cont LBM: 4/17  Bladder: cont   Pain: right hip pain, lidocaine patch on.   Ambulation/Transfers: A1 with walker and gait belt   Diet/ Liquids: regular, thin   Oxygen: O2 NC 1L   Skin: scattered bruising throughout     Assessment: suction younker self admin as needed.     Cecelia John RN on 4/17/2023 at 5:48 PM

## 2023-04-17 NOTE — PLAN OF CARE
Goal Outcome Evaluation:      Plan of Care Reviewed With: patient    Overall Patient Progress: no changeOverall Patient Progress: no change    Outcome Evaluation: Patient appeared to be sleeping during rounds, didn't complain of any pain/discomfort, continues to be on 2L O2 via N/C. No concerns raised, continues to be AX1 with walker. Safety measures in place, bed alarm on, call light/needs within reach, continue with POC.

## 2023-04-17 NOTE — PLAN OF CARE
Goal Outcome Evaluation:      Plan of Care Reviewed With: patient    Overall Patient Progress: no changeOverall Patient Progress: no change    Outcome Evaluation: Pt continues to report hip pain, provider paged and ibuprofen added per pt request. Pt encouraged to rotate tylenol and ibuprofen as needed to control pain. Ibuprofen later was effective for pain control. No new skin issues noted. Continues to transfer assist of 1 with walker. Using call light appropriately to make needs known.

## 2023-04-17 NOTE — PLAN OF CARE
Discharge Planner Post-Acute Rehab SLP:      Discharge Plan: Home with daughter     Precautions: fall     Current Status:  Hearing: [place],  does not use hearing aids  Vision: Uses glasses for close up  Communication: WFL  Cognition: WFL CLQT. Demonstrating mod-severe difficulty with higher level executive skills.  Swallow: regular diet, thin liquids. Not assessed on ARU     Assessment:  Pt reported ongoing pain related to pelvic fracture. Despite  this, was agreeable  to participate in therapy. Pt  did endorse mild increased difficulty with some areas of cognition indicating emerging insight into deficits. Pt participated in mod deductive reasoning task, reviewed strategies to aid IND. Pt with improved reasoning within task and  required instances of less cueing. However, did required instances of mod cues to complete cyeg219% accuracy. Also, noting more active participation compared to prior cognitive tasks      Other Barriers to Discharge (Family Training, etc): None anticipated by SLP. Will need increased support with IADLs from dtr if able to provide that  Support

## 2023-04-17 NOTE — PROGRESS NOTES
[facility]   Acute Rehabilitation Unit  Daily progress note    INTERVAL HISTORY  Weekend and therapy notes reviewed.  Patient was noted to have increased right hip pain.  Xrays revealed no concerns with hemiarthoplasty, persistent pubic fractures with some changes from prior imaging.  Ortho evaluated and recommended ongoing conservative management, including therapy, pain management, etc.    Josephine Nicole was seen and examined at bedside this morning.  She reports that overall she is feeling quite well.  Notes a good night of sleep last night.  Has been getting good pain relief with ibuprofen.  States pain is quite minimal at rest, but exacerbated by weightbearing activities.  Feels her breathing is comfortable, was actually weaned down to 1L oxygen.  Persistent chronic cough, unchanged.  Did have a headache last night but resolved as of this morning.  Denies dizziness.  Reports last BM this morning, was somewhat loose.  Denies abdominal pain, nausea, vomiting.  Feels she is eating and drinking well.  Asks some questions re: equipment for discharge.    Functionally, she is currently needing mod A for bed mobility, min to max A for sit to stand pending surface height, using [facility] for transfers with nursing.  Has ambulated up to 40' with CGA and FWW, assist of 2nd for oxygen tank and wheelchair follow.    MEDICATIONS    acetaminophen  975 mg Oral TID     atorvastatin  10 mg Oral Daily     enoxaparin ANTICOAGULANT  40 mg Subcutaneous Q24H     fluticasone-vilanterol  1 puff Inhalation Daily     furosemide  20 mg Oral Daily     levothyroxine  88 mcg Oral Daily     lidocaine  1 patch Transdermal Q24H     lidocaine   Transdermal Q8H NING     Magic Mouthwash  10 mL Swish & Swallow 4x Daily AC & HS     melatonin  1 mg Oral At Bedtime     mirtazapine  15 mg Oral At Bedtime     pantoprazole  40 mg Oral QAM AC     polyethylene glycol  17 g Oral Daily     senna-docusate  1 tablet Oral  "BID     sertraline  25 mg Oral QAM        acetaminophen, albuterol, bisacodyl, hydrOXYzine, ibuprofen, levalbuterol, naloxone **OR** naloxone **OR** naloxone **OR** naloxone, oxyCODONE     PHYSICAL EXAM  /61 (BP Location: Left arm, Patient Position: Supine, Cuff Size: Adult Regular)   Pulse 78   Temp 97.5  F (36.4  C) (Axillary)   Resp 18   Ht 1.585 m (5' 2.4\")   Wt 54 kg (119 lb 0.8 oz)   SpO2 96%   BMI 21.50 kg/m     Gen: NAD, lying in bed  HEENT: bifrontal craniotomy incision healing, no erythema or drainage  Cardio: RRR, no murmurs  Pulm: non-labored on 1L by NC, scattered wheezes bilaterally, no crackles  Abd: soft, non-tender, non-distended, bowel sounds present  Ext: no appreciable edema in bilateral lower extremities, no tenderness in calves  Neuro/MSK: awake, alert, PERRL, EOMI, left intermittent ptosis, scattered ecchymoses, moving all extremities in chair, right hip incision not visualized on this date    LABS  Last Basic Metabolic Panel:  Recent Labs   Lab Test 04/13/23  0602 04/10/23  0747 04/08/23  0656    135* 137   POTASSIUM 4.0 3.9 4.0   CHLORIDE 100 97* 98   CO2 31* 30* 33*   ANIONGAP 7 8 6*   GLC 91 82 81   BUN 15.6 13.9 18.7   CR 0.47* 0.45* 0.47*   GFRESTIMATED >90 >90 >90   CHAO 8.5* 8.3* 8.3*     CBC RESULTS:   Recent Labs   Lab Test 04/13/23  0602 04/10/23  0747 04/08/23  0656   WBC 8.3 11.7* 9.6   RBC 3.48* 3.75* 3.57*   HGB 10.2* 11.1* 10.5*   HCT 32.9* 35.0 33.2*   MCV 95 93 93   MCH 29.3 29.6 29.4   MCHC 31.0* 31.7 31.6   RDW 15.3* 14.7 14.4   * 201 224       Rehabilitation - continue comprehensive acute inpatient rehabilitation program with multidisciplinary approach including therapies, rehab nursing, and physiatry following. See interval history for updates.      ASSESSMENT AND PLAN  Josephine Nicole is a 73 year old right hand dominant female with a past medical history of cerebral meningioma initially diagnosed 2019, s/p radiation in 11/2022 with recent " progression including mass effect and midline shift of 3/17/23 MRI, COPD on 2L oxygen at baseline, hypothyroidism, hyperlipidemia, osteoporosis, depression/anxiety, vitamin B12 deficiency, and anemia who was admitted on 3/19/23 after fall at home in setting of several weeks of worsening confusion, impaired balance with imaging revealing right femoral neck fracture s/p right hip hemiarthroplasty.  He was then transferred to [facility] on 3/23/23 due worsening respiratory failure (COPD exacerbation), hypotension, lactic acidosis, and worsening encephalopathy in setting of progressive meningioma now s/p craniotomy and resection of meningioma on 3/30/23 with hospital course further complicated by lactic acidosis, anemia, left eye ptosis, urinary retention, pain, and hypernatremia.  She is now admitted to ARU on 4/7/23 for multidisciplinary rehabilitation and ongoing medical management.        Admission to acute inpatient rehab 4/7/23.    Impairment group code: Brain Dysfunction 02.1 Non-Traumatic: s/p right bifrontal stealth craniotomy and resection of meningioma due to vasogenic cerebral edema and brain compression along with R femoral neck fracture s/p right hip hemiarthroplasty        1. PT, OT and SLP 60 minutes of each on a daily basis, in addition to rehab nursing and close management of physiatrist.       2. Impairment of ADL's: Noted to have impaired activity tolerance, impaired balance, impaired cognition, impaired coordination, impaired judgement and safety awareness, impaired strength, impaired tone and impaired weight shifting, all affecting her ability to safely and independently perform basic ADLs.  Goal for mod I with basic ADLs.     3. Impairment of mobility:  Noted to have impaired activity tolerance, impaired balance, impaired cognition, impaired coordination, impaired judgement and safety awareness, impaired strength, impaired tone and impaired weight shifting, all affecting her ability to safely and  independently perform basic mobility.  Goal for mod I with basic mobility.     4. Impairment of cognition/language/swallow:  Noted to have impaired cognition, will need full cognitive linguistic evaluation with SLP while on ARU with goals for improved cognitive-linguistic skills to meet basic needs.        5. Medical Conditions  New actions/orders/updates for today are in blue.     S/p bifrontal craniotomy and resection of meningioma on 3/30/23 with Dr. Murphy  CNS WHO grade 1 meningioma (right frontal) with moderate surrounding vasogenic edema and mass effect  Diagnosed in 2019 after presenting with refractory headaches.  Status post radiation treatment 11/2022.  Recent brain MRI revealing progression.  Patient presented after fall in setting of several week history of worsening confusion, gait impairment, balance deficits.  CT head with known mass surrounding vasogenic edema predominantly involving the right frontal lobe with right to left midline shift/subfalcine herniation measuring up to 1.1 cm.  Neurosurgery consulted and recommended medical optimization and transfer to [facility] for surgical intervention, which she underwent as above on 3/30.  - Decadron taper: on 1 mg daily thru 4/14, then off  - Pain management: Tylenol 975 mg TID, wean to PRN as able.    - Wound care: keep clean and dry, leave open to air  - Continue PT/OT/SLP  - Wound check and suture removal completed by rehab team on 4/14  - Follow up with neurosurgery on 5/15 ([facility]) and 6/30 (Dr. Murphy)  - Follow up with neuro-oncology?     Displaced right femoral neck fracture s/p right hip hemiarthroplasty (anterolateral approach) on 3/20/23 with Dr. Shun Cuevas  Pathologic fracture secondary to osteoporosis  Fractures of the right superior and inferior pubic rami and the left pubic body extending into the left pubic rami, stable on imaging 4/3  - WBAT RLE with walker  - Wound care: Surgical dressing removed by ortho on 4/3. Per their recs at that  time: May keep site open to air. Please allow remaining Dermabond to slough off naturally (no picking at site). Okay to shower, but no soaking/submersion x 1 more week.   - Pain management as above  - Per ortho, osteoporosis workup and treatment with primary care provider within 2 months.  - Noted to have increased right hip pain starting 4/15.  Xray revealed no concerns with right hip hemiarthoplasty; bilateral superior and inferior pubic rami fractures with mild displacement, which were noted on prior imaging as well.  Assessed by ortho, who recommended ongoing conservative management wtih WBAT, ongoing PT, pain management, DVT prophylaxis, and OP follow-up in bone health clinic and ortho.  Would recommend to complete at least 3 mos of rehab before any consideration of surgical treatment (right pelvic ring fixation).  - Added oxycodone 2.5 mg q6h PRN but patient did not want to use and requested NSAIDs.  Risk/benefit of NSAID use (including bleeding risk) discussed with patient, who felt this risk was acceptable, per pharmacy, low risk with ibuprofen 400 mg q6h PRN.  Monitor for any signs/symptoms of bleeding.   - Continue PT/OT  - Follow up with Dr. Shun Cuevas at [facility] at six weeks post-op (~5/1/23) with X-rays. If remains at ARU at that time, can have wound check and xrays performed there and sent to Dr. Cuevas for review.     COPD, recent exacerbation  Acute on chronic hypoxic/hypercapnic respiratory failure  Possible community acquired pneumonia, treated  Former smoker x50 years.  On 2-3L of oxygen PTA for past ~5 years.  Following hip surgery, had acute on chronic hypoxic/hypercapnic respiratory failure requiring BiPAP.  Completed course of azithromycin 3/27 and ceftriaxone 4/4.  Respiratory status improved with diuresis so in part may be related to heart failure as below.  As of admission to ARU, stable on 2L, which is baseline.  - Monitor respiratory status  - Continue supplemental oxygen to maintain  spO2>88% (goal range 88-93%)  - Continue Breo Ellipta 200-25 mcg inhaler daily (formulary sub for PTA Advair Diskus 500/50 mcg inhaler)  - Duoneb not available due to supply issues, ordered PRN xopenex neb.    - Pulmonary hygiene/toilet  - Continue oral suction PRN for more tenuous secretions     HFpEF with recent exacerbation with fluid overload  Intermittent diuresis during this admission with improved respiratory status.  Echo 3/27/23 with EF 55-60%, grade I or early diastolic dysfunction.  Noted to have lower extremity edema.  Started on Lasix daily on 4/5, also ordered 2L fluid restriction.    - Continue Lasix 20 mg daily  - Continue 2L fluid restriction  - Daily weights, monitor volume status     Acute on chronic anemia  Hgb 10.5 on admission, dropped to benson of 7.8 on 3/26.  Did receive 1 unit pRBC.  Stable in 10s at discharge to ARU (baseline).  - Trend CBC every M/Th.  4/17: Hgb stable at 9.8     Urinary retention, resolved  Baldwin placed 3/24, failed TOV on 4/4.  Urology consulted 4/5 and recommended to replace baldwin.  Per urology, retention likely due to recent anesthesia, immobilization, and narcotic pain medications; additional recs as below.  Baldwin removed on 4/13, voiding well with no evidence of retention.s     Mild left intermittent ptosis  Noted on 4/4.  CT head with expected post-operative changes.  Patient reports she has noted this for at least 1 year.    - Per sending team, given chronicity, recommended to defer additional work-up to outpatient setting as appropriate.     Hyperlipidemia  - Continue PTA atorvastatin     Depression/anxiety  Fatigue  - Patient complaining of feeling fatigued daily, ?poor quality sleep.  Fatigue and mood have been barrier to participation.  Is on steroid taper.  Increased PTA mirtazapine to 15 mg at HS on 4/12.  Also consulted health psychology.  Continue to monitor.  - Continue Zoloft 25 mg daily     Hypothyroidism  - Continue PTA levothyroxine     Vitamin B12  deficiency  - Continue PTA B12 injection 1000 mcg q30 days, last given on 4/3     Throat/mouth pain  Patient reports this has been present throughout this admission.    - Continue magic mouthwash    Leukocytosis, resolved  WBC mildly elevated at 11.7 on 4/10, has been previously mildly elevated though had normalized to 9.6 on 4/8.  Suspect 2/2 steroids.  No localizing s/sx of infection.  - Continue to trend CBC every M/Th.  WBC WNL on 4/17    Mild hyponatremia, resolved  Na mildly low at 135 on 4/10.  Asymptomatic.  - Trend BMP.  Na WNL on 4/17        6. Adjustment to disability:  Clinical psychology to eval and treat if indicated  7. FEN: regular diet, thin liquids  8. Bowel: continent, monitor, scheduled and PRN bowel meds available  9. Bladder: as above, baldwin removed 4/13 for TOV and is voiding well, encouraged to avoid use of Purewick  10. DVT Prophylaxis: subcutaneous Lovenox, ok'd to start on 4/3 per neurosurgery  11. GI Prophylaxis: PPI  12. Code: DNR/DNI, discussed with palliative care during inpatient admission, confirmed at admission  13. Disposition: goal for home  14. ELOS:  target 4/28/23  15. Follow up Appointments on Discharge: PCP in 1-2 weeks, neurosurgery (scsheduled on 5/15 and 6/30), neuro-oncology?, ortho (Dr. Shun Cuevas ~5/1/23; care coordinator is Neha Way at [phone] for scheduling) with repeat xrays        Patient was discussed with Dr. Kris Ray, PM&R Staff Physician    Abimbola Thompson PA-C  Physical Medicine & Rehabilitation

## 2023-04-18 ENCOUNTER — APPOINTMENT (OUTPATIENT)
Dept: OCCUPATIONAL THERAPY | Facility: CLINIC | Age: 73
DRG: 949 | End: 2023-04-18
Attending: PHYSICAL MEDICINE & REHABILITATION
Payer: COMMERCIAL

## 2023-04-18 ENCOUNTER — APPOINTMENT (OUTPATIENT)
Dept: PHYSICAL THERAPY | Facility: CLINIC | Age: 73
DRG: 949 | End: 2023-04-18
Attending: PHYSICAL MEDICINE & REHABILITATION
Payer: COMMERCIAL

## 2023-04-18 ENCOUNTER — APPOINTMENT (OUTPATIENT)
Dept: SPEECH THERAPY | Facility: CLINIC | Age: 73
DRG: 949 | End: 2023-04-18
Attending: PHYSICAL MEDICINE & REHABILITATION
Payer: COMMERCIAL

## 2023-04-18 PROCEDURE — 99232 SBSQ HOSP IP/OBS MODERATE 35: CPT | Performed by: PHYSICIAN ASSISTANT

## 2023-04-18 PROCEDURE — 250N000013 HC RX MED GY IP 250 OP 250 PS 637: Performed by: PHYSICIAN ASSISTANT

## 2023-04-18 PROCEDURE — 99232 SBSQ HOSP IP/OBS MODERATE 35: CPT | Mod: FS | Performed by: PHYSICAL MEDICINE & REHABILITATION

## 2023-04-18 PROCEDURE — 250N000011 HC RX IP 250 OP 636: Performed by: PHYSICIAN ASSISTANT

## 2023-04-18 PROCEDURE — 97129 THER IVNTJ 1ST 15 MIN: CPT | Mod: GN

## 2023-04-18 PROCEDURE — 97535 SELF CARE MNGMENT TRAINING: CPT | Mod: GO | Performed by: OCCUPATIONAL THERAPIST

## 2023-04-18 PROCEDURE — 128N000003 HC R&B REHAB

## 2023-04-18 PROCEDURE — 97530 THERAPEUTIC ACTIVITIES: CPT | Mod: GP

## 2023-04-18 PROCEDURE — 90791 PSYCH DIAGNOSTIC EVALUATION: CPT | Performed by: CLINICAL NEUROPSYCHOLOGIST

## 2023-04-18 PROCEDURE — 250N000013 HC RX MED GY IP 250 OP 250 PS 637: Performed by: PHYSICAL MEDICINE & REHABILITATION

## 2023-04-18 PROCEDURE — 97110 THERAPEUTIC EXERCISES: CPT | Mod: GO | Performed by: OCCUPATIONAL THERAPIST

## 2023-04-18 PROCEDURE — 97130 THER IVNTJ EA ADDL 15 MIN: CPT | Mod: GN

## 2023-04-18 PROCEDURE — 250N000013 HC RX MED GY IP 250 OP 250 PS 637: Performed by: STUDENT IN AN ORGANIZED HEALTH CARE EDUCATION/TRAINING PROGRAM

## 2023-04-18 RX ORDER — AMOXICILLIN 250 MG
1 CAPSULE ORAL AT BEDTIME
Status: DISCONTINUED | OUTPATIENT
Start: 2023-04-18 | End: 2023-04-28 | Stop reason: HOSPADM

## 2023-04-18 RX ORDER — LIDOCAINE 4 G/G
1-2 PATCH TOPICAL
Status: DISCONTINUED | OUTPATIENT
Start: 2023-04-18 | End: 2023-04-24

## 2023-04-18 RX ADMIN — SENNOSIDES AND DOCUSATE SODIUM 1 TABLET: 50; 8.6 TABLET ORAL at 08:28

## 2023-04-18 RX ADMIN — ENOXAPARIN SODIUM 40 MG: 40 INJECTION SUBCUTANEOUS at 16:23

## 2023-04-18 RX ADMIN — FLUTICASONE FUROATE AND VILANTEROL TRIFENATATE 1 PUFF: 200; 25 POWDER RESPIRATORY (INHALATION) at 19:44

## 2023-04-18 RX ADMIN — IBUPROFEN 400 MG: 200 TABLET, FILM COATED ORAL at 08:40

## 2023-04-18 RX ADMIN — DIPHENHYDRAMINE HYDROCHLORIDE AND LIDOCAINE HYDROCHLORIDE AND ALUMINUM HYDROXIDE AND MAGNESIUM HYDRO 10 ML: KIT at 08:40

## 2023-04-18 RX ADMIN — DIPHENHYDRAMINE HYDROCHLORIDE AND LIDOCAINE HYDROCHLORIDE AND ALUMINUM HYDROXIDE AND MAGNESIUM HYDRO 10 ML: KIT at 12:45

## 2023-04-18 RX ADMIN — LEVOTHYROXINE SODIUM 88 MCG: 88 TABLET ORAL at 08:28

## 2023-04-18 RX ADMIN — ATORVASTATIN CALCIUM 10 MG: 10 TABLET, FILM COATED ORAL at 08:28

## 2023-04-18 RX ADMIN — SERTRALINE HYDROCHLORIDE 25 MG: 25 TABLET ORAL at 08:28

## 2023-04-18 RX ADMIN — ACETAMINOPHEN 325MG 975 MG: 325 TABLET ORAL at 08:27

## 2023-04-18 RX ADMIN — PANTOPRAZOLE SODIUM 40 MG: 40 TABLET, DELAYED RELEASE ORAL at 08:28

## 2023-04-18 RX ADMIN — POLYETHYLENE GLYCOL 3350 17 G: 17 POWDER, FOR SOLUTION ORAL at 08:28

## 2023-04-18 RX ADMIN — SENNOSIDES AND DOCUSATE SODIUM 1 TABLET: 50; 8.6 TABLET ORAL at 21:23

## 2023-04-18 RX ADMIN — MIRTAZAPINE 15 MG: 15 TABLET, FILM COATED ORAL at 21:22

## 2023-04-18 RX ADMIN — ACETAMINOPHEN 325MG 975 MG: 325 TABLET ORAL at 15:03

## 2023-04-18 RX ADMIN — ACETAMINOPHEN 325MG 975 MG: 325 TABLET ORAL at 21:22

## 2023-04-18 RX ADMIN — LIDOCAINE PATCH 4% 1 PATCH: 40 PATCH TOPICAL at 08:27

## 2023-04-18 RX ADMIN — Medication 1 MG: at 21:22

## 2023-04-18 ASSESSMENT — ACTIVITIES OF DAILY LIVING (ADL)
ADLS_ACUITY_SCORE: 53
ADLS_ACUITY_SCORE: 54
ADLS_ACUITY_SCORE: 53
ADLS_ACUITY_SCORE: 54
ADLS_ACUITY_SCORE: 50
ADLS_ACUITY_SCORE: 51
ADLS_ACUITY_SCORE: 51
ADLS_ACUITY_SCORE: 55
ADLS_ACUITY_SCORE: 54
ADLS_ACUITY_SCORE: 51
ADLS_ACUITY_SCORE: 54
ADLS_ACUITY_SCORE: 54

## 2023-04-18 NOTE — PROGRESS NOTES
[facility]   Acute Rehabilitation Unit  Daily progress note    INTERVAL HISTORY  Josephine Nicole was seen and examined at bedside this morning.  No acute events reported overnight.  Patient reports that she has had recurrent headache over the past 2 days, which was previously resolved.  Notes history of frequent headaches chronically PTA (~7 years) which were alleviated whenever she took oral steroids.  States those were typically located behind eyes.  Her headache over the past 2 days has been primarily behind right eye, no radiation.  Describes as constant, aching.  No associated visual disturbance.  No relief with Tylenol, ibuprofen, or atarax.  Took small dose of oxycodone last night and was able to fall asleep with no recurrent headache this morning.  She does note feeling somewhat nauseous this morning.  Denies dizziness today, though has had intermittently recently, especially if she goes from laying flat to upright.  Feels breathing has been good today, COPD symptoms stable.  Notes 3 loose BMs yesterday, today has had 1.  Denies abdominal pain or vomiting.  Reports stable hip pain, rated about 7-8/10.    Functionally, she needs min A for mobility in room with walker, SBA for grooming standing, set-up for upper body dressing, mod A for lower body dressing, and mod A for bathing with extended tub bench.    MEDICATIONS    acetaminophen  975 mg Oral TID     atorvastatin  10 mg Oral Daily     enoxaparin ANTICOAGULANT  40 mg Subcutaneous Q24H     fluticasone-vilanterol  1 puff Inhalation Daily     furosemide  20 mg Oral Daily     levothyroxine  88 mcg Oral Daily     lidocaine  1 patch Transdermal Q24H     lidocaine   Transdermal Q8H NING     Magic Mouthwash  10 mL Swish & Swallow 4x Daily AC & HS     melatonin  1 mg Oral At Bedtime     mirtazapine  15 mg Oral At Bedtime     pantoprazole  40 mg Oral QAM AC     polyethylene glycol  17 g Oral Daily     senna-docusate  1 tablet  "Oral BID     sertraline  25 mg Oral QAM        acetaminophen, albuterol, bisacodyl, hydrOXYzine, ibuprofen, levalbuterol, naloxone **OR** naloxone **OR** naloxone **OR** naloxone, oxyCODONE     PHYSICAL EXAM  BP 94/58 (BP Location: Left arm, Patient Position: Supine, Cuff Size: Adult Regular)   Pulse 82   Temp 98  F (36.7  C) (Axillary)   Resp 18   Ht 1.585 m (5' 2.4\")   Wt 54 kg (119 lb 0.8 oz)   SpO2 96%   BMI 21.50 kg/m     Gen: NAD, sitting up in chair  HEENT: bifrontal craniotomy incision healing, no erythema or drainage  Cardio: RRR, no murmurs  Pulm: non-labored on 1L by NC, lungs CTA bilaterally, no wheezes, rhonchi, or crackles  Abd: soft, non-tender, non-distended, bowel sounds present  Ext: no appreciable edema in bilateral lower extremities, no tenderness in calves  Neuro/MSK: awake, alert, PERRL, EOMI, left intermittent ptosis, scattered ecchymoses, moving all extremities in chair, right hip incision not visualized on this date    LABS  Last Basic Metabolic Panel:  Recent Labs   Lab Test 04/17/23  0947 04/13/23  0602 04/10/23  0747    138 135*   POTASSIUM 3.6 4.0 3.9   CHLORIDE 100 100 97*   CO2 31* 31* 30*   ANIONGAP 7 7 8   GLC 84 91 82   BUN 13.1 15.6 13.9   CR 0.43* 0.47* 0.45*   GFRESTIMATED >90 >90 >90   CHAO 8.8 8.5* 8.3*     CBC RESULTS:   Recent Labs   Lab Test 04/17/23  0947 04/13/23  0602 04/10/23  0747   WBC 7.5 8.3 11.7*   RBC 3.27* 3.48* 3.75*   HGB 9.8* 10.2* 11.1*   HCT 31.2* 32.9* 35.0   MCV 95 95 93   MCH 30.0 29.3 29.6   MCHC 31.4* 31.0* 31.7   RDW 15.6* 15.3* 14.7    149* 201       Rehabilitation - continue comprehensive acute inpatient rehabilitation program with multidisciplinary approach including therapies, rehab nursing, and physiatry following. See interval history for updates.      ASSESSMENT AND PLAN  Josephine Nicole is a 73 year old right hand dominant female with a past medical history of cerebral meningioma initially diagnosed 2019, s/p radiation in " 11/2022 with recent progression including mass effect and midline shift of 3/17/23 MRI, COPD on 2L oxygen at baseline, hypothyroidism, hyperlipidemia, osteoporosis, depression/anxiety, vitamin B12 deficiency, and anemia who was admitted on 3/19/23 after fall at home in setting of several weeks of worsening confusion, impaired balance with imaging revealing right femoral neck fracture s/p right hip hemiarthroplasty.  He was then transferred to [facility] on 3/23/23 due worsening respiratory failure (COPD exacerbation), hypotension, lactic acidosis, and worsening encephalopathy in setting of progressive meningioma now s/p craniotomy and resection of meningioma on 3/30/23 with hospital course further complicated by lactic acidosis, anemia, left eye ptosis, urinary retention, pain, and hypernatremia.  She is now admitted to ARU on 4/7/23 for multidisciplinary rehabilitation and ongoing medical management.        Admission to acute inpatient rehab 4/7/23.    Impairment group code: Brain Dysfunction 02.1 Non-Traumatic: s/p right bifrontal stealth craniotomy and resection of meningioma due to vasogenic cerebral edema and brain compression along with R femoral neck fracture s/p right hip hemiarthroplasty        1. PT, OT and SLP 60 minutes of each on a daily basis, in addition to rehab nursing and close management of physiatrist.       2. Impairment of ADL's: Noted to have impaired activity tolerance, impaired balance, impaired cognition, impaired coordination, impaired judgement and safety awareness, impaired strength, impaired tone and impaired weight shifting, all affecting her ability to safely and independently perform basic ADLs.  Goal for mod I with basic ADLs.     3. Impairment of mobility:  Noted to have impaired activity tolerance, impaired balance, impaired cognition, impaired coordination, impaired judgement and safety awareness, impaired strength, impaired tone and impaired weight shifting, all affecting her  ability to safely and independently perform basic mobility.  Goal for mod I with basic mobility.     4. Impairment of cognition/language/swallow:  Noted to have impaired cognition, will need full cognitive linguistic evaluation with SLP while on ARU with goals for improved cognitive-linguistic skills to meet basic needs.        5. Medical Conditions  New actions/orders/updates for today are in blue.     S/p bifrontal craniotomy and resection of meningioma on 3/30/23 with Dr. Murphy  CNS WHO grade 1 meningioma (right frontal) with moderate surrounding vasogenic edema and mass effect  Diagnosed in 2019 after presenting with refractory headaches.  Status post radiation treatment 11/2022.  Recent brain MRI revealing progression.  Patient presented after fall in setting of several week history of worsening confusion, gait impairment, balance deficits.  CT head with known mass surrounding vasogenic edema predominantly involving the right frontal lobe with right to left midline shift/subfalcine herniation measuring up to 1.1 cm.  Neurosurgery consulted and recommended medical optimization and transfer to [facility] for surgical intervention, which she underwent as above on 3/30.  - Decadron taper completed per neurosurgery, off since 4/14  - Pain management: Tylenol 975 mg TID, wean to PRN as able.    - Notes recurrent headache over past 2 days, attributes to being off steroids.  Resolved last night after oxycodone 2.5 mg, no recurrence this morning.  Neuro exam stable.  BP also low this morning, so possible contribution of poor hydration.  Encouraged fluids.  Continue to monitor.  - Wound care: keep clean and dry, leave open to air  - Continue PT/OT/SLP  - Wound check and suture removal completed by rehab team on 4/14  - Follow up with neurosurgery on 5/15 (ANT[facility]A) and 6/30 (Dr. Murphy)  - Follow up with neuro-oncology?     Displaced right femoral neck fracture s/p right hip hemiarthroplasty (anterolateral approach) on 3/20/23  with Dr. hSun Cuevas  Pathologic fracture secondary to osteoporosis  Fractures of the right superior and inferior pubic rami and the left pubic body extending into the left pubic rami, stable on imaging 4/3  - WBAT RLE with walker  - Wound care: Surgical dressing removed by ortho on 4/3. Per their recs at that time: May keep site open to air. Please allow remaining Dermabond to slough off naturally (no picking at site). Okay to shower, but no soaking/submersion x 1 more week.   - Pain management as above  - Per ortho, osteoporosis workup and treatment with primary care provider within 2 months.  - Noted to have increased right hip pain starting 4/15.  Xray revealed no concerns with right hip hemiarthoplasty; bilateral superior and inferior pubic rami fractures with mild displacement, which were noted on prior imaging as well.  Assessed by ortho, who recommended ongoing conservative management wtih WBAT, ongoing PT, pain management, DVT prophylaxis, and OP follow-up in bone health clinic and ortho.  Would recommend to complete at least 3 mos of rehab before any consideration of surgical treatment (right pelvic ring fixation).  - Added oxycodone 2.5 mg q6h PRN but patient did not want to use and requested NSAIDs.  Risk/benefit of NSAID use (including bleeding risk) discussed with patient, who felt this risk was acceptable, per pharmacy, low risk with ibuprofen 400 mg q6h PRN.  Monitor for any signs/symptoms of bleeding.   - Continue PT/OT  - Follow up with Dr. Shun Cuevas at [facility] at six weeks post-op (~5/1/23) with X-rays. If remains at ARU at that time, can have wound check and xrays performed there and sent to Dr. Cuevas for review.     COPD, recent exacerbation  Acute on chronic hypoxic/hypercapnic respiratory failure  Possible community acquired pneumonia, treated  Former smoker x50 years.  On 2-3L of oxygen PTA for past ~5 years.  Following hip surgery, had acute on chronic hypoxic/hypercapnic  respiratory failure requiring BiPAP.  Completed course of azithromycin 3/27 and ceftriaxone 4/4.  Respiratory status improved with diuresis so in part may be related to heart failure as below.  As of admission to ARU, stable on 2L, which is baseline.  - Monitor respiratory status  - Continue supplemental oxygen to maintain spO2>88% (goal range 88-93%)  - Continue Breo Ellipta 200-25 mcg inhaler daily (formulary sub for PTA Advair Diskus 500/50 mcg inhaler)  - Duoneb not available due to supply issues, ordered PRN xopenex neb.    - Pulmonary hygiene/toilet  - Continue oral suction PRN for more tenuous secretions     HFpEF with recent exacerbation with fluid overload  Intermittent diuresis during this admission with improved respiratory status.  Echo 3/27/23 with EF 55-60%, grade I or early diastolic dysfunction.  Noted to have lower extremity edema.  Started on Lasix daily on 4/5, also ordered 2L fluid restriction.    - Continue Lasix 20 mg daily  - Has not been drinking well, BP low this morning.  No peripheral edema on exam, respiratory symptoms stable.  AM dose of Lasix held today.  Will remove fluid restriction and continue to monitor for any evidence of recurrent volume overload.  - Daily weights, monitor volume status     Acute on chronic anemia  Hgb 10.5 on admission, dropped to benson of 7.8 on 3/26.  Did receive 1 unit pRBC.  Stable in 10s at discharge to ARU (baseline).  - Trend CBC every M/Th.  4/17: Hgb stable at 9.8     Urinary retention, resolved  Baldwin placed 3/24, failed TOV on 4/4.  Urology consulted 4/5 and recommended to replace baldwin.  Per urology, retention likely due to recent anesthesia, immobilization, and narcotic pain medications; additional recs as below.  Baldwin removed on 4/13, voiding well with no evidence of retention.     Mild left intermittent ptosis  Noted on 4/4.  CT head with expected post-operative changes.  Patient reports she has noted this for at least 1 year.    - Per sending  team, given chronicity, recommended to defer additional work-up to outpatient setting as appropriate.     Hyperlipidemia  - Continue PTA atorvastatin     Depression/anxiety  Fatigue  - Patient complaining of feeling fatigued daily, ?poor quality sleep.  Fatigue and mood have been barrier to participation.  Is on steroid taper.  Increased PTA mirtazapine to 15 mg at HS on 4/12.  Also consulted health psychology.  Continue to monitor.  - Continue Zoloft 25 mg daily     Hypothyroidism  - Continue PTA levothyroxine     Vitamin B12 deficiency  - Continue PTA B12 injection 1000 mcg q30 days, last given on 4/3     Throat/mouth pain  Patient reports this has been present throughout this admission.    - Continue magic mouthwash    Leukocytosis, resolved  WBC mildly elevated at 11.7 on 4/10, has been previously mildly elevated though had normalized to 9.6 on 4/8.  Suspect 2/2 steroids.  No localizing s/sx of infection.  - Continue to trend CBC every M/Th.  WBC WNL on 4/17    Mild hyponatremia, resolved  Na mildly low at 135 on 4/10.  Asymptomatic.  - Trend BMP.  Na WNL on 4/17        6. Adjustment to disability:  Clinical psychology to eval and treat if indicated  7. FEN: regular diet, thin liquids  8. Bowel: continent, monitor, scheduled and PRN bowel meds available  9. Bladder: as above, baldwin removed 4/13 for TOV and is voiding well, encouraged to avoid use of Purewick  10. DVT Prophylaxis: subcutaneous Lovenox, ok'd to start on 4/3 per neurosurgery  11. GI Prophylaxis: PPI  12. Code: DNR/DNI, discussed with palliative care during inpatient admission, confirmed at admission  13. Disposition: goal for home  14. ELOS:  target 4/28/23  15. Follow up Appointments on Discharge: PCP in 1-2 weeks, neurosurgery (scsheduled on 5/15 and 6/30), neuro-oncology?, ortho (Dr. Shun Cuevas ~5/1/23; care coordinator is Neha Way at [phone] for scheduling) with repeat xrays        Patient was discussed with Dr. Ponce  Cory, PM&R Staff Physician    ABIEL Santana-C  Physical Medicine & Rehabilitation

## 2023-04-18 NOTE — PLAN OF CARE
Goal Outcome Evaluation:      Plan of Care Reviewed With: patient      Outcome Evaluation: Pt is alert and oriented x 4 but sometimes forgetful. BP slightly low before breakfast. Recheck after was slightly better. Rest of vitals including O2 at 1 L stable. Notified PM&R ABIEL Thompson and she said to hold lasix dose this morning. BP at noon remained stable. Fluid restriction has been removed today and pt encouraged to drink. I&Os. Pt doesn't really drink much. She had 1 cup of orange juice at breakfast and lunch but just sips of water and other beverages that were available at bedside. Pt denied any headache this morning. Admitted to having right hip pain and requested prn ibuprofen in addition to scheduled tylenol this morning with good effect. Scalp incision scabbed and right hip incision YAZ and healing well. Pt using the call light appropriately for assist. Min assist of 1 with walker. Continent of bowel and bladder using the toilet. LBM this morning.

## 2023-04-18 NOTE — PLAN OF CARE
Discharge Planner Post-Acute Rehab SLP:      Discharge Plan: Home with daughter     Precautions: fall     Current Status:  Hearing: [place],  does not use hearing aids  Vision: Uses glasses for close up  Communication: WFL  Cognition: WFL CLQT. Demonstrating mod-severe difficulty with higher level executive skills.  Swallow: regular diet, thin liquids. Not assessed on ARU     Assessment: Patient engaged in error recognition task.  Patient was able to recognize approximately 70% of errors.  With mild to moderate cueing, able to recognize and correct the remainder of the errors with approximately 90% accuracy.     Other Barriers to Discharge (Family Training, etc): None anticipated by SLP. Will need increased support with IADLs from dtr if able to provide that  Support

## 2023-04-18 NOTE — PLAN OF CARE
Goal Outcome Evaluation:      Plan of Care Reviewed With: patient    Overall Patient Progress: no changeOverall Patient Progress: no change    Outcome Evaluation:  Orientation: A&O x4; forgetful, intermittent confusion   Bowel: Cont LBM: 4/17  Bladder: cont   Pain: Right hip pain, lidocaine patch off, X1 PRN Oxy-effective per patient.   Ambulation/Transfers: AX1 with walker and gait belt   Diet/ Liquids: regular, thin   Meds: Pills whole with water  Oxygen: 1L O2 via NC    Skin: Scattered bruising, R. Hip incision and scalpt incision  YAZ   Other: Suction younker self admin as needed.

## 2023-04-18 NOTE — PLAN OF CARE
Goal Outcome Evaluation:    Overall Patient Progress: no changeOverall Patient Progress: no change    Patient is alert and oriented x4. [place]. No complaints of pain, sob, fever, nausea, or any new weakness. Transfers as A1 with the walker. Continent of B&B, no bm this shift. On continuous O2 at 1LPM via NC. Sleeping well at night. Remains at 2000ml/day fluid restriction. Continue poc.

## 2023-04-18 NOTE — PROGRESS NOTES
Discharge Planner Post-Acute Rehab OT:      Discharge Plan: home with assist and HHOT     Precautions: crani, falls, WBAT RLE, per chart review no formal hip precautions, [place] with no hearing aids     Current Status:  ADLs:    Mobility: CGA with walker. Up to max A to stand with walker when fatigued or low surface height.    Grooming: GGA with FWW standing at the sink    Dressing: UB: set-up and supervision seated; Min A with LBD tasks with walker and AE.     Bathing: Min-mod A with W/C<>extended tub bench    Toileting: CGA with transfer to toilet, Max A from toilet to standing with BSC overlay, GB, and FWW. SBA with seated murphy-cares.    IADLs: Reports daughter assist with some IADLs at baseline, will continue to assess  Vision/Cognition: Demonstrated deficits in problem solving t/o and appears self-limiting at times, will continue to monitor and assess     Assessment: Pt reporting ongoing pain in R hip, headaches for the past 2 days, and nausea during session; alerted doctor about headaches. Pt is progressing in independence with FBD at EOB, toileting, and g/h tasks at EOS, mostly requiring assistance w/STS and standing tolerance.      Other Barriers to Discharge (DME, Family Training, etc):   AE/DME needed for home unclear pending further assessment  Family Training not scheduled

## 2023-04-18 NOTE — PROGRESS NOTES
CLINICAL NUTRITION SERVICES - REASSESSMENT NOTE     Nutrition Prescription    RECOMMENDATIONS FOR MDs/PROVIDERS TO ORDER:  None today.    Malnutrition Status:    Patient does not meet two of the established criteria necessary for diagnosing malnutrition but is at risk for malnutrition    Recommendations already ordered by Registered Dietitian (RD):  Gelatein Plus PRN (do not send automatically)    Future/Additional Recommendations:  Monitor meal intakes, supplement use, wt/lab trends      EVALUATION OF THE PROGRESS TOWARD GOALS   Diet: Regular  - Room service with assist    Snacks/supplements: Gelatein Plus PRN (per review of [employer], pt has been receiving TID with meals)    Intake: % per flowsheets  Per [employer], pt typically ordering 2 meals/day from room service. Pt's daughter has been bringing meals.      NEW FINDINGS   Met with pt in room. She reports a good appetite and has been eating 3 meals/day. She has also been getting food from outside the hospital. She had pastries and snacks in her room as well. She has not been consuming the Gelatein Plus and would like to order them PRN only (not have them be sent automatically). RD will update order. She has no other nutrition questions or concerns at this time.     Weight:   04/16/23 0701 54 kg (119 lb 0.8 oz) Bed scale   04/15/23 0653 56.9 kg (125 lb 6.4 oz) Standing scale   04/14/23 0802 52.4 kg (115 lb 8.3 oz) --   04/13/23 0639 54.8 kg (120 lb 13 oz) Bed scale   04/12/23 0647 59.7 kg (131 lb 9.8 oz) Bed scale   04/08/23 0658 63.8 kg (140 lb 10.5 oz) Bed scale   04/07/23 1603 63.2 kg (139 lb 5.3 oz) Bed scale     Wt Readings from Last 10 Encounters:   04/16/23 54 kg (119 lb 0.8 oz)   04/02/23 69.6 kg (153 lb 7 oz)   03/22/23 66.9 kg (147 lb 7.8 oz)   05/24/22 65.8 kg (145 lb)   04/18/22 66.7 kg (147 lb)   02/06/21 64.4 kg (142 lb)   Difficult to assess true weight loss vs fluid shifts vs scale discrepancy.     Labs:   Cr: 0.43  (L)    Meds:  Lasix  Magic mouthwash suspension   Remeron  Protonix  Miralax  Senna-docusate, BID    MALNUTRITION  % Intake: No decreased intake noted  % Weight Loss: Unable to assess  Subcutaneous Fat Loss: Globally mild vs age related   Muscle Loss: Globally mild vs age related   Fluid Accumulation/Edema: Mild to Moderate  Malnutrition Diagnosis: Patient does not meet two of the established criteria necessary for diagnosing malnutrition but is at risk for malnutrition    Previous Goals   Patient to consume % of nutritionally adequate meal trays TID, or the equivalent with supplements/snacks.  Evaluation: Met    Previous Nutrition Diagnosis  Predicted inadequate nutrient intake related to appetite vs other  Evaluation: Improving    CURRENT NUTRITION DIAGNOSIS  No nutrition diagnosis at this time    INTERVENTIONS  Implementation  Medical food supplement therapy - Gelatein Plus PRN    Goals  Patient to consume % of nutritionally adequate meal trays TID, or the equivalent with supplements/snacks.    Monitoring/Evaluation  Progress toward goals will be monitored and evaluated per protocol.    Michelle Contreras RD, CHIDI  [facility] RD pager: [phone]  Weekend/Holiday RD pager: [phone]

## 2023-04-19 ENCOUNTER — APPOINTMENT (OUTPATIENT)
Dept: OCCUPATIONAL THERAPY | Facility: CLINIC | Age: 73
DRG: 949 | End: 2023-04-19
Attending: PHYSICAL MEDICINE & REHABILITATION
Payer: COMMERCIAL

## 2023-04-19 ENCOUNTER — APPOINTMENT (OUTPATIENT)
Dept: PHYSICAL THERAPY | Facility: CLINIC | Age: 73
DRG: 949 | End: 2023-04-19
Attending: PHYSICAL MEDICINE & REHABILITATION
Payer: COMMERCIAL

## 2023-04-19 ENCOUNTER — APPOINTMENT (OUTPATIENT)
Dept: SPEECH THERAPY | Facility: CLINIC | Age: 73
DRG: 949 | End: 2023-04-19
Attending: PHYSICAL MEDICINE & REHABILITATION
Payer: COMMERCIAL

## 2023-04-19 PROCEDURE — 250N000013 HC RX MED GY IP 250 OP 250 PS 637: Performed by: STUDENT IN AN ORGANIZED HEALTH CARE EDUCATION/TRAINING PROGRAM

## 2023-04-19 PROCEDURE — 128N000003 HC R&B REHAB

## 2023-04-19 PROCEDURE — 97130 THER IVNTJ EA ADDL 15 MIN: CPT | Mod: GN

## 2023-04-19 PROCEDURE — 99232 SBSQ HOSP IP/OBS MODERATE 35: CPT | Mod: FS | Performed by: PHYSICAL MEDICINE & REHABILITATION

## 2023-04-19 PROCEDURE — 97535 SELF CARE MNGMENT TRAINING: CPT | Mod: GO | Performed by: OCCUPATIONAL THERAPIST

## 2023-04-19 PROCEDURE — 97110 THERAPEUTIC EXERCISES: CPT | Mod: GO | Performed by: OCCUPATIONAL THERAPIST

## 2023-04-19 PROCEDURE — 250N000013 HC RX MED GY IP 250 OP 250 PS 637: Performed by: PHYSICAL MEDICINE & REHABILITATION

## 2023-04-19 PROCEDURE — 97530 THERAPEUTIC ACTIVITIES: CPT | Mod: GP | Performed by: PHYSICAL THERAPIST

## 2023-04-19 PROCEDURE — 999N000125 HC STATISTIC PATIENT MED CONFERENCE < 30 MIN

## 2023-04-19 PROCEDURE — 999N000125 HC STATISTIC PATIENT MED CONFERENCE < 30 MIN: Performed by: OCCUPATIONAL THERAPIST

## 2023-04-19 PROCEDURE — 97110 THERAPEUTIC EXERCISES: CPT | Mod: GP | Performed by: PHYSICAL THERAPIST

## 2023-04-19 PROCEDURE — 250N000013 HC RX MED GY IP 250 OP 250 PS 637: Performed by: PHYSICIAN ASSISTANT

## 2023-04-19 PROCEDURE — 999N000150 HC STATISTIC PT MED CONFERENCE < 30 MIN

## 2023-04-19 PROCEDURE — 250N000011 HC RX IP 250 OP 636: Performed by: PHYSICIAN ASSISTANT

## 2023-04-19 PROCEDURE — 97129 THER IVNTJ 1ST 15 MIN: CPT | Mod: GN

## 2023-04-19 RX ADMIN — FLUTICASONE FUROATE AND VILANTEROL TRIFENATATE 1 PUFF: 200; 25 POWDER RESPIRATORY (INHALATION) at 19:43

## 2023-04-19 RX ADMIN — SERTRALINE HYDROCHLORIDE 25 MG: 25 TABLET ORAL at 07:45

## 2023-04-19 RX ADMIN — ACETAMINOPHEN 325MG 975 MG: 325 TABLET ORAL at 13:03

## 2023-04-19 RX ADMIN — Medication 1 MG: at 21:18

## 2023-04-19 RX ADMIN — LIDOCAINE PATCH 4% 2 PATCH: 40 PATCH TOPICAL at 19:27

## 2023-04-19 RX ADMIN — ACETAMINOPHEN 325MG 975 MG: 325 TABLET ORAL at 07:44

## 2023-04-19 RX ADMIN — ATORVASTATIN CALCIUM 10 MG: 10 TABLET, FILM COATED ORAL at 07:45

## 2023-04-19 RX ADMIN — POLYETHYLENE GLYCOL 3350 17 G: 17 POWDER, FOR SOLUTION ORAL at 07:45

## 2023-04-19 RX ADMIN — ACETAMINOPHEN 325MG 975 MG: 325 TABLET ORAL at 21:18

## 2023-04-19 RX ADMIN — FUROSEMIDE 20 MG: 20 TABLET ORAL at 07:44

## 2023-04-19 RX ADMIN — LIDOCAINE PATCH 4% 1 PATCH: 40 PATCH TOPICAL at 07:45

## 2023-04-19 RX ADMIN — SENNOSIDES AND DOCUSATE SODIUM 1 TABLET: 50; 8.6 TABLET ORAL at 21:18

## 2023-04-19 RX ADMIN — IBUPROFEN 400 MG: 200 TABLET, FILM COATED ORAL at 07:45

## 2023-04-19 RX ADMIN — MIRTAZAPINE 15 MG: 15 TABLET, FILM COATED ORAL at 21:18

## 2023-04-19 RX ADMIN — LEVOTHYROXINE SODIUM 88 MCG: 88 TABLET ORAL at 07:45

## 2023-04-19 RX ADMIN — PANTOPRAZOLE SODIUM 40 MG: 40 TABLET, DELAYED RELEASE ORAL at 07:44

## 2023-04-19 RX ADMIN — ENOXAPARIN SODIUM 40 MG: 40 INJECTION SUBCUTANEOUS at 15:57

## 2023-04-19 ASSESSMENT — ACTIVITIES OF DAILY LIVING (ADL)
ADLS_ACUITY_SCORE: 51

## 2023-04-19 NOTE — PLAN OF CARE
Goal Outcome Evaluation:      Plan of Care Reviewed With: patient    Overall Patient Progress: no change    Outcome Evaluation: No change in patient progress this shift.    Orientation: A/Ox4  Bowel: Continent of bowel using toilet in bathroom. Patient having loose stools, advised patient to hold Miralax today but patient declined and requested to have scheduled dose regardless  Bladder:  Continent of bladder using toilet in bathroom  Pain: Complains of R hip pain. PRN Ibuprofen given x1 with good effect per patient report  Ambulation/Transfers: Ax1 for transfer, then CGA with walker for ambulation. WBAT to RLE maintained  Diet/ Liquids: Tolerating diet with fair appetite. Encouraged fluids  Oxygen: 1L O2 via nasal cannula   Skin: R hip incision and scalp incision YAZ  Other: [place] at bedside. Patient uses independently  Bed alarm on for safety, call light within reach. Continue with POC.

## 2023-04-19 NOTE — PLAN OF CARE
Discharge Planner Post-Acute Rehab PT:      Discharge Plan: HHPT, lives with daughter     Precautions: Falls, crani, WBAT R LE, 2 L NC 02 at baseline     Current Status:  Bed Mobility: Mod A - sleeps in recliner at baseline for past 6-7 years d/t COPD  Transfer: Variable min<>max A STS pending surface height with FWW. Ax1 SaraStedy with nsg.  Gait: up to 40 ft with FWW, CGA (Ax2 with wc and O2 tank follow)  Stairs: NT due to safety concerns  Balance: Able to sit unsupported, standing needs heavy assist to maintain     [place]one:  - 4/8: 4/56  4/15: 17/56     5xSTS:  - 4/8: 0    10MWt:  - 4/8: 0 m/s     Assessment: Pain limited later in afternoons. Working on durability of basic mobility.      Other Barriers to Discharge (DME, Family Training, etc):  - Needs FWW, possibly transport wc  - 2STE no railing - recommend adding at least one rail if possible  Family training

## 2023-04-19 NOTE — CONSULTS
Start Time: 1515  Stop Time: 1603  Session Duration in Minutes: 48 mintes      REASON FOR CONSULTATION: Psychology consulted to assess and provide interventions for adjustment to disability as well as chronic pain techniques.     SOURCES OF INFORMATION: Information was obtained from a clinical interview with the patient and review of the medical record.    HISTORY OF PRESENT ILLNESS: Per H&P, Josephine Nicole is a 73 year old right hand dominant female with a past medical history of cerebral meningioma initially diagnosed 2019, s/p radiation in 11/2022 with recent progression including mass effect and midline shift of 3/17/23 MRI, COPD on 2L oxygen at baseline, hypothyroidism, hyperlipidemia, osteoporosis, depression/anxiety, vitamin B12 deficiency, and anemia who was admitted on 3/19/23 after fall at home in setting of several weeks of worsening confusion, impaired balance with imaging revealing right femoral neck fracture s/p right hip hemiarthroplasty.  She was then transferred to [facility] on 3/23/23 due worsening respiratory failure (COPD exacerbation), hypotension, lactic acidosis, and worsening encephalopathy in setting of progressive meningioma now s/p craniotomy and resection of meningioma on 3/30/23 with hospital course further complicated by lactic acidosis, anemia, left eye ptosis, urinary retention, pain, and hypernatremia.  She is now admitted to ARU on 4/7/23 for multidisciplinary rehabilitation and ongoing medical management.       PAST MEDICAL HISTORY: See below lists for past medical history, past surgical history, and current medications.    Past Medical History:   Diagnosis Date     Cerebral meningioma      COPD (chronic obstructive pulmonary disease)      Depressive disorder      Eczema      Eczema      Hyperlipidemia      Hypothyroidism      Melanoma of skin      Osteoporosis        Past Surgical History:   Procedure Laterality Date     COLONOSCOPY N/A 04/18/2022    Procedure: COLONOSCOPY;   Surgeon: Abimbola Handley MD;  Location: [facility]     OPEN REDUCTION INTERNAL FIXATION HIP UNIPOLAR Right 3/20/2023    Procedure: Right hip hemiarthroplasty anterolateral approach;  Surgeon: Shun Cuevas MD;  Location: [facility]     OPTICAL TRACKING SYSTEM CRANIOTOMY, EXCISE TUMOR, COMBINED N/A 3/30/2023    Procedure: Bifrontal stealth craniotomy and resection of meningioma;  Surgeon: Ramez Murphy MD;  Location: [facility] OR     Tubal ligation NOS         Current Facility-Administered Medications   Medication     acetaminophen (TYLENOL) tablet 975 mg     acetaminophen (TYLENOL) tablet 975 mg     albuterol (PROVENTIL HFA/VENTOLIN HFA) inhaler     atorvastatin (LIPITOR) tablet 10 mg     bisacodyl (DULCOLAX) suppository 10 mg     enoxaparin ANTICOAGULANT (LOVENOX) injection 40 mg     fluticasone-vilanterol (BREO ELLIPTA) 200-25 MCG/ACT inhaler 1 puff     furosemide (LASIX) tablet 20 mg     hydrOXYzine (ATARAX) tablet 10 mg     ibuprofen (ADVIL/MOTRIN) tablet 400 mg     levalbuterol (XOPENEX) neb solution 0.63 mg     levothyroxine (SYNTHROID/LEVOTHROID) tablet 88 mcg     Lidocaine (LIDOCARE) 4 % Patch 1 patch     Lidocaine (LIDOCARE) 4 % Patch 1-2 patch    And     lidocaine patch in PLACE     lidocaine patch in PLACE     melatonin tablet 1 mg     mirtazapine (REMERON) tablet 15 mg     naloxone (NARCAN) injection 0.2 mg    Or     naloxone (NARCAN) injection 0.4 mg    Or     naloxone (NARCAN) injection 0.2 mg    Or     naloxone (NARCAN) injection 0.4 mg     oxyCODONE IR (ROXICODONE) half-tab 2.5 mg     pantoprazole (PROTONIX) EC tablet 40 mg     polyethylene glycol (MIRALAX) Packet 17 g     senna-docusate (SENOKOT-S/PERICOLACE) 8.6-50 MG per tablet 1 tablet     sertraline (ZOLOFT) tablet 25 mg       PSYCHIATRIC HISTORY:  Josephine dubon talks about a recent history of being afraid of being alone, afraid of the dark, and claustrophobia.  She is currently taking sertraline.  She is also taking Xanax and Ambien.   She  "denied any recent SI, HI, andie, or confusion.  She has a significant smoking history, although she quit about 6-7 years ago.  She has COPD and is on 1L O2 daily.        BRIEF PSYCHOSOCIAL HISTORY:  Josephine is legally  and lives in a house with her oldest daughter.  She graduated high school.  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     MENTAL STATUS:    Appearance/Behavior/Orientation:  Alert and generally oriented to person, place, time, and situation. No evidence of psychomotor agitation.    Cooperation/Reliability: Patient appeared to honestly respond to questions about psychosocial functioning and is deemed a fair historian.   Cognition/Memory/Judgment: Not formally assessed, yet no difficulties apparent upon interview. Fund of knowledge consistent with age, level of education, and life experience. Abstract reasoning appropriate, no difficulties with some difficulties in judgment - particularly social patterns - noted.   Speech/Language: Speech was clear, logical and coherent, of normal rate, rhythm and volume.   Thought Content/Form: Appropriate to interview and situation. Overall logical and organized.   Mood/Affect: Mood euthymic; affect was mood congruent.    Appetite:  Patient described adequate appetite.    Insight/Motivation: Appropriate to situation.   Suicide/Assault:  Patient denies suicidal or assaultive ideation, plan, or intent.    PRESENTING PROBLEM: Mood is reported to be euthymic.  Josephine did not demonstrate any significant low mood during our session today.  She was quite loquacious and was quick to admit to feeling afraid of being alone, the dark, and claustrophobia.  She spent the majority of the session discussing her medical history including COPD, falls, tumor, and pain history.  She appeared to become overwhelmed and made a comment such as \"Why all at once?\"  And we discussed that these things were likely all connected.      We spent some time reviewing " "pain management techniques including deep breathing (e.g., focusing on the exhale during the painful parts, elongating the exhale to evoke PNS) as well as monitoring and changing our self talk.  She eagerly liked the deep breathing recommendation due to her use of it when managing her COPD.  She appeared much more hesitant to implement self-talk related strategies.  She kept saying \"I don't do pain\" during the session, and didn't demonstrate any insight into how that mentality could be affecting her approach and response to pain.      IMPRESSION: Based on the interview the patient presents with symptoms consistent with Adjustment Disorder with anxious mood.     THERAPEUTIC INTERVENTION: clinical interviewing/questionning, thought restructuring, psychoeducation    DIAGNOSIS: Adjustment disorder with anxious mood.     RECOMMENDATION/PLAN: 1), will continue to meet with Josephine at least weekly throughout her stay at the ARU, using CBT/ACT techniques.     For the team: Josephine will likely continue to struggle with insight and internal motivation into the long term.  Her tumor is affecting the right hemisphere, which can affect insight as well as motivation/apathy.  Any external strategies will need to be highly routinized or started by a care-partner of some kind, as Josephine may have challenges independently implementing anything. She enjoys talking with others, so 5-7 minutes of rapport building will likely go a long way when asking her to do things.      Parvin Pond PsyD  "

## 2023-04-19 NOTE — PLAN OF CARE
Discharge Planner Post-Acute Rehab PT:      Discharge Plan: HHPT, lives with daughter     Precautions: Falls, crani, WBAT R LE, 2 L NC 02 at baseline     Current Status:  Bed Mobility: Mod A - sleeps in recliner at baseline for past 6-7 years d/t COPD  Transfer: CG A STS pending surface height with FWW. Ax1 SaraStedy with nsg.  Gait: up to 80 ft with FWW, CGA (Ax1 with wc and O2 tank follow) - consider trial of FWW d/t oxygen dependence.  Stairs: NT due to safety concerns  Balance: Able to sit unsupported, standing needs heavy assist to maintain     [place]one:  - 4/8: 4/56  4/15: 17/56     5xSTS:  - 4/8: 0     10MWt:  - 4/8: 0 m/s     Assessment: Improving sit to stand from a variety of surface heights.     Other Barriers to Discharge (DME, Family Training, etc):  - Needs FWW, possibly transport wc  - 2STE no railing - recommend adding at least one rail if possible  Family training

## 2023-04-19 NOTE — PROGRESS NOTES
Discharge Planner Post-Acute Rehab OT:      Discharge Plan: home with assist and HHOT     Precautions: crani, falls, WBAT RLE, per chart review no formal hip precautions, [place] with no hearing aids     Current Status:  ADLs:    Mobility: CGA with walker with ambulation in BR. Up to max A to stand with walker when fatigued or low surface height.    Grooming: CGA with FWW standing at the sink    Dressing: UB: set-up and supervision seated; Min A with LBD tasks with walker and AE.     Bathing: Min-mod A with W/C<>extended tub bench    Toileting: CGA with transfer to toilet, Max A from toilet to standing with RTS, GB, and FWW. SBA with seated murphy-cares.     IADLs: Reports daughter assist with some IADLs at baseline, will continue to assess  Vision/Cognition: Demonstrated deficits in problem solving t/o and appears self-limiting at times, will continue to monitor and assess     Assessment: Focused on problem solving appropriate commode overlay vs raised toilet seat to increase pt's ease with toileting. Pt agreeable to use raised toilet seat for toileting with min/mod A to stand with walker. Need to continue to increase IND with STS from various surfaces to decrease burden of care on family. [facility]      Other Barriers to Discharge (DME, Family Training, etc):   AE/DME: extended tub bench, RTS, FWW, reacher, long handled sponge, LHSH, sock aid  Family Training Sunday 4/23/23: 1:15-3:15 PM OT, PT, SLP

## 2023-04-19 NOTE — PROGRESS NOTES
Team rounds this morning. Targeting home end of next week with support and assistance. Therapy calling dtr to discuss recommendations, DME, and caregiver training. Dtr's FMLA ppwk completed. HC recommended. Referral sent to [facility] for RN, PT, OT, SLP, and HA and pending. Home O2 company still unknown. [facility] sent email to pt dtr ([email or web address]) and inquired. Jodran stated that she forgot, will look tonight, and will f/u once more information determined.     This writer will remain available and continue to follow.     ADDENDUM: [facility] HC declined due to insurance. Referral sent to [facility] and pending.     BASSEM Poon   [facility]   Direct Phone: [phone]  I   Pager: [phone]  I  Fax: [phone]

## 2023-04-19 NOTE — PROGRESS NOTES
"  [facility]   Acute Rehabilitation Unit  Daily progress note    INTERVAL HISTORY  Josephine Nicole was seen and examined at bedside this morning during team rounds.  No acute events reported overnight.  Patient reports that she is feeling well overall this morning, pleased to report able to walk good distance this morning.  Pain tends to be lower in the morning but can be more limiting as day progresses.  No ongoing/recurrent headache this morning.  Chronic cough and oxygen needs stable.  She also notes she is feeling more \"alert\" than she did when she first got to rehab.  She demonstrates inconsistent insight into her current deficits and needed assist.  Will plan for MAP to further assess needed level of assistance for medication administration at home.    Functionally, she is making progress.  Can ambulate home distances, though still needing a lot of encouragement, assist with set-up and initiation of tasks.  Sit to stand can be variable pending surface and fatigue.  Needs extensive equipment for ADLs, so therapy will reach out to daughter to discuss available assist at discharge as well as purchase of these needed items.  Cognition overall most limiting.  For full functional updates, see team rounds note from today.    MEDICATIONS    acetaminophen  975 mg Oral TID     atorvastatin  10 mg Oral Daily     enoxaparin ANTICOAGULANT  40 mg Subcutaneous Q24H     fluticasone-vilanterol  1 puff Inhalation Daily     furosemide  20 mg Oral Daily     levothyroxine  88 mcg Oral Daily     lidocaine  1 patch Transdermal Q24H     lidocaine  1-2 patch Transdermal Q24h    And     lidocaine   Transdermal Q8H NING     lidocaine   Transdermal Q8H NING     melatonin  1 mg Oral At Bedtime     mirtazapine  15 mg Oral At Bedtime     pantoprazole  40 mg Oral QAM AC     polyethylene glycol  17 g Oral Daily     senna-docusate  1 tablet Oral At Bedtime     sertraline  25 mg Oral QAM        acetaminophen, " "albuterol, bisacodyl, hydrOXYzine, ibuprofen, levalbuterol, naloxone **OR** naloxone **OR** naloxone **OR** naloxone, oxyCODONE     PHYSICAL EXAM  /56 (BP Location: Left arm, Patient Position: Sitting, Cuff Size: Adult Regular)   Pulse 84   Temp (!) 96  F (35.6  C) (Axillary)   Resp 18   Ht 1.585 m (5' 2.4\")   Wt 59.1 kg (130 lb 4.7 oz)   SpO2 94%   BMI 23.53 kg/m     Gen: NAD, sitting up at edge of bed  HEENT: bifrontal craniotomy incision healing  Cardio: appears well-perfused  Pulm: non-labored on 1L by NC  Abd: soft, non-tender, non-distended  Ext: no appreciable edema in bilateral lower extremities  Neuro/MSK: awake, alert, PERRL, EOMI, left intermittent ptosis, scattered ecchymoses, moving all extremities  *Full exam deferred today for conversation    LABS  Last Basic Metabolic Panel:  Recent Labs   Lab Test 04/17/23  0947 04/13/23  0602 04/10/23  0747    138 135*   POTASSIUM 3.6 4.0 3.9   CHLORIDE 100 100 97*   CO2 31* 31* 30*   ANIONGAP 7 7 8   GLC 84 91 82   BUN 13.1 15.6 13.9   CR 0.43* 0.47* 0.45*   GFRESTIMATED >90 >90 >90   CHAO 8.8 8.5* 8.3*     CBC RESULTS:   Recent Labs   Lab Test 04/17/23  0947 04/13/23  0602 04/10/23  0747   WBC 7.5 8.3 11.7*   RBC 3.27* 3.48* 3.75*   HGB 9.8* 10.2* 11.1*   HCT 31.2* 32.9* 35.0   MCV 95 95 93   MCH 30.0 29.3 29.6   MCHC 31.4* 31.0* 31.7   RDW 15.6* 15.3* 14.7    149* 201       Rehabilitation - continue comprehensive acute inpatient rehabilitation program with multidisciplinary approach including therapies, rehab nursing, and physiatry following. See interval history for updates.      ASSESSMENT AND PLAN  Josephine Nicole is a 73 year old right hand dominant female with a past medical history of cerebral meningioma initially diagnosed 2019, s/p radiation in 11/2022 with recent progression including mass effect and midline shift of 3/17/23 MRI, COPD on 2L oxygen at baseline, hypothyroidism, hyperlipidemia, osteoporosis, depression/anxiety, " vitamin B12 deficiency, and anemia who was admitted on 3/19/23 after fall at home in setting of several weeks of worsening confusion, impaired balance with imaging revealing right femoral neck fracture s/p right hip hemiarthroplasty.  He was then transferred to [facility] on 3/23/23 due worsening respiratory failure (COPD exacerbation), hypotension, lactic acidosis, and worsening encephalopathy in setting of progressive meningioma now s/p craniotomy and resection of meningioma on 3/30/23 with hospital course further complicated by lactic acidosis, anemia, left eye ptosis, urinary retention, pain, and hypernatremia.  She is now admitted to ARU on 4/7/23 for multidisciplinary rehabilitation and ongoing medical management.        Admission to acute inpatient rehab 4/7/23.    Impairment group code: Brain Dysfunction 02.1 Non-Traumatic: s/p right bifrontal stealth craniotomy and resection of meningioma due to vasogenic cerebral edema and brain compression along with R femoral neck fracture s/p right hip hemiarthroplasty        1. PT, OT and SLP 60 minutes of each on a daily basis, in addition to rehab nursing and close management of physiatrist.       2. Impairment of ADL's: Noted to have impaired activity tolerance, impaired balance, impaired cognition, impaired coordination, impaired judgement and safety awareness, impaired strength, impaired tone and impaired weight shifting, all affecting her ability to safely and independently perform basic ADLs.  Goal for mod I with basic ADLs.     3. Impairment of mobility:  Noted to have impaired activity tolerance, impaired balance, impaired cognition, impaired coordination, impaired judgement and safety awareness, impaired strength, impaired tone and impaired weight shifting, all affecting her ability to safely and independently perform basic mobility.  Goal for mod I with basic mobility.     4. Impairment of cognition/language/swallow:  Noted to have impaired cognition, will  need full cognitive linguistic evaluation with SLP while on ARU with goals for improved cognitive-linguistic skills to meet basic needs.        5. Medical Conditions  New actions/orders/updates for today are in blue.     S/p bifrontal craniotomy and resection of meningioma on 3/30/23 with Dr. Murphy  CNS WHO grade 1 meningioma (right frontal) with moderate surrounding vasogenic edema and mass effect  Diagnosed in 2019 after presenting with refractory headaches.  Status post radiation treatment 11/2022.  Recent brain MRI revealing progression.  Patient presented after fall in setting of several week history of worsening confusion, gait impairment, balance deficits.  CT head with known mass surrounding vasogenic edema predominantly involving the right frontal lobe with right to left midline shift/subfalcine herniation measuring up to 1.1 cm.  Neurosurgery consulted and recommended medical optimization and transfer to [facility] for surgical intervention, which she underwent as above on 3/30.  - Decadron taper completed per neurosurgery, off since 4/14  - Pain management: Tylenol 975 mg TID, wean to PRN as able.    - Recurrent headache 4/16-4/17, patient attributes to being off steroids.  Resolved 4/17 evening after oxycodone 2.5 mg, no recurrence this morning.  Neuro exam stable.  BP also low, so possible contribution of poor hydration.  Encouraged fluids.  Dr. Ray also added lidocaine patches to neck/upper back.  4/19: no recurrent headache, continue to monitor.  - Wound care: keep clean and dry, leave open to air  - Continue PT/OT/SLP  - Wound check and suture removal completed by rehab team on 4/14  - Follow up with neurosurgery on 5/15 ([facility]) and 6/30 (Dr. Murphy)  - Follow up with neuro-oncology?     Displaced right femoral neck fracture s/p right hip hemiarthroplasty (anterolateral approach) on 3/20/23 with Dr. Shun Cuevas  Pathologic fracture secondary to osteoporosis  Fractures of the right superior  and inferior pubic rami and the left pubic body extending into the left pubic rami, stable on imaging 4/3  - WBAT RLE with walker  - Wound care: Surgical dressing removed by ortho on 4/3. Per their recs at that time: May keep site open to air. Please allow remaining Dermabond to slough off naturally (no picking at site). Okay to shower, but no soaking/submersion x 1 more week.   - Pain management as above  - Per ortho, osteoporosis workup and treatment with primary care provider within 2 months.  - Noted to have increased right hip pain starting 4/15.  Xray revealed no concerns with right hip hemiarthoplasty; bilateral superior and inferior pubic rami fractures with mild displacement, which were noted on prior imaging as well.  Assessed by ortho, who recommended ongoing conservative management wtih WBAT, ongoing PT, pain management, DVT prophylaxis, and OP follow-up in bone health clinic and ortho.  Would recommend to complete at least 3 mos of rehab before any consideration of surgical treatment (right pelvic ring fixation).  - Added oxycodone 2.5 mg q6h PRN but patient did not want to use and requested NSAIDs.  Risk/benefit of NSAID use (including bleeding risk) discussed with patient, who felt this risk was acceptable, per pharmacy, low risk with ibuprofen 400 mg q6h PRN.  Monitor for any signs/symptoms of bleeding.   - Continue PT/OT  - Follow up with Dr. Shun Cuevas at [facility] at six weeks post-op (~5/1/23) with X-rays. If remains at ARU at that time, can have wound check and xrays performed there and sent to Dr. Cuevas for review.     COPD, recent exacerbation  Acute on chronic hypoxic/hypercapnic respiratory failure  Possible community acquired pneumonia, treated  Former smoker x50 years.  On 2-3L of oxygen PTA for past ~5 years.  Following hip surgery, had acute on chronic hypoxic/hypercapnic respiratory failure requiring BiPAP.  Completed course of azithromycin 3/27 and ceftriaxone 4/4.  Respiratory  status improved with diuresis so in part may be related to heart failure as below.  As of admission to ARU, stable on 2L, which is baseline.  - Monitor respiratory status  - Continue supplemental oxygen to maintain spO2>88% (goal range 88-93%)  - Continue Breo Ellipta 200-25 mcg inhaler daily (formulary sub for PTA Advair Diskus 500/50 mcg inhaler)  - Duoneb not available due to supply issues, ordered PRN xopenex neb.    - Pulmonary hygiene/toilet  - Continue oral suction PRN for more tenuous secretions     HFpEF with recent exacerbation with fluid overload  Intermittent diuresis during this admission with improved respiratory status.  Echo 3/27/23 with EF 55-60%, grade I or early diastolic dysfunction.  Noted to have lower extremity edema.  Started on Lasix daily on 4/5, also ordered 2L fluid restriction.  Fluid restriction discontinued on 4/18 due to poor intake, low BP, no evidence of fluid overload.  - Continue Lasix 20 mg daily  - Continue to monitor for any evidence of recurrent volume overload now that off fluid restriction  - Daily weights, monitor volume status     Acute on chronic anemia  Hgb 10.5 on admission, dropped to benson of 7.8 on 3/26.  Did receive 1 unit pRBC.  Stable in 10s at discharge to ARU (baseline).  - Trend CBC every M/Th.  4/17: Hgb stable at 9.8     Mild left intermittent ptosis  Noted on 4/4.  CT head with expected post-operative changes.  Patient reports she has noted this for at least 1 year.    - Per sending team, given chronicity, recommended to defer additional work-up to outpatient setting as appropriate.     Hyperlipidemia  - Continue PTA atorvastatin     Adjustment disorder with anxious mood  Hx Depression/anxiety  Fatigue  - Continue mirtazapine 15 mg at HS (increased from PTA dose on 4/12).  Improved daytime alertness since.    - Continue Zoloft 25 mg daily  - Seen by health psych on 4/18, appreciate assistance especially with strategies for ARU team to optimize participation.   Please see note by Dr. Pond on 4/18.     Hypothyroidism  - Continue PTA levothyroxine     Vitamin B12 deficiency  - Continue PTA B12 injection 1000 mcg q30 days, last given on 4/3     Urinary retention, resolved  Baldwin placed 3/24, failed TOV on 4/4.  Urology consulted 4/5 and recommended to replace baldwin.  Per urology, retention likely due to recent anesthesia, immobilization, and narcotic pain medications; additional recs as below.  Baldwin removed on 4/13, voiding well with no evidence of retention.    Leukocytosis, resolved  WBC mildly elevated at 11.7 on 4/10, has been previously mildly elevated though had normalized to 9.6 on 4/8.  Suspect 2/2 steroids.  No localizing s/sx of infection.  - Continue to trend CBC every M/Th.  WBC WNL on 4/17    Mild hyponatremia, resolved  Na mildly low at 135 on 4/10.  Asymptomatic.  - Trend BMP.  Na WNL on 4/17        6. Adjustment to disability:  Clinical psychology to eval and treat if indicated  7. FEN: regular diet, thin liquids  8. Bowel: continent, monitor, scheduled and PRN bowel meds available  9. Bladder: as above, baldwin removed 4/13 for TOV and is voiding well, encouraged to avoid use of Purewick  10. DVT Prophylaxis: subcutaneous Lovenox, ok'd to start on 4/3 per neurosurgery  11. GI Prophylaxis: PPI  12. Code: DNR/DNI, discussed with palliative care during inpatient admission, confirmed at admission  13. Disposition: goal for home  14. ELOS:  target 4/28/23  15. Follow up Appointments on Discharge: PCP in 1-2 weeks, neurosurgery (scsheduled on 5/15 and 6/30), neuro-oncology?, ortho (Dr. Shun Cuevas ~5/1/23; care coordinator is Neha Way at [phone] for scheduling) with repeat xrays        Patient was seen and discussed with Dr. Kris Ray, PM&R Staff Physician    Abimbola Thompson PA-C  Physical Medicine & Rehabilitation

## 2023-04-19 NOTE — PLAN OF CARE
"Discharge Planner Post-Acute Rehab SLP:      Discharge Plan: Home with daughter     Precautions: fall     Current Status:  Hearing: [place],  does not use hearing aids  Vision: Uses glasses for close up  Communication: WFL  Cognition: WFL CLQT. Demonstrating mod-severe difficulty with higher level executive skills.  Swallow: regular diet, thin liquids. Not assessed on ARU     Assessment: Patient able to accurately recall tasks completed in prior sessions.  Patient was engaged in a card game based task \"speed\".  Patient was able to learn the task and complete with minimal cueing and assistance with 100% accuracy.  Able to show appropriate degree of improvement with repetition of the task.     Other Barriers to Discharge (Family Training, etc): None anticipated by SLP. Will need increased support with IADLs from dtr if able to provide that  Support          "

## 2023-04-19 NOTE — PLAN OF CARE
Goal Outcome Evaluation:      Plan of Care Reviewed With: patient        Acute Rehab Care Conference/Team Rounds      Type: Team Rounds    Present: Dr. Kris Ray, Abimbola Thompson PA, Dr. Parvin Pond Neuropsychologist, Farida Ahn PT, Terra Cortez OT, Zhang Escobar SLP, Alissa Duenas [facility], Michelle Contreras RD, Rose Castaneda RN, and Josephine Solomons Patient.     Discharge Barriers/Treatment/Education    Rehab Diagnosis: Brain Dysfunction 02.1 Non-Traumatic: s/p right bifrontal stealth craniotomy and resection of meningioma due to vasogenic cerebral edema and brain compression along with R femoral neck fracture s/p right hip hemiarthroplasty    Active Medical Co-morbidities/Prognosis:   Patient Active Problem List   Diagnosis     Cerebral meningioma (H)     Fall, initial encounter     Closed displaced fracture of right femoral neck (H)     S/P resection of meningioma        Safety: Patient alert and oriented, Ax1 with walker    Pain: No pain reported this shift.     Medications, Skin, Tubes/Lines: Takes pills whole, cranial incision YAZ, right hip incision YAZ, mepilex to coccyx area for protection, continuous O2 at 1L    Swallowing/Nutrition: No dysphagia related concerns.    Bowel/Bladder: Continent with bowel and bladder. Last BM 04/18    Psychosocial: Lives with dtr in a home. All needs met on main level. Indep PTA. Legally  from spouse. Family supportive. No financial concerns. Pt reported she has Depression, anxiety, panic attacks, claustrophobia, and is afraid of the dark. No substance abuse concerns.     ADLs/IADLs: Pt is making slow but steady progress with ADLs. Pt continues to require encouragement to participate and initiate cares for self. Pt's level of assistance with transfers fluctuates pending fatigue and hip/pelvic pain. Pt requires CGA with walker in bedroom and mod-max A to stand with walker. Pt requires CGA with FWW standing at the sink. Pt requires set-up and supervision with  UBD and min A with LBD tasks with FWW and AE. Pt requires CGA with toilet transfer with commode overlay and up to mod A to stand from toilet with walker, and grab bar. Pt requires CGA with toilet hygiene standing with walker. Recommending ongoing IP to maximize IND with ADLs. HC OT services upon discharge. AE/DME: tub bench vs shower chair, grab bars, walker, walker tray, total hip kit.     Mobility: Pain limited later in afternoons. Working on durability of basic mobility.   Bed Mobility: Mod A - sleeps in recliner at baseline for past 6-7 years d/t COPD  Transfer: Variable min<>max A STS pending surface height with FWW. Ax1 SaraStedy with nsg.  Gait: up to 40 ft with FWW, CGA (Ax2 with wc and O2 tank follow)  Stairs: NT due to safety concerns  Balance: Able to sit unsupported, standing needs heavy assist to maintain    Cognition/Language: Patient's level of participation has been improving over the past few days but requiring assistance with moderate level reasoning/problem solving tasks.  Patient overall having poor initiation with IADL tasks and requesting assistance to complete tasks that she should be able to do more independently.  Anticipate need for assistance with IADL tasks upon facility discharge.  Will recommend completion of MAP program to assess medication management.  Ongoing SLP interventions upon facility discharge.    Community Re-Entry:    Transportation: Not a  - family to provide.    Decision maker: self and daughter    Plan of Care and goals reviewed and updated.    Discharge Plan/Recommendations    Fall Precautions: continue    Patient/Family input to goals: Yes    Anticipated rehab needs following discharge: Home with family    Anticipated care giver support after discharge: Family    Estimated length of stay: 4/28/23    Overall plan for the patient: Continue IP Rehabilitation.       Utilization Review and Continued Stay Justification    Medical Necessity Criteria:    For any criteria  that is not met, please document reason and plan for discharge, transfer, or modification of plan of care to address.    Requires intensive rehabilitation program to treat functional deficits?: Yes    Requires 3x per week or greater involvement of rehabilitation physician to oversee rehabilitation program?: Yes    Requires rehabilitation nursing interventions?: Yes    Patient is making functional progress?: Yes    There is a potential for additional functional progress? Yes    Patient is participating in therapy 3 hours per day a minimum of 5 days per week or 15 hours per week in 7 day period?:Yes    Has discharge needs that require coordinated discharge planning approach?:Yes          Final Physician Sign off    Statement of Approval: I approve the plan of care.     Patient Goals  Social Work Goals: Confirm discharge recommendations with therapy, coordinate safe discharge plan and remain available to support and assist as needed.    OT Predicted Duration/Target Date for Goal Attainment: 04/28/23  Therapy Frequency (OT): Daily  OT: Hygiene/Grooming: modified independent  OT: Upper Body Dressing: Modified independent  OT: Lower Body Dressing: Modified independent  OT: Upper Body Bathing: Supervision/stand-by assist  OT: Lower Body Bathing: Supervision/stand-by assist  OT: Toilet Transfer/Toileting: Modified independent  OT: Cognitive: Patient/caregiver will verbalize understanding of cognitive assessment results/recommendations as needed for safe discharge planning  OT: Goal 1: Pt will perform tub/shower transfer with Min A and DME as needed.    PT Predicted Duration/Target Date for Goal Attainment: 04/28/23  PT Frequency: 2x/day  PT: Bed Mobility: Modified independent, Supine to/from sit  PT: Transfers: Supervision/stand-by assist, Sit to/from stand, Assistive device  PT: Gait: Supervision/stand-by assist, 50 feet, Rolling walker  PT: Stairs: 2 stairs, Minimal assist, Assistive device  PT: Goal 1: PT: Primitivo with car  transfer  PT: Goal 2: PT: Pt will verbalize 3 strategies to decrease risk of falls at home     Therapy Frequency (SLP Eval): daily   SLP: Goal 1: Pt will participate in additional cognitive testing as needed.  SLP: Goal 2: Pt will complete higher level executive function tasks with 80% accuracy independently.  SLP: Goal 3: Pt will complete functional memory tasks with 80% accuracy independently                                Goal: Mobility: Pt will progress to MOD I with assistive device as indicated prior to discharge from ARU.  Goal: Skin Integrity: Pt will be free from skin breakdown related to pressure/moisture and will have good healing of incisions throughout ARU stay.                    Goal: Safety Management: Pt will be free from falls/harm throughout ARU stay by using her call light and waiting for assistance as indicated.

## 2023-04-19 NOTE — PROGRESS NOTES
Post Rounds Family Phone Call  Staff involved: Terra Cortez OT  Length of call: 17 minutes  Family involved: Daughter Jordan  Projected discharge date: 4/28/23  Projected discharge location: Home with daughter with initial 24/7 assistance  Equipment needs: FWW, extended tub bench, Raised toilet seat, reacher, sock aid, LHSH, long handled sponge  Other questions and misc: Daughter reporting pt does have 100% VA benefits, will attempt to send VA equipment form with plan for daughter to  equipment to expedite process. If equipment is not available prior to discharge, daughter will order through @Pay. Pt has wheelchair for longer distances already. Daughter awaiting FMLA approval through work but willing to take time off initially to provide 24/7 physical assistance with guidance from home care to wean off of 24/7 assistance once home. Family training set-up for Sunday from 1:15-3:15 PM d/t daughter unable to come during the work week and aware level of assistance may change prior to discharge end of week. Daughter to provide assistance with all IADLs    Plan to discharge home with HC OT, PT, SLP, HHA, and nursing

## 2023-04-19 NOTE — PLAN OF CARE
FOCUS/GOAL  Bowel management, Bladder management, Pain management, Mobility, Skin integrity, and Safety management    ASSESSMENT, INTERVENTIONS AND CONTINUING PLAN FOR GOAL:  Patient is alert and oriented x 4. [place]. A-1 walker. Regular/thin take pills whole. Continent of B/B last BM 04/18. Patient denied pain, headache, dizziness, CP. SOB noted when ambulate to the bath room. Uses Yanker independently. No care concern at this time. VS WDL Call light is in reach alarm is on.   Goal Outcome Evaluation:

## 2023-04-20 ENCOUNTER — APPOINTMENT (OUTPATIENT)
Dept: PHYSICAL THERAPY | Facility: CLINIC | Age: 73
DRG: 949 | End: 2023-04-20
Attending: PHYSICAL MEDICINE & REHABILITATION
Payer: COMMERCIAL

## 2023-04-20 ENCOUNTER — APPOINTMENT (OUTPATIENT)
Dept: OCCUPATIONAL THERAPY | Facility: CLINIC | Age: 73
DRG: 949 | End: 2023-04-20
Attending: PHYSICAL MEDICINE & REHABILITATION
Payer: COMMERCIAL

## 2023-04-20 ENCOUNTER — APPOINTMENT (OUTPATIENT)
Dept: SPEECH THERAPY | Facility: CLINIC | Age: 73
DRG: 949 | End: 2023-04-20
Attending: PHYSICAL MEDICINE & REHABILITATION
Payer: COMMERCIAL

## 2023-04-20 LAB
ANION GAP SERPL CALCULATED.3IONS-SCNC: 8 MMOL/L (ref 7–15)
BUN SERPL-MCNC: 11.1 MG/DL (ref 8–23)
CALCIUM SERPL-MCNC: 8.8 MG/DL (ref 8.8–10.2)
CHLORIDE SERPL-SCNC: 101 MMOL/L (ref 98–107)
CREAT SERPL-MCNC: 0.45 MG/DL (ref 0.51–0.95)
DEPRECATED HCO3 PLAS-SCNC: 30 MMOL/L (ref 22–29)
ERYTHROCYTE [DISTWIDTH] IN BLOOD BY AUTOMATED COUNT: 15.7 % (ref 10–15)
GFR SERPL CREATININE-BSD FRML MDRD: >90 ML/MIN/1.73M2
GLUCOSE SERPL-MCNC: 106 MG/DL (ref 70–99)
HCT VFR BLD AUTO: 29.4 % (ref 35–47)
HGB BLD-MCNC: 9.4 G/DL (ref 11.7–15.7)
MCH RBC QN AUTO: 30.6 PG (ref 26.5–33)
MCHC RBC AUTO-ENTMCNC: 32 G/DL (ref 31.5–36.5)
MCV RBC AUTO: 96 FL (ref 78–100)
PLATELET # BLD AUTO: 155 10E3/UL (ref 150–450)
POTASSIUM SERPL-SCNC: 3.8 MMOL/L (ref 3.4–5.3)
RBC # BLD AUTO: 3.07 10E6/UL (ref 3.8–5.2)
SODIUM SERPL-SCNC: 139 MMOL/L (ref 136–145)
WBC # BLD AUTO: 3.7 10E3/UL (ref 4–11)

## 2023-04-20 PROCEDURE — 97130 THER IVNTJ EA ADDL 15 MIN: CPT | Mod: GN

## 2023-04-20 PROCEDURE — 36415 COLL VENOUS BLD VENIPUNCTURE: CPT | Performed by: PHYSICIAN ASSISTANT

## 2023-04-20 PROCEDURE — 85027 COMPLETE CBC AUTOMATED: CPT | Performed by: PHYSICIAN ASSISTANT

## 2023-04-20 PROCEDURE — 250N000011 HC RX IP 250 OP 636: Performed by: PHYSICIAN ASSISTANT

## 2023-04-20 PROCEDURE — 97530 THERAPEUTIC ACTIVITIES: CPT | Mod: GP

## 2023-04-20 PROCEDURE — 97110 THERAPEUTIC EXERCISES: CPT | Mod: GO | Performed by: OCCUPATIONAL THERAPIST

## 2023-04-20 PROCEDURE — 97129 THER IVNTJ 1ST 15 MIN: CPT | Mod: GN

## 2023-04-20 PROCEDURE — 82310 ASSAY OF CALCIUM: CPT | Performed by: PHYSICIAN ASSISTANT

## 2023-04-20 PROCEDURE — 97535 SELF CARE MNGMENT TRAINING: CPT | Mod: GO | Performed by: OCCUPATIONAL THERAPIST

## 2023-04-20 PROCEDURE — 128N000003 HC R&B REHAB

## 2023-04-20 PROCEDURE — 97110 THERAPEUTIC EXERCISES: CPT | Mod: GP

## 2023-04-20 PROCEDURE — 250N000013 HC RX MED GY IP 250 OP 250 PS 637: Performed by: PHYSICAL MEDICINE & REHABILITATION

## 2023-04-20 PROCEDURE — 250N000013 HC RX MED GY IP 250 OP 250 PS 637: Performed by: PHYSICIAN ASSISTANT

## 2023-04-20 PROCEDURE — 250N000013 HC RX MED GY IP 250 OP 250 PS 637: Performed by: STUDENT IN AN ORGANIZED HEALTH CARE EDUCATION/TRAINING PROGRAM

## 2023-04-20 PROCEDURE — 99232 SBSQ HOSP IP/OBS MODERATE 35: CPT | Performed by: PHYSICAL MEDICINE & REHABILITATION

## 2023-04-20 PROCEDURE — 97530 THERAPEUTIC ACTIVITIES: CPT | Mod: GP | Performed by: PHYSICAL THERAPIST

## 2023-04-20 PROCEDURE — 97110 THERAPEUTIC EXERCISES: CPT | Mod: GP | Performed by: PHYSICAL THERAPIST

## 2023-04-20 RX ORDER — LEVALBUTEROL TARTRATE 45 UG/1
2 AEROSOL, METERED ORAL EVERY 6 HOURS PRN
Status: DISCONTINUED | OUTPATIENT
Start: 2023-04-20 | End: 2023-04-28 | Stop reason: HOSPADM

## 2023-04-20 RX ADMIN — MIRTAZAPINE 15 MG: 15 TABLET, FILM COATED ORAL at 21:48

## 2023-04-20 RX ADMIN — IBUPROFEN 400 MG: 200 TABLET, FILM COATED ORAL at 10:15

## 2023-04-20 RX ADMIN — ENOXAPARIN SODIUM 40 MG: 40 INJECTION SUBCUTANEOUS at 16:04

## 2023-04-20 RX ADMIN — LIDOCAINE PATCH 4% 1 PATCH: 40 PATCH TOPICAL at 08:45

## 2023-04-20 RX ADMIN — FUROSEMIDE 20 MG: 20 TABLET ORAL at 08:45

## 2023-04-20 RX ADMIN — SENNOSIDES AND DOCUSATE SODIUM 1 TABLET: 50; 8.6 TABLET ORAL at 21:34

## 2023-04-20 RX ADMIN — ACETAMINOPHEN 975 MG: 325 TABLET, FILM COATED ORAL at 18:24

## 2023-04-20 RX ADMIN — Medication 1 MG: at 21:34

## 2023-04-20 RX ADMIN — FLUTICASONE FUROATE AND VILANTEROL TRIFENATATE 1 PUFF: 200; 25 POWDER RESPIRATORY (INHALATION) at 20:48

## 2023-04-20 RX ADMIN — ACETAMINOPHEN 325MG 975 MG: 325 TABLET ORAL at 08:45

## 2023-04-20 RX ADMIN — LEVOTHYROXINE SODIUM 88 MCG: 88 TABLET ORAL at 08:45

## 2023-04-20 RX ADMIN — PANTOPRAZOLE SODIUM 40 MG: 40 TABLET, DELAYED RELEASE ORAL at 08:45

## 2023-04-20 RX ADMIN — SERTRALINE HYDROCHLORIDE 25 MG: 25 TABLET ORAL at 08:45

## 2023-04-20 RX ADMIN — LIDOCAINE PATCH 4% 2 PATCH: 40 PATCH TOPICAL at 20:45

## 2023-04-20 RX ADMIN — ATORVASTATIN CALCIUM 10 MG: 10 TABLET, FILM COATED ORAL at 08:45

## 2023-04-20 RX ADMIN — ACETAMINOPHEN 325MG 975 MG: 325 TABLET ORAL at 21:34

## 2023-04-20 RX ADMIN — POLYETHYLENE GLYCOL 3350 17 G: 17 POWDER, FOR SOLUTION ORAL at 08:45

## 2023-04-20 ASSESSMENT — ACTIVITIES OF DAILY LIVING (ADL)
ADLS_ACUITY_SCORE: 51

## 2023-04-20 NOTE — DISCHARGE INSTRUCTIONS
PCP  You are scheduled to see Dr. Turner on May 2 2023 at 2:40 pm.    Address  [address] [place] [address] [address]  [place] [address]  Phone   [phone]  Fax  [phone]      Neurosurgery  You are scheduled to see Manasa Mix PA-C on May 15 2023 at 2:30 pm.    Address  [address] [place] [address]  Phone   [phone]      Ortho  You are scheduled to see Dr. Shun Cuevas on May 12 2023 at 8:50 am      Address  [address] [place] [address]  Phone   [phone]      Neurology  You are scheduled to see Honey Casanova PA-C on August 8 2023 at 7:45 am.      Address  [address] [place] [id number]  Phone   [phone]       Home Health Care:   Home Health Care [facility]   PH: [phone], Fax: [phone]   Nurse, physical therapy, occupational therapy, speech therapy, and home health aide   *Intake will contact you to schedule the first apt to your home, after your discharge from acute rehab unit.

## 2023-04-20 NOTE — PLAN OF CARE
Goal Outcome Evaluation:      Plan of Care Reviewed With: patient    Overall Patient Progress: no change    Outcome Evaluation: No change in patient progress this shift.    Orientation: A/Ox4  Bowel: Continent of bowel using toilet in bathroom. Patient having loose stools again today. Advised patient to hold Miralax today but patient declined and requested to have scheduled dose regardless  Bladder:  Continent of bladder using toilet in bathroom  Pain: Complains of R hip pain. PRN Ibuprofen given x1 with good effect per patient report  Ambulation/Transfers: Ax1 for transfer, then CGA with walker for ambulation. WBAT to RLE maintained  Diet/ Liquids: Tolerating diet with fair appetite. Encouraged fluids  Oxygen: 1L O2 via nasal cannula   Skin: R hip incision and scalp incision YAZ  Other: Yaunker at bedside. Patient uses independently  Bed alarm on for safety, call light within reach. Continue with POC.

## 2023-04-20 NOTE — PLAN OF CARE
FOCUS/GOAL  Bowel management, Bladder management, Pain management, Mobility, Skin integrity, and Safety management    ASSESSMENT, INTERVENTIONS AND CONTINUING PLAN FOR GOAL:  Patient is alert and oriented x 4. A-1 walker. Regular/thin take pills whole. Continent of B/B last BM 04/19. Patient denied pain, headache, dizziness, CP. SOB with ambulation. Uses Yanker independently. No care concern at this time. VS WDL Call light is in reach alarm is on.  Goal Outcome Evaluation:

## 2023-04-20 NOTE — PROGRESS NOTES
No update from dtr on home O2 company. Will f/u early next week if no update by that time. [facility] accepted for RN, PT, OT, SLP, and HA. Information added to AVS for pt reference. Will update pt and pt dtr next week before discharge home and complete IRF and IMM with pt as well. No other immediate SW needs at this time. SW available if needs arise.     Home Health Care:   [facility]   PH: [phone], Fax: [phone]   Nurse, physical therapy, occupational therapy, speech therapy, and home health aide     Alissa Duenas [facility]   Direct Phone: [phone]  I   Pager: [phone]  I  Fax: [phone]

## 2023-04-20 NOTE — PROGRESS NOTES
Discharge Planner Post-Acute Rehab OT:      Discharge Plan: home with assist and HHOT     Precautions: crani, falls, WBAT RLE, per chart review no formal hip precautions, [place] with no hearing aids     Current Status:  ADLs:    Mobility: CGA with walker with ambulation in BR. Min A for STS with walker    Grooming: SBA with FWW standing at the sink    Dressing: UB: set-up and supervision seated; SBA with LBD tasks with walker and AE. Min A with shoelaces.    Bathing: Min-mod A with W/C<>extended tub bench    Toileting: CGA with transfer to toilet, Min A from toilet to standing with RTS, GB, and FWW. SBA with seated murphy-cares.  IADLs: Reports daughter assist with some IADLs at baseline, will continue to assess  Vision/Cognition: Demonstrated deficits in problem solving t/o and appears self-limiting at times, will continue to monitor and assess     Assessment: Completed full morning ADL routine while focusing on standing tolerance at EOS with walker. Pt stood for 10 mins at EOS w/out breaks on 1L of o2; reported no fatigue, but some stiffness in RLE following. Pt's level of assistance decreasing with STS with pt requiring only min A with STS from raised toilet seat and EOB with walker     Other Barriers to Discharge (DME, Family Training, etc):   AE/DME: extended tub bench, RTS, FWW, reacher, long handled sponge, LHSH, sock aid-no [employer] coverage, daughter emailed list of equipment to purchase  Family Training Sunday 4/23/23: 1:15-3:15 PM OT, PT, SLP

## 2023-04-20 NOTE — PLAN OF CARE
FOCUS/GOAL  Bladder management, Pain management, Mobility, Cognition/Memory/Judgment/Problem solving, and Safety management    ASSESSMENT, INTERVENTIONS AND CONTINUING PLAN FOR GOAL:  Pt is alert and oriented. Continent of bladder, voiding without difficulty using toilet. Up assist of 1 with walker to the bathroom. Reported right hip and groin pain, but declined interventions until prior to therapy. Appeared to be sleeping well during rounds. Uses call light appropriately, able to make needs known. Bed alarm on for safety.

## 2023-04-20 NOTE — PROGRESS NOTES
[facility]   Acute Rehabilitation Unit  Daily progress note    INTERVAL HISTORY  Josephine Nicole was seen and examined at bedside this morning.  No acute events reported overnight.  Patient reports that she is feeling well overall this morning, pleased to report able to walk good distance.        No ongoing/recurrent headache this morning.  Chronic cough and oxygen needs stable.  She is feeling alert now.  MAP to further assess needed level of assistance for medication administration at home.    Functionally,    Bed Mobility: Mod A - sleeps in recliner at baseline for past 6-7 years d/t COPD  Transfer: CGA/min A STS pending surface height with FWW. Ax1 SaraStedy with nsg.  Gait: up to 80 ft with FWW, CGA (Ax1 with wc and O2 tank follow) - consider trial of FWW d/t oxygen dependence.  Stairs: NT due to safety concerns  Balance: Able to sit unsupported, standing needs heavy assist to maintain    Hearing: [facility],  does not use hearing aids  Vision: Uses glasses for close up  Communication: [facility]  Cognition: [facility] CLQT. Demonstrating mod-severe difficulty with higher level executive skills.  Swallow: regular diet, thin liquids. Not assessed on ARU    MEDICATIONS    [START ON 4/24/2023] - Medication Assessment Program - Rehab Services   Does not apply See Admin Instructions     acetaminophen  975 mg Oral TID     atorvastatin  10 mg Oral Daily     enoxaparin ANTICOAGULANT  40 mg Subcutaneous Q24H     fluticasone-vilanterol  1 puff Inhalation Daily     furosemide  20 mg Oral Daily     levothyroxine  88 mcg Oral Daily     lidocaine  1 patch Transdermal Q24H     lidocaine  1-2 patch Transdermal Q24h    And     lidocaine   Transdermal Q8H NING     lidocaine   Transdermal Q8H NING     melatonin  1 mg Oral At Bedtime     mirtazapine  15 mg Oral At Bedtime     pantoprazole  40 mg Oral QAM AC     polyethylene glycol  17 g Oral Daily     senna-docusate  1 tablet Oral At Bedtime     sertraline  25 mg  "Oral QAM        acetaminophen, bisacodyl, hydrOXYzine, ibuprofen, levalbuterol, levalbuterol, naloxone **OR** naloxone **OR** naloxone **OR** naloxone, oxyCODONE     PHYSICAL EXAM  /65 (BP Location: Left arm, Patient Position: Sitting, Cuff Size: Adult Regular)   Pulse 83   Temp 97.1  F (36.2  C) (Axillary)   Resp 18   Ht 1.585 m (5' 2.4\")   Wt 57.9 kg (127 lb 11.2 oz)   SpO2 96%   BMI 23.06 kg/m     Gen: NAD, sitting up at edge of bed  HEENT: bifrontal craniotomy incision healing  Cardio: appears well-perfused  Pulm: non-labored on 1L by NC  Abd: soft, non-tender, non-distended  Ext: no appreciable edema in bilateral lower extremities  Neuro/MSK: awake, alert, PERRL, EOMI, left intermittent ptosis, scattered ecchymoses, moving all extremities  *Full exam deferred today for conversation    LABS  Last Basic Metabolic Panel:  Recent Labs   Lab Test 04/20/23  0637 04/17/23  0947 04/13/23  0602    138 138   POTASSIUM 3.8 3.6 4.0   CHLORIDE 101 100 100   CO2 30* 31* 31*   ANIONGAP 8 7 7   * 84 91   BUN 11.1 13.1 15.6   CR 0.45* 0.43* 0.47*   GFRESTIMATED >90 >90 >90   CHAO 8.8 8.8 8.5*     CBC RESULTS:   Recent Labs   Lab Test 04/20/23  0637 04/17/23  0947 04/13/23  0602   WBC 3.7* 7.5 8.3   RBC 3.07* 3.27* 3.48*   HGB 9.4* 9.8* 10.2*   HCT 29.4* 31.2* 32.9*   MCV 96 95 95   MCH 30.6 30.0 29.3   MCHC 32.0 31.4* 31.0*   RDW 15.7* 15.6* 15.3*    156 149*       Rehabilitation - continue comprehensive acute inpatient rehabilitation program with multidisciplinary approach including therapies, rehab nursing, and physiatry following. See interval history for updates.      ASSESSMENT AND PLAN  Josephine Nicole is a 73 year old right hand dominant female with a past medical history of cerebral meningioma initially diagnosed 2019, s/p radiation in 11/2022 with recent progression including mass effect and midline shift of 3/17/23 MRI, COPD on 2L oxygen at baseline, hypothyroidism, hyperlipidemia, " osteoporosis, depression/anxiety, vitamin B12 deficiency, and anemia who was admitted on 3/19/23 after fall at home in setting of several weeks of worsening confusion, impaired balance with imaging revealing right femoral neck fracture s/p right hip hemiarthroplasty.  He was then transferred to [facility] on 3/23/23 due worsening respiratory failure (COPD exacerbation), hypotension, lactic acidosis, and worsening encephalopathy in setting of progressive meningioma now s/p craniotomy and resection of meningioma on 3/30/23 with hospital course further complicated by lactic acidosis, anemia, left eye ptosis, urinary retention, pain, and hypernatremia.  She is now admitted to ARU on 4/7/23 for multidisciplinary rehabilitation and ongoing medical management.        Admission to acute inpatient rehab 4/7/23.    Impairment group code: Brain Dysfunction 02.1 Non-Traumatic: s/p right bifrontal stealth craniotomy and resection of meningioma due to vasogenic cerebral edema and brain compression along with R femoral neck fracture s/p right hip hemiarthroplasty        1. PT, OT and SLP 60 minutes of each on a daily basis, in addition to rehab nursing and close management of physiatrist.       2. Impairment of ADL's: Noted to have impaired activity tolerance, impaired balance, impaired cognition, impaired coordination, impaired judgement and safety awareness, impaired strength, impaired tone and impaired weight shifting, all affecting her ability to safely and independently perform basic ADLs.  Goal for mod I with basic ADLs.     3. Impairment of mobility:  Noted to have impaired activity tolerance, impaired balance, impaired cognition, impaired coordination, impaired judgement and safety awareness, impaired strength, impaired tone and impaired weight shifting, all affecting her ability to safely and independently perform basic mobility.  Goal for mod I with basic mobility.     4. Impairment of cognition/language/swallow:  Noted  to have impaired cognition, will need full cognitive linguistic evaluation with SLP while on ARU with goals for improved cognitive-linguistic skills to meet basic needs.        5. Medical Conditions  New actions/orders/updates for today are in blue.     S/p bifrontal craniotomy and resection of meningioma on 3/30/23 with Dr. Murphy  CNS WHO grade 1 meningioma (right frontal) with moderate surrounding vasogenic edema and mass effect  Diagnosed in 2019 after presenting with refractory headaches.  Status post radiation treatment 11/2022.  Recent brain MRI revealing progression.  Patient presented after fall in setting of several week history of worsening confusion, gait impairment, balance deficits.  CT head with known mass surrounding vasogenic edema predominantly involving the right frontal lobe with right to left midline shift/subfalcine herniation measuring up to 1.1 cm.  Neurosurgery consulted and recommended medical optimization and transfer to [facility] for surgical intervention, which she underwent as above on 3/30.  - Decadron taper completed per neurosurgery, off since 4/14  - Pain management: Tylenol 975 mg TID, wean to PRN as able.    - Recurrent headache 4/16-4/17, patient attributes to being off steroids.  Resolved 4/17 evening after oxycodone 2.5 mg, no recurrence this morning.  Neuro exam stable.  BP also low, so possible contribution of poor hydration.  Encouraged fluids.  Dr. Ray also added lidocaine patches to neck/upper back.  4/19: no recurrent headache, continue to monitor.  - Wound care: keep clean and dry, leave open to air  - Continue PT/OT/SLP  - Wound check and suture removal completed by rehab team on 4/14  - Follow up with neurosurgery on 5/15 (ANT[facility]A) and 6/30 (Dr. Murphy)  - Follow up with neuro-oncology?     Displaced right femoral neck fracture s/p right hip hemiarthroplasty (anterolateral approach) on 3/20/23 with Dr. Shun Cuevas  Pathologic fracture secondary to  osteoporosis  Fractures of the right superior and inferior pubic rami and the left pubic body extending into the left pubic rami, stable on imaging 4/3  - WBAT RLE with walker  - Wound care: Surgical dressing removed by ortho on 4/3. Per their recs at that time: May keep site open to air. Please allow remaining Dermabond to slough off naturally (no picking at site). Okay to shower, but no soaking/submersion x 1 more week.   - Pain management as above  - Per ortho, osteoporosis workup and treatment with primary care provider within 2 months.  - Noted to have increased right hip pain starting 4/15.  Xray revealed no concerns with right hip hemiarthoplasty; bilateral superior and inferior pubic rami fractures with mild displacement, which were noted on prior imaging as well.  Assessed by ortho, who recommended ongoing conservative management wtih WBAT, ongoing PT, pain management, DVT prophylaxis, and OP follow-up in bone health clinic and ortho.  Would recommend to complete at least 3 mos of rehab before any consideration of surgical treatment (right pelvic ring fixation).  - Added oxycodone 2.5 mg q6h PRN but patient did not want to use and requested NSAIDs.  Risk/benefit of NSAID use (including bleeding risk) discussed with patient, who felt this risk was acceptable, per pharmacy, low risk with ibuprofen 400 mg q6h PRN.  Monitor for any signs/symptoms of bleeding.   - Continue PT/OT  - Follow up with Dr. Shun Cuevas at [facility] at six weeks post-op (~5/1/23) with X-rays. If remains at ARU at that time, can have wound check and xrays performed there and sent to Dr. Cuevas for review.     COPD, recent exacerbation  Acute on chronic hypoxic/hypercapnic respiratory failure  Possible community acquired pneumonia, treated  Former smoker x50 years.  On 2-3L of oxygen PTA for past ~5 years.  Following hip surgery, had acute on chronic hypoxic/hypercapnic respiratory failure requiring BiPAP.  Completed course of  azithromycin 3/27 and ceftriaxone 4/4.  Respiratory status improved with diuresis so in part may be related to heart failure as below.  As of admission to ARU, stable on 2L, which is baseline.  - Monitor respiratory status  - Continue supplemental oxygen to maintain spO2>88% (goal range 88-93%)  - Continue Breo Ellipta 200-25 mcg inhaler daily (formulary sub for PTA Advair Diskus 500/50 mcg inhaler)  - Duoneb not available due to supply issues, ordered PRN xopenex neb.    - Pulmonary hygiene/toilet  - Continue oral suction PRN for more tenuous secretions     HFpEF with recent exacerbation with fluid overload  Intermittent diuresis during this admission with improved respiratory status.  Echo 3/27/23 with EF 55-60%, grade I or early diastolic dysfunction.  Noted to have lower extremity edema.  Started on Lasix daily on 4/5, also ordered 2L fluid restriction.  Fluid restriction discontinued on 4/18 due to poor intake, low BP, no evidence of fluid overload.  - Continue Lasix 20 mg daily  - Continue to monitor for any evidence of recurrent volume overload now that off fluid restriction  - Daily weights, monitor volume status     Acute on chronic anemia  Hgb 10.5 on admission, dropped to benson of 7.8 on 3/26.  Did receive 1 unit pRBC.  Stable in 10s at discharge to ARU (baseline).  - Trend CBC every M/Th. Hgb stable at 9.4     Mild left intermittent ptosis  Noted on 4/4.  CT head with expected post-operative changes.  Patient reports she has noted this for at least 1 year.    - Per sending team, given chronicity, recommended to defer additional work-up to outpatient setting as appropriate.     Hyperlipidemia  - Continue PTA atorvastatin     Adjustment disorder with anxious mood  Hx Depression/anxiety  Fatigue  - Continue mirtazapine 15 mg at HS (increased from PTA dose on 4/12).  Improved daytime alertness since.    - Continue Zoloft 25 mg daily  - Seen by health psych on 4/18, appreciate assistance especially with  strategies for ARU team to optimize participation.  Please see note by Dr. Pond on 4/18.     Hypothyroidism  - Continue PTA levothyroxine     Vitamin B12 deficiency  - Continue PTA B12 injection 1000 mcg q30 days, last given on 4/3     Urinary retention, resolved  Baldwin placed 3/24, failed TOV on 4/4.  Urology consulted 4/5 and recommended to replace baldwin.  Per urology, retention likely due to recent anesthesia, immobilization, and narcotic pain medications; additional recs as below.  Baldwin removed on 4/13, voiding well with no evidence of retention.    Leukocytosis, resolved  WBC mildly elevated at 11.7 on 4/10, has been previously mildly elevated though had normalized to 9.6 on 4/8.  Suspect 2/2 steroids.  No localizing s/sx of infection.  - Continue to trend CBC every M/Th.  WBC 3.7.     Mild hyponatremia, resolved  Na mildly low at 135 on 4/10.  Asymptomatic.  - Trend BMP.  Na WNL  139.        6. Adjustment to disability:  Clinical psychology to eval and treat if indicated  7. FEN: regular diet, thin liquids  8. Bowel: continent, monitor, scheduled and PRN bowel meds available  9. Bladder: as above, baldwin removed 4/13 for TOV and is voiding well, encouraged to avoid use of Purewick  10. DVT Prophylaxis: subcutaneous Lovenox, ok'd to start on 4/3 per neurosurgery  11. GI Prophylaxis: PPI  12. Code: DNR/DNI, discussed with palliative care during inpatient admission, confirmed at admission  13. Disposition: goal for home  14. ELOS:  target 4/28/23  15. Follow up Appointments on Discharge: PCP in 1-2 weeks, neurosurgery (scsheduled on 5/15 and 6/30), neuro-oncology?, ortho (Dr. Shun Cuevas ~5/1/23; care coordinator is Neha Way at [phone] for scheduling) with repeat xrays    Doing well. Discussed with team. Continue cares and plans outlined.     Kris Ray MD

## 2023-04-20 NOTE — PLAN OF CARE
Discharge Planner Post-Acute Rehab PT:      Discharge Plan: HHPT, lives with daughter     Precautions: Falls, crani, WBAT R LE, 2 L NC 02 at baseline     Current Status:  Bed Mobility: Mod A - sleeps in recliner at baseline for past 6-7 years d/t COPD  Transfer: CGA/min A STS pending surface height with FWW. Ax1 SaraStedy with nsg.  Gait: up to 80 ft with FWW, CGA (Ax1 with wc and O2 tank follow) - consider trial of FWW d/t oxygen dependence.  Stairs: NT due to safety concerns  Balance: Able to sit unsupported, standing needs heavy assist to maintain     Barone:  - 4/8: 4/56  4/15: 17/56     5xSTS:  - 4/8: 0     10MWt:  - 4/8: 0 m/s     Assessment: focus on BLE strengthening with gait activity, seated BLE strengthening, standing activities for increased standing tolerance and continued improvement w/transfers from a variety of surface heights,      Other Barriers to Discharge (DME, Family Training, etc):  - Needs FWW, possibly transport wc  - 2STE no railing - recommend adding at least one rail if possible  Family training

## 2023-04-20 NOTE — PHARMACY
Labeled patient's home Xopenex inhaler for use while in ARU. One of the inhalers brought in had  2022, so we cannot let the patient use it. I showed the patient and her nurse the expiration date. I presented the options of either us throwing out the  inhaler or her having someone bring it home. Patient chose to have us throw out her  inhaler. Her other Xopenex inhaler was still good until 23. This was labeled and put in the patient's medication bin in the med room for use. Explained to the patient she needs to hit the nurse call light button whenever she wants to use it and that the inhaler should be returned to her when she discharges from ARU.    Safia Romero, JesusD, BCPS

## 2023-04-20 NOTE — PLAN OF CARE
Discharge Planner Post-Acute Rehab SLP:      Discharge Plan: Home with daughter. Ongoing therapy     Precautions: fall     Current Status:  Hearing: [place],  does not use hearing aids  Vision: Uses glasses for close up  Communication: WFL  Cognition: WFL CLQT. Demonstrating mod-severe difficulty with higher level executive skills.  Swallow: regular diet, thin liquids. Not assessed on ARU     Assessment: Patient engaged in same task as yesterday (card game, speed). Patient able to recall task and complete with 100% accuracy. Also engaged in shopping list task which she was able to complete with 90% accuracy. Ongoing improvement in level of engagement and effort being applied to therapy tasks.      Other Barriers to Discharge (Family Training, etc): None anticipated by SLP. Will need increased support with IADLs from dtr if able to provide that support

## 2023-04-21 ENCOUNTER — APPOINTMENT (OUTPATIENT)
Dept: PHYSICAL THERAPY | Facility: CLINIC | Age: 73
DRG: 949 | End: 2023-04-21
Attending: PHYSICAL MEDICINE & REHABILITATION
Payer: COMMERCIAL

## 2023-04-21 ENCOUNTER — APPOINTMENT (OUTPATIENT)
Dept: OCCUPATIONAL THERAPY | Facility: CLINIC | Age: 73
DRG: 949 | End: 2023-04-21
Attending: PHYSICAL MEDICINE & REHABILITATION
Payer: COMMERCIAL

## 2023-04-21 ENCOUNTER — APPOINTMENT (OUTPATIENT)
Dept: SPEECH THERAPY | Facility: CLINIC | Age: 73
DRG: 949 | End: 2023-04-21
Attending: PHYSICAL MEDICINE & REHABILITATION
Payer: COMMERCIAL

## 2023-04-21 PROCEDURE — 97110 THERAPEUTIC EXERCISES: CPT | Mod: GO | Performed by: OCCUPATIONAL THERAPIST

## 2023-04-21 PROCEDURE — 97110 THERAPEUTIC EXERCISES: CPT | Mod: GO

## 2023-04-21 PROCEDURE — 99232 SBSQ HOSP IP/OBS MODERATE 35: CPT | Performed by: PHYSICAL MEDICINE & REHABILITATION

## 2023-04-21 PROCEDURE — 97535 SELF CARE MNGMENT TRAINING: CPT | Mod: GO

## 2023-04-21 PROCEDURE — 97110 THERAPEUTIC EXERCISES: CPT | Mod: GP

## 2023-04-21 PROCEDURE — 97750 PHYSICAL PERFORMANCE TEST: CPT | Mod: GP

## 2023-04-21 PROCEDURE — 250N000013 HC RX MED GY IP 250 OP 250 PS 637: Performed by: PHYSICIAN ASSISTANT

## 2023-04-21 PROCEDURE — 250N000013 HC RX MED GY IP 250 OP 250 PS 637: Performed by: PHYSICAL MEDICINE & REHABILITATION

## 2023-04-21 PROCEDURE — 97530 THERAPEUTIC ACTIVITIES: CPT | Mod: GO | Performed by: OCCUPATIONAL THERAPIST

## 2023-04-21 PROCEDURE — 97129 THER IVNTJ 1ST 15 MIN: CPT | Mod: GN | Performed by: SPEECH-LANGUAGE PATHOLOGIST

## 2023-04-21 PROCEDURE — 128N000003 HC R&B REHAB

## 2023-04-21 PROCEDURE — 97130 THER IVNTJ EA ADDL 15 MIN: CPT | Mod: GN | Performed by: SPEECH-LANGUAGE PATHOLOGIST

## 2023-04-21 PROCEDURE — 250N000011 HC RX IP 250 OP 636: Performed by: PHYSICIAN ASSISTANT

## 2023-04-21 PROCEDURE — 250N000013 HC RX MED GY IP 250 OP 250 PS 637: Performed by: STUDENT IN AN ORGANIZED HEALTH CARE EDUCATION/TRAINING PROGRAM

## 2023-04-21 RX ADMIN — FUROSEMIDE 20 MG: 20 TABLET ORAL at 08:45

## 2023-04-21 RX ADMIN — LIDOCAINE PATCH 4% 1 PATCH: 40 PATCH TOPICAL at 08:46

## 2023-04-21 RX ADMIN — IBUPROFEN 400 MG: 200 TABLET, FILM COATED ORAL at 20:47

## 2023-04-21 RX ADMIN — ATORVASTATIN CALCIUM 10 MG: 10 TABLET, FILM COATED ORAL at 08:39

## 2023-04-21 RX ADMIN — ACETAMINOPHEN 325MG 975 MG: 325 TABLET ORAL at 14:39

## 2023-04-21 RX ADMIN — POLYETHYLENE GLYCOL 3350 17 G: 17 POWDER, FOR SOLUTION ORAL at 08:46

## 2023-04-21 RX ADMIN — Medication 1 MG: at 21:33

## 2023-04-21 RX ADMIN — ENOXAPARIN SODIUM 40 MG: 40 INJECTION SUBCUTANEOUS at 16:37

## 2023-04-21 RX ADMIN — PANTOPRAZOLE SODIUM 40 MG: 40 TABLET, DELAYED RELEASE ORAL at 08:46

## 2023-04-21 RX ADMIN — FLUTICASONE FUROATE AND VILANTEROL TRIFENATATE 1 PUFF: 200; 25 POWDER RESPIRATORY (INHALATION) at 21:33

## 2023-04-21 RX ADMIN — IBUPROFEN 400 MG: 200 TABLET, FILM COATED ORAL at 11:06

## 2023-04-21 RX ADMIN — SERTRALINE HYDROCHLORIDE 25 MG: 25 TABLET ORAL at 08:39

## 2023-04-21 RX ADMIN — ACETAMINOPHEN 325MG 975 MG: 325 TABLET ORAL at 08:39

## 2023-04-21 RX ADMIN — ACETAMINOPHEN 325MG 975 MG: 325 TABLET ORAL at 21:33

## 2023-04-21 RX ADMIN — LEVOTHYROXINE SODIUM 88 MCG: 88 TABLET ORAL at 08:45

## 2023-04-21 RX ADMIN — MIRTAZAPINE 15 MG: 15 TABLET, FILM COATED ORAL at 21:34

## 2023-04-21 RX ADMIN — SENNOSIDES AND DOCUSATE SODIUM 1 TABLET: 50; 8.6 TABLET ORAL at 21:33

## 2023-04-21 ASSESSMENT — ACTIVITIES OF DAILY LIVING (ADL)
ADLS_ACUITY_SCORE: 51

## 2023-04-21 NOTE — PLAN OF CARE
FOCUS/GOAL  Bladder management, Pain management, Mobility, Cognition/Memory/Judgment/Problem solving, and Safety management    ASSESSMENT, INTERVENTIONS AND CONTINUING PLAN FOR GOAL:  Pt is alert and oriented. Continent of bladder, voiding without difficulty using toilet. Up CGA with walker. Denied pain or discomfort this shift. Appeared to be sleeping well during rounds. Uses call light appropriately, able to make needs known. Bed alarm on for safety.

## 2023-04-21 NOTE — PROGRESS NOTES
"10 Meter Walk Test:  Setup - using open 10 meter walkway. Can be setup individually or using marks along base board of East hallway between PT and OT gyms.  Instructions - comfortable/self chosen pace: \"Walk at your own comfortable walking pace and stop when you reach the end,\"   and fast pace: \"Walk as fast as you can safely walk and stop when you reach the end.\"   Time was started as the lead foot crossed the 2 meter dulce maria and finished as the patient's lead foot crossed the 8 meter dulce maria.    Assistive Device: FWW - 2L O2  Assist Provided: CGA    Average Speed for Comfortable Pace - 0.21 m/s  Average Speed for Fast Pace - Unable to change pace    Assessment: Slow gait. Household ambulation at discharge (pt uses w/c for community at baseline)      Normative Data:    Spinal Cord Injury -   MDC - 0.13 m/s (Jon et al, 2008)  MCID - 0.06 m/s (Arya et al, 2007)  0.13 m/s (Jon et al, 2008)    Gait Speed for Community Dwellers - [place] et al, 2005  Low Gait Speed - <0.7 m/sec  Mean Gait Speed - 0.7-1.0 m/sec  High Functioning Gait Speed - >1.1 m/sec        "

## 2023-04-21 NOTE — PROGRESS NOTES
[facility]   Acute Rehabilitation Unit  Daily progress note    INTERVAL HISTORY  Josephine Nicole was seen and examined at bedside this morning.  No acute events reported overnight.  Patient reports that she is feeling well overall this morning, pleased to report able to walk good distance.  Reviewed role of binder to keep BP up. Stockings as well.    No ongoing/recurrent headache this morning.  Chronic cough and oxygen needs stable.  She is feeling alert now.  MAP to further assess needed level of assistance for medication administration at home.    Functionally,    ADLs:    Mobility: CGA with walker with ambulation in BR. Min A for STS with walker    Grooming: SBA with FWW standing at the sink    Dressing: UB: set-up and supervision seated; SBA with LBD tasks with walker and AE. Min A with shoelaces.    Bathing: Min-mod A with W/C<>extended tub bench    Toileting: CGA with transfer to toilet, CGA from toilet to standing with RTS, GB, and FWW. SBA with seated murphy-cares.  IADLs: Reports daughter assist with some IADLs at baseline, will continue to assess    Hearing: [place],  does not use hearing aids  Vision: Uses glasses for close up  Communication: [facility]  Cognition: [facility] CLQT. Demonstrating mod-severe difficulty with higher level executive skills.  Swallow: regular diet, thin liquids. Not assessed on ARU    MEDICATIONS    [START ON 4/24/2023] - Medication Assessment Program - Rehab Services   Does not apply See Admin Instructions     acetaminophen  975 mg Oral TID     atorvastatin  10 mg Oral Daily     enoxaparin ANTICOAGULANT  40 mg Subcutaneous Q24H     fluticasone-vilanterol  1 puff Inhalation Daily     furosemide  20 mg Oral Daily     levothyroxine  88 mcg Oral Daily     lidocaine  1 patch Transdermal Q24H     lidocaine  1-2 patch Transdermal Q24h    And     lidocaine   Transdermal Q8H NING     lidocaine   Transdermal Q8H NING     melatonin  1 mg Oral At Bedtime     mirtazapine   "15 mg Oral At Bedtime     pantoprazole  40 mg Oral QAM AC     polyethylene glycol  17 g Oral Daily     senna-docusate  1 tablet Oral At Bedtime     sertraline  25 mg Oral QAM        bisacodyl, hydrOXYzine, ibuprofen, levalbuterol, levalbuterol, naloxone **OR** naloxone **OR** naloxone **OR** naloxone, oxyCODONE     PHYSICAL EXAM  /65 (BP Location: Left arm, Patient Position: Chair, Cuff Size: Adult Regular)   Pulse 85   Temp 97.6  F (36.4  C) (Axillary)   Resp 17   Ht 1.585 m (5' 2.4\")   Wt 58.2 kg (128 lb 3.2 oz)   SpO2 95%   BMI 23.15 kg/m     Gen: NAD, sitting up at edge of bed  HEENT: bifrontal craniotomy incision healing  Cardio: appears well-perfused  Pulm: non-labored on 1L by NC  Abd: soft, non-tender, non-distended  Ext: no appreciable edema in bilateral lower extremities  Neuro/MSK: awake, alert, PERRL, EOMI, left intermittent ptosis, scattered ecchymoses, moving all extremities  *Full exam deferred today for conversation    LABS  Last Basic Metabolic Panel:  Recent Labs   Lab Test 04/20/23  0637 04/17/23  0947 04/13/23  0602    138 138   POTASSIUM 3.8 3.6 4.0   CHLORIDE 101 100 100   CO2 30* 31* 31*   ANIONGAP 8 7 7   * 84 91   BUN 11.1 13.1 15.6   CR 0.45* 0.43* 0.47*   GFRESTIMATED >90 >90 >90   CHAO 8.8 8.8 8.5*     CBC RESULTS:   Recent Labs   Lab Test 04/20/23  0637 04/17/23  0947 04/13/23  0602   WBC 3.7* 7.5 8.3   RBC 3.07* 3.27* 3.48*   HGB 9.4* 9.8* 10.2*   HCT 29.4* 31.2* 32.9*   MCV 96 95 95   MCH 30.6 30.0 29.3   MCHC 32.0 31.4* 31.0*   RDW 15.7* 15.6* 15.3*    156 149*       Rehabilitation - continue comprehensive acute inpatient rehabilitation program with multidisciplinary approach including therapies, rehab nursing, and physiatry following. See interval history for updates.      ASSESSMENT AND PLAN  Josephine Nicole is a 73 year old right hand dominant female with a past medical history of cerebral meningioma initially diagnosed 2019, s/p radiation in 11/2022 " with recent progression including mass effect and midline shift of 3/17/23 MRI, COPD on 2L oxygen at baseline, hypothyroidism, hyperlipidemia, osteoporosis, depression/anxiety, vitamin B12 deficiency, and anemia who was admitted on 3/19/23 after fall at home in setting of several weeks of worsening confusion, impaired balance with imaging revealing right femoral neck fracture s/p right hip hemiarthroplasty.  He was then transferred to [facility] on 3/23/23 due worsening respiratory failure (COPD exacerbation), hypotension, lactic acidosis, and worsening encephalopathy in setting of progressive meningioma now s/p craniotomy and resection of meningioma on 3/30/23 with hospital course further complicated by lactic acidosis, anemia, left eye ptosis, urinary retention, pain, and hypernatremia.  She is now admitted to ARU on 4/7/23 for multidisciplinary rehabilitation and ongoing medical management.        Admission to acute inpatient rehab 4/7/23.    Impairment group code: Brain Dysfunction 02.1 Non-Traumatic: s/p right bifrontal stealth craniotomy and resection of meningioma due to vasogenic cerebral edema and brain compression along with R femoral neck fracture s/p right hip hemiarthroplasty        1. PT, OT and SLP 60 minutes of each on a daily basis, in addition to rehab nursing and close management of physiatrist.       2. Impairment of ADL's: Noted to have impaired activity tolerance, impaired balance, impaired cognition, impaired coordination, impaired judgement and safety awareness, impaired strength, impaired tone and impaired weight shifting, all affecting her ability to safely and independently perform basic ADLs.  Goal for mod I with basic ADLs.     3. Impairment of mobility:  Noted to have impaired activity tolerance, impaired balance, impaired cognition, impaired coordination, impaired judgement and safety awareness, impaired strength, impaired tone and impaired weight shifting, all affecting her ability to  safely and independently perform basic mobility.  Goal for mod I with basic mobility.     4. Impairment of cognition/language/swallow:  Noted to have impaired cognition, will need full cognitive linguistic evaluation with SLP while on ARU with goals for improved cognitive-linguistic skills to meet basic needs.        5. Medical Conditions  New actions/orders/updates for today are in blue.     S/p bifrontal craniotomy and resection of meningioma on 3/30/23 with Dr. Murphy  CNS WHO grade 1 meningioma (right frontal) with moderate surrounding vasogenic edema and mass effect  Diagnosed in 2019 after presenting with refractory headaches.  Status post radiation treatment 11/2022.  Recent brain MRI revealing progression.  Patient presented after fall in setting of several week history of worsening confusion, gait impairment, balance deficits.  CT head with known mass surrounding vasogenic edema predominantly involving the right frontal lobe with right to left midline shift/subfalcine herniation measuring up to 1.1 cm.  Neurosurgery consulted and recommended medical optimization and transfer to [facility] for surgical intervention, which she underwent as above on 3/30.  - Decadron taper completed per neurosurgery, off since 4/14  - Pain management: Tylenol 975 mg TID, wean to PRN as able.    - Recurrent headache 4/16-4/17, patient attributes to being off steroids.  Resolved 4/17 evening after oxycodone 2.5 mg, no recurrence this morning.  Neuro exam stable.  BP also low, so possible contribution of poor hydration.  Encouraged fluids.  Dr. Ray also added lidocaine patches to neck/upper back.  4/19: no recurrent headache, continue to monitor.  - Wound care: keep clean and dry, leave open to air  - Continue PT/OT/SLP  - Wound check and suture removal completed by rehab team on 4/14  - Follow up with neurosurgery on 5/15 ([facility]) and 6/30 (Dr. Murphy)  - Follow up with neuro-oncology?     Displaced right femoral neck fracture  s/p right hip hemiarthroplasty (anterolateral approach) on 3/20/23 with Dr. Shun Cuevas  Pathologic fracture secondary to osteoporosis  Fractures of the right superior and inferior pubic rami and the left pubic body extending into the left pubic rami, stable on imaging 4/3  - WBAT RLE with walker  - Wound care: Surgical dressing removed by ortho on 4/3. Per their recs at that time: May keep site open to air. Please allow remaining Dermabond to slough off naturally (no picking at site). Okay to shower, but no soaking/submersion x 1 more week.   - Pain management as above  - Per ortho, osteoporosis workup and treatment with primary care provider within 2 months.  - Noted to have increased right hip pain starting 4/15.  Xray revealed no concerns with right hip hemiarthoplasty; bilateral superior and inferior pubic rami fractures with mild displacement, which were noted on prior imaging as well.  Assessed by ortho, who recommended ongoing conservative management wtih WBAT, ongoing PT, pain management, DVT prophylaxis, and OP follow-up in bone health clinic and ortho.  Would recommend to complete at least 3 mos of rehab before any consideration of surgical treatment (right pelvic ring fixation).  - Added oxycodone 2.5 mg q6h PRN but patient did not want to use and requested NSAIDs.  Risk/benefit of NSAID use (including bleeding risk) discussed with patient, who felt this risk was acceptable, per pharmacy, low risk with ibuprofen 400 mg q6h PRN.  Monitor for any signs/symptoms of bleeding.   - Continue PT/OT  - Follow up with Dr. Shun Cuevas at [facility] at six weeks post-op (~5/1/23) with X-rays. If remains at ARU at that time, can have wound check and xrays performed there and sent to Dr. Cuevas for review.  -Lidoderm prn.     COPD, recent exacerbation  Acute on chronic hypoxic/hypercapnic respiratory failure  Possible community acquired pneumonia, treated  Former smoker x50 years.  On 2-3L of oxygen PTA for past  ~5 years.  Following hip surgery, had acute on chronic hypoxic/hypercapnic respiratory failure requiring BiPAP.  Completed course of azithromycin 3/27 and ceftriaxone 4/4.  Respiratory status improved with diuresis so in part may be related to heart failure as below.  As of admission to ARU, stable on 2L, which is baseline.  - Monitor respiratory status  - Continue supplemental oxygen to maintain spO2>88% (goal range 88-93%)  - Continue Breo Ellipta 200-25 mcg inhaler daily (formulary sub for PTA Advair Diskus 500/50 mcg inhaler)  - Duoneb not available due to supply issues, ordered PRN xopenex neb.    - Pulmonary hygiene/toilet  - Continue oral suction PRN for more tenuous secretions     HFpEF with recent exacerbation with fluid overload  Intermittent diuresis during this admission with improved respiratory status.  Echo 3/27/23 with EF 55-60%, grade I or early diastolic dysfunction.  Noted to have lower extremity edema.  Started on Lasix daily on 4/5, also ordered 2L fluid restriction.  Fluid restriction discontinued on 4/18 due to poor intake, low BP, no evidence of fluid overload.  - Continue Lasix 20 mg daily  - Continue to monitor for any evidence of recurrent volume overload now that off fluid restriction  - Daily weights, monitor volume status     Acute on chronic anemia  Hgb 10.5 on admission, dropped to benson of 7.8 on 3/26.  Did receive 1 unit pRBC.  Stable in 10s at discharge to ARU (baseline).  - Trend CBC every M/Th. Hgb stable at 9.4     Mild left intermittent ptosis  Noted on 4/4.  CT head with expected post-operative changes.  Patient reports she has noted this for at least 1 year.    - Per sending team, given chronicity, recommended to defer additional work-up to outpatient setting as appropriate.     Hyperlipidemia  - Continue PTA atorvastatin     Adjustment disorder with anxious mood  Hx Depression/anxiety  Fatigue  - Continue mirtazapine 15 mg at HS (increased from PTA dose on 4/12).  Improved  daytime alertness since.    - Continue Zoloft 25 mg daily  - Seen by health psych on 4/18, appreciate assistance especially with strategies for ARU team to optimize participation.  Please see note by Dr. Pond on 4/18.     Hypothyroidism  - Continue PTA levothyroxine     Vitamin B12 deficiency  - Continue PTA B12 injection 1000 mcg q30 days, last given on 4/3     Urinary retention, resolved  Baldwin placed 3/24, failed TOV on 4/4.  Urology consulted 4/5 and recommended to replace baldwin.  Per urology, retention likely due to recent anesthesia, immobilization, and narcotic pain medications; additional recs as below.  Baldwin removed on 4/13, voiding well with no evidence of retention.    Leukocytosis, resolved  WBC mildly elevated at 11.7 on 4/10, has been previously mildly elevated though had normalized to 9.6 on 4/8.  Suspect 2/2 steroids.  No localizing s/sx of infection.  - Continue to trend CBC every M/Th.  WBC 3.7.     Mild hyponatremia, resolved  Na mildly low at 135 on 4/10.  Asymptomatic.  - Trend BMP.  Na WNL  139.        6. Adjustment to disability:  Clinical psychology to eval and treat if indicated  7. FEN: regular diet, thin liquids  8. Bowel: continent, monitor, scheduled and PRN bowel meds available  9. Bladder: as above, baldwin removed 4/13 for TOV and is voiding well, encouraged to avoid use of Purewick  10. DVT Prophylaxis: subcutaneous Lovenox, ok'd to start on 4/3 per neurosurgery  11. GI Prophylaxis: PPI  12. Code: DNR/DNI, discussed with palliative care during inpatient admission, confirmed at admission  13. Disposition: goal for home  14. ELOS:  target 4/28/23  15. Follow up Appointments on Discharge: PCP in 1-2 weeks, neurosurgery (scsheduled on 5/15 and 6/30), neuro-oncology?, ortho (Dr. Shun Cuevas ~5/1/23; care coordinator is Neha Way at [phone] for scheduling) with repeat xrays    Doing well. Discussed with team. Continue cares and plans outlined.     Kris Ray MD

## 2023-04-21 NOTE — PLAN OF CARE
FOCUS/GOAL  Bowel management, Bladder management, Pain management, Mobility, Skin integrity, and Safety management    ASSESSMENT, INTERVENTIONS AND CONTINUING PLAN FOR GOAL:  Patient is alert and oriented x 4. A-1 walker. Regular/thin take pills whole. Continent of B/B last BM 04/20. PRN and scheduled tylenol  for lower back and R. Hip  pain was effective. Patient denied headache, dizziness, CP. SOB with ambulation. Uses Yanker independently. No care concern at this time. VS WDL Call light is in reach alarm is on.  Goal Outcome Evaluation:

## 2023-04-21 NOTE — PLAN OF CARE
"Discharge Planner Post-Acute Rehab PT:      Discharge Plan: HHPT, lives with daughter     Precautions: Falls, crani, WBAT R LE, 2 L NC 02 at baseline     Current Status:  Bed Mobility: Mod A - sleeps in recliner at baseline for past 6-7 years d/t COPD  Transfer: CGA/min A STS pending surface height with FWW  Gait: 80' CGA FWW  Stairs: CGA 4\" x 12 B rail  Balance: UE support in standing     Barone/8: 4/56  4/15: :      5xSTS:  - : 0  - : 0 (unable with BUE     10MWt:  - : 0 m/s  - : 0.21 m/s     Assessment: Barone improved. Standing ability can still vary. Pain more limiting than in past per pt.     Other Barriers to Discharge (DME, Family Training, etc):  FWW  2STE no railing - recommend adding at least one rail if possible  Family training     "

## 2023-04-21 NOTE — PLAN OF CARE
Goal Outcome Evaluation:      Plan of Care Reviewed With: patient    Overall Patient Progress: improvingOverall Patient Progress: improving    Outcome Evaluation: Pt's vitals stable including BP. Abdominal binder on and tolerated. O2 at 1 L/min. Pt said that sajan shoulders not hurting but right hip/buttocks area is. Lidoderm patch, scheduled tylenol and prn ibuprofen helpful. Right hip incision healed and scalp incision scabbed. Pt is able to transfer and walk using a walker and assist of 1. Continent of bowel and bladder using the toilet. Stools loose but pt wants to keep on taking miralax daily. MAP to start monday 4/24/23. Med bottles in med room.

## 2023-04-21 NOTE — PLAN OF CARE
Discharge Planner Post-Acute Rehab SLP:      Discharge Plan: Home with daughter. Ongoing therapy     Precautions: fall     Current Status:  Hearing: [place],  does not use hearing aids  Vision: Uses glasses for close up  Communication: WFL  Cognition: WFL CLQT. Demonstrating mod-severe difficulty with higher level executive skills.  Swallow: regular diet, thin liquids. Not assessed on ARU     Assessment: Instructed pt in functional recall/orientation task, pt completed with 80% accuracy.     Other Barriers to Discharge (Family Training, etc): None anticipated by SLP. Will need increased support with IADLs from dtr if able to provide that support

## 2023-04-21 NOTE — PROGRESS NOTES
04/21/23 1100   Signing Clinician's Name / Credentials   Signing clinician's name / credentials Dru Angelcezar DPT   Barone Balance Scale (CEZAR QUICK, CHARANJIT S, FRAN PEÑA, SPENSER THOMAS: MEASURING BALANCE IN THE ELDERLY: VALIDATION OF AN INSTRUMENT. CAN. J. PUB. HEALTH, JULY/AUGUST SUPPLEMENT 2:S7-11, 1992.)   Sit To Stand 2   Standing Unsupported 3   Sitting Unsupported 4   Stand to Sit 2   Transfers 1   Standing with Eyes Closed 3   Standing Unsupported, Feet Together 3   Reach Forward With Outstretched Arm 1   Retrieve Object From Floor 3   Turning to Look Behind 2   Turn 360 Degrees 1   Placing Alternate Foot on Stool (4-6 inches) 1   Unsupported Tandem Stand (Demonstrate to Subject) 3   One Leg Stand 1   Total Score (A score of 45 or less has been correlated with an increased risk of falls)   Total Score (out of 56) 30     Barone Balance Scale (BBS) Cutoff Scores: A score of < 45 /56 indicates an increased risk for falls.     The BBS is a measure of static and dynamic standing balance that has been validated in community dwelling elderly individuals and individuals who have Parkinson's Disease, MS, and those who are s/p CVA and TBI. The test is administered without an assistive device. Scores from the Barone are used to determine the probability of falling based on the patient's previous history of falls and their test performance.     Minimal Detectable Change = 6.5   & Minimal Detectable Change (Parkinson's Disease) = 5 according to Isidoro & Jaspal 2008    Assessment (rationale for performing, application to patient s function & care plan): Up from 17 last week, scores may fluctuate based on fatigue.  (Minutes billed as physical performance test)

## 2023-04-21 NOTE — PLAN OF CARE
FOCUS/GOAL  Bladder management, Pain management, Mobility, Cognition/Memory/Judgment/Problem solving, and Safety management    ASSESSMENT, INTERVENTIONS AND CONTINUING PLAN FOR GOAL:  Pt is alert and oriented. Continent of bladder, voiding without difficulty using toilet. Up CGA with walker to the bathroom. Reported right hip/groin pain but declined interventions. Appeared to be sleeping well during rounds. Uses call light appropriately, able to make needs known. Bed alarm on for safety.

## 2023-04-22 ENCOUNTER — APPOINTMENT (OUTPATIENT)
Dept: OCCUPATIONAL THERAPY | Facility: CLINIC | Age: 73
DRG: 949 | End: 2023-04-22
Attending: PHYSICAL MEDICINE & REHABILITATION
Payer: COMMERCIAL

## 2023-04-22 ENCOUNTER — APPOINTMENT (OUTPATIENT)
Dept: PHYSICAL THERAPY | Facility: CLINIC | Age: 73
DRG: 949 | End: 2023-04-22
Attending: PHYSICAL MEDICINE & REHABILITATION
Payer: COMMERCIAL

## 2023-04-22 PROCEDURE — 97530 THERAPEUTIC ACTIVITIES: CPT | Mod: GO | Performed by: OCCUPATIONAL THERAPIST

## 2023-04-22 PROCEDURE — 99232 SBSQ HOSP IP/OBS MODERATE 35: CPT | Performed by: PHYSICAL MEDICINE & REHABILITATION

## 2023-04-22 PROCEDURE — 250N000013 HC RX MED GY IP 250 OP 250 PS 637: Performed by: STUDENT IN AN ORGANIZED HEALTH CARE EDUCATION/TRAINING PROGRAM

## 2023-04-22 PROCEDURE — 250N000011 HC RX IP 250 OP 636: Performed by: PHYSICIAN ASSISTANT

## 2023-04-22 PROCEDURE — 250N000013 HC RX MED GY IP 250 OP 250 PS 637: Performed by: PHYSICAL MEDICINE & REHABILITATION

## 2023-04-22 PROCEDURE — 250N000013 HC RX MED GY IP 250 OP 250 PS 637: Performed by: PHYSICIAN ASSISTANT

## 2023-04-22 PROCEDURE — 97535 SELF CARE MNGMENT TRAINING: CPT | Mod: GO | Performed by: OCCUPATIONAL THERAPIST

## 2023-04-22 PROCEDURE — 97110 THERAPEUTIC EXERCISES: CPT | Mod: GP | Performed by: PHYSICAL THERAPIST

## 2023-04-22 PROCEDURE — 128N000003 HC R&B REHAB

## 2023-04-22 RX ADMIN — FUROSEMIDE 20 MG: 20 TABLET ORAL at 08:23

## 2023-04-22 RX ADMIN — POLYETHYLENE GLYCOL 3350 17 G: 17 POWDER, FOR SOLUTION ORAL at 08:23

## 2023-04-22 RX ADMIN — ACETAMINOPHEN 325MG 975 MG: 325 TABLET ORAL at 07:07

## 2023-04-22 RX ADMIN — ACETAMINOPHEN 325MG 975 MG: 325 TABLET ORAL at 21:27

## 2023-04-22 RX ADMIN — Medication 2.5 MG: at 10:13

## 2023-04-22 RX ADMIN — ENOXAPARIN SODIUM 40 MG: 40 INJECTION SUBCUTANEOUS at 16:00

## 2023-04-22 RX ADMIN — SENNOSIDES AND DOCUSATE SODIUM 1 TABLET: 50; 8.6 TABLET ORAL at 21:27

## 2023-04-22 RX ADMIN — Medication 1 MG: at 21:27

## 2023-04-22 RX ADMIN — SERTRALINE HYDROCHLORIDE 25 MG: 25 TABLET ORAL at 08:23

## 2023-04-22 RX ADMIN — FLUTICASONE FUROATE AND VILANTEROL TRIFENATATE 1 PUFF: 200; 25 POWDER RESPIRATORY (INHALATION) at 21:33

## 2023-04-22 RX ADMIN — LIDOCAINE PATCH 4% 1 PATCH: 40 PATCH TOPICAL at 08:23

## 2023-04-22 RX ADMIN — MIRTAZAPINE 15 MG: 15 TABLET, FILM COATED ORAL at 21:27

## 2023-04-22 RX ADMIN — LEVOTHYROXINE SODIUM 88 MCG: 88 TABLET ORAL at 08:23

## 2023-04-22 RX ADMIN — PANTOPRAZOLE SODIUM 40 MG: 40 TABLET, DELAYED RELEASE ORAL at 08:23

## 2023-04-22 RX ADMIN — ATORVASTATIN CALCIUM 10 MG: 10 TABLET, FILM COATED ORAL at 08:23

## 2023-04-22 RX ADMIN — IBUPROFEN 400 MG: 200 TABLET, FILM COATED ORAL at 08:23

## 2023-04-22 ASSESSMENT — ACTIVITIES OF DAILY LIVING (ADL)
ADLS_ACUITY_SCORE: 54
ADLS_ACUITY_SCORE: 51
ADLS_ACUITY_SCORE: 54
ADLS_ACUITY_SCORE: 54
ADLS_ACUITY_SCORE: 51

## 2023-04-22 NOTE — PLAN OF CARE
Goal Outcome Evaluation:    Overall Patient Progress: no changeOverall Patient Progress: no change    Patient slept all night. No complaints of pain, sob, dizziness, fever, nausea or any weakness. On continuous O2 at 1LPM via nc. Continent of bladder using the bathroom. Remains alert and oriented x4 and calls for needs appropriately. No concerns so far. Continue poc.

## 2023-04-22 NOTE — PLAN OF CARE
Goal Outcome Evaluation:      Plan of Care Reviewed With: patient    Overall Patient Progress: no change    Outcome Evaluation: No change in patient progress this shift.    Orientation: A/Ox4  Bowel: Continent of bowel using toilet in bathroom. Patient continues to have loose stool, but prefers to take scheduled Miralax regardless  Bladder:  Continent of bladder using toilet in bathroom  Pain: Complains of R hip pain and headache. PRN Ibuprofen given x1 with no relief per patient. PRN Oxycodone given x1, which was effective for reducing headache and R hip pain  Ambulation/Transfers: Ax1 for transfer, then CGA with walker for ambulation. WBAT to RLE maintained  Diet/ Liquids: Tolerating diet with good appetite. Encouraged fluids  Oxygen: 1L O2 via nasal cannula   Skin: R hip incision and scalp incision YAZ  Other: [place]er at bedside. Patient uses independently  Bed alarm on for safety, call light within reach. Continue with POC.

## 2023-04-22 NOTE — PLAN OF CARE
Goal Outcome Evaluation:      Plan of Care Reviewed With: patient          Outcome Evaluation: Pt. alert orientedx4, V/S are stable /62, Abdominal biinder on and tolerated, O2 @ 2LP. Pt refused lidocaine on [place].shoulders.Pt. c/o of pain PRN Ibuprophen given and was effective.    FOCUS/GOAL  Pain management, Safety management     ASSESSMENT, INTERVENTIONS AND CONTINUING PLAN FOR GOAL:  Orientation: Alert and oriented x4   Bowel: continent in Bowel LBM 04/21/23, had a medium BM this shift  Bladder: continent in bladder uses bathroom toilet  Pain: Pt complaints of  pain,PRN Ibuprofen given and was effective, denies N/V, chest pain  Ambulation/Transfers: A1 walker  Diet/ Liquids: Regular, thin , takes pills whole  Pt. on O2 @ 1LPM denies SOB and chest pain, 3 side rails UP, bed alarm ON, call light/bedside table in reach.Continue with POC.

## 2023-04-22 NOTE — PLAN OF CARE
"Discharge Planner Post-Acute Rehab PT:      Discharge Plan: HHPT, lives with daughter     Precautions: Falls, crani, WBAT R LE, 2 L NC 02 at baseline     Current Status:  Bed Mobility: Mod A - sleeps in recliner at baseline for past 6-7 years d/t COPD  Transfer: CGA/min A STS pending surface height with FWW  Gait: 250' SBA FWW  Stairs: CGA 6\" x 4, rail x 2, step to pattern  Balance: UE support in standing     Barone/8: 4/56  4/15: :      5xSTS:  - : 0  - : 0 (unable with BUE     10MWt:  - : 0 m/s  - : 0.21 m/s     Assessment: Refused participation in AM d/t migrane headache rating 9/10; improved symptoms post medicaton management; increased sustained activity tolerance with incresed ambulation distance and stair climbing step height of 6\".     Other Barriers to Discharge (DME, Family Training, etc):  FWW  2STE no railing - recommend adding at least one rail if possible  Family training     "

## 2023-04-23 ENCOUNTER — APPOINTMENT (OUTPATIENT)
Dept: OCCUPATIONAL THERAPY | Facility: CLINIC | Age: 73
DRG: 949 | End: 2023-04-23
Attending: PHYSICAL MEDICINE & REHABILITATION
Payer: COMMERCIAL

## 2023-04-23 ENCOUNTER — APPOINTMENT (OUTPATIENT)
Dept: SPEECH THERAPY | Facility: CLINIC | Age: 73
DRG: 949 | End: 2023-04-23
Attending: PHYSICAL MEDICINE & REHABILITATION
Payer: COMMERCIAL

## 2023-04-23 ENCOUNTER — APPOINTMENT (OUTPATIENT)
Dept: PHYSICAL THERAPY | Facility: CLINIC | Age: 73
DRG: 949 | End: 2023-04-23
Attending: PHYSICAL MEDICINE & REHABILITATION
Payer: COMMERCIAL

## 2023-04-23 PROCEDURE — 128N000003 HC R&B REHAB

## 2023-04-23 PROCEDURE — 99232 SBSQ HOSP IP/OBS MODERATE 35: CPT | Performed by: PHYSICAL MEDICINE & REHABILITATION

## 2023-04-23 PROCEDURE — 250N000011 HC RX IP 250 OP 636: Performed by: PHYSICIAN ASSISTANT

## 2023-04-23 PROCEDURE — 97530 THERAPEUTIC ACTIVITIES: CPT | Mod: GO | Performed by: OCCUPATIONAL THERAPIST

## 2023-04-23 PROCEDURE — 250N000013 HC RX MED GY IP 250 OP 250 PS 637: Performed by: PHYSICAL MEDICINE & REHABILITATION

## 2023-04-23 PROCEDURE — 97130 THER IVNTJ EA ADDL 15 MIN: CPT | Mod: GN | Performed by: SPEECH-LANGUAGE PATHOLOGIST

## 2023-04-23 PROCEDURE — 250N000013 HC RX MED GY IP 250 OP 250 PS 637: Performed by: PHYSICIAN ASSISTANT

## 2023-04-23 PROCEDURE — 250N000013 HC RX MED GY IP 250 OP 250 PS 637: Performed by: STUDENT IN AN ORGANIZED HEALTH CARE EDUCATION/TRAINING PROGRAM

## 2023-04-23 PROCEDURE — 97150 GROUP THERAPEUTIC PROCEDURES: CPT | Mod: GP | Performed by: PHYSICAL THERAPIST

## 2023-04-23 PROCEDURE — 97110 THERAPEUTIC EXERCISES: CPT | Mod: GP | Performed by: PHYSICAL THERAPIST

## 2023-04-23 PROCEDURE — 97535 SELF CARE MNGMENT TRAINING: CPT | Mod: GO

## 2023-04-23 PROCEDURE — 97129 THER IVNTJ 1ST 15 MIN: CPT | Mod: GN | Performed by: SPEECH-LANGUAGE PATHOLOGIST

## 2023-04-23 RX ADMIN — Medication 1 MG: at 21:16

## 2023-04-23 RX ADMIN — ACETAMINOPHEN 325MG 975 MG: 325 TABLET ORAL at 21:16

## 2023-04-23 RX ADMIN — IBUPROFEN 400 MG: 200 TABLET, FILM COATED ORAL at 09:39

## 2023-04-23 RX ADMIN — FLUTICASONE FUROATE AND VILANTEROL TRIFENATATE 1 PUFF: 200; 25 POWDER RESPIRATORY (INHALATION) at 21:16

## 2023-04-23 RX ADMIN — POLYETHYLENE GLYCOL 3350 17 G: 17 POWDER, FOR SOLUTION ORAL at 08:48

## 2023-04-23 RX ADMIN — ACETAMINOPHEN 325MG 975 MG: 325 TABLET ORAL at 08:49

## 2023-04-23 RX ADMIN — SENNOSIDES AND DOCUSATE SODIUM 1 TABLET: 50; 8.6 TABLET ORAL at 21:17

## 2023-04-23 RX ADMIN — ATORVASTATIN CALCIUM 10 MG: 10 TABLET, FILM COATED ORAL at 08:49

## 2023-04-23 RX ADMIN — MIRTAZAPINE 15 MG: 15 TABLET, FILM COATED ORAL at 21:17

## 2023-04-23 RX ADMIN — LEVOTHYROXINE SODIUM 88 MCG: 88 TABLET ORAL at 08:49

## 2023-04-23 RX ADMIN — ENOXAPARIN SODIUM 40 MG: 40 INJECTION SUBCUTANEOUS at 16:13

## 2023-04-23 RX ADMIN — ACETAMINOPHEN 325MG 975 MG: 325 TABLET ORAL at 13:14

## 2023-04-23 RX ADMIN — PANTOPRAZOLE SODIUM 40 MG: 40 TABLET, DELAYED RELEASE ORAL at 08:50

## 2023-04-23 RX ADMIN — FUROSEMIDE 20 MG: 20 TABLET ORAL at 08:50

## 2023-04-23 RX ADMIN — LIDOCAINE PATCH 4% 1 PATCH: 40 PATCH TOPICAL at 08:48

## 2023-04-23 RX ADMIN — IBUPROFEN 400 MG: 200 TABLET, FILM COATED ORAL at 21:26

## 2023-04-23 RX ADMIN — SERTRALINE HYDROCHLORIDE 25 MG: 25 TABLET ORAL at 08:49

## 2023-04-23 ASSESSMENT — ACTIVITIES OF DAILY LIVING (ADL)
ADLS_ACUITY_SCORE: 54
ADLS_ACUITY_SCORE: 52
ADLS_ACUITY_SCORE: 54
ADLS_ACUITY_SCORE: 52
ADLS_ACUITY_SCORE: 54
ADLS_ACUITY_SCORE: 54

## 2023-04-23 NOTE — PLAN OF CARE
Goal Outcome Evaluation:      Plan of Care Reviewed With: patient    Overall Patient Progress: no change    Outcome Evaluation: Pt.alert oriented x4, cooperates with cares and therapy. Pt.c/o pain PRN pain medication given.    FOCUS/GOAL  Pain management, Safety management     ASSESSMENT, INTERVENTIONS AND CONTINUING PLAN FOR GOAL:  Orientation: Alert and oriented x 4, cooperates with cares and therapy  Bowel: continent in Bowel LBM 04/23/23, had a medium BM this shift  Bladder: continent in bladder uses bathroom toilet  Pain: Pt complaints of  pain,PRN Ibuprofen given and was effective, denies N/V, chest pain  Ambulation/Transfers: A1 walker  Diet/ Liquids: Regular, thin , takes pills whole  Pt. on O2 @ 1LPM denies SOB and chest pain, 3 side rails UP, bed alarm ON, call light/bedside table in reach.Continue with POC.

## 2023-04-23 NOTE — PLAN OF CARE
Discharge Planner Post-Acute Rehab SLP:      Discharge Plan: Home with daughter. Ongoing therapy     Precautions: fall     Current Status:  Hearing: [place],  does not use hearing aids  Vision: Uses glasses for close up  Communication: WFL  Cognition: WFL CLQT. Demonstrating mod-severe difficulty with higher level executive skills.  Swallow: regular diet, thin liquids. Not assessed on ARU     Assessment: Reviewed assigned task, pt directed SLP to location of paper with no prompt from SLP. Pt complketed decoding task with 100% accuracy. Session scheduled for famaily training, pt reported her daughter did not make it in as she was called into work. Instructed pt in time table reading/time calculation task, pt completed with 100% accuracy with mild cues from SLP. Rehab RN arrive towards end of session with med list for MAP, pt to start tomorrow.     Other Barriers to Discharge (Family Training, etc): None anticipated by SLP. Will need increased support with IADLs from dtr if able to provide that support

## 2023-04-23 NOTE — PLAN OF CARE
Goal Outcome Evaluation:  Overall Patient Progress: no changeOverall Patient Progress: no change    Patient is alert and oriented x4. Make needs known. Clustered cares to prevent sleep disturbance at night. On oxygen at 1LPM via nasal cannula. Continent of bladder using the bathroom. Sleeping during safety rounds. Continue poc.

## 2023-04-23 NOTE — PROGRESS NOTES
Discharge Planner Post-Acute Rehab OT:      Discharge Plan: home with assist and HHOT     Precautions: crani, falls, WBAT RLE, per chart review no formal hip precautions, [place] with no hearing aids     Current Status:  ADLs:    Mobility: CGA with walker with ambulation in BR. Min A for STS with walker    Grooming: SBA with FWW standing at the sink    Dressing: UB: set-up and supervision seated; SBA with LBD tasks with walker and AE. Min A with shoelaces.    Bathing: Min-mod A with W/C<>extended tub bench    Toileting: CGA with transfer to toilet, CGA from toilet to standing with RTS, GB, and FWW. SBA with seated murphy-cares.  IADLs: Reports daughter assist with some IADLs at baseline, will continue to assess  Vision/Cognition: Demonstrated deficits in problem solving t/o and appears self-limiting at times, will continue to monitor and assess     Assessment: Pt engaged in room ambulation w/ CGA-SBA w/ assist to manage 02 tubing. Reviewed DME needs for home- height of recliner, toilet riser and re-assess DME shower equipment as pt wants a shower chair ( pt reports tub bench does not fit in her bathroom and pt has no grab bars). Pt reporting she will be left at home during the day as daughter works and can have a friend/relative at night. She reports her daughter has taken too much time off since she's been hospitalized. Will discuss w/ IDT as appropriate. Pt has not reached Mod I level.      Other Barriers to Discharge (DME, Family Training, etc):   AE/DME: extended tub bench verse shower chair (pt reports tub bench is too big for her tub- no grab bars and will need to continue to assess) RTS, FWW, reacher, long handled sponge, LHSH, sock aid-no [employer] coverage, daughter emailed list of equipment to purchase  Family Training Sunday 4/23/23: 1:15-3:15 PM OT, PT, SLP

## 2023-04-23 NOTE — PROGRESS NOTES
[facility]   Acute Rehabilitation Unit  Daily progress note    INTERVAL HISTORY  Josephine Nicole was seen and examined at bedside this afternoon.  No acute events overnight, but near fall in therapy yesterday.  Denies chest pain, shortness of breath, no fever or chills.  Patient feels that will not be ready for discharge this week and we discussed goals as well as continued therapy throughout the week to determine safety a discharge.    Functional  OT:  ADLs:    Mobility: CGA with walker with ambulation in BR. Min A for STS with walker    Grooming: SBA with FWW standing at the sink    Dressing: UB: set-up and supervision seated; SBA with LBD tasks with walker and AE. Min A with shoelaces.    Bathing: Min-mod A with W/C<>extended tub bench    Toileting: CGA with transfer to toilet, CGA from toilet to standing with RTS, GB, and FWW. SBA with seated murphy-cares.  IADLs: Reports daughter assist with some IADLs at baseline, will continue to assess  Vision/Cognition: Demonstrated deficits in problem solving t/o and appears self-limiting at times, will continue to monitor and assess       ROS: 10 point ROS neg other than the symptoms noted above in the HPI.      MEDICATIONS    [START ON 4/24/2023] - Medication Assessment Program - Rehab Services   Does not apply See Admin Instructions     acetaminophen  975 mg Oral TID     atorvastatin  10 mg Oral Daily     enoxaparin ANTICOAGULANT  40 mg Subcutaneous Q24H     fluticasone-vilanterol  1 puff Inhalation Daily     furosemide  20 mg Oral Daily     levothyroxine  88 mcg Oral Daily     lidocaine  1 patch Transdermal Q24H     lidocaine  1-2 patch Transdermal Q24h    And     lidocaine   Transdermal Q8H NING     lidocaine   Transdermal Q8H NING     melatonin  1 mg Oral At Bedtime     mirtazapine  15 mg Oral At Bedtime     pantoprazole  40 mg Oral QAM AC     polyethylene glycol  17 g Oral Daily     senna-docusate  1 tablet Oral At Bedtime      "sertraline  25 mg Oral QAM        bisacodyl, hydrOXYzine, ibuprofen, levalbuterol, levalbuterol, naloxone **OR** naloxone **OR** naloxone **OR** naloxone, oxyCODONE     PHYSICAL EXAM  /62 (BP Location: Left arm)   Pulse 83   Temp 96.8  F (36  C) (Oral)   Resp 18   Ht 1.585 m (5' 2.4\")   Wt 58.5 kg (129 lb)   SpO2 97%   BMI 23.29 kg/m     Gen: NAD, sitting up at edge of bed  HEENT: bifrontal craniotomy incision healing  Cardio: appears well-perfused  Pulm: non-labored on RA, symmetrical chest rise   Abd: soft, non-tender, non-distended  Ext: no appreciable edema in bilateral lower extremities  Neuro/MSK: awake, alert, PERRL, EOMI, left intermittent ptosis, scattered ecchymoses, moving all extremities      LABS  Last Basic Metabolic Panel:  Recent Labs   Lab Test 04/20/23  0637 04/17/23  0947 04/13/23  0602    138 138   POTASSIUM 3.8 3.6 4.0   CHLORIDE 101 100 100   CO2 30* 31* 31*   ANIONGAP 8 7 7   * 84 91   BUN 11.1 13.1 15.6   CR 0.45* 0.43* 0.47*   GFRESTIMATED >90 >90 >90   CHAO 8.8 8.8 8.5*     CBC RESULTS:   Recent Labs   Lab Test 04/20/23  0637 04/17/23  0947 04/13/23  0602   WBC 3.7* 7.5 8.3   RBC 3.07* 3.27* 3.48*   HGB 9.4* 9.8* 10.2*   HCT 29.4* 31.2* 32.9*   MCV 96 95 95   MCH 30.6 30.0 29.3   MCHC 32.0 31.4* 31.0*   RDW 15.7* 15.6* 15.3*    156 149*       Rehabilitation - continue comprehensive acute inpatient rehabilitation program with multidisciplinary approach including therapies, rehab nursing, and physiatry following. See interval history for updates.      ASSESSMENT AND PLAN  Josephine Nicole is a 73 year old right hand dominant female with a past medical history of cerebral meningioma initially diagnosed 2019, s/p radiation in 11/2022 with recent progression including mass effect and midline shift of 3/17/23 MRI, COPD on 2L oxygen at baseline, hypothyroidism, hyperlipidemia, osteoporosis, depression/anxiety, vitamin B12 deficiency, and anemia who was admitted on " 3/19/23 after fall at home in setting of several weeks of worsening confusion, impaired balance with imaging revealing right femoral neck fracture s/p right hip hemiarthroplasty.  He was then transferred to [facility] on 3/23/23 due worsening respiratory failure (COPD exacerbation), hypotension, lactic acidosis, and worsening encephalopathy in setting of progressive meningioma now s/p craniotomy and resection of meningioma on 3/30/23 with hospital course further complicated by lactic acidosis, anemia, left eye ptosis, urinary retention, pain, and hypernatremia.  She is now admitted to ARU on 4/7/23 for multidisciplinary rehabilitation and ongoing medical management.        Admission to acute inpatient rehab 4/7/23.    Impairment group code: Brain Dysfunction 02.1 Non-Traumatic: s/p right bifrontal stealth craniotomy and resection of meningioma due to vasogenic cerebral edema and brain compression along with R femoral neck fracture s/p right hip hemiarthroplasty        1. PT, OT and SLP 60 minutes of each on a daily basis, in addition to rehab nursing and close management of physiatrist.       2. Impairment of ADL's: Noted to have impaired activity tolerance, impaired balance, impaired cognition, impaired coordination, impaired judgement and safety awareness, impaired strength, impaired tone and impaired weight shifting, all affecting her ability to safely and independently perform basic ADLs.  Goal for mod I with basic ADLs.     3. Impairment of mobility:  Noted to have impaired activity tolerance, impaired balance, impaired cognition, impaired coordination, impaired judgement and safety awareness, impaired strength, impaired tone and impaired weight shifting, all affecting her ability to safely and independently perform basic mobility.  Goal for mod I with basic mobility.     4. Impairment of cognition/language/swallow:  Noted to have impaired cognition, will need full cognitive linguistic evaluation with SLP while  on ARU with goals for improved cognitive-linguistic skills to meet basic needs.        5. Medical Conditions  New actions/orders/updates for today are in blue.     S/p bifrontal craniotomy and resection of meningioma on 3/30/23 with Dr. Murphy  CNS WHO grade 1 meningioma (right frontal) with moderate surrounding vasogenic edema and mass effect  Diagnosed in 2019 after presenting with refractory headaches.  Status post radiation treatment 11/2022.  Recent brain MRI revealing progression.  Patient presented after fall in setting of several week history of worsening confusion, gait impairment, balance deficits.  CT head with known mass surrounding vasogenic edema predominantly involving the right frontal lobe with right to left midline shift/subfalcine herniation measuring up to 1.1 cm.  Neurosurgery consulted and recommended medical optimization and transfer to [facility] for surgical intervention, which she underwent as above on 3/30.  - Decadron taper completed per neurosurgery, off since 4/14  - Pain management: Tylenol 975 mg TID, wean to PRN as able.    - Recurrent headache 4/16-4/17, patient attributes to being off steroids.  Resolved 4/17 evening after oxycodone 2.5 mg, no recurrence this morning.  Neuro exam stable.  BP also low, so possible contribution of poor hydration.  Encouraged fluids.  Dr. Ray also added lidocaine patches to neck/upper back.  4/19: no recurrent headache, continue to monitor.  - Wound care: keep clean and dry, leave open to air  - Continue PT/OT/SLP  - Wound check and suture removal completed by rehab team on 4/14  - Follow up with neurosurgery on 5/15 ([facility]) and 6/30 (Dr. Murphy)  - Follow up with neuro-oncology?     Displaced right femoral neck fracture s/p right hip hemiarthroplasty (anterolateral approach) on 3/20/23 with Dr. Shun Cuevas  Pathologic fracture secondary to osteoporosis  Fractures of the right superior and inferior pubic rami and the left pubic body extending  into the left pubic rami, stable on imaging 4/3  - WBAT RLE with walker  - Wound care: Surgical dressing removed by ortho on 4/3. Per their recs at that time: May keep site open to air. Please allow remaining Dermabond to slough off naturally (no picking at site). Okay to shower, but no soaking/submersion x 1 more week.   - Pain management as above  - Per ortho, osteoporosis workup and treatment with primary care provider within 2 months.  - Noted to have increased right hip pain starting 4/15.  Xray revealed no concerns with right hip hemiarthoplasty; bilateral superior and inferior pubic rami fractures with mild displacement, which were noted on prior imaging as well.  Assessed by ortho, who recommended ongoing conservative management wtih WBAT, ongoing PT, pain management, DVT prophylaxis, and OP follow-up in bone health clinic and ortho.  Would recommend to complete at least 3 mos of rehab before any consideration of surgical treatment (right pelvic ring fixation).  - Added oxycodone 2.5 mg q6h PRN but patient did not want to use and requested NSAIDs.  Risk/benefit of NSAID use (including bleeding risk) discussed with patient, who felt this risk was acceptable, per pharmacy, low risk with ibuprofen 400 mg q6h PRN.  Monitor for any signs/symptoms of bleeding.   - Continue PT/OT  - Follow up with Dr. Shun Cuevas at [facility] at six weeks post-op (~5/1/23) with X-rays. If remains at ARU at that time, can have wound check and xrays performed there and sent to Dr. Cuevas for review.  -Lidoderm prn.     COPD, recent exacerbation  Acute on chronic hypoxic/hypercapnic respiratory failure  Possible community acquired pneumonia, treated  Former smoker x50 years.  On 2-3L of oxygen PTA for past ~5 years.  Following hip surgery, had acute on chronic hypoxic/hypercapnic respiratory failure requiring BiPAP.  Completed course of azithromycin 3/27 and ceftriaxone 4/4.  Respiratory status improved with diuresis so in part may be  related to heart failure as below.  As of admission to ARU, stable on 2L, which is baseline.  - Monitor respiratory status  - Continue supplemental oxygen to maintain spO2>88% (goal range 88-93%)  - Continue Breo Ellipta 200-25 mcg inhaler daily (formulary sub for PTA Advair Diskus 500/50 mcg inhaler)  - Duoneb not available due to supply issues, ordered PRN xopenex neb.    - Pulmonary hygiene/toilet  - Continue oral suction PRN for more tenuous secretions     HFpEF with recent exacerbation with fluid overload  Intermittent diuresis during this admission with improved respiratory status.  Echo 3/27/23 with EF 55-60%, grade I or early diastolic dysfunction.  Noted to have lower extremity edema.  Started on Lasix daily on 4/5, also ordered 2L fluid restriction.  Fluid restriction discontinued on 4/18 due to poor intake, low BP, no evidence of fluid overload.  - Continue Lasix 20 mg daily  - Continue to monitor for any evidence of recurrent volume overload now that off fluid restriction  - Daily weights, monitor volume status     Acute on chronic anemia  Hgb 10.5 on admission, dropped to benson of 7.8 on 3/26.  Did receive 1 unit pRBC.  Stable in 10s at discharge to ARU (baseline).  - Trend CBC every M/Th. Hgb stable at 9.4     Mild left intermittent ptosis  Noted on 4/4.  CT head with expected post-operative changes.  Patient reports she has noted this for at least 1 year.    - Per sending team, given chronicity, recommended to defer additional work-up to outpatient setting as appropriate.     Hyperlipidemia  - Continue PTA atorvastatin     Adjustment disorder with anxious mood  Hx Depression/anxiety  Fatigue  - Continue mirtazapine 15 mg at HS (increased from PTA dose on 4/12).  Improved daytime alertness since.    - Continue Zoloft 25 mg daily  - Seen by health psych on 4/18, appreciate assistance especially with strategies for ARU team to optimize participation.  Please see note by Dr. Pond on  4/18.     Hypothyroidism  - Continue PTA levothyroxine     Vitamin B12 deficiency  - Continue PTA B12 injection 1000 mcg q30 days, last given on 4/3     Urinary retention, resolved  Baldwin placed 3/24, failed TOV on 4/4.  Urology consulted 4/5 and recommended to replace baldwin.  Per urology, retention likely due to recent anesthesia, immobilization, and narcotic pain medications; additional recs as below.  Baldwin removed on 4/13, voiding well with no evidence of retention.    Leukocytosis, resolved  WBC mildly elevated at 11.7 on 4/10, has been previously mildly elevated though had normalized to 9.6 on 4/8.  Suspect 2/2 steroids.  No localizing s/sx of infection.  - Continue to trend CBC every M/Th.  WBC 3.7.     Mild hyponatremia, resolved  Na mildly low at 135 on 4/10.  Asymptomatic.  - Trend BMP.  Na WNL  139.        6. Adjustment to disability:  Clinical psychology to eval and treat if indicated  7. FEN: regular diet, thin liquids  8. Bowel: continent, monitor, scheduled and PRN bowel meds available  9. Bladder: as above, baldwin removed 4/13 for TOV and is voiding well, encouraged to avoid use of Purewick  10. DVT Prophylaxis: subcutaneous Lovenox, ok'd to start on 4/3 per neurosurgery  11. GI Prophylaxis: PPI  12. Code: DNR/DNI, discussed with palliative care during inpatient admission, confirmed at admission  13. Disposition: goal for home  14. ELOS:  target 4/28/23  15. Follow up Appointments on Discharge: PCP in 1-2 weeks, neurosurgery (scsheduled on 5/15 and 6/30), neuro-oncology?, ortho (Dr. Shun Cuevas ~5/1/23; care coordinator is Neha Way at [phone] for scheduling) with repeat xrays    Doing well. Discussed with team. Continue cares and plans outlined.         Francisco Doss, DO        I spent a total of 25 minutes face to face and coordinating care of Josephine Nicole. Over 50% of my time on the unit was spent counseling the patient and /or coordinating care regarding post meningioma  resection.

## 2023-04-23 NOTE — PROGRESS NOTES
Pt  attended Falls Prevention class today with group of 5 patients.  Pt selected for class due to documented gait deficit and falls risk.  Class includes education in falls risks, how to decrease that risk through behavior changes,  home modifications, and energy conservation. Instruction in available equipment designed to increase home safety was included. Pt was able to verbalize understanding of materials and participated appropriately in the discussion and problem-solving segments of the class. Pt given fall prevention handout.

## 2023-04-23 NOTE — PLAN OF CARE
Goal Outcome Evaluation:      Plan of Care Reviewed With: patient    Overall Patient Progress: no change    Outcome Evaluation: Pt.alert orientedx4, cooperates with cares and therapy. Writer was informed that pt had an assisted fall, V/s are all WNL. No skin or other injuries related to fall noted, denies pain. Refused shower schedule today after encouragement and explanation. Denies pain, Headache/migrane, SOB and chest pain.    FOCUS/GOAL  Pain management, Safety management     ASSESSMENT, INTERVENTIONS AND CONTINUING PLAN FOR GOAL:  Orientation: Alert and oriented x4   Bowel: continent in Bowel LBM 04/22/23, had a medium BM this shift  Bladder: continent in bladder uses bathroom toilet  Pain: Pt complaints of  pain,PRN Ibuprofen given and was effective, denies N/V, chest pain  Ambulation/Transfers: A1 walker  Diet/ Liquids: Regular, thin , takes pills whole  Pt. Had an assisted fall in OT,daughter knows about the incident, MD informed via sticky note. Pt. on O2 @ 1LPM denies SOB and chest pain, 3 side rails UP, bed alarm ON, call light/bedside table in reach.Continue with POC.

## 2023-04-23 NOTE — PLAN OF CARE
"Discharge Planner Post-Acute Rehab PT:      Discharge Plan: HHPT, lives with daughter     Precautions: Falls, crani, WBAT R LE, 2 L NC 02 at baseline     Current Status:  Bed Mobility: Mod A - sleeps in recliner at baseline for past 6-7 years d/t COPD  Transfer: CGA/min A STS pending surface height with FWW  Gait: 250' SBA FWW, no wc follow  Stairs: CGA 6\" x 4, rail x 2, step to pattern  Balance: UE support in standing     Barone/8: 4/56  4/15: :      5xSTS:  - : 0  - : 0 (unable with BUE     10MWt:  - 8: 0 m/s  - : 0.21 m/s     Assessment: improved pain management and increased sustained activity tolerance with increased ambulation distance and NuStep duration with repeatable function stair climbing step heights of 6\".     Other Barriers to Discharge (DME, Family Training, etc):  FWW  2STE no railing - recommend adding at least one rail if possible  Family training     "

## 2023-04-24 ENCOUNTER — APPOINTMENT (OUTPATIENT)
Dept: OCCUPATIONAL THERAPY | Facility: CLINIC | Age: 73
DRG: 949 | End: 2023-04-24
Attending: PHYSICAL MEDICINE & REHABILITATION
Payer: COMMERCIAL

## 2023-04-24 ENCOUNTER — APPOINTMENT (OUTPATIENT)
Dept: SPEECH THERAPY | Facility: CLINIC | Age: 73
DRG: 949 | End: 2023-04-24
Attending: PHYSICAL MEDICINE & REHABILITATION
Payer: COMMERCIAL

## 2023-04-24 LAB
ANION GAP SERPL CALCULATED.3IONS-SCNC: 7 MMOL/L (ref 7–15)
BUN SERPL-MCNC: 9.7 MG/DL (ref 8–23)
CALCIUM SERPL-MCNC: 9.1 MG/DL (ref 8.8–10.2)
CHLORIDE SERPL-SCNC: 102 MMOL/L (ref 98–107)
CREAT SERPL-MCNC: 0.52 MG/DL (ref 0.51–0.95)
DEPRECATED HCO3 PLAS-SCNC: 32 MMOL/L (ref 22–29)
ERYTHROCYTE [DISTWIDTH] IN BLOOD BY AUTOMATED COUNT: 16 % (ref 10–15)
GFR SERPL CREATININE-BSD FRML MDRD: >90 ML/MIN/1.73M2
GLUCOSE SERPL-MCNC: 91 MG/DL (ref 70–99)
HCT VFR BLD AUTO: 30.4 % (ref 35–47)
HGB BLD-MCNC: 9.2 G/DL (ref 11.7–15.7)
MCH RBC QN AUTO: 29.2 PG (ref 26.5–33)
MCHC RBC AUTO-ENTMCNC: 30.3 G/DL (ref 31.5–36.5)
MCV RBC AUTO: 97 FL (ref 78–100)
PLATELET # BLD AUTO: 221 10E3/UL (ref 150–450)
POTASSIUM SERPL-SCNC: 3.9 MMOL/L (ref 3.4–5.3)
RBC # BLD AUTO: 3.15 10E6/UL (ref 3.8–5.2)
SODIUM SERPL-SCNC: 141 MMOL/L (ref 136–145)
WBC # BLD AUTO: 3.7 10E3/UL (ref 4–11)

## 2023-04-24 PROCEDURE — 97530 THERAPEUTIC ACTIVITIES: CPT | Mod: GO | Performed by: OCCUPATIONAL THERAPIST

## 2023-04-24 PROCEDURE — 97110 THERAPEUTIC EXERCISES: CPT | Mod: GO | Performed by: OCCUPATIONAL THERAPIST

## 2023-04-24 PROCEDURE — 250N000013 HC RX MED GY IP 250 OP 250 PS 637: Performed by: PHYSICAL MEDICINE & REHABILITATION

## 2023-04-24 PROCEDURE — 128N000003 HC R&B REHAB

## 2023-04-24 PROCEDURE — 80048 BASIC METABOLIC PNL TOTAL CA: CPT | Performed by: PHYSICIAN ASSISTANT

## 2023-04-24 PROCEDURE — 99232 SBSQ HOSP IP/OBS MODERATE 35: CPT | Mod: FS | Performed by: PHYSICIAN ASSISTANT

## 2023-04-24 PROCEDURE — 97129 THER IVNTJ 1ST 15 MIN: CPT | Mod: GN

## 2023-04-24 PROCEDURE — 250N000013 HC RX MED GY IP 250 OP 250 PS 637: Performed by: STUDENT IN AN ORGANIZED HEALTH CARE EDUCATION/TRAINING PROGRAM

## 2023-04-24 PROCEDURE — 85027 COMPLETE CBC AUTOMATED: CPT | Performed by: PHYSICIAN ASSISTANT

## 2023-04-24 PROCEDURE — 250N000013 HC RX MED GY IP 250 OP 250 PS 637: Performed by: PHYSICIAN ASSISTANT

## 2023-04-24 PROCEDURE — 250N000011 HC RX IP 250 OP 636: Performed by: PHYSICIAN ASSISTANT

## 2023-04-24 PROCEDURE — 36415 COLL VENOUS BLD VENIPUNCTURE: CPT | Performed by: PHYSICIAN ASSISTANT

## 2023-04-24 PROCEDURE — 97130 THER IVNTJ EA ADDL 15 MIN: CPT | Mod: GN

## 2023-04-24 PROCEDURE — 97535 SELF CARE MNGMENT TRAINING: CPT | Mod: GO | Performed by: OCCUPATIONAL THERAPIST

## 2023-04-24 RX ORDER — LIDOCAINE 4 G/G
1-3 PATCH TOPICAL
Status: DISCONTINUED | OUTPATIENT
Start: 2023-04-25 | End: 2023-04-28 | Stop reason: HOSPADM

## 2023-04-24 RX ORDER — ONDANSETRON 4 MG/1
4 TABLET, ORALLY DISINTEGRATING ORAL EVERY 6 HOURS PRN
Status: DISCONTINUED | OUTPATIENT
Start: 2023-04-24 | End: 2023-04-28 | Stop reason: HOSPADM

## 2023-04-24 RX ORDER — LIDOCAINE 4 G/G
1-3 PATCH TOPICAL
Status: DISCONTINUED | OUTPATIENT
Start: 2023-04-25 | End: 2023-04-24

## 2023-04-24 RX ADMIN — Medication 1 MG: at 21:27

## 2023-04-24 RX ADMIN — ACETAMINOPHEN 325MG 975 MG: 325 TABLET ORAL at 08:54

## 2023-04-24 RX ADMIN — FUROSEMIDE 20 MG: 20 TABLET ORAL at 08:54

## 2023-04-24 RX ADMIN — LEVOTHYROXINE SODIUM 88 MCG: 88 TABLET ORAL at 08:54

## 2023-04-24 RX ADMIN — ACETAMINOPHEN 325MG 975 MG: 325 TABLET ORAL at 21:26

## 2023-04-24 RX ADMIN — PANTOPRAZOLE SODIUM 40 MG: 40 TABLET, DELAYED RELEASE ORAL at 08:54

## 2023-04-24 RX ADMIN — ENOXAPARIN SODIUM 40 MG: 40 INJECTION SUBCUTANEOUS at 16:08

## 2023-04-24 RX ADMIN — SENNOSIDES AND DOCUSATE SODIUM 1 TABLET: 50; 8.6 TABLET ORAL at 21:27

## 2023-04-24 RX ADMIN — ONDANSETRON 4 MG: 4 TABLET, ORALLY DISINTEGRATING ORAL at 10:49

## 2023-04-24 RX ADMIN — FLUTICASONE FUROATE AND VILANTEROL TRIFENATATE 1 PUFF: 200; 25 POWDER RESPIRATORY (INHALATION) at 19:34

## 2023-04-24 RX ADMIN — SERTRALINE HYDROCHLORIDE 25 MG: 25 TABLET ORAL at 08:54

## 2023-04-24 RX ADMIN — POLYETHYLENE GLYCOL 3350 17 G: 17 POWDER, FOR SOLUTION ORAL at 08:59

## 2023-04-24 RX ADMIN — Medication 2.5 MG: at 06:19

## 2023-04-24 RX ADMIN — ATORVASTATIN CALCIUM 10 MG: 10 TABLET, FILM COATED ORAL at 08:54

## 2023-04-24 RX ADMIN — MIRTAZAPINE 15 MG: 15 TABLET, FILM COATED ORAL at 21:26

## 2023-04-24 RX ADMIN — LIDOCAINE PATCH 4% 1 PATCH: 40 PATCH TOPICAL at 09:00

## 2023-04-24 RX ADMIN — IBUPROFEN 400 MG: 200 TABLET, FILM COATED ORAL at 13:15

## 2023-04-24 ASSESSMENT — ACTIVITIES OF DAILY LIVING (ADL)
ADLS_ACUITY_SCORE: 52
ADLS_ACUITY_SCORE: 51
ADLS_ACUITY_SCORE: 52
ADLS_ACUITY_SCORE: 51
ADLS_ACUITY_SCORE: 51
ADLS_ACUITY_SCORE: 52
ADLS_ACUITY_SCORE: 51
ADLS_ACUITY_SCORE: 52
ADLS_ACUITY_SCORE: 51
ADLS_ACUITY_SCORE: 51

## 2023-04-24 NOTE — PLAN OF CARE
Goal Outcome Evaluation:    Overall Patient Progress: no changeOverall Patient Progress: no change    Patient is alert and oriented x4. Calls for needs. On oxygen at 1lpm via nasal cannula. Transfers as A1 with the walker. Continent of bladder using the toilet. No bm this shift. This morning, woke up with a bad headache, declined tylenol and requested for oxycodone which is more effective per patient. Cool washcloth on forehead utilized. Ate a little bit of some pastries she had in the room to prevent nausea. Denies sob, dizziness, fever, N/V and new weakness. Continue poc.

## 2023-04-24 NOTE — PROGRESS NOTES
Discharge Planner Post-Acute Rehab OT:      Discharge Plan: home with assist and HHOT     Precautions: crani, falls, WBAT RLE, per chart review no formal hip precautions, [place] with no hearing aids     Current Status:  ADLs:    Mobility: CGA with walker with ambulation in BR. CGA for STS with walker    Grooming: SBA with FWW EOS    Dressing: UB: set-up and supervision seated; SBA with LBD tasks with walker and AE. Min A with shoelaces.    Bathing: Min-mod A with W/C<>extended tub bench    Toileting: CGA with transfer to toilet, CGA from toilet to standing with RTS, GB, and FWW. SBA with seated murphy-cares.  IADLs: Reports daughter assist with some IADLs at baseline, will continue to assess  Vision/Cognition: Demonstrated deficits in problem solving t/o and appears self-limiting at times, will continue to monitor and assess     Assessment: Focused on partial ADL routine with walker and cues for safety with O2 line mgmt. Educated pt on recommendations needed to provide daughter during family training.     Other Barriers to Discharge (DME, Family Training, etc):   AE/DME: extended tub bench, RTS, FWW, reacher, long handled sponge, LHSH, sock aid-no [employer] coverage, daughter emailed list of equipment to purchase  Family Training Sunday 4/28/23: 9AM-11AM PM OT, PT, SLP

## 2023-04-24 NOTE — PROGRESS NOTES
[facility]   Acute Rehabilitation Unit  Daily progress note    INTERVAL HISTORY  Weekend and therapy notes reviewed.  Patient was noted to have assisted fall with OT, no evidence of injury per covering team.    Josephine Nicole was seen and examined at bedside this morning.  Patient most concerned today about recurrent headache.  Woke at 6am with what she described as a migraine, at that time rated as 10/10.  Headache located bilaterally in frontal region.  She took a dose of oxycodone 2.5 mg around 7am.  Then began having some nausea.  By mid-morning, headache improved significantly, rates at 0/10 after some rest.  Feels headache fairly consistent with chronic headaches but she is worried about how she will manage this at home.  Does have some light sensitivity.  Reports headache has been more frequent over the past week than it had been earlier this admission.  Had 2 days last week, then the day before yesterday, and again this morning.  She denies nasal congestion, sore throat, fever/chills.  Chronic cough and oxygen needs are stable.  Denies any urinary symptoms.  She notes nausea yesterday as well, but has not had vomiting.  Denies abdominal pain.  Reports last BM was yesterday, feels she has been having more than 1 per day, which are sometimes loose.      Functionally, she is currently needing SBA for grooming, set-up and supervision for seated upper body dressing, SBA for lower body dressing with min A for shoelaces.  She needs min to mod A for bathing and CGA to SBA for toileting.      MEDICATIONS    - Medication Assessment Program - Rehab Services   Does not apply See Admin Instructions     acetaminophen  975 mg Oral TID     atorvastatin  10 mg Oral Daily     enoxaparin ANTICOAGULANT  40 mg Subcutaneous Q24H     fluticasone-vilanterol  1 puff Inhalation Daily     furosemide  20 mg Oral Daily     levothyroxine  88 mcg Oral Daily     lidocaine  1 patch Transdermal Q24H  "    lidocaine  1-2 patch Transdermal Q24h    And     lidocaine   Transdermal Q8H NING     lidocaine   Transdermal Q8H NING     melatonin  1 mg Oral At Bedtime     mirtazapine  15 mg Oral At Bedtime     pantoprazole  40 mg Oral QAM AC     polyethylene glycol  17 g Oral Daily     senna-docusate  1 tablet Oral At Bedtime     sertraline  25 mg Oral QAM        bisacodyl, hydrOXYzine, ibuprofen, levalbuterol, levalbuterol, naloxone **OR** naloxone **OR** naloxone **OR** naloxone, oxyCODONE     PHYSICAL EXAM  /54 (BP Location: Left arm)   Pulse 79   Temp (!) 95.6  F (35.3  C) (Oral)   Resp 16   Ht 1.585 m (5' 2.4\")   Wt 58.5 kg (129 lb)   SpO2 95%   BMI 23.29 kg/m     Gen: NAD, lying in bed  HEENT: bifrontal craniotomy incision healing well  Cardio: appears well-perfused  Pulm: non-labored on 2L by NC  Abd: soft, non-tender, non-distended  Ext: no appreciable edema in bilateral lower extremities  Neuro/MSK: awake, alert, PERRL, EOMI, left intermittent ptosis, scattered ecchymoses, moving all extremities      LABS  Last Basic Metabolic Panel:  Recent Labs   Lab Test 04/24/23  0631 04/20/23  0637 04/17/23  0947    139 138   POTASSIUM 3.9 3.8 3.6   CHLORIDE 102 101 100   CO2 32* 30* 31*   ANIONGAP 7 8 7   GLC 91 106* 84   BUN 9.7 11.1 13.1   CR 0.52 0.45* 0.43*   GFRESTIMATED >90 >90 >90   CHAO 9.1 8.8 8.8     CBC RESULTS:   Recent Labs   Lab Test 04/24/23  0631 04/20/23  0637 04/17/23  0947   WBC 3.7* 3.7* 7.5   RBC 3.15* 3.07* 3.27*   HGB 9.2* 9.4* 9.8*   HCT 30.4* 29.4* 31.2*   MCV 97 96 95   MCH 29.2 30.6 30.0   MCHC 30.3* 32.0 31.4*   RDW 16.0* 15.7* 15.6*    155 156       Rehabilitation - continue comprehensive acute inpatient rehabilitation program with multidisciplinary approach including therapies, rehab nursing, and physiatry following. See interval history for updates.      ASSESSMENT AND PLAN  Josephine Nicole is a 73 year old right hand dominant female with a past medical history of cerebral " meningioma initially diagnosed 2019, s/p radiation in 11/2022 with recent progression including mass effect and midline shift of 3/17/23 MRI, COPD on 2L oxygen at baseline, hypothyroidism, hyperlipidemia, osteoporosis, depression/anxiety, vitamin B12 deficiency, and anemia who was admitted on 3/19/23 after fall at home in setting of several weeks of worsening confusion, impaired balance with imaging revealing right femoral neck fracture s/p right hip hemiarthroplasty.  He was then transferred to [facility] on 3/23/23 due worsening respiratory failure (COPD exacerbation), hypotension, lactic acidosis, and worsening encephalopathy in setting of progressive meningioma now s/p craniotomy and resection of meningioma on 3/30/23 with hospital course further complicated by lactic acidosis, anemia, left eye ptosis, urinary retention, pain, and hypernatremia.  She is now admitted to ARU on 4/7/23 for multidisciplinary rehabilitation and ongoing medical management.        Admission to acute inpatient rehab 4/7/23.    Impairment group code: Brain Dysfunction 02.1 Non-Traumatic: s/p right bifrontal stealth craniotomy and resection of meningioma due to vasogenic cerebral edema and brain compression along with R femoral neck fracture s/p right hip hemiarthroplasty        1. PT, OT and SLP 60 minutes of each on a daily basis, in addition to rehab nursing and close management of physiatrist.       2. Impairment of ADL's: Noted to have impaired activity tolerance, impaired balance, impaired cognition, impaired coordination, impaired judgement and safety awareness, impaired strength, impaired tone and impaired weight shifting, all affecting her ability to safely and independently perform basic ADLs.  Goal for mod I with basic ADLs.     3. Impairment of mobility:  Noted to have impaired activity tolerance, impaired balance, impaired cognition, impaired coordination, impaired judgement and safety awareness, impaired strength, impaired  tone and impaired weight shifting, all affecting her ability to safely and independently perform basic mobility.  Goal for mod I with basic mobility.     4. Impairment of cognition/language/swallow:  Noted to have impaired cognition, will need full cognitive linguistic evaluation with SLP while on ARU with goals for improved cognitive-linguistic skills to meet basic needs.        5. Medical Conditions  New actions/orders/updates for today are in blue.     S/p bifrontal craniotomy and resection of meningioma on 3/30/23 with Dr. Murphy  CNS WHO grade 1 meningioma (right frontal) with moderate surrounding vasogenic edema and mass effect  Acute on chronic headache  Diagnosed in 2019 after presenting with refractory headaches.  Status post radiation treatment 11/2022.  Recent brain MRI revealing progression.  Patient presented after fall in setting of several week history of worsening confusion, gait impairment, balance deficits.  CT head with known mass surrounding vasogenic edema predominantly involving the right frontal lobe with right to left midline shift/subfalcine herniation measuring up to 1.1 cm.  Neurosurgery consulted and recommended medical optimization and transfer to [facility] for surgical intervention, which she underwent as above on 3/30.  - Decadron taper completed per neurosurgery, off since 4/14  - Pain management: Tylenol 975 mg TID, wean to PRN as able.    - Wound care: keep clean and dry, leave open to air.  Wound check and suture removal completed by rehab team on 4/14.  - Recurrent intermittent frontal headaches starting ~4/16, sometimes associated with photosensitivity and nausea.  Reports has had chronic headaches for approx 7 years, most relief while on steroids.  10/10 headache earlier this morning but resolved mid-morning with rest and small dose of oxycodone.  Per discussion with attending physician, would recommend referral to OP neurology for further headache evaluation and consideration for  trial of prophylactic medication.  - Continue PT/OT/SLP  - Follow up with neurosurgery on 5/15 ([facility]) and 6/30 (Dr. Murphy)  - Follow up with neuro-oncology?     Displaced right femoral neck fracture s/p right hip hemiarthroplasty (anterolateral approach) on 3/20/23 with Dr. Shun Cuevas  Pathologic fracture secondary to osteoporosis  Fractures of the right superior and inferior pubic rami and the left pubic body extending into the left pubic rami, stable on imaging 4/3  - WBAT RLE with walker  - Wound care: Surgical dressing removed by ortho on 4/3. Per their recs at that time: May keep site open to air. Please allow remaining Dermabond to slough off naturally (no picking at site). Okay to shower, but no soaking/submersion x 1 more week.   - Pain management as above  - Per ortho, osteoporosis workup and treatment with primary care provider within 2 months.  - Noted to have increased right hip pain starting 4/15.  Xray revealed no concerns with right hip hemiarthoplasty; bilateral superior and inferior pubic rami fractures with mild displacement, which were noted on prior imaging as well.  Assessed by ortho, who recommended ongoing conservative management wtih WBAT, ongoing PT, pain management, DVT prophylaxis, and OP follow-up in bone health clinic and ortho.  Would recommend to complete at least 3 mos of rehab before any consideration of surgical treatment (right pelvic ring fixation).  - Added oxycodone 2.5 mg q6h PRN but patient requested NSAIDs.  Risk/benefit of NSAID use (including bleeding risk) discussed with patient, who felt this risk was acceptable, per pharmacy, low risk with ibuprofen 400 mg q6h PRN.  Monitor for any signs/symptoms of bleeding.   - Continue PT/OT  - Follow up with Dr. Shun Cuevas at [facility] at six weeks post-op (~5/1/23) with X-rays. If remains at ARU at that time, can have wound check and xrays performed there and sent to Dr. Cuevas for review.     COPD, recent exacerbation  Acute  on chronic hypoxic/hypercapnic respiratory failure  Possible community acquired pneumonia, treated  Former smoker x50 years.  On 2-3L of oxygen PTA for past ~5 years.  Following hip surgery, had acute on chronic hypoxic/hypercapnic respiratory failure requiring BiPAP.  Completed course of azithromycin 3/27 and ceftriaxone 4/4.  Respiratory status improved with diuresis so in part may be related to heart failure as below.  As of admission to ARU, stable on 2L, which is baseline.  - Monitor respiratory status  - Continue supplemental oxygen to maintain spO2>88% (goal range 88-93%)  - Continue Breo Ellipta 200-25 mcg inhaler daily (formulary sub for PTA Advair Diskus 500/50 mcg inhaler)  - Duoneb not available due to supply issues, ordered PRN xopenex neb.    - Pulmonary hygiene/toilet  - Continue oral suction PRN for more tenuous secretions     HFpEF with recent exacerbation with fluid overload  Intermittent diuresis during this admission with improved respiratory status.  Echo 3/27/23 with EF 55-60%, grade I or early diastolic dysfunction.  Noted to have lower extremity edema.  Started on Lasix daily on 4/5, also ordered 2L fluid restriction.  Fluid restriction discontinued on 4/18 due to poor intake, low BP, no evidence of fluid overload.  - Continue Lasix 20 mg daily  - Continue to monitor for any evidence of recurrent volume overload now that off fluid restriction  - Daily weights, monitor volume status     Acute on chronic anemia  Hgb 10.5 on admission, dropped to benson of 7.8 on 3/26.  Did receive 1 unit pRBC.  Stable in 10s at discharge to ARU (baseline).  - Trend CBC every M/Th.  4/24: Hgb stable at 9.4     Mild left intermittent ptosis  Noted on 4/4.  CT head with expected post-operative changes.  Patient reports she has noted this for at least 1 year.    - Per sending team, given chronicity, recommended to defer additional work-up to outpatient setting as appropriate.     Hyperlipidemia  - Continue PTA  atorvastatin     Adjustment disorder with anxious mood  Hx Depression/anxiety  Fatigue  - Continue mirtazapine 15 mg at HS (increased from PTA dose on 4/12).  Improved daytime alertness since.    - Continue Zoloft 25 mg daily  - Seen by health psych on 4/18, appreciate assistance especially with strategies for ARU team to optimize participation.  Please see note by Dr. Pond on 4/18.     Hypothyroidism  - Continue PTA levothyroxine     Vitamin B12 deficiency  - Continue PTA B12 injection 1000 mcg q30 days, last given on 4/3     Urinary retention, resolved  Baldwin placed 3/24, failed TOV on 4/4.  Urology consulted 4/5 and recommended to replace baldwin.  Per urology, retention likely due to recent anesthesia, immobilization, and narcotic pain medications; additional recs as below.  Baldwin removed on 4/13, voiding well with no evidence of retention.    Leukopenia  Leukocytosis, resolved  WBC mildly elevated at 11.7 on 4/10, has been previously mildly elevated though had normalized to 9.6 on 4/8.  Suspect 2/2 steroids.  No localizing s/sx of infection.  - Continue to trend CBC every M/Th.  4/24: WBC now low at 3.7.  ?related to viral illness though no other clear signs/symptoms.      Mild hyponatremia, resolved  Na mildly low at 135 on 4/10.  Asymptomatic.  - Trend BMP.  4/24: Na WNL at 141        6. Adjustment to disability:  Clinical psychology to eval and treat if indicated  7. FEN: regular diet, thin liquids  8. Bowel: continent, monitor, scheduled and PRN bowel meds available  9. Bladder: as above, baldwin removed 4/13 for TOV and is voiding well, encouraged to avoid use of Purewick  10. DVT Prophylaxis: subcutaneous Lovenox, ok'd to start on 4/3 per neurosurgery  11. GI Prophylaxis: PPI  12. Code: DNR/DNI, discussed with palliative care during inpatient admission, confirmed at admission  13. Disposition: goal for home  14. ELOS:  target 4/28/23  15. Follow up Appointments on Discharge: PCP in 1-2 weeks, neurosurgery  (scsheduled on 5/15 and 6/30), neuro-oncology?, ortho (Dr. Shun Cuevas ~5/1/23; care coordinator is Neha Way at [phone] for scheduling) with repeat xrays, neurology (headaches)      Patient was discussed with Dr. Sushila Sheehan, PM&R staff physician     Abimbola Thompson PA-C  Physical Medicine & Rehabilitation

## 2023-04-24 NOTE — PLAN OF CARE
Discharge Planner Post-Acute Rehab SLP:      Discharge Plan: Home with daughter. Ongoing therapy     Precautions: fall     Current Status:  Hearing: [place],  does not use hearing aids  Vision: Uses glasses for close up  Communication: WFL  Cognition: WFL CLQT. Demonstrating mod-severe difficulty with higher level executive skills.  Swallow: regular diet, thin liquids. Not assessed on ARU     Assessment:  Pt reported beginning MAP today and requested medications appropriately. Pt with good orientation to visual listing medications and initiation with task. Pt participated in task using visual to solve and answer functional math ?s. Reviewed strategies to aid IND and accuracy. Completed with 80% accuracy IND increasing to  100% with mod cues. Pt with difficulty accounting for all details/steps within direction and wiht more complex reasoning. Pt with single instance did apply good reasoning IND and  able to form plan to correct. However, was not consistent across entire task.     Other Barriers to Discharge (Family Training, etc): None anticipated by SLP. Will need increased support with IADLs from dtr if able to provide that support

## 2023-04-24 NOTE — PLAN OF CARE
FOCUS/GOAL  Bowel management, Bladder management, Pain management, Medical management, and Mobility    ASSESSMENT, INTERVENTIONS AND CONTINUING PLAN FOR GOAL:    Orientation: alert and oriented x4, [place]  VS: VSS. On O2 1L/NC  Pain: c/o right hip pain, prn Ibuprofen given per request.   Ambulation/ Transfers: Ax1 with walker  Bowel: continent, had large bm this shift  Bladder: continent, used toilet  Tubes/ Lines/ Drains: O2 tubing  Skin: Scalp and R hip incision, YAZ  Plan to start Map tomorrow, pt is aware.  Bed alarm on for safety, call light within reach. Continue with POC.

## 2023-04-25 ENCOUNTER — APPOINTMENT (OUTPATIENT)
Dept: PHYSICAL THERAPY | Facility: CLINIC | Age: 73
DRG: 949 | End: 2023-04-25
Attending: PHYSICAL MEDICINE & REHABILITATION
Payer: COMMERCIAL

## 2023-04-25 ENCOUNTER — APPOINTMENT (OUTPATIENT)
Dept: OCCUPATIONAL THERAPY | Facility: CLINIC | Age: 73
DRG: 949 | End: 2023-04-25
Attending: PHYSICAL MEDICINE & REHABILITATION
Payer: COMMERCIAL

## 2023-04-25 ENCOUNTER — APPOINTMENT (OUTPATIENT)
Dept: SPEECH THERAPY | Facility: CLINIC | Age: 73
DRG: 949 | End: 2023-04-25
Attending: PHYSICAL MEDICINE & REHABILITATION
Payer: COMMERCIAL

## 2023-04-25 PROCEDURE — 97129 THER IVNTJ 1ST 15 MIN: CPT | Mod: GN

## 2023-04-25 PROCEDURE — 97530 THERAPEUTIC ACTIVITIES: CPT | Mod: GP

## 2023-04-25 PROCEDURE — 250N000013 HC RX MED GY IP 250 OP 250 PS 637: Performed by: PHYSICAL MEDICINE & REHABILITATION

## 2023-04-25 PROCEDURE — 97130 THER IVNTJ EA ADDL 15 MIN: CPT | Mod: GN

## 2023-04-25 PROCEDURE — 97110 THERAPEUTIC EXERCISES: CPT | Mod: GP

## 2023-04-25 PROCEDURE — 97535 SELF CARE MNGMENT TRAINING: CPT | Mod: GO | Performed by: OCCUPATIONAL THERAPIST

## 2023-04-25 PROCEDURE — 250N000013 HC RX MED GY IP 250 OP 250 PS 637: Performed by: STUDENT IN AN ORGANIZED HEALTH CARE EDUCATION/TRAINING PROGRAM

## 2023-04-25 PROCEDURE — 97110 THERAPEUTIC EXERCISES: CPT | Mod: GO | Performed by: OCCUPATIONAL THERAPIST

## 2023-04-25 PROCEDURE — 128N000003 HC R&B REHAB

## 2023-04-25 PROCEDURE — 99233 SBSQ HOSP IP/OBS HIGH 50: CPT | Performed by: PHYSICAL MEDICINE & REHABILITATION

## 2023-04-25 PROCEDURE — 250N000011 HC RX IP 250 OP 636: Performed by: PHYSICIAN ASSISTANT

## 2023-04-25 PROCEDURE — 250N000013 HC RX MED GY IP 250 OP 250 PS 637: Performed by: PHYSICIAN ASSISTANT

## 2023-04-25 RX ORDER — AMITRIPTYLINE HYDROCHLORIDE 10 MG/1
10 TABLET ORAL AT BEDTIME
Status: DISCONTINUED | OUTPATIENT
Start: 2023-04-25 | End: 2023-04-28 | Stop reason: HOSPADM

## 2023-04-25 RX ADMIN — PANTOPRAZOLE SODIUM 40 MG: 40 TABLET, DELAYED RELEASE ORAL at 08:18

## 2023-04-25 RX ADMIN — Medication 1 MG: at 22:11

## 2023-04-25 RX ADMIN — ACETAMINOPHEN 325MG 975 MG: 325 TABLET ORAL at 08:16

## 2023-04-25 RX ADMIN — ENOXAPARIN SODIUM 40 MG: 40 INJECTION SUBCUTANEOUS at 17:43

## 2023-04-25 RX ADMIN — ACETAMINOPHEN 325MG 975 MG: 325 TABLET ORAL at 22:11

## 2023-04-25 RX ADMIN — IBUPROFEN 400 MG: 200 TABLET, FILM COATED ORAL at 15:10

## 2023-04-25 RX ADMIN — ACETAMINOPHEN 325MG 975 MG: 325 TABLET ORAL at 15:03

## 2023-04-25 RX ADMIN — LIDOCAINE PATCH 4% 3 PATCH: 40 PATCH TOPICAL at 10:07

## 2023-04-25 RX ADMIN — IBUPROFEN 400 MG: 200 TABLET, FILM COATED ORAL at 08:14

## 2023-04-25 RX ADMIN — LEVOTHYROXINE SODIUM 88 MCG: 88 TABLET ORAL at 08:15

## 2023-04-25 RX ADMIN — POLYETHYLENE GLYCOL 3350 17 G: 17 POWDER, FOR SOLUTION ORAL at 08:15

## 2023-04-25 RX ADMIN — MIRTAZAPINE 15 MG: 15 TABLET, FILM COATED ORAL at 22:13

## 2023-04-25 RX ADMIN — FLUTICASONE FUROATE AND VILANTEROL TRIFENATATE 1 PUFF: 200; 25 POWDER RESPIRATORY (INHALATION) at 20:09

## 2023-04-25 RX ADMIN — ATORVASTATIN CALCIUM 10 MG: 10 TABLET, FILM COATED ORAL at 08:15

## 2023-04-25 RX ADMIN — FUROSEMIDE 20 MG: 20 TABLET ORAL at 08:15

## 2023-04-25 RX ADMIN — SERTRALINE HYDROCHLORIDE 25 MG: 25 TABLET ORAL at 08:15

## 2023-04-25 RX ADMIN — AMITRIPTYLINE HYDROCHLORIDE 10 MG: 10 TABLET, FILM COATED ORAL at 22:10

## 2023-04-25 RX ADMIN — SENNOSIDES AND DOCUSATE SODIUM 1 TABLET: 50; 8.6 TABLET ORAL at 22:12

## 2023-04-25 RX ADMIN — Medication 2.5 MG: at 02:22

## 2023-04-25 RX ADMIN — IBUPROFEN 400 MG: 200 TABLET, FILM COATED ORAL at 22:10

## 2023-04-25 ASSESSMENT — ACTIVITIES OF DAILY LIVING (ADL)
ADLS_ACUITY_SCORE: 52

## 2023-04-25 NOTE — PROGRESS NOTES
Discharge Planner Post-Acute Rehab OT:      Discharge Plan: home with assist and HHOT     Precautions: crani, falls, WBAT RLE, per chart review no formal hip precautions, Elim IRA with no hearing aids     Current Status:  ADLs:    Mobility: CGA with walker with ambulation in BR. CGA for STS with walker    Grooming: SBA with FWW EOS    Dressing: UB: set-up and supervision seated; SBA with LBD tasks with walker and AE. Min A with shoelaces.    Bathing: Min-mod A with W/C<>extended tub bench    Toileting: CGA with transfer to toilet, CGA from toilet to standing with RTS, GB, and FWW. SBA with seated murphy-cares.  IADLs: Reports daughter assist with some IADLs at baseline, will continue to assess  Vision/Cognition: Demonstrated deficits in problem solving t/o and appears self-limiting at times, will continue to monitor and assess     Assessment: Focused on partial ADL routine with walker, still requires cues for safety with O2 line mgmt. Educated pt on edema mgmt utilizing compression socks to decrease fluid in BLE's.     Other Barriers to Discharge (DME, Family Training, etc):   AE/DME: extended tub bench, RTS, FWW, reacher, long handled sponge, LHSH, sock aid-no [employer] coverage, daughter emailed list of equipment to purchase  Family Training Sunday 4/28/23: 9AM-11AM PM OT, PT, SLP

## 2023-04-25 NOTE — PLAN OF CARE
Goal Outcome Evaluation:      Plan of Care Reviewed With: patient      Outcome Evaluation: Pt is using call light appropriately. On MAP but did not call for MAP meds. She only called for prn ibuprofen for right hip pain. Needs reminders to check medication schedule times. She was able to pick out meds correctly using the med list as guide. Lidoderm patches applied to right hip. Vitals stable but BP sometimes low so tubigrips and abdominal binder applied. Provided and encouraged to drink fluids. O2 at 1 L/min. Continent of bladder. Pt needing CGA with walker and assistance managing O2 tubing.

## 2023-04-25 NOTE — PROGRESS NOTES
Reached out to pt dtr again about O2 company. Waiting on response. Called [facility] and confirmed that they are not servicing pt for home O2. Need to confirm that pt has what she needs at home for home O2, specifically tank for discharge.     Alissa Duenas [facility] Acute Rehab   Direct Phone: [phone]  I   Pager: [phone]  I  Fax: [phone]

## 2023-04-25 NOTE — PROGRESS NOTES
"  VS: /64 (BP Location: Left arm, Patient Position: Sitting, Cuff Size: Adult Regular)   Pulse 99   Temp (!) 96.7  F (35.9  C) (Axillary)   Resp 18   Ht 1.585 m (5' 2.4\")   Wt 58.7 kg (129 lb 8 oz)   SpO2 94%   BMI 23.38 kg/m     O2: SOB on exertion, 1 L NC   Output: Elimination spontaneously to bathroom with A1/walker   Last BM: 4/23   Activity: Assist of 1 w/walker   Skin: Intact   Pain: Denied   CMS: A/O x4, denied numbness and tingling   Diet: Regular/thin   LDA: None   Equipment: Personal belongings   Plan: Continue acute rehab for multidisciplinary medical rehabilitation   Additional Info:          "

## 2023-04-25 NOTE — PLAN OF CARE
FOCUS/GOAL  Bladder management, Pain management, Mobility, Cognition/Memory/Judgment/Problem solving, and Safety management    ASSESSMENT, INTERVENTIONS AND CONTINUING PLAN FOR GOAL:  Pt is alert and oriented. Continent of bladder, voiding spontaneously using toilet. Up with assist of 1 with walker to the bathroom. Requested and received oxycodone x 1 for report of headache behind the left eye. Pt appeared to be sleeping well between cares. Uses call light appropriately, able to make needs known. Bed alarm on for safety.

## 2023-04-25 NOTE — PLAN OF CARE
"Discharge Planner Post-Acute Rehab PT:      Discharge Plan: HHPT, lives with daughter     Precautions: Falls, crani, WBAT R LE, 2 L NC 02 at baseline     Current Status:  Bed Mobility: mod-I - sleeps in recliner at baseline for past 6-7 years d/t COPD  Transfer: SBA FWW, min-A from low surface at times  Gait: 250' FWW SBA  Stairs: CGA 6\" x 4, rail x 2, step to pattern  Balance: UE support in standing     Barone/8: 4/56  4/15: :      5xSTS:  - : 0  - : 0 (unable with BUE     10MWt:  - : 0 m/s  - : 0.21 m/s     Assessment: Problem solved stairs to enter today, pt not wanting a railing put in. Based on her stair measurements, will be able to sit on a chair on top of second stair and get in from there. Unable to ascend the stairs without a railing, but able to descend.     Other Barriers to Discharge (DME, Family Training, etc):  FWW  2STE no railing - rail not installed - will set chair on top of 2nd step able to sit and pivot up based on measurement  Family training - morning of discharge    "

## 2023-04-25 NOTE — PLAN OF CARE
FOCUS/GOAL  Bowel management, Bladder management, Pain management, Mobility, Skin integrity, and Safety management    ASSESSMENT, INTERVENTIONS AND CONTINUING PLAN FOR GOAL:  Patient is alert and oriented x 4. A-1 walker. Regular/thin take pills whole. Continent of B/B last BM 04/23.  Patient denied headache, dizziness, CP. SOB with ambulation. Uses Yanker independently. No care concern at this time. Did good on MAP. VS WDL Call light is in reach alarm is on  Goal Outcome Evaluation:

## 2023-04-25 NOTE — PROGRESS NOTES
"  [facility]   Acute Rehabilitation Unit  Daily progress note    INTERVAL HISTORY  Josephine Nicole was seen and examined at bedside this morning.  No acute events reported overnight.      She was doing overall well this morning.  She unfortunately had another episode of headache at 2 AM and took 1 dose of oxycodone.  Her headache eventually resolved.  Discussed the nature of her headaches and the treatment plan as outlined below.  She was agreeable with a trial of a preventative medication and the plan as outlined below.    She is participating in therapies and making a slow and steady progress.  She is now able to ambulate with front wheeled walker and standby assist.  They have been working on stairs with PT.  She seems to be on track for discharge to home this Friday if the family training goes well.  She will have home therapies at discharge.        MEDICATIONS    - Medication Assessment Program - Rehab Services   Does not apply See Admin Instructions     acetaminophen  975 mg Oral TID     atorvastatin  10 mg Oral Daily     enoxaparin ANTICOAGULANT  40 mg Subcutaneous Q24H     fluticasone-vilanterol  1 puff Inhalation Daily     furosemide  20 mg Oral Daily     levothyroxine  88 mcg Oral Daily     lidocaine  1-3 patch Transdermal Q24H    And     lidocaine   Transdermal Q8H NING     melatonin  1 mg Oral At Bedtime     mirtazapine  15 mg Oral At Bedtime     pantoprazole  40 mg Oral QAM AC     polyethylene glycol  17 g Oral Daily     senna-docusate  1 tablet Oral At Bedtime     sertraline  25 mg Oral QAM        bisacodyl, hydrOXYzine, ibuprofen, levalbuterol, levalbuterol, naloxone **OR** naloxone **OR** naloxone **OR** naloxone, ondansetron, oxyCODONE     PHYSICAL EXAM  /54 (BP Location: Left arm)   Pulse 88   Temp 97.8  F (36.6  C) (Axillary)   Resp 18   Ht 1.585 m (5' 2.4\")   Wt 58.7 kg (129 lb 8 oz)   SpO2 95%   BMI 23.38 kg/m       Gen: NAD, sitting in " bed  HEENT: bifrontal craniotomy incision healing well  Cardio: appears well-perfused  Pulm: non-labored on 2L by NC  Abd: soft, non-tender, non-distended  Ext: no appreciable edema in bilateral lower extremities  Neuro/MSK: Exam was deferred for conversation today      LABS  Last Basic Metabolic Panel:  Recent Labs   Lab Test 04/24/23  0631 04/20/23  0637 04/17/23  0947    139 138   POTASSIUM 3.9 3.8 3.6   CHLORIDE 102 101 100   CO2 32* 30* 31*   ANIONGAP 7 8 7   GLC 91 106* 84   BUN 9.7 11.1 13.1   CR 0.52 0.45* 0.43*   GFRESTIMATED >90 >90 >90   CHAO 9.1 8.8 8.8     CBC RESULTS:   Recent Labs   Lab Test 04/24/23  0631 04/20/23  0637 04/17/23  0947   WBC 3.7* 3.7* 7.5   RBC 3.15* 3.07* 3.27*   HGB 9.2* 9.4* 9.8*   HCT 30.4* 29.4* 31.2*   MCV 97 96 95   MCH 29.2 30.6 30.0   MCHC 30.3* 32.0 31.4*   RDW 16.0* 15.7* 15.6*    155 156       Rehabilitation - continue comprehensive acute inpatient rehabilitation program with multidisciplinary approach including therapies, rehab nursing, and physiatry following. See interval history for updates.      ASSESSMENT AND PLAN  Josephine Nicole is a 73 year old right hand dominant female with a past medical history of cerebral meningioma initially diagnosed 2019, s/p radiation in 11/2022 with recent progression including mass effect and midline shift of 3/17/23 MRI, COPD on 2L oxygen at baseline, hypothyroidism, hyperlipidemia, osteoporosis, depression/anxiety, vitamin B12 deficiency, and anemia who was admitted on 3/19/23 after fall at home in setting of several weeks of worsening confusion, impaired balance with imaging revealing right femoral neck fracture s/p right hip hemiarthroplasty.  He was then transferred to [facility] on 3/23/23 due worsening respiratory failure (COPD exacerbation), hypotension, lactic acidosis, and worsening encephalopathy in setting of progressive meningioma now s/p craniotomy and resection of meningioma on 3/30/23 with hospital course  further complicated by lactic acidosis, anemia, left eye ptosis, urinary retention, pain, and hypernatremia.  She is now admitted to ARU on 4/7/23 for multidisciplinary rehabilitation and ongoing medical management.        Admission to acute inpatient rehab 4/7/23.    Impairment group code: Brain Dysfunction 02.1 Non-Traumatic: s/p right bifrontal stealth craniotomy and resection of meningioma due to vasogenic cerebral edema and brain compression along with R femoral neck fracture s/p right hip hemiarthroplasty        1. PT, OT and SLP 60 minutes of each on a daily basis, in addition to rehab nursing and close management of physiatrist.       2. Impairment of ADL's: Noted to have impaired activity tolerance, impaired balance, impaired cognition, impaired coordination, impaired judgement and safety awareness, impaired strength, impaired tone and impaired weight shifting, all affecting her ability to safely and independently perform basic ADLs.  Goal for mod I with basic ADLs.     3. Impairment of mobility:  Noted to have impaired activity tolerance, impaired balance, impaired cognition, impaired coordination, impaired judgement and safety awareness, impaired strength, impaired tone and impaired weight shifting, all affecting her ability to safely and independently perform basic mobility.  Goal for mod I with basic mobility.     4. Impairment of cognition/language/swallow:  Noted to have impaired cognition, will need full cognitive linguistic evaluation with SLP while on ARU with goals for improved cognitive-linguistic skills to meet basic needs.        5. Medical Conditions  New actions/orders/updates for today are in blue.     S/p bifrontal craniotomy and resection of meningioma on 3/30/23 with Dr. Murphy  CNS WHO grade 1 meningioma (right frontal) with moderate surrounding vasogenic edema and mass effect    Diagnosed in 2019 after presenting with refractory headaches.  Status post radiation treatment 11/2022.  Recent  brain MRI revealing progression.  Patient presented after fall in setting of several week history of worsening confusion, gait impairment, balance deficits.  CT head with known mass surrounding vasogenic edema predominantly involving the right frontal lobe with right to left midline shift/subfalcine herniation measuring up to 1.1 cm.  Neurosurgery consulted and recommended medical optimization and transfer to [facility] for surgical intervention, which she underwent as above on 3/30.  - Decadron taper completed per neurosurgery, off since 4/14  - Pain management: Tylenol 975 mg TID, wean to PRN as able.    - Wound care: keep clean and dry, leave open to air.  Wound check and suture removal completed by rehab team on 4/14.   medication.   - Continue PT/OT/SLP  - Follow up with neurosurgery on 5/15 ([facility]) and 6/30 (Dr. Murphy)  - Follow up with neuro-oncology?    Acute on chronic headache  - Recurrent intermittent frontal headaches starting ~4/16, sometimes associated with photosensitivity and nausea.  Reports has had chronic headaches for approx 7 years, most relief while on steroids.   Discussed possible diagnosis and the potential treatment options 04/25/23.  Also discussed risk of rebound headaches with as needed doses of any medications more than 3 times per week.  Her headaches are most likely tension headaches associated with rebound headaches in the setting of recent surgery.  -Started amitriptyline 10 mg tonight as preventive medication; will titrate by 10 mg every 2 weeks if tolerated (discussed with pharmacist)  -Continue with scheduled Tylenol  -Continue ice and other nonmedication options  -Should avoid as needed doses of ibuprofen more than 3 times per week  -Should also avoid oxycodone for the management of headaches given the highest risk of rebound headaches  -Discussed referral to headache neurology clinic at the [facility] but this is not a priority given her other medical issues       Displaced right  femoral neck fracture s/p right hip hemiarthroplasty (anterolateral approach) on 3/20/23 with Dr. Shun Cuevas  Pathologic fracture secondary to osteoporosis  Fractures of the right superior and inferior pubic rami and the left pubic body extending into the left pubic rami, stable on imaging 4/3  - WBAT RLE with walker  - Wound care: Surgical dressing removed by ortho on 4/3. Per their recs at that time: May keep site open to air. Please allow remaining Dermabond to slough off naturally (no picking at site). Okay to shower, but no soaking/submersion x 1 more week.   - Pain management as above  - Per ortho, osteoporosis workup and treatment with primary care provider within 2 months.  - Noted to have increased right hip pain starting 4/15.  Xray revealed no concerns with right hip hemiarthoplasty; bilateral superior and inferior pubic rami fractures with mild displacement, which were noted on prior imaging as well.  Assessed by ortho, who recommended ongoing conservative management wtih WBAT, ongoing PT, pain management, DVT prophylaxis, and OP follow-up in bone health clinic and ortho.  Would recommend to complete at least 3 mos of rehab before any consideration of surgical treatment (right pelvic ring fixation).  - Added oxycodone 2.5 mg q6h PRN but patient requested NSAIDs.  Risk/benefit of NSAID use (including bleeding risk) discussed with patient, who felt this risk was acceptable, per pharmacy, low risk with ibuprofen 400 mg q6h PRN.  Monitor for any signs/symptoms of bleeding.   - Continue PT/OT  - Follow up with Dr. Shun Cuevas at [facility] at six weeks post-op (~5/1/23) with X-rays. If remains at ARU at that time, can have wound check and xrays performed there and sent to Dr. Cuevas for review.     COPD, recent exacerbation  Acute on chronic hypoxic/hypercapnic respiratory failure  Possible community acquired pneumonia, treated  Former smoker x50 years.  On 2-3L of oxygen PTA for past ~5 years.   Following hip surgery, had acute on chronic hypoxic/hypercapnic respiratory failure requiring BiPAP.  Completed course of azithromycin 3/27 and ceftriaxone 4/4.  Respiratory status improved with diuresis so in part may be related to heart failure as below.  As of admission to ARU, stable on 2L, which is baseline.  - Monitor respiratory status  - Continue supplemental oxygen to maintain spO2>88% (goal range 88-93%)  - Continue Breo Ellipta 200-25 mcg inhaler daily (formulary sub for PTA Advair Diskus 500/50 mcg inhaler)  - Duoneb not available due to supply issues, ordered PRN xopenex neb.    - Pulmonary hygiene/toilet  - Continue oral suction PRN for more tenuous secretions     HFpEF with recent exacerbation with fluid overload  Intermittent diuresis during this admission with improved respiratory status.  Echo 3/27/23 with EF 55-60%, grade I or early diastolic dysfunction.  Noted to have lower extremity edema.  Started on Lasix daily on 4/5, also ordered 2L fluid restriction.  Fluid restriction discontinued on 4/18 due to poor intake, low BP, no evidence of fluid overload.  - Continue Lasix 20 mg daily  - Continue to monitor for any evidence of recurrent volume overload now that off fluid restriction  - Daily weights, monitor volume status     Acute on chronic anemia  Hgb 10.5 on admission, dropped to benson of 7.8 on 3/26.  Did receive 1 unit pRBC.  Stable in 10s at discharge to ARU (baseline).  - Trend CBC every M/Th.  4/24: Hgb stable at 9.4     Mild left intermittent ptosis  Noted on 4/4.  CT head with expected post-operative changes.  Patient reports she has noted this for at least 1 year.    - Per sending team, given chronicity, recommended to defer additional work-up to outpatient setting as appropriate.     Hyperlipidemia  - Continue PTA atorvastatin     Adjustment disorder with anxious mood  Hx Depression/anxiety  Fatigue  - Continue mirtazapine 15 mg at HS (increased from PTA dose on 4/12).  Improved  daytime alertness since.    - Continue Zoloft 25 mg daily  - Seen by health psych on 4/18, appreciate assistance especially with strategies for ARU team to optimize participation.  Please see note by Dr. Pond on 4/18.     Hypothyroidism  - Continue PTA levothyroxine     Vitamin B12 deficiency  - Continue PTA B12 injection 1000 mcg q30 days, last given on 4/3     Urinary retention, resolved  Baldwin placed 3/24, failed TOV on 4/4.  Urology consulted 4/5 and recommended to replace baldwin.  Per urology, retention likely due to recent anesthesia, immobilization, and narcotic pain medications; additional recs as below.  Baldwin removed on 4/13, voiding well with no evidence of retention.    Leukopenia  Leukocytosis, resolved  WBC mildly elevated at 11.7 on 4/10, has been previously mildly elevated though had normalized to 9.6 on 4/8.  Suspect 2/2 steroids.  No localizing s/sx of infection.  - Continue to trend CBC every M/Th.  4/24: WBC now low at 3.7.  ?related to viral illness though no other clear signs/symptoms.      Mild hyponatremia, resolved  Na mildly low at 135 on 4/10.  Asymptomatic.  - Trend BMP.  4/24: Na WNL at 141        6. Adjustment to disability:  Clinical psychology to eval and treat if indicated  7. FEN: regular diet, thin liquids  8. Bowel: continent, monitor, scheduled and PRN bowel meds available  9. Bladder: as above, baldwin removed 4/13 for TOV and is voiding well, encouraged to avoid use of Purewick  10. DVT Prophylaxis: subcutaneous Lovenox, ok'd to start on 4/3 per neurosurgery  11. GI Prophylaxis: PPI  12. Code: DNR/DNI, discussed with palliative care during inpatient admission, confirmed at admission  13. Disposition: goal for home  14. ELOS:  target 4/28/23  15. Follow up Appointments on Discharge: PCP in 1-2 weeks, neurosurgery (scsheduled on 5/15 and 6/30), neuro-oncology?, ortho (Dr. Shun Cuevas ~5/1/23; care coordinator is Neha Way at [phone] for scheduling) with repeat xrays,  neurology (headaches)      Gertrudis Hidalgo MD  Physical Medicine & Rehabilitation

## 2023-04-25 NOTE — PLAN OF CARE
Discharge Planner Post-Acute Rehab SLP:      Discharge Plan: Home with daughter. Ongoing therapy     Precautions: fall     Current Status:  Hearing: [place],  does not use hearing aids  Vision: Uses glasses for close up  Communication: WFL  Cognition: WFL CLQT. Demonstrating mod-severe difficulty with higher level executive skills.  Swallow: regular diet, thin liquids. Not assessed on ARU     Assessment: The pt engaged in mod-complex/complex task targeting attention, reasoning, and problem solving (FAVRES #2). She demonstrated good organization strategies of taking notes, crossing things out, and verbal processing. She was not impulsive with responses. Some delayed processing and impaired short-term recall (re-reading clues), however she completed 75% of puzzle across 20 minutes with 100% accuracy. Left remainder of puzzle for pt to complete on her own and reinforced strategies of reviewing work and taking notes. RN reports that the pt had great performance with MAP today.     Other Barriers to Discharge (Family Training, etc): None anticipated by SLP. Will need increased support with IADLs from dtr if able to provide that support

## 2023-04-26 ENCOUNTER — APPOINTMENT (OUTPATIENT)
Dept: PHYSICAL THERAPY | Facility: CLINIC | Age: 73
DRG: 949 | End: 2023-04-26
Attending: PHYSICAL MEDICINE & REHABILITATION
Payer: COMMERCIAL

## 2023-04-26 ENCOUNTER — APPOINTMENT (OUTPATIENT)
Dept: OCCUPATIONAL THERAPY | Facility: CLINIC | Age: 73
DRG: 949 | End: 2023-04-26
Attending: PHYSICAL MEDICINE & REHABILITATION
Payer: COMMERCIAL

## 2023-04-26 ENCOUNTER — APPOINTMENT (OUTPATIENT)
Dept: SPEECH THERAPY | Facility: CLINIC | Age: 73
DRG: 949 | End: 2023-04-26
Attending: PHYSICAL MEDICINE & REHABILITATION
Payer: COMMERCIAL

## 2023-04-26 PROCEDURE — 999N000150 HC STATISTIC PT MED CONFERENCE < 30 MIN

## 2023-04-26 PROCEDURE — 250N000013 HC RX MED GY IP 250 OP 250 PS 637: Performed by: PHYSICAL MEDICINE & REHABILITATION

## 2023-04-26 PROCEDURE — 250N000013 HC RX MED GY IP 250 OP 250 PS 637: Performed by: STUDENT IN AN ORGANIZED HEALTH CARE EDUCATION/TRAINING PROGRAM

## 2023-04-26 PROCEDURE — 97129 THER IVNTJ 1ST 15 MIN: CPT | Mod: GN | Performed by: SPEECH-LANGUAGE PATHOLOGIST

## 2023-04-26 PROCEDURE — 999N000125 HC STATISTIC PATIENT MED CONFERENCE < 30 MIN

## 2023-04-26 PROCEDURE — 250N000011 HC RX IP 250 OP 636: Performed by: PHYSICIAN ASSISTANT

## 2023-04-26 PROCEDURE — 97535 SELF CARE MNGMENT TRAINING: CPT | Mod: GO | Performed by: OCCUPATIONAL THERAPIST

## 2023-04-26 PROCEDURE — 99232 SBSQ HOSP IP/OBS MODERATE 35: CPT | Mod: FS | Performed by: PHYSICIAN ASSISTANT

## 2023-04-26 PROCEDURE — 97110 THERAPEUTIC EXERCISES: CPT | Mod: GP

## 2023-04-26 PROCEDURE — 250N000013 HC RX MED GY IP 250 OP 250 PS 637: Performed by: PHYSICIAN ASSISTANT

## 2023-04-26 PROCEDURE — 97110 THERAPEUTIC EXERCISES: CPT | Mod: GO | Performed by: OCCUPATIONAL THERAPIST

## 2023-04-26 PROCEDURE — 999N000125 HC STATISTIC PATIENT MED CONFERENCE < 30 MIN: Performed by: OCCUPATIONAL THERAPIST

## 2023-04-26 PROCEDURE — 97130 THER IVNTJ EA ADDL 15 MIN: CPT | Mod: GN | Performed by: SPEECH-LANGUAGE PATHOLOGIST

## 2023-04-26 PROCEDURE — 128N000003 HC R&B REHAB

## 2023-04-26 PROCEDURE — 97110 THERAPEUTIC EXERCISES: CPT | Mod: GP | Performed by: PHYSICAL THERAPIST

## 2023-04-26 PROCEDURE — 97530 THERAPEUTIC ACTIVITIES: CPT | Mod: GP | Performed by: PHYSICAL THERAPIST

## 2023-04-26 RX ORDER — FUROSEMIDE 20 MG/1
10 TABLET ORAL DAILY
Status: DISCONTINUED | OUTPATIENT
Start: 2023-04-27 | End: 2023-04-28 | Stop reason: HOSPADM

## 2023-04-26 RX ADMIN — IBUPROFEN 400 MG: 200 TABLET, FILM COATED ORAL at 08:32

## 2023-04-26 RX ADMIN — Medication 1 MG: at 21:19

## 2023-04-26 RX ADMIN — ATORVASTATIN CALCIUM 10 MG: 10 TABLET, FILM COATED ORAL at 08:20

## 2023-04-26 RX ADMIN — ACETAMINOPHEN 325MG 975 MG: 325 TABLET ORAL at 14:29

## 2023-04-26 RX ADMIN — FLUTICASONE FUROATE AND VILANTEROL TRIFENATATE 1 PUFF: 200; 25 POWDER RESPIRATORY (INHALATION) at 20:44

## 2023-04-26 RX ADMIN — AMITRIPTYLINE HYDROCHLORIDE 10 MG: 10 TABLET, FILM COATED ORAL at 21:19

## 2023-04-26 RX ADMIN — LIDOCAINE PATCH 4% 3 PATCH: 40 PATCH TOPICAL at 08:21

## 2023-04-26 RX ADMIN — POLYETHYLENE GLYCOL 3350 17 G: 17 POWDER, FOR SOLUTION ORAL at 08:21

## 2023-04-26 RX ADMIN — ENOXAPARIN SODIUM 40 MG: 40 INJECTION SUBCUTANEOUS at 16:42

## 2023-04-26 RX ADMIN — ACETAMINOPHEN 325MG 975 MG: 325 TABLET ORAL at 21:19

## 2023-04-26 RX ADMIN — SERTRALINE HYDROCHLORIDE 25 MG: 25 TABLET ORAL at 08:19

## 2023-04-26 RX ADMIN — PANTOPRAZOLE SODIUM 40 MG: 40 TABLET, DELAYED RELEASE ORAL at 08:20

## 2023-04-26 RX ADMIN — FUROSEMIDE 20 MG: 20 TABLET ORAL at 08:19

## 2023-04-26 RX ADMIN — LEVOTHYROXINE SODIUM 88 MCG: 88 TABLET ORAL at 08:19

## 2023-04-26 RX ADMIN — IBUPROFEN 400 MG: 200 TABLET, FILM COATED ORAL at 21:24

## 2023-04-26 RX ADMIN — ACETAMINOPHEN 325MG 975 MG: 325 TABLET ORAL at 08:19

## 2023-04-26 RX ADMIN — MIRTAZAPINE 15 MG: 15 TABLET, FILM COATED ORAL at 21:19

## 2023-04-26 RX ADMIN — SENNOSIDES AND DOCUSATE SODIUM 1 TABLET: 50; 8.6 TABLET ORAL at 21:19

## 2023-04-26 ASSESSMENT — ACTIVITIES OF DAILY LIVING (ADL)
ADLS_ACUITY_SCORE: 52

## 2023-04-26 NOTE — PROGRESS NOTES
Discharge Planner Post-Acute Rehab OT:      Discharge Plan: home with assist and HHOT     Precautions: crani, falls, WBAT RLE, per chart review no formal hip precautions, [place] with no hearing aids     Current Status:  ADLs:    Mobility: CGA with walker with ambulation in BR. CGA-Min A for STS with walker    Grooming: SBA with FWW EOS    Dressing: UB: set-up and supervision seated; SBA with LBD tasks with walker and AE. Min A with shoelaces.    Bathing: Min-mod A with W/C<>extended tub bench    Toileting: close SBA with toilet transfer with RTS, GB, and FWW. SBA with seated murphy-cares.  IADLs: Reports daughter assist with some IADLs at baseline, will continue to assess  Vision/Cognition: Demonstrated deficits in problem solving t/o and appears self-limiting at times, will continue to monitor and assess     Assessment: Focused on partial ADL routine with walker. Re-educated and provided written instructions for pt on low BP signs/symptoms and management of low BP. Applied compression socks and binder d/t low BP 95/53 seated.     Other Barriers to Discharge (DME, Family Training, etc):   AE/DME: extended tub bench, RTS, FWW, reacher, long handled sponge, LHSH, sock aid-no [employer] coverage, daughter emailed list of equipment to purchase  Family Training Sunday 4/28/23: 9AM-11AM PM OT, PT, SLP

## 2023-04-26 NOTE — PROGRESS NOTES
Attempted to meet with patient.  She reported no major mood concerns and is eager to discharge on Friday.  She decline psychology services at this time.     Given her discharge is on Friday, further psychology involvement is not indicated.  I am happy to provide additional consultation as needed.     Parvin Pond PsyD, LP    Not a billable visit.

## 2023-04-26 NOTE — PLAN OF CARE
Discharge Planner Post-Acute Rehab SLP:      Discharge Plan: Home with daughter. Ongoing therapy     Precautions: fall     Current Status:  Hearing: [place],  does not use hearing aids  Vision: Uses glasses for close up  Communication: WFL  Cognition: WFL CLQT. Demonstrating mod-severe difficulty with higher level executive skills.  Swallow: regular diet, thin liquids. Not assessed on ARU     Assessment: Reviewed assigned tasks, pt completed decoding and schedule planning tasks with 100% accuracy. Pt educated in cognitive activities to complete on own at home to promote/maintain cognitive skills- jigsaw puzzles, crosswords, word searches, reading tasks.RN arrived to check BP, reported pt did well on MAP this a.m.     Other Barriers to Discharge (Family Training, etc): None anticipated by SLP. Will need increased support with IADLs from dtr if able to provide that support

## 2023-04-26 NOTE — PROGRESS NOTES
"Per PA, no O2 changes from her home setting. SWer has emailed pt dtr multiple times to get the O2 company name, reviewed the chart, and even called some agencies without luck or more information. Called pt dtr this afternoon. Pt dtr apologized and stated that she will do her best to look into it once home and email this writer once more information determined. Regardless, no O2 needs before discharge. Dtr confirmed that she will bring portable tank from home and can contact the O2 company if needed. Informed pt dtr of HC set up and services. Pt dtr in agreement and aware that contact information in AVS for reference. Dtr denied additional needs, questions, or concerns.     Met with pt at bedside. IMM and IRF questions completed. Pt shared that she prefer f/u apts early in AM or end of day. HUC notified. Updated pt on HC and pt declined HC services. Pt stated that she and her dtr \"can do it all\". Pt denied additional questions or concerns.     Met with primary PT Dru. Both in agreement with keeping HC set up as pt reports are inconsistent. Dru will discuss with pt and dtr together during family training on morning of discharge. Dru will update PA and covering SWer if anything changes. Will continue to plan for HC. No need to fax any clinicals or orders due to [facility] having Epic access. No other SW needs at this time.    Home Health Care:   [facility]   PH: [phone], Fax: [phone]   Nurse, physical therapy, occupational therapy, speech therapy, and home health aide      [facility] Pain Assessment  Pain Effect on Sleep  \"Over the past 5 days, how much of the time has pain made it hard for you to sleep at night?\"    1. Rarely or not at all     Pain Interference with Therapy Activities  \"Over the past 5 days, how often have you limited your participation in rehabilitation therapy sessions due to pain?\"   2. Occasionally    Pain Interference with Day-to-Day Activities  \"Over the past 5 days, how often " "have you limited your day-to-day activities (excluding rehabilitation therapy sessions) because of pain?\"   1. Rarely or not at all     BASSEM Poon   [place] [facility]   Direct Phone: [phone]  I   Pager: [phone]  I  Fax: [phone]      "

## 2023-04-26 NOTE — PLAN OF CARE
Acute Rehab Care Conference/Team Rounds    Type: Team Rounds    Present: Dr. Francisco Doss, Abimbola Thompson PA, Dr. Parvin Pond Neuropsychologist, Dru España PT, Terra Casillas OT, Zhagn Escobar SLP, Alissa Duenas [facility], Michelle Contreras RD, Jocelin Marino RN, Mercedes Fry Patient Care Supervisor, and Josephine Nicole Patient.     Discharge Barriers/Treatment/Education    Rehab Diagnosis:  S/p meningioma resection     Active Medical Co-morbidities/Prognosis:     Patient Active Problem List   Diagnosis Code     Cerebral meningioma (H) D32.0     Fall, initial encounter W19.XXXA     Closed displaced fracture of right femoral neck (H) S72.001A     S/P resection of meningioma Z98.890, Z86.018         Safety: AOx4. Can be forgetful. Call light within reach at all times. Pt able to make needs known. Bed alarm on for safety.    Pain: Pt c/o R hip pain- managed with lidocaine patches, tylenol and PRN ibuprofen    Medications, Skin, Tubes/Lines: On MAP- pt called for evening meds and able to pick meds correctly, except didn't call for inhaler at 2000. Takes pills whole. Skin- R hip and scalp incisions YAZ, CDI. No tubes/lines.    Swallowing/Nutrition: No dysphagia related goals.    Bowel/Bladder: Continent of bowel and bladder. Voiding without difficulty. LBM 4/24/23    Psychosocial: Lives with dtr in a home. All needs met on main level. Indep PTA. Legally  from spouse. Family supportive. No financial concerns. Pt reported she has Depression, anxiety, panic attacks, claustrophobia, and is afraid of the dark. No substance abuse concerns.      ADLs/IADLs: Pt is making slow but steady progress with ADLs. Pt requires set-up with UBD tasks and SBA/CGA with LBD tasks with use of AE for LBD tasks. Pt requires SBA with G/H tasks standing at EOS with walker. Pt requires CGA with toileting with walker, raised toilet seat, and grab bars. Family training with daughter to be completed on 4/28/23 prior to discharge.  Daughter to assist with ADLs/IADLs upon discharge. Daughter has ordered raised toilet seat, extended tub bench, and total hip kit. HC OT services upon discharge.     Mobility: Pt continues to make steady progress and on track for discharge end of the week. Generally SBA for bed mobility, transfers, gait with FWW up to 200' with standing breaks. CGA for 4 stairs with railing. No rail on stairs to enter - will pivot to w/c and rolling in from platform, will training daughter on this. Will issue FWW at discharge.  Barone/8: 4/56  4/15: :   5xSTS:  - : 0  - : 0 (unable with BUE)  10MWt:  - : 0 m/s  - : 0.21 m/s    Cognition/Language: Patient has shown improvement in cognition since last week and has been able to be engaged in more complex reasoning/problem solving tasks. Will benefit from initial oversight with IADL tasks to ensure accuracy. Ongoing SLP interventions upon facility discharge.     Community Re-Entry: W/c based per baseline    Transportation: Family to provide    Decision maker: self    Plan of Care and goals reviewed and updated.    Discharge Plan/Recommendations    Fall Precautions: continue    Patient/Family input to goals: yes     Estimated length of stay: 16 days     Overall plan for the patient: reach a level of mod I       Utilization Review and Continued Stay Justification    Medical Necessity Criteria:    For any criteria that is not met, please document reason and plan for discharge, transfer, or modification of plan of care to address.    Requires intensive rehabilitation program to treat functional deficits?: Yes    Requires 3x per week or greater involvement of rehabilitation physician to oversee rehabilitation program?: Yes    Requires rehabilitation nursing interventions?: Yes    Patient is making functional progress?: Yes    There is a potential for additional functional progress? Yes    Patient is participating in therapy 3 hours per day a minimum of 5 days  per week or 15 hours per week in 7 day period?:Yes    Has discharge needs that require coordinated discharge planning approach?:Yes      Barriers/Concerns related to meeting medical necessity criteria:  none    Team Plan to Address Concern:  As needed       Final Physician Sign off    Statement of Approval:  Francisco Doss, DO      Patient Goals  Social Work Goals: Confirm discharge recommendations with therapy, coordinate safe discharge plan and remain available to support and assist as needed.    OT Predicted Duration/Target Date for Goal Attainment: 04/28/23  Therapy Frequency (OT): Daily  OT: Hygiene/Grooming: modified independent  OT: Upper Body Dressing: Modified independent  OT: Lower Body Dressing: Modified independent  OT: Upper Body Bathing: Supervision/stand-by assist  OT: Lower Body Bathing: Supervision/stand-by assist  OT: Toilet Transfer/Toileting: Modified independent  OT: Cognitive: Patient/caregiver will verbalize understanding of cognitive assessment results/recommendations as needed for safe discharge planning  OT: Goal 1: Pt will perform tub/shower transfer with Min A and DME as needed.    PT Predicted Duration/Target Date for Goal Attainment: 04/28/23  PT Frequency: 2x/day  PT: Bed Mobility: Modified independent, Supine to/from sit  PT: Transfers: Supervision/stand-by assist, Sit to/from stand, Assistive device  PT: Gait: Supervision/stand-by assist, 50 feet, Rolling walker  PT: Stairs: 2 stairs, Minimal assist, Assistive device  PT: Goal 1: PT: Primitivo with car transfer  PT: Goal 2: PT: Pt will verbalize 3 strategies to decrease risk of falls at home                          SLP Predicted Duration/Target Date for Goal Attainment: 04/28/23  Therapy Frequency (SLP Eval): daily   SLP: Goal 1: Pt will participate in additional cognitive testing as needed.  SLP: Goal 2: Pt will complete higher level executive function tasks with 80% accuracy independently.  SLP: Goal 3: Pt will complete functional  memory tasks with 80% accuracy independently      Nursing Goals  Bowel and Bladder care  Fall prevention   Medication Education  Skin Care protection

## 2023-04-26 NOTE — PROGRESS NOTES
"  [facility]   Acute Rehabilitation Unit  Daily progress note    INTERVAL HISTORY  Josephine Nicole was seen at bedside this morning during team rounds.  No acute events reported overnight.  Patient reports that she is feeling good overall.  No recurrent headache today.  BP has been soft the past 2 days but asymptomatic and not limiting per therapies.  Encouraged ongoing fluid intake.    Functionally, she is still needing fluctuating levels of assist for sit to stand pending fatigue.  Also requires assist for bathing.  She is generally SBA for mobility, takes frequent standing breaks.  On track for discharge home on Friday after family training with her daughter.  For full functional updates, see team rounds note from today.      MEDICATIONS    - Medication Assessment Program - Rehab Services   Does not apply See Admin Instructions     acetaminophen  975 mg Oral TID     amitriptyline  10 mg Oral At Bedtime     atorvastatin  10 mg Oral Daily     enoxaparin ANTICOAGULANT  40 mg Subcutaneous Q24H     fluticasone-vilanterol  1 puff Inhalation Daily     furosemide  20 mg Oral Daily     levothyroxine  88 mcg Oral Daily     lidocaine  1-3 patch Transdermal Q24H    And     lidocaine   Transdermal Q8H NING     melatonin  1 mg Oral At Bedtime     mirtazapine  15 mg Oral At Bedtime     pantoprazole  40 mg Oral QAM AC     polyethylene glycol  17 g Oral Daily     senna-docusate  1 tablet Oral At Bedtime     sertraline  25 mg Oral QAM        bisacodyl, hydrOXYzine, ibuprofen, levalbuterol, levalbuterol, naloxone **OR** naloxone **OR** naloxone **OR** naloxone, ondansetron, oxyCODONE     PHYSICAL EXAM  /62 (BP Location: Left arm)   Pulse 92   Temp (!) 96.7  F (35.9  C) (Axillary)   Resp 18   Ht 1.585 m (5' 2.4\")   Wt 58.7 kg (129 lb 8 oz)   SpO2 92%   BMI 23.38 kg/m     Gen: NAD, sitting in bed  HEENT: bifrontal craniotomy incision healing well  Cardio: appears well-perfused  Pulm: " non-labored on 2L by NC  Abd: soft, non-tender, non-distended  Ext: no appreciable edema in bilateral lower extremities  Neuro/MSK: awake, alert, moving all extremities in bed  *Full exam deferred today for conversation    LABS  Last Basic Metabolic Panel:  Recent Labs   Lab Test 04/24/23  0631 04/20/23  0637 04/17/23  0947    139 138   POTASSIUM 3.9 3.8 3.6   CHLORIDE 102 101 100   CO2 32* 30* 31*   ANIONGAP 7 8 7   GLC 91 106* 84   BUN 9.7 11.1 13.1   CR 0.52 0.45* 0.43*   GFRESTIMATED >90 >90 >90   CHAO 9.1 8.8 8.8     CBC RESULTS:   Recent Labs   Lab Test 04/24/23  0631 04/20/23  0637 04/17/23  0947   WBC 3.7* 3.7* 7.5   RBC 3.15* 3.07* 3.27*   HGB 9.2* 9.4* 9.8*   HCT 30.4* 29.4* 31.2*   MCV 97 96 95   MCH 29.2 30.6 30.0   MCHC 30.3* 32.0 31.4*   RDW 16.0* 15.7* 15.6*    155 156       Rehabilitation - continue comprehensive acute inpatient rehabilitation program with multidisciplinary approach including therapies, rehab nursing, and physiatry following. See interval history for updates.      ASSESSMENT AND PLAN  Josephine Nicole is a 73 year old right hand dominant female with a past medical history of cerebral meningioma initially diagnosed 2019, s/p radiation in 11/2022 with recent progression including mass effect and midline shift of 3/17/23 MRI, COPD on 2L oxygen at baseline, hypothyroidism, hyperlipidemia, osteoporosis, depression/anxiety, vitamin B12 deficiency, and anemia who was admitted on 3/19/23 after fall at home in setting of several weeks of worsening confusion, impaired balance with imaging revealing right femoral neck fracture s/p right hip hemiarthroplasty.  He was then transferred to [facility] on 3/23/23 due worsening respiratory failure (COPD exacerbation), hypotension, lactic acidosis, and worsening encephalopathy in setting of progressive meningioma now s/p craniotomy and resection of meningioma on 3/30/23 with hospital course further complicated by lactic acidosis, anemia, left  eye ptosis, urinary retention, pain, and hypernatremia.  She is now admitted to ARU on 4/7/23 for multidisciplinary rehabilitation and ongoing medical management.        Admission to acute inpatient rehab 4/7/23.    Impairment group code: Brain Dysfunction 02.1 Non-Traumatic: s/p right bifrontal stealth craniotomy and resection of meningioma due to vasogenic cerebral edema and brain compression along with R femoral neck fracture s/p right hip hemiarthroplasty        1. PT, OT and SLP 60 minutes of each on a daily basis, in addition to rehab nursing and close management of physiatrist.       2. Impairment of ADL's: Noted to have impaired activity tolerance, impaired balance, impaired cognition, impaired coordination, impaired judgement and safety awareness, impaired strength, impaired tone and impaired weight shifting, all affecting her ability to safely and independently perform basic ADLs.  Goal for mod I with basic ADLs.     3. Impairment of mobility:  Noted to have impaired activity tolerance, impaired balance, impaired cognition, impaired coordination, impaired judgement and safety awareness, impaired strength, impaired tone and impaired weight shifting, all affecting her ability to safely and independently perform basic mobility.  Goal for mod I with basic mobility.     4. Impairment of cognition/language/swallow:  Noted to have impaired cognition, will need full cognitive linguistic evaluation with SLP while on ARU with goals for improved cognitive-linguistic skills to meet basic needs.        5. Medical Conditions  New actions/orders/updates for today are in blue.     S/p bifrontal craniotomy and resection of meningioma on 3/30/23 with Dr. Murphy  CNS WHO grade 1 meningioma (right frontal) with moderate surrounding vasogenic edema and mass effect  Diagnosed in 2019 after presenting with refractory headaches.  Status post radiation treatment 11/2022.  Recent brain MRI revealing progression.  Patient presented  after fall in setting of several week history of worsening confusion, gait impairment, balance deficits.  CT head with known mass surrounding vasogenic edema predominantly involving the right frontal lobe with right to left midline shift/subfalcine herniation measuring up to 1.1 cm.  Neurosurgery consulted and recommended medical optimization and transfer to [facility] for surgical intervention, which she underwent as above on 3/30.  - Decadron taper completed per neurosurgery, off since 4/14  - Pain management: Tylenol 975 mg TID  - Wound care: keep clean and dry, leave open to air.  Wound check and suture removal completed by rehab team on 4/14.   medication.   - Continue PT/OT/SLP  - Follow up with neurosurgery on 5/15 ([facility]) and 6/30 (Dr. Murphy)  - Follow up with neuro-oncology?    Acute on chronic tension-type headaches, migrainous features, possible component of rebound  Recurrent intermittent frontal headaches starting ~4/16, sometimes associated with photosensitivity and nausea.  Reports has had chronic headaches for approx 7 years, most relief while on steroids.   Discussed possible diagnosis and the potential treatment options 04/25/23.  Also discussed risk of rebound headaches with as needed doses of any medications more than 3 times per week.  Her headaches are most likely tension headaches associated with rebound headaches in the setting of recent surgery.  - Started amitriptyline 10 mg on 4/25 as preventive medication; will titrate by 10 mg every 2 weeks if tolerated (discussed with pharmacist)  - Continue with scheduled Tylenol  - Continue ice and other nonmedication options  - Should avoid as needed doses of ibuprofen more than 3 times per week  - Should also avoid oxycodone for the management of headaches given the highest risk of rebound headaches  - Discussed referral to headache neurology clinic at the [facility] but this is not a priority given her other medical issues  - 4/26: no recurrent  headache.  Continue to monitor     Displaced right femoral neck fracture s/p right hip hemiarthroplasty (anterolateral approach) on 3/20/23 with Dr. Shun Cuevas  Pathologic fracture secondary to osteoporosis  Fractures of the right superior and inferior pubic rami and the left pubic body extending into the left pubic rami, stable on imaging 4/3  - WBAT RLE with walker  - Wound care: Surgical dressing removed by ortho on 4/3. Per their recs at that time: May keep site open to air. Please allow remaining Dermabond to slough off naturally (no picking at site). Okay to shower, but no soaking/submersion x 1 more week.   - Pain management as above  - Per ortho, osteoporosis workup and treatment with primary care provider within 2 months.  - Noted to have increased right hip pain starting 4/15.  Xray revealed no concerns with right hip hemiarthoplasty; bilateral superior and inferior pubic rami fractures with mild displacement, which were noted on prior imaging as well.  Assessed by ortho, who recommended ongoing conservative management wtih WBAT, ongoing PT, pain management, DVT prophylaxis, and OP follow-up in bone health clinic and ortho.  Would recommend to complete at least 3 mos of rehab before any consideration of surgical treatment (right pelvic ring fixation).  - Added oxycodone 2.5 mg q6h PRN but patient requested NSAIDs.  Risk/benefit of NSAID use (including bleeding risk) discussed with patient, who felt this risk was acceptable, per pharmacy, low risk with ibuprofen 400 mg q6h PRN.  Monitor for any signs/symptoms of bleeding.   - Continue PT/OT  - Follow up with Dr. Shun Cuevas at [facility] at six weeks post-op (~5/1/23) with X-rays. If remains at ARU at that time, can have wound check and xrays performed there and sent to Dr. Cuevas for review.     COPD, recent exacerbation  Acute on chronic hypoxic/hypercapnic respiratory failure  Possible community acquired pneumonia, treated  Former smoker x50 years.   On 2-3L of oxygen PTA for past ~5 years.  Following hip surgery, had acute on chronic hypoxic/hypercapnic respiratory failure requiring BiPAP.  Completed course of azithromycin 3/27 and ceftriaxone 4/4.  Respiratory status improved with diuresis so in part may be related to heart failure as below.  As of admission to ARU, stable on 2L, which is baseline.  - Monitor respiratory status  - Continue supplemental oxygen to maintain spO2>88% (goal range 88-93%)  - Continue Breo Ellipta 200-25 mcg inhaler daily (formulary sub for PTA Advair Diskus 500/50 mcg inhaler)  - Duoneb not available due to supply issues, ordered PRN xopenex neb.    - Pulmonary hygiene/toilet  - Continue oral suction PRN for more tenuous secretions     HFpEF with recent exacerbation with fluid overload  Intermittent diuresis during this admission with improved respiratory status.  Echo 3/27/23 with EF 55-60%, grade I or early diastolic dysfunction.  Noted to have lower extremity edema.  Started on Lasix daily on 4/5, also ordered 2L fluid restriction.  Fluid restriction discontinued on 4/18 due to poor intake, low BP, no evidence of fluid overload.  - BP has been soft past several days.  Weight trend has been up and down, so difficult to interpret.  No peripheral edema, respiratory symptoms stable.  Decrease Lasix to 10 mg daily, monitor.  - Continue to monitor for any evidence of recurrent volume overload  - Daily weights, monitor volume status     Acute on chronic anemia  Hgb 10.5 on admission, dropped to benson of 7.8 on 3/26.  Did receive 1 unit pRBC.  Stable in 10s at discharge to ARU (baseline).  - Trend CBC every M/Th.  4/24: Hgb stable at 9.4     Mild left intermittent ptosis  Noted on 4/4.  CT head with expected post-operative changes.  Patient reports she has noted this for at least 1 year.    - Per sending team, given chronicity, recommended to defer additional work-up to outpatient setting as appropriate.     Hyperlipidemia  - Continue  PTA atorvastatin     Adjustment disorder with anxious mood  Hx Depression/anxiety  Fatigue  - Continue mirtazapine 15 mg at HS (increased from PTA dose on 4/12).  Improved daytime alertness since.    - Continue Zoloft 25 mg daily  - Seen by health psych on 4/18, appreciate assistance especially with strategies for ARU team to optimize participation.  Please see note by Dr. Pond on 4/18.     Hypothyroidism  - Continue PTA levothyroxine     Vitamin B12 deficiency  - Continue PTA B12 injection 1000 mcg q30 days, last given on 4/3     Urinary retention, resolved  Baldwin placed 3/24, failed TOV on 4/4.  Urology consulted 4/5 and recommended to replace baldwin. Per urology, retention likely due to recent anesthesia, immobilization, and narcotic pain medications; additional recs as below.  Baldwin removed on 4/13, voiding well with no evidence of retention.    Leukopenia  Leukocytosis, resolved  WBC mildly elevated at 11.7 on 4/10, has been previously mildly elevated though had normalized to 9.6 on 4/8.  Suspect 2/2 steroids.  No localizing s/sx of infection.  - Continue to trend CBC every M/Th.  4/24: WBC now low at 3.7.  ?related to viral illness though no other clear signs/symptoms.      Mild hyponatremia, resolved  Na mildly low at 135 on 4/10.  Asymptomatic.  - Trend BMP.  4/24: Na WNL at 141        6. Adjustment to disability:  Clinical psychology to eval and treat if indicated  7. FEN: regular diet, thin liquids  8. Bowel: continent, monitor, scheduled and PRN bowel meds available  9. Bladder: as above, baldwin removed 4/13 for TOV and is voiding well, encouraged to avoid use of Purewick  10. DVT Prophylaxis: subcutaneous Lovenox, ok'd to start on 4/3 per neurosurgery  11. GI Prophylaxis: PPI  12. Code: DNR/DNI, discussed with palliative care during inpatient admission, confirmed at admission  13. Disposition: home  14. ELOS:  4/28/23  15. Follow up Appointments on Discharge: PCP in 1-2 weeks, neurosurgery (scsheduled  on 5/15 and 6/30), neuro-oncology?, ortho (Dr. Shun Cuevas ~5/1/23; care coordinator is Neha Way at [phone] for scheduling) with repeat xrays, neurology (headaches)      Patient was seen and discussed with Dr. Francisco Doss, PM&R staff physician     Abimbola Thompson PA-C  Physical Medicine & Rehabilitation

## 2023-04-26 NOTE — PLAN OF CARE
VS: Temp: (!) 96.2  F (35.7  C) Temp src: Axillary BP: 95/53 Pulse: 88   Resp: 16 SpO2: 94 % O2 Device: None (Room air) Oxygen Delivery: 1 LPM     W/ soft BP, fluids provided and encouraged recheck /43   O2: On O2 1L via NC, sats >92%   Output: Voids without difficulties using toilet   Last BM: 4/26/23   Activity: Assist of 1 w/ walker and GB   Skin: Intact   Pain: R hip pain managed w/ PRN ibuprofen and scheduled lidocaine patches.   CMS: Pt is alert and oriented x4. Denies numbness or tingling   Dressing: None    Diet: Regular diet   LDA: None    Equipment: Personal belongings   Plan: TBD. Call light is in reach, continue w/ POC.   Additional Info: Pt is on MAP. Called for morning medications.  Compression stocking and abdominal binder when OOB d/t soft BPs

## 2023-04-26 NOTE — PLAN OF CARE
"Discharge Planner Post-Acute Rehab PT:      Discharge Plan: HHPT, lives with daughter     Precautions: Falls, crani, WBAT R LE, 2 L NC 02 at baseline     Current Status:  Bed Mobility: mod-I - sleeps in recliner at baseline for past 6-7 years d/t COPD  Transfer: SBA FWW, min-A from low surface at times  Gait: 250' FWW SBA  Stairs: CGA 6\" x 12, rail x 2, step to pattern  Balance: UE support in standing     Barone/8: 4/56  4/15: :      5xSTS:  - 8: 0  - : 0 (unable with BUE     10MWt:  - 8: 0 m/s  - : 0.21 m/s     Assessment: IND day and issue FWW tomorrow. Family training Friday AM for her stair entry.     Other Barriers to Discharge (DME, Family Training, etc):  FWW - issue FWW  2STE no railing - rail not installed - will set chair on top of 2nd step able to sit and pivot up based on measurement  Family training - morning of discharge    "

## 2023-04-27 ENCOUNTER — APPOINTMENT (OUTPATIENT)
Dept: PHYSICAL THERAPY | Facility: CLINIC | Age: 73
DRG: 949 | End: 2023-04-27
Attending: PHYSICAL MEDICINE & REHABILITATION
Payer: COMMERCIAL

## 2023-04-27 ENCOUNTER — APPOINTMENT (OUTPATIENT)
Dept: OCCUPATIONAL THERAPY | Facility: CLINIC | Age: 73
DRG: 949 | End: 2023-04-27
Attending: PHYSICAL MEDICINE & REHABILITATION
Payer: COMMERCIAL

## 2023-04-27 ENCOUNTER — APPOINTMENT (OUTPATIENT)
Dept: SPEECH THERAPY | Facility: CLINIC | Age: 73
DRG: 949 | End: 2023-04-27
Attending: PHYSICAL MEDICINE & REHABILITATION
Payer: COMMERCIAL

## 2023-04-27 LAB
ANION GAP SERPL CALCULATED.3IONS-SCNC: 8 MMOL/L (ref 7–15)
BUN SERPL-MCNC: 12 MG/DL (ref 8–23)
CALCIUM SERPL-MCNC: 9.3 MG/DL (ref 8.8–10.2)
CHLORIDE SERPL-SCNC: 99 MMOL/L (ref 98–107)
CREAT SERPL-MCNC: 0.51 MG/DL (ref 0.51–0.95)
DEPRECATED HCO3 PLAS-SCNC: 32 MMOL/L (ref 22–29)
ERYTHROCYTE [DISTWIDTH] IN BLOOD BY AUTOMATED COUNT: 16.2 % (ref 10–15)
GFR SERPL CREATININE-BSD FRML MDRD: >90 ML/MIN/1.73M2
GLUCOSE SERPL-MCNC: 98 MG/DL (ref 70–99)
HCT VFR BLD AUTO: 29.7 % (ref 35–47)
HGB BLD-MCNC: 9 G/DL (ref 11.7–15.7)
MCH RBC QN AUTO: 29.6 PG (ref 26.5–33)
MCHC RBC AUTO-ENTMCNC: 30.3 G/DL (ref 31.5–36.5)
MCV RBC AUTO: 98 FL (ref 78–100)
PLATELET # BLD AUTO: 276 10E3/UL (ref 150–450)
POTASSIUM SERPL-SCNC: 4 MMOL/L (ref 3.4–5.3)
RBC # BLD AUTO: 3.04 10E6/UL (ref 3.8–5.2)
SODIUM SERPL-SCNC: 139 MMOL/L (ref 136–145)
WBC # BLD AUTO: 5.1 10E3/UL (ref 4–11)

## 2023-04-27 PROCEDURE — 97535 SELF CARE MNGMENT TRAINING: CPT | Mod: GO | Performed by: OCCUPATIONAL THERAPIST

## 2023-04-27 PROCEDURE — 97530 THERAPEUTIC ACTIVITIES: CPT | Mod: GP | Performed by: REHABILITATION PRACTITIONER

## 2023-04-27 PROCEDURE — 128N000003 HC R&B REHAB

## 2023-04-27 PROCEDURE — 250N000013 HC RX MED GY IP 250 OP 250 PS 637: Performed by: STUDENT IN AN ORGANIZED HEALTH CARE EDUCATION/TRAINING PROGRAM

## 2023-04-27 PROCEDURE — 36415 COLL VENOUS BLD VENIPUNCTURE: CPT | Performed by: PHYSICIAN ASSISTANT

## 2023-04-27 PROCEDURE — 97110 THERAPEUTIC EXERCISES: CPT | Mod: GO | Performed by: OCCUPATIONAL THERAPIST

## 2023-04-27 PROCEDURE — 99232 SBSQ HOSP IP/OBS MODERATE 35: CPT | Performed by: PHYSICIAN ASSISTANT

## 2023-04-27 PROCEDURE — 250N000013 HC RX MED GY IP 250 OP 250 PS 637: Performed by: PHYSICAL MEDICINE & REHABILITATION

## 2023-04-27 PROCEDURE — 250N000011 HC RX IP 250 OP 636: Performed by: PHYSICIAN ASSISTANT

## 2023-04-27 PROCEDURE — 97129 THER IVNTJ 1ST 15 MIN: CPT | Mod: GN

## 2023-04-27 PROCEDURE — 85027 COMPLETE CBC AUTOMATED: CPT | Performed by: PHYSICIAN ASSISTANT

## 2023-04-27 PROCEDURE — 250N000013 HC RX MED GY IP 250 OP 250 PS 637: Performed by: PHYSICIAN ASSISTANT

## 2023-04-27 PROCEDURE — 82310 ASSAY OF CALCIUM: CPT | Performed by: PHYSICIAN ASSISTANT

## 2023-04-27 PROCEDURE — 97130 THER IVNTJ EA ADDL 15 MIN: CPT | Mod: GN

## 2023-04-27 RX ORDER — LIDOCAINE 4 G/G
1-3 PATCH TOPICAL EVERY 24 HOURS
Qty: 20 PATCH | Refills: 0 | Status: SHIPPED | OUTPATIENT
Start: 2023-04-27

## 2023-04-27 RX ORDER — AMOXICILLIN 250 MG
1 CAPSULE ORAL AT BEDTIME
Qty: 30 TABLET | Refills: 0 | Status: SHIPPED | OUTPATIENT
Start: 2023-04-27

## 2023-04-27 RX ORDER — POLYETHYLENE GLYCOL 3350 17 G/17G
17 POWDER, FOR SOLUTION ORAL DAILY
Qty: 510 G | Refills: 0 | Status: SHIPPED | OUTPATIENT
Start: 2023-04-27

## 2023-04-27 RX ORDER — IBUPROFEN 400 MG/1
400 TABLET, FILM COATED ORAL EVERY 6 HOURS PRN
COMMUNITY
Start: 2023-04-27

## 2023-04-27 RX ORDER — MIRTAZAPINE 15 MG/1
15 TABLET, FILM COATED ORAL AT BEDTIME
Qty: 30 TABLET | Refills: 0 | Status: SHIPPED | OUTPATIENT
Start: 2023-04-27

## 2023-04-27 RX ORDER — AMITRIPTYLINE HYDROCHLORIDE 10 MG/1
10 TABLET ORAL AT BEDTIME
Qty: 30 TABLET | Refills: 0 | Status: SHIPPED | OUTPATIENT
Start: 2023-04-27 | End: 2023-04-27

## 2023-04-27 RX ORDER — ACETAMINOPHEN 325 MG/1
650 TABLET ORAL EVERY 4 HOURS PRN
Qty: 20 TABLET | Refills: 0 | Status: SHIPPED | OUTPATIENT
Start: 2023-04-27

## 2023-04-27 RX ORDER — AMITRIPTYLINE HYDROCHLORIDE 10 MG/1
TABLET ORAL
Qty: 46 TABLET | Refills: 0 | Status: SHIPPED | OUTPATIENT
Start: 2023-04-27 | End: 2023-05-27

## 2023-04-27 RX ADMIN — SENNOSIDES AND DOCUSATE SODIUM 1 TABLET: 50; 8.6 TABLET ORAL at 21:33

## 2023-04-27 RX ADMIN — FLUTICASONE FUROATE AND VILANTEROL TRIFENATATE 1 PUFF: 200; 25 POWDER RESPIRATORY (INHALATION) at 21:34

## 2023-04-27 RX ADMIN — AMITRIPTYLINE HYDROCHLORIDE 10 MG: 10 TABLET, FILM COATED ORAL at 21:33

## 2023-04-27 RX ADMIN — PANTOPRAZOLE SODIUM 40 MG: 40 TABLET, DELAYED RELEASE ORAL at 08:15

## 2023-04-27 RX ADMIN — ENOXAPARIN SODIUM 40 MG: 40 INJECTION SUBCUTANEOUS at 14:39

## 2023-04-27 RX ADMIN — ACETAMINOPHEN 325MG 975 MG: 325 TABLET ORAL at 21:33

## 2023-04-27 RX ADMIN — LEVOTHYROXINE SODIUM 88 MCG: 88 TABLET ORAL at 08:15

## 2023-04-27 RX ADMIN — LIDOCAINE PATCH 4% 3 PATCH: 40 PATCH TOPICAL at 08:14

## 2023-04-27 RX ADMIN — Medication 1 MG: at 21:33

## 2023-04-27 RX ADMIN — IBUPROFEN 400 MG: 200 TABLET, FILM COATED ORAL at 08:13

## 2023-04-27 RX ADMIN — POLYETHYLENE GLYCOL 3350 17 G: 17 POWDER, FOR SOLUTION ORAL at 08:14

## 2023-04-27 RX ADMIN — IBUPROFEN 400 MG: 200 TABLET, FILM COATED ORAL at 21:36

## 2023-04-27 RX ADMIN — ATORVASTATIN CALCIUM 10 MG: 10 TABLET, FILM COATED ORAL at 08:15

## 2023-04-27 RX ADMIN — MIRTAZAPINE 15 MG: 15 TABLET, FILM COATED ORAL at 21:33

## 2023-04-27 RX ADMIN — ACETAMINOPHEN 325MG 975 MG: 325 TABLET ORAL at 08:15

## 2023-04-27 RX ADMIN — ACETAMINOPHEN 325MG 975 MG: 325 TABLET ORAL at 14:38

## 2023-04-27 RX ADMIN — SERTRALINE HYDROCHLORIDE 25 MG: 25 TABLET ORAL at 08:15

## 2023-04-27 RX ADMIN — Medication 10 MG: at 08:15

## 2023-04-27 ASSESSMENT — ACTIVITIES OF DAILY LIVING (ADL)
ADLS_ACUITY_SCORE: 51
ADLS_ACUITY_SCORE: 52
ADLS_ACUITY_SCORE: 51
ADLS_ACUITY_SCORE: 52
ADLS_ACUITY_SCORE: 51
ADLS_ACUITY_SCORE: 52
ADLS_ACUITY_SCORE: 51

## 2023-04-27 NOTE — PLAN OF CARE
FOCUS/GOAL  Bowel management, Bladder management, Pain management, Mobility, Skin integrity, and Safety management    ASSESSMENT, INTERVENTIONS AND CONTINUING PLAN FOR GOAL:  Patient is alert and oriented x 4. A-1 walker. Regular/thin take pills whole. Continent of B/B last BM 04/26.  PRN ibuprofen x 1 for right hip pain was effective.  Patient denied headache, dizziness, CP. SOB with ambulatio. Received shower. No care concern at this time. On 1 L continuous oxygen. VS WDL Call light is in reach alarm is on.   Goal Outcome Evaluation:

## 2023-04-27 NOTE — PROGRESS NOTES
Discharge Planner Post-Acute Rehab OT:      Discharge Plan: home with assist and HHOT     Precautions: crani, falls, WBAT RLE, per chart review no formal hip precautions, [place] with no hearing aids     Current Status:  ADLs:    Mobility: SBA with walker with ambulation in BR. CGA-Min A for STS with walker    Grooming: SBA with FWW EOS    Dressing: UB: IND seated; SBA with LBD tasks with walker and AE. Min A with shoelaces.    Bathing: SBA with transfer W/C<>extended tub bench. SBA w/washing, rinsing, drying 9/10 body parts.    Toileting: SBA with toilet transfer with RTS, GB, and FWW. SBA with seated murphy-cares.  IADLs: Reports daughter assist with some IADLs at baseline, will continue to assess  Vision/Cognition: Demonstrated deficits in problem solving t/o and appears self-limiting at times, will continue to monitor and assess     Assessment: Completed independence day with shower and ADLs. Pt continues to require cues for O2 line mgmt. Family training tomorrow with daughter to review level of A with FB dressing, toileting, and tub transfers with tub bench.      Other Barriers to Discharge (DME, Family Training, etc):   AE/DME: extended tub bench, RTS, FWW, reacher, long handled sponge, LHSH, sock aid-no [employer] coverage, daughter emailed list of equipment to purchase  Family Training Sunday 4/28/23: 9AM-11AM PM OT, PT, SLP

## 2023-04-27 NOTE — PLAN OF CARE
"Discharge Planner Post-Acute Rehab PT:      Discharge Plan: HHPT, lives with daughter     Precautions: Falls, crani, WBAT R LE, 2 L NC 02 at baseline     Current Status:  Bed Mobility: mod-I - sleeps in recliner at baseline for past 6-7 years d/t COPD  Transfer: SBA FWW, min-A from low surface at times  Gait: 250' FWW SBA  Stairs: CGA 6\" x 12, rail x 2, step to pattern  Balance: UE support in standing          Assessment: . Family training Friday AM for her stair entry. Pt given WW. Fit to size. Pt cont to progress with with all functional mob. On track for discharge Friday.      Other Barriers to Discharge (DME, Family Training, etc):  FWW - issue FWW  2STE no railing - rail not installed - will set chair on top of 2nd step able to sit and pivot up based on measurement  Family training - morning of discharge    "

## 2023-04-27 NOTE — PROGRESS NOTES
[facility]   Acute Rehabilitation Unit  Daily progress note    INTERVAL HISTORY  Josephine Nicole was seen at bedside this morning.  No acute events reported overnight.  Patient reports that she is feeling well overall and looking forward to discharge home tomorrow.  She has had no recurrent headache yesterday or today and is pleased about that.  She denies any dizziness or lightheadedness though BP has been soft the last few days.  Reports breathing and oxygen needs stable, as is her chronic cough.  Reports to be having regular nightly bowel movements, most recent was last night.  Endorses good sleep.  Feels she is hydrating well.  She asks some questions regarding discharge medications and follow-up appointments, which were addressed.  Denies other questions or concerns at this time.    Functionally, she is currently needing SBA for transfers with FWW with min A to rise from lower surfaces at times.  She needs SBA for ambulating up to 250' with FWW.      MEDICATIONS    - Medication Assessment Program - Rehab Services   Does not apply See Admin Instructions     acetaminophen  975 mg Oral TID     amitriptyline  10 mg Oral At Bedtime     atorvastatin  10 mg Oral Daily     enoxaparin ANTICOAGULANT  40 mg Subcutaneous Q24H     fluticasone-vilanterol  1 puff Inhalation Daily     furosemide  10 mg Oral Daily     levothyroxine  88 mcg Oral Daily     lidocaine  1-3 patch Transdermal Q24H    And     lidocaine   Transdermal Q8H NING     melatonin  1 mg Oral At Bedtime     mirtazapine  15 mg Oral At Bedtime     pantoprazole  40 mg Oral QAM AC     polyethylene glycol  17 g Oral Daily     senna-docusate  1 tablet Oral At Bedtime     sertraline  25 mg Oral QAM        bisacodyl, hydrOXYzine, ibuprofen, levalbuterol, levalbuterol, naloxone **OR** naloxone **OR** naloxone **OR** naloxone, ondansetron, oxyCODONE     PHYSICAL EXAM  BP 94/52 (BP Location: Left arm)   Pulse 87   Temp 97.3  F (36.3  " C) (Axillary)   Resp 16   Ht 1.585 m (5' 2.4\")   Wt 59.4 kg (130 lb 14.4 oz)   SpO2 96%   BMI 23.64 kg/m     Gen: NAD, sitting at edge of bed  HEENT: bifrontal craniotomy incision healing well  Cardio: RRR, no murmurs  Pulm: non-labored on 2L by NC, lungs CTA bilaterally  Abd: soft, non-tender, non-distended, bowel sounds present  Ext: trace edema in bilateral lower extremities  Neuro/MSK: awake, alert, moving all extremities      LABS  Last Basic Metabolic Panel:  Recent Labs   Lab Test 04/24/23  0631 04/20/23  0637 04/17/23  0947    139 138   POTASSIUM 3.9 3.8 3.6   CHLORIDE 102 101 100   CO2 32* 30* 31*   ANIONGAP 7 8 7   GLC 91 106* 84   BUN 9.7 11.1 13.1   CR 0.52 0.45* 0.43*   GFRESTIMATED >90 >90 >90   CHAO 9.1 8.8 8.8     CBC RESULTS:   Recent Labs   Lab Test 04/24/23  0631 04/20/23  0637 04/17/23  0947   WBC 3.7* 3.7* 7.5   RBC 3.15* 3.07* 3.27*   HGB 9.2* 9.4* 9.8*   HCT 30.4* 29.4* 31.2*   MCV 97 96 95   MCH 29.2 30.6 30.0   MCHC 30.3* 32.0 31.4*   RDW 16.0* 15.7* 15.6*    155 156       Rehabilitation - continue comprehensive acute inpatient rehabilitation program with multidisciplinary approach including therapies, rehab nursing, and physiatry following. See interval history for updates.      ASSESSMENT AND PLAN  Josephine Nicole is a 73 year old right hand dominant female with a past medical history of cerebral meningioma initially diagnosed 2019, s/p radiation in 11/2022 with recent progression including mass effect and midline shift of 3/17/23 MRI, COPD on 2L oxygen at baseline, hypothyroidism, hyperlipidemia, osteoporosis, depression/anxiety, vitamin B12 deficiency, and anemia who was admitted on 3/19/23 after fall at home in setting of several weeks of worsening confusion, impaired balance with imaging revealing right femoral neck fracture s/p right hip hemiarthroplasty.  He was then transferred to [facility] on 3/23/23 due worsening respiratory failure (COPD exacerbation), " hypotension, lactic acidosis, and worsening encephalopathy in setting of progressive meningioma now s/p craniotomy and resection of meningioma on 3/30/23 with hospital course further complicated by lactic acidosis, anemia, left eye ptosis, urinary retention, pain, and hypernatremia.  She is now admitted to ARU on 4/7/23 for multidisciplinary rehabilitation and ongoing medical management.        Admission to acute inpatient rehab 4/7/23.    Impairment group code: Brain Dysfunction 02.1 Non-Traumatic: s/p right bifrontal stealth craniotomy and resection of meningioma due to vasogenic cerebral edema and brain compression along with R femoral neck fracture s/p right hip hemiarthroplasty        1. PT, OT and SLP 60 minutes of each on a daily basis, in addition to rehab nursing and close management of physiatrist.       2. Impairment of ADL's: Noted to have impaired activity tolerance, impaired balance, impaired cognition, impaired coordination, impaired judgement and safety awareness, impaired strength, impaired tone and impaired weight shifting, all affecting her ability to safely and independently perform basic ADLs.  Goal for mod I with basic ADLs.     3. Impairment of mobility:  Noted to have impaired activity tolerance, impaired balance, impaired cognition, impaired coordination, impaired judgement and safety awareness, impaired strength, impaired tone and impaired weight shifting, all affecting her ability to safely and independently perform basic mobility.  Goal for mod I with basic mobility.     4. Impairment of cognition/language/swallow:  Noted to have impaired cognition, will need full cognitive linguistic evaluation with SLP while on ARU with goals for improved cognitive-linguistic skills to meet basic needs.        5. Medical Conditions  New actions/orders/updates for today are in blue.     S/p bifrontal craniotomy and resection of meningioma on 3/30/23 with Dr. Murphy  CNS WHO grade 1 meningioma (right  frontal) with moderate surrounding vasogenic edema and mass effect  Diagnosed in 2019 after presenting with refractory headaches.  Status post radiation treatment 11/2022.  Recent brain MRI revealing progression.  Patient presented after fall in setting of several week history of worsening confusion, gait impairment, balance deficits.  CT head with known mass surrounding vasogenic edema predominantly involving the right frontal lobe with right to left midline shift/subfalcine herniation measuring up to 1.1 cm.  Neurosurgery consulted and recommended medical optimization and transfer to [facility] for surgical intervention, which she underwent as above on 3/30.  - Decadron taper completed per neurosurgery, off since 4/14  - Pain management: Tylenol 975 mg TID  - Wound care: keep clean and dry, leave open to air.  Wound check and suture removal completed by rehab team on 4/14.   medication.   - Continue PT/OT/SLP  - Follow up with neurosurgery on 5/15 ([facility]) and 6/30 (Dr. Murphy)  - Follow up with neuro-oncology?    Acute on chronic tension-type headaches, migrainous features, possible component of rebound  Recurrent intermittent frontal headaches starting ~4/16, sometimes associated with photosensitivity and nausea.  Reports has had chronic headaches for approx 7 years, most relief while on steroids.   Discussed possible diagnosis and the potential treatment options 04/25/23.  Also discussed risk of rebound headaches with as needed doses of any medications more than 3 times per week.  Her headaches are most likely tension headaches associated with rebound headaches in the setting of recent surgery.  - Started amitriptyline 10 mg on 4/25 as preventive medication; will titrate by 10 mg every 2 weeks if tolerated (discussed with pharmacist)  - Continue with scheduled Tylenol  - Continue ice and other nonmedication options  - Should avoid as needed doses of ibuprofen more than 3 times per week  - Should also avoid oxycodone  for the management of headaches given the highest risk of rebound headaches  - Discussed referral to headache neurology clinic at the [facility] but this is not a priority given her other medical issues  - No recurrent headache past 2 days.  Continue to monitor     Displaced right femoral neck fracture s/p right hip hemiarthroplasty (anterolateral approach) on 3/20/23 with Dr. Shun Cuevas  Pathologic fracture secondary to osteoporosis  Fractures of the right superior and inferior pubic rami and the left pubic body extending into the left pubic rami, stable on imaging 4/3  - WBAT RLE with walker  - Wound care: Surgical dressing removed by ortho on 4/3. Per their recs at that time: May keep site open to air. Please allow remaining Dermabond to slough off naturally (no picking at site). Okay to shower, but no soaking/submersion x 1 more week.   - Pain management as above  - Per ortho, osteoporosis workup and treatment with primary care provider within 2 months.  - Noted to have increased right hip pain starting 4/15.  Xray revealed no concerns with right hip hemiarthoplasty; bilateral superior and inferior pubic rami fractures with mild displacement, which were noted on prior imaging as well.  Assessed by ortho, who recommended ongoing conservative management wtih WBAT, ongoing PT, pain management, DVT prophylaxis, and OP follow-up in bone health clinic and ortho.  Would recommend to complete at least 3 mos of rehab before any consideration of surgical treatment (right pelvic ring fixation).  - Added oxycodone 2.5 mg q6h PRN but patient requested NSAIDs.  Risk/benefit of NSAID use (including bleeding risk) discussed with patient, who felt this risk was acceptable, per pharmacy, low risk with ibuprofen 400 mg q6h PRN.  Monitor for any signs/symptoms of bleeding.   - Continue PT/OT  - Follow up with Dr. Shun Cuevas at [facility] at six weeks post-op (~5/1/23) with X-rays. If remains at ARU at that time, can have wound  check and xrays performed there and sent to Dr. Cuevas for review.     COPD, recent exacerbation  Acute on chronic hypoxic/hypercapnic respiratory failure  Possible community acquired pneumonia, treated  Former smoker x50 years.  On 2-3L of oxygen PTA for past ~5 years.  Following hip surgery, had acute on chronic hypoxic/hypercapnic respiratory failure requiring BiPAP.  Completed course of azithromycin 3/27 and ceftriaxone 4/4.  Respiratory status improved with diuresis so in part may be related to heart failure as below.  As of admission to ARU, stable on 2L, which is baseline.  - Monitor respiratory status  - Continue supplemental oxygen to maintain spO2>88% (goal range 88-93%)  - Continue Breo Ellipta 200-25 mcg inhaler daily (formulary sub for PTA Advair Diskus 500/50 mcg inhaler)  - Duoneb not available due to supply issues, ordered PRN xopenex neb.    - Pulmonary hygiene/toilet  - Continue oral suction PRN for more tenuous secretions     HFpEF with recent exacerbation with fluid overload  Intermittent diuresis during this admission with improved respiratory status.  Echo 3/27/23 with EF 55-60%, grade I or early diastolic dysfunction.  Noted to have lower extremity edema.  Started on Lasix daily on 4/5, also ordered 2L fluid restriction.  Fluid restriction discontinued on 4/18 due to poor intake, low BP, no evidence of fluid overload.  - Continue Lasix 10 mg daily (decreased on 4/26 due to soft BP, no evidence of hypervolemia on exam).  - BP remains soft, does have trace edema in bilat lower extremities.  Will monitor BP today to determine whether to continue lower dose of Lasix initially at discharge vs stop.  - Continue to monitor for any evidence of recurrent volume overload  - Daily weights, monitor volume status     Acute on chronic anemia  Hgb 10.5 on admission, dropped to benson of 7.8 on 3/26.  Did receive 1 unit pRBC.  Stable in 10s at discharge to ARU (baseline).  - Trend CBC every M/Th.  4/27: Hgb  stable at 9.2     Mild left intermittent ptosis  Noted on 4/4.  CT head with expected post-operative changes.  Patient reports she has noted this for at least 1 year.    - Per sending team, given chronicity, recommended to defer additional work-up to outpatient setting as appropriate.     Hyperlipidemia  - Continue PTA atorvastatin     Adjustment disorder with anxious mood  Hx Depression/anxiety  Fatigue  - Continue mirtazapine 15 mg at HS (increased from PTA dose on 4/12).  Improved daytime alertness since.    - Continue Zoloft 25 mg daily  - Seen by health psych on 4/18, appreciate assistance especially with strategies for ARU team to optimize participation.  Please see note by Dr. Pond on 4/18.     Hypothyroidism  - Continue PTA levothyroxine     Vitamin B12 deficiency  - Continue PTA B12 injection 1000 mcg q30 days, last given on 4/3     Urinary retention, resolved  Baldwin placed 3/24, failed TOV on 4/4.  Urology consulted 4/5 and recommended to replace baldwin. Per urology, retention likely due to recent anesthesia, immobilization, and narcotic pain medications; additional recs as below.  Baldwin removed on 4/13, voiding well with no evidence of retention.    Leukopenia  Leukocytosis, resolved  WBC mildly elevated at 11.7 on 4/10, has been previously mildly elevated though had normalized to 9.6 on 4/8.  Suspect 2/2 steroids.  No localizing s/sx of infection.  - Continue to trend CBC every M/Th.  4/27: WBC low/stable at 3.7    Mild hyponatremia, resolved  Na mildly low at 135 on 4/10.  Asymptomatic.  - Trend BMP.  4/27: Na stable/WNL at 141        6. Adjustment to disability:  Clinical psychology to eval and treat if indicated  7. FEN: regular diet, thin liquids  8. Bowel: continent, monitor, scheduled and PRN bowel meds available  9. Bladder: as above, baldwin removed 4/13 for TOV and is voiding well, encouraged to avoid use of Purewick  10. DVT Prophylaxis: subcutaneous Lovenox, ok'd to start on 4/3 per  neurosurgery  11. GI Prophylaxis: PPI  12. Code: DNR/DNI, discussed with palliative care during inpatient admission, confirmed at admission  13. Disposition: home  14. ELOS:  4/28/23  15. Follow up Appointments on Discharge: PCP in 1-2 weeks, neurosurgery (scsheduled on 5/15 and 6/30), neuro-oncology?, ortho (Dr. Shun Cuevas ~5/1/23; care coordinator is Neha Way at [phone] for scheduling) with repeat xrays, neurology (headaches)      Patient was discussed with Dr. Francisco Doss, PM&R staff physician     ABIEL Santana-IMMANUEL  Physical Medicine & Rehabilitation

## 2023-04-27 NOTE — PLAN OF CARE
Goal Outcome Evaluation: No c/o pain.  3 lidocaine patches applied to R hip per patient request.  Ate breakfast.  On 2L of O2 sating at 96% when approached for 8am medpass. Pt reports being on oxygen for 7 years prior to admission.  Calls appropriately.  Makes needs known.

## 2023-04-27 NOTE — PLAN OF CARE
Goal Outcome Evaluation:      Plan of Care Reviewed With: patient    Overall Patient Progress: no changeOverall Patient Progress: no change    Patient is alert and oriented x4. Calls appropriately. No complaints of pain, dizziness, fever,nausea or any new weakness. Sob with exertion. On O2 at 1LPM via nasal cannula.  Needing frequent checks at night for pt unconsciously removes nasal cannula off. Continent of bladder using the bathroom. A1 to CGA with the walker in transfers. Sleeping during hourly rounds. Continue poc.

## 2023-04-27 NOTE — PLAN OF CARE
Discharge Planner Post-Acute Rehab SLP:      Discharge Plan: Home with daughter. Ongoing therapy     Precautions: fall     Current Status:  Hearing: [place],  does not use hearing aids  Vision: Uses glasses for close up  Communication: WFL  Cognition: WFL CLQT. Demonstrating mod-severe difficulty with higher level executive skills.  Swallow: regular diet, thin liquids. Not assessed on ARU     Assessment: Pt able to independently initiate task. She completed functional math problems with 100% accuracy, independently and within a reasonable time frame. Pt then initiated functional grocery shopping task, she used an appropriate and functional organizatonal system, paid attention to detail and identified the desired grocery items- it was left for her to complete on her own given time constraints.     Other Barriers to Discharge (Family Training, etc): None anticipated by SLP. Will need increased support with IADLs from dtr if able to provide that support

## 2023-04-27 NOTE — DISCHARGE SUMMARY
[facility]   Acute Rehabilitation Unit  Discharge summary     Date of Admission: 4/7/2023  Date of Discharge: 4/28/2023  Disposition: home with family assist, home care nursing and therapies  Primary Care Physician: Divina Turner  Attending physician: Kris Ray MD; Francisco Doss,       DISCHARGE DIAGNOSIS      S/p bifrontal craniotomy and resection of meningioma on 3/30/23    Displaced right femoral neck fracture s/p right hip hemiarthroplasty (anterolateral approach) on 3/20/23    Acute on chronic tension-type headaches, migrainous features, possible component of rebound     Fractures of the right superior and inferior pubic rami and the left pubic body extending into the left pubic rami    COPD    Chronic hypoxic/hypercapnic respiratory failure     HFpEF    Acute on chronic anemia     Mild left intermittent ptosis    Hx hyperlipidemia     Adjustment disorder with anxious mood    Hx hypothyroidism    Hx vitamin B12 deficiency      BRIEF SUMMARY  Josephine Nicole is a 73 year old right hand dominant female with a past medical history of cerebral meningioma initially diagnosed 2019, s/p radiation in 11/2022 with recent progression including mass effect and midline shift of 3/17/23 MRI, COPD on 2L oxygen at baseline, hypothyroidism, hyperlipidemia, osteoporosis, depression/anxiety, vitamin B12 deficiency, and anemia who was admitted on 3/19/23 after fall at home in setting of several weeks of worsening confusion, impaired balance with imaging revealing right femoral neck fracture s/p right hip hemiarthroplasty.  He was then transferred to [facility] on 3/23/23 due worsening respiratory failure (COPD exacerbation), hypotension, lactic acidosis, and worsening encephalopathy in setting of progressive meningioma now s/p craniotomy and resection of meningioma on 3/30/23 with hospital course further complicated by lactic acidosis, anemia, left eye ptosis, urinary  "retention, pain, and hypernatremia.  She was admitted to ARU on 4/7/23 for multidisciplinary rehabilitation and ongoing medical management.  Rehab and medical course at ARU as below.    REHABILITATION COURSE  PT: Discharged to home with home therapy.    Current Status:  Bed Mobility: mod-I - sleeps in recliner at baseline for past 6-7 years d/t COPD  Transfer: SBA FWW, min-A from low surface at times  Gait: 250' FWW SBA  Stairs: CGA 6\" x 12, rail x 2, step to pattern  Balance: UE support in standing    OT: Discharged to home with home therapy.    Current Status:  ADLs:    Mobility: SBA with walker with ambulation in BR. CGA-Min A for STS with walker    Grooming: SBA with FWW EOS    Dressing: UB: IND seated; SBA with LBD tasks with walker and AE. Min A with shoelaces.    Bathing: SBA with transfer W/C<>extended tub bench. SBA w/washing, rinsing, drying 9/10 body parts.    Toileting: SBA with toilet transfer with RTS, GB, and FWW. SBA with seated murphy-cares.  IADLs: Reports daughter assist with some IADLs at baseline, will continue to assess  Vision/Cognition: Demonstrated deficits in problem solving t/o and appears self-limiting at times, will continue to monitor and assess    SLP: Discharged to home with home therapy.     Progress towards therapy goal(s). See goals on Care Plan in [facility] electronic health record for goal details.  Goals partially met.  Barriers to achieving goals:   discharge from facility.     Therapy recommendation(s):    Continued therapy is recommended.  Rationale/Recommendations:   SLP for high level home-based/functional cognitive-linguistic interventions; anticipate HH SLP episode would be brief.   Pt made excellent progress towards goals once motivated and agreeable to work on cognition.        CLQT 04/08/2023-     Cognitive Domain                   Severity Rating/Score  Attention                                             4 - WNL  Memory                                               4 - " WNL  Executive Functions                            4 - WNL  Language                                           4 - WNL  Visuospatial Skills                               4 - WNL  Composite Severity Rating                 4 - WNL  Clock Drawing Severity Rating           4 - WNL     INTERPRETATION OF TEST RESULTS: Pt scored WNL across all subtests. However, there were deficits identified during evaluation concerning for difficulty with more high level memory and executive function tasks. This warrants ongoing treatment and assessment, pt is agreeable to this plan.    MEDICAL COURSE    S/p bifrontal craniotomy and resection of meningioma on 3/30/23 with Dr. Murphy  CNS WHO grade 1 meningioma (right frontal) with moderate surrounding vasogenic edema and mass effect  Diagnosed in 2019 after presenting with refractory headaches.  Status post radiation treatment 11/2022.  Recent brain MRI revealing progression.  Patient presented after fall in setting of several week history of worsening confusion, gait impairment, balance deficits.  CT head with known mass surrounding vasogenic edema predominantly involving the right frontal lobe with right to left midline shift/subfalcine herniation measuring up to 1.1 cm.  Neurosurgery consulted and recommended medical optimization and transfer to [facility] for surgical intervention, which she underwent as above on 3/30.  Decadron taper completed per neurosurgery, off since 4/14.  Wound check and suture removal completed by rehab team on 4/14.  - Pain management: Tylenol 975 mg TID at ARU -> Tylenol 650 mg q4h PRN at discharge to home.  - Wound care: keep clean and dry, leave open to air  - Continue PT/OT/SLP  - Follow up with neurosurgery on 5/15 ([facility]) and 6/30 (Dr. Murphy)     Acute on chronic tension-type headaches, migrainous features, possible component of rebound  Recurrent intermittent frontal headaches starting ~4/16, sometimes associated with photosensitivity and nausea.  Reports  has had chronic headaches for approx 7 years, most relief while on steroids.   Discussed possible diagnosis and the potential treatment options 04/25/23.  Also discussed risk of rebound headaches with as needed doses of any medications more than 3 times per week.  Her headaches are most likely tension headaches associated with rebound headaches in the setting of recent surgery.  - Started amitriptyline 10 mg on 4/25 as preventive medication; plan to titrate by 10 mg every 2 weeks if tolerated (discussed with pharmacist).  Sent home on 10 mg daily for 2 weeks, then increase to 20 mg for 2 weeks.  Ongoing adjustments per PCP.  - Continue with Tylenol as above  - Continue ice and other nonmedication options  - Should avoid as needed doses of ibuprofen more than 3 times per week  - Should also avoid oxycodone for the management of headaches given the highest risk of rebound headaches  - Discussed referral to headache neurology clinic at the [facility]  - No recurrent headache past 2 days after starting amitriptyline.  Continue to monitor     Displaced right femoral neck fracture s/p right hip hemiarthroplasty (anterolateral approach) on 3/20/23 with Dr. Shun Cuevas  Pathologic fracture secondary to osteoporosis  Fractures of the right superior and inferior pubic rami and the left pubic body extending into the left pubic rami, stable on imaging 4/3  Noted to have increased right hip pain starting 4/15.  Xray revealed no concerns with right hip hemiarthoplasty; bilateral superior and inferior pubic rami fractures with mild displacement, which were noted on prior imaging as well.  Assessed by ortho, who recommended ongoing conservative management wtih WBAT, ongoing PT, pain management, DVT prophylaxis, and OP follow-up in bone health clinic and ortho.  Would recommend to complete at least 3 mos of rehab before any consideration of surgical treatment (right pelvic ring fixation).  - WBAT RLE with walker  - Wound care:  leave open to air, ok to shower  - Pain management as above  - Per ortho, osteoporosis workup and treatment with primary care provider within 2 months.  - Continue PT/OT  - Follow up with Dr. Shun Cuevas at [facility] at six weeks post-op (~5/1/23) with X-rays     COPD, recent exacerbation  Acute on chronic hypoxic/hypercapnic respiratory failure, stable  Possible community acquired pneumonia, treated  Former smoker x50 years.  On 2-3L of oxygen PTA for past ~5 years.  Following hip surgery, had acute on chronic hypoxic/hypercapnic respiratory failure requiring BiPAP.  Completed course of azithromycin 3/27 and ceftriaxone 4/4.  Respiratory status improved with diuresis so in part may be related to heart failure as below.  Throughout ARU stay, has been stable on 2L, which is baseline.  - Continue supplemental oxygen to maintain spO2>88% (goal range 88-93%)  - Continue PTA Advair Diskus 500/50 mcg inhaler and PRN inhaler/nebulizers     HFpEF with recent exacerbation with fluid overload  Intermittent diuresis during this admission with improved respiratory status.  Echo 3/27/23 with EF 55-60%, grade I or early diastolic dysfunction.  Noted to have lower extremity edema. Started on Lasix daily on 4/5, also ordered 2L fluid restriction.  Fluid restriction discontinued on 4/18 due to poor intake, low BP, no evidence of fluid overload.  Lasix decreased to 10 mg daily on 4/27 due to soft BP, no evidence of fluid overload.  - Continue Lasix 10 mg daily  - Daily weights at home and recommended to check BP especially if symptoms such as lightheadedness, dizziness  - Follow up with PCP to determine ongoing need for diuresis     Acute on chronic anemia, stable  Hgb 10.5 on admission, dropped to benson of 7.8 on 3/26.  Did receive 1 unit pRBC.  Stable in 10s at discharge to ARU (baseline).  Remains overall stable at 9.0 on last check 4/27.  - Follow up with PCP, repeat CBC as indicated     Mild left intermittent ptosis  Noted on  4/4.  CT head with expected post-operative changes.  Patient reports she has noted this for at least 1 year.    - Per sending team, given chronicity, recommended to defer additional work-up to outpatient setting as appropriate.     Hyperlipidemia  - Continue PTA atorvastatin     Adjustment disorder with anxious mood  Hx Depression/anxiety  Fatigue, improved  - Continue mirtazapine 15 mg at HS (increased from PTA dose on 4/12).  Improved daytime alertness and sleep since.    - Continue Zoloft 25 mg daily  - Seen by health psych on 4/18, appreciate assistance especially with strategies for ARU team to optimize participation.  Please see note by Dr. Pond on 4/18.     Hypothyroidism  - Continue PTA levothyroxine     Vitamin B12 deficiency  - Continue PTA B12 injection 1000 mcg q30 days, last given on 4/3     Urinary retention, resolved  Baldwin placed 3/24, failed TOV on 4/4.  Urology consulted 4/5 and recommended to replace baldwin. Per urology, retention likely due to recent anesthesia, immobilization, and narcotic pain medications; additional recs as below.  Baldwin removed on 4/13, voiding well with no evidence of retention.     Leukopenia, resolved  Leukocytosis, resolved  WBC mildly elevated at 11.7 on 4/10, has been previously mildly elevated though had normalized to 9.6 on 4/8.  Suspect 2/2 steroids.  No localizing s/sx of infection.  More recently with mild leukopenia starting 4/20.  ?viral vs medication-related.  WBC now WNL at 5.1 as of 4/27.  - Follow up with PCP, repeat CBC as indicated     Mild hyponatremia, resolved  Na mildly low at 135 on 4/10.  Asymptomatic.  Na stable/WNL at 139 on 4/27.      DISCHARGE MEDICATIONS  Current Discharge Medication List      START taking these medications    Details   amitriptyline (ELAVIL) 10 MG tablet Take 1 tablet (10 mg) by mouth At Bedtime for 14 days, THEN 2 tablets (20 mg) At Bedtime for 16 days.  Qty: 46 tablet, Refills: 0    Associated Diagnoses: Chronic  tension-type headache, not intractable      ibuprofen (ADVIL/MOTRIN) 400 MG tablet Take 1 tablet (400 mg) by mouth every 6 hours as needed for inflammatory pain    Associated Diagnoses: Closed displaced fracture of right femoral neck (H)      Lidocaine (LIDOCARE) 4 % Patch Place 1-3 patches onto the skin every 24 hours To prevent lidocaine toxicity, patient should be patch free for 12 hrs daily.  Qty: 20 patch, Refills: 0    Associated Diagnoses: Closed displaced fracture of right femoral neck (H)         CONTINUE these medications which have CHANGED    Details   acetaminophen (TYLENOL) 325 MG tablet Take 2 tablets (650 mg) by mouth every 4 hours as needed for mild pain  Qty: 20 tablet, Refills: 0    Associated Diagnoses: S/P resection of meningioma; Closed displaced fracture of right femoral neck (H)      mirtazapine (REMERON) 15 MG tablet Take 1 tablet (15 mg) by mouth At Bedtime  Qty: 30 tablet, Refills: 0    Associated Diagnoses: S/P resection of meningioma      polyethylene glycol (MIRALAX) 17 GM/Dose powder Take 17 g by mouth daily  Qty: 510 g, Refills: 0    Associated Diagnoses: Slow transit constipation      senna-docusate (SENOKOT-S/PERICOLACE) 8.6-50 MG tablet Take 1 tablet by mouth At Bedtime  Qty: 30 tablet, Refills: 0    Associated Diagnoses: Slow transit constipation         CONTINUE these medications which have NOT CHANGED    Details   albuterol (PROVENTIL) (2.5 MG/3ML) 0.083% neb solution Take 2.5 mg by nebulization 3 times daily as needed for shortness of breath, wheezing or cough      alendronate (FOSAMAX) 70 MG tablet Take 70 mg by mouth every 7 days      atorvastatin (LIPITOR) 10 MG tablet Take 10 mg by mouth daily      cyanocobalamin (CYANOCOBALAMIN) 1000 MCG/ML injection Inject 1 mL into the muscle every 30 days      fluticasone-salmeterol (ADVAIR) 500-50 MCG/DOSE inhaler Inhale 1 puff into the lungs 2 times daily      hydrOXYzine (ATARAX) 10 MG tablet Take 10 mg by mouth every 8 hours as  needed for anxiety      ipratropium - albuterol 0.5 mg/2.5 mg/3 mL (DUONEB) 0.5-2.5 (3) MG/3ML neb solution Take 1 vial by nebulization every 6 hours as needed for shortness of breath, wheezing or cough      levalbuterol (XOPENEX HFA) 45 MCG/ACT inhaler Inhale 2 puffs into the lungs every 4 hours as needed for shortness of breath or wheezing      levothyroxine (SYNTHROID/LEVOTHROID) 88 MCG tablet Take 88 mcg by mouth daily      sertraline (ZOLOFT) 25 MG tablet Take 25 mg by mouth every morning         STOP taking these medications       butalbital-acetaminophen-caffeine (ESGIC) -40 MG tablet Comments:   Reason for Stopping:         dexamethasone (DECADRON) 4 MG tablet Comments:   Reason for Stopping:         enoxaparin ANTICOAGULANT (LOVENOX) 40 MG/0.4ML syringe Comments:   Reason for Stopping:         furosemide (LASIX) 20 MG tablet Comments:   Reason for Stopping:         guaiFENesin (ROBITUSSIN) 20 mg/mL liquid Comments:   Reason for Stopping:         pantoprazole (PROTONIX) 40 MG EC tablet Comments:   Reason for Stopping:                 DISCHARGE INSTRUCTIONS AND FOLLOW UP  Discharge Procedure Orders   Adult Neurology  Referral   Standing Status: Future   Referral Priority: Routine: Next available opening Referral Type: Consultation   Number of Visits Requested: 1     Home Care Referral   Referral Priority: Routine: Next available opening Referral Type: Home Health Therapies & Aides   Number of Visits Requested: 1     Reason for your hospital stay   Order Comments: You were admitted for rehabilitation following craniotomy for meningioma resection as well as surgical repair for fracture of your right hip (femur).     Activity   Order Comments: Your activity upon discharge: activity as tolerated and as directed by therapy and surgical teams.  We recommend ongoing use of walker for mobility and assistance from another person for mobility and activities of daily living as reviewed during caregiver  education with therapists.  Per your surgical team, ok to bear weight as tolerated on right lower extremity with no specific activity restrictions.     Order Specific Question Answer Comments   Is discharge order? Yes      Adult [facility] Follow-up and recommended labs and tests   Order Comments: Follow up with primary care provider, Divina Turner, within 7-14 days for hospital follow- up.  The following labs/tests are recommended: BMP, CBC.    Follow up with neurosurgery (scheduled on 5/15 and 6/30) regarding meningioma resection.    Follow up with ortho (Dr. Shun Cuevas) in about 1 week with repeat xrays (right hip and pelvic fractures).    Follow up with neurology (headaches).    Follow up with bone health clinic regarding osteoporosis.    Appointments on [place] and/or [facility] (with [facility] or [facility] provider or service). Call [phone] if you haven't heard regarding these appointments within 7 days of discharge.     Monitor and record   Order Comments: blood pressure daily and bring record to next appointment  weight every other day     Wound care and dressings   Order Comments: Instructions to care for your wound at home: keep wounds (scalp and right hip) clean and dry and may get incision wet in shower but do not soak or scrub.     Diet   Order Comments: Follow this diet upon discharge: Regular Diet; Thin Liquids (level 0)     Order Specific Question Answer Comments   Is discharge order? Yes           PHYSICAL EXAMINATION    Most recent Vital Signs:   Vitals:    04/26/23 0832 04/26/23 1500 04/26/23 1614 04/27/23 0700   BP: 95/53 100/43 94/52 104/64   BP Location: Left arm Left arm Left arm Left arm   Patient Position:    Sitting   Cuff Size:    Adult Regular   Pulse: 88 90 87 91   Resp:   16 14   Temp:   97.3  F (36.3  C) 97.8  F (36.6  C)   TempSrc:   Axillary Oral   SpO2: 94% 94% 96% 96%   Weight:    59.4 kg (130 lb 14.4 oz)   Height:           Gen: NAD, seated upright at edge of bed  HEENT:  bifrontal crani incision well-healed, no erythema or drainage, MMM  Cardio: RRR, no murmurs  Pulm: non-labored on room air, lungs CTA bilaterally  Abd: soft, non-tender, non-distended, bowel sounds present  Extr: trace edema in bilateral lower extremities, no calf tenderness  Neuro/MSK: AAOx3, PERRL, EOMI, face symmetric, speech clear/fluent, moving all extremities in bed, strength against resistance throughout    40 minutes spent in discharge, including >50% in counseling and coordination of care, medication review and plan of care recommended on follow up.     Patient was evaluated on day of discharge by attending physician, Kris Ray MD, who agrees with plan of care.    Discharge summary was forwarded to Divina Turner (PCP) at the time of discharge, so as to bridge from hospital to outpatient care.     It was our pleasure to care for Josephine Nicole during this hospitalization. Please do not hesitate to contact me should there be questions regarding the hospital course or discharge plan.          Abimbola Thompson PA-C  Physical Medicine and Rehabilitation

## 2023-04-28 ENCOUNTER — APPOINTMENT (OUTPATIENT)
Dept: PHYSICAL THERAPY | Facility: CLINIC | Age: 73
DRG: 949 | End: 2023-04-28
Attending: PHYSICAL MEDICINE & REHABILITATION
Payer: COMMERCIAL

## 2023-04-28 ENCOUNTER — APPOINTMENT (OUTPATIENT)
Dept: OCCUPATIONAL THERAPY | Facility: CLINIC | Age: 73
DRG: 949 | End: 2023-04-28
Attending: PHYSICAL MEDICINE & REHABILITATION
Payer: COMMERCIAL

## 2023-04-28 VITALS
HEART RATE: 81 BPM | OXYGEN SATURATION: 95 % | SYSTOLIC BLOOD PRESSURE: 128 MMHG | WEIGHT: 132 LBS | TEMPERATURE: 95.2 F | BODY MASS INDEX: 24.29 KG/M2 | HEIGHT: 62 IN | DIASTOLIC BLOOD PRESSURE: 68 MMHG | RESPIRATION RATE: 16 BRPM

## 2023-04-28 PROCEDURE — 99239 HOSP IP/OBS DSCHRG MGMT >30: CPT | Performed by: PHYSICIAN ASSISTANT

## 2023-04-28 PROCEDURE — 250N000013 HC RX MED GY IP 250 OP 250 PS 637: Performed by: PHYSICAL MEDICINE & REHABILITATION

## 2023-04-28 PROCEDURE — 97530 THERAPEUTIC ACTIVITIES: CPT | Mod: GP

## 2023-04-28 PROCEDURE — 97535 SELF CARE MNGMENT TRAINING: CPT | Mod: GO

## 2023-04-28 PROCEDURE — 250N000013 HC RX MED GY IP 250 OP 250 PS 637: Performed by: STUDENT IN AN ORGANIZED HEALTH CARE EDUCATION/TRAINING PROGRAM

## 2023-04-28 PROCEDURE — 250N000013 HC RX MED GY IP 250 OP 250 PS 637: Performed by: PHYSICIAN ASSISTANT

## 2023-04-28 RX ORDER — FUROSEMIDE 20 MG
10 TABLET ORAL DAILY
Qty: 15 TABLET | Refills: 0 | Status: SHIPPED | OUTPATIENT
Start: 2023-04-28

## 2023-04-28 RX ADMIN — SERTRALINE HYDROCHLORIDE 25 MG: 25 TABLET ORAL at 09:33

## 2023-04-28 RX ADMIN — ATORVASTATIN CALCIUM 10 MG: 10 TABLET, FILM COATED ORAL at 09:33

## 2023-04-28 RX ADMIN — Medication 10 MG: at 09:33

## 2023-04-28 RX ADMIN — PANTOPRAZOLE SODIUM 40 MG: 40 TABLET, DELAYED RELEASE ORAL at 09:33

## 2023-04-28 RX ADMIN — LEVOTHYROXINE SODIUM 88 MCG: 88 TABLET ORAL at 09:33

## 2023-04-28 RX ADMIN — Medication 2.5 MG: at 05:23

## 2023-04-28 RX ADMIN — LIDOCAINE PATCH 4% 1 PATCH: 40 PATCH TOPICAL at 09:34

## 2023-04-28 RX ADMIN — POLYETHYLENE GLYCOL 3350 17 G: 17 POWDER, FOR SOLUTION ORAL at 09:34

## 2023-04-28 RX ADMIN — IBUPROFEN 400 MG: 200 TABLET, FILM COATED ORAL at 09:38

## 2023-04-28 RX ADMIN — ACETAMINOPHEN 325MG 975 MG: 325 TABLET ORAL at 09:33

## 2023-04-28 ASSESSMENT — ACTIVITIES OF DAILY LIVING (ADL)
ADLS_ACUITY_SCORE: 51

## 2023-04-28 NOTE — PLAN OF CARE
"FOCUS/GOAL  Medical management    ASSESSMENT, INTERVENTIONS AND CONTINUING PLAN FOR GOAL:  Pt is alert and oriented. Complained of a headache and requested oxycodone. RN offered PRN advil and offered education on avoiding use of narcotics to treat headaches. Pt stated \"it's the only thing you people have given me that works\". Received oxycodone x1. Assist of 1 with walker. Continent of bladder using toilet in the bathroom. On track to discharge today. Appeared to be sleeping on rounds.  "

## 2023-04-28 NOTE — PLAN OF CARE
Goal Outcome Evaluation:      Plan of Care Reviewed With: patient    Overall Patient Progress: no changeOverall Patient Progress: no change    Outcome Evaluation: Pt remains assist of 1 with walker for transfers. No new skin issues noted. Using call light appropriately to make needs known. Wearing oxygen via nasal cannula at all times, needing between 1-2 LPM. Expected to discharge tomorrow, 4/28.  Denies pain this shift.

## 2023-04-28 NOTE — PLAN OF CARE
Discharge instructions and discharge medications reviewed with patient and daughter. All questions answered. Pt left unit via wheelchair with 2 L of oxygen via nasal cannula. Transferred to private vehicle with assist of 1 without incident.

## 2023-04-28 NOTE — PROGRESS NOTES
Physical Therapy Discharge Summary    Reason for therapy discharge:    Discharged to home with home therapy.    Progress towards therapy goal(s). See goals on Care Plan in [facility] electronic health record for goal details.  Goals met    Therapy recommendation(s):    Continued therapy is recommended.  Rationale/Recommendations:   PT to progress activity tolerance, strength, and IND with functional mobility..Pt is discharging home SBA with FWW with her daughter. Able to ambulate 250' with 3 standing breaks with FWW. Still some limitations due to pain. Family training completed with daughter for car, fall, and stair entry. Issued FWW for home.

## 2023-04-28 NOTE — PROGRESS NOTES
Speech Language Therapy Discharge Summary    Reason for therapy discharge:    Discharged to home with home therapy.    Progress towards therapy goal(s). See goals on Care Plan in [facility] electronic health record for goal details.  Goals partially met.  Barriers to achieving goals:   discharge from facility.    Therapy recommendation(s):    Continued therapy is recommended.  Rationale/Recommendations:   SLP for high level home-based/functional cognitive-linguistic interventions; anticipate HH SLP episode would be brief.   Pt made excellent progress towards goals once motivated and agreeable to work on cognition.      CLQT 04/08/2023-    Cognitive Domain                   Severity Rating/Score  Attention                                             4 - WNL  Memory                                               4 - WNL  Executive Functions                            4 - WNL  Language                                           4 - WNL  Visuospatial Skills                               4 - WNL  Composite Severity Rating                 4 - WNL  Clock Drawing Severity Rating           4 - WNL     INTERPRETATION OF TEST RESULTS: Pt scored WNL across all subtests. However, there were deficits identified during evaluation concerning for difficulty with more high level memory and executive function tasks. This warrants ongoing treatment and assessment, pt is agreeable to this plan.

## 2023-04-29 NOTE — PROGRESS NOTES
Occupational Therapy Discharge Summary    Reason for therapy discharge:    Discharged to home with home therapy.    Progress towards therapy goal(s). See goals on Care Plan in [facility] electronic health record for goal details.  Goals partially met.  Barriers to achieving goals:   discharge from facility.    Therapy recommendation(s):    Continued therapy is recommended.  Rationale/Recommendations:  Recommend HC OT services to increase IND and safety with ADLs/IADLs and functional mobility. .   Currently, pt requires SBA-min A with transfers pending surface height with walker. Pt requires SBA with G/H tasks standing at EOS with walker. Pt is IND with UBD tasks seated and SBA with LBD tasks with use of AE and walker. Pt requires assistance with tying shoe laces. Pt requires SBA with tub transfers with extended tub bench, and supervision when seated on tub bench for bathing. Pt requires SBA with toileting with raised toilet seat, grab bar and walker. Pt requires 1L of O2 throughout tasks. Family training completed 4/28/23 with pt's daughter to review tub transfers, toileting, and FB Dressing tasks. Pt's daughter to provide 24/7 assistance initially upon discharge. Pt's daughter to provide oversight with IADLs. Pt's daughter ordered extended tub bench, raised toilet seat, and total hip kit.

## 2023-04-30 ENCOUNTER — PATIENT OUTREACH (OUTPATIENT)
Dept: CARE COORDINATION | Facility: CLINIC | Age: 73
End: 2023-04-30
Payer: COMMERCIAL

## 2023-04-30 NOTE — PROGRESS NOTES
Connected Care Resource Center Contact  [facility]/Voicemail     Clinical Data: Transitional Care Management Outreach     Outreach attempted x 2.  Left message on patient's voicemail, providing [facility]'s 24/7 scheduling and nurse triage phone number [phone] ([phone]) for questions/concerns and/or to schedule an appt with an [facility] provider, if they do not have a PCP.      Plan:  [facility] will do no further outreaches at this time.       Katie Almeida MA  Connected Care Resource Center, [facility]    *Connected Care Resource Team does NOT follow patient ongoing. Referrals are identified based on internal discharge reports and the outreach is to ensure patient has an understanding of their discharge instructions.

## 2023-05-17 ENCOUNTER — OFFICE VISIT (OUTPATIENT)
Dept: NEUROSURGERY | Facility: CLINIC | Age: 73
End: 2023-05-17
Payer: COMMERCIAL

## 2023-05-17 VITALS — OXYGEN SATURATION: 93 % | SYSTOLIC BLOOD PRESSURE: 102 MMHG | DIASTOLIC BLOOD PRESSURE: 63 MMHG | HEART RATE: 89 BPM

## 2023-05-17 DIAGNOSIS — Z86.018 S/P RESECTION OF MENINGIOMA: Primary | ICD-10-CM

## 2023-05-17 DIAGNOSIS — Z98.890 S/P RESECTION OF MENINGIOMA: Primary | ICD-10-CM

## 2023-05-17 PROCEDURE — 99024 POSTOP FOLLOW-UP VISIT: CPT | Performed by: NURSE PRACTITIONER

## 2023-05-17 ASSESSMENT — PAIN SCALES - GENERAL: PAINLEVEL: NO PAIN (0)

## 2023-05-17 NOTE — PROGRESS NOTES
[facility] Neurosurgery Clinic   Follow Up Visit      HPI: Josephine Nicole is a 73 year old female who presents with sister for post op follow-up. Patient is 6 weeks s/p right bifrontal craniotomy for resection of meningioma on 3/30/23 with Dr. Murphy. Patient was recently discharged from rehab last week and now lives with daughter. She was doing well post op, but then was brought to [place] ED on 5/14/23 for altered mental status and possible stroke-like symptoms. Patient was at the [facility] on 5/14/23, daughter found patient slumped over in the wheelchair. EMS brought patient to [place] ED for evaluation. Per chart review, oxygen was 88% in the ED (history of COPD and has been on oxygen nasal cannula for 7 years), head CT showed post op changes, mental status improved while in the ED, patient was discharged home. Patient had follow-up with PCP yesterday who recommended Holter monitor and seizure work up. Today, patient reports feeling better compared to the weekend. She has some mild confusion, but overall this has improved compared to pre op as well. Denies headaches, dizziness, n/v, vision/speech changes. Patient follows with Ortho for recent right hip surgery, planning to transition to home therapies.     Past Medical History:   Diagnosis Date     Cerebral meningioma      COPD (chronic obstructive pulmonary disease)      Depressive disorder      Eczema      Eczema      Hyperlipidemia      Hypothyroidism      Melanoma of skin      Osteoporosis          Past Surgical History:   Procedure Laterality Date     COLONOSCOPY N/A 04/18/2022    Procedure: COLONOSCOPY;  Surgeon: Abimbola Handley MD;  Location:  GI     OPEN REDUCTION INTERNAL FIXATION HIP UNIPOLAR Right 3/20/2023    Procedure: Right hip hemiarthroplasty anterolateral approach;  Surgeon: Shun Cuevas MD;  Location:  OR     OPTICAL TRACKING SYSTEM CRANIOTOMY, EXCISE TUMOR, COMBINED N/A 3/30/2023    Procedure: Bifrontal stealth  craniotomy and resection of meningioma;  Surgeon: Ramez Murphy MD;  Location: [facility] OR     Tubal ligation NOS         Current Outpatient Medications   Medication     acetaminophen (TYLENOL) 325 MG tablet     albuterol (PROVENTIL) (2.5 MG/3ML) 0.083% neb solution     alendronate (FOSAMAX) 70 MG tablet     amitriptyline (ELAVIL) 10 MG tablet     atorvastatin (LIPITOR) 10 MG tablet     cyanocobalamin (CYANOCOBALAMIN) 1000 MCG/ML injection     fluticasone-salmeterol (ADVAIR) 500-50 MCG/DOSE inhaler     furosemide (LASIX) 20 MG tablet     hydrOXYzine (ATARAX) 10 MG tablet     ibuprofen (ADVIL/MOTRIN) 400 MG tablet     ipratropium - albuterol 0.5 mg/2.5 mg/3 mL (DUONEB) 0.5-2.5 (3) MG/3ML neb solution     levalbuterol (XOPENEX HFA) 45 MCG/ACT inhaler     levothyroxine (SYNTHROID/LEVOTHROID) 88 MCG tablet     Lidocaine (LIDOCARE) 4 % Patch     mirtazapine (REMERON) 15 MG tablet     polyethylene glycol (MIRALAX) 17 GM/Dose powder     senna-docusate (SENOKOT-S/PERICOLACE) 8.6-50 MG tablet     sertraline (ZOLOFT) 25 MG tablet     No current facility-administered medications for this visit.       Allergies   Allergen Reactions     Bupropion Anaphylaxis, Hives and Shortness Of Breath       Social History     Socioeconomic History     Marital status:      Spouse name: None     Number of children: None     Years of education: None     Highest education level: None   Tobacco Use     Smoking status: Former     Years: 50.00     Types: Cigarettes     Smokeless tobacco: Never   Substance and Sexual Activity     Alcohol use: Not Currently     Drug use: Never       No family history on file.    ROS: 10 point ROS neg other than the symptoms noted above in the HPI.    Vital Signs: /63   Pulse 89   SpO2 93%     Neurological Examination:  Awake  Alert  Oriented x 3  Speech clear  Cranial nerves II - XII intact  PERRL  EOMI  Face symmetric  Tongue midline  Motor exam: Generalized weakness but able to move all extremities,  right hip slightly limited due to recent Ortho surgery   Sensation intact to light touch   Finger to Nose smooth  Pronator drift negative   Gait: Ambulates with walker   Incision: Well healed    Imaging:   Head CT 5/14/23  IMPRESSION  Postop changes of recent anterior frontal craniotomy. 5 cm low-density region in the anterior and parasagittal right frontal lobe, compatible with edema. There is mild mass effect and impression upon the frontal horn of the right lateral ventricle. No midline shift.    Assessment/Plan:   6 weeks s/p right bifrontal craniotomy for resection of meningioma on 3/30/23 with Dr. Murphy.    - Neurology referral for possible seizure work up   - Will obtain and review head CT images from 5/14/23 at [facility]   - Continue follow-up with Ortho and home care therapies   - Follow up with Dr. Murphy at 3 months post op as scheduled     Discussed red flag symptoms and advised to seek medical attention with any increased headaches, dizziness, nausea/vomiting, vision/speech changes, weakness, confusion, seizure activity, or other neurological changes. Patient voiced understanding and agreement.      Candace Perdomo CNP  [facility] Neurosurgery  [address] [place] [id number]  Tel [phone]  Pager [phone]

## 2023-05-17 NOTE — NURSING NOTE
"Josephine Nicole is a 73 year old female who presents for:  Chief Complaint   Patient presents with     RECHECK        Initial Vitals:  /63   Pulse 89   SpO2 93%  Estimated body mass index is 23.83 kg/m  as calculated from the following:    Height as of 4/7/23: 5' 2.4\" (1.585 m).    Weight as of 4/28/23: 132 lb (59.9 kg).. There is no height or weight on file to calculate BSA. BP completed using cuff size: regular  No Pain (0)    Jonathan Champion    "

## 2023-05-17 NOTE — PATIENT INSTRUCTIONS
Follow-up with Dr. Murphy on 6/30/23     Please call our clinic with any post op questions or concerns.     [facility] Neurosurgery  [address] [address]  Tel [phone]

## 2023-05-17 NOTE — Clinical Note
Phil Murphy,   Please see my clinic note from today. PCP recommended Neurology referral to evaluate for possible seizures but this wasn't ordered yet, so I placed referral today. Patient is scheduled to follow-up with you at 3 months post op as well. Just wanted to keep you in the loop. Thanks!

## 2023-05-17 NOTE — LETTER
5/17/2023         RE: Josephine Nicole  [address]  [place] [address]        Dear Colleague,    Thank you for referring your patient, Josephine Nicole, to the [facility] NEUROLOGY CLINICS [facility]. Please see a copy of my visit note below.    [facility] Neurosurgery Clinic   Follow Up Visit      HPI: Josephine Nicole is a 73 year old female who presents with sister for post op follow-up. Patient is 6 weeks s/p right bifrontal craniotomy for resection of meningioma on 3/30/23 with Dr. Murphy. Patient was recently discharged from rehab last week and now lives with daughter. She was doing well post op, but then was brought to [place] ED on 5/14/23 for altered mental status and possible stroke-like symptoms. Patient was at the [facility] on 5/14/23, daughter found patient slumped over in the wheelchair. EMS brought patient to [place] ED for evaluation. Per chart review, oxygen was 88% in the ED (history of COPD and has been on oxygen nasal cannula for 7 years), head CT showed post op changes, mental status improved while in the ED, patient was discharged home. Patient had follow-up with PCP yesterday who recommended Holter monitor and seizure work up. Today, patient reports feeling better compared to the weekend. She has some mild confusion, but overall this has improved compared to pre op as well. Denies headaches, dizziness, n/v, vision/speech changes. Patient follows with Ortho for recent right hip surgery, planning to transition to home therapies.     Past Medical History:   Diagnosis Date     Cerebral meningioma      COPD (chronic obstructive pulmonary disease)      Depressive disorder      Eczema      Eczema      Hyperlipidemia      Hypothyroidism      Melanoma of skin      Osteoporosis          Past Surgical History:   Procedure Laterality Date     COLONOSCOPY N/A 04/18/2022    Procedure: COLONOSCOPY;  Surgeon: Abimbola Handley MD;  Location:  GI     OPEN REDUCTION INTERNAL  FIXATION HIP UNIPOLAR Right 3/20/2023    Procedure: Right hip hemiarthroplasty anterolateral approach;  Surgeon: Shun Cuevas MD;  Location: [facility] OR     OPTICAL TRACKING SYSTEM CRANIOTOMY, EXCISE TUMOR, COMBINED N/A 3/30/2023    Procedure: Bifrontal stealth craniotomy and resection of meningioma;  Surgeon: Ramez Murphy MD;  Location: [facility] OR     Tubal ligation NOS         Current Outpatient Medications   Medication     acetaminophen (TYLENOL) 325 MG tablet     albuterol (PROVENTIL) (2.5 MG/3ML) 0.083% neb solution     alendronate (FOSAMAX) 70 MG tablet     amitriptyline (ELAVIL) 10 MG tablet     atorvastatin (LIPITOR) 10 MG tablet     cyanocobalamin (CYANOCOBALAMIN) 1000 MCG/ML injection     fluticasone-salmeterol (ADVAIR) 500-50 MCG/DOSE inhaler     furosemide (LASIX) 20 MG tablet     hydrOXYzine (ATARAX) 10 MG tablet     ibuprofen (ADVIL/MOTRIN) 400 MG tablet     ipratropium - albuterol 0.5 mg/2.5 mg/3 mL (DUONEB) 0.5-2.5 (3) MG/3ML neb solution     levalbuterol (XOPENEX HFA) 45 MCG/ACT inhaler     levothyroxine (SYNTHROID/LEVOTHROID) 88 MCG tablet     Lidocaine (LIDOCARE) 4 % Patch     mirtazapine (REMERON) 15 MG tablet     polyethylene glycol (MIRALAX) 17 GM/Dose powder     senna-docusate (SENOKOT-S/PERICOLACE) 8.6-50 MG tablet     sertraline (ZOLOFT) 25 MG tablet     No current facility-administered medications for this visit.       Allergies   Allergen Reactions     Bupropion Anaphylaxis, Hives and Shortness Of Breath       Social History     Socioeconomic History     Marital status:      Spouse name: None     Number of children: None     Years of education: None     Highest education level: None   Tobacco Use     Smoking status: Former     Years: 50.00     Types: Cigarettes     Smokeless tobacco: Never   Substance and Sexual Activity     Alcohol use: Not Currently     Drug use: Never       No family history on file.    ROS: 10 point ROS neg other than the symptoms noted above in the  HPI.    Vital Signs: /63   Pulse 89   SpO2 93%     Neurological Examination:  Awake  Alert  Oriented x 3  Speech clear  Cranial nerves II - XII intact  PERRL  EOMI  Face symmetric  Tongue midline  Motor exam: Generalized weakness but able to move all extremities, right hip slightly limited due to recent Ortho surgery   Sensation intact to light touch   Finger to Nose smooth  Pronator drift negative   Gait: Ambulates with walker   Incision: Well healed    Imaging:   Head CT 5/14/23  IMPRESSION  Postop changes of recent anterior frontal craniotomy. 5 cm low-density region in the anterior and parasagittal right frontal lobe, compatible with edema. There is mild mass effect and impression upon the frontal horn of the right lateral ventricle. No midline shift.    Assessment/Plan:   6 weeks s/p right bifrontal craniotomy for resection of meningioma on 3/30/23 with Dr. Murphy.    - Neurology referral for possible seizure work up   - Will obtain and review head CT images from 5/14/23 at [facility]   - Continue follow-up with Ortho and home care therapies   - Follow up with Dr. Murphy at 3 months post op as scheduled     Discussed red flag symptoms and advised to seek medical attention with any increased headaches, dizziness, nausea/vomiting, vision/speech changes, weakness, confusion, seizure activity, or other neurological changes. Patient voiced understanding and agreement.      Candace Perdomo CNP  [facility] Neurosurgery  [address] [address]  Tel [phone]  Pager [phone]        Again, thank you for allowing me to participate in the care of your patient.        Sincerely,        Candace Perdomo NP

## 2023-06-30 ENCOUNTER — TELEPHONE (OUTPATIENT)
Dept: NEUROSURGERY | Facility: CLINIC | Age: 73
End: 2023-06-30

## 2023-06-30 ENCOUNTER — OFFICE VISIT (OUTPATIENT)
Dept: NEUROSURGERY | Facility: CLINIC | Age: 73
End: 2023-06-30
Payer: COMMERCIAL

## 2023-06-30 VITALS — DIASTOLIC BLOOD PRESSURE: 69 MMHG | OXYGEN SATURATION: 91 % | HEART RATE: 75 BPM | SYSTOLIC BLOOD PRESSURE: 118 MMHG

## 2023-06-30 DIAGNOSIS — D32.0 CEREBRAL MENINGIOMA (H): Primary | ICD-10-CM

## 2023-06-30 PROCEDURE — 99213 OFFICE O/P EST LOW 20 MIN: CPT | Performed by: NEUROLOGICAL SURGERY

## 2023-06-30 ASSESSMENT — PAIN SCALES - GENERAL: PAINLEVEL: NO PAIN (0)

## 2023-06-30 NOTE — TELEPHONE ENCOUNTER
Attempted to return call to luis eduardo Ortega.     They should contact us closer to scheduled MRI date.

## 2023-06-30 NOTE — TELEPHONE ENCOUNTER
Patient is scheduled for follow up MRI  On April 15, 2024.  Patient will need oral sedation for the procedure.  Please call and coordinate the script request with daughter.

## 2023-06-30 NOTE — NURSING NOTE
"Josephine Nicole is a 73 year old female who presents for:  Chief Complaint   Patient presents with     RECHECK        Initial Vitals:  /69   Pulse 75   SpO2 91%  Estimated body mass index is 23.83 kg/m  as calculated from the following:    Height as of 4/7/23: 5' 2.4\" (1.585 m).    Weight as of 4/28/23: 132 lb (59.9 kg).. There is no height or weight on file to calculate BSA. BP completed using cuff size: regular  No Pain (0)    Jonathan Champion    "

## 2023-06-30 NOTE — PROGRESS NOTES
"It was a pleasure to see Josephine Nicole today in Neurosurgery Clinic. She is a 73 year old female who underwent:    Procedure Date: 03/30/2023     PREOPERATIVE DIAGNOSES:    1.  Right frontal meningioma, status post radiotherapy.  2.  Vasogenic cerebral edema.  3.  Brain compression.     POSTOPERATIVE DIAGNOSES:    1.  Right frontal meningioma, status post radiotherapy.  2.  Vasogenic cerebral edema.  3.  Brain compression.     PROCEDURE:    1.  Right bifrontal Stealth craniotomy and resection of meningioma.  2.  Intraoperative microdissection using the operative microscope.     SURGEON:  Ramez Murphy MD     ANESTHESIA:  General endotracheal anesthesia.     ESTIMATED BLOOD LOSS:  50 mL.    Overall she is doing well and is recovering well from surgery.    Vitals:    06/30/23 1331   BP: 118/69   Pulse: 75   SpO2: 91%     Estimated body mass index is 23.83 kg/m  as calculated from the following:    Height as of 4/7/23: 1.585 m (5' 2.4\").    Weight as of 4/28/23: 59.9 kg (132 lb).  No Pain (0)    Well-healed incision  Bilateral upper and lower extremity strength 5 out of 5 in all muscle groups    Imaging: We reviewed her pre and postoperative imaging.    Assessment: Status post craniotomy for meningioma.    Plan: I would like to see her back approximately 1 year out from surgery with a repeat MRI of the brain to evaluate her tumor.     "

## 2023-06-30 NOTE — LETTER
"    6/30/2023         RE: Josephine Nicole  [address]  [place] [address]        Dear Colleague,    Thank you for referring your patient, Josephine Nicole, to the [facility] NEUROLOGY [facility]. Please see a copy of my visit note below.    It was a pleasure to see Josephine Nicole today in Neurosurgery Clinic. She is a 73 year old female who underwent:    Procedure Date: 03/30/2023     PREOPERATIVE DIAGNOSES:    1.  Right frontal meningioma, status post radiotherapy.  2.  Vasogenic cerebral edema.  3.  Brain compression.     POSTOPERATIVE DIAGNOSES:    1.  Right frontal meningioma, status post radiotherapy.  2.  Vasogenic cerebral edema.  3.  Brain compression.     PROCEDURE:    1.  Right bifrontal Stealth craniotomy and resection of meningioma.  2.  Intraoperative microdissection using the operative microscope.     SURGEON:  Ramez Murphy MD     ANESTHESIA:  General endotracheal anesthesia.     ESTIMATED BLOOD LOSS:  50 mL.    Overall she is doing well and is recovering well from surgery.    Vitals:    06/30/23 1331   BP: 118/69   Pulse: 75   SpO2: 91%     Estimated body mass index is 23.83 kg/m  as calculated from the following:    Height as of 4/7/23: 1.585 m (5' 2.4\").    Weight as of 4/28/23: 59.9 kg (132 lb).  No Pain (0)    Well-healed incision  Bilateral upper and lower extremity strength 5 out of 5 in all muscle groups    Imaging: We reviewed her pre and postoperative imaging.    Assessment: Status post craniotomy for meningioma.    Plan: I would like to see her back approximately 1 year out from surgery with a repeat MRI of the brain to evaluate her tumor.         Again, thank you for allowing me to participate in the care of your patient.        Sincerely,        Ramez Murphy MD    "

## 2023-08-07 ENCOUNTER — TELEPHONE (OUTPATIENT)
Dept: NEUROLOGY | Facility: CLINIC | Age: 73
End: 2023-08-07
Payer: COMMERCIAL

## 2024-03-22 DIAGNOSIS — F40.240 CLAUSTROPHOBIA: Primary | ICD-10-CM

## 2024-03-22 RX ORDER — DIAZEPAM 5 MG
5 TABLET ORAL SEE ADMIN INSTRUCTIONS
Qty: 2 TABLET | Refills: 0 | Status: SHIPPED | OUTPATIENT
Start: 2024-03-22

## 2024-03-22 NOTE — TELEPHONE ENCOUNTER
[facility] Call Center    Phone Message    May a detailed message be left on voicemail: yes     Reason for Call: Other: Patient called stating she is scheduled for an MRI on 4/22/2024 and is requesting Valium for the scan for anxiety and claustrophobia. Patient states she uses [facility] [place]. Patient states she needs at least two. Please call patient to discuss.        Action Taken: Message routed to:  Other: [facility] Neurosurgery    Travel Screening: Not Applicable

## 2024-03-22 NOTE — TELEPHONE ENCOUNTER
Called patient to review medication instructions and requirement to have a . Patient voiced agreement and understanding. Encounter routed to provider team.

## 2024-04-03 ENCOUNTER — TELEPHONE (OUTPATIENT)
Dept: NEUROSURGERY | Facility: CLINIC | Age: 74
End: 2024-04-03
Payer: COMMERCIAL

## 2024-04-03 NOTE — TELEPHONE ENCOUNTER
Patient needs follow up with Dr. Murphy after her MRI that is scheduled on 4/22/24. Writer AMILCAR for patient to call back to schedule and provided phone number [phone] for the scheduling team.

## 2024-04-09 NOTE — TELEPHONE ENCOUNTER
Date: April 9, 2024  (Provide the date when patient was called)  Provider: MD     Provider/Other: Dr. Murphy  (with whom  should patient reinaldo with)  Reason for out-going call: FOLLOW-UP  (Reason patient was contacted)    Detailed message: ldvm for patient to c/b and reinaldo her 9 months follow-up after her imaging that has been scheduled for 4/22/2024.

## 2024-04-22 ENCOUNTER — HOSPITAL ENCOUNTER (OUTPATIENT)
Dept: MRI IMAGING | Facility: CLINIC | Age: 74
Discharge: HOME | End: 2024-04-22
Attending: NEUROLOGICAL SURGERY | Admitting: NEUROLOGICAL SURGERY
Payer: COMMERCIAL

## 2024-04-22 DIAGNOSIS — D32.0 CEREBRAL MENINGIOMA (H): ICD-10-CM

## 2024-04-22 PROCEDURE — A9585 GADOBUTROL INJECTION: HCPCS | Performed by: NEUROLOGICAL SURGERY

## 2024-04-22 PROCEDURE — 70553 MRI BRAIN STEM W/O & W/DYE: CPT

## 2024-04-22 PROCEDURE — 255N000002 HC RX 255 OP 636: Performed by: NEUROLOGICAL SURGERY

## 2024-04-22 RX ORDER — GADOBUTROL 604.72 MG/ML
10 INJECTION INTRAVENOUS ONCE
Status: COMPLETED | OUTPATIENT
Start: 2024-04-22 | End: 2024-04-22

## 2024-04-22 RX ADMIN — GADOBUTROL 5 ML: 604.72 INJECTION INTRAVENOUS at 19:10

## 2024-05-08 ENCOUNTER — TELEPHONE (OUTPATIENT)
Dept: NEUROSURGERY | Facility: CLINIC | Age: 74
End: 2024-05-08
Payer: COMMERCIAL

## 2024-05-08 NOTE — TELEPHONE ENCOUNTER
[facility] Call Center    Phone Message    May a detailed message be left on voicemail: yes     Reason for Call: Other: Patient is wanting a call back regarding her imaging instead of making an appointment. She has issues getting to the clinic. Please call back early morning to discuss.       Action Taken: Other: Neurosurgery    Travel Screening: Not Applicable

## 2024-05-08 NOTE — TELEPHONE ENCOUNTER
Message left for patient to call and schedule a consult with Dr. Murphy, at either [place] or [place] location.

## 2024-05-13 NOTE — TELEPHONE ENCOUNTER
[facility] Call Center    Phone Message    May a detailed message be left on voicemail: yes     Reason for Call: Other: Pt called about scheduling a phone appt with Dr. Murphy to go over her imaging. Writer unable to schedule due to conflicting notes as Genia said to schedule at [place] or [place] and Michelle's message said it can be a telephone appt. Pt would prefer a telephone appt. Please review and call back to schedule the correct way.      Action Taken: Message routed to:  Other: [place] Neurosurgery    Travel Screening: Not Applicable

## 2024-05-24 ENCOUNTER — TELEPHONE (OUTPATIENT)
Dept: NEUROSURGERY | Facility: CLINIC | Age: 74
End: 2024-05-24
Payer: COMMERCIAL

## 2024-05-28 ENCOUNTER — VIRTUAL VISIT (OUTPATIENT)
Dept: NEUROSURGERY | Facility: CLINIC | Age: 74
End: 2024-05-28
Attending: NEUROLOGICAL SURGERY
Payer: COMMERCIAL

## 2024-05-28 DIAGNOSIS — D32.0 CEREBRAL MENINGIOMA (H): Primary | ICD-10-CM

## 2024-05-28 PROCEDURE — 99441 PR PHYSICIAN TELEPHONE EVALUATION 5-10 MIN: CPT | Mod: 93 | Performed by: NEUROLOGICAL SURGERY

## 2024-05-28 NOTE — PROGRESS NOTES
"It was a pleasure to see Josephine Nicole today in Neurosurgery Clinic. She is a 74 year old female who underwent:    Procedure Date: 03/30/2023     PREOPERATIVE DIAGNOSES:    1.  Right frontal meningioma, status post radiotherapy.  2.  Vasogenic cerebral edema.  3.  Brain compression.     POSTOPERATIVE DIAGNOSES:    1.  Right frontal meningioma, status post radiotherapy.  2.  Vasogenic cerebral edema.  3.  Brain compression.     PROCEDURE:    1.  Right bifrontal Stealth craniotomy and resection of meningioma.  2.  Intraoperative microdissection using the operative microscope.     SURGEON:  Ramez Murphy MD     ANESTHESIA:  General endotracheal anesthesia.     ESTIMATED BLOOD LOSS:  50 mL.    Final Diagnosis  A. 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 See comment.    Overall she is doing reasonably well since last year.  She does not have any major concerns.      There were no vitals filed for this visit.  Estimated body mass index is 23.83 kg/m  as calculated from the following:    Height as of 4/7/23: 1.585 m (5' 2.4\").    Weight as of 4/28/23: 59.9 kg (132 lb).  Data Unavailable    Phone visit    Imaging: Updated MRI from last month does not show any obvious residual tumor although there are some spotty areas of enhancement that are likely postsurgical in nature.  Imaging was described to the patient via phone.    Assessment: Status post craniotomy for meningioma.    Plan: I would like to see her back in 1 year with a repeat MRI of the brain.     This phone visit took 6 minutes.  MD Location: [facility]  Patient Location: [facility]  "

## 2024-05-28 NOTE — LETTER
"    5/28/2024         RE: Josephine Nicole  [address]  [place] [address]        Dear Colleague,    Thank you for referring your patient, Josephine Nicole, to the [facility] NEUROSURGERY CLINIC [place]. Please see a copy of my visit note below.    It was a pleasure to see Josephine Nicole today in Neurosurgery Clinic. She is a 74 year old female who underwent:    Procedure Date: 03/30/2023     PREOPERATIVE DIAGNOSES:    1.  Right frontal meningioma, status post radiotherapy.  2.  Vasogenic cerebral edema.  3.  Brain compression.     POSTOPERATIVE DIAGNOSES:    1.  Right frontal meningioma, status post radiotherapy.  2.  Vasogenic cerebral edema.  3.  Brain compression.     PROCEDURE:    1.  Right bifrontal Stealth craniotomy and resection of meningioma.  2.  Intraoperative microdissection using the operative microscope.     SURGEON:  Ramez Murphy MD     ANESTHESIA:  General endotracheal anesthesia.     ESTIMATED BLOOD LOSS:  50 mL.    Final Diagnosis  A. 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 See comment.    Overall she is doing reasonably well since last year.  She does not have any major concerns.      There were no vitals filed for this visit.  Estimated body mass index is 23.83 kg/m  as calculated from the following:    Height as of 4/7/23: 1.585 m (5' 2.4\").    Weight as of 4/28/23: 59.9 kg (132 lb).  Data Unavailable    Phone visit    Imaging: Updated MRI from last month does not show any obvious residual tumor although there are some spotty areas of enhancement that are likely postsurgical in nature.  Imaging was described to the patient via phone.    Assessment: Status post craniotomy for meningioma.    Plan: I would like to see her back in 1 year with a repeat MRI of the brain.     This phone visit took 6 minutes.  MD Location: [facility]  Patient Location: [facility]      Again, thank you " for allowing me to participate in the care of your patient.        Sincerely,        Ramez Murphy MD

## 2024-06-13 ENCOUNTER — APPOINTMENT (OUTPATIENT)
Dept: GENERAL RADIOLOGY | Facility: CLINIC | Age: 74
End: 2024-06-13
Attending: EMERGENCY MEDICINE
Payer: COMMERCIAL

## 2024-06-13 ENCOUNTER — HOSPITAL ENCOUNTER (EMERGENCY)
Facility: CLINIC | Age: 74
Discharge: HOME | End: 2024-06-13
Attending: EMERGENCY MEDICINE | Admitting: EMERGENCY MEDICINE
Payer: COMMERCIAL

## 2024-06-13 VITALS
TEMPERATURE: 98 F | SYSTOLIC BLOOD PRESSURE: 94 MMHG | HEART RATE: 88 BPM | DIASTOLIC BLOOD PRESSURE: 66 MMHG | OXYGEN SATURATION: 96 % | RESPIRATION RATE: 20 BRPM

## 2024-06-13 DIAGNOSIS — S42.291A OTHER CLOSED DISPLACED FRACTURE OF PROXIMAL END OF RIGHT HUMERUS, INITIAL ENCOUNTER: ICD-10-CM

## 2024-06-13 DIAGNOSIS — S70.01XA CONTUSION OF RIGHT HIP, INITIAL ENCOUNTER: ICD-10-CM

## 2024-06-13 PROCEDURE — 73502 X-RAY EXAM HIP UNI 2-3 VIEWS: CPT

## 2024-06-13 PROCEDURE — 73030 X-RAY EXAM OF SHOULDER: CPT | Mod: RT

## 2024-06-13 PROCEDURE — 23600 CLTX PROX HUMRL FX W/O MNPJ: CPT | Mod: RT

## 2024-06-13 PROCEDURE — 250N000013 HC RX MED GY IP 250 OP 250 PS 637: Performed by: EMERGENCY MEDICINE

## 2024-06-13 PROCEDURE — 99284 EMERGENCY DEPT VISIT MOD MDM: CPT | Mod: 25

## 2024-06-13 RX ORDER — HYDROCODONE BITARTRATE AND ACETAMINOPHEN 5; 325 MG/1; MG/1
1-2 TABLET ORAL EVERY 4 HOURS PRN
Qty: 15 TABLET | Refills: 0 | Status: SHIPPED | OUTPATIENT
Start: 2024-06-13

## 2024-06-13 RX ORDER — ACETAMINOPHEN 325 MG/1
650 TABLET ORAL ONCE
Status: COMPLETED | OUTPATIENT
Start: 2024-06-13 | End: 2024-06-13

## 2024-06-13 RX ADMIN — ACETAMINOPHEN 650 MG: 325 TABLET, FILM COATED ORAL at 16:53

## 2024-06-13 ASSESSMENT — COLUMBIA-SUICIDE SEVERITY RATING SCALE - C-SSRS
6. HAVE YOU EVER DONE ANYTHING, STARTED TO DO ANYTHING, OR PREPARED TO DO ANYTHING TO END YOUR LIFE?: NO
2. HAVE YOU ACTUALLY HAD ANY THOUGHTS OF KILLING YOURSELF IN THE PAST MONTH?: NO
1. IN THE PAST MONTH, HAVE YOU WISHED YOU WERE DEAD OR WISHED YOU COULD GO TO SLEEP AND NOT WAKE UP?: NO

## 2024-06-13 ASSESSMENT — ACTIVITIES OF DAILY LIVING (ADL): ADLS_ACUITY_SCORE: 42

## 2024-06-13 NOTE — ED TRIAGE NOTES
Pt arrives with R shoulder injury after a mechanical fall at home. Pt reports tripping over her NC tubing. Pt uses 1-2 L NC baseline. Pt reports 10/10 pain when moving shoulder, 0/10 pain at rest. Denies LOC and denies head strike. No cane or walker use at baseline. Fall occurred at approximately 1500.

## 2025-02-26 ENCOUNTER — APPOINTMENT (OUTPATIENT)
Dept: ULTRASOUND IMAGING | Facility: CLINIC | Age: 75
End: 2025-02-26
Attending: EMERGENCY MEDICINE
Payer: COMMERCIAL

## 2025-02-26 ENCOUNTER — HOSPITAL ENCOUNTER (OUTPATIENT)
Facility: CLINIC | Age: 75
End: 2025-02-26
Attending: SURGERY | Admitting: SURGERY
Payer: COMMERCIAL

## 2025-02-26 ENCOUNTER — APPOINTMENT (OUTPATIENT)
Dept: CT IMAGING | Facility: CLINIC | Age: 75
End: 2025-02-26
Attending: EMERGENCY MEDICINE
Payer: COMMERCIAL

## 2025-02-26 ENCOUNTER — OFFICE VISIT (OUTPATIENT)
Dept: URGENT CARE | Facility: URGENT CARE | Age: 75
End: 2025-02-26
Payer: COMMERCIAL

## 2025-02-26 ENCOUNTER — HOSPITAL ENCOUNTER (OUTPATIENT)
Facility: CLINIC | Age: 75
Setting detail: OBSERVATION
End: 2025-02-26
Attending: EMERGENCY MEDICINE | Admitting: STUDENT IN AN ORGANIZED HEALTH CARE EDUCATION/TRAINING PROGRAM
Payer: COMMERCIAL

## 2025-02-26 VITALS
OXYGEN SATURATION: 93 % | WEIGHT: 124 LBS | DIASTOLIC BLOOD PRESSURE: 65 MMHG | HEART RATE: 78 BPM | TEMPERATURE: 97.5 F | RESPIRATION RATE: 20 BRPM | BODY MASS INDEX: 22.39 KG/M2 | SYSTOLIC BLOOD PRESSURE: 126 MMHG

## 2025-02-26 DIAGNOSIS — J42 CHRONIC BRONCHITIS, UNSPECIFIED CHRONIC BRONCHITIS TYPE (H): ICD-10-CM

## 2025-02-26 DIAGNOSIS — Z86.018 S/P RESECTION OF MENINGIOMA: ICD-10-CM

## 2025-02-26 DIAGNOSIS — R10.13 EPIGASTRIC PAIN: Primary | ICD-10-CM

## 2025-02-26 DIAGNOSIS — S72.001A CLOSED DISPLACED FRACTURE OF RIGHT FEMORAL NECK (H): ICD-10-CM

## 2025-02-26 DIAGNOSIS — Z98.890 S/P RESECTION OF MENINGIOMA: ICD-10-CM

## 2025-02-26 DIAGNOSIS — K81.0 ACUTE CHOLECYSTITIS: ICD-10-CM

## 2025-02-26 LAB
ALBUMIN SERPL BCG-MCNC: 4.4 G/DL (ref 3.5–5.2)
ALP SERPL-CCNC: 95 U/L (ref 40–150)
ALT SERPL W P-5'-P-CCNC: 17 U/L (ref 0–50)
ANION GAP SERPL CALCULATED.3IONS-SCNC: 9 MMOL/L (ref 7–15)
AST SERPL W P-5'-P-CCNC: 19 U/L (ref 0–45)
BASOPHILS # BLD AUTO: 0 10E3/UL (ref 0–0.2)
BASOPHILS NFR BLD AUTO: 0 %
BILIRUB DIRECT SERPL-MCNC: 0.24 MG/DL (ref 0–0.3)
BILIRUB SERPL-MCNC: 0.7 MG/DL
BUN SERPL-MCNC: 14.9 MG/DL (ref 8–23)
CALCIUM SERPL-MCNC: 9.1 MG/DL (ref 8.8–10.4)
CHLORIDE SERPL-SCNC: 96 MMOL/L (ref 98–107)
CREAT SERPL-MCNC: 0.51 MG/DL (ref 0.51–0.95)
EGFRCR SERPLBLD CKD-EPI 2021: >90 ML/MIN/1.73M2
EOSINOPHIL # BLD AUTO: 0 10E3/UL (ref 0–0.7)
EOSINOPHIL NFR BLD AUTO: 0 %
ERYTHROCYTE [DISTWIDTH] IN BLOOD BY AUTOMATED COUNT: 12.1 % (ref 10–15)
GLUCOSE SERPL-MCNC: 121 MG/DL (ref 70–99)
HCO3 SERPL-SCNC: 30 MMOL/L (ref 22–29)
HCT VFR BLD AUTO: 34.5 % (ref 35–47)
HGB BLD-MCNC: 11.2 G/DL (ref 11.7–15.7)
IMM GRANULOCYTES # BLD: 0.1 10E3/UL
IMM GRANULOCYTES NFR BLD: 0 %
LIPASE SERPL-CCNC: 11 U/L (ref 13–60)
LYMPHOCYTES # BLD AUTO: 0.6 10E3/UL (ref 0.8–5.3)
LYMPHOCYTES NFR BLD AUTO: 4 %
MCH RBC QN AUTO: 29.7 PG (ref 26.5–33)
MCHC RBC AUTO-ENTMCNC: 32.5 G/DL (ref 31.5–36.5)
MCV RBC AUTO: 92 FL (ref 78–100)
MONOCYTES # BLD AUTO: 0.8 10E3/UL (ref 0–1.3)
MONOCYTES NFR BLD AUTO: 6 %
NEUTROPHILS # BLD AUTO: 12.7 10E3/UL (ref 1.6–8.3)
NEUTROPHILS NFR BLD AUTO: 90 %
NRBC # BLD AUTO: 0 10E3/UL
NRBC BLD AUTO-RTO: 0 /100
PLATELET # BLD AUTO: 196 10E3/UL (ref 150–450)
POTASSIUM SERPL-SCNC: 4.2 MMOL/L (ref 3.4–5.3)
PROT SERPL-MCNC: 7 G/DL (ref 6.4–8.3)
RBC # BLD AUTO: 3.77 10E6/UL (ref 3.8–5.2)
SODIUM SERPL-SCNC: 135 MMOL/L (ref 135–145)
WBC # BLD AUTO: 14.2 10E3/UL (ref 4–11)

## 2025-02-26 PROCEDURE — 250N000011 HC RX IP 250 OP 636: Performed by: STUDENT IN AN ORGANIZED HEALTH CARE EDUCATION/TRAINING PROGRAM

## 2025-02-26 PROCEDURE — 85004 AUTOMATED DIFF WBC COUNT: CPT | Performed by: EMERGENCY MEDICINE

## 2025-02-26 PROCEDURE — 82374 ASSAY BLOOD CARBON DIOXIDE: CPT | Performed by: EMERGENCY MEDICINE

## 2025-02-26 PROCEDURE — 74177 CT ABD & PELVIS W/CONTRAST: CPT

## 2025-02-26 PROCEDURE — 3074F SYST BP LT 130 MM HG: CPT | Performed by: PHYSICIAN ASSISTANT

## 2025-02-26 PROCEDURE — 250N000013 HC RX MED GY IP 250 OP 250 PS 637: Performed by: STUDENT IN AN ORGANIZED HEALTH CARE EDUCATION/TRAINING PROGRAM

## 2025-02-26 PROCEDURE — G0378 HOSPITAL OBSERVATION PER HR: HCPCS

## 2025-02-26 PROCEDURE — 3078F DIAST BP <80 MM HG: CPT | Performed by: PHYSICIAN ASSISTANT

## 2025-02-26 PROCEDURE — 250N000011 HC RX IP 250 OP 636: Performed by: EMERGENCY MEDICINE

## 2025-02-26 PROCEDURE — 99222 1ST HOSP IP/OBS MODERATE 55: CPT | Performed by: STUDENT IN AN ORGANIZED HEALTH CARE EDUCATION/TRAINING PROGRAM

## 2025-02-26 PROCEDURE — 250N000009 HC RX 250: Performed by: EMERGENCY MEDICINE

## 2025-02-26 PROCEDURE — 96374 THER/PROPH/DIAG INJ IV PUSH: CPT

## 2025-02-26 PROCEDURE — 76705 ECHO EXAM OF ABDOMEN: CPT

## 2025-02-26 PROCEDURE — 258N000003 HC RX IP 258 OP 636: Performed by: EMERGENCY MEDICINE

## 2025-02-26 PROCEDURE — 99285 EMERGENCY DEPT VISIT HI MDM: CPT | Mod: 25

## 2025-02-26 PROCEDURE — 99213 OFFICE O/P EST LOW 20 MIN: CPT | Performed by: PHYSICIAN ASSISTANT

## 2025-02-26 PROCEDURE — 80048 BASIC METABOLIC PNL TOTAL CA: CPT | Performed by: EMERGENCY MEDICINE

## 2025-02-26 PROCEDURE — 96361 HYDRATE IV INFUSION ADD-ON: CPT

## 2025-02-26 PROCEDURE — 85048 AUTOMATED LEUKOCYTE COUNT: CPT | Performed by: EMERGENCY MEDICINE

## 2025-02-26 PROCEDURE — 93000 ELECTROCARDIOGRAM COMPLETE: CPT | Performed by: PHYSICIAN ASSISTANT

## 2025-02-26 PROCEDURE — 82248 BILIRUBIN DIRECT: CPT | Performed by: EMERGENCY MEDICINE

## 2025-02-26 PROCEDURE — 36415 COLL VENOUS BLD VENIPUNCTURE: CPT | Performed by: EMERGENCY MEDICINE

## 2025-02-26 PROCEDURE — 83690 ASSAY OF LIPASE: CPT | Performed by: EMERGENCY MEDICINE

## 2025-02-26 RX ORDER — DEXTROSE MONOHYDRATE AND SODIUM CHLORIDE 5; .9 G/100ML; G/100ML
INJECTION, SOLUTION INTRAVENOUS CONTINUOUS
Status: ACTIVE | OUTPATIENT
Start: 2025-02-26 | End: 2025-02-27

## 2025-02-26 RX ORDER — NALOXONE HYDROCHLORIDE 0.4 MG/ML
0.2 INJECTION, SOLUTION INTRAMUSCULAR; INTRAVENOUS; SUBCUTANEOUS
Status: ACTIVE | OUTPATIENT
Start: 2025-02-26

## 2025-02-26 RX ORDER — MIRTAZAPINE 15 MG/1
15 TABLET, FILM COATED ORAL AT BEDTIME
Status: DISPENSED | OUTPATIENT
Start: 2025-02-26

## 2025-02-26 RX ORDER — ALPRAZOLAM 0.25 MG
0.25 TABLET ORAL DAILY PRN
Status: ON HOLD | COMMUNITY

## 2025-02-26 RX ORDER — CEFTRIAXONE 1 G/1
1 INJECTION, POWDER, FOR SOLUTION INTRAMUSCULAR; INTRAVENOUS ONCE
Status: COMPLETED | OUTPATIENT
Start: 2025-02-26 | End: 2025-02-26

## 2025-02-26 RX ORDER — ONDANSETRON 4 MG/1
4 TABLET, ORALLY DISINTEGRATING ORAL EVERY 6 HOURS PRN
Status: DISPENSED | OUTPATIENT
Start: 2025-02-26

## 2025-02-26 RX ORDER — HYDROMORPHONE HCL IN WATER/PF 6 MG/30 ML
0.4 PATIENT CONTROLLED ANALGESIA SYRINGE INTRAVENOUS
Status: ACTIVE | OUTPATIENT
Start: 2025-02-26

## 2025-02-26 RX ORDER — AMOXICILLIN 250 MG
2 CAPSULE ORAL 2 TIMES DAILY PRN
Status: ACTIVE | OUTPATIENT
Start: 2025-02-26

## 2025-02-26 RX ORDER — ACETAMINOPHEN 650 MG/1
650 SUPPOSITORY RECTAL EVERY 4 HOURS PRN
Status: ACTIVE | OUTPATIENT
Start: 2025-02-26

## 2025-02-26 RX ORDER — LEVALBUTEROL TARTRATE 45 UG/1
2 AEROSOL, METERED ORAL EVERY 4 HOURS PRN
Status: ACTIVE | OUTPATIENT
Start: 2025-02-26

## 2025-02-26 RX ORDER — NALOXONE HYDROCHLORIDE 0.4 MG/ML
0.4 INJECTION, SOLUTION INTRAMUSCULAR; INTRAVENOUS; SUBCUTANEOUS
Status: ACTIVE | OUTPATIENT
Start: 2025-02-26

## 2025-02-26 RX ORDER — PROCHLORPERAZINE MALEATE 5 MG/1
5 TABLET ORAL EVERY 6 HOURS PRN
Status: ACTIVE | OUTPATIENT
Start: 2025-02-26

## 2025-02-26 RX ORDER — MAGNESIUM HYDROXIDE/ALUMINUM HYDROXICE/SIMETHICONE 120; 1200; 1200 MG/30ML; MG/30ML; MG/30ML
15 SUSPENSION ORAL 3 TIMES DAILY PRN
Status: ACTIVE | OUTPATIENT
Start: 2025-02-26

## 2025-02-26 RX ORDER — HYDROMORPHONE HCL IN WATER/PF 6 MG/30 ML
0.2 PATIENT CONTROLLED ANALGESIA SYRINGE INTRAVENOUS
Status: ACTIVE | OUTPATIENT
Start: 2025-02-26

## 2025-02-26 RX ORDER — IPRATROPIUM BROMIDE AND ALBUTEROL SULFATE 2.5; .5 MG/3ML; MG/3ML
1 SOLUTION RESPIRATORY (INHALATION) EVERY 6 HOURS PRN
Status: ACTIVE | OUTPATIENT
Start: 2025-02-26

## 2025-02-26 RX ORDER — CEFTRIAXONE 1 G/1
1 INJECTION, POWDER, FOR SOLUTION INTRAMUSCULAR; INTRAVENOUS EVERY 24 HOURS
Status: DISPENSED | OUTPATIENT
Start: 2025-02-27

## 2025-02-26 RX ORDER — LEVOTHYROXINE SODIUM 88 UG/1
88 TABLET ORAL AT BEDTIME
Status: DISPENSED | OUTPATIENT
Start: 2025-02-26

## 2025-02-26 RX ORDER — IOPAMIDOL 755 MG/ML
500 INJECTION, SOLUTION INTRAVASCULAR ONCE
Status: COMPLETED | OUTPATIENT
Start: 2025-02-26 | End: 2025-02-26

## 2025-02-26 RX ORDER — FLUTICASONE FUROATE AND VILANTEROL 200; 25 UG/1; UG/1
1 POWDER RESPIRATORY (INHALATION) DAILY
Status: DISPENSED | OUTPATIENT
Start: 2025-02-27

## 2025-02-26 RX ORDER — SERTRALINE HYDROCHLORIDE 25 MG/1
25 TABLET, FILM COATED ORAL AT BEDTIME
Status: DISPENSED | OUTPATIENT
Start: 2025-02-26

## 2025-02-26 RX ORDER — ALBUTEROL SULFATE 0.83 MG/ML
2.5 SOLUTION RESPIRATORY (INHALATION) 3 TIMES DAILY PRN
Status: ACTIVE | OUTPATIENT
Start: 2025-02-26

## 2025-02-26 RX ORDER — OXYCODONE HYDROCHLORIDE 5 MG/1
5 TABLET ORAL EVERY 4 HOURS PRN
Status: ACTIVE | OUTPATIENT
Start: 2025-02-26

## 2025-02-26 RX ORDER — AMOXICILLIN 250 MG
1 CAPSULE ORAL 2 TIMES DAILY PRN
Status: ACTIVE | OUTPATIENT
Start: 2025-02-26

## 2025-02-26 RX ORDER — ONDANSETRON 2 MG/ML
4 INJECTION INTRAMUSCULAR; INTRAVENOUS EVERY 6 HOURS PRN
Status: ACTIVE | OUTPATIENT
Start: 2025-02-26

## 2025-02-26 RX ORDER — ACETAMINOPHEN 325 MG/1
650 TABLET ORAL EVERY 4 HOURS PRN
Status: DISPENSED | OUTPATIENT
Start: 2025-02-26

## 2025-02-26 RX ADMIN — ONDANSETRON 4 MG: 4 TABLET, ORALLY DISINTEGRATING ORAL at 20:19

## 2025-02-26 RX ADMIN — DEXTROSE AND SODIUM CHLORIDE: 5; 900 INJECTION, SOLUTION INTRAVENOUS at 18:39

## 2025-02-26 RX ADMIN — SERTRALINE HYDROCHLORIDE 25 MG: 25 TABLET ORAL at 20:41

## 2025-02-26 RX ADMIN — LEVOTHYROXINE SODIUM 88 MCG: 0.09 TABLET ORAL at 20:41

## 2025-02-26 RX ADMIN — SODIUM CHLORIDE, POTASSIUM CHLORIDE, SODIUM LACTATE AND CALCIUM CHLORIDE 1000 ML: 600; 310; 30; 20 INJECTION, SOLUTION INTRAVENOUS at 16:10

## 2025-02-26 RX ADMIN — MIRTAZAPINE 15 MG: 15 TABLET, FILM COATED ORAL at 20:42

## 2025-02-26 RX ADMIN — OXYCODONE HYDROCHLORIDE 2.5 MG: 5 TABLET ORAL at 20:41

## 2025-02-26 RX ADMIN — SODIUM CHLORIDE 56 ML: 9 INJECTION, SOLUTION INTRAVENOUS at 16:52

## 2025-02-26 RX ADMIN — IOPAMIDOL 62 ML: 755 INJECTION, SOLUTION INTRAVENOUS at 16:52

## 2025-02-26 RX ADMIN — CEFTRIAXONE 1 G: 1 INJECTION, POWDER, FOR SOLUTION INTRAMUSCULAR; INTRAVENOUS at 17:59

## 2025-02-26 ASSESSMENT — ACTIVITIES OF DAILY LIVING (ADL)
ADLS_ACUITY_SCORE: 61
ADLS_ACUITY_SCORE: 57
ADLS_ACUITY_SCORE: 61
ADLS_ACUITY_SCORE: 57
ADLS_ACUITY_SCORE: 61
ADLS_ACUITY_SCORE: 57
ADLS_ACUITY_SCORE: 61
ADLS_ACUITY_SCORE: 61

## 2025-02-26 ASSESSMENT — COLUMBIA-SUICIDE SEVERITY RATING SCALE - C-SSRS
1. IN THE PAST MONTH, HAVE YOU WISHED YOU WERE DEAD OR WISHED YOU COULD GO TO SLEEP AND NOT WAKE UP?: YES
2. HAVE YOU ACTUALLY HAD ANY THOUGHTS OF KILLING YOURSELF IN THE PAST MONTH?: NO
6. HAVE YOU EVER DONE ANYTHING, STARTED TO DO ANYTHING, OR PREPARED TO DO ANYTHING TO END YOUR LIFE?: NO

## 2025-02-26 NOTE — ED PROVIDER NOTES
Emergency Department Note      History of Present Illness     Chief Complaint   Abdominal Pain      HPI   Josephine Nicole is a 75 year old female with history of pulmonary embolism, emphysema, COPD, and hypothyroidism who presents to the ED with her daughter for abdominal pain. The patient reports that she has been experiencing 2 days of abdominal pain. She states that her pain has been constant aside from a brief 3 hour reprieve which allowed her to sleep. The patient states that her abdominal pain started as a sharp sensation sitting in the epigastric region. Initially, she thought her discomfort was due to gas so the patient took Gas-X to make herself burp. Later, her pain began to migrate into the right lower quadrant. Eventually the patient developed dry heaves that would last for 5-10 minutes at a time. She complains that these episodes would cause severe abdominal pain. She adds that she had one episode of emesis. The patient was seen at urgent care earlier today because her symptoms were too much. She was worked up with a GI cocktail and an EKG which came back normal. After the GI cocktail, the patient's dry heaving and abdominal pain completely resolved for the last hour and a half. She was sent her for additional work up. Denies fever, urinary issues, and known sick contacts. No history of endoscopy.     Independent Historian   None    Review of External Notes   I reviewed office visit earlier the same day including symptoms as well as recommendation to come to the emergency department.    Past Medical History     Medical History and Problem List   Anxiety   Depression  Pulmonary emphysema   Cerebral meningioma  Hypothyroidism  Osteoporosis   Hyperlipidemia   Melanoma   COPD  Panlobular emphysema   Tobacco abuse    Medications   Fosamax  Xanax  Atorvastatin   Levothyroxine   Mirtazapine   Prednisone  Sertraline   Zolpidem   Lasix   Hydroxyzine     Surgical History   Tubal ligation  Open reduction  internal fixation hip   Craniotomy     Physical Exam     Patient Vitals for the past 24 hrs:   BP Temp Temp src Pulse Resp SpO2   02/26/25 1419 92/71 98.5  F (36.9  C) Temporal 66 18 98 %     Physical Exam    CV: ppi, regular   Resp: speaking in full sentences without any resp distress, clear to auscultation  Skin: warm dry well perfused  Neuro: Alert, moving all extremities without apparent deficit     HEENT: No scleral icterus     Abdomen: Soft, nondistended, tenderness palpation in the right upper quadrant with positive Espitia sign                 Diagnostics     Lab Results   Labs Ordered and Resulted from Time of ED Arrival to Time of ED Departure   BASIC METABOLIC PANEL - Abnormal       Result Value    Sodium 135      Potassium 4.2      Chloride 96 (*)     Carbon Dioxide (CO2) 30 (*)     Anion Gap 9      Urea Nitrogen 14.9      Creatinine 0.51      GFR Estimate >90      Calcium 9.1      Glucose 121 (*)    LIPASE - Abnormal    Lipase 11 (*)    CBC WITH PLATELETS AND DIFFERENTIAL - Abnormal    WBC Count 14.2 (*)     RBC Count 3.77 (*)     Hemoglobin 11.2 (*)     Hematocrit 34.5 (*)     MCV 92      MCH 29.7      MCHC 32.5      RDW 12.1      Platelet Count 196      % Neutrophils 90      % Lymphocytes 4      % Monocytes 6      % Eosinophils 0      % Basophils 0      % Immature Granulocytes 0      NRBCs per 100 WBC 0      Absolute Neutrophils 12.7 (*)     Absolute Lymphocytes 0.6 (*)     Absolute Monocytes 0.8      Absolute Eosinophils 0.0      Absolute Basophils 0.0      Absolute Immature Granulocytes 0.1      Absolute NRBCs 0.0     HEPATIC FUNCTION PANEL - Normal    Protein Total 7.0      Albumin 4.4      Bilirubin Total 0.7      Alkaline Phosphatase 95      AST 19      ALT 17      Bilirubin Direct 0.24         Imaging   Abd/pelvis CT,  IV  contrast only TRAUMA / AAA   Final Result   IMPRESSION:    1.  Acute cholecystitis. Distended gallbladder with cholelithiasis with marked gallbladder wall thickening and  pericholecystic fluid.      2.  No biliary dilatation.      3.  Marked emphysema.      4.  Mild bronchial wall thickening compatible with bronchial inflammation.      US Abdomen Limited    (Results Pending)       Independent Interpretation       ED Course      Medications Administered   Medications   cefTRIAXone (ROCEPHIN) 1 g vial to attach to  mL bag for ADULTS or NS 50 mL bag for PEDS (1 g Intravenous $New Bag 2/26/25 1750)   dextrose 5% and 0.9% NaCl infusion (has no administration in time range)   lactated ringers BOLUS 1,000 mL (0 mLs Intravenous Stopped 2/26/25 1710)   CT Scan Flush (56 mLs Intravenous $Given 2/26/25 1652)   iopamidol (ISOVUE-370) solution 500 mL (62 mLs Intravenous $Given 2/26/25 1652)       Procedures   Procedures     Discussion of Management   Admitting Hospitalist,    Surgery, Dr. George    ED Course   ED Course as of 02/26/25 1816 Wed Feb 26, 2025   1546 I obtained history and performed physical exam.    1553 I reviewed ECG from [facility]: NSR, no ischemia, no significantly abnormal intervals    1734 I updated the patient on her imaging results.    1750 I spoke with Dr. George from general surgery.   1800 I spoke with the hospitalist regarding the patient. They accepted the patient for admission.         Additional Documentation  None    Medical Decision Making / Diagnosis     CMS Diagnoses: None    MIPS       None    MDM   Josephine Nicole is a 75 year old female     75-year-old female on 2 L nasal cannula 24/7 for COPD here with upper abdominal pain.  Right upper quadrant tenderness on examination.  Workup here in the ED shows evidence of acute cholecystitis, no evidence of common ductal obstruction.  Recommendation will be for antibiotics, admission, surgical consultation for likely cholecystectomy.      Disposition   The patient was admitted to the hospital.     Diagnosis     ICD-10-CM    1. Acute cholecystitis  K81.0       2. Chronic bronchitis, unspecified chronic bronchitis type  (H)  J42            Discharge Medications   New Prescriptions    No medications on file         Scribe Disclosure:  I, Fifi Ortiz, am serving as a scribe at 4:07 PM on 2/26/2025 to document services personally performed by Harshil Brown MD based on my observations and the provider's statements to me.        Harshil Brown MD  02/26/25 9603

## 2025-02-26 NOTE — ED TRIAGE NOTES
Arrives from the clinic. 2 days ago developed abdominal pain.  States attempted gas-x with some relief but not resolution of symptoms. Points to the top of her abdomin to where the pain is located. States pain gets worse with touch.     Emesis X1 today. Endorses nausea.     LBM was today and describes it as soft, with increased frequency.     Denies fevers.     GI cocktail at  has greatly improved her pain.

## 2025-02-26 NOTE — H&P
[facility]    History and Physical - Hospitalist Service       Date of Admission:  2/26/2025    Assessment & Plan      Josephine Nicole is a 75 year old female with PMH of cerebral meningioma s/p radiation and resection, COPD on chronic 2L O2, hypothyroidism, HLD, osteoporosis, depression/anxiety, who was admitted to observation on 2/26/2025 for cholecystitis.    Presents with about 2 days of abdominal pain and N/V.     On arrival, BP 92/71, HR 66, temp 98.5, 98% on 2L NC. Labs notable for WBC 14.2, Hgb 11.2, plt 196, Na 135, K 4.2, Cr 0.51, LFTs in normal limits, lipase 11, . CT AP showed acute cholecystitis with distended gallbladder with cholelithiasis with marked gallbladder wall thickening and pericholecystic fluid, no biliary dilatation, marked emphysema, and mild bronchial wall thickening compatible with bronchial inflammation. Abd US with significant gallbladder wall thickening, stones and sludge in the gallbladder, no bile duct dilatation.     Acute cholecystitis  Optimized for surgery.   - Surgery consult  - CTX  - NPO at midnight  - PRN anti-emetics, GI cocktail, and oxycodone/dilaudid    COPD on chronic oxygen  Not in exacerbation.   - PTA inhalers    Hypothyroidism - PTA levothyroxine  Depression/anxiety - PTA sertraline, mirtazapine, and PRN ambien     Observation Goals: -diagnostic tests and consults completed and resulted, -vital signs normal or at patient baseline, Nurse to notify provider when observation goals have been met and patient is ready for discharge.  Diet: NPO for Procedure/Surgery per Anesthesia Guidelines Except for: Meds; Clear liquids before procedure/surgery: ADULT (Age GREATER than or Equal to 18 years) - Clear liquids 2 hours before procedure/surgery  Clear Liquid Diet  DVT Prophylaxis: Pneumatic Compression Devices  Roman Catheter: Not present  Lines: None     Cardiac Monitoring: None  Code Status: Full Code    Clinically Significant Risk Factors  Present on Admission          # Hypochloremia: Lowest Cl = 96 mmol/L in last 2 days, will monitor as appropriate                               Disposition Plan     Medically Ready for Discharge: Anticipated Tomorrow           Caroline Shahid DO  Hospitalist Service  [facility]  Securely message with Carli (more info)  Text page via [facility] Paging/Directory     ______________________________________________________________________    Chief Complaint   Abdominal pain    History is obtained from the patient and her daughter    History of Present Illness   Josephine Nicole is a 75 year old female who presents with about 2 days of abdominal pain and N/V. RUQ/epigastric pain with vomiting yesterday. Only vomited once or twice before stomach was empty. Continued to have dry heaves and developed sharp chest pain after this. Associated with chills. Has chronic dyspnea from COPD and it is unchanged. No diarrhea or dysuria. GI cocktail given in clinic helped with pain.     Past Medical History    Past Medical History:   Diagnosis Date    Cerebral meningioma     COPD (chronic obstructive pulmonary disease)     Depressive disorder     Eczema     Eczema     Hyperlipidemia     Hypothyroidism     Melanoma of skin     Osteoporosis        Past Surgical History   Past Surgical History:   Procedure Laterality Date    COLONOSCOPY N/A 04/18/2022    Procedure: COLONOSCOPY;  Surgeon: Abimbola Handley MD;  Location:  GI    OPEN REDUCTION INTERNAL FIXATION HIP UNIPOLAR Right 3/20/2023    Procedure: Right hip hemiarthroplasty anterolateral approach;  Surgeon: Shun Cuevas MD;  Location:  OR    OPTICAL TRACKING SYSTEM CRANIOTOMY, EXCISE TUMOR, COMBINED N/A 3/30/2023    Procedure: Bifrontal stealth craniotomy and resection of meningioma;  Surgeon: Ramez Murphy MD;  Location:  OR    Tubal ligation NOS         Prior to Admission Medications   Prior to Admission Medications   Prescriptions  Last Dose Informant Patient Reported? Taking?   HYDROcodone-acetaminophen (NORCO) 5-325 MG tablet   No No   Sig: Take 1-2 tablets by mouth every 4 hours as needed for moderate to severe pain   Patient not taking: Reported on 2/26/2025   Lidocaine (LIDOCARE) 4 % Patch   No No   Sig: Place 1-3 patches onto the skin every 24 hours To prevent lidocaine toxicity, patient should be patch free for 12 hrs daily.   acetaminophen (TYLENOL) 325 MG tablet   No No   Sig: Take 2 tablets (650 mg) by mouth every 4 hours as needed for mild pain   albuterol (PROVENTIL) (2.5 MG/3ML) 0.083% neb solution   Yes No   Sig: Take 2.5 mg by nebulization 3 times daily as needed for shortness of breath, wheezing or cough   alendronate (FOSAMAX) 70 MG tablet   Yes No   Sig: Take 70 mg by mouth every 7 days   amitriptyline (ELAVIL) 10 MG tablet   No No   Sig: Take 1 tablet (10 mg) by mouth At Bedtime for 14 days, THEN 2 tablets (20 mg) At Bedtime for 16 days.   atorvastatin (LIPITOR) 10 MG tablet   Yes No   Sig: Take 10 mg by mouth daily   cyanocobalamin (CYANOCOBALAMIN) 1000 MCG/ML injection   Yes No   Sig: Inject 1 mL into the muscle every 30 days   diazepam (VALIUM) 5 MG tablet   No No   Sig: Take 1 tablet (5 mg) by mouth See Admin Instructions Take 5mg 30 minutes prior to MRI, then may take an additional 5mg at time of exam if needed. MUST have a .   Patient not taking: Reported on 2/26/2025   fluticasone-salmeterol (ADVAIR) 500-50 MCG/DOSE inhaler   Yes No   Sig: Inhale 1 puff into the lungs 2 times daily   furosemide (LASIX) 20 MG tablet   No No   Sig: Take 0.5 tablets (10 mg) by mouth daily   hydrOXYzine (ATARAX) 10 MG tablet   Yes No   Sig: Take 10 mg by mouth every 8 hours as needed for anxiety   ibuprofen (ADVIL/MOTRIN) 400 MG tablet   No No   Sig: Take 1 tablet (400 mg) by mouth every 6 hours as needed for inflammatory pain   ipratropium - albuterol 0.5 mg/2.5 mg/3 mL (DUONEB) 0.5-2.5 (3) MG/3ML neb solution   Yes No   Sig: Take  1 vial by nebulization every 6 hours as needed for shortness of breath, wheezing or cough   levalbuterol (XOPENEX HFA) 45 MCG/ACT inhaler   Yes No   Sig: Inhale 2 puffs into the lungs every 4 hours as needed for shortness of breath or wheezing   levothyroxine (SYNTHROID/LEVOTHROID) 88 MCG tablet   Yes No   Sig: Take 88 mcg by mouth daily   mirtazapine (REMERON) 15 MG tablet   No No   Sig: Take 1 tablet (15 mg) by mouth At Bedtime   polyethylene glycol (MIRALAX) 17 GM/Dose powder   No No   Sig: Take 17 g by mouth daily   senna-docusate (SENOKOT-S/PERICOLACE) 8.6-50 MG tablet   No No   Sig: Take 1 tablet by mouth At Bedtime   sertraline (ZOLOFT) 25 MG tablet   Yes No   Sig: Take 25 mg by mouth every morning      Facility-Administered Medications Last Administration Doses Remaining   lidocaine (viscous) (XYLOCAINE) 2 % 15 mL, alum & mag hydroxide-simethicone (MAALOX) 15 mL GI Cocktail 2/26/2025 12:38 PM 0              Physical Exam   Vital Signs: Temp: 98  F (36.7  C) Temp src: Oral BP: 114/63 Pulse: 66   Resp: 18 SpO2: 98 % O2 Device: None (Room air)    Weight: 126 lbs 4.8 oz    Constitutional: Awake, alert, no distress, and cooperative  Cardiovascular: Regular rate and rhythm, normal S1 and S2, no S3 or S4, and no murmur noted  Respiratory: No increased work of breathing, good air exchange, clear to auscultation bilaterally, no crackles or wheezing  Gastrointestinal: Abdomen soft, RUQ and epigastric tenderness, non-distended. BS normal. No masses, organomegaly     Medical Decision Making       65 MINUTES SPENT BY ME on the date of service doing chart review, history, exam, documentation & further activities per the note.      Data     I have personally reviewed the following data over the past 24 hrs:    14.2 (H)  \   11.2 (L)   / 196     135 96 (L) 14.9 /  121 (H)   4.2 30 (H) 0.51 \     ALT: 17 AST: 19 AP: 95 TBILI: 0.7   ALB: 4.4 TOT PROTEIN: 7.0 LIPASE: 11 (L)

## 2025-02-26 NOTE — PROGRESS NOTES
SUBJECTIVE  HPI:  Josephine Nicole is a 75 year old female who presents with the CC of abdominal/pelvic pain. 10/10 pain now. 7/10 24 no improvement    Past Medical History:   Diagnosis Date    Cerebral meningioma     COPD (chronic obstructive pulmonary disease)     Depressive disorder     Eczema     Eczema     Hyperlipidemia     Hypothyroidism     Melanoma of skin     Osteoporosis      Current Outpatient Medications   Medication Sig Dispense Refill    acetaminophen (TYLENOL) 325 MG tablet Take 2 tablets (650 mg) by mouth every 4 hours as needed for mild pain 20 tablet 0    albuterol (PROVENTIL) (2.5 MG/3ML) 0.083% neb solution Take 2.5 mg by nebulization 3 times daily as needed for shortness of breath, wheezing or cough      alendronate (FOSAMAX) 70 MG tablet Take 70 mg by mouth every 7 days      atorvastatin (LIPITOR) 10 MG tablet Take 10 mg by mouth daily      cyanocobalamin (CYANOCOBALAMIN) 1000 MCG/ML injection Inject 1 mL into the muscle every 30 days      fluticasone-salmeterol (ADVAIR) 500-50 MCG/DOSE inhaler Inhale 1 puff into the lungs 2 times daily      furosemide (LASIX) 20 MG tablet Take 0.5 tablets (10 mg) by mouth daily 15 tablet 0    hydrOXYzine (ATARAX) 10 MG tablet Take 10 mg by mouth every 8 hours as needed for anxiety      ibuprofen (ADVIL/MOTRIN) 400 MG tablet Take 1 tablet (400 mg) by mouth every 6 hours as needed for inflammatory pain      ipratropium - albuterol 0.5 mg/2.5 mg/3 mL (DUONEB) 0.5-2.5 (3) MG/3ML neb solution Take 1 vial by nebulization every 6 hours as needed for shortness of breath, wheezing or cough      levalbuterol (XOPENEX HFA) 45 MCG/ACT inhaler Inhale 2 puffs into the lungs every 4 hours as needed for shortness of breath or wheezing      levothyroxine (SYNTHROID/LEVOTHROID) 88 MCG tablet Take 88 mcg by mouth daily      Lidocaine (LIDOCARE) 4 % Patch Place 1-3 patches onto the skin every 24 hours To prevent lidocaine toxicity, patient should be patch free for 12 hrs daily.  20 patch 0    mirtazapine (REMERON) 15 MG tablet Take 1 tablet (15 mg) by mouth At Bedtime 30 tablet 0    polyethylene glycol (MIRALAX) 17 GM/Dose powder Take 17 g by mouth daily 510 g 0    senna-docusate (SENOKOT-S/PERICOLACE) 8.6-50 MG tablet Take 1 tablet by mouth At Bedtime 30 tablet 0    sertraline (ZOLOFT) 25 MG tablet Take 25 mg by mouth every morning      amitriptyline (ELAVIL) 10 MG tablet Take 1 tablet (10 mg) by mouth At Bedtime for 14 days, THEN 2 tablets (20 mg) At Bedtime for 16 days. 46 tablet 0    diazepam (VALIUM) 5 MG tablet Take 1 tablet (5 mg) by mouth See Admin Instructions Take 5mg 30 minutes prior to MRI, then may take an additional 5mg at time of exam if needed. MUST have a . (Patient not taking: Reported on 2/26/2025) 2 tablet 0    HYDROcodone-acetaminophen (NORCO) 5-325 MG tablet Take 1-2 tablets by mouth every 4 hours as needed for moderate to severe pain (Patient not taking: Reported on 2/26/2025) 15 tablet 0     Social History     Tobacco Use    Smoking status: Former     Types: Cigarettes    Smokeless tobacco: Never   Substance Use Topics    Alcohol use: Not Currently       ROS:  {[id number]:124466}    OBJECTIVE:  /65   Pulse 78   Temp 97.5  F (36.4  C)   Resp 20   Wt 56.2 kg (124 lb)   SpO2 93%   BMI 22.39 kg/m    {:[id number]}    ASSESSMENT:      See Orders in Epic     now

## 2025-02-27 ENCOUNTER — ANESTHESIA EVENT (OUTPATIENT)
Dept: SURGERY | Facility: CLINIC | Age: 75
End: 2025-02-27
Payer: COMMERCIAL

## 2025-02-27 ENCOUNTER — ANESTHESIA (OUTPATIENT)
Dept: SURGERY | Facility: CLINIC | Age: 75
End: 2025-02-27
Payer: COMMERCIAL

## 2025-02-27 VITALS
RESPIRATION RATE: 17 BRPM | HEART RATE: 74 BPM | BODY MASS INDEX: 25.96 KG/M2 | TEMPERATURE: 97.9 F | OXYGEN SATURATION: 94 % | HEIGHT: 62 IN | SYSTOLIC BLOOD PRESSURE: 122 MMHG | DIASTOLIC BLOOD PRESSURE: 69 MMHG | WEIGHT: 141.09 LBS

## 2025-02-27 LAB
ALBUMIN SERPL BCG-MCNC: 3.5 G/DL (ref 3.5–5.2)
ALP SERPL-CCNC: 79 U/L (ref 40–150)
ALT SERPL W P-5'-P-CCNC: 16 U/L (ref 0–50)
ANION GAP SERPL CALCULATED.3IONS-SCNC: 6 MMOL/L (ref 7–15)
AST SERPL W P-5'-P-CCNC: 17 U/L (ref 0–45)
BILIRUB SERPL-MCNC: 0.3 MG/DL
BUN SERPL-MCNC: 9.6 MG/DL (ref 8–23)
CALCIUM SERPL-MCNC: 8.5 MG/DL (ref 8.8–10.4)
CHLORIDE SERPL-SCNC: 100 MMOL/L (ref 98–107)
CREAT SERPL-MCNC: 0.56 MG/DL (ref 0.51–0.95)
EGFRCR SERPLBLD CKD-EPI 2021: >90 ML/MIN/1.73M2
ERYTHROCYTE [DISTWIDTH] IN BLOOD BY AUTOMATED COUNT: 12.2 % (ref 10–15)
GLUCOSE SERPL-MCNC: 117 MG/DL (ref 70–99)
HCO3 SERPL-SCNC: 32 MMOL/L (ref 22–29)
HCT VFR BLD AUTO: 32 % (ref 35–47)
HGB BLD-MCNC: 10.1 G/DL (ref 11.7–15.7)
MCH RBC QN AUTO: 29.6 PG (ref 26.5–33)
MCHC RBC AUTO-ENTMCNC: 31.6 G/DL (ref 31.5–36.5)
MCV RBC AUTO: 94 FL (ref 78–100)
PLATELET # BLD AUTO: 168 10E3/UL (ref 150–450)
POTASSIUM SERPL-SCNC: 3.8 MMOL/L (ref 3.4–5.3)
PROT SERPL-MCNC: 5.7 G/DL (ref 6.4–8.3)
RBC # BLD AUTO: 3.41 10E6/UL (ref 3.8–5.2)
SODIUM SERPL-SCNC: 138 MMOL/L (ref 135–145)
WBC # BLD AUTO: 9.4 10E3/UL (ref 4–11)

## 2025-02-27 PROCEDURE — 710N000012 HC RECOVERY PHASE 2, PER MINUTE: Performed by: SURGERY

## 2025-02-27 PROCEDURE — 250N000011 HC RX IP 250 OP 636

## 2025-02-27 PROCEDURE — 82374 ASSAY BLOOD CARBON DIOXIDE: CPT | Performed by: STUDENT IN AN ORGANIZED HEALTH CARE EDUCATION/TRAINING PROGRAM

## 2025-02-27 PROCEDURE — 710N000009 HC RECOVERY PHASE 1, LEVEL 1, PER MIN: Performed by: SURGERY

## 2025-02-27 PROCEDURE — 36415 COLL VENOUS BLD VENIPUNCTURE: CPT | Performed by: STUDENT IN AN ORGANIZED HEALTH CARE EDUCATION/TRAINING PROGRAM

## 2025-02-27 PROCEDURE — 250N000011 HC RX IP 250 OP 636: Performed by: STUDENT IN AN ORGANIZED HEALTH CARE EDUCATION/TRAINING PROGRAM

## 2025-02-27 PROCEDURE — 360N000076 HC SURGERY LEVEL 3, PER MIN: Performed by: SURGERY

## 2025-02-27 PROCEDURE — 250N000009 HC RX 250: Performed by: SURGERY

## 2025-02-27 PROCEDURE — 88304 TISSUE EXAM BY PATHOLOGIST: CPT | Mod: TC | Performed by: SURGERY

## 2025-02-27 PROCEDURE — 258N000001 HC RX 258: Performed by: SURGERY

## 2025-02-27 PROCEDURE — 250N000025 HC SEVOFLURANE, PER MIN: Performed by: SURGERY

## 2025-02-27 PROCEDURE — 96376 TX/PRO/DX INJ SAME DRUG ADON: CPT

## 2025-02-27 PROCEDURE — 250N000011 HC RX IP 250 OP 636: Performed by: SURGERY

## 2025-02-27 PROCEDURE — 370N000017 HC ANESTHESIA TECHNICAL FEE, PER MIN: Performed by: SURGERY

## 2025-02-27 PROCEDURE — 99204 OFFICE O/P NEW MOD 45 MIN: CPT | Mod: 57 | Performed by: SURGERY

## 2025-02-27 PROCEDURE — G0378 HOSPITAL OBSERVATION PER HR: HCPCS

## 2025-02-27 PROCEDURE — 250N000011 HC RX IP 250 OP 636: Performed by: ANESTHESIOLOGY

## 2025-02-27 PROCEDURE — 250N000013 HC RX MED GY IP 250 OP 250 PS 637: Performed by: SURGERY

## 2025-02-27 PROCEDURE — 250N000009 HC RX 250

## 2025-02-27 PROCEDURE — 250N000009 HC RX 250: Performed by: ANESTHESIOLOGY

## 2025-02-27 PROCEDURE — 47562 LAPAROSCOPIC CHOLECYSTECTOMY: CPT | Mod: AS | Performed by: PHYSICIAN ASSISTANT

## 2025-02-27 PROCEDURE — 272N000001 HC OR GENERAL SUPPLY STERILE: Performed by: SURGERY

## 2025-02-27 PROCEDURE — 99232 SBSQ HOSP IP/OBS MODERATE 35: CPT | Performed by: PHYSICIAN ASSISTANT

## 2025-02-27 PROCEDURE — 85027 COMPLETE CBC AUTOMATED: CPT | Performed by: STUDENT IN AN ORGANIZED HEALTH CARE EDUCATION/TRAINING PROGRAM

## 2025-02-27 PROCEDURE — 250N000013 HC RX MED GY IP 250 OP 250 PS 637: Performed by: STUDENT IN AN ORGANIZED HEALTH CARE EDUCATION/TRAINING PROGRAM

## 2025-02-27 PROCEDURE — 258N000003 HC RX IP 258 OP 636: Performed by: ANESTHESIOLOGY

## 2025-02-27 PROCEDURE — 258N000003 HC RX IP 258 OP 636

## 2025-02-27 PROCEDURE — 47562 LAPAROSCOPIC CHOLECYSTECTOMY: CPT | Performed by: SURGERY

## 2025-02-27 PROCEDURE — 82310 ASSAY OF CALCIUM: CPT | Performed by: STUDENT IN AN ORGANIZED HEALTH CARE EDUCATION/TRAINING PROGRAM

## 2025-02-27 PROCEDURE — 999N000141 HC STATISTIC PRE-PROCEDURE NURSING ASSESSMENT: Performed by: SURGERY

## 2025-02-27 PROCEDURE — 250N000011 HC RX IP 250 OP 636: Mod: JZ | Performed by: SURGERY

## 2025-02-27 RX ORDER — ONDANSETRON 4 MG/1
4 TABLET, ORALLY DISINTEGRATING ORAL EVERY 30 MIN PRN
Status: DISCONTINUED | OUTPATIENT
Start: 2025-02-27 | End: 2025-02-27 | Stop reason: HOSPADM

## 2025-02-27 RX ORDER — ALBUTEROL SULFATE 0.83 MG/ML
2.5 SOLUTION RESPIRATORY (INHALATION) EVERY 4 HOURS PRN
Status: DISCONTINUED | OUTPATIENT
Start: 2025-02-27 | End: 2025-02-27 | Stop reason: HOSPADM

## 2025-02-27 RX ORDER — IBUPROFEN 200 MG
600 TABLET ORAL EVERY 6 HOURS PRN
COMMUNITY
Start: 2025-02-27

## 2025-02-27 RX ORDER — FENTANYL CITRATE 50 UG/ML
INJECTION, SOLUTION INTRAMUSCULAR; INTRAVENOUS PRN
Status: DISCONTINUED | OUTPATIENT
Start: 2025-02-27 | End: 2025-02-27

## 2025-02-27 RX ORDER — NALOXONE HYDROCHLORIDE 0.4 MG/ML
0.1 INJECTION, SOLUTION INTRAMUSCULAR; INTRAVENOUS; SUBCUTANEOUS
Status: DISCONTINUED | OUTPATIENT
Start: 2025-02-27 | End: 2025-02-27 | Stop reason: HOSPADM

## 2025-02-27 RX ORDER — KETOROLAC TROMETHAMINE 30 MG/ML
INJECTION, SOLUTION INTRAMUSCULAR; INTRAVENOUS PRN
Status: DISCONTINUED | OUTPATIENT
Start: 2025-02-27 | End: 2025-02-27

## 2025-02-27 RX ORDER — HYDROMORPHONE HCL IN WATER/PF 6 MG/30 ML
0.4 PATIENT CONTROLLED ANALGESIA SYRINGE INTRAVENOUS EVERY 5 MIN PRN
Status: DISCONTINUED | OUTPATIENT
Start: 2025-02-27 | End: 2025-02-27 | Stop reason: HOSPADM

## 2025-02-27 RX ORDER — LIDOCAINE HYDROCHLORIDE 20 MG/ML
INJECTION, SOLUTION INFILTRATION; PERINEURAL PRN
Status: DISCONTINUED | OUTPATIENT
Start: 2025-02-27 | End: 2025-02-27

## 2025-02-27 RX ORDER — OXYCODONE HYDROCHLORIDE 5 MG/1
5 TABLET ORAL
Status: COMPLETED | OUTPATIENT
Start: 2025-02-27 | End: 2025-02-27

## 2025-02-27 RX ORDER — FENTANYL CITRATE 50 UG/ML
25 INJECTION, SOLUTION INTRAMUSCULAR; INTRAVENOUS EVERY 5 MIN PRN
Status: DISCONTINUED | OUTPATIENT
Start: 2025-02-27 | End: 2025-02-27 | Stop reason: HOSPADM

## 2025-02-27 RX ORDER — CEFAZOLIN SODIUM/WATER 2 G/20 ML
2 SYRINGE (ML) INTRAVENOUS
Status: DISCONTINUED | OUTPATIENT
Start: 2025-02-27 | End: 2025-02-27 | Stop reason: HOSPADM

## 2025-02-27 RX ORDER — PROPOFOL 10 MG/ML
INJECTION, EMULSION INTRAVENOUS CONTINUOUS PRN
Status: DISCONTINUED | OUTPATIENT
Start: 2025-02-27 | End: 2025-02-27

## 2025-02-27 RX ORDER — ACETAMINOPHEN 500 MG
650 TABLET ORAL EVERY 6 HOURS PRN
COMMUNITY
Start: 2025-02-27

## 2025-02-27 RX ORDER — BUPIVACAINE HYDROCHLORIDE 5 MG/ML
INJECTION, SOLUTION EPIDURAL; INTRACAUDAL PRN
Status: DISCONTINUED | OUTPATIENT
Start: 2025-02-27 | End: 2025-02-27 | Stop reason: HOSPADM

## 2025-02-27 RX ORDER — CEFAZOLIN SODIUM/WATER 2 G/20 ML
2 SYRINGE (ML) INTRAVENOUS SEE ADMIN INSTRUCTIONS
Status: DISCONTINUED | OUTPATIENT
Start: 2025-02-27 | End: 2025-02-27 | Stop reason: HOSPADM

## 2025-02-27 RX ORDER — IPRATROPIUM BROMIDE AND ALBUTEROL SULFATE 2.5; .5 MG/3ML; MG/3ML
3 SOLUTION RESPIRATORY (INHALATION) ONCE
Status: COMPLETED | OUTPATIENT
Start: 2025-02-27 | End: 2025-02-27

## 2025-02-27 RX ORDER — ACETAMINOPHEN 325 MG/1
650 TABLET ORAL
Status: ACTIVE | OUTPATIENT
Start: 2025-02-27

## 2025-02-27 RX ORDER — ONDANSETRON 2 MG/ML
4 INJECTION INTRAMUSCULAR; INTRAVENOUS EVERY 30 MIN PRN
Status: DISCONTINUED | OUTPATIENT
Start: 2025-02-27 | End: 2025-02-27 | Stop reason: HOSPADM

## 2025-02-27 RX ORDER — SODIUM CHLORIDE, SODIUM LACTATE, POTASSIUM CHLORIDE, CALCIUM CHLORIDE 600; 310; 30; 20 MG/100ML; MG/100ML; MG/100ML; MG/100ML
INJECTION, SOLUTION INTRAVENOUS CONTINUOUS
Status: DISCONTINUED | OUTPATIENT
Start: 2025-02-27 | End: 2025-02-27 | Stop reason: HOSPADM

## 2025-02-27 RX ORDER — FENTANYL CITRATE 50 UG/ML
50 INJECTION, SOLUTION INTRAMUSCULAR; INTRAVENOUS EVERY 5 MIN PRN
Status: DISCONTINUED | OUTPATIENT
Start: 2025-02-27 | End: 2025-02-27 | Stop reason: HOSPADM

## 2025-02-27 RX ORDER — LIDOCAINE 40 MG/G
CREAM TOPICAL
Status: DISCONTINUED | OUTPATIENT
Start: 2025-02-27 | End: 2025-02-27 | Stop reason: HOSPADM

## 2025-02-27 RX ORDER — ONDANSETRON 2 MG/ML
INJECTION INTRAMUSCULAR; INTRAVENOUS PRN
Status: DISCONTINUED | OUTPATIENT
Start: 2025-02-27 | End: 2025-02-27

## 2025-02-27 RX ORDER — HYDROMORPHONE HCL IN WATER/PF 6 MG/30 ML
0.2 PATIENT CONTROLLED ANALGESIA SYRINGE INTRAVENOUS EVERY 5 MIN PRN
Status: DISCONTINUED | OUTPATIENT
Start: 2025-02-27 | End: 2025-02-27 | Stop reason: HOSPADM

## 2025-02-27 RX ORDER — OXYCODONE HYDROCHLORIDE 5 MG/1
5-10 TABLET ORAL EVERY 4 HOURS PRN
Qty: 12 TABLET | Refills: 0 | Status: SHIPPED | OUTPATIENT
Start: 2025-02-27

## 2025-02-27 RX ORDER — LABETALOL HYDROCHLORIDE 5 MG/ML
10 INJECTION, SOLUTION INTRAVENOUS
Status: DISCONTINUED | OUTPATIENT
Start: 2025-02-27 | End: 2025-02-27 | Stop reason: HOSPADM

## 2025-02-27 RX ORDER — INDOCYANINE GREEN AND WATER 25 MG
2.5 KIT INJECTION ONCE
Status: COMPLETED | OUTPATIENT
Start: 2025-02-27 | End: 2025-02-27

## 2025-02-27 RX ORDER — HYDRALAZINE HYDROCHLORIDE 20 MG/ML
2.5-5 INJECTION INTRAMUSCULAR; INTRAVENOUS EVERY 10 MIN PRN
Status: DISCONTINUED | OUTPATIENT
Start: 2025-02-27 | End: 2025-02-27 | Stop reason: HOSPADM

## 2025-02-27 RX ORDER — DEXAMETHASONE SODIUM PHOSPHATE 4 MG/ML
4 INJECTION, SOLUTION INTRA-ARTICULAR; INTRALESIONAL; INTRAMUSCULAR; INTRAVENOUS; SOFT TISSUE
Status: DISCONTINUED | OUTPATIENT
Start: 2025-02-27 | End: 2025-02-27 | Stop reason: HOSPADM

## 2025-02-27 RX ORDER — PROPOFOL 10 MG/ML
INJECTION, EMULSION INTRAVENOUS PRN
Status: DISCONTINUED | OUTPATIENT
Start: 2025-02-27 | End: 2025-02-27

## 2025-02-27 RX ORDER — DEXAMETHASONE SODIUM PHOSPHATE 4 MG/ML
INJECTION, SOLUTION INTRA-ARTICULAR; INTRALESIONAL; INTRAMUSCULAR; INTRAVENOUS; SOFT TISSUE PRN
Status: DISCONTINUED | OUTPATIENT
Start: 2025-02-27 | End: 2025-02-27

## 2025-02-27 RX ORDER — SODIUM CHLORIDE, SODIUM LACTATE, POTASSIUM CHLORIDE, CALCIUM CHLORIDE 600; 310; 30; 20 MG/100ML; MG/100ML; MG/100ML; MG/100ML
INJECTION, SOLUTION INTRAVENOUS CONTINUOUS PRN
Status: DISCONTINUED | OUTPATIENT
Start: 2025-02-27 | End: 2025-02-27

## 2025-02-27 RX ADMIN — PROPOFOL 50 MG: 10 INJECTION, EMULSION INTRAVENOUS at 12:05

## 2025-02-27 RX ADMIN — CEFTRIAXONE 1 G: 1 INJECTION, POWDER, FOR SOLUTION INTRAMUSCULAR; INTRAVENOUS at 17:25

## 2025-02-27 RX ADMIN — ROCURONIUM BROMIDE 50 MG: 50 INJECTION, SOLUTION INTRAVENOUS at 11:41

## 2025-02-27 RX ADMIN — FENTANYL CITRATE 25 MCG: 50 INJECTION, SOLUTION INTRAMUSCULAR; INTRAVENOUS at 13:10

## 2025-02-27 RX ADMIN — LIDOCAINE HYDROCHLORIDE 50 MG: 20 INJECTION, SOLUTION INFILTRATION; PERINEURAL at 11:41

## 2025-02-27 RX ADMIN — SUGAMMADEX 200 MG: 100 INJECTION, SOLUTION INTRAVENOUS at 12:28

## 2025-02-27 RX ADMIN — OXYCODONE HYDROCHLORIDE 2.5 MG: 5 TABLET ORAL at 17:25

## 2025-02-27 RX ADMIN — ACETAMINOPHEN 650 MG: 325 TABLET, FILM COATED ORAL at 05:17

## 2025-02-27 RX ADMIN — SODIUM CHLORIDE, POTASSIUM CHLORIDE, SODIUM LACTATE AND CALCIUM CHLORIDE: 600; 310; 30; 20 INJECTION, SOLUTION INTRAVENOUS at 10:11

## 2025-02-27 RX ADMIN — SERTRALINE HYDROCHLORIDE 25 MG: 25 TABLET ORAL at 21:20

## 2025-02-27 RX ADMIN — INDOCYANINE GREEN AND WATER 2.5 MG: KIT at 10:12

## 2025-02-27 RX ADMIN — PHENYLEPHRINE HYDROCHLORIDE 300 MCG: 10 INJECTION INTRAVENOUS at 11:56

## 2025-02-27 RX ADMIN — IPRATROPIUM BROMIDE AND ALBUTEROL SULFATE 3 ML: .5; 3 SOLUTION RESPIRATORY (INHALATION) at 11:08

## 2025-02-27 RX ADMIN — KETOROLAC TROMETHAMINE 15 MG: 30 INJECTION, SOLUTION INTRAMUSCULAR at 12:26

## 2025-02-27 RX ADMIN — Medication 2 G: at 11:30

## 2025-02-27 RX ADMIN — PHENYLEPHRINE HYDROCHLORIDE 200 MCG: 10 INJECTION INTRAVENOUS at 11:50

## 2025-02-27 RX ADMIN — OXYCODONE HYDROCHLORIDE 5 MG: 5 TABLET ORAL at 14:24

## 2025-02-27 RX ADMIN — FENTANYL CITRATE 100 MCG: 50 INJECTION INTRAMUSCULAR; INTRAVENOUS at 11:41

## 2025-02-27 RX ADMIN — SODIUM CHLORIDE, POTASSIUM CHLORIDE, SODIUM LACTATE AND CALCIUM CHLORIDE 1000 ML: 600; 310; 30; 20 INJECTION, SOLUTION INTRAVENOUS at 15:30

## 2025-02-27 RX ADMIN — MIRTAZAPINE 15 MG: 15 TABLET, FILM COATED ORAL at 21:19

## 2025-02-27 RX ADMIN — LEVOTHYROXINE SODIUM 88 MCG: 0.09 TABLET ORAL at 21:19

## 2025-02-27 RX ADMIN — PROPOFOL 50 MCG/KG/MIN: 10 INJECTION, EMULSION INTRAVENOUS at 11:46

## 2025-02-27 RX ADMIN — ONDANSETRON 4 MG: 2 INJECTION INTRAMUSCULAR; INTRAVENOUS at 12:26

## 2025-02-27 RX ADMIN — PROPOFOL 100 MG: 10 INJECTION, EMULSION INTRAVENOUS at 11:41

## 2025-02-27 RX ADMIN — PHENYLEPHRINE HYDROCHLORIDE 200 MCG: 10 INJECTION INTRAVENOUS at 11:47

## 2025-02-27 RX ADMIN — PHENYLEPHRINE HYDROCHLORIDE 100 MCG: 10 INJECTION INTRAVENOUS at 11:45

## 2025-02-27 RX ADMIN — DEXAMETHASONE SODIUM PHOSPHATE 8 MG: 4 INJECTION, SOLUTION INTRA-ARTICULAR; INTRALESIONAL; INTRAMUSCULAR; INTRAVENOUS; SOFT TISSUE at 11:41

## 2025-02-27 RX ADMIN — SODIUM CHLORIDE, POTASSIUM CHLORIDE, SODIUM LACTATE AND CALCIUM CHLORIDE: 600; 310; 30; 20 INJECTION, SOLUTION INTRAVENOUS at 11:30

## 2025-02-27 RX ADMIN — ACETAMINOPHEN 650 MG: 325 TABLET, FILM COATED ORAL at 15:43

## 2025-02-27 ASSESSMENT — ACTIVITIES OF DAILY LIVING (ADL)
ADLS_ACUITY_SCORE: 66
ADLS_ACUITY_SCORE: 66
ADLS_ACUITY_SCORE: 61
ADLS_ACUITY_SCORE: 66
ADLS_ACUITY_SCORE: 66
ADLS_ACUITY_SCORE: 57
ADLS_ACUITY_SCORE: 66
ADLS_ACUITY_SCORE: 66
ADLS_ACUITY_SCORE: 61
ADLS_ACUITY_SCORE: 57
ADLS_ACUITY_SCORE: 61
ADLS_ACUITY_SCORE: 66
ADLS_ACUITY_SCORE: 66
ADLS_ACUITY_SCORE: 57
ADLS_ACUITY_SCORE: 66
ADLS_ACUITY_SCORE: 66
ADLS_ACUITY_SCORE: 57
ADLS_ACUITY_SCORE: 61
ADLS_ACUITY_SCORE: 66

## 2025-02-27 ASSESSMENT — COPD QUESTIONNAIRES: COPD: 1

## 2025-02-27 NOTE — ED NOTES
[facility]  ED Nurse Handoff Report    ED Chief complaint: Abdominal Pain  . ED Diagnosis:   Final diagnoses:   Acute cholecystitis   Chronic bronchitis, unspecified chronic bronchitis type (H)       Allergies:   Allergies   Allergen Reactions    Bupropion Anaphylaxis, Hives and Shortness Of Breath       Code Status: DNR / DNI    Activity level - Baseline/Home:  in bed.  Activity Level - Current:   in bed.   Lift room needed: No.   Bariatric: No   Needed: No   Isolation: No.   Infection: Not Applicable.     Respiratory status: Nasal cannula    Vital Signs (within 30 minutes):   Vitals:    02/26/25 1419   BP: 92/71   Pulse: 66   Resp: 18   Temp: 98.5  F (36.9  C)   TempSrc: Temporal   SpO2: 98%       Cardiac Rhythm:  ,      Pain level:    Patient confused: No.   Patient Falls Risk: patient and family education.   Elimination Status: Has voided     Patient Report - Initial Complaint: Arrives from the clinic. 2 days ago developed abdominal pain.  States attempted gas-x with some relief but not resolution of symptoms. Points to the top of her abdomin to where the pain is located. States pain gets worse with touch. .   Focused Assessment: abdominal pain     Abnormal Results:   Labs Ordered and Resulted from Time of ED Arrival to Time of ED Departure   BASIC METABOLIC PANEL - Abnormal       Result Value    Sodium 135      Potassium 4.2      Chloride 96 (*)     Carbon Dioxide (CO2) 30 (*)     Anion Gap 9      Urea Nitrogen 14.9      Creatinine 0.51      GFR Estimate >90      Calcium 9.1      Glucose 121 (*)    LIPASE - Abnormal    Lipase 11 (*)    CBC WITH PLATELETS AND DIFFERENTIAL - Abnormal    WBC Count 14.2 (*)     RBC Count 3.77 (*)     Hemoglobin 11.2 (*)     Hematocrit 34.5 (*)     MCV 92      MCH 29.7      MCHC 32.5      RDW 12.1      Platelet Count 196      % Neutrophils 90      % Lymphocytes 4      % Monocytes 6      % Eosinophils 0      % Basophils 0      % Immature Granulocytes  0      NRBCs per 100 WBC 0      Absolute Neutrophils 12.7 (*)     Absolute Lymphocytes 0.6 (*)     Absolute Monocytes 0.8      Absolute Eosinophils 0.0      Absolute Basophils 0.0      Absolute Immature Granulocytes 0.1      Absolute NRBCs 0.0     HEPATIC FUNCTION PANEL - Normal    Protein Total 7.0      Albumin 4.4      Bilirubin Total 0.7      Alkaline Phosphatase 95      AST 19      ALT 17      Bilirubin Direct 0.24          US Abdomen Limited   Final Result   IMPRESSION:   1.  Significant gallbladder wall thickening. Stones and sludge in the gallbladder. No bile duct dilatation.            Abd/pelvis CT,  IV  contrast only TRAUMA / AAA   Final Result   IMPRESSION:    1.  Acute cholecystitis. Distended gallbladder with cholelithiasis with marked gallbladder wall thickening and pericholecystic fluid.      2.  No biliary dilatation.      3.  Marked emphysema.      4.  Mild bronchial wall thickening compatible with bronchial inflammation.          Treatments provided: labs, imaging, meds  Family Comments: none  OBS brochure/video discussed/provided to patient:  N/A  ED Medications:   Medications   dextrose 5% and 0.9% NaCl infusion ( Intravenous $New Bag 2/26/25 1839)   lactated ringers BOLUS 1,000 mL (0 mLs Intravenous Stopped 2/26/25 1710)   CT Scan Flush (56 mLs Intravenous $Given 2/26/25 1652)   iopamidol (ISOVUE-370) solution 500 mL (62 mLs Intravenous $Given 2/26/25 1652)   cefTRIAXone (ROCEPHIN) 1 g vial to attach to  mL bag for ADULTS or NS 50 mL bag for PEDS (1 g Intravenous $New Bag 2/26/25 1759)       Drips infusing:  Yes  For the majority of the shift this patient was Green.   Interventions performed were none  .    Sepsis treatment initiated: No    Cares/treatment/interventions/medications to be completed following ED care: none    ED Nurse Name: Kathleen Wiseman RN  7:09 PM     RECEIVING UNIT ED HANDOFF REVIEW    Above ED Nurse Handoff Report was reviewed: Yes  Reviewed by: Alda Maki RN  on February 26, 2025 at 7:21 PM

## 2025-02-27 NOTE — ANESTHESIA POSTPROCEDURE EVALUATION
Patient: Josephine Nicole    Procedure: Procedure(s):  LAPAROSCOPIC CHOLECYSTECTOMY       Anesthesia Type:  General    Note:  Disposition: Inpatient   Postop Pain Control: Uneventful            Sign Out: Well controlled pain   PONV: No   Neuro/Psych: Uneventful            Sign Out: Acceptable/Baseline neuro status   Airway/Respiratory: Uneventful            Sign Out: Acceptable/Baseline resp. status   CV/Hemodynamics: Uneventful            Sign Out: Acceptable CV status; No obvious hypovolemia; No obvious fluid overload   Other NRE:    DID A NON-ROUTINE EVENT OCCUR? No           Last vitals:  Vitals Value Taken Time   /75 02/27/25 1250   Temp 97.34  F (36.3  C) 02/27/25 1253   Pulse 85 02/27/25 1253   Resp 10 02/27/25 1253   SpO2 84 % 02/27/25 1253   Vitals shown include unfiled device data.    Electronically Signed By: Mercedes Ahn MD  February 27, 2025  12:55 PM

## 2025-02-27 NOTE — ANESTHESIA PREPROCEDURE EVALUATION
Anesthesia Pre-Procedure Evaluation    Patient: Josephine Nicole   MRN: [id number] : 1950        Procedure : Procedure(s):  LAPAROSCOPIC CHOLECYSTECTOMY          Past Medical History:   Diagnosis Date    Cerebral meningioma     COPD (chronic obstructive pulmonary disease)     Depressive disorder     Eczema     Eczema     Hyperlipidemia     Hypothyroidism     Melanoma of skin     Osteoporosis       Past Surgical History:   Procedure Laterality Date    COLONOSCOPY N/A 2022    Procedure: COLONOSCOPY;  Surgeon: Abimbola Handley MD;  Location:  GI    OPEN REDUCTION INTERNAL FIXATION HIP UNIPOLAR Right 3/20/2023    Procedure: Right hip hemiarthroplasty anterolateral approach;  Surgeon: Shun Cuevas MD;  Location: [facility] OR    OPTICAL TRACKING SYSTEM CRANIOTOMY, EXCISE TUMOR, COMBINED N/A 3/30/2023    Procedure: Bifrontal stealth craniotomy and resection of meningioma;  Surgeon: Ramez Murphy MD;  Location: [facility] OR    Tubal ligation NOS        Allergies   Allergen Reactions    Bupropion Anaphylaxis, Hives and Shortness Of Breath      Social History     Tobacco Use    Smoking status: Former     Types: Cigarettes    Smokeless tobacco: Never   Substance Use Topics    Alcohol use: Not Currently      Wt Readings from Last 1 Encounters:   25 57.3 kg (126 lb 4.8 oz)        Anesthesia Evaluation            ROS/MED HX  ENT/Pulmonary:     (+)                          COPD (history of respiratory failure requiring hospitalization, postop respiratory failure after hip fx), O2 dependent,             Neurologic:       Cardiovascular:       METS/Exercise Tolerance:     Hematologic:     (+)      anemia,          Musculoskeletal:       GI/Hepatic:     (+)          cholecystitis/cholelithiasis,          Renal/Genitourinary:       Endo:       Psychiatric/Substance Use:       Infectious Disease:       Malignancy:       Other:            Physical Exam    Airway        Mallampati: II   TM distance: > 3 FB  "  Neck ROM: full   Mouth opening: > 3 cm    Respiratory Devices and Support         Dental           Cardiovascular   cardiovascular exam normal          Pulmonary   pulmonary exam normal                OUTSIDE LABS:  CBC:   Lab Results   Component Value Date    WBC 9.4 02/27/2025    WBC 14.2 (H) 02/26/2025    HGB 10.1 (L) 02/27/2025    HGB 11.2 (L) 02/26/2025    HCT 32.0 (L) 02/27/2025    HCT 34.5 (L) 02/26/2025     02/27/2025     02/26/2025     BMP:   Lab Results   Component Value Date     02/27/2025     02/26/2025    POTASSIUM 3.8 02/27/2025    POTASSIUM 4.2 02/26/2025    CHLORIDE 100 02/27/2025    CHLORIDE 96 (L) 02/26/2025    CO2 32 (H) 02/27/2025    CO2 30 (H) 02/26/2025    BUN 9.6 02/27/2025    BUN 14.9 02/26/2025    CR 0.56 02/27/2025    CR 0.51 02/26/2025     (H) 02/27/2025     (H) 02/26/2025     COAGS:   Lab Results   Component Value Date    PTT 29 03/19/2023    INR 0.96 03/19/2023     POC: No results found for: \"BGM\", \"HCG\", \"HCGS\"  HEPATIC:   Lab Results   Component Value Date    ALBUMIN 3.5 02/27/2025    PROTTOTAL 5.7 (L) 02/27/2025    ALT 16 02/27/2025    AST 17 02/27/2025    ALKPHOS 79 02/27/2025    BILITOTAL 0.3 02/27/2025     OTHER:   Lab Results   Component Value Date    PH 7.45 03/30/2023    LACT 2.1 (H) 03/28/2023    CHAO 8.5 (L) 02/27/2025    PHOS 3.8 04/03/2023    MAG 2.0 04/03/2023    LIPASE 11 (L) 02/26/2025       Anesthesia Plan    ASA Status:  3    NPO Status:  NPO Appropriate    Anesthesia Type: General.     - Airway: ETT   Induction: Intravenous.   Maintenance: Balanced.        Consents    Anesthesia Plan(s) and associated risks, benefits, and realistic alternatives discussed. Questions answered and patient/representative(s) expressed understanding.     - Discussed:     - Discussed with:  Patient            Postoperative Care    Pain management: IV analgesics, Oral pain medications, Multi-modal analgesia.   PONV prophylaxis: Ondansetron (or other " 5HT-3), Dexamethasone or Solumedrol     Comments:    Other Comments: Patient had issues with respiratory failure and confusion after previous surgeries (hip fracture and meningioma)  Discussed with patient and her daughter  She is at elevated risk of postop confusion/delirium given her history of it- we will avoid long acting sedating medications and polypharmacy   She is at elevated risk of respiratory issues after surgery due to her history of COPD - plan for preop nebulizer, encourage ambulation, being up in a chair and incentive spirometer postop           Mercedes Ahn MD    I have reviewed the pertinent notes and labs in the chart from the past 30 days and (re)examined the patient.  Any updates or changes from those notes are reflected in this note.    Clinically Significant Risk Factors Present on Admission          # Hypochloremia: Lowest Cl = 96 mmol/L in last 2 days, will monitor as appropriate  # Hypocalcemia: Lowest Ca = 8.5 mg/dL in last 2 days, will monitor and replace as appropriate              # Anemia: based on hgb <11

## 2025-02-27 NOTE — PROGRESS NOTES
Hospitalist progress note    Contacted by Dr. Bear of general surgery that patient is hypotensive in PACU with SBP in 70-80s but asymptomatic and slightly more hypoxic than baseline requiring 3-4 liters via NC. Currently 1 liter IVF bolus ordered. Closely monitor BPs and give additional IVFs if continues to have softer BPs. Will discuss with cross cover provider these evening to monitor and order additional IVFs if appropriate. Will cancel discharge and keep overnight for monitoring.     Alice Don PA-C

## 2025-02-27 NOTE — ANESTHESIA CARE TRANSFER NOTE
Patient: Josephine Nicole    Procedure: Procedure(s):  LAPAROSCOPIC CHOLECYSTECTOMY       Diagnosis: Acute cholecystitis [K81.0]  Diagnosis Additional Information: No value filed.    Anesthesia Type:   General     Note:    Oropharynx: oropharynx clear of all foreign objects  Level of Consciousness: awake  Oxygen Supplementation: face mask  Level of Supplemental Oxygen (L/min / FiO2): 6  Independent Airway: airway patency satisfactory and stable  Dentition: dentition unchanged  Vital Signs Stable: post-procedure vital signs reviewed and stable  Report to RN Given: handoff report given  Patient transferred to: PACU    Handoff Report: Identifed the Patient, Identified the Reponsible Provider, Reviewed the pertinent medical history, Discussed the surgical course, Reviewed Intra-OP anesthesia mangement and issues during anesthesia, Set expectations for post-procedure period and Allowed opportunity for questions and acknowledgement of understanding      Vitals:  Vitals Value Taken Time   BP     Temp     Pulse     Resp     SpO2         Electronically Signed By: MIGNON Heath CRNA  February 27, 2025  12:47 PM

## 2025-02-27 NOTE — PROGRESS NOTES
Notified MD at 1515 PM regarding  low BP on arrival to phase 2 .  70s/40s. Pt asymptomatic    Spoke with: Dr Cuevas    Orders were obtained.    Comments: give 1 L fluid bolus      Little improvement after fluid bolus. 85/52 (62)    MDA notified-advises to admit to hospital overnight    Dr Bear paged to update- ok to stay overnight    BP still upper 80s/50s. Dr Cuevas notified, ok to send to floor

## 2025-02-27 NOTE — ANESTHESIA PROCEDURE NOTES
Airway       Patient location during procedure: OR       Procedure Start/Stop Times: 2/27/2025 11:45 AM  Staff -        CRNA: Ronel Roe APRN CRNA       Performed By: CRNA  Consent for Airway        Urgency: elective  Indications and Patient Condition       Indications for airway management: murphy-procedural       Induction type:intravenous       Mask difficulty assessment: 1 - vent by mask    Final Airway Details       Final airway type: endotracheal airway       Successful airway: ETT - single  Endotracheal Airway Details        ETT size (mm): 7.0       Cuffed: yes       Successful intubation technique: video laryngoscopy       VL Blade Size: Glidescope 3       Grade View of Cords: 1       Adjucts: stylet       Position: Right       Measured from: gums/teeth       Secured at (cm): 21       Bite block used: None    Post intubation assessment        Placement verified by: capnometry, equal breath sounds and chest rise        Number of attempts at approach: 1       Number of other approaches attempted: 0       Secured with: tape       Ease of procedure: easy       Dentition: Intact and Unchanged    Medication(s) Administered   Medication Administration Time: 2/27/2025 11:45 AM

## 2025-02-27 NOTE — PLAN OF CARE
PRIMARY DIAGNOSIS: BILIARY COLIC/UNCOMPLICATED EARLY ACUTE CHOLECYSTITIS  OUTPATIENT/OBSERVATION GOALS TO BE MET BEFORE DISCHARGE:    1. Pain status: Improved-controlled with oral pain medications.  2. Stable vital signs and labs (if performed) at disposition: Yes  3. Tolerating adequate PO diet: NPO  4. Successful cholecystectomy or clear follow up plan with General Surgery team if immediate surgery not performed No  5. ADLs back to baseline?  Yes  6. Activity and level of assistance: Up with standby assistance.  7. Barriers to discharge noted Yes Surgery    Discharge Planner Nurse   Safe discharge environment identified: Yes  Barriers to discharge: Yes       Entered by: Alda Maki RN 02/27/2025 2:30 AM    Vitals are Temp: 98.9  F (37.2  C) Temp src: Oral BP: 114/60 Pulse: 95   Resp: 18 SpO2: 93 %.  Patient is Alert and Oriented x4. They are SBA with no assistive devices .  Pt is a NPO diet.  They are complaining of 4/10 pain in their abdomen. Oxycodone given for pain. Medications decreased pain.  Patient has Normal Saline 0.9% and Dextrose 5% running at 75 mL per hour. Gen surg is following, lap katalina scheduled for 2/27 9:10 AM.     Please review provider order for any additional goals.   Nurse to notify provider when observation goals have been met and patient is ready for discharge.      Goal Outcome Evaluation:      Plan of Care Reviewed With: patient, child    Overall Patient Progress: no changeOverall Patient Progress: no change    Outcome Evaluation: A&O x4. Fluids running at 75. Chronic 02 at 2 L NC. Gen surg is following for lap katalina.

## 2025-02-27 NOTE — PLAN OF CARE
ROOM # 202-1     Living Situation (if not independent, order SW consult): Home w/ daughter   Facility name:  : Daughter Jordan Auguste- [phone]    Activity level at baseline: IND  Activity level on admit: SBA    Who will be transporting you at discharge: Daughter    Patient registered to observation; given Patient Bill of Rights; given the opportunity to ask questions about observation status and their plan of care.  Patient has been oriented to the observation room, bathroom and call light is in place.    Discussed discharge goals and expectations with patient/family.

## 2025-02-27 NOTE — PHARMACY-ADMISSION MEDICATION HISTORY
Pharmacist Admission Medication History    Admission medication history is complete. The information provided in this note is only as accurate as the sources available at the time of the update.    Information Source(s): Patient via in-person    Pertinent Information: outside meds    Changes made to PTA medication list:  Added: ambien and xanax  Deleted: elavil, norco, atarax, lidocaine patch, miralax, senokot-s  Changed: None    Allergies reviewed with patient and updates made in EHR: yes    Medication History Completed By: Nate Partida RPH 2/26/2025 8:27 PM    Current Facility-Administered Medications for the 2/26/25 encounter (Hospital Encounter)   Medication    [COMPLETED] lidocaine (viscous) (XYLOCAINE) 2 % 15 mL, alum & mag hydroxide-simethicone (MAALOX) 15 mL GI Cocktail     PTA Med List   Medication Sig Last Dose/Taking    acetaminophen (TYLENOL) 325 MG tablet Take 2 tablets (650 mg) by mouth every 4 hours as needed for mild pain Taking As Needed    albuterol (PROVENTIL) (2.5 MG/3ML) 0.083% neb solution Take 2.5 mg by nebulization 3 times daily as needed for shortness of breath, wheezing or cough Taking As Needed    alendronate (FOSAMAX) 70 MG tablet Take 70 mg by mouth every 7 days Past Month    ALPRAZolam (XANAX) 0.25 MG tablet Take 0.25 mg by mouth daily as needed for anxiety. Past Month    atorvastatin (LIPITOR) 10 MG tablet Take 10 mg by mouth daily 2/25/2025 Bedtime    cyanocobalamin (CYANOCOBALAMIN) 1000 MCG/ML injection Inject 1 mL into the muscle every 30 days More than a month    diazepam (VALIUM) 5 MG tablet Take 1 tablet (5 mg) by mouth See Admin Instructions Take 5mg 30 minutes prior to MRI, then may take an additional 5mg at time of exam if needed. MUST have a . Unknown    fluticasone-salmeterol (ADVAIR) 500-50 MCG/DOSE inhaler Inhale 1 puff into the lungs 2 times daily Past Week    ibuprofen (ADVIL/MOTRIN) 400 MG tablet Take 1 tablet (400 mg) by mouth every 6 hours as needed for  inflammatory pain Taking As Needed    ipratropium - albuterol 0.5 mg/2.5 mg/3 mL (DUONEB) 0.5-2.5 (3) MG/3ML neb solution Take 1 vial by nebulization every 6 hours as needed for shortness of breath, wheezing or cough Taking As Needed    levalbuterol (XOPENEX HFA) 45 MCG/ACT inhaler Inhale 2 puffs into the lungs every 4 hours as needed for shortness of breath or wheezing Taking As Needed    levothyroxine (SYNTHROID/LEVOTHROID) 88 MCG tablet Take 88 mcg by mouth daily 2/25/2025 Bedtime    mirtazapine (REMERON) 15 MG tablet Take 1 tablet (15 mg) by mouth At Bedtime 2/25/2025 Bedtime    sertraline (ZOLOFT) 25 MG tablet Take 25 mg by mouth every morning 2/25/2025 Bedtime    zolpidem (AMBIEN) 2.5 mg TABS half-tab Take 2.5 mg by mouth nightly as needed for sleep. Past Week

## 2025-02-27 NOTE — PROGRESS NOTES
ROOM # [id number]    Living Situation (if not independent, order SW consult):  Facility name:  : daughter    Activity level at baseline: ind  Activity level on admit: SBA    Who will be transporting you at discharge: daughter    Patient registered to observation; given Patient Bill of Rights; given the opportunity to ask questions about observation status and their plan of care.  Patient has been oriented to the observation room, bathroom and call light is in place.    Discussed discharge goals and expectations with patient/family.

## 2025-02-27 NOTE — PLAN OF CARE
PRIMARY DIAGNOSIS: BILIARY COLIC/UNCOMPLICATED EARLY ACUTE CHOLECYSTITIS    OUTPATIENT/OBSERVATION GOALS TO BE MET BEFORE DISCHARGE:     1. Pain status: denies currently  2. Stable vital signs and labs (if performed) at disposition: Yes  3. Tolerating adequate PO diet: NPO  4. Successful cholecystectomy or clear follow up plan with General Surgery team if immediate surgery not performed No- plan for lap katalina today  5. ADLs back to baseline?  Yes  6. Activity and level of assistance: Up with standby assistance.  7. Barriers to discharge noted Yes Surgery        Discharge Planner Nurse  Safe discharge environment identified: Yes  Barriers to discharge: Yes       AOx4 Up SBA. Currently denies pain and denies nausea. O2 sats stable on baseline 2L O2 NC (pt has hx: COPD). IV now SL. NPO. Plan for lap katalina today with possible discharge after.      Please review provider order for any additional goals.   Nurse to notify provider when observation goals have been met and patient is ready for discharge.

## 2025-02-27 NOTE — DISCHARGE SUMMARY
[facility]    Discharge Summary  Hospitalist    Date of Admission:  2/26/2025  Date of Discharge:  2/27/2025  Provider:  Ronel Don PA-C  Date of Service (when I last saw the patient): 02/27/25    Discharge Diagnoses   Acute cholecystitis     Other medical issues:  Past Medical History:   Diagnosis Date    Cerebral meningioma     COPD (chronic obstructive pulmonary disease)     Depressive disorder     Eczema     Eczema     Hyperlipidemia     Hypothyroidism     Melanoma of skin     Osteoporosis      History of Present Illness   Josephine Nicole is a 75 year old female with PMH of cerebral meningioma s/p radiation and resection, COPD on chronic 2L O2, hypothyroidism, HLD, osteoporosis, depression/anxiety, who was admitted to observation on 2/26/2025 for cholecystitis.     Presents with about 2 days of abdominal pain and N/V.      On arrival, BP 92/71, HR 66, temp 98.5, 98% on 2L NC. Labs notable for WBC 14.2, Hgb 11.2, plt 196, Na 135, K 4.2, Cr 0.51, LFTs in normal limits, lipase 11, . CT AP showed acute cholecystitis with distended gallbladder with cholelithiasis with marked gallbladder wall thickening and pericholecystic fluid, no biliary dilatation, marked emphysema, and mild bronchial wall thickening compatible with bronchial inflammation. Abd US with significant gallbladder wall thickening, stones and sludge in the gallbladder, no bile duct dilatation. Please see the admission history and physical for full details.    Hospital Course   Josephine Nicole was admitted on 2/26/2025.  The following problems were addressed during her hospitalization:    Acute cholecystitis  - received Rocephin on admission, no need for further antibiotics upon discharge  - General surgery consulted, s/p laparoscopic cholecystectomy, tolerated procedure and able to discharge from PACU  - NPO for procedure, ADAT after procedure   - pain medications postoperatively provided by surgery     COPD on chronic  "oxygen  Not in exacerbation.   - PTA inhalers  - remained stable on PTA 2 L via NC throughout stay     Pending Results   Unresulted Labs Ordered in the Past 30 Days of this Admission       Date and Time Order Name Status Description    2/27/2025 12:19 PM Surgical Pathology Exam In process           Code Status   Full Code       Primary Care Physician   Divina Turner    Exam:    /58   Pulse 83   Temp 98.2  F (36.8  C)   Resp 20   Ht 1.575 m (5' 2\")   Wt 57.3 kg (126 lb 4.8 oz)   SpO2 97%   BMI 23.10 kg/m    General: laying in bed, pleasant, interactive, A&Ox3, NAD  Lungs: decreased breath sounds without wheezing or rales  CV: RRR without murmur or rub  Abdomen: soft, mild RUQ tenderness, nondistended, normoactive   Skin: warm, dry   Ext: no LE edema     Discharge Disposition   Discharged to home    Consultations This Hospital Stay   SURGERY GENERAL IP CONSULT    Time Spent on this Encounter   I, Alice Don PA-C, personally saw the patient today and spent greater than 30 minutes discharging this patient.    Discharge Orders      When to call - Contact Surgeon Team    You may experience symptoms that require follow-up before your scheduled appointment. Contact your Surgeon Team if you are concerned about pain control, large amount of bleeding, constipation, or if you experience signs of infection (fever, growing tenderness at the surgery site, a large amount of drainage, severe pain, foul-smelling drainage, redness or swelling).     No driving or operating machinery    Do NOT drive any vehicle or operate mechanical equipment for 24 hours following the end of your surgery.  Even though you may feel normal, your reactions may be affected by Anesthesia medication you received.     No Alcohol    Do NOT drink alcoholic beverages for 24 hours following your surgery and while taking pain medications.     Diet Instructions    You may drink clear liquids (apple juice, ginger ale, 7-up, broth, etc.) and " progress to your regular diet as you feel able. It is important to stay well-hydrated after surgery and drink plenty of water.    If you develop loose stools following surgery, follow a low fat diet for 1-2 months, then begin to slowly re-introduce fats to your diet.     Shower/Bathing - Restrictions (specify)    Shower/Bathing - Starting after 24 hours, you may shower. Let water run over incisions and pat dry. Do NOT soak incision in tub or go swimming in a lake/pool for 2 weeks postoperatively.     Incision Care    Keep dressing clean and dry.  Wash your hands with soap and warm water before you touch the site of surgery. You have surgical glue on your surgical sites.  Leave the glue in place until it flakes off on its own (typically 7-14 days). You have stitches underneath the skin which will dissolve over several weeks. Do not apply any creams or lotions to skin until glue is off and incisions are completely healed.     Ice to affected area    Ice to operative site PRN     Discharge Instructions - Comfort and Pain Management    Pain after surgery is normal and expected. You will have some amount of pain after surgery. Your pain will improve with time. There are several things you can do to help reduce your pain including: rest, ice, and using pain medications as needed. Use pain interventions and don't wait until pain level is out of control. Take over the counter pain medications such as acetaminophen (Tylenol) and ibuprofen (Motrin/Advil) in doses recommended by your surgeon.    After laparoscopic surgery, some patients experience shoulder pain. This is referred pain from stretch of the abdominal wall and diaphragm during surgery. Typically this pain resolves after about 48 hours.     Contact your Surgeon Team if you have pain that persists or worsens after surgery despite rest, ice, and taking your medications as prescribed. You may have a dry mouth, a sore throat, muscles aches or trouble sleeping, and these  symptoms should go away after 24 hours.     Discharge Instructions - Rest    Rest and relax for the next 24 hours. Make arrangements to have someone stay with you overnight, and avoid hazardous and strenuous activities.  Do NOT make any important decisions for the next 24 hours.     Discharge Instructions - Follow-up Appointment (Weeks)    FOLLOW-UP AFTER SURGERY:  - Our office will contact you approximately 2-3 weeks after surgery to check on your progress and answer any questions you may have.  If you are doing well, you will not need to return for an office appointment.  If any concerns are identified over the phone, we will help you make an appointment to see a provider.      - If you have not received a phone call, have any questions or concerns, or would like to be seen, please call us at [phone].  We are located at: [address], [address]; [place] [address]     No lifting    No lifting over 20 pounds and no strenuous physical activity for 2 weeks.     Reason for your hospital stay    You were admitted for concerns of stomach pain related to your gallbladder. Imaging in the ER showed that your gallbladder was inflamed/infected. There was not evidence of a stone in the biliary tract. You were treated with antibiotics. Your pain and nausea was controlled with narcotics and anti nausea medications. You were seen by surgery who opted to remove your gallbladder. You were doing fine post operatively to allowed to discharge home from recovery.     Follow-up and recommended labs and tests     Follow up with primary care provider, Divina Turner, within 7-10 days for hospital follow- up.  No follow up labs or test are needed.  Follow up with general surgery per their recommendations.     Discharge Medications   Current Discharge Medication List        START taking these medications    Details   oxyCODONE (ROXICODONE) 5 MG tablet Take 1-2 tablets (5-10 mg) by mouth every 4 hours as needed for moderate  to severe pain.  Qty: 12 tablet, Refills: 0    Associated Diagnoses: Acute cholecystitis           CONTINUE these medications which have CHANGED    Details   acetaminophen (TYLENOL) 500 MG tablet Take 1.5 tablets (750 mg) by mouth every 6 hours as needed for mild pain.    Associated Diagnoses: S/P resection of meningioma; Closed displaced fracture of right femoral neck (H)      ibuprofen (ADVIL/MOTRIN) 200 MG tablet Take 3 tablets (600 mg) by mouth every 6 hours as needed for inflammatory pain.    Associated Diagnoses: Closed displaced fracture of right femoral neck (H)           CONTINUE these medications which have NOT CHANGED    Details   albuterol (PROVENTIL) (2.5 MG/3ML) 0.083% neb solution Take 2.5 mg by nebulization 3 times daily as needed for shortness of breath, wheezing or cough      alendronate (FOSAMAX) 70 MG tablet Take 70 mg by mouth every 7 days      ALPRAZolam (XANAX) 0.25 MG tablet Take 0.25 mg by mouth daily as needed for anxiety.      atorvastatin (LIPITOR) 10 MG tablet Take 10 mg by mouth daily      cyanocobalamin (CYANOCOBALAMIN) 1000 MCG/ML injection Inject 1 mL into the muscle every 30 days      diazepam (VALIUM) 5 MG tablet Take 1 tablet (5 mg) by mouth See Admin Instructions Take 5mg 30 minutes prior to MRI, then may take an additional 5mg at time of exam if needed. MUST have a .  Qty: 2 tablet, Refills: 0    Associated Diagnoses: Claustrophobia      fluticasone-salmeterol (ADVAIR) 500-50 MCG/DOSE inhaler Inhale 1 puff into the lungs 2 times daily      ipratropium - albuterol 0.5 mg/2.5 mg/3 mL (DUONEB) 0.5-2.5 (3) MG/3ML neb solution Take 1 vial by nebulization every 6 hours as needed for shortness of breath, wheezing or cough      levalbuterol (XOPENEX HFA) 45 MCG/ACT inhaler Inhale 2 puffs into the lungs every 4 hours as needed for shortness of breath or wheezing      levothyroxine (SYNTHROID/LEVOTHROID) 88 MCG tablet Take 88 mcg by mouth daily      mirtazapine (REMERON) 15 MG  tablet Take 1 tablet (15 mg) by mouth At Bedtime  Qty: 30 tablet, Refills: 0    Associated Diagnoses: S/P resection of meningioma      sertraline (ZOLOFT) 25 MG tablet Take 25 mg by mouth every morning      zolpidem (AMBIEN) 2.5 mg TABS half-tab Take 2.5 mg by mouth nightly as needed for sleep.           Allergies   Allergies   Allergen Reactions    Bupropion Anaphylaxis, Hives and Shortness Of Breath     Data   Results for orders placed or performed during the hospital encounter of 02/26/25   Abd/pelvis CT,  IV  contrast only TRAUMA / AAA     Status: None    Narrative    EXAM: CT ABDOMEN PELVIS W CONTRAST  LOCATION: [facility]  DATE: 2/26/2025    INDICATION: Epigastric pain radiating to periumbilical area for 2 days, assocated nausea and vomiting.  COMPARISON: None.  TECHNIQUE: CT scan of the abdomen and pelvis was performed following injection of IV contrast. Multiplanar reformats were obtained. Dose reduction techniques were used.  CONTRAST: 62 mL Isovue 370    FINDINGS:   LOWER CHEST: Marked emphysema. No pulmonary infiltrate, pleural fluid or pneumothorax. Mild bronchial wall thickening. Coronary artery calcification.    HEPATOBILIARY: Markedly distended gallbladder with gallbladder wall thickening and pericholecystic fluid. Cholelithiasis. No biliary dilatation. Mild hepatic steatosis. Patent portal vein.    PANCREAS: Normal.    SPLEEN: Normal.    ADRENAL GLANDS: Normal.    KIDNEYS/BLADDER: Symmetric renal enhancement. No CT evidence of pyelonephritis. No urinary collecting system dilatation or obstructing calculi. Bladder unremarkable.    BOWEL: No obstruction or inflammatory change. Colonic diverticulosis without diverticulitis. No evidence for appendicitis.    LYMPH NODES: No lymphadenopathy.    VASCULATURE: Normal caliber aorta. Patent celiac, SMA, bilateral renal arteries and LUKE. Moderate vascular calcification. Patent portal, splenic and superior mesenteric veins.    PELVIC  ORGANS: Unremarkable. Trace free fluid.    MUSCULOSKELETAL: Right CATHERINE. Healed pubic rami fractures. Healed right sacral fracture. Marked lower lumbar facet arthropathy. Degenerative changes in the spine.      Impression    IMPRESSION:   1.  Acute cholecystitis. Distended gallbladder with cholelithiasis with marked gallbladder wall thickening and pericholecystic fluid.    2.  No biliary dilatation.    3.  Marked emphysema.    4.  Mild bronchial wall thickening compatible with bronchial inflammation.   US Abdomen Limited     Status: None    Narrative    EXAM: US ABDOMEN LIMITED  LOCATION: [facility]  DATE: 2/26/2025    INDICATION: Epigastric and RUQ pain  COMPARISON: CT abdomen pelvis 2/26/2025  TECHNIQUE: Limited abdominal ultrasound.    FINDINGS:    GALLBLADDER: Significant gallbladder wall thickening with gallbladder wall measuring 9 mm. Stones and sludge in the gallbladder.    BILE DUCTS: No biliary dilatation. The common duct measures 3.5 mm.    LIVER: Normal parenchyma with smooth contour. No focal mass. The portal vein is patent with flow in the normal direction.    RIGHT KIDNEY: No hydronephrosis.    PANCREAS: The visualized portions are normal.    No ascites.      Impression    IMPRESSION:  1.  Significant gallbladder wall thickening. Stones and sludge in the gallbladder. No bile duct dilatation.       Basic metabolic panel     Status: Abnormal   Result Value Ref Range    Sodium 135 135 - 145 mmol/L    Potassium 4.2 3.4 - 5.3 mmol/L    Chloride 96 (L) 98 - 107 mmol/L    Carbon Dioxide (CO2) 30 (H) 22 - 29 mmol/L    Anion Gap 9 7 - 15 mmol/L    Urea Nitrogen 14.9 8.0 - 23.0 mg/dL    Creatinine 0.51 0.51 - 0.95 mg/dL    GFR Estimate >90 >60 mL/min/1.73m2    Calcium 9.1 8.8 - 10.4 mg/dL    Glucose 121 (H) 70 - 99 mg/dL   Hepatic function panel     Status: Normal   Result Value Ref Range    Protein Total 7.0 6.4 - 8.3 g/dL    Albumin 4.4 3.5 - 5.2 g/dL    Bilirubin Total 0.7 <=1.2 mg/dL     Alkaline Phosphatase 95 40 - 150 U/L    AST 19 0 - 45 U/L    ALT 17 0 - 50 U/L    Bilirubin Direct 0.24 0.00 - 0.30 mg/dL   Lipase     Status: Abnormal   Result Value Ref Range    Lipase 11 (L) 13 - 60 U/L   CBC with platelets and differential     Status: Abnormal   Result Value Ref Range    WBC Count 14.2 (H) 4.0 - 11.0 10e3/uL    RBC Count 3.77 (L) 3.80 - 5.20 10e6/uL    Hemoglobin 11.2 (L) 11.7 - 15.7 g/dL    Hematocrit 34.5 (L) 35.0 - 47.0 %    MCV 92 78 - 100 fL    MCH 29.7 26.5 - 33.0 pg    MCHC 32.5 31.5 - 36.5 g/dL    RDW 12.1 10.0 - 15.0 %    Platelet Count 196 150 - 450 10e3/uL    % Neutrophils 90 %    % Lymphocytes 4 %    % Monocytes 6 %    % Eosinophils 0 %    % Basophils 0 %    % Immature Granulocytes 0 %    NRBCs per 100 WBC 0 <1 /100    Absolute Neutrophils 12.7 (H) 1.6 - 8.3 10e3/uL    Absolute Lymphocytes 0.6 (L) 0.8 - 5.3 10e3/uL    Absolute Monocytes 0.8 0.0 - 1.3 10e3/uL    Absolute Eosinophils 0.0 0.0 - 0.7 10e3/uL    Absolute Basophils 0.0 0.0 - 0.2 10e3/uL    Absolute Immature Granulocytes 0.1 <=0.4 10e3/uL    Absolute NRBCs 0.0 10e3/uL   Comprehensive metabolic panel     Status: Abnormal   Result Value Ref Range    Sodium 138 135 - 145 mmol/L    Potassium 3.8 3.4 - 5.3 mmol/L    Carbon Dioxide (CO2) 32 (H) 22 - 29 mmol/L    Anion Gap 6 (L) 7 - 15 mmol/L    Urea Nitrogen 9.6 8.0 - 23.0 mg/dL    Creatinine 0.56 0.51 - 0.95 mg/dL    GFR Estimate >90 >60 mL/min/1.73m2    Calcium 8.5 (L) 8.8 - 10.4 mg/dL    Chloride 100 98 - 107 mmol/L    Glucose 117 (H) 70 - 99 mg/dL    Alkaline Phosphatase 79 40 - 150 U/L    AST 17 0 - 45 U/L    ALT 16 0 - 50 U/L    Protein Total 5.7 (L) 6.4 - 8.3 g/dL    Albumin 3.5 3.5 - 5.2 g/dL    Bilirubin Total 0.3 <=1.2 mg/dL   CBC with platelets     Status: Abnormal   Result Value Ref Range    WBC Count 9.4 4.0 - 11.0 10e3/uL    RBC Count 3.41 (L) 3.80 - 5.20 10e6/uL    Hemoglobin 10.1 (L) 11.7 - 15.7 g/dL    Hematocrit 32.0 (L) 35.0 - 47.0 %    MCV 94 78 - 100 fL    MCH  29.6 26.5 - 33.0 pg    MCHC 31.6 31.5 - 36.5 g/dL    RDW 12.2 10.0 - 15.0 %    Platelet Count 168 150 - 450 10e3/uL   CBC with platelets differential     Status: Abnormal    Narrative    The following orders were created for panel order CBC with platelets differential.  Procedure                               Abnormality         Status                     ---------                               -----------         ------                     CBC with platelets and d...[[id number]]  Abnormal            Final result                 Please view results for these tests on the individual orders.     Alice Don PA-C

## 2025-02-27 NOTE — PLAN OF CARE
PRIMARY DIAGNOSIS: BILIARY COLIC/UNCOMPLICATED EARLY ACUTE CHOLECYSTITIS    OUTPATIENT/OBSERVATION GOALS TO BE MET BEFORE DISCHARGE:     1. Pain status: Improved-controlled with oral pain medications.  2. Stable vital signs and labs (if performed) at disposition: Yes  3. Tolerating adequate PO diet: NPO  4. Successful cholecystectomy or clear follow up plan with General Surgery team if immediate surgery not performed No  5. ADLs back to baseline?  Yes  6. Activity and level of assistance: Up with standby assistance.  7. Barriers to discharge noted Yes Surgery        Discharge Planner Nurse  Safe discharge environment identified: Yes  Barriers to discharge: Yes       Entered by: Alda Maki RN 02/27/2025 6:30 AM     Vitals are Temp: 98.9  F (37.2  C) Temp src: Oral BP: 108/66 Pulse: 106   Resp: 20 SpO2: 92 %.  Patient is Alert and Oriented x4. They are SBA with no assistive devices .  Pt is a NPO diet.  They are complaining of 4/10 pain in their abdomen. Oxycodone given for pain. Medications decreased pain.  Patient has Normal Saline 0.9% and Dextrose 5% running at 75 mL per hour. Gen surg is following, lap katalina scheduled for 2/27 10:40 AM. CHG bath done.      Please review provider order for any additional goals.   Nurse to notify provider when observation goals have been met and patient is ready for discharge.    Goal Outcome Evaluation:      Plan of Care Reviewed With: patient, child    Overall Patient Progress: no changeOverall Patient Progress: no change    Outcome Evaluation: A&O x4. Fluids running at 75. Chronic 02 at 2 L NC. Gen surg is following for lap katalina.

## 2025-02-27 NOTE — OP NOTE
General Surgery Operative Note    PREOPERATIVE DIAGNOSIS:  Acute cholecystitis [K81.0]    POSTOPERATIVE DIAGNOSIS:  Same    PROCEDURE:  Laparoscopic Cholecystectomy    ANESTHESIA:  General    PREOPERATIVE MEDICATIONS:  Ancef IV.    SURGEON:  Mercedes Bear MD    ASSISTANT:  Sherrill Barrera PA-C, The Physician Assistant was medically necessary for their expertise in prepping, camera management, suctioning, suturing and retraction.    ESTIMATED BLOOD LOSS:  50 ml    INDICATIONS:  Josephine Nicole is a 75 year old female who has been experiencing episodes of RUQ abdominal pain for the past 2 days associated with chills, nausea, and vomiting.  Abdominal imaging has revealed gallstones.  She now presents for laparoscopic cholecystectomy after having risks and benefits reviewed in detail.    PROCEDURE:   An infraumbilical incision was made and the abdomen was entered using a [place] trocar technique.  Secondary trocars were placed under laparoscopic guidance.  We proceeded in the usual fashion first finding the gallbladder neck cystic duct junction.  The gallbladder was too taut to grasp, so a needle aspirator was used to withdraw ~ 60 ml of dark green bile.  The cystic duct was then identified and cleared of inflammatory adhesions. The cystic artery was similarly identified.  Once a critical window of safety was achieved, the cystic duct was triply clipped and divided.  The cystic artery was also triply clipped and divided. The gallbladder was removed from the gallbladder bed using cautery.  Once free, the gallbladder was extracted from the infraumbilical site in an EndoCatch bag.  The liver bed was inspected for hemostasis, which was ensured with cautery and surgicel powder.  Trocar sites were then infiltrated with 0.5% Marcaine plain. The infraumbilical fascial opening was closed with interrupted 0 Vicryl. The skin was closed using 4-0 subcuticular vicryl. Steri-Strips were placed on the incisions. The patient was  transferred to recovery in good condition.        INTRAOPERATIVE FINDINGS: Inflamed gallbladder    Specimens:   ID Type Source Tests Collected by Time Destination   1 : GALLBLADDER AND CONTENTS Tissue Gallbladder SURGICAL PATHOLOGY EXAM Mercedes Bear MD 2/27/2025 12:19 PM        Mercedes Bear MD

## 2025-02-27 NOTE — CONSULTS
General Surgery Consultation    Josephine Nicole MRN# [id number]   Age: 75 year old YOB: 1950     Date of Admission:  2/26/2025    Reason for consult:            Abdominal pain, right upper quadrant       Requesting physician:            Caroline Shahid DO                Assessment and Plan:   Assessment:   Josephine Nicole is a 75 year old female with acute cholecystitis.         Plan:   I have offered the patient a laparoscopic cholecystectomy.     We have discussed the indication, alternatives, risks and expected recovery for laparoscopic cholecystectomy.  Specifically we have discussed incisions, general anesthesia, potential postoperative infections, bleeding, open conversion, common bile duct injury, injury to intra-abdominal organs, adhesions leading to bowel obstruction, retained common bile duct stone, bile leak, DVT, PE, hernia, post cholecystectomy diarrhea, as well as expected recovery, postoperative dietary restrictions and physical limitations.  We have discussed the recommended interventions and treatments for complications.  All questions have been answered to the best of my ability.    She elects to proceed with surgery.       Mercedes Bear MD           Chief Complaint:   Abdominal pain, right upper quadrant     History is obtained from the patient.         History of Present Illness:   Josephine Nicole is a 75 year old female who presents with epigastric region abdominal pain for the past 2 days.  The pain is constant.  She has had similar pain in the past, which she though was gas pain and typically lasted for a day at a time.  There is not an association with eating any type of food.  Positive for associated symptoms of nausea, vomiting, and chills.  She  does not have a history of jaundice or dark urine.  She  has not had pancreatitis in the past.              Past Medical History:    has a past medical history of Cerebral meningioma, COPD (chronic obstructive pulmonary  disease), Depressive disorder, Eczema, Eczema, Hyperlipidemia, Hypothyroidism, Melanoma of skin, and Osteoporosis.          Past Surgical History:     Past Surgical History:   Procedure Laterality Date    COLONOSCOPY N/A 04/18/2022    Procedure: COLONOSCOPY;  Surgeon: Abimbola Handley MD;  Location:  GI    OPEN REDUCTION INTERNAL FIXATION HIP UNIPOLAR Right 3/20/2023    Procedure: Right hip hemiarthroplasty anterolateral approach;  Surgeon: Shun Cuevas MD;  Location:  OR    OPTICAL TRACKING SYSTEM CRANIOTOMY, EXCISE TUMOR, COMBINED N/A 3/30/2023    Procedure: Bifrontal stealth craniotomy and resection of meningioma;  Surgeon: Ramez Murphy MD;  Location:  OR    Tubal ligation NOS               Social History:     Social History     Tobacco Use    Smoking status: Former     Types: Cigarettes    Smokeless tobacco: Never   Substance Use Topics    Alcohol use: Not Currently             Family History:   Family history reviewed and is not pertinent.         Allergies:     Allergies   Allergen Reactions    Bupropion Anaphylaxis, Hives and Shortness Of Breath             Medications:     Current Facility-Administered Medications   Medication Dose Route Frequency Provider Last Rate Last Admin    acetaminophen (TYLENOL) tablet 650 mg  650 mg Oral Q4H PRN Caroline Shahid, DO   650 mg at 02/27/25 0517    Or    acetaminophen (TYLENOL) Suppository 650 mg  650 mg Rectal Q4H PRN Caroline Shahid DO        albuterol (PROVENTIL) neb solution 2.5 mg  2.5 mg Nebulization TID PRN Caroline Shahid DO        alum & mag hydroxide-simethicone (MAALOX) suspension 15 mL  15 mL Oral TID PRN Caroline Shahid DO        cefTRIAXone (ROCEPHIN) 1 g vial to attach to  mL bag for ADULTS or NS 50 mL bag for PEDS  1 g Intravenous Q24H Caroline Shahid DO        fluticasone-vilanterol (BREO ELLIPTA) 200-25 MCG/ACT inhaler 1 puff  1 puff Inhalation Daily Caroline Shahid DO         HYDROmorphone (DILAUDID) injection 0.2 mg  0.2 mg Intravenous Q2H PRN Kleber, Caroline, DO        HYDROmorphone (DILAUDID) injection 0.4 mg  0.4 mg Intravenous Q2H PRN Kleber, Caroline, DO        ipratropium - albuterol 0.5 mg/2.5 mg/3 mL (DUONEB) neb solution 3 mL  1 vial Nebulization Q6H PRN Kleber, Caroline, DO        levalbuterol (XOPENEX HFA) 45 MCG/ACT Inhaler 2 puff  2 puff Inhalation Q4H PRN Kleber, Caroline, DO        levothyroxine (SYNTHROID/LEVOTHROID) tablet 88 mcg  88 mcg Oral At Bedtime Kleber, Caroline, DO   88 mcg at 02/26/25 2041    melatonin tablet 1 mg  1 mg Oral At Bedtime PRN Kleber, Caroline, DO        mirtazapine (REMERON) tablet 15 mg  15 mg Oral At Bedtime Kleber, Caroline, DO   15 mg at 02/26/25 2042    naloxone (NARCAN) injection 0.2 mg  0.2 mg Intravenous Q2 Min PRN Kleber, Caroline, DO        Or    naloxone (NARCAN) injection 0.4 mg  0.4 mg Intravenous Q2 Min PRN Kleber, Caroline, DO        Or    naloxone (NARCAN) injection 0.2 mg  0.2 mg Intramuscular Q2 Min PRN Kleber, Caroline, DO        Or    naloxone (NARCAN) injection 0.4 mg  0.4 mg Intramuscular Q2 Min PRN Kleber, Caroline, DO        ondansetron (ZOFRAN ODT) ODT tab 4 mg  4 mg Oral Q6H PRN Kleber, Caroline, DO   4 mg at 02/26/25 2019    Or    ondansetron (ZOFRAN) injection 4 mg  4 mg Intravenous Q6H PRN Kleber, Caroline, DO        oxyCODONE (ROXICODONE) tablet 5 mg  5 mg Oral Q4H PRN Kleber, Caroline, DO        oxyCODONE IR (ROXICODONE) half-tab 2.5 mg  2.5 mg Oral Q4H PRN Kleber, Caroline, DO   2.5 mg at 02/26/25 2041    prochlorperazine (COMPAZINE) injection 5 mg  5 mg Intravenous Q6H PRN Caroline Shahid DO        Or    prochlorperazine (COMPAZINE) tablet 5 mg  5 mg Oral Q6H PRN Caroline Shahid DO        senna-docusate (SENOKOT-S/PERICOLACE) 8.6-50 MG per tablet 1 tablet  1 tablet Oral BID PRN Caroline Shahid DO        Or    senna-docusate  "(SENOKOT-S/PERICOLACE) 8.6-50 MG per tablet 2 tablet  2 tablet Oral BID PRN Caroline Shahid DO        sertraline (ZOLOFT) tablet 25 mg  25 mg Oral At Bedtime Caroline Shahid, DO   25 mg at 02/26/25 2041    zolpidem (AMBIEN) half-tab 2.5 mg  2.5 mg Oral At Bedtime PRN Caroline Shahid DO         Current Facility-Administered Medications   Medication Dose Route Frequency Provider Last Rate Last Admin    cefTRIAXone (ROCEPHIN) 1 g vial to attach to  mL bag for ADULTS or NS 50 mL bag for PEDS  1 g Intravenous Q24H Caroline Shahid DO        fluticasone-vilanterol (BREO ELLIPTA) 200-25 MCG/ACT inhaler 1 puff  1 puff Inhalation Daily Caroline Shahid DO        levothyroxine (SYNTHROID/LEVOTHROID) tablet 88 mcg  88 mcg Oral At Bedtime Caroline Shahid, DO   88 mcg at 02/26/25 2041    mirtazapine (REMERON) tablet 15 mg  15 mg Oral At Bedtime Caroline Shahid, DO   15 mg at 02/26/25 2042    sertraline (ZOLOFT) tablet 25 mg  25 mg Oral At Bedtime Caroline Shahid, DO   25 mg at 02/26/25 2041            Review of Systems:   The 10 point review of systems is negative other than noted in the HPI.          Physical Exam:   /55 (BP Location: Right arm)   Pulse 81   Temp 98.9  F (37.2  C) (Oral)   Resp 18   Ht 1.575 m (5' 2\")   Wt 57.3 kg (126 lb 4.8 oz)   SpO2 95%   BMI 23.10 kg/m    General - Well developed, well nourished female in no apparent distress  HEENT:  Head normocephalic and atraumatic, pupils equal and round, conjunctivae clear, no scleral icterus, mucous membranes moist, external ears and nose normal  Neck: Supple without thyromegaly or masses  Lymphatic: No cervical, or supraclavicular lymphadenopathy  Lungs: Clear to auscultation bilaterally  Heart: regular rate and rhythm, no murmurs  Abdomen:   soft, flat, non-distended with mild tenderness noted in the epigastric region. no masses palpated  Extremities: Warm without edema  Neurologic: nonfocal  Psychiatric: " Mood and affect appropriate  Skin: Without lesions, rashes, or jaundice         Data:     WBC -   Lab Results   Component Value Date    WBC 9.4 02/27/2025       HgB -   Lab Results   Component Value Date    HGB 10.1 (L) 02/27/2025       Plt-   Lab Results   Component Value Date     02/27/2025       Liver Function Studies -   Recent Labs   Lab Test 02/27/25  0513   PROTTOTAL 5.7*   ALBUMIN 3.5   BILITOTAL 0.3   ALKPHOS 79   AST 17   ALT 16       Lipase-   Lab Results   Component Value Date    LIPASE 11 (L) 02/26/2025         Imaging:  All imaging studies reviewed by me.    Results for orders placed or performed during the hospital encounter of 02/26/25   Abd/pelvis CT,  IV  contrast only TRAUMA / AAA    Narrative    EXAM: CT ABDOMEN PELVIS W CONTRAST  LOCATION: [facility]  DATE: 2/26/2025    INDICATION: Epigastric pain radiating to periumbilical area for 2 days, assocated nausea and vomiting.  COMPARISON: None.  TECHNIQUE: CT scan of the abdomen and pelvis was performed following injection of IV contrast. Multiplanar reformats were obtained. Dose reduction techniques were used.  CONTRAST: 62 mL Isovue 370    FINDINGS:   LOWER CHEST: Marked emphysema. No pulmonary infiltrate, pleural fluid or pneumothorax. Mild bronchial wall thickening. Coronary artery calcification.    HEPATOBILIARY: Markedly distended gallbladder with gallbladder wall thickening and pericholecystic fluid. Cholelithiasis. No biliary dilatation. Mild hepatic steatosis. Patent portal vein.    PANCREAS: Normal.    SPLEEN: Normal.    ADRENAL GLANDS: Normal.    KIDNEYS/BLADDER: Symmetric renal enhancement. No CT evidence of pyelonephritis. No urinary collecting system dilatation or obstructing calculi. Bladder unremarkable.    BOWEL: No obstruction or inflammatory change. Colonic diverticulosis without diverticulitis. No evidence for appendicitis.    LYMPH NODES: No lymphadenopathy.    VASCULATURE: Normal caliber aorta.  Patent celiac, SMA, bilateral renal arteries and LUKE. Moderate vascular calcification. Patent portal, splenic and superior mesenteric veins.    PELVIC ORGANS: Unremarkable. Trace free fluid.    MUSCULOSKELETAL: Right CATHERINE. Healed pubic rami fractures. Healed right sacral fracture. Marked lower lumbar facet arthropathy. Degenerative changes in the spine.      Impression    IMPRESSION:   1.  Acute cholecystitis. Distended gallbladder with cholelithiasis with marked gallbladder wall thickening and pericholecystic fluid.    2.  No biliary dilatation.    3.  Marked emphysema.    4.  Mild bronchial wall thickening compatible with bronchial inflammation.   US Abdomen Limited    Narrative    EXAM: US ABDOMEN LIMITED  LOCATION: [facility]  DATE: 2/26/2025    INDICATION: Epigastric and RUQ pain  COMPARISON: CT abdomen pelvis 2/26/2025  TECHNIQUE: Limited abdominal ultrasound.    FINDINGS:    GALLBLADDER: Significant gallbladder wall thickening with gallbladder wall measuring 9 mm. Stones and sludge in the gallbladder.    BILE DUCTS: No biliary dilatation. The common duct measures 3.5 mm.    LIVER: Normal parenchyma with smooth contour. No focal mass. The portal vein is patent with flow in the normal direction.    RIGHT KIDNEY: No hydronephrosis.    PANCREAS: The visualized portions are normal.    No ascites.      Impression    IMPRESSION:  1.  Significant gallbladder wall thickening. Stones and sludge in the gallbladder. No bile duct dilatation.           This note was created using voice recognition software. Undetected word substitutions or other errors may have occurred.     Time spent with the patient, reviewing the EMR, reviewing laboratory and imaging studies, more than 50% of which was counseling and coordinating care:  40 minutes.     Mercedes Bear MD

## 2025-02-28 VITALS
HEART RATE: 96 BPM | OXYGEN SATURATION: 95 % | SYSTOLIC BLOOD PRESSURE: 97 MMHG | TEMPERATURE: 99.1 F | BODY MASS INDEX: 25.96 KG/M2 | WEIGHT: 141.09 LBS | RESPIRATION RATE: 18 BRPM | DIASTOLIC BLOOD PRESSURE: 57 MMHG | HEIGHT: 62 IN

## 2025-02-28 LAB
PATH REPORT.COMMENTS IMP SPEC: NORMAL
PATH REPORT.COMMENTS IMP SPEC: NORMAL
PATH REPORT.FINAL DX SPEC: NORMAL
PATH REPORT.GROSS SPEC: NORMAL
PATH REPORT.MICROSCOPIC SPEC OTHER STN: NORMAL
PATH REPORT.RELEVANT HX SPEC: NORMAL
PHOTO IMAGE: NORMAL

## 2025-02-28 PROCEDURE — 99238 HOSP IP/OBS DSCHRG MGMT 30/<: CPT | Performed by: PHYSICIAN ASSISTANT

## 2025-02-28 PROCEDURE — 250N000009 HC RX 250: Performed by: SURGERY

## 2025-02-28 PROCEDURE — 250N000013 HC RX MED GY IP 250 OP 250 PS 637: Performed by: SURGERY

## 2025-02-28 PROCEDURE — 250N000011 HC RX IP 250 OP 636: Performed by: SURGERY

## 2025-02-28 PROCEDURE — G0378 HOSPITAL OBSERVATION PER HR: HCPCS

## 2025-02-28 PROCEDURE — 88304 TISSUE EXAM BY PATHOLOGIST: CPT | Mod: 26

## 2025-02-28 RX ADMIN — OXYCODONE HYDROCHLORIDE 2.5 MG: 5 TABLET ORAL at 07:47

## 2025-02-28 RX ADMIN — ALBUTEROL SULFATE 2.5 MG: 2.5 SOLUTION RESPIRATORY (INHALATION) at 09:16

## 2025-02-28 RX ADMIN — ONDANSETRON 4 MG: 4 TABLET, ORALLY DISINTEGRATING ORAL at 07:48

## 2025-02-28 ASSESSMENT — ACTIVITIES OF DAILY LIVING (ADL)
ADLS_ACUITY_SCORE: 65
ADLS_ACUITY_SCORE: 66
ADLS_ACUITY_SCORE: 66
ADLS_ACUITY_SCORE: 65
ADLS_ACUITY_SCORE: 65
ADLS_ACUITY_SCORE: 66
ADLS_ACUITY_SCORE: 65
ADLS_ACUITY_SCORE: 66
ADLS_ACUITY_SCORE: 65

## 2025-02-28 NOTE — DISCHARGE SUMMARY
"[facility]  Hospitalist Discharge Summary      Date of Admission:  2/26/2025  Date of Discharge:  2/28/2025  Discharging Provider: Michelle Noguera PA-C  Discharge Service: Hospitalist Service    Discharge Diagnoses   Acute cholecystitis  S/p lap cholecystectomy   Chronic respiratory failure with hypoxia due to COPD  Hypotension, resolved     Clinically Significant Risk Factors     # Overweight: Estimated body mass index is 25.81 kg/m  as calculated from the following:    Height as of this encounter: 1.575 m (5' 2\").    Weight as of this encounter: 64 kg (141 lb 1.5 oz).       Follow-ups Needed After Discharge   Follow-up Appointments       Follow-up and recommended labs and tests       Follow up with primary care provider, Divina Turner, within 7-10 days for hospital follow- up.  No follow up labs or test are needed.  Follow up with general surgery per their recommendations.        Follow-up and recommended labs and tests       Follow up with primary care provider, Divina Turner, within 7 days for hospital follow- up.  No follow up labs or test are needed.                Unresulted Labs Ordered in the Past 30 Days of this Admission       Date and Time Order Name Status Description    2/27/2025 12:19 PM Surgical Pathology Exam In process         These results will be followed up by PCP    Discharge Disposition   Discharged to home  Condition at discharge: Stable    Hospital Course   Josephine Nicole is a 75 year old female with PMH of cerebral meningioma s/p radiation and resection, COPD on chronic 2L O2, hypothyroidism, HLD, osteoporosis, depression/anxiety, who was admitted to observation on 2/26/2025 for cholecystitis.     Presents with about 2 days of abdominal pain and N/V.      On arrival, BP 92/71, HR 66, temp 98.5, 98% on 2L NC. Labs notable for WBC 14.2, Hgb 11.2, plt 196, Na 135, K 4.2, Cr 0.51, LFTs in normal limits, lipase 11, . CT AP showed acute cholecystitis with " distended gallbladder with cholelithiasis with marked gallbladder wall thickening and pericholecystic fluid, no biliary dilatation, marked emphysema, and mild bronchial wall thickening compatible with bronchial inflammation. Abd US with significant gallbladder wall thickening, stones and sludge in the gallbladder, no bile duct dilatation. Please see the admission history and physical for full details.        Hospital Course  Josephine Nicole was admitted on 2/26/2025.  The following problems were addressed during her hospitalization:     Acute cholecystitis  - received Rocephin on admission, no need for further antibiotics upon discharge  - General surgery consulted, s/p laparoscopic cholecystectomy  - pt was hypotensive in the PACU and had increased O2 needs post op so pt returned to the floor for prolonged recovery.  - she was given IVFs with improvement in BP  - able to wean down to baseline O2 needs of 2L  - NPO for procedure, ADAT after procedure   - pain medications postoperatively provided by surgery     COPD on chronic oxygen  Not in exacerbation.   - PTA inhalers  - required increased O2 post op but able to wean down to baseline overnight     Consultations This Hospital Stay   SURGERY GENERAL IP CONSULT    Code Status   Full Code    Time Spent on this Encounter   I, Michelle Noguera PA-C, personally saw the patient today and spent less than or equal to 30 minutes discharging this patient.       Michelle Noguera PA-C  [facility] OBSERVATION DEPT  [address] [place] [address]  Phone: [phone]  ______________________________________________________________________    Physical Exam   Vital Signs: Temp: 98.5  F (36.9  C) Temp src: Oral BP: 107/60 Pulse: 89   Resp: (!) 163 SpO2: 98 % O2 Device: Nasal cannula Oxygen Delivery: 2 LPM  Weight: 141 lbs 1.51 oz    GENERAL:  Comfortable.  PSYCH: pleasant, oriented, No acute distress.  HEART:  Normal S1, S2 with no murmur, no  pericardial rub, gallops or S3 or S4.  LUNGS:  Clear to auscultation, normal Respiratory effort. No wheezing, rales or ronchi.  EXTREMITIES: able to ambulate  NEUROLOGIC:  grossly intact       Primary Care Physician   Divina Turner    Discharge Orders      When to call - Contact Surgeon Team    You may experience symptoms that require follow-up before your scheduled appointment. Contact your Surgeon Team if you are concerned about pain control, large amount of bleeding, constipation, or if you experience signs of infection (fever, growing tenderness at the surgery site, a large amount of drainage, severe pain, foul-smelling drainage, redness or swelling).     No driving or operating machinery    Do NOT drive any vehicle or operate mechanical equipment for 24 hours following the end of your surgery.  Even though you may feel normal, your reactions may be affected by Anesthesia medication you received.     No Alcohol    Do NOT drink alcoholic beverages for 24 hours following your surgery and while taking pain medications.     Diet Instructions    You may drink clear liquids (apple juice, ginger ale, 7-up, broth, etc.) and progress to your regular diet as you feel able. It is important to stay well-hydrated after surgery and drink plenty of water.    If you develop loose stools following surgery, follow a low fat diet for 1-2 months, then begin to slowly re-introduce fats to your diet.     Shower/Bathing - Restrictions (specify)    Shower/Bathing - Starting after 24 hours, you may shower. Let water run over incisions and pat dry. Do NOT soak incision in tub or go swimming in a lake/pool for 2 weeks postoperatively.     Incision Care    Keep dressing clean and dry.  Wash your hands with soap and warm water before you touch the site of surgery. You have surgical glue on your surgical sites.  Leave the glue in place until it flakes off on its own (typically 7-14 days). You have stitches underneath the skin which will  dissolve over several weeks. Do not apply any creams or lotions to skin until glue is off and incisions are completely healed.     Ice to affected area    Ice to operative site PRN     Discharge Instructions - Comfort and Pain Management    Pain after surgery is normal and expected. You will have some amount of pain after surgery. Your pain will improve with time. There are several things you can do to help reduce your pain including: rest, ice, and using pain medications as needed. Use pain interventions and don't wait until pain level is out of control. Take over the counter pain medications such as acetaminophen (Tylenol) and ibuprofen (Motrin/Advil) in doses recommended by your surgeon.    After laparoscopic surgery, some patients experience shoulder pain. This is referred pain from stretch of the abdominal wall and diaphragm during surgery. Typically this pain resolves after about 48 hours.     Contact your Surgeon Team if you have pain that persists or worsens after surgery despite rest, ice, and taking your medications as prescribed. You may have a dry mouth, a sore throat, muscles aches or trouble sleeping, and these symptoms should go away after 24 hours.     Discharge Instructions - Rest    Rest and relax for the next 24 hours. Make arrangements to have someone stay with you overnight, and avoid hazardous and strenuous activities.  Do NOT make any important decisions for the next 24 hours.     Discharge Instructions - Follow-up Appointment (Weeks)    FOLLOW-UP AFTER SURGERY:  - Our office will contact you approximately 2-3 weeks after surgery to check on your progress and answer any questions you may have.  If you are doing well, you will not need to return for an office appointment.  If any concerns are identified over the phone, we will help you make an appointment to see a provider.      - If you have not received a phone call, have any questions or concerns, or would like to be seen, please call us at  [phone].  We are located at: [address], [address]; [place] [address]     No lifting    No lifting over 20 pounds and no strenuous physical activity for 2 weeks.     Reason for your hospital stay    You were admitted for concerns of stomach pain related to your gallbladder. Imaging in the ER showed that your gallbladder was inflamed/infected. There was not evidence of a stone in the biliary tract. You were treated with antibiotics. Your pain and nausea was controlled with narcotics and anti nausea medications. You were seen by surgery who opted to remove your gallbladder. You were doing fine post operatively to allowed to discharge home from recovery.     Follow-up and recommended labs and tests     Follow up with primary care provider, Divina Turner, within 7-10 days for hospital follow- up.  No follow up labs or test are needed.  Follow up with general surgery per their recommendations.     Reason for your hospital stay    Acute cholecystitis, s/p lap cholecystectomy. You were kept overnight due to increased oxygen needs and hypotension in the PACU. Blood pressures improved and you were able to wean back down to your baseline 2L supplemental O2.     Follow-up and recommended labs and tests     Follow up with primary care provider, Divina Turner, within 7 days for hospital follow- up.  No follow up labs or test are needed.     Activity    Your activity upon discharge: activity as tolerated. Lifting restrictions per surgery     Oxygen Adult/Peds    Oxygen Documentation  I certify that this patient, Josephine Nicole has been under my care (or a nurse practitioner or physican's assistant working with me). This is the face-to-face encounter for oxygen medical necessity.      At the time of this encounter, I have reviewed the qualifying testing and have determined that supplemental oxygen is reasonable and necessary and is expected to improve the patient's condition in a home setting.          Patient has continued oxygen desaturation due to Chronic Respiratory Failure with Hypoxia J96.11.    If portability is ordered, is the patient mobile within the home? yes    Was this visit performed as a telehealth visit: No     Diet    Follow this diet upon discharge: Low Fat Regular       Significant Results and Procedures   Most Recent 3 CBC's:  Recent Labs   Lab Test 02/27/25  0513 02/26/25  1606 04/27/23  1219   WBC 9.4 14.2* 5.1   HGB 10.1* 11.2* 9.0*   MCV 94 92 98    196 276     Most Recent 3 BMP's:  Recent Labs   Lab Test 02/27/25  0513 02/26/25  1606 04/27/23  1219    135 139   POTASSIUM 3.8 4.2 4.0   CHLORIDE 100 96* 99   CO2 32* 30* 32*   BUN 9.6 14.9 12.0   CR 0.56 0.51 0.51   ANIONGAP 6* 9 8   CHAO 8.5* 9.1 9.3   * 121* 98     Most Recent 2 LFT's:  Recent Labs   Lab Test 02/27/25  0513 02/26/25  1606   AST 17 19   ALT 16 17   ALKPHOS 79 95   BILITOTAL 0.3 0.7   ,   Results for orders placed or performed during the hospital encounter of 02/26/25   Abd/pelvis CT,  IV  contrast only TRAUMA / AAA    Narrative    EXAM: CT ABDOMEN PELVIS W CONTRAST  LOCATION: [facility]  DATE: 2/26/2025    INDICATION: Epigastric pain radiating to periumbilical area for 2 days, assocated nausea and vomiting.  COMPARISON: None.  TECHNIQUE: CT scan of the abdomen and pelvis was performed following injection of IV contrast. Multiplanar reformats were obtained. Dose reduction techniques were used.  CONTRAST: 62 mL Isovue 370    FINDINGS:   LOWER CHEST: Marked emphysema. No pulmonary infiltrate, pleural fluid or pneumothorax. Mild bronchial wall thickening. Coronary artery calcification.    HEPATOBILIARY: Markedly distended gallbladder with gallbladder wall thickening and pericholecystic fluid. Cholelithiasis. No biliary dilatation. Mild hepatic steatosis. Patent portal vein.    PANCREAS: Normal.    SPLEEN: Normal.    ADRENAL GLANDS: Normal.    KIDNEYS/BLADDER: Symmetric renal  enhancement. No CT evidence of pyelonephritis. No urinary collecting system dilatation or obstructing calculi. Bladder unremarkable.    BOWEL: No obstruction or inflammatory change. Colonic diverticulosis without diverticulitis. No evidence for appendicitis.    LYMPH NODES: No lymphadenopathy.    VASCULATURE: Normal caliber aorta. Patent celiac, SMA, bilateral renal arteries and LUKE. Moderate vascular calcification. Patent portal, splenic and superior mesenteric veins.    PELVIC ORGANS: Unremarkable. Trace free fluid.    MUSCULOSKELETAL: Right CATHERINE. Healed pubic rami fractures. Healed right sacral fracture. Marked lower lumbar facet arthropathy. Degenerative changes in the spine.      Impression    IMPRESSION:   1.  Acute cholecystitis. Distended gallbladder with cholelithiasis with marked gallbladder wall thickening and pericholecystic fluid.    2.  No biliary dilatation.    3.  Marked emphysema.    4.  Mild bronchial wall thickening compatible with bronchial inflammation.   US Abdomen Limited    Narrative    EXAM: US ABDOMEN LIMITED  LOCATION: [facility]  DATE: 2/26/2025    INDICATION: Epigastric and RUQ pain  COMPARISON: CT abdomen pelvis 2/26/2025  TECHNIQUE: Limited abdominal ultrasound.    FINDINGS:    GALLBLADDER: Significant gallbladder wall thickening with gallbladder wall measuring 9 mm. Stones and sludge in the gallbladder.    BILE DUCTS: No biliary dilatation. The common duct measures 3.5 mm.    LIVER: Normal parenchyma with smooth contour. No focal mass. The portal vein is patent with flow in the normal direction.    RIGHT KIDNEY: No hydronephrosis.    PANCREAS: The visualized portions are normal.    No ascites.      Impression    IMPRESSION:  1.  Significant gallbladder wall thickening. Stones and sludge in the gallbladder. No bile duct dilatation.           Discharge Medications   Current Discharge Medication List        START taking these medications    Details   oxyCODONE  (ROXICODONE) 5 MG tablet Take 1-2 tablets (5-10 mg) by mouth every 4 hours as needed for moderate to severe pain.  Qty: 12 tablet, Refills: 0    Associated Diagnoses: Acute cholecystitis           CONTINUE these medications which have CHANGED    Details   acetaminophen (TYLENOL) 500 MG tablet Take 1.5 tablets (750 mg) by mouth every 6 hours as needed for mild pain.    Associated Diagnoses: S/P resection of meningioma; Closed displaced fracture of right femoral neck (H)      ibuprofen (ADVIL/MOTRIN) 200 MG tablet Take 3 tablets (600 mg) by mouth every 6 hours as needed for inflammatory pain.    Associated Diagnoses: Closed displaced fracture of right femoral neck (H)           CONTINUE these medications which have NOT CHANGED    Details   albuterol (PROVENTIL) (2.5 MG/3ML) 0.083% neb solution Take 2.5 mg by nebulization 3 times daily as needed for shortness of breath, wheezing or cough      alendronate (FOSAMAX) 70 MG tablet Take 70 mg by mouth every 7 days      ALPRAZolam (XANAX) 0.25 MG tablet Take 0.25 mg by mouth daily as needed for anxiety.      atorvastatin (LIPITOR) 10 MG tablet Take 10 mg by mouth daily      cyanocobalamin (CYANOCOBALAMIN) 1000 MCG/ML injection Inject 1 mL into the muscle every 30 days      diazepam (VALIUM) 5 MG tablet Take 1 tablet (5 mg) by mouth See Admin Instructions Take 5mg 30 minutes prior to MRI, then may take an additional 5mg at time of exam if needed. MUST have a .  Qty: 2 tablet, Refills: 0    Associated Diagnoses: Claustrophobia      fluticasone-salmeterol (ADVAIR) 500-50 MCG/DOSE inhaler Inhale 1 puff into the lungs 2 times daily      ipratropium - albuterol 0.5 mg/2.5 mg/3 mL (DUONEB) 0.5-2.5 (3) MG/3ML neb solution Take 1 vial by nebulization every 6 hours as needed for shortness of breath, wheezing or cough      levalbuterol (XOPENEX HFA) 45 MCG/ACT inhaler Inhale 2 puffs into the lungs every 4 hours as needed for shortness of breath or wheezing      levothyroxine  (SYNTHROID/LEVOTHROID) 88 MCG tablet Take 88 mcg by mouth daily      mirtazapine (REMERON) 15 MG tablet Take 1 tablet (15 mg) by mouth At Bedtime  Qty: 30 tablet, Refills: 0    Associated Diagnoses: S/P resection of meningioma      sertraline (ZOLOFT) 25 MG tablet Take 25 mg by mouth every morning      zolpidem (AMBIEN) 2.5 mg TABS half-tab Take 2.5 mg by mouth nightly as needed for sleep.           Allergies   Allergies   Allergen Reactions    Bupropion Anaphylaxis, Hives and Shortness Of Breath

## 2025-02-28 NOTE — PLAN OF CARE
Patient's After Visit Summary was reviewed with patient and/or family.   Patient verbalized understanding of After Visit Summary, recommended follow up and was given an opportunity to ask questions.   Discharge medications sent home with patient/family: YES   Discharged with daughter

## 2025-02-28 NOTE — PLAN OF CARE
PRIMARY DIAGNOSIS: BILIARY COLIC/UNCOMPLICATED EARLY ACUTE CHOLECYSTITIS    OUTPATIENT/OBSERVATION GOALS TO BE MET BEFORE DISCHARGE:    1. Pain status: Improved-controlled with oral pain medications.  2. Stable vital signs and labs (if performed) at disposition: Yes  3. Tolerating adequate PO diet: Yes  4. Successful cholecystectomy or clear follow up plan with General Surgery team if immediate surgery not performed Yes  5. ADLs back to baseline?  Yes  6. Activity and level of assistance: Up with standby assistance.  7. Barriers to discharge noted No    Discharge Planner Nurse   Safe discharge environment identified: Yes  Barriers to discharge: No       Entered by: Alda aMki RN 02/28/2025 1:40 PM    Vitals are Temp: 99.1  F (37.3  C) Temp src: Oral BP: 97/57 Pulse: 96   Resp: 18 SpO2: 95 %.  Patient is Alert and Oriented x4 but forgetful. They are SBA with Gait Belt and Walker.  Pt is a Low fat diet. They are complaining of 2/10 pain in their abdomen.  Oxycodone given for pain.  Medicatons decreased pain. Patient is Saline locked.       Please review provider order for any additional goals.   Nurse to notify provider when observation goals have been met and patient is ready for discharge.      Goal Outcome Evaluation:    Pt to discharge

## 2025-02-28 NOTE — PLAN OF CARE
PRIMARY DIAGNOSIS: ACUTE CHOLECYSTITIS  OUTPATIENT/OBSERVATION GOALS TO BE MET BEFORE DISCHARGE:    1. Pain status: Improved-controlled with oral pain medications.  2. Stable vital signs and labs (if performed) at disposition: Yes  3. Tolerating adequate PO diet: Yes  4. Successful cholecystectomy or clear follow up plan with General Surgery team if immediate surgery not performed Yes  5. ADLs back to baseline?  No  6. Activity and level of assistance: Up with assist of 1  7. Barriers to discharge noted: Yes, hypotension.    Discharge Planner Nurse   Safe discharge environment identified: Yes  Barriers to discharge: Yes       Entered by: Macario Carroll RN 02/28/2025    Patient is alert and oriented x3, disoriented to time with intermittent confusion. 2L oxygen at baseline, sats >90. Reports little to no pain on rest. Denies sob, nausea, dizziness. Peripheral iv saline locked. Regular diet. Abdominal incisions open to air, clean, dry, & intact. Vital signs stable. Ambulated to bathroom back to bed with assist of 1& gait belt. Possible discharge tomorrow. Patient is sleeping well and able to make needs known.    Please review provider order for any additional goals.   Nurse to notify provider when observation goals have been met and patient is ready for discharge.      Goal Outcome Evaluation:      Plan of Care Reviewed With: patient    Overall Patient Progress: improvingOverall Patient Progress: improving

## 2025-02-28 NOTE — PLAN OF CARE
PRIMARY DIAGNOSIS: ACUTE CHOLECYSTITIS  OUTPATIENT/OBSERVATION GOALS TO BE MET BEFORE DISCHARGE:    1. Pain status: Improved-controlled with oral pain medications.  2. Stable vital signs and labs (if performed) at disposition: Yes  3. Tolerating adequate PO diet: Yes  4. Successful cholecystectomy or clear follow up plan with General Surgery team if immediate surgery not performed Yes  5. ADLs back to baseline?  No  6. Activity and level of assistance: Up with assist of 1  7. Barriers to discharge noted: Yes, hypotension.    Discharge Planner Nurse   Safe discharge environment identified: Yes  Barriers to discharge: Yes       Entered by: Macario Carroll RN 02/28/2025    Patient is alert and oriented x3, disoriented to time with intermittent confusion. 2L oxygen at baseline, sats >90. Reports little to no pain on rest. Denies sob, nausea, dizziness. Peripheral iv saline locked. Regular diet. Abdominal incisions open to air, clean, dry, & intact. Vital signs stable. Ambulated to bathroom back to bed with assist of 1& gait belt. Possible discharge tomorrow.    Please review provider order for any additional goals.   Nurse to notify provider when observation goals have been met and patient is ready for discharge.      Goal Outcome Evaluation:      Plan of Care Reviewed With: patient    Overall Patient Progress: improvingOverall Patient Progress: improving

## 2025-02-28 NOTE — PROGRESS NOTES
[facility]    General Surgery Progress Note          Assessment and Plan:   Assessment:    Josephine Nicole is a 75 year old female 1 Day Post-Op from Procedure(s):  LAPAROSCOPIC CHOLECYSTECTOMY   - admitted overnight for hypotension, increased oxygen use        Plan:   Regular, low fat diet  Oxycodone for pain  Discharge home. Surgical PA will do follow up via phone call in 2-3 weeks          Interval History:   Pt resting in bed.  Doing well.  Hypotension resolved, back to her baseline oxygen needs (uses home oxygen). Pain controlled with Oxycodone. Tolerating diet.  Discharge instructions reviewed.         Physical Exam:   Temp: 98.5  F (36.9  C) Temp src: Oral BP: 107/60 Pulse: 89   Resp: 16   SpO2: 98 % O2 Device: Nasal cannula Oxygen Delivery: 2 LPM    I/O last 3 completed shifts:  In: 1240 [P.O.:240; I.V.:1000]  Out: -     Constitutional: alert and no distress   Abdomen: soft, non tender.   Incisions: surgical glue in place, clean/dry/intact             Data:   Data   Recent Labs   Lab 02/27/25  0513 02/26/25  1606   WBC 9.4 14.2*   HGB 10.1* 11.2*   MCV 94 92    196    135   POTASSIUM 3.8 4.2   CHLORIDE 100 96*   CO2 32* 30*   BUN 9.6 14.9   CR 0.56 0.51   ANIONGAP 6* 9   CHAO 8.5* 9.1   * 121*   ALBUMIN 3.5 4.4   PROTTOTAL 5.7* 7.0   BILITOTAL 0.3 0.7   ALKPHOS 79 95   ALT 16 17   AST 17 19        [facility]  Text page: [phone]  8-4 pm   After 4 pm call [phone]                          a

## 2025-03-04 ENCOUNTER — PATIENT OUTREACH (OUTPATIENT)
Dept: CARE COORDINATION | Facility: CLINIC | Age: 75
End: 2025-03-04
Payer: COMMERCIAL

## 2025-03-04 NOTE — PROGRESS NOTES
"  [facility]: Transitions of Care Outreach  Chief Complaint   Patient presents with    Clinic Care Coordination - Post Hospital       Most Recent Admission Date: 2/26/2025   Most Recent Admission Diagnosis: Acute cholecystitis - K81.0  Chronic bronchitis, unspecified chronic bronchitis type (H) - J42     Most Recent Discharge Date: 2/28/2025   Most Recent Discharge Diagnosis: Acute cholecystitis - K81.0  Chronic bronchitis, unspecified chronic bronchitis type (H) - J42  S/P resection of meningioma - Z98.890, Z86.018  Closed displaced fracture of right femoral neck (H) - S72.001A     Transitions of Care Assessment    Discharge Assessment  How are you doing now that you are home?: \" I am doing ok \"  How are your symptoms? (Red Flag symptoms escalate to triage hotline per guidelines): Improved  Do you know how to contact your clinic care team if you have future questions or changes to your health status? : Yes  Does the patient have their discharge instructions? : Yes  Does the patient have questions regarding their discharge instructions? : No  Were you started on any new medications or were there changes to any of your previous medications? : Yes  Does the patient have all of their medications?: Yes  Do you have questions regarding any of your medications? : No  Do you have all of your needed medical supplies or equipment (DME)?  (i.e. oxygen tank, CPAP, cane, etc.): Yes    Post-op ([facility] CTA Only)  If the patient had a surgery or procedure, do they have any questions for a nurse?: No           Follow up Plan     Discharge Follow-Up  Discharge follow up appointment scheduled in alignment with recommended follow up timeframe or Transitions of Risk Category? (Low = within 30 days; Moderate= within 14 days; High= within 7 days): No  Patient's follow up appointment not scheduled: Patient declined scheduling support. Education on the importance of transitions of care follow up. Provided scheduling phone " number.    No future appointments.    Outpatient Plan as outlined on AVS reviewed with patient.    For any urgent concerns, please contact our 24 hour nurse triage line: [phone] ([phone])       DANIELLE Cruz

## 2025-03-05 ENCOUNTER — APPOINTMENT (OUTPATIENT)
Dept: CT IMAGING | Facility: CLINIC | Age: 75
End: 2025-03-05
Attending: EMERGENCY MEDICINE
Payer: COMMERCIAL

## 2025-03-05 ENCOUNTER — HOSPITAL ENCOUNTER (INPATIENT)
Facility: CLINIC | Age: 75
End: 2025-03-05
Attending: EMERGENCY MEDICINE | Admitting: INTERNAL MEDICINE
Payer: COMMERCIAL

## 2025-03-05 DIAGNOSIS — J96.02 ACUTE RESPIRATORY FAILURE WITH HYPOXIA AND HYPERCAPNIA (H): ICD-10-CM

## 2025-03-05 DIAGNOSIS — J44.1 COPD WITH ACUTE EXACERBATION (H): ICD-10-CM

## 2025-03-05 DIAGNOSIS — J96.01 ACUTE RESPIRATORY FAILURE WITH HYPOXIA AND HYPERCAPNIA (H): ICD-10-CM

## 2025-03-05 DIAGNOSIS — J90 BILATERAL PLEURAL EFFUSION: ICD-10-CM

## 2025-03-05 LAB
ALBUMIN SERPL BCG-MCNC: 3.6 G/DL (ref 3.5–5.2)
ALP SERPL-CCNC: 92 U/L (ref 40–150)
ALT SERPL W P-5'-P-CCNC: 16 U/L (ref 0–50)
ANION GAP SERPL CALCULATED.3IONS-SCNC: 5 MMOL/L (ref 7–15)
AST SERPL W P-5'-P-CCNC: 18 U/L (ref 0–45)
BASOPHILS # BLD AUTO: 0 10E3/UL (ref 0–0.2)
BASOPHILS NFR BLD AUTO: 1 %
BILIRUB SERPL-MCNC: 0.2 MG/DL
BUN SERPL-MCNC: 7.7 MG/DL (ref 8–23)
CALCIUM SERPL-MCNC: 8.7 MG/DL (ref 8.8–10.4)
CHLORIDE SERPL-SCNC: 98 MMOL/L (ref 98–107)
CREAT SERPL-MCNC: 0.58 MG/DL (ref 0.51–0.95)
D DIMER PPP FEU-MCNC: 2.66 UG/ML FEU (ref 0–0.5)
EGFRCR SERPLBLD CKD-EPI 2021: >90 ML/MIN/1.73M2
EOSINOPHIL # BLD AUTO: 0.2 10E3/UL (ref 0–0.7)
EOSINOPHIL NFR BLD AUTO: 2 %
ERYTHROCYTE [DISTWIDTH] IN BLOOD BY AUTOMATED COUNT: 12.1 % (ref 10–15)
FLUAV RNA SPEC QL NAA+PROBE: NEGATIVE
FLUBV RNA RESP QL NAA+PROBE: NEGATIVE
GLUCOSE SERPL-MCNC: 137 MG/DL (ref 70–99)
HCO3 BLDV-SCNC: 44 MMOL/L (ref 21–28)
HCO3 SERPL-SCNC: 39 MMOL/L (ref 22–29)
HCT VFR BLD AUTO: 33.6 % (ref 35–47)
HGB BLD-MCNC: 10.3 G/DL (ref 11.7–15.7)
HOLD SPECIMEN: NORMAL
IMM GRANULOCYTES # BLD: 0 10E3/UL
IMM GRANULOCYTES NFR BLD: 1 %
LACTATE BLD-SCNC: 1.2 MMOL/L
LYMPHOCYTES # BLD AUTO: 0.5 10E3/UL (ref 0.8–5.3)
LYMPHOCYTES NFR BLD AUTO: 8 %
MCH RBC QN AUTO: 29.6 PG (ref 26.5–33)
MCHC RBC AUTO-ENTMCNC: 30.7 G/DL (ref 31.5–36.5)
MCV RBC AUTO: 97 FL (ref 78–100)
MONOCYTES # BLD AUTO: 0.3 10E3/UL (ref 0–1.3)
MONOCYTES NFR BLD AUTO: 5 %
NEUTROPHILS # BLD AUTO: 5.4 10E3/UL (ref 1.6–8.3)
NEUTROPHILS NFR BLD AUTO: 84 %
NRBC # BLD AUTO: 0 10E3/UL
NRBC BLD AUTO-RTO: 0 /100
NT-PROBNP SERPL-MCNC: 4687 PG/ML (ref 0–900)
PCO2 BLDV: 81 MM HG (ref 40–50)
PH BLDV: 7.34 [PH] (ref 7.32–7.43)
PLATELET # BLD AUTO: 216 10E3/UL (ref 150–450)
PO2 BLDV: 32 MM HG (ref 25–47)
POTASSIUM SERPL-SCNC: 3.7 MMOL/L (ref 3.4–5.3)
PROT SERPL-MCNC: 6.1 G/DL (ref 6.4–8.3)
RBC # BLD AUTO: 3.48 10E6/UL (ref 3.8–5.2)
RSV RNA SPEC NAA+PROBE: NEGATIVE
SAO2 % BLDV: 53 % (ref 70–75)
SARS-COV-2 RNA RESP QL NAA+PROBE: NEGATIVE
SODIUM SERPL-SCNC: 142 MMOL/L (ref 135–145)
TROPONIN T SERPL HS-MCNC: 13 NG/L
WBC # BLD AUTO: 6.4 10E3/UL (ref 4–11)

## 2025-03-05 PROCEDURE — 85379 FIBRIN DEGRADATION QUANT: CPT | Performed by: EMERGENCY MEDICINE

## 2025-03-05 PROCEDURE — 83735 ASSAY OF MAGNESIUM: CPT | Performed by: INTERNAL MEDICINE

## 2025-03-05 PROCEDURE — 82803 BLOOD GASES ANY COMBINATION: CPT

## 2025-03-05 PROCEDURE — 36415 COLL VENOUS BLD VENIPUNCTURE: CPT | Performed by: EMERGENCY MEDICINE

## 2025-03-05 PROCEDURE — 93005 ELECTROCARDIOGRAM TRACING: CPT

## 2025-03-05 PROCEDURE — 250N000011 HC RX IP 250 OP 636: Performed by: EMERGENCY MEDICINE

## 2025-03-05 PROCEDURE — 82310 ASSAY OF CALCIUM: CPT | Performed by: EMERGENCY MEDICINE

## 2025-03-05 PROCEDURE — 96374 THER/PROPH/DIAG INJ IV PUSH: CPT | Mod: 59

## 2025-03-05 PROCEDURE — 999N000157 HC STATISTIC RCP TIME EA 10 MIN

## 2025-03-05 PROCEDURE — 94660 CPAP INITIATION&MGMT: CPT

## 2025-03-05 PROCEDURE — 250N000009 HC RX 250: Performed by: EMERGENCY MEDICINE

## 2025-03-05 PROCEDURE — 85041 AUTOMATED RBC COUNT: CPT | Performed by: EMERGENCY MEDICINE

## 2025-03-05 PROCEDURE — 5A09357 ASSISTANCE WITH RESPIRATORY VENTILATION, LESS THAN 24 CONSECUTIVE HOURS, CONTINUOUS POSITIVE AIRWAY PRESSURE: ICD-10-PCS | Performed by: EMERGENCY MEDICINE

## 2025-03-05 PROCEDURE — 87637 SARSCOV2&INF A&B&RSV AMP PRB: CPT | Performed by: EMERGENCY MEDICINE

## 2025-03-05 PROCEDURE — 99291 CRITICAL CARE FIRST HOUR: CPT | Mod: 25

## 2025-03-05 PROCEDURE — 83880 ASSAY OF NATRIURETIC PEPTIDE: CPT | Performed by: EMERGENCY MEDICINE

## 2025-03-05 PROCEDURE — 84484 ASSAY OF TROPONIN QUANT: CPT | Performed by: EMERGENCY MEDICINE

## 2025-03-05 PROCEDURE — 80048 BASIC METABOLIC PNL TOTAL CA: CPT | Performed by: EMERGENCY MEDICINE

## 2025-03-05 PROCEDURE — 71275 CT ANGIOGRAPHY CHEST: CPT

## 2025-03-05 PROCEDURE — 85025 COMPLETE CBC W/AUTO DIFF WBC: CPT | Performed by: EMERGENCY MEDICINE

## 2025-03-05 PROCEDURE — 82040 ASSAY OF SERUM ALBUMIN: CPT | Performed by: EMERGENCY MEDICINE

## 2025-03-05 PROCEDURE — 94640 AIRWAY INHALATION TREATMENT: CPT

## 2025-03-05 PROCEDURE — 250N000012 HC RX MED GY IP 250 OP 636 PS 637: Performed by: EMERGENCY MEDICINE

## 2025-03-05 RX ORDER — IOPAMIDOL 755 MG/ML
500 INJECTION, SOLUTION INTRAVASCULAR ONCE
Status: COMPLETED | OUTPATIENT
Start: 2025-03-05 | End: 2025-03-05

## 2025-03-05 RX ORDER — PREDNISONE 20 MG/1
60 TABLET ORAL ONCE
Status: COMPLETED | OUTPATIENT
Start: 2025-03-05 | End: 2025-03-05

## 2025-03-05 RX ORDER — IPRATROPIUM BROMIDE AND ALBUTEROL SULFATE 2.5; .5 MG/3ML; MG/3ML
3 SOLUTION RESPIRATORY (INHALATION)
Status: DISCONTINUED | OUTPATIENT
Start: 2025-03-05 | End: 2025-03-06

## 2025-03-05 RX ORDER — LORAZEPAM 2 MG/ML
1 INJECTION INTRAMUSCULAR ONCE
Status: COMPLETED | OUTPATIENT
Start: 2025-03-05 | End: 2025-03-05

## 2025-03-05 RX ADMIN — PREDNISONE 60 MG: 20 TABLET ORAL at 22:37

## 2025-03-05 RX ADMIN — LORAZEPAM 1 MG: 2 INJECTION INTRAMUSCULAR; INTRAVENOUS at 22:37

## 2025-03-05 RX ADMIN — IOPAMIDOL 54 ML: 755 INJECTION, SOLUTION INTRAVENOUS at 23:50

## 2025-03-05 RX ADMIN — SODIUM CHLORIDE 75 ML: 9 INJECTION, SOLUTION INTRAVENOUS at 23:57

## 2025-03-05 RX ADMIN — IPRATROPIUM BROMIDE AND ALBUTEROL SULFATE 3 ML: .5; 3 SOLUTION RESPIRATORY (INHALATION) at 23:21

## 2025-03-05 ASSESSMENT — ACTIVITIES OF DAILY LIVING (ADL): ADLS_ACUITY_SCORE: 65

## 2025-03-06 ENCOUNTER — APPOINTMENT (OUTPATIENT)
Dept: CARDIOLOGY | Facility: CLINIC | Age: 75
End: 2025-03-06
Attending: INTERNAL MEDICINE
Payer: COMMERCIAL

## 2025-03-06 VITALS
TEMPERATURE: 97.2 F | HEART RATE: 84 BPM | WEIGHT: 132.5 LBS | RESPIRATION RATE: 18 BRPM | OXYGEN SATURATION: 93 % | SYSTOLIC BLOOD PRESSURE: 128 MMHG | HEIGHT: 62 IN | DIASTOLIC BLOOD PRESSURE: 70 MMHG | BODY MASS INDEX: 24.38 KG/M2

## 2025-03-06 PROBLEM — J96.02 ACUTE RESPIRATORY FAILURE WITH HYPOXIA AND HYPERCAPNIA (H): Status: ACTIVE | Noted: 2025-03-06

## 2025-03-06 PROBLEM — J96.01 ACUTE RESPIRATORY FAILURE WITH HYPOXIA AND HYPERCAPNIA (H): Status: ACTIVE | Noted: 2025-03-06

## 2025-03-06 LAB
ATRIAL RATE - MUSE: 91 BPM
BASE EXCESS BLDV CALC-SCNC: 19.8 MMOL/L (ref -3–3)
C PNEUM DNA SPEC QL NAA+PROBE: NOT DETECTED
DIASTOLIC BLOOD PRESSURE - MUSE: NORMAL MMHG
FLUAV H1 2009 PAND RNA SPEC QL NAA+PROBE: NOT DETECTED
FLUAV H1 RNA SPEC QL NAA+PROBE: NOT DETECTED
FLUAV H3 RNA SPEC QL NAA+PROBE: NOT DETECTED
FLUAV RNA SPEC QL NAA+PROBE: NOT DETECTED
FLUBV RNA SPEC QL NAA+PROBE: NOT DETECTED
GLUCOSE BLDC GLUCOMTR-MCNC: 130 MG/DL (ref 70–99)
GLUCOSE BLDC GLUCOMTR-MCNC: 139 MG/DL (ref 70–99)
GLUCOSE BLDC GLUCOMTR-MCNC: 142 MG/DL (ref 70–99)
GLUCOSE BLDC GLUCOMTR-MCNC: 146 MG/DL (ref 70–99)
HADV DNA SPEC QL NAA+PROBE: NOT DETECTED
HCO3 BLDV-SCNC: >45 MMOL/L (ref 21–28)
HCOV PNL SPEC NAA+PROBE: DETECTED
HMPV RNA SPEC QL NAA+PROBE: NOT DETECTED
HOLD SPECIMEN: NORMAL
HPIV1 RNA SPEC QL NAA+PROBE: NOT DETECTED
HPIV2 RNA SPEC QL NAA+PROBE: NOT DETECTED
HPIV3 RNA SPEC QL NAA+PROBE: NOT DETECTED
HPIV4 RNA SPEC QL NAA+PROBE: NOT DETECTED
INTERPRETATION ECG - MUSE: NORMAL
LVEF ECHO: NORMAL
M PNEUMO DNA SPEC QL NAA+PROBE: NOT DETECTED
MAGNESIUM SERPL-MCNC: 1.6 MG/DL (ref 1.7–2.3)
MAGNESIUM SERPL-MCNC: 1.6 MG/DL (ref 1.7–2.3)
O2/TOTAL GAS SETTING VFR VENT: 28 %
OXYHGB MFR BLDV: 68 % (ref 70–75)
P AXIS - MUSE: 67 DEGREES
PCO2 BLDV: 64 MM HG (ref 40–50)
PH BLDV: 7.47 [PH] (ref 7.32–7.43)
PO2 BLDV: 36 MM HG (ref 25–47)
PR INTERVAL - MUSE: 158 MS
QRS DURATION - MUSE: 48 MS
QT - MUSE: 342 MS
QTC - MUSE: 420 MS
R AXIS - MUSE: 3 DEGREES
RSV RNA SPEC QL NAA+PROBE: NOT DETECTED
RSV RNA SPEC QL NAA+PROBE: NOT DETECTED
RV+EV RNA SPEC QL NAA+PROBE: NOT DETECTED
SAO2 % BLDV: 69.3 % (ref 70–75)
SYSTOLIC BLOOD PRESSURE - MUSE: NORMAL MMHG
T AXIS - MUSE: 32 DEGREES
TROPONIN T SERPL HS-MCNC: 11 NG/L
TROPONIN T SERPL HS-MCNC: 9 NG/L
TROPONIN T SERPL HS-MCNC: 9 NG/L
VENTRICULAR RATE- MUSE: 91 BPM

## 2025-03-06 PROCEDURE — 93306 TTE W/DOPPLER COMPLETE: CPT | Mod: 26 | Performed by: INTERNAL MEDICINE

## 2025-03-06 PROCEDURE — 250N000013 HC RX MED GY IP 250 OP 250 PS 637: Performed by: INTERNAL MEDICINE

## 2025-03-06 PROCEDURE — 120N000001 HC R&B MED SURG/OB

## 2025-03-06 PROCEDURE — 250N000009 HC RX 250: Performed by: INTERNAL MEDICINE

## 2025-03-06 PROCEDURE — 36415 COLL VENOUS BLD VENIPUNCTURE: CPT | Performed by: INTERNAL MEDICINE

## 2025-03-06 PROCEDURE — C8929 TTE W OR WO FOL WCON,DOPPLER: HCPCS

## 2025-03-06 PROCEDURE — 99223 1ST HOSP IP/OBS HIGH 75: CPT | Mod: AI | Performed by: INTERNAL MEDICINE

## 2025-03-06 PROCEDURE — 250N000009 HC RX 250: Performed by: EMERGENCY MEDICINE

## 2025-03-06 PROCEDURE — 999N000185 HC STATISTIC TRANSPORT TIME EA 15 MIN

## 2025-03-06 PROCEDURE — 999N000157 HC STATISTIC RCP TIME EA 10 MIN

## 2025-03-06 PROCEDURE — 94660 CPAP INITIATION&MGMT: CPT

## 2025-03-06 PROCEDURE — 87581 M.PNEUMON DNA AMP PROBE: CPT | Performed by: INTERNAL MEDICINE

## 2025-03-06 PROCEDURE — 999N000156 HC STATISTIC RCP CONSULT EA 30 MIN

## 2025-03-06 PROCEDURE — 87486 CHLMYD PNEUM DNA AMP PROBE: CPT | Performed by: INTERNAL MEDICINE

## 2025-03-06 PROCEDURE — 250N000011 HC RX IP 250 OP 636: Mod: JZ | Performed by: INTERNAL MEDICINE

## 2025-03-06 PROCEDURE — 999N000208 ECHOCARDIOGRAM COMPLETE

## 2025-03-06 PROCEDURE — 94640 AIRWAY INHALATION TREATMENT: CPT | Mod: 76

## 2025-03-06 PROCEDURE — 84484 ASSAY OF TROPONIN QUANT: CPT | Performed by: INTERNAL MEDICINE

## 2025-03-06 PROCEDURE — 255N000002 HC RX 255 OP 636: Performed by: INTERNAL MEDICINE

## 2025-03-06 PROCEDURE — 272N000272 HC CONTINUOUS NEBULIZER MICRO PUMP

## 2025-03-06 PROCEDURE — 250N000011 HC RX IP 250 OP 636: Performed by: EMERGENCY MEDICINE

## 2025-03-06 PROCEDURE — 94640 AIRWAY INHALATION TREATMENT: CPT

## 2025-03-06 PROCEDURE — 87633 RESP VIRUS 12-25 TARGETS: CPT | Performed by: INTERNAL MEDICINE

## 2025-03-06 PROCEDURE — 82805 BLOOD GASES W/O2 SATURATION: CPT | Performed by: INTERNAL MEDICINE

## 2025-03-06 PROCEDURE — 83735 ASSAY OF MAGNESIUM: CPT | Performed by: INTERNAL MEDICINE

## 2025-03-06 RX ORDER — LEVALBUTEROL TARTRATE 45 UG/1
2 AEROSOL, METERED ORAL EVERY 4 HOURS PRN
Status: DISCONTINUED | OUTPATIENT
Start: 2025-03-06 | End: 2025-03-06

## 2025-03-06 RX ORDER — DIAZEPAM 5 MG/1
5 TABLET ORAL SEE ADMIN INSTRUCTIONS
Status: DISCONTINUED | OUTPATIENT
Start: 2025-03-06 | End: 2025-03-13 | Stop reason: HOSPADM

## 2025-03-06 RX ORDER — ALBUTEROL SULFATE 0.83 MG/ML
2.5 SOLUTION RESPIRATORY (INHALATION) 3 TIMES DAILY PRN
Status: DISCONTINUED | OUTPATIENT
Start: 2025-03-06 | End: 2025-03-06

## 2025-03-06 RX ORDER — ACETAMINOPHEN 325 MG/1
650 TABLET ORAL EVERY 6 HOURS PRN
Status: DISCONTINUED | OUTPATIENT
Start: 2025-03-06 | End: 2025-03-13 | Stop reason: HOSPADM

## 2025-03-06 RX ORDER — ALBUTEROL SULFATE 0.83 MG/ML
2.5 SOLUTION RESPIRATORY (INHALATION)
Status: DISCONTINUED | OUTPATIENT
Start: 2025-03-06 | End: 2025-03-13 | Stop reason: HOSPADM

## 2025-03-06 RX ORDER — LEVOTHYROXINE SODIUM 88 UG/1
88 TABLET ORAL DAILY
Status: DISCONTINUED | OUTPATIENT
Start: 2025-03-06 | End: 2025-03-13 | Stop reason: HOSPADM

## 2025-03-06 RX ORDER — ALPRAZOLAM 0.25 MG
0.25 TABLET ORAL DAILY PRN
Status: DISCONTINUED | OUTPATIENT
Start: 2025-03-06 | End: 2025-03-07

## 2025-03-06 RX ORDER — IPRATROPIUM BROMIDE AND ALBUTEROL SULFATE 2.5; .5 MG/3ML; MG/3ML
3 SOLUTION RESPIRATORY (INHALATION)
Status: DISCONTINUED | OUTPATIENT
Start: 2025-03-06 | End: 2025-03-09 | Stop reason: ALTCHOICE

## 2025-03-06 RX ORDER — LORAZEPAM 2 MG/ML
1 INJECTION INTRAMUSCULAR ONCE
Status: COMPLETED | OUTPATIENT
Start: 2025-03-06 | End: 2025-03-06

## 2025-03-06 RX ORDER — IPRATROPIUM BROMIDE AND ALBUTEROL SULFATE 2.5; .5 MG/3ML; MG/3ML
1 SOLUTION RESPIRATORY (INHALATION) EVERY 6 HOURS PRN
Status: DISCONTINUED | OUTPATIENT
Start: 2025-03-06 | End: 2025-03-06

## 2025-03-06 RX ORDER — FUROSEMIDE 10 MG/ML
60 INJECTION INTRAMUSCULAR; INTRAVENOUS ONCE
Status: COMPLETED | OUTPATIENT
Start: 2025-03-06 | End: 2025-03-06

## 2025-03-06 RX ORDER — PREDNISONE 20 MG/1
40 TABLET ORAL
Status: DISCONTINUED | OUTPATIENT
Start: 2025-03-07 | End: 2025-03-06

## 2025-03-06 RX ORDER — METHYLPREDNISOLONE SODIUM SUCCINATE 40 MG/ML
40 INJECTION INTRAMUSCULAR; INTRAVENOUS EVERY 8 HOURS
Status: DISCONTINUED | OUTPATIENT
Start: 2025-03-06 | End: 2025-03-06

## 2025-03-06 RX ORDER — DEXTROSE MONOHYDRATE 25 G/50ML
25-50 INJECTION, SOLUTION INTRAVENOUS
Status: DISCONTINUED | OUTPATIENT
Start: 2025-03-06 | End: 2025-03-13 | Stop reason: HOSPADM

## 2025-03-06 RX ORDER — FLUTICASONE FUROATE AND VILANTEROL 200; 25 UG/1; UG/1
1 POWDER RESPIRATORY (INHALATION) DAILY
Status: DISCONTINUED | OUTPATIENT
Start: 2025-03-06 | End: 2025-03-13 | Stop reason: HOSPADM

## 2025-03-06 RX ORDER — NICOTINE POLACRILEX 4 MG
15-30 LOZENGE BUCCAL
Status: DISCONTINUED | OUTPATIENT
Start: 2025-03-06 | End: 2025-03-13 | Stop reason: HOSPADM

## 2025-03-06 RX ORDER — CYANOCOBALAMIN 1000 UG/ML
1000 INJECTION, SOLUTION INTRAMUSCULAR; SUBCUTANEOUS
Status: DISCONTINUED | OUTPATIENT
Start: 2025-03-06 | End: 2025-03-13 | Stop reason: HOSPADM

## 2025-03-06 RX ORDER — IPRATROPIUM BROMIDE AND ALBUTEROL SULFATE 2.5; .5 MG/3ML; MG/3ML
3 SOLUTION RESPIRATORY (INHALATION) EVERY 4 HOURS PRN
Status: DISCONTINUED | OUTPATIENT
Start: 2025-03-06 | End: 2025-03-13 | Stop reason: HOSPADM

## 2025-03-06 RX ORDER — ATORVASTATIN CALCIUM 10 MG/1
10 TABLET, FILM COATED ORAL DAILY
Status: DISCONTINUED | OUTPATIENT
Start: 2025-03-06 | End: 2025-03-13 | Stop reason: HOSPADM

## 2025-03-06 RX ORDER — METHYLPREDNISOLONE SODIUM SUCCINATE 40 MG/ML
40 INJECTION INTRAMUSCULAR; INTRAVENOUS EVERY 8 HOURS
Status: DISCONTINUED | OUTPATIENT
Start: 2025-03-06 | End: 2025-03-08

## 2025-03-06 RX ORDER — ENOXAPARIN SODIUM 100 MG/ML
40 INJECTION SUBCUTANEOUS EVERY 24 HOURS
Status: DISCONTINUED | OUTPATIENT
Start: 2025-03-06 | End: 2025-03-13 | Stop reason: HOSPADM

## 2025-03-06 RX ORDER — MIRTAZAPINE 15 MG/1
15 TABLET, FILM COATED ORAL AT BEDTIME
Status: DISCONTINUED | OUTPATIENT
Start: 2025-03-06 | End: 2025-03-13 | Stop reason: HOSPADM

## 2025-03-06 RX ORDER — SERTRALINE HYDROCHLORIDE 25 MG/1
25 TABLET, FILM COATED ORAL EVERY MORNING
Status: DISCONTINUED | OUTPATIENT
Start: 2025-03-06 | End: 2025-03-13 | Stop reason: HOSPADM

## 2025-03-06 RX ORDER — CARBOXYMETHYLCELLULOSE SODIUM 5 MG/ML
1 SOLUTION/ DROPS OPHTHALMIC
Status: DISCONTINUED | OUTPATIENT
Start: 2025-03-06 | End: 2025-03-13 | Stop reason: HOSPADM

## 2025-03-06 RX ORDER — BUDESONIDE 0.5 MG/2ML
0.5 INHALANT ORAL DAILY
Status: DISCONTINUED | OUTPATIENT
Start: 2025-03-06 | End: 2025-03-13 | Stop reason: HOSPADM

## 2025-03-06 RX ADMIN — HUMAN ALBUMIN MICROSPHERES AND PERFLUTREN 3 ML: 10; .22 INJECTION, SOLUTION INTRAVENOUS at 09:50

## 2025-03-06 RX ADMIN — MIRTAZAPINE 15 MG: 15 TABLET, FILM COATED ORAL at 20:22

## 2025-03-06 RX ADMIN — METHYLPREDNISOLONE SODIUM SUCCINATE 40 MG: 40 INJECTION, POWDER, FOR SOLUTION INTRAMUSCULAR; INTRAVENOUS at 10:40

## 2025-03-06 RX ADMIN — ALPRAZOLAM 0.25 MG: 0.25 TABLET ORAL at 15:13

## 2025-03-06 RX ADMIN — ENOXAPARIN SODIUM 40 MG: 40 INJECTION SUBCUTANEOUS at 04:26

## 2025-03-06 RX ADMIN — FUROSEMIDE 60 MG: 10 INJECTION, SOLUTION INTRAMUSCULAR; INTRAVENOUS at 00:59

## 2025-03-06 RX ADMIN — LORAZEPAM 1 MG: 2 INJECTION INTRAMUSCULAR; INTRAVENOUS at 02:32

## 2025-03-06 RX ADMIN — IPRATROPIUM BROMIDE AND ALBUTEROL SULFATE 3 ML: .5; 3 SOLUTION RESPIRATORY (INHALATION) at 20:25

## 2025-03-06 RX ADMIN — ATORVASTATIN CALCIUM 10 MG: 10 TABLET, FILM COATED ORAL at 12:26

## 2025-03-06 RX ADMIN — METHYLPREDNISOLONE SODIUM SUCCINATE 40 MG: 40 INJECTION, POWDER, FOR SOLUTION INTRAMUSCULAR; INTRAVENOUS at 20:22

## 2025-03-06 RX ADMIN — Medication 1 LOZENGE: at 14:34

## 2025-03-06 RX ADMIN — IPRATROPIUM BROMIDE AND ALBUTEROL SULFATE 3 ML: .5; 3 SOLUTION RESPIRATORY (INHALATION) at 08:26

## 2025-03-06 RX ADMIN — IPRATROPIUM BROMIDE AND ALBUTEROL SULFATE 3 ML: .5; 3 SOLUTION RESPIRATORY (INHALATION) at 16:32

## 2025-03-06 RX ADMIN — ACETAMINOPHEN 650 MG: 325 TABLET, FILM COATED ORAL at 12:29

## 2025-03-06 RX ADMIN — METHYLPREDNISOLONE SODIUM SUCCINATE 40 MG: 40 INJECTION, POWDER, FOR SOLUTION INTRAMUSCULAR; INTRAVENOUS at 04:26

## 2025-03-06 RX ADMIN — SERTRALINE HYDROCHLORIDE 25 MG: 25 TABLET ORAL at 12:26

## 2025-03-06 RX ADMIN — ACETAMINOPHEN 650 MG: 325 TABLET, FILM COATED ORAL at 20:22

## 2025-03-06 RX ADMIN — LEVOTHYROXINE SODIUM 88 MCG: 0.09 TABLET ORAL at 12:26

## 2025-03-06 RX ADMIN — IPRATROPIUM BROMIDE AND ALBUTEROL SULFATE 3 ML: .5; 3 SOLUTION RESPIRATORY (INHALATION) at 01:36

## 2025-03-06 ASSESSMENT — ACTIVITIES OF DAILY LIVING (ADL)
ADLS_ACUITY_SCORE: 77
ADLS_ACUITY_SCORE: 77
ADLS_ACUITY_SCORE: 75
ADLS_ACUITY_SCORE: 77
ADLS_ACUITY_SCORE: 75
ADLS_ACUITY_SCORE: 75
ADLS_ACUITY_SCORE: 65
ADLS_ACUITY_SCORE: 76
ADLS_ACUITY_SCORE: 75
ADLS_ACUITY_SCORE: 75
ADLS_ACUITY_SCORE: 65
ADLS_ACUITY_SCORE: 75
ADLS_ACUITY_SCORE: 75
ADLS_ACUITY_SCORE: 69
ADLS_ACUITY_SCORE: 77
ADLS_ACUITY_SCORE: 70
ADLS_ACUITY_SCORE: 69
ADLS_ACUITY_SCORE: 65

## 2025-03-06 NOTE — ED TRIAGE NOTES
Pt presents via EMS for evaluation of shortness of breath for the last 3 hours. Resides with daughter, who is currently sick with a URI. Had her gallbladder removed 1 week ago. Hx of COPD, on 2 LPM via NC at baseline, currently on 4 LPM. Wheezing noted in upper lobes. Has prednisone at home PRN, took one of those and 0.5 mg ativan PTA. EMS gave 1 duoneb in route.

## 2025-03-06 NOTE — PHARMACY-ADMISSION MEDICATION HISTORY
Pharmacist Admission Medication History    Admission medication history is complete. The information provided in this note is only as accurate as the sources available at the time of the update.    Information Source(s): Patient and Family member(Daughter Jordan) via in-person    Pertinent Information: Patient reports that she only uses her Advair one time a day and only uses it on some days.    Changes made to PTA medication list:  Added: None  Deleted: Oxycodone  Changed: None    Allergies reviewed with patient and updates made in EHR: yes    Medication History Completed By: Gabriele Villalpando [facility] 3/6/2025 11:50 AM    PTA Med List   Medication Sig Last Dose/Taking    acetaminophen (TYLENOL) 500 MG tablet Take 1.5 tablets (750 mg) by mouth every 6 hours as needed for mild pain. 3/4/2025    albuterol (PROVENTIL) (2.5 MG/3ML) 0.083% neb solution Take 2.5 mg by nebulization 3 times daily as needed for shortness of breath, wheezing or cough 3/4/2025    alendronate (FOSAMAX) 70 MG tablet Take 70 mg by mouth every 7 days More than a month    ALPRAZolam (XANAX) 0.25 MG tablet Take 0.25 mg by mouth daily as needed for anxiety. 3/5/2025 Evening    atorvastatin (LIPITOR) 10 MG tablet Take 10 mg by mouth daily 3/4/2025    cyanocobalamin (CYANOCOBALAMIN) 1000 MCG/ML injection Inject 1 mL into the muscle every 30 days More than a month    diazepam (VALIUM) 5 MG tablet Take 1 tablet (5 mg) by mouth See Admin Instructions Take 5mg 30 minutes prior to MRI, then may take an additional 5mg at time of exam if needed. MUST have a . More than a month    fluticasone-salmeterol (ADVAIR) 500-50 MCG/DOSE inhaler Inhale 1 puff into the lungs 2 times daily 3/4/2025    ibuprofen (ADVIL/MOTRIN) 200 MG tablet Take 3 tablets (600 mg) by mouth every 6 hours as needed for inflammatory pain. Past Month    ipratropium - albuterol 0.5 mg/2.5 mg/3 mL (DUONEB) 0.5-2.5 (3) MG/3ML neb solution Take 1 vial by nebulization every 6 hours as needed for  shortness of breath, wheezing or cough 3/4/2025    levalbuterol (XOPENEX HFA) 45 MCG/ACT inhaler Inhale 2 puffs into the lungs every 4 hours as needed for shortness of breath or wheezing 3/4/2025    levothyroxine (SYNTHROID/LEVOTHROID) 88 MCG tablet Take 88 mcg by mouth daily 3/4/2025 Morning    mirtazapine (REMERON) 15 MG tablet Take 1 tablet (15 mg) by mouth At Bedtime 3/4/2025 Evening    sertraline (ZOLOFT) 25 MG tablet Take 25 mg by mouth every morning 3/4/2025 Evening    zolpidem (AMBIEN) 2.5 mg TABS half-tab Take 2.5 mg by mouth nightly as needed for sleep. 3/4/2025 Evening

## 2025-03-06 NOTE — ED NOTES
[facility]  ED Nurse Handoff Report    ED Chief complaint: Copd Exacerbation  . ED Diagnosis:   Final diagnoses:   Acute respiratory failure with hypoxia and hypercapnia (H)   COPD with acute exacerbation (H)   Bilateral pleural effusion       Allergies:   Allergies   Allergen Reactions    Bupropion Anaphylaxis, Hives and Shortness Of Breath       Code Status: Full Code    Activity level - Baseline/Home:  standby.  Activity Level - Current:   assist of 1.   Lift room needed: No.   Bariatric: No   Needed: No   Isolation: No.   Infection: Not Applicable.     Respiratory status: BiPap    Vital Signs (within 30 minutes):   Vitals:    03/05/25 2300 03/05/25 2315 03/05/25 2330 03/06/25 0001   BP: 129/77 (!) 141/96 (!) 134/93    Pulse: 85 80 83    Resp: 28 28 26    Temp:       TempSrc:       SpO2: 98% (!) 86% 98% 95%       Cardiac Rhythm:  ,      Pain level:    Patient confused: No.   Patient Falls Risk: bed/chair alarm on, nonskid shoes/slippers when out of bed, arm band in place, patient and family education, assistive device/personal items within reach, activity supervised, mobility aid in reach, room door open, and toileting schedule implemented.   Elimination Status: Has voided     Patient Report - Initial Complaint: SOB.   Focused Assessment:  Josephine Nicole is a 75 year old female who presented to ED with SOB.  Worsening on exertion.  Normally on 2 L at home but even with her oxygen while for currently due to neck wear and walking to the bathroom no fevers but has had cough not really decreasing producing sputum at all but is really winded no chest pain at all initially here was working hard to breathe and hypoxic so was started on BiPAP and feels much better on BiPAP COVID influenza was all negative but she did have a positive D-dimer I suspect probably from the surgery but we did get a CT PE study which shows no PE trace effusion and pretty severe emphysema no pneumonia there  either her BNP is a little bit elevated from her baseline may be from the fluid that she got from her surgery I would get a give her a dose of Lasix here just around her little bit dry and then she is getting steroids and then DuoNebs ordered for her given her needs a BiPAP diet that we started in the ICU she is pneumonitis/area okay sounds good thank you thank     Abnormal Results:   Labs Ordered and Resulted from Time of ED Arrival to Time of ED Departure   COMPREHENSIVE METABOLIC PANEL - Abnormal       Result Value    Sodium 142      Potassium 3.7      Carbon Dioxide (CO2) 39 (*)     Anion Gap 5 (*)     Urea Nitrogen 7.7 (*)     Creatinine 0.58      GFR Estimate >90      Calcium 8.7 (*)     Chloride 98      Glucose 137 (*)     Alkaline Phosphatase 92      AST 18      ALT 16      Protein Total 6.1 (*)     Albumin 3.6      Bilirubin Total 0.2     CBC WITH PLATELETS AND DIFFERENTIAL - Abnormal    WBC Count 6.4      RBC Count 3.48 (*)     Hemoglobin 10.3 (*)     Hematocrit 33.6 (*)     MCV 97      MCH 29.6      MCHC 30.7 (*)     RDW 12.1      Platelet Count 216      % Neutrophils 84      % Lymphocytes 8      % Monocytes 5      % Eosinophils 2      % Basophils 1      % Immature Granulocytes 1      NRBCs per 100 WBC 0      Absolute Neutrophils 5.4      Absolute Lymphocytes 0.5 (*)     Absolute Monocytes 0.3      Absolute Eosinophils 0.2      Absolute Basophils 0.0      Absolute Immature Granulocytes 0.0      Absolute NRBCs 0.0     NT PROBNP INPATIENT - Abnormal    N terminal Pro BNP Inpatient 4,687 (*)    D DIMER QUANTITATIVE - Abnormal    D-Dimer Quantitative 2.66 (*)    ISTAT GASES LACTATE VENOUS POCT - Abnormal    Lactic Acid POCT 1.2      Bicarbonate Venous POCT 44 (*)     O2 Sat, Venous POCT 53 (*)     pCO2 Venous POCT 81 (*)     pH Venous POCT 7.34      pO2 Venous POCT 32     INFLUENZA A/B, RSV AND SARS-COV2 PCR - Normal    Influenza A PCR Negative      Influenza B PCR Negative      RSV PCR Negative      SARS  CoV2 PCR Negative     TROPONIN T, HIGH SENSITIVITY - Normal    Troponin T, High Sensitivity 13     TROPONIN T, HIGH SENSITIVITY        CT Chest Pulmonary Embolism w Contrast   Final Result   IMPRESSION:   1.  No acute pulmonary embolism.      2.  Small right and trace left pleural effusions, with adjacent compressive atelectasis.      3.  Severe centrilobular emphysema.          Treatments provided: See MAR  Family Comments: none  OBS brochure/video discussed/provided to patient:  No  ED Medications:   Medications   ipratropium - albuterol 0.5 mg/2.5 mg/3 mL (DUONEB) neb solution 3 mL (3 mLs Nebulization $Given 3/5/25 1132)   furosemide (LASIX) injection 60 mg (has no administration in time range)   predniSONE (DELTASONE) tablet 60 mg (60 mg Oral $Given 3/5/25 2237)   LORazepam (ATIVAN) injection 1 mg (1 mg Intravenous $Given 3/5/25 2237)   CT Scan Flush (75 mLs Intravenous $Given 3/5/25 8198)   iopamidol (ISOVUE-370) solution 500 mL (54 mLs Intravenous $Given 3/5/25 2350)       Drips infusing:  No  For the majority of the shift this patient was Green.   Interventions performed were BiPap, lasix    Sepsis treatment initiated: No    Cares/treatment/interventions/medications to be completed following ED care: n/a    ED Nurse Name: Mey Snow RN  12:31 AM

## 2025-03-06 NOTE — ED PROVIDER NOTES
Emergency Department Note      History of Present Illness     Chief Complaint   Copd Exacerbation    HPI   Josephine Nicole is a 75 year old female with a history of COPD who presents to the emergency department for COPD exacerbation. The patient states that she has a hx of COPD and is on 2L of O2 at baseline. She reports that within the past 3-4 days, she has been experiencing gradually increasing exertional shortness of breath. She adds that she is concerned that she may also be wheezing. She notes that she underwent a cholecystectomy on 02/27/25, denies any abdominal pain or chest pain. She adds that her daughter is sick at home with URI symptoms. She adds that she took a Xanax tonight for this shortness of breath. She ambulates with a walker at baseline.    Independent Historian   None    Review of External Notes   Reviewed recent hospitalization from 2/26 in which she was admitted for acute cholecystitis and had cholecystectomy performed    Past Medical History     Medical History and Problem List   Cerebral meningioma  COPD  Depressive disorder  Eczema  Eczema  Hyperlipidemia  Hypothyroidism  Melanoma of skin  Osteoporosis    Medications   albuterol (PROVENTIL) (2.5 MG/3ML) 0.083% neb solution  alendronate (FOSAMAX) 70 MG tablet  ALPRAZolam (XANAX) 0.25 MG tablet  atorvastatin (LIPITOR) 10 MG tablet  diazepam (VALIUM) 5 MG tablet  ipratropium - albuterol 0.5 mg/2.5 mg/3 mL (DUONEB) 0.5-2.5 (3) MG/3ML neb solution  levalbuterol (XOPENEX HFA) 45 MCG/ACT inhaler  levothyroxine (SYNTHROID/LEVOTHROID) 88 MCG tablet  mirtazapine (REMERON) 15 MG tablet  oxyCODONE (ROXICODONE) 5 MG tablet  sertraline (ZOLOFT) 25 MG tablet  zolpidem (AMBIEN) 2.5 mg TABS half-tab    Surgical History   ORIF right hip  Craniotomy  Tubal ligation    Physical Exam     Patient Vitals for the past 24 hrs:   BP Temp Temp src Pulse Resp SpO2   03/06/25 0001 -- -- -- -- -- 95 %   03/05/25 2330 (!) 134/93 -- -- 83 26 98 %   03/05/25 2315 (!)  141/96 -- -- 80 28 (!) 86 %   03/05/25 2300 129/77 -- -- 85 28 98 %   03/05/25 2245 -- -- -- -- -- (!) 90 %   03/05/25 2236 (!) 125/103 98.6  F (37  C) Oral -- -- --   03/05/25 2230 (!) 125/103 -- -- 90 -- (!) 89 %   03/05/25 2215 -- -- -- 87 22 96 %     Physical Exam  General: Patient is awake, alert and interactive when I enter the room  CV: Tachycardic but regular  Resp: moderate  respiratory distress.  Inspiratory and expiratory wheezes. Prolonged expiratory phase. No rales noted.  Tachypnea with increased work of breathing  GI: Abdomen is soft, no rigidity, guarding, or rebound. No distension. No tenderness to palpation in any quadrant.    MS: Normal tone. Joints grossly normal without effusions. No asymmetric leg swelling, calf or thigh tenderness.    Skin: No rash or lesions noted. Normal capillary refill noted  Neuro: Speech is normal and fluent. Face is symmetric. Moving all extremities.   Psych: Anxious appearing    Diagnostics     Lab Results   Labs Ordered and Resulted from Time of ED Arrival to Time of ED Departure   COMPREHENSIVE METABOLIC PANEL - Abnormal       Result Value    Sodium 142      Potassium 3.7      Carbon Dioxide (CO2) 39 (*)     Anion Gap 5 (*)     Urea Nitrogen 7.7 (*)     Creatinine 0.58      GFR Estimate >90      Calcium 8.7 (*)     Chloride 98      Glucose 137 (*)     Alkaline Phosphatase 92      AST 18      ALT 16      Protein Total 6.1 (*)     Albumin 3.6      Bilirubin Total 0.2     CBC WITH PLATELETS AND DIFFERENTIAL - Abnormal    WBC Count 6.4      RBC Count 3.48 (*)     Hemoglobin 10.3 (*)     Hematocrit 33.6 (*)     MCV 97      MCH 29.6      MCHC 30.7 (*)     RDW 12.1      Platelet Count 216      % Neutrophils 84      % Lymphocytes 8      % Monocytes 5      % Eosinophils 2      % Basophils 1      % Immature Granulocytes 1      NRBCs per 100 WBC 0      Absolute Neutrophils 5.4      Absolute Lymphocytes 0.5 (*)     Absolute Monocytes 0.3      Absolute Eosinophils 0.2       Absolute Basophils 0.0      Absolute Immature Granulocytes 0.0      Absolute NRBCs 0.0     NT PROBNP INPATIENT - Abnormal    N terminal Pro BNP Inpatient 4,687 (*)    D DIMER QUANTITATIVE - Abnormal    D-Dimer Quantitative 2.66 (*)    ISTAT GASES LACTATE VENOUS POCT - Abnormal    Lactic Acid POCT 1.2      Bicarbonate Venous POCT 44 (*)     O2 Sat, Venous POCT 53 (*)     pCO2 Venous POCT 81 (*)     pH Venous POCT 7.34      pO2 Venous POCT 32     INFLUENZA A/B, RSV AND SARS-COV2 PCR - Normal    Influenza A PCR Negative      Influenza B PCR Negative      RSV PCR Negative      SARS CoV2 PCR Negative     TROPONIN T, HIGH SENSITIVITY - Normal    Troponin T, High Sensitivity 13     TROPONIN T, HIGH SENSITIVITY     Imaging   CT Chest Pulmonary Embolism w Contrast   Final Result   IMPRESSION:   1.  No acute pulmonary embolism.      2.  Small right and trace left pleural effusions, with adjacent compressive atelectasis.      3.  Severe centrilobular emphysema.        EKG   ECG results from 03/05/25   EKG 12-lead, tracing only     Value    Systolic Blood Pressure     Diastolic Blood Pressure     Ventricular Rate 91    Atrial Rate 91    MN Interval 158    QRS Duration 48        QTc 420    P Axis 67    R AXIS 3    T Axis 32    Interpretation ECG      Sinus rhythm  Low voltage QRS  Borderline ECG  When compared with ECG of 19-Mar-2023 07:39,  Nonspecific T wave abnormality no longer evident in Lateral leads       Independent Interpretation   None    ED Course      Medications Administered   Medications   ipratropium - albuterol 0.5 mg/2.5 mg/3 mL (DUONEB) neb solution 3 mL (3 mLs Nebulization $Given 3/5/25 2321)   furosemide (LASIX) injection 60 mg (has no administration in time range)   predniSONE (DELTASONE) tablet 60 mg (60 mg Oral $Given 3/5/25 2237)   LORazepam (ATIVAN) injection 1 mg (1 mg Intravenous $Given 3/5/25 2237)   CT Scan Flush (75 mLs Intravenous $Given 3/5/25 2357)   iopamidol (ISOVUE-370) solution 500 mL  (54 mLs Intravenous $Given 3/5/25 4146)     Discussion of Management   Admitting Hospitalist, Dr. Hammond    ED Course   ED Course as of 03/06/25 0053   Wed Mar 05, 2025   2219 I obtained history and examined the patient as noted above.      2334 Patient started on BiPAP.     2343 I rechecked the patient. Patient appears better on BiPAP.       Additional Documentation  None    Medical Decision Making / Diagnosis     CMS Diagnoses: None    MIPS    CT for PE was ordered because the patient had an abnormal d-dimer.    HANNAH Nicole is a 75 year old female with past medical history of COPD who presents emergency department with acute respiratory distress.  In review of her chart she had a recent cholecystectomy which was uncomplicated.  However since going home she has noted some worsening dyspnea on exertion and shortness of breath.  She arrives here with moderate respiratory distress and was started on BiPAP due to hypoxia and increased work of breathing.  Fortunately her respiratory status stabilized with BiPAP, steroids and DuoNebs.  Blood work revealed a elevated D-dimer therefore CT PE study was pursued.  Thankfully this was negative for any evidence of pulmonary embolism or pneumonia.  It did however reveal some mild pleural effusions.  BNP was elevated from baseline.  Troponin not significantly elevated.  EKG does not show any acute signs of ischemia and or dysrhythmia.  Patient may be slightly volume overloaded due to her recent hospitalization and IV fluids.  Will give her a dose of Lasix here in the emergency department.  COVID, influenza and RSV testing negative.  However patient's daughter was recently sick with upper respiratory illness.  Viral URI certainly could have exacerbated the patient's severe underlying COPD.  Due to her overall clinical presentation will be admitted for further evaluation and treatment.  Due to the need for BiPAP will be admitted to the ICU.    Critical Care time was 45  minutes for this patient excluding procedures.    Disposition   The patient was admitted to the hospital.     Diagnosis     ICD-10-CM    1. Acute respiratory failure with hypoxia and hypercapnia (H)  J96.01     J96.02       2. COPD with acute exacerbation (H)  J44.1       3. Bilateral pleural effusion  J90          WhatsApp 6 of 6 last 1 starting the climb  Scribe Disclosure:  Lobo TRAN, am serving as a scribe at 10:27 PM on 3/5/2025 to document services personally performed by Ray Interiano MD based on my observations and the provider's statements to me.        Ray Interiano MD  03/06/25 0055

## 2025-03-06 NOTE — CONSULTS
"BRIEF NOTE - CLINICAL NUTRITION     RD notified for positive MST score.  Pt with reported MST of 2 for unsure weight loss     Weight history was reviewed  Weight History:   Wt Readings from Last 10 Encounters:   03/06/25 60.1 kg (132 lb 7.9 oz)   02/27/25 64 kg (141 lb 1.5 oz)   02/26/25 56.2 kg (124 lb)   04/28/23 59.9 kg (132 lb)   04/02/23 69.6 kg (153 lb 7 oz)   03/22/23 66.9 kg (147 lb 7.8 oz)   05/24/22 65.8 kg (145 lb)   04/18/22 66.7 kg (147 lb)   02/06/21 64.4 kg (142 lb)     Met with pt and daughter at bedside.  Pt eating breakfast.  States she is hungry.  B includes omelet, fruit, toast, crews, beverages.     Reviewed recent intake with daughter and pt.  Pt had a lap katalina procedure on 2/28.  States that she ate less for a day or two afterwards but more recently close to her usual - PB toast, fruit, lots of it, chicken salad sandwhiches, soup.    No weight loss noted.  No current documentation of pressure injuries or edema.    With no weight loss, overall MST score would be 0.      RD will sign off at this time and will reassess pt chart in 7-10 days per policy guidelines unless consulted sooner.    Jayne Brink, MPH, RDN, LD, [facility]  Vocera \"Dietitian Lady\"  Pager (ICU/Weekends/Holiday):  [phone]  Pager (all other floors): [phone]  "

## 2025-03-06 NOTE — PROGRESS NOTES
Josephine Nicole was admitted on 3/5/2025 for COPD exacerbation    PMH: cerebral meningioma s/p radiation and resection, COPD on chronic 2L O2, hypothyroidism, HLD, osteoporosis, depression/anxiety, patient hospitalization for acute cholecystiti     Oxygen Therapy: BiPAP 14/7 28%    Respiratory Medications: Breo every day, albuterol/duoneb/xopenex PRN    Breath Sounds and Secretions: Coarse, expiratory/inspiratory wheezes     ABG @    Shift Note: Arrived to ICU around 0330 on BiPAP. Plan to wean off in AM.

## 2025-03-06 NOTE — CONSULTS
Patient has a Part B only [employer]age that has no pharmacy coverage.      Trelegy: $595/mo.    Patients with Medicare Advantage plans are able to change their Medicare plans once before March 31st.  She could elect a plan that has pharmacy coverage!  Plans can be reviewed at medicare.gov.  The Senior Linkage Line provides free assistance in choosing a plan by calling [phone].    Florencia Multani  Pharmacy Technician/Liaison, Discharge Pharmacy   [phone] (voice or text)  [email or web address]  Pharmacy test claims are estimates and may not reflect final costs.  Suggested alternatives aim to be cost-effective and may not be therapeutically equivalent as this consult is informational and does not constitute medical advice.  Clinical decisions should be made by qualified healthcare providers.

## 2025-03-06 NOTE — PROVIDER NOTIFICATION
03/06/25 1632   RCAT Assessment   Reason for Assessment COPD   Pulmonary Status 3   Surgical Status 0   Chest X-ray 1   Respiratory Pattern 2   Mental Status 0   Breath Sounds 4   Cough Effectiveness 0   Level of Activity 1   O2 Required for SpO2>=92% 1   Acuity Level (points) 12   Acuity Level  3   Re-eval Interval Guideline Every 3 days   Re-evaluation Date 03/09/25   $RT Consult Time Spent RCAT (30 minute increments) 1   Clinical Indications/Symptoms   Aerosol Therapy History of bronchospasm;Physician order;RCAT protocol   Broncho-pulmonary Hygiene Rhonchi on auscultation   Volume Expansion Prevent atelectasis   Aerosol Therapy Plan   RT Treatment Nebulizer   Anticholinergic/Beta-Andrenergic Agonist Duoneb soln (0.5mg/3mg per 3mL) neb Max 6 doses/24h   Aerosol Treatment Frequency Acuity Level 3: QID/PRN @noc-Mod wheezing/Hx asthma/secretion removal   Aerosol Therapy (SVN)   Respiratory Treatment Status (SVN) given   Patient Position HOB elevated   Signs of Intolerance (SVN) none   Broncho-Pulmonary Hygiene Plan   Broncho-Pulmonary Hygiene Treatment Coughing techniques   Broncho-Pulm Hygiene Frequency Acuity Level 3: TID-Small amounts secretions/poor cough, hx of secretions   Volume Expansion Plan   Volume Expansion Treatment Incentive Spirometer   Volume Expansion Frequency Acuity Level 3: TID-At risk for developing atelectasis   Breath Sounds   Breath Sounds All [place]   MOHSEN Breath Sounds coarse   LLL Breath Sounds coarse   RUL Breath Sounds wheezes, expiratory;wheezes, inspiratory   RML Breath Sounds wheezes, expiratory;wheezes, inspiratory   RLL Breath Sounds wheezes, expiratory;wheezes, inspiratory     Scores level three will leave neb QID.

## 2025-03-06 NOTE — PLAN OF CARE
"RN end of shift summary    Pt A&Ox2-3; disoriented to situation and time. Repeated questions and difficult to redirect. Fixates on certain issues and does not retain information regarding plan of care. CMS and PERRLA intact    Sinus rhythm. Normotensive and afebrile. +2 pulses, no edema.    LS coarse with insp and exp wheezing. Remains on BiPap, tolerating fairly well. Tachypneic with rate mid-20s. VBG resulted with critical bicarbonate >45 . Paged MD with recommendations to adjust BiPap settings to following:  IPAP 10  EPAP 5  Rate 10 (although pt breathing above set rate)      Active BS, LBM 3/5 per pt; states it was loose    Voiding via purewick    PIV x1; saline locked    Skin with fading ecchymosis and lap sites to abdomen from recent surgery.         Goal Outcome Evaluation:      Plan of Care Reviewed With: patient, child    Overall Patient Progress: no changeOverall Patient Progress: no change    Outcome Evaluation: See above.      Problem: Adult Inpatient Plan of Care  Goal: Plan of Care Review  Description: The Plan of Care Review/Shift note should be completed every shift.  The Outcome Evaluation is a brief statement about your assessment that the patient is improving, declining, or no change.  This information will be displayed automatically on your shift  note.  Outcome: Progressing  Flowsheets (Taken 3/6/2025 0502)  Outcome Evaluation: See above.  Plan of Care Reviewed With:   patient   child  Overall Patient Progress: no change  Goal: Patient-Specific Goal (Individualized)  Description: You can add care plan individualizations to a care plan. Examples of Individualization might be:  \"Parent requests to be called daily at 9am for status\", \"I have a hard time hearing out of my right ear\", or \"Do not touch me to wake me up as it startles  me\".  Outcome: Progressing  Goal: Absence of Hospital-Acquired Illness or Injury  Outcome: Progressing  Intervention: Identify and Manage Fall Risk  Recent Flowsheet " Documentation  Taken 3/6/2025 0330 by Brent Berumen RN  Safety Promotion/Fall Prevention:   clutter free environment maintained   increased rounding and observation   increase visualization of patient   mobility aid in reach   nonskid shoes/slippers when out of bed   room organization consistent   safety round/check completed  Intervention: Prevent Skin Injury  Recent Flowsheet Documentation  Taken 3/6/2025 0330 by Brent Berumen RN  Body Position:   weight shifting   position changed independently  Intervention: Prevent and Manage VTE (Venous Thromboembolism) Risk  Recent Flowsheet Documentation  Taken 3/6/2025 0330 by Brent Berumen RN  VTE Prevention/Management: SCDs on (sequential compression devices)  Goal: Optimal Comfort and Wellbeing  Outcome: Progressing  Intervention: Provide Person-Centered Care  Recent Flowsheet Documentation  Taken 3/6/2025 0330 by Brent Berumen RN  Trust Relationship/Rapport:   care explained   choices provided   emotional support provided   empathic listening provided   questions answered   questions encouraged   reassurance provided   thoughts/feelings acknowledged  Goal: Readiness for Transition of Care  Outcome: Progressing  Intervention: Mutually Develop Transition Plan  Recent Flowsheet Documentation  Taken 3/6/2025 0330 by Brent Berumen RN  Equipment Currently Used at Home:   cane, straight   walker, standard     Problem: Comorbidity Management  Goal: Maintenance of COPD Symptom Control  Outcome: Progressing  Intervention: Maintain COPD Symptom Control  Recent Flowsheet Documentation  Taken 3/6/2025 0330 by Brent Berumen RN  Medication Review/Management: medications reviewed     Problem: Fall Injury Risk  Goal: Absence of Fall and Fall-Related Injury  Outcome: Progressing  Intervention: Identify and Manage Contributors  Recent Flowsheet Documentation  Taken 3/6/2025 0330 by Brent Berumen RN  Medication Review/Management: medications  reviewed  Intervention: Promote Injury-Free Environment  Recent Flowsheet Documentation  Taken 3/6/2025 0330 by Brent Berumen RN  Safety Promotion/Fall Prevention:   clutter free environment maintained   increased rounding and observation   increase visualization of patient   mobility aid in reach   nonskid shoes/slippers when out of bed   room organization consistent   safety round/check completed     Problem: Mechanical Ventilation Invasive  Goal: Effective Communication  Outcome: Progressing  Intervention: Ensure Effective Communication  Recent Flowsheet Documentation  Taken 3/6/2025 0330 by Brent Berumen RN  Family/Support System Care:   caregiver stress acknowledged   self-care encouraged  Trust Relationship/Rapport:   care explained   choices provided   emotional support provided   empathic listening provided   questions answered   questions encouraged   reassurance provided   thoughts/feelings acknowledged  Goal: Optimal Device Function  Outcome: Progressing  Intervention: Optimize Device Care and Function  Recent Flowsheet Documentation  Taken 3/6/2025 0330 by Brent Berumen RN  Airway Safety Measures:   all equipment/monitors on and audible   mask at bedside  Oral Care: oral rinse provided  Goal: Mechanical Ventilation Liberation  Outcome: Progressing  Intervention: Promote Extubation and Mechanical Ventilation Liberation  Recent Flowsheet Documentation  Taken 3/6/2025 0330 by Brent Berumen RN  Medication Review/Management: medications reviewed  Goal: Optimal Nutrition Delivery  Outcome: Progressing  Goal: Absence of Device-Related Skin and Tissue Injury  Outcome: Progressing  Goal: Absence of Ventilator-Induced Lung Injury  Outcome: Progressing  Intervention: Prevent Ventilator-Associated Pneumonia  Recent Flowsheet Documentation  Taken 3/6/2025 0330 by Brent Berumen RN  Oral Care: oral rinse provided  Head of Bed (HOB) Positioning: HOB at 20 degrees     Problem: Gas Exchange  Impaired  Goal: Optimal Gas Exchange  Outcome: Progressing  Intervention: Optimize Oxygenation and Ventilation  Recent Flowsheet Documentation  Taken 3/6/2025 0330 by Brent Berumen, RN  Head of Bed (HOB) Positioning: HOB at 20 degrees

## 2025-03-06 NOTE — PROGRESS NOTES
[facility]    Hospitalist Progress Note  Name: Josephine Nicole    MRN: [id number]  Provider:  Adria Amin DO  Date of Service: 03/06/2025    Summary of Stay: Josephine Nicole is a 75 year old female with a history of COPD with chronic home oxygen dependence of 2 L continuously, hypothyroidism, anxiety/depression, hyperlipidemia admitted on 3/5/2025 with shortness of breath.  In the emergency department, the patient was found to have a blood pressure 125/103, temperature 98.6  F, heart rate 87, respiratory rate 22, SpO2 89% on 4 L nasal cannula.  Initial lab work showed magnesium 1.6, proBNP 4687, high-sensitivity troponin 13, venous pH 7.34 with a venous pCO2 of 81, hemoglobin 10.3.  COVID-19, influenza, RSV are negative.  CT chest showed small right and trace left oral effusions with adjacent compressive atelectasis, severe centrilobular emphysema.  The patient did ultimately require continuous BiPAP in the emergency department.  The patient was started on Solu-Medrol and scheduled DuoNeb treatments in the setting of acute COPD exacerbation.    TODAY'S PLAN: Patient off BiPAP this morning and on 3 L supplemental oxygen.  At baseline she wears 2 L continuously.  Daughter is at bedside.  Still with some mild expiratory wheezes bilaterally so we will continue IV Solu-Medrol 40 mg every 8 hours.  Schedule DuoNebs 4 times daily and as needed.  Patient has been coughing white sputum since the operation on her gallbladder a few days ago.  Holding off on antibiotics at this time, the patient is certainly high risk so consider starting azithromycin.  At baseline, patient has been on Advair for several years and follows with a COPD physician.  We discussed the possibility of advancing to Trelegy inhaler.  Will ask for and appreciate pharmacy assistance with cost coverage for this.  Anticipate another few days in the hospital.  Once respiratory situation has improved, will ask for physical therapy  evaluation.  All questions were answered.  Discussed CODE STATUS with the patient and daughter.  Confirmed full code.    Problem List:   Acute on Chronic Hypoxic Respiratory Failure  Acute Hypercapnic Respiratory Failure  Acute COPD Exacerbation  - Initially required continuous bipap.  Titrated off on AM of 3/6  - At baseline pt uses 2L continuously  - Resp viral panel pending  - Holding off on abx for now given no infiltrate on CT chest and normal WBC  - Solumedrol 40 mg IV q8h  - Duonebs QID, prn  - Add pulmicort neb daily  - Consider addition of Trelegy inhaler at discharge.  Appreciate pharmacy assistance with cost coverage    Small Bilateral Pleural Effusion  Suspected Mild Fluid Overload  - Echocardiogram pending  - Telemetry  - Strict I/O  - Daily Weights    Hypomagnesemia  - Electrolyte replacement protocol    Chronic Medical Problems:  Hypothyroidism  Anxiety/Depression  Hyperlipidemia    I spent 56 minutes in reviewing this patient's labs, imaging, medications, medical history.  In addition time was spent interviewing the patient, communicating with family, and medical decision making.      DVT Prophylaxis: Enoxaparin (Lovenox) SQ  Code Status: Full Code  Diet: Regular Diet Adult    Roman Catheter: Not present    Disposition: Medically Ready for Discharge: Anticipated in 2-4 Days    Goals to discharge include: respiratory symptoms improved  Family updated today: Yes      Interval History   Pt seen and examined.  Pt reports severe anxiety regarding her breathing issues.  Pt reports improvement this morning.    -Data reviewed today: I personally reviewed all new labs and imaging results over the last 24 hours.     Physical Exam   Temp: 98.1  F (36.7  C) Temp src: Axillary BP: 109/75 Pulse: 90   Resp: 23 SpO2: 92 % O2 Device: BiPAP/CPAP Oxygen Delivery: 4 LPM  Vitals:    03/06/25 0330   Weight: 60.1 kg (132 lb 7.9 oz)     Vital Signs with Ranges  Temp:  [98.1  F (36.7  C)-98.6  F (37  C)] 98.1  F (36.7   C)  Pulse:  [79-94] 90  Resp:  [14-35] 23  BP: (109-141)/() 109/75  FiO2 (%):  [25 %-40 %] 28 %  SpO2:  [81 %-99 %] 92 %  I/O last 3 completed shifts:  In: -   Out: 500 [Urine:500]    GENERAL: No apparent distress. Awake, alert, and fully oriented.  HEENT: Normocephalic, atraumatic. Extraocular movements intact.  CARDIOVASCULAR: Regular rate and rhythm without murmurs or rubs. No S3.  PULMONARY: Mild exp wheezes bilaterally  GASTROINTESTINAL: Soft, non-tender, non-distended. Bowel sounds normoactive.   EXTREMITIES: No cyanosis or clubbing. No edema.  NEUROLOGICAL: CN 2-12 grossly intact, no focal neurological deficits.  DERMATOLOGICAL: No rash, ulcer, bruising, nor jaundice.    Medications   Current Facility-Administered Medications   Medication Dose Route Frequency Provider Last Rate Last Admin    No lozenges or gum should be given while patient on BIPAP/AVAPS/AVAPS AE   Does not apply Continuous PRN Natalya Hammond MD        Patient may continue current oral medications   Does not apply Continuous PRN Natalya Hammond MD         Current Facility-Administered Medications   Medication Dose Route Frequency Provider Last Rate Last Admin    atorvastatin (LIPITOR) tablet 10 mg  10 mg Oral Daily Natalya Hammond MD        cyanocobalamin injection 1,000 mcg  1,000 mcg Intramuscular Q30 Days Natalya Hammond MD        diazepam (VALIUM) tablet 5 mg  5 mg Oral See Admin Instructions Natalya Hammond MD        enoxaparin ANTICOAGULANT (LOVENOX) injection 40 mg  40 mg Subcutaneous Q24H Natalya Hammond MD   40 mg at 03/06/25 0426    [Held by provider] fluticasone-vilanterol (BREO ELLIPTA) 200-25 MCG/ACT inhaler 1 puff  1 puff Inhalation Daily Natalya Hammond MD        ipratropium - albuterol 0.5 mg/2.5 mg/3 mL (DUONEB) neb solution 3 mL  3 mL Nebulization 4x daily Adria Amin DO   3 mL at 03/06/25 0826    levothyroxine (SYNTHROID/LEVOTHROID) tablet 88 mcg  88 mcg Oral Daily  "Natalya Hammond MD        methylPREDNISolone sodium succinate (SOLU-MEDROL) injection 40 mg  40 mg Intravenous Q8H Natalya Hammond MD   40 mg at 03/06/25 1040    Followed by    [START ON 3/7/2025] predniSONE (DELTASONE) tablet 40 mg  40 mg Oral Daily with breakfast Natalya Hammond MD        mirtazapine (REMERON) tablet 15 mg  15 mg Oral At Bedtime Natalya Hammond MD        sertraline (ZOLOFT) tablet 25 mg  25 mg Oral QAM Natalya Hammond MD         Data     Laboratory:  Recent Labs   Lab 03/05/25  2221   WBC 6.4   HGB 10.3*   HCT 33.6*   MCV 97        Recent Labs   Lab 03/06/25  0806 03/06/25  0323 03/05/25  2221   NA  --   --  142   POTASSIUM  --   --  3.7   CHLORIDE  --   --  98   CO2  --   --  39*   ANIONGAP  --   --  5*   * 146* 137*   BUN  --   --  7.7*   CR  --   --  0.58   GFRESTIMATED  --   --  >90   CHAO  --   --  8.7*     No results for input(s): \"CULT\" in the last 168 hours.  No results found for: \"TROPI\"    Imaging:  Recent Results (from the past 24 hours)   CT Chest Pulmonary Embolism w Contrast    Narrative    EXAM: CT CHEST PULMONARY EMBOLISM W CONTRAST  LOCATION: [facility]  DATE: 3/6/2025    INDICATION: Worsening shortness of breath. Recent gallbladder surgery. Evaluate for pulmonary embolism.  COMPARISON: None.  TECHNIQUE: CT chest pulmonary angiogram during arterial phase injection of IV contrast. Multiplanar reformats and MIP reconstructions were performed. Dose reduction techniques were used.   CONTRAST: 54mL Isovue 370    FINDINGS:  ANGIOGRAM CHEST: Pulmonary arteries are normal caliber and negative for pulmonary emboli. Mild aortic calcifications. Thoracic aorta is negative for dissection. No CT evidence of right heart strain.    LUNGS AND PLEURA: Small right and trace left pleural effusions. Compressive atelectasis in the dependent right lung. No focal airspace disease. Severe upper lobe predominant centrilobular emphysema. No " pneumothorax.    MEDIASTINUM/AXILLAE: No lymphadenopathy. No pericardial effusion.    CORONARY ARTERY CALCIFICATION: Mild.    UPPER ABDOMEN: Status post cholecystectomy. Trace fluid in the gallbladder fossa.    MUSCULOSKELETAL: Multilevel degenerative changes of the spine. Chronic appearing deformity of the right proximal humerus.      Impression    IMPRESSION:  1.  No acute pulmonary embolism.    2.  Small right and trace left pleural effusions, with adjacent compressive atelectasis.    3.  Severe centrilobular emphysema.         Adria Amin DO  [facility] Hospitalist  [address] [place] [address]  Securely message with [employer] (more info)  Text page via [facility] Paging/Directory   03/06/2025

## 2025-03-06 NOTE — PLAN OF CARE
"Pt transferred to room 601     Problem: Adult Inpatient Plan of Care  Goal: Plan of Care Review  Description: The Plan of Care Review/Shift note should be completed every shift.  The Outcome Evaluation is a brief statement about your assessment that the patient is improving, declining, or no change.  This information will be displayed automatically on your shift  note.  Outcome: Progressing  Flowsheets (Taken 3/6/2025 1344)  Outcome Evaluation: Weaned from bipap to home 02  Overall Patient Progress: improving  Goal: Patient-Specific Goal (Individualized)  Description: You can add care plan individualizations to a care plan. Examples of Individualization might be:  \"Parent requests to be called daily at 9am for status\", \"I have a hard time hearing out of my right ear\", or \"Do not touch me to wake me up as it startles  me\".  Outcome: Progressing  Goal: Absence of Hospital-Acquired Illness or Injury  Outcome: Progressing  Intervention: Identify and Manage Fall Risk  Recent Flowsheet Documentation  Taken 3/6/2025 0800 by Lilo Bryant RN  Safety Promotion/Fall Prevention:   activity supervised   assistive device/personal items within reach   clutter free environment maintained   increased rounding and observation   increase visualization of patient   nonskid shoes/slippers when out of bed   patient and family education   room near nurse's station   room organization consistent   safety round/check completed   supervised activity   treat reversible contributory factors   treat underlying cause  Intervention: Prevent Skin Injury  Recent Flowsheet Documentation  Taken 3/6/2025 0800 by Lilo Bryant RN  Body Position: position changed independently  Intervention: Prevent and Manage VTE (Venous Thromboembolism) Risk  Recent Flowsheet Documentation  Taken 3/6/2025 0800 by Lilo Bryant RN  VTE Prevention/Management: (on lovenox) SCDs off (sequential compression devices)  Goal: Optimal Comfort and " Wellbeing  Outcome: Progressing  Intervention: Monitor Pain and Promote Comfort  Recent Flowsheet Documentation  Taken 3/6/2025 1229 by Lilo Bryant, RN  Pain Management Interventions: medication (see MAR)  Intervention: Provide Person-Centered Care  Recent Flowsheet Documentation  Taken 3/6/2025 0800 by Lilo Bryant, RN  Trust Relationship/Rapport:   care explained   choices provided   emotional support provided   empathic listening provided   questions answered   reassurance provided   questions encouraged   thoughts/feelings acknowledged  Goal: Readiness for Transition of Care  Outcome: Progressing   Goal Outcome Evaluation:           Overall Patient Progress: improvingOverall Patient Progress: improving    Outcome Evaluation: Weaned from bipap to home 02

## 2025-03-06 NOTE — H&P
"[facility]    Hospitalist History and Physical    Name: Josephine Nicole    MRN: [id number]  YOB: 1950    Age: 75 year old  Date of Admission:  3/5/2025  Date of Service (when I saw the patient): 03/06/25    Assessment & Plan   Josephine Nicole is a 75 year old female  with PMH of cerebral meningioma s/p radiation and resection, COPD on chronic 2L O2, hypothyroidism, HLD, osteoporosis, depression/anxiety, patient hospitalization for acute cholecystitis who presented to ED with worsening dyspnea.    Acute on chronic respiratory failure with hypercapnia  Acute COPD exacerbation  -Viral panel negative  -Procalcitonin within normal limits no evidence of pneumonia on CT  -Requiring BiPAP in the emergency room  -Continue BiPAP for now will wean as able  -Adjusted BiPAP settings to keep sats between 88 and 90%, FiO2 down to 28%, IPAP 14, EPAP 7  -IV steroids will continue for now  -Monitor in ICU      Small bilateral pleural effusion and atelectasis  Status post recent surgery: Possibly fluid overload  -BNP elevated  -One-time dose of IV Lasix in the emergency room  -Cardiac echo  -Monitor on telemetry  -Serial troponins  -Daily weights and I's and O's    D-dimer elevated  -CTA negative for PE  -Likely postop    Hypothyroidism  -Continue prior to admission    Anxiety and depression  -Resume prior to admission meds    Hyperlipidemia  -Continue statins    Report called to telemetry intensivist    Clinically Significant Risk Factors Present on Admission                        # Anemia: based on hgb <11       # Overweight: Estimated body mass index is 25.81 kg/m  as calculated from the following:    Height as of 2/26/25: 1.575 m (5' 2\").    Weight as of 2/27/25: 64 kg (141 lb 1.5 oz).                  DVT Prophylaxis: Enoxaparin (Lovenox) SQ  Code Status: Full Code notes she would not want to be intubated for more than few days    Disposition:   Medically Ready for Discharge: Anticipated in " 2-4 Days      Primary Care Physician   Divina Turner    Chief Complaint   Progressively worsening shortness of breath for 5 days  Productive cough 5 days    History is obtained from the patient    History of Present Illness   Josephine Nicole is a 75 year old female  with PMH of cerebral meningioma s/p radiation and resection, COPD on chronic 2L O2, hypothyroidism, HLD, osteoporosis, depression/anxiety, patient hospitalization for acute cholecystitis who presented to ED with worsening dyspnea.    Notes that since her discharge from hospital she has had productive cough and worsening shortness of breath today she was not able to manage her shortness of breath with 2 L of supplemental O2 and had to up her oxygen at home.  Complains of dyspnea on exertion, productive cough, denies any chest pain pressure heaviness or tightness.  Cannot take some deep breaths.  She did have URI-like symptoms and was exposed to her daughter who was sick.  Complains of generalized malaise and weakness.  No fever or chills.  More than 10 point review of system was carried out was otherwise negative.    In the emergency room patient was hypoxic and hypercapnic.  Was started on BiPAP received steroids and one-time dose of Lasix and is being admitted for further evaluation treatment.    Past Medical History    Past Medical History:   Diagnosis Date    Cerebral meningioma     COPD (chronic obstructive pulmonary disease)     Depressive disorder     Eczema     Eczema     Hyperlipidemia     Hypothyroidism     Melanoma of skin     Osteoporosis          Past Surgical History   Past Surgical History:   Procedure Laterality Date    COLONOSCOPY N/A 04/18/2022    Procedure: COLONOSCOPY;  Surgeon: Abimbola Handley MD;  Location:  GI    OPEN REDUCTION INTERNAL FIXATION HIP UNIPOLAR Right 3/20/2023    Procedure: Right hip hemiarthroplasty anterolateral approach;  Surgeon: Shun Cuevas MD;  Location:  OR    OPTICAL TRACKING SYSTEM  CRANIOTOMY, EXCISE TUMOR, COMBINED N/A 3/30/2023    Procedure: Bifrontal stealth craniotomy and resection of meningioma;  Surgeon: Ramez Murphy MD;  Location: [facility] OR    Tubal ligation NOS         Prior to Admission Medications   Prior to Admission Medications   Prescriptions Last Dose Informant Patient Reported? Taking?   ALPRAZolam (XANAX) 0.25 MG tablet   Yes No   Sig: Take 0.25 mg by mouth daily as needed for anxiety.   acetaminophen (TYLENOL) 500 MG tablet   No No   Sig: Take 1.5 tablets (750 mg) by mouth every 6 hours as needed for mild pain.   albuterol (PROVENTIL) (2.5 MG/3ML) 0.083% neb solution   Yes No   Sig: Take 2.5 mg by nebulization 3 times daily as needed for shortness of breath, wheezing or cough   alendronate (FOSAMAX) 70 MG tablet   Yes No   Sig: Take 70 mg by mouth every 7 days   atorvastatin (LIPITOR) 10 MG tablet   Yes No   Sig: Take 10 mg by mouth daily   cyanocobalamin (CYANOCOBALAMIN) 1000 MCG/ML injection   Yes No   Sig: Inject 1 mL into the muscle every 30 days   diazepam (VALIUM) 5 MG tablet   No No   Sig: Take 1 tablet (5 mg) by mouth See Admin Instructions Take 5mg 30 minutes prior to MRI, then may take an additional 5mg at time of exam if needed. MUST have a .   fluticasone-salmeterol (ADVAIR) 500-50 MCG/DOSE inhaler   Yes No   Sig: Inhale 1 puff into the lungs 2 times daily   ibuprofen (ADVIL/MOTRIN) 200 MG tablet   No No   Sig: Take 3 tablets (600 mg) by mouth every 6 hours as needed for inflammatory pain.   ipratropium - albuterol 0.5 mg/2.5 mg/3 mL (DUONEB) 0.5-2.5 (3) MG/3ML neb solution   Yes No   Sig: Take 1 vial by nebulization every 6 hours as needed for shortness of breath, wheezing or cough   levalbuterol (XOPENEX HFA) 45 MCG/ACT inhaler   Yes No   Sig: Inhale 2 puffs into the lungs every 4 hours as needed for shortness of breath or wheezing   levothyroxine (SYNTHROID/LEVOTHROID) 88 MCG tablet   Yes No   Sig: Take 88 mcg by mouth daily   mirtazapine (REMERON) 15  MG tablet   No No   Sig: Take 1 tablet (15 mg) by mouth At Bedtime   oxyCODONE (ROXICODONE) 5 MG tablet   No No   Sig: Take 1-2 tablets (5-10 mg) by mouth every 4 hours as needed for moderate to severe pain.   sertraline (ZOLOFT) 25 MG tablet   Yes No   Sig: Take 25 mg by mouth every morning   zolpidem (AMBIEN) 2.5 mg TABS half-tab   Yes No   Sig: Take 2.5 mg by mouth nightly as needed for sleep.      Facility-Administered Medications: None     Allergies   Allergies   Allergen Reactions    Bupropion Anaphylaxis, Hives and Shortness Of Breath       Social History   Social History     Tobacco Use    Smoking status: Former     Types: Cigarettes    Smokeless tobacco: Never   Substance Use Topics    Alcohol use: Not Currently     Social History     Social History Narrative    Not on file     Quit smoking more than 10 years ago    Family History   I have reviewed this patient's family history and updated it with pertinent information if needed.   No family history on file.      Review of Systems   A Comprehensive greater than 10 system review of systems was carried out.  Pertinent positives and negatives are noted above.  Otherwise negative for contributory information.    Physical Exam   Temp: 98.6  F (37  C) Temp src: Oral BP: (!) 134/93 Pulse: 83   Resp: 26 SpO2: 95 % O2 Device: BiPAP/CPAP Oxygen Delivery: 4 LPM  Vital Signs with Ranges  Temp:  [98.6  F (37  C)] 98.6  F (37  C)  Pulse:  [80-90] 83  Resp:  [22-28] 26  BP: (125-141)/() 134/93  FiO2 (%):  [30 %-40 %] 30 %  SpO2:  [86 %-98 %] 95 %  0 lbs 0 oz    GEN:  Alert, oriented x 3, using BiPAP is able to talk in full sentences  HEENT:  Normocephalic/atraumatic, no scleral icterus, no nasal discharge, mouth dry  CV:  Regular rate and rhythm, no murmur or JVD.  S1 + S2 noted, no S3 or S4.  LUNGS: Poor inspiratory effort few bibasilar crackles scattered wheezing.  Symmetric chest rise on inhalation noted.  ABD:  Active bowel sounds, soft,  "non-tender/non-distended.  No rebound/guarding/rigidity.  EXT:  No edema.  No cyanosis.  No joint synovitis noted.  SKIN:  Dry to touch, no exanthems noted in the visualized areas.  NEURO:  Symmetric muscle strength.  No new focal deficits appreciated.    Data   Data reviewed today:  I personally reviewed the EKG tracing showing sinus rhythm low voltage EKG with T flat in lead III nonspecific ST-T changes not significant change when compared to prior EKG .    Recent Labs   Lab 03/05/25 2226   PHV 7.34   PO2V 32   PCO2V 81*   HCO3V 44*     Recent Labs   Lab 03/05/25 2221 02/27/25 0513   WBC 6.4 9.4   HGB 10.3* 10.1*   HCT 33.6* 32.0*   MCV 97 94    168     Recent Labs   Lab 03/05/25 2221 02/27/25 0513    138   POTASSIUM 3.7 3.8   CHLORIDE 98 100   CO2 39* 32*   ANIONGAP 5* 6*   * 117*   BUN 7.7* 9.6   CR 0.58 0.56   GFRESTIMATED >90 >90   CHAO 8.7* 8.5*     7-Day Micro Results       Collected Updated Procedure Result Status      03/05/2025 2230 03/05/2025 2321 Influenza A/B, RSV and SARS-CoV2 PCR (COVID-19) Nasopharyngeal [[id number]]    Swab from Nasopharyngeal    Final result Component Value   Influenza A PCR Negative   Influenza B PCR Negative   RSV PCR Negative   SARS CoV2 PCR Negative   NEGATIVE: SARS-CoV-2 (COVID-19) RNA not detected, presumed negative.                  Recent Labs   Lab 03/05/25 2227   NTBNPI 4,687*     Recent Labs   Lab 03/05/25 2221   DD 2.66*     Recent Labs   Lab 03/05/25 2221 02/27/25 0513   * 117*     Recent Labs   Lab 03/05/25 2221 02/27/25 0513   HGB 10.3* 10.1*     Recent Labs   Lab 03/05/25 2221 02/27/25 0513   AST 18 17   ALT 16 16   ALKPHOS 92 79   BILITOTAL 0.2 0.3     No results for input(s): \"INR\" in the last 168 hours.  Recent Labs   Lab 03/05/25 2226   LACT 1.2     No results for input(s): \"LIPASE\" in the last 168 hours.  No results for input(s): \"TSH\" in the last 168 hours.  No results for input(s): \"TROPONIN\", \"TROPI\", \"TROPR\", " "\"TROPONINIS\" in the last 168 hours.    Invalid input(s): \"TROPT\", \"TROP\", \"TROPONINIES\", \"TNIH\"  No results for input(s): \"COLOR\", \"APPEARANCE\", \"URINEGLC\", \"URINEBILI\", \"URINEKETONE\", \"SG\", \"UBLD\", \"URINEPH\", \"PROTEIN\", \"UROBILINOGEN\", \"NITRITE\", \"LEUKEST\", \"RBCU\", \"WBCU\" in the last 168 hours.    Recent Results (from the past 24 hours)   CT Chest Pulmonary Embolism w Contrast    Narrative    EXAM: CT CHEST PULMONARY EMBOLISM W CONTRAST  LOCATION: [facility]  DATE: 3/6/2025    INDICATION: Worsening shortness of breath. Recent gallbladder surgery. Evaluate for pulmonary embolism.  COMPARISON: None.  TECHNIQUE: CT chest pulmonary angiogram during arterial phase injection of IV contrast. Multiplanar reformats and MIP reconstructions were performed. Dose reduction techniques were used.   CONTRAST: 54mL Isovue 370    FINDINGS:  ANGIOGRAM CHEST: Pulmonary arteries are normal caliber and negative for pulmonary emboli. Mild aortic calcifications. Thoracic aorta is negative for dissection. No CT evidence of right heart strain.    LUNGS AND PLEURA: Small right and trace left pleural effusions. Compressive atelectasis in the dependent right lung. No focal airspace disease. Severe upper lobe predominant centrilobular emphysema. No pneumothorax.    MEDIASTINUM/AXILLAE: No lymphadenopathy. No pericardial effusion.    CORONARY ARTERY CALCIFICATION: Mild.    UPPER ABDOMEN: Status post cholecystectomy. Trace fluid in the gallbladder fossa.    MUSCULOSKELETAL: Multilevel degenerative changes of the spine. Chronic appearing deformity of the right proximal humerus.      Impression    IMPRESSION:  1.  No acute pulmonary embolism.    2.  Small right and trace left pleural effusions, with adjacent compressive atelectasis.    3.  Severe centrilobular emphysema.        "

## 2025-03-06 NOTE — PROGRESS NOTES
A BiPAP of  12/6 @ 40% was applied to the pt via the mask for an increase in WOB and SOB.  The skin in contact with the mask and straps looks good and intact. Pt is tolerating it well. RT will continue to monitor and assess the pt's respiratory status and needs.  RT/RN huddle to discuss contraindications prior to placement? Y/N? N, spoke with daughter    Molly Kelsey, RT

## 2025-03-07 LAB
ANION GAP SERPL CALCULATED.3IONS-SCNC: 9 MMOL/L (ref 7–15)
BUN SERPL-MCNC: 13.6 MG/DL (ref 8–23)
CALCIUM SERPL-MCNC: 8.9 MG/DL (ref 8.8–10.4)
CHLORIDE SERPL-SCNC: 92 MMOL/L (ref 98–107)
CREAT SERPL-MCNC: 0.57 MG/DL (ref 0.51–0.95)
EGFRCR SERPLBLD CKD-EPI 2021: >90 ML/MIN/1.73M2
ERYTHROCYTE [DISTWIDTH] IN BLOOD BY AUTOMATED COUNT: 12.3 % (ref 10–15)
GLUCOSE SERPL-MCNC: 127 MG/DL (ref 70–99)
HCO3 SERPL-SCNC: 40 MMOL/L (ref 22–29)
HCT VFR BLD AUTO: 31.7 % (ref 35–47)
HGB BLD-MCNC: 10.2 G/DL (ref 11.7–15.7)
MAGNESIUM SERPL-MCNC: 1.8 MG/DL (ref 1.7–2.3)
MCH RBC QN AUTO: 30 PG (ref 26.5–33)
MCHC RBC AUTO-ENTMCNC: 32.2 G/DL (ref 31.5–36.5)
MCV RBC AUTO: 93 FL (ref 78–100)
PLATELET # BLD AUTO: 257 10E3/UL (ref 150–450)
POTASSIUM SERPL-SCNC: 3.4 MMOL/L (ref 3.4–5.3)
RBC # BLD AUTO: 3.4 10E6/UL (ref 3.8–5.2)
SODIUM SERPL-SCNC: 141 MMOL/L (ref 135–145)
WBC # BLD AUTO: 10.1 10E3/UL (ref 4–11)

## 2025-03-07 PROCEDURE — 99232 SBSQ HOSP IP/OBS MODERATE 35: CPT | Performed by: INTERNAL MEDICINE

## 2025-03-07 PROCEDURE — 83735 ASSAY OF MAGNESIUM: CPT | Performed by: INTERNAL MEDICINE

## 2025-03-07 PROCEDURE — 85027 COMPLETE CBC AUTOMATED: CPT | Performed by: INTERNAL MEDICINE

## 2025-03-07 PROCEDURE — 999N000156 HC STATISTIC RCP CONSULT EA 30 MIN

## 2025-03-07 PROCEDURE — 272N000202 HC AEROBIKA WITH MANOMETER

## 2025-03-07 PROCEDURE — 120N000001 HC R&B MED SURG/OB

## 2025-03-07 PROCEDURE — 250N000011 HC RX IP 250 OP 636: Performed by: INTERNAL MEDICINE

## 2025-03-07 PROCEDURE — 94640 AIRWAY INHALATION TREATMENT: CPT

## 2025-03-07 PROCEDURE — 250N000013 HC RX MED GY IP 250 OP 250 PS 637: Performed by: INTERNAL MEDICINE

## 2025-03-07 PROCEDURE — 94664 DEMO&/EVAL PT USE INHALER: CPT

## 2025-03-07 PROCEDURE — 80048 BASIC METABOLIC PNL TOTAL CA: CPT | Performed by: INTERNAL MEDICINE

## 2025-03-07 PROCEDURE — 250N000009 HC RX 250: Performed by: INTERNAL MEDICINE

## 2025-03-07 PROCEDURE — 36415 COLL VENOUS BLD VENIPUNCTURE: CPT | Performed by: INTERNAL MEDICINE

## 2025-03-07 PROCEDURE — 94799 UNLISTED PULMONARY SVC/PX: CPT

## 2025-03-07 PROCEDURE — 999N000157 HC STATISTIC RCP TIME EA 10 MIN

## 2025-03-07 PROCEDURE — 250N000011 HC RX IP 250 OP 636: Mod: JZ | Performed by: INTERNAL MEDICINE

## 2025-03-07 RX ORDER — SODIUM CHLORIDE FOR INHALATION 3 %
4 VIAL, NEBULIZER (ML) INHALATION 4 TIMES DAILY
Status: DISCONTINUED | OUTPATIENT
Start: 2025-03-07 | End: 2025-03-09

## 2025-03-07 RX ORDER — BENZONATATE 100 MG/1
100 CAPSULE ORAL 3 TIMES DAILY PRN
Status: DISCONTINUED | OUTPATIENT
Start: 2025-03-07 | End: 2025-03-13 | Stop reason: HOSPADM

## 2025-03-07 RX ORDER — ALPRAZOLAM 0.25 MG
0.25 TABLET ORAL 3 TIMES DAILY PRN
Status: DISCONTINUED | OUTPATIENT
Start: 2025-03-07 | End: 2025-03-13 | Stop reason: HOSPADM

## 2025-03-07 RX ORDER — GUAIFENESIN 200 MG/10ML
200 LIQUID ORAL EVERY 4 HOURS PRN
Status: DISCONTINUED | OUTPATIENT
Start: 2025-03-07 | End: 2025-03-07

## 2025-03-07 RX ORDER — CODEINE PHOSPHATE AND GUAIFENESIN 10; 100 MG/5ML; MG/5ML
5 SOLUTION ORAL EVERY 4 HOURS PRN
Status: DISCONTINUED | OUTPATIENT
Start: 2025-03-07 | End: 2025-03-13 | Stop reason: HOSPADM

## 2025-03-07 RX ADMIN — IPRATROPIUM BROMIDE AND ALBUTEROL SULFATE 3 ML: .5; 3 SOLUTION RESPIRATORY (INHALATION) at 16:34

## 2025-03-07 RX ADMIN — LEVOTHYROXINE SODIUM 88 MCG: 0.09 TABLET ORAL at 08:11

## 2025-03-07 RX ADMIN — ATORVASTATIN CALCIUM 10 MG: 10 TABLET, FILM COATED ORAL at 08:11

## 2025-03-07 RX ADMIN — ALPRAZOLAM 0.25 MG: 0.25 TABLET ORAL at 16:29

## 2025-03-07 RX ADMIN — SODIUM CHLORIDE SOLN NEBU 3% 4 ML: 3 NEBU SOLN at 20:44

## 2025-03-07 RX ADMIN — MIRTAZAPINE 15 MG: 15 TABLET, FILM COATED ORAL at 20:58

## 2025-03-07 RX ADMIN — IPRATROPIUM BROMIDE AND ALBUTEROL SULFATE 3 ML: .5; 3 SOLUTION RESPIRATORY (INHALATION) at 08:45

## 2025-03-07 RX ADMIN — BUDESONIDE 0.5 MG: 0.5 INHALANT ORAL at 08:45

## 2025-03-07 RX ADMIN — GUAIFENESIN 200 MG: 100 LIQUID ORAL at 00:47

## 2025-03-07 RX ADMIN — ZOLPIDEM TARTRATE 2.5 MG: 5 TABLET, FILM COATED ORAL at 01:23

## 2025-03-07 RX ADMIN — GUAIFENESIN AND CODEINE PHOSPHATE 5 ML: 100; 10 SOLUTION ORAL at 17:55

## 2025-03-07 RX ADMIN — IPRATROPIUM BROMIDE AND ALBUTEROL SULFATE 3 ML: .5; 3 SOLUTION RESPIRATORY (INHALATION) at 20:43

## 2025-03-07 RX ADMIN — METHYLPREDNISOLONE SODIUM SUCCINATE 40 MG: 40 INJECTION, POWDER, FOR SOLUTION INTRAMUSCULAR; INTRAVENOUS at 20:58

## 2025-03-07 RX ADMIN — SERTRALINE HYDROCHLORIDE 25 MG: 25 TABLET ORAL at 08:11

## 2025-03-07 RX ADMIN — Medication 1 LOZENGE: at 08:57

## 2025-03-07 RX ADMIN — IPRATROPIUM BROMIDE AND ALBUTEROL SULFATE 3 ML: .5; 3 SOLUTION RESPIRATORY (INHALATION) at 01:33

## 2025-03-07 RX ADMIN — METHYLPREDNISOLONE SODIUM SUCCINATE 40 MG: 40 INJECTION, POWDER, FOR SOLUTION INTRAMUSCULAR; INTRAVENOUS at 13:00

## 2025-03-07 RX ADMIN — BENZONATATE 100 MG: 100 CAPSULE ORAL at 13:00

## 2025-03-07 RX ADMIN — ENOXAPARIN SODIUM 40 MG: 40 INJECTION SUBCUTANEOUS at 04:01

## 2025-03-07 RX ADMIN — Medication 1 LOZENGE: at 04:04

## 2025-03-07 RX ADMIN — GUAIFENESIN 200 MG: 100 LIQUID ORAL at 11:05

## 2025-03-07 RX ADMIN — BENZONATATE 100 MG: 100 CAPSULE ORAL at 20:58

## 2025-03-07 RX ADMIN — METHYLPREDNISOLONE SODIUM SUCCINATE 40 MG: 40 INJECTION, POWDER, FOR SOLUTION INTRAMUSCULAR; INTRAVENOUS at 04:01

## 2025-03-07 ASSESSMENT — ACTIVITIES OF DAILY LIVING (ADL)
ADLS_ACUITY_SCORE: 65
ADLS_ACUITY_SCORE: 68
ADLS_ACUITY_SCORE: 68
ADLS_ACUITY_SCORE: 65
ADLS_ACUITY_SCORE: 68
ADLS_ACUITY_SCORE: 68
ADLS_ACUITY_SCORE: 65
ADLS_ACUITY_SCORE: 68
ADLS_ACUITY_SCORE: 65
ADLS_ACUITY_SCORE: 68
ADLS_ACUITY_SCORE: 68
ADLS_ACUITY_SCORE: 65

## 2025-03-07 NOTE — PLAN OF CARE
"  Problem: Adult Inpatient Plan of Care  Goal: Plan of Care Review  Description: The Plan of Care Review/Shift note should be completed every shift.  The Outcome Evaluation is a brief statement about your assessment that the patient is improving, declining, or no change.  This information will be displayed automatically on your shift  note.  Outcome: Progressing  Flowsheets (Taken 3/7/2025 0610)  Plan of Care Reviewed With: patient  Goal: Patient-Specific Goal (Individualized)  Description: You can add care plan individualizations to a care plan. Examples of Individualization might be:  \"Parent requests to be called daily at 9am for status\", \"I have a hard time hearing out of my right ear\", or \"Do not touch me to wake me up as it startles  me\".  Outcome: Progressing  Goal: Absence of Hospital-Acquired Illness or Injury  Outcome: Progressing  Intervention: Identify and Manage Fall Risk  Recent Flowsheet Documentation  Taken 3/6/2025 2020 by Caroline Casas RN  Safety Promotion/Fall Prevention: activity supervised  Intervention: Prevent Skin Injury  Recent Flowsheet Documentation  Taken 3/6/2025 2020 by Caroline Casas RN  Body Position: position changed independently  Intervention: Prevent Infection  Recent Flowsheet Documentation  Taken 3/6/2025 2020 by Caroline Casas RN  Infection Prevention: rest/sleep promoted  Goal: Optimal Comfort and Wellbeing  Outcome: Progressing  Intervention: Provide Person-Centered Care  Recent Flowsheet Documentation  Taken 3/6/2025 2020 by Caroline Casas RN  Trust Relationship/Rapport:   care explained   choices provided   emotional support provided   empathic listening provided   questions answered   questions encouraged   reassurance provided   thoughts/feelings acknowledged  Goal: Readiness for Transition of Care  Outcome: Progressing     Problem: Comorbidity Management  Goal: Maintenance of COPD Symptom Control  Outcome: Progressing  Intervention: Maintain COPD " Symptom Control  Recent Flowsheet Documentation  Taken 3/6/2025 2020 by Caroline Casas RN  Medication Review/Management: medications reviewed     Problem: Fall Injury Risk  Goal: Absence of Fall and Fall-Related Injury  Outcome: Progressing  Intervention: Identify and Manage Contributors  Recent Flowsheet Documentation  Taken 3/6/2025 2020 by Caroline Casas RN  Medication Review/Management: medications reviewed  Intervention: Promote Injury-Free Environment  Recent Flowsheet Documentation  Taken 3/6/2025 2020 by Caroline Casas RN  Safety Promotion/Fall Prevention: activity supervised     Problem: Mechanical Ventilation Invasive  Goal: Effective Communication  Outcome: Progressing  Intervention: Ensure Effective Communication  Recent Flowsheet Documentation  Taken 3/6/2025 2020 by Caroline Casas RN  Trust Relationship/Rapport:   care explained   choices provided   emotional support provided   empathic listening provided   questions answered   questions encouraged   reassurance provided   thoughts/feelings acknowledged  Goal: Optimal Device Function  Outcome: Progressing  Intervention: Optimize Device Care and Function  Recent Flowsheet Documentation  Taken 3/6/2025 2020 by Caroline Casas RN  Airway Safety Measures: all equipment/monitors on and audible  Goal: Mechanical Ventilation Liberation  Outcome: Progressing  Intervention: Promote Extubation and Mechanical Ventilation Liberation  Recent Flowsheet Documentation  Taken 3/6/2025 2020 by Caroline Casas RN  Medication Review/Management: medications reviewed  Goal: Optimal Nutrition Delivery  Outcome: Progressing  Goal: Absence of Device-Related Skin and Tissue Injury  Outcome: Progressing  Goal: Absence of Ventilator-Induced Lung Injury  Outcome: Progressing  Intervention: Prevent Ventilator-Associated Pneumonia  Recent Flowsheet Documentation  Taken 3/6/2025 2020 by Caroline Casas RN  Head of Bed (HOB) Positioning: HOB at 30  degrees     Problem: Gas Exchange Impaired  Goal: Optimal Gas Exchange  Outcome: Progressing  Intervention: Optimize Oxygenation and Ventilation  Recent Flowsheet Documentation  Taken 3/6/2025 2020 by Caroline Casas RN  Head of Bed (HOB) Positioning: HOB at 30 degrees   Goal Outcome Evaluation:      Plan of Care Reviewed With: patient

## 2025-03-07 NOTE — PROGRESS NOTES
[facility]    Hospitalist Progress Note  Name: Josephine Nicole    MRN: [id number]  Provider:  Adria Amin DO  Date of Service: 03/07/2025    Summary of Stay: Josephine Nicole is a 75 year old female with a history of COPD with chronic home oxygen dependence of 2 L continuously, hypothyroidism, anxiety/depression, hyperlipidemia admitted on 3/5/2025 with shortness of breath.  In the emergency department, the patient was found to have a blood pressure 125/103, temperature 98.6  F, heart rate 87, respiratory rate 22, SpO2 89% on 4 L nasal cannula.  Initial lab work showed magnesium 1.6, proBNP 4687, high-sensitivity troponin 13, venous pH 7.34 with a venous pCO2 of 81, hemoglobin 10.3.  COVID-19, influenza, RSV are negative.  CT chest showed small right and trace left oral effusions with adjacent compressive atelectasis, severe centrilobular emphysema.  The patient did ultimately require continuous BiPAP in the emergency department.  The patient was started on Solu-Medrol and scheduled DuoNeb treatments in the setting of acute COPD exacerbation.  The patient was transitioned off of continuous BiPAP to nasal cannula.    TODAY'S PLAN: Patient doing a bit better today.  Still not at her baseline oxygen requirements.  Wheezing is improved on physical exam, still present at the very end of expiration now.  Respiratory viral panel returned positive for coronavirus.  Plan for symptomatic treatment.  Of note, this is not COVID, but rather a generic coronavirus which is often seen with the common cold.  Continue IV Solu-Medrol 40 mg Q8 today and anticipate decreasing dose tomorrow.  Continue nebulizer treatments.  Added cough suppressants.  Trelegy inhalers quite expensive at over $500 per month so this is an unrealistic option.  Consider adding muscarinic antagonist at discharge.  Plan for PT and OT evaluation tomorrow as the patient was quite limited with her mobility prior to admission, mostly due to  breathing.  Patient does live at home with her daughter.  All questions answered.  Anticipate patient will be hospitalized through the weekend.    Problem List:   Acute on Chronic Hypoxic Respiratory Failure  Acute Hypercapnic Respiratory Failure  Acute COPD Exacerbation  Coronavirus Infection (NOT covid)  - Initially required continuous bipap.  Titrated off on AM of 3/6  - At baseline pt uses 2L continuously  - Resp viral panel positive for coronavirus  - Holding off on abx for now given no infiltrate on CT chest and normal WBC  - Solumedrol 40 mg IV q8h  - Duonebs QID, prn  - Add pulmicort neb daily  - Trelegy is $595 per month which is unrealistic.  Will consider adding muscarinic antagonist at discharge (spiriva vs incruse ellipta)     Small Bilateral Pleural Effusion  Suspected Mild Fluid Overload  - Echocardiogram showed EF 65-70%, G1DD, no RWMA  - No further plan for diuresis  - Telemetry  - Strict I/O  - Daily Weights     Hypomagnesemia  - Electrolyte replacement protocol     Chronic Medical Problems:  Hypothyroidism  Anxiety/Depression  Hyperlipidemia    I spent 45 minutes in reviewing this patient's labs, imaging, medications, medical history.  In addition time was spent interviewing the patient, communicating with family, and medical decision making.      DVT Prophylaxis: Enoxaparin (Lovenox) SQ  Code Status: Full Code  Diet: Regular Diet Adult    Roman Catheter: Not present    Disposition: Medically Ready for Discharge: Anticipated in 2-4 Days    Goals to discharge include: respiratory symptoms improved  Family updated today: No     Interval History   Pt seen and examined.  Pt reports her breathing is better.  Still has coughing fits.  Not sleeping from the steroids.    -Data reviewed today: I personally reviewed all new labs and imaging results over the last 24 hours.     Physical Exam   Temp: 97.3  F (36.3  C) Temp src: Oral BP: 129/81 Pulse: 91   Resp: 17 SpO2: 96 % O2 Device: Nasal cannula Oxygen  Delivery: 4 LPM  Vitals:    03/06/25 0330 03/07/25 0605   Weight: 60.1 kg (132 lb 7.9 oz) 61.2 kg (134 lb 14.7 oz)     Vital Signs with Ranges  Temp:  [97.2  F (36.2  C)-98.3  F (36.8  C)] 97.3  F (36.3  C)  Pulse:  [82-94] 91  Resp:  [15-22] 17  BP: (117-129)/(62-81) 129/81  SpO2:  [70 %-97 %] 96 %  I/O last 3 completed shifts:  In: 2 [IV Piggyback:2]  Out: 1300 [Urine:1300]    GENERAL: No apparent distress. Awake, alert, and fully oriented.  HEENT: Normocephalic, atraumatic. Extraocular movements intact.  CARDIOVASCULAR: Regular rate and rhythm without murmurs or rubs. No S3.  PULMONARY: Mild exp wheezes at the end of expiratory phase  GASTROINTESTINAL: Soft, non-tender, non-distended. Bowel sounds normoactive.   EXTREMITIES: No cyanosis or clubbing. No edema.  NEUROLOGICAL: CN 2-12 grossly intact, no focal neurological deficits.  DERMATOLOGICAL: No rash, ulcer, bruising, nor jaundice.    Medications   Current Facility-Administered Medications   Medication Dose Route Frequency Provider Last Rate Last Admin    No lozenges or gum should be given while patient on BIPAP/AVAPS/AVAPS AE   Does not apply Continuous PRN Natalya Hammond MD        Patient may continue current oral medications   Does not apply Continuous PRN Natalya Hammond MD         Current Facility-Administered Medications   Medication Dose Route Frequency Provider Last Rate Last Admin    atorvastatin (LIPITOR) tablet 10 mg  10 mg Oral Daily Natalya Hammond MD   10 mg at 03/07/25 0811    budesonide (PULMICORT) neb solution 0.5 mg  0.5 mg Nebulization Daily Adria Amin DO   0.5 mg at 03/07/25 0845    cyanocobalamin injection 1,000 mcg  1,000 mcg Intramuscular Q30 Days Natalya Hammond MD        diazepam (VALIUM) tablet 5 mg  5 mg Oral See Admin Instructions Natalya Hammond MD        enoxaparin ANTICOAGULANT (LOVENOX) injection 40 mg  40 mg Subcutaneous Q24H Natalya Hammond MD   40 mg at 03/07/25 0401    [Held by  "provider] fluticasone-vilanterol (BREO ELLIPTA) 200-25 MCG/ACT inhaler 1 puff  1 puff Inhalation Daily Natalya Hammond MD        ipratropium - albuterol 0.5 mg/2.5 mg/3 mL (DUONEB) neb solution 3 mL  3 mL Nebulization 4x daily Adria Amin DO   3 mL at 03/07/25 0845    levothyroxine (SYNTHROID/LEVOTHROID) tablet 88 mcg  88 mcg Oral Daily Natalya Hammond MD   88 mcg at 03/07/25 0811    methylPREDNISolone sodium succinate (SOLU-MEDROL) injection 40 mg  40 mg Intravenous Q8H Adria Amin DO   40 mg at 03/07/25 0401    mirtazapine (REMERON) tablet 15 mg  15 mg Oral At Bedtime Natalya Hammond MD   15 mg at 03/06/25 2022    sertraline (ZOLOFT) tablet 25 mg  25 mg Oral QAM Natalya Hammond MD   25 mg at 03/07/25 0811    sodium chloride (NEBUSAL) 3 % neb solution 4 mL  4 mL Nebulization 4x Daily Natalya Hammond MD         Data     Laboratory:  Recent Labs   Lab 03/07/25  0637 03/05/25  2221   WBC 10.1 6.4   HGB 10.2* 10.3*   HCT 31.7* 33.6*   MCV 93 97    216     Recent Labs   Lab 03/07/25  0637 03/06/25  1815 03/06/25  1416 03/06/25  0323 03/05/25  2221     --   --   --  142   POTASSIUM 3.4  --   --   --  3.7   CHLORIDE 92*  --   --   --  98   CO2 40*  --   --   --  39*   ANIONGAP 9  --   --   --  5*   * 130* 142*   < > 137*   BUN 13.6  --   --   --  7.7*   CR 0.57  --   --   --  0.58   GFRESTIMATED >90  --   --   --  >90   CHAO 8.9  --   --   --  8.7*    < > = values in this interval not displayed.     No results for input(s): \"CULT\" in the last 168 hours.  No results found for: \"TROPI\"    Imaging:  No results found for this or any previous visit (from the past 24 hours).      Adria Amin DO  [facility] Hospitalist  [address] [place] [address]  Securely message with [employer] (more info)  Text page via [employer] Paging/Directory   03/07/2025   "

## 2025-03-07 NOTE — PLAN OF CARE
"Goal Outcome Evaluation:      Plan of Care Reviewed With: patient, child    Overall Patient Progress: no changeOverall Patient Progress: no change    Outcome Evaluation: VS monitored, 2-4L NC at rest to maintain sats, 6L NC with activity, LS coarse w/wheezes, congested/loose cough, SBA w/ww, voiding, small bm, toshia reg diet, denies pain, lap sites YAZ and C/D/I, droplet ISO cont, pt very anxious and claustrophobic, remote tele, dtr at b/s, will cont to monitor.    Problem: Adult Inpatient Plan of Care  Goal: Plan of Care Review  Description: The Plan of Care Review/Shift note should be completed every shift.  The Outcome Evaluation is a brief statement about your assessment that the patient is improving, declining, or no change.  This information will be displayed automatically on your shift  note.  Outcome: Progressing  Flowsheets (Taken 3/6/2025 1949)  Outcome Evaluation: VS monitored, 2-4L NC at rest to maintain sats, 6L NC with activity, LS coarse w/wheezes, congested/loose cough, SBA w/ww, voiding, small bm, toshia reg diet, denies pain, lap sites YAZ and C/D/I, droplet ISO cont, pt very anxious and claustrophobic, remote tele, dtr at b/s, will cont to monitor.  Plan of Care Reviewed With:   patient   child  Overall Patient Progress: no change  Goal: Patient-Specific Goal (Individualized)  Description: You can add care plan individualizations to a care plan. Examples of Individualization might be:  \"Parent requests to be called daily at 9am for status\", \"I have a hard time hearing out of my right ear\", or \"Do not touch me to wake me up as it startles  me\".  Outcome: Progressing  Goal: Absence of Hospital-Acquired Illness or Injury  Outcome: Progressing  Intervention: Identify and Manage Fall Risk  Recent Flowsheet Documentation  Taken 3/6/2025 1430 by Tayla Treviño RN  Safety Promotion/Fall Prevention:   activity supervised   assistive device/personal items within reach   clutter free environment maintained   " lighting adjusted   mobility aid in reach   nonskid shoes/slippers when out of bed   patient and family education   room organization consistent   safety round/check completed   supervised activity   treat reversible contributory factors   treat underlying cause  Intervention: Prevent Skin Injury  Recent Flowsheet Documentation  Taken 3/6/2025 1430 by Tayla Treviño RN  Body Position: position changed independently  Intervention: Prevent and Manage VTE (Venous Thromboembolism) Risk  Recent Flowsheet Documentation  Taken 3/6/2025 1430 by Tayla Treviño RN  VTE Prevention/Management: SCDs off (sequential compression devices)  Intervention: Prevent Infection  Recent Flowsheet Documentation  Taken 3/6/2025 1430 by Tayla Treviño RN  Infection Prevention:   hand hygiene promoted   rest/sleep promoted   single patient room provided  Goal: Optimal Comfort and Wellbeing  Outcome: Progressing  Intervention: Provide Person-Centered Care  Recent Flowsheet Documentation  Taken 3/6/2025 1430 by Tayla Treviño RN  Trust Relationship/Rapport:   care explained   choices provided   thoughts/feelings acknowledged  Goal: Readiness for Transition of Care  Outcome: Progressing     Problem: Comorbidity Management  Goal: Maintenance of COPD Symptom Control  Outcome: Progressing  Intervention: Maintain COPD Symptom Control  Recent Flowsheet Documentation  Taken 3/6/2025 1430 by Tayla Treviño RN  Breathing Techniques/Airway Clearance:   deep/controlled cough encouraged   pursed-lip breathing encouraged  Medication Review/Management: medications reviewed     Problem: Fall Injury Risk  Goal: Absence of Fall and Fall-Related Injury  Outcome: Progressing  Intervention: Identify and Manage Contributors  Recent Flowsheet Documentation  Taken 3/6/2025 1430 by Tayla Treviño RN  Self-Care Promotion:   independence encouraged   BADL personal objects within reach   BADL personal routines maintained   meal set-up provided   adaptive equipment  use encouraged  Medication Review/Management: medications reviewed  Intervention: Promote Injury-Free Environment  Recent Flowsheet Documentation  Taken 3/6/2025 1430 by Tayla Treviño RN  Safety Promotion/Fall Prevention:   activity supervised   assistive device/personal items within reach   clutter free environment maintained   lighting adjusted   mobility aid in reach   nonskid shoes/slippers when out of bed   patient and family education   room organization consistent   safety round/check completed   supervised activity   treat reversible contributory factors   treat underlying cause     Problem: Mechanical Ventilation Invasive  Goal: Effective Communication  Outcome: Progressing  Intervention: Ensure Effective Communication  Recent Flowsheet Documentation  Taken 3/6/2025 1430 by Tayla Treviño RN  Family/Support System Care:   involvement promoted   presence promoted  Trust Relationship/Rapport:   care explained   choices provided   thoughts/feelings acknowledged  Goal: Optimal Device Function  Outcome: Progressing  Intervention: Optimize Device Care and Function  Recent Flowsheet Documentation  Taken 3/6/2025 1430 by Tayla Treviño RN  Airway Safety Measures: all equipment/monitors on and audible  Airway/Ventilation Management:   airway patency maintained   calming measures promoted   oxygen therapy provided   pulmonary hygiene promoted  Goal: Mechanical Ventilation Liberation  Outcome: Progressing  Intervention: Promote Extubation and Mechanical Ventilation Liberation  Recent Flowsheet Documentation  Taken 3/6/2025 1430 by Tayla Treviño RN  Sleep/Rest Enhancement:   awakenings minimized   noise level reduced   regular sleep/rest pattern promoted   relaxation techniques promoted  Medication Review/Management: medications reviewed  Goal: Optimal Nutrition Delivery  Outcome: Progressing  Intervention: Optimize Nutrition Delivery  Recent Flowsheet Documentation  Taken 3/6/2025 1430 by Tayla Treviño  RN  Nutrition Support Management: weight trending reviewed  Goal: Absence of Device-Related Skin and Tissue Injury  Outcome: Progressing  Goal: Absence of Ventilator-Induced Lung Injury  Outcome: Progressing     Problem: Gas Exchange Impaired  Goal: Optimal Gas Exchange  Outcome: Progressing  Intervention: Optimize Oxygenation and Ventilation  Recent Flowsheet Documentation  Taken 3/6/2025 1430 by Tayla Treviño RN  Airway/Ventilation Management:   airway patency maintained   calming measures promoted   oxygen therapy provided   pulmonary hygiene promoted

## 2025-03-08 ENCOUNTER — APPOINTMENT (OUTPATIENT)
Dept: OCCUPATIONAL THERAPY | Facility: CLINIC | Age: 75
End: 2025-03-08
Attending: INTERNAL MEDICINE
Payer: COMMERCIAL

## 2025-03-08 LAB
ANION GAP SERPL CALCULATED.3IONS-SCNC: 7 MMOL/L (ref 7–15)
BUN SERPL-MCNC: 17.1 MG/DL (ref 8–23)
CALCIUM SERPL-MCNC: 8.7 MG/DL (ref 8.8–10.4)
CHLORIDE SERPL-SCNC: 95 MMOL/L (ref 98–107)
CREAT SERPL-MCNC: 0.57 MG/DL (ref 0.51–0.95)
EGFRCR SERPLBLD CKD-EPI 2021: >90 ML/MIN/1.73M2
ERYTHROCYTE [DISTWIDTH] IN BLOOD BY AUTOMATED COUNT: 12.4 % (ref 10–15)
GLUCOSE BLDC GLUCOMTR-MCNC: 107 MG/DL (ref 70–99)
GLUCOSE BLDC GLUCOMTR-MCNC: 115 MG/DL (ref 70–99)
GLUCOSE BLDC GLUCOMTR-MCNC: 130 MG/DL (ref 70–99)
GLUCOSE SERPL-MCNC: 133 MG/DL (ref 70–99)
HCO3 SERPL-SCNC: 39 MMOL/L (ref 22–29)
HCT VFR BLD AUTO: 33.2 % (ref 35–47)
HGB BLD-MCNC: 10.5 G/DL (ref 11.7–15.7)
MAGNESIUM SERPL-MCNC: 1.9 MG/DL (ref 1.7–2.3)
MCH RBC QN AUTO: 30.5 PG (ref 26.5–33)
MCHC RBC AUTO-ENTMCNC: 31.6 G/DL (ref 31.5–36.5)
MCV RBC AUTO: 97 FL (ref 78–100)
PLATELET # BLD AUTO: 263 10E3/UL (ref 150–450)
POTASSIUM SERPL-SCNC: 4.2 MMOL/L (ref 3.4–5.3)
RBC # BLD AUTO: 3.44 10E6/UL (ref 3.8–5.2)
SODIUM SERPL-SCNC: 141 MMOL/L (ref 135–145)
WBC # BLD AUTO: 12.4 10E3/UL (ref 4–11)

## 2025-03-08 PROCEDURE — 250N000009 HC RX 250: Performed by: INTERNAL MEDICINE

## 2025-03-08 PROCEDURE — 94640 AIRWAY INHALATION TREATMENT: CPT | Mod: 76

## 2025-03-08 PROCEDURE — 36415 COLL VENOUS BLD VENIPUNCTURE: CPT | Performed by: INTERNAL MEDICINE

## 2025-03-08 PROCEDURE — 250N000011 HC RX IP 250 OP 636: Mod: JZ | Performed by: INTERNAL MEDICINE

## 2025-03-08 PROCEDURE — 94640 AIRWAY INHALATION TREATMENT: CPT

## 2025-03-08 PROCEDURE — 97535 SELF CARE MNGMENT TRAINING: CPT | Mod: GO | Performed by: OCCUPATIONAL THERAPIST

## 2025-03-08 PROCEDURE — 82374 ASSAY BLOOD CARBON DIOXIDE: CPT | Performed by: INTERNAL MEDICINE

## 2025-03-08 PROCEDURE — 120N000001 HC R&B MED SURG/OB

## 2025-03-08 PROCEDURE — 250N000011 HC RX IP 250 OP 636: Performed by: INTERNAL MEDICINE

## 2025-03-08 PROCEDURE — 85014 HEMATOCRIT: CPT | Performed by: INTERNAL MEDICINE

## 2025-03-08 PROCEDURE — 80048 BASIC METABOLIC PNL TOTAL CA: CPT | Performed by: INTERNAL MEDICINE

## 2025-03-08 PROCEDURE — 250N000013 HC RX MED GY IP 250 OP 250 PS 637: Performed by: INTERNAL MEDICINE

## 2025-03-08 PROCEDURE — 99232 SBSQ HOSP IP/OBS MODERATE 35: CPT | Performed by: INTERNAL MEDICINE

## 2025-03-08 PROCEDURE — 97165 OT EVAL LOW COMPLEX 30 MIN: CPT | Mod: GO | Performed by: OCCUPATIONAL THERAPIST

## 2025-03-08 PROCEDURE — 83735 ASSAY OF MAGNESIUM: CPT | Performed by: INTERNAL MEDICINE

## 2025-03-08 PROCEDURE — 82310 ASSAY OF CALCIUM: CPT | Performed by: INTERNAL MEDICINE

## 2025-03-08 PROCEDURE — 999N000157 HC STATISTIC RCP TIME EA 10 MIN

## 2025-03-08 RX ORDER — GUAIFENESIN 600 MG/1
600 TABLET, EXTENDED RELEASE ORAL 2 TIMES DAILY
Status: DISCONTINUED | OUTPATIENT
Start: 2025-03-08 | End: 2025-03-13 | Stop reason: HOSPADM

## 2025-03-08 RX ORDER — METHYLPREDNISOLONE SODIUM SUCCINATE 40 MG/ML
40 INJECTION INTRAMUSCULAR; INTRAVENOUS EVERY 12 HOURS
Status: DISCONTINUED | OUTPATIENT
Start: 2025-03-08 | End: 2025-03-10

## 2025-03-08 RX ADMIN — GUAIFENESIN AND CODEINE PHOSPHATE 5 ML: 100; 10 SOLUTION ORAL at 06:52

## 2025-03-08 RX ADMIN — BENZONATATE 100 MG: 100 CAPSULE ORAL at 21:18

## 2025-03-08 RX ADMIN — METHYLPREDNISOLONE SODIUM SUCCINATE 40 MG: 40 INJECTION, POWDER, FOR SOLUTION INTRAMUSCULAR; INTRAVENOUS at 04:55

## 2025-03-08 RX ADMIN — METHYLPREDNISOLONE SODIUM SUCCINATE 40 MG: 40 INJECTION, POWDER, FOR SOLUTION INTRAMUSCULAR; INTRAVENOUS at 17:52

## 2025-03-08 RX ADMIN — IPRATROPIUM BROMIDE AND ALBUTEROL SULFATE 3 ML: .5; 3 SOLUTION RESPIRATORY (INHALATION) at 12:36

## 2025-03-08 RX ADMIN — GUAIFENESIN AND CODEINE PHOSPHATE 5 ML: 100; 10 SOLUTION ORAL at 18:01

## 2025-03-08 RX ADMIN — SODIUM CHLORIDE SOLN NEBU 3% 4 ML: 3 NEBU SOLN at 21:06

## 2025-03-08 RX ADMIN — LEVOTHYROXINE SODIUM 88 MCG: 0.09 TABLET ORAL at 09:33

## 2025-03-08 RX ADMIN — BUDESONIDE 0.5 MG: 0.5 INHALANT ORAL at 09:04

## 2025-03-08 RX ADMIN — IPRATROPIUM BROMIDE AND ALBUTEROL SULFATE 3 ML: .5; 3 SOLUTION RESPIRATORY (INHALATION) at 16:35

## 2025-03-08 RX ADMIN — ATORVASTATIN CALCIUM 10 MG: 10 TABLET, FILM COATED ORAL at 09:33

## 2025-03-08 RX ADMIN — ENOXAPARIN SODIUM 40 MG: 40 INJECTION SUBCUTANEOUS at 04:55

## 2025-03-08 RX ADMIN — IPRATROPIUM BROMIDE AND ALBUTEROL SULFATE 3 ML: .5; 3 SOLUTION RESPIRATORY (INHALATION) at 21:06

## 2025-03-08 RX ADMIN — SODIUM CHLORIDE SOLN NEBU 3% 4 ML: 3 NEBU SOLN at 09:04

## 2025-03-08 RX ADMIN — SERTRALINE HYDROCHLORIDE 25 MG: 25 TABLET ORAL at 09:33

## 2025-03-08 RX ADMIN — ALPRAZOLAM 0.25 MG: 0.25 TABLET ORAL at 09:33

## 2025-03-08 RX ADMIN — SODIUM CHLORIDE SOLN NEBU 3% 4 ML: 3 NEBU SOLN at 12:37

## 2025-03-08 RX ADMIN — GUAIFENESIN 600 MG: 600 TABLET, EXTENDED RELEASE ORAL at 13:10

## 2025-03-08 RX ADMIN — IPRATROPIUM BROMIDE AND ALBUTEROL SULFATE 3 ML: .5; 3 SOLUTION RESPIRATORY (INHALATION) at 09:04

## 2025-03-08 RX ADMIN — GUAIFENESIN AND CODEINE PHOSPHATE 5 ML: 100; 10 SOLUTION ORAL at 10:56

## 2025-03-08 RX ADMIN — GUAIFENESIN 600 MG: 600 TABLET, EXTENDED RELEASE ORAL at 19:46

## 2025-03-08 RX ADMIN — SODIUM CHLORIDE SOLN NEBU 3% 4 ML: 3 NEBU SOLN at 16:35

## 2025-03-08 RX ADMIN — MIRTAZAPINE 15 MG: 15 TABLET, FILM COATED ORAL at 21:18

## 2025-03-08 ASSESSMENT — ACTIVITIES OF DAILY LIVING (ADL)
ADLS_ACUITY_SCORE: 68
ADLS_ACUITY_SCORE: 65
ADLS_ACUITY_SCORE: 68
ADLS_ACUITY_SCORE: 65
ADLS_ACUITY_SCORE: 65
ADLS_ACUITY_SCORE: 68
ADLS_ACUITY_SCORE: 65

## 2025-03-08 NOTE — PLAN OF CARE
"Pt alert and oriented. Denies pain. On 3L nc, sating 94%. PIV saline locked. Lap incisions CDI. Blood glucose monitored. External catheter in place, voiding adequately.     Problem: Adult Inpatient Plan of Care  Goal: Plan of Care Review  Description: The Plan of Care Review/Shift note should be completed every shift.  The Outcome Evaluation is a brief statement about your assessment that the patient is improving, declining, or no change.  This information will be displayed automatically on your shift  note.  Outcome: Progressing  Flowsheets (Taken 3/8/2025 0634)  Outcome Evaluation: Pt alert and oriented. Denies pain. On 3L nc, sating 94%. PIV saline locked. Lap incisions CDI.  Plan of Care Reviewed With: patient  Overall Patient Progress: improving  Goal: Patient-Specific Goal (Individualized)  Description: You can add care plan individualizations to a care plan. Examples of Individualization might be:  \"Parent requests to be called daily at 9am for status\", \"I have a hard time hearing out of my right ear\", or \"Do not touch me to wake me up as it startles  me\".  Outcome: Progressing  Goal: Absence of Hospital-Acquired Illness or Injury  Outcome: Progressing  Intervention: Identify and Manage Fall Risk  Recent Flowsheet Documentation  Taken 3/7/2025 2343 by Jayda Doss RN  Safety Promotion/Fall Prevention:   activity supervised   assistive device/personal items within reach   lighting adjusted   clutter free environment maintained  Intervention: Prevent Skin Injury  Recent Flowsheet Documentation  Taken 3/7/2025 2343 by Jayda Doss, RN  Body Position: position changed independently  Skin Protection:   adhesive use limited   tubing/devices free from skin contact  Intervention: Prevent and Manage VTE (Venous Thromboembolism) Risk  Recent Flowsheet Documentation  Taken 3/7/2025 2343 by Jayda Doss RN  VTE Prevention/Management: SCDs off (sequential compression devices)  Intervention: " Prevent Infection  Recent Flowsheet Documentation  Taken 3/7/2025 2343 by Jayda Doss, RN  Infection Prevention:   rest/sleep promoted   single patient room provided   hand hygiene promoted  Goal: Optimal Comfort and Wellbeing  Outcome: Progressing  Intervention: Monitor Pain and Promote Comfort  Recent Flowsheet Documentation  Taken 3/7/2025 2343 by Jayda Doss, RN  Pain Management Interventions: declines  Goal: Readiness for Transition of Care  Outcome: Progressing     Problem: Comorbidity Management  Goal: Maintenance of COPD Symptom Control  Outcome: Progressing  Intervention: Maintain COPD Symptom Control  Recent Flowsheet Documentation  Taken 3/7/2025 2343 by Jayda Doss, RN  Medication Review/Management: medications reviewed     Problem: Fall Injury Risk  Goal: Absence of Fall and Fall-Related Injury  Outcome: Progressing  Intervention: Identify and Manage Contributors  Recent Flowsheet Documentation  Taken 3/7/2025 2343 by Jayda Doss, RN  Medication Review/Management: medications reviewed  Intervention: Promote Injury-Free Environment  Recent Flowsheet Documentation  Taken 3/7/2025 2343 by Jayda Doss, RN  Safety Promotion/Fall Prevention:   activity supervised   assistive device/personal items within reach   lighting adjusted   clutter free environment maintained     Problem: Mechanical Ventilation Invasive  Goal: Effective Communication  Outcome: Progressing  Goal: Optimal Device Function  Outcome: Progressing  Intervention: Optimize Device Care and Function  Recent Flowsheet Documentation  Taken 3/7/2025 2343 by Jayda Doss, RN  Airway Safety Measures:   all equipment/monitors on and audible   oxygen flowmeter  Airway/Ventilation Management:   pulmonary hygiene promoted   airway patency maintained  Goal: Mechanical Ventilation Liberation  Outcome: Progressing  Intervention: Promote Extubation and Mechanical Ventilation Liberation  Recent  Flowsheet Documentation  Taken 3/7/2025 2343 by Jayda Doss, RN  Medication Review/Management: medications reviewed  Goal: Optimal Nutrition Delivery  Outcome: Progressing  Goal: Absence of Device-Related Skin and Tissue Injury  Outcome: Progressing  Intervention: Maintain Skin and Tissue Health  Recent Flowsheet Documentation  Taken 3/7/2025 2343 by Jayda Doss, RN  Device Skin Pressure Protection: absorbent pad utilized/changed  Goal: Absence of Ventilator-Induced Lung Injury  Outcome: Progressing  Intervention: Prevent Ventilator-Associated Pneumonia  Recent Flowsheet Documentation  Taken 3/7/2025 2343 by Jayda Doss, RN  Head of Bed (HOB) Positioning: HOB at 30 degrees     Problem: Gas Exchange Impaired  Goal: Optimal Gas Exchange  Outcome: Progressing  Intervention: Optimize Oxygenation and Ventilation  Recent Flowsheet Documentation  Taken 3/7/2025 2343 by Jayda Doss, RN  Airway/Ventilation Management:   pulmonary hygiene promoted   airway patency maintained  Head of Bed (HOB) Positioning: HOB at 30 degrees     Problem: Infection  Goal: Absence of Infection Signs and Symptoms  Outcome: Progressing  Intervention: Prevent or Manage Infection  Recent Flowsheet Documentation  Taken 3/7/2025 2343 by Jayda Doss, RN  Isolation Precautions: droplet precautions maintained   Goal Outcome Evaluation:      Plan of Care Reviewed With: patient    Overall Patient Progress: improvingOverall Patient Progress: improving    Outcome Evaluation: Pt alert and oriented. Denies pain. On 3L nc, sating 94%. PIV saline locked. Lap incisions CDI.

## 2025-03-08 NOTE — PROGRESS NOTES
[facility]    Hospitalist Progress Note  Name: Josephine Nicole    MRN: [id number]  Provider:  Adria Amin DO  Date of Service: 03/08/2025    Summary of Stay: Josephine Nicole is a 75 year old female with a history of COPD with chronic home oxygen dependence of 2 L continuously, hypothyroidism, anxiety/depression, hyperlipidemia admitted on 3/5/2025 with shortness of breath.  In the emergency department, the patient was found to have a blood pressure 125/103, temperature 98.6  F, heart rate 87, respiratory rate 22, SpO2 89% on 4 L nasal cannula.  Initial lab work showed magnesium 1.6, proBNP 4687, high-sensitivity troponin 13, venous pH 7.34 with a venous pCO2 of 81, hemoglobin 10.3.  COVID-19, influenza, RSV are negative.  CT chest showed small right and trace left oral effusions with adjacent compressive atelectasis, severe centrilobular emphysema.  The patient did ultimately require continuous BiPAP in the emergency department.  The patient was started on Solu-Medrol and scheduled DuoNeb treatments in the setting of acute COPD exacerbation.  The patient was transitioned off of continuous BiPAP to nasal cannula.     TODAY'S PLAN: Patient with coughing attack this morning and desaturation.  Patient reports nasal congestion.  Oxygen saturations improved with placement of oxy mask.  Appreciate nursing assistance with this.  Decrease Solu-Medrol to every 12 today as no wheezing on exam.  Patient with thick clear mucus she is coughing up.  Will add scheduled Mucinex.  Low threshold to start antibiotics.  Patient did work with PT this morning and appreciate their recommendations.  Daughter is at bedside.  Anticipate another few days in the hospital for the above.  Patient has very significant anxiety regarding her breathing issues.    Problem List:   Acute on Chronic Hypoxic Respiratory Failure  Acute Hypercapnic Respiratory Failure  Acute COPD Exacerbation  Coronavirus Infection (NOT covid)  -  Initially required continuous bipap.  Titrated off on AM of 3/6  - At baseline pt uses 2L continuously  - Resp viral panel positive for coronavirus  - Holding off on abx for now given no infiltrate on CT chest and normal WBC  - Solumedrol 40 mg IV q12h  - Duonebs QID, prn  - Add pulmicort neb daily  - Trelegy is $595 per month which is unrealistic.  Will consider adding muscarinic antagonist at discharge (spiriva vs incruse ellipta)     Small Bilateral Pleural Effusion  Suspected Mild Fluid Overload  - Echocardiogram showed EF 65-70%, G1DD, no RWMA  - No further plan for diuresis  - Telemetry  - Strict I/O  - Daily Weights     Hypomagnesemia  - Electrolyte replacement protocol     Chronic Medical Problems:  Hypothyroidism  Anxiety/Depression  Hyperlipidemia    I spent 46 minutes in reviewing this patient's labs, imaging, medications, medical history.  In addition time was spent interviewing the patient, communicating with family, and medical decision making.      DVT Prophylaxis: Enoxaparin (Lovenox) SQ  Code Status: Full Code  Diet: Regular Diet Adult    Roman Catheter: Not present    Disposition: Medically Ready for Discharge: Anticipated in 2-4 Days    Goals to discharge include: hypoxia improved, respiratory symptoms improved  Family updated today: Yes      Interval History   Pt seen and examined.  Pt reports was feeling well this morning.  Then saw she had low oxygen and became anxious, then started coughing and couldn't stop.    -Data reviewed today: I personally reviewed all new labs and imaging results over the last 24 hours.     Physical Exam   Temp: 98.6  F (37  C) Temp src: Temporal BP: (!) 155/76 Pulse: 88   Resp: 22 SpO2: (!) 90 % O2 Device: Simple face mask Oxygen Delivery: 5 LPM  Vitals:    03/06/25 0330 03/07/25 0605 03/08/25 0544   Weight: 60.1 kg (132 lb 7.9 oz) 61.2 kg (134 lb 14.7 oz) 61.3 kg (135 lb 2.3 oz)     Vital Signs with Ranges  Temp:  [96  F (35.6  C)-98.6  F (37  C)] 98.6  F (37   C)  Pulse:  [67-89] 88  Resp:  [17-22] 22  BP: (129-155)/(60-78) 155/76  SpO2:  [75 %-98 %] 90 %  I/O last 3 completed shifts:  In: 100 [P.O.:100]  Out: 1700 [Urine:1700]    GENERAL: No apparent distress. Awake, alert, and fully oriented.  HEENT: Normocephalic, atraumatic. Extraocular movements intact.  CARDIOVASCULAR: Regular rate and rhythm without murmurs or rubs. No S3.  PULMONARY: Clear bilaterally.  GASTROINTESTINAL: Soft, non-tender, non-distended. Bowel sounds normoactive.   EXTREMITIES: No cyanosis or clubbing. No edema.  NEUROLOGICAL: CN 2-12 grossly intact, no focal neurological deficits.  DERMATOLOGICAL: No rash, ulcer, bruising, nor jaundice.    Medications   Current Facility-Administered Medications   Medication Dose Route Frequency Provider Last Rate Last Admin    No lozenges or gum should be given while patient on BIPAP/AVAPS/AVAPS AE   Does not apply Continuous PRN Natalya Hammond MD        Patient may continue current oral medications   Does not apply Continuous PRN Natalya Hammond MD         Current Facility-Administered Medications   Medication Dose Route Frequency Provider Last Rate Last Admin    atorvastatin (LIPITOR) tablet 10 mg  10 mg Oral Daily Natalya Hammond MD   10 mg at 03/08/25 0933    budesonide (PULMICORT) neb solution 0.5 mg  0.5 mg Nebulization Daily Adria Amin DO   0.5 mg at 03/08/25 0904    cyanocobalamin injection 1,000 mcg  1,000 mcg Intramuscular Q30 Days Natalya Hammond MD        diazepam (VALIUM) tablet 5 mg  5 mg Oral See Admin Instructions Natalya Hammond MD        enoxaparin ANTICOAGULANT (LOVENOX) injection 40 mg  40 mg Subcutaneous Q24H Natalya Hammond MD   40 mg at 03/08/25 0455    [Held by provider] fluticasone-vilanterol (BREO ELLIPTA) 200-25 MCG/ACT inhaler 1 puff  1 puff Inhalation Daily Natalya Hammond MD        guaiFENesin (MUCINEX) 12 hr tablet 600 mg  600 mg Oral BID Adria Amin DO         "ipratropium - albuterol 0.5 mg/2.5 mg/3 mL (DUONEB) neb solution 3 mL  3 mL Nebulization 4x daily Adria Amin DO   3 mL at 03/08/25 0904    levothyroxine (SYNTHROID/LEVOTHROID) tablet 88 mcg  88 mcg Oral Daily Natalya Hammond MD   88 mcg at 03/08/25 0933    methylPREDNISolone sodium succinate (SOLU-MEDROL) injection 40 mg  40 mg Intravenous Q12H Adria Amin DO        mirtazapine (REMERON) tablet 15 mg  15 mg Oral At Bedtime Natalya Hammond MD   15 mg at 03/07/25 2058    sertraline (ZOLOFT) tablet 25 mg  25 mg Oral QAM Natalya Hammond MD   25 mg at 03/08/25 0933    sodium chloride (NEBUSAL) 3 % neb solution 4 mL  4 mL Nebulization 4x Daily Natalya Hammond MD   4 mL at 03/08/25 0904     Data     Laboratory:  Recent Labs   Lab 03/08/25  0640 03/07/25  0637 03/05/25  2221   WBC 12.4* 10.1 6.4   HGB 10.5* 10.2* 10.3*   HCT 33.2* 31.7* 33.6*   MCV 97 93 97    257 216     Recent Labs   Lab 03/08/25  0746 03/08/25  0640 03/08/25  0158 03/07/25  0637 03/06/25  0323 03/05/25  2221   NA  --  141  --  141  --  142   POTASSIUM  --  4.2  --  3.4  --  3.7   CHLORIDE  --  95*  --  92*  --  98   CO2  --  39*  --  40*  --  39*   ANIONGAP  --  7  --  9  --  5*   * 133* 130* 127*   < > 137*   BUN  --  17.1  --  13.6  --  7.7*   CR  --  0.57  --  0.57  --  0.58   GFRESTIMATED  --  >90  --  >90  --  >90   CHAO  --  8.7*  --  8.9  --  8.7*    < > = values in this interval not displayed.     No results for input(s): \"CULT\" in the last 168 hours.  No results found for: \"TROPI\"    Imaging:  No results found for this or any previous visit (from the past 24 hours).      Adria Amin DO  [facility] Hospitalist  [address] [place] [address]  Securely message with [employer] (more info)  Text page via [facility] Paging/Directory   03/08/2025   "

## 2025-03-08 NOTE — PLAN OF CARE
"Goal Outcome Evaluation:      Plan of Care Reviewed With: patient    Overall Patient Progress: no changeOverall Patient Progress: no change    Outcome Evaluation: A/o x 4. Forgetful. Anxious. Claustrophobic. VSS. 4L NC Desats to mid 80s when anxious. LS expiratory wheezing. Frequent productive cough. KELLY with activity and at rest when anxious. On duonebs, Solumedrol. Robutissin and tessalon given with some relief. Pure wick in placed. Tele SR. Laps sites CDI. Denies pain. A x 1 gb/w    Problem: Adult Inpatient Plan of Care  Goal: Plan of Care Review  Description: The Plan of Care Review/Shift note should be completed every shift.  The Outcome Evaluation is a brief statement about your assessment that the patient is improving, declining, or no change.  This information will be displayed automatically on your shift  note.  Outcome: Progressing  Flowsheets (Taken 3/7/2025 1430)  Outcome Evaluation: VSS. 4L NC Desats to mid 80s when anxious. LS expiratory wheezing. Frequent productive cough. KELLY with activity and at rest when anxious. Purewick in placed.  Plan of Care Reviewed With: patient  Overall Patient Progress: no change  Goal: Patient-Specific Goal (Individualized)  Description: You can add care plan individualizations to a care plan. Examples of Individualization might be:  \"Parent requests to be called daily at 9am for status\", \"I have a hard time hearing out of my right ear\", or \"Do not touch me to wake me up as it startles  me\".  Outcome: Progressing  Goal: Absence of Hospital-Acquired Illness or Injury  Outcome: Progressing  Intervention: Identify and Manage Fall Risk  Recent Flowsheet Documentation  Taken 3/7/2025 9796 by Vi Spangler, RN  Safety Promotion/Fall Prevention:   activity supervised   assistive device/personal items within reach   clutter free environment maintained   nonskid shoes/slippers when out of bed   patient and family education   room organization consistent   safety round/check " completed   supervised activity   treat underlying cause   treat reversible contributory factors  Intervention: Prevent Skin Injury  Recent Flowsheet Documentation  Taken 3/7/2025 0815 by Vi Spangler RN  Body Position: position changed independently  Intervention: Prevent Infection  Recent Flowsheet Documentation  Taken 3/7/2025 0815 by Vi Spangler RN  Infection Prevention:   single patient room provided   personal protective equipment utilized  Goal: Optimal Comfort and Wellbeing  Outcome: Progressing  Goal: Readiness for Transition of Care  Outcome: Progressing     Problem: Comorbidity Management  Goal: Maintenance of COPD Symptom Control  Outcome: Progressing  Intervention: Maintain COPD Symptom Control  Recent Flowsheet Documentation  Taken 3/7/2025 0815 by Vi Spangler RN  Medication Review/Management: medications reviewed     Problem: Fall Injury Risk  Goal: Absence of Fall and Fall-Related Injury  Outcome: Progressing  Intervention: Identify and Manage Contributors  Recent Flowsheet Documentation  Taken 3/7/2025 0815 by Vi Spangler RN  Medication Review/Management: medications reviewed  Intervention: Promote Injury-Free Environment  Recent Flowsheet Documentation  Taken 3/7/2025 0815 by Vi Spangler RN  Safety Promotion/Fall Prevention:   activity supervised   assistive device/personal items within reach   clutter free environment maintained   nonskid shoes/slippers when out of bed   patient and family education   room organization consistent   safety round/check completed   supervised activity   treat underlying cause   treat reversible contributory factors     Problem: Mechanical Ventilation Invasive  Goal: Effective Communication  Outcome: Progressing  Goal: Optimal Device Function  Outcome: Progressing  Intervention: Optimize Device Care and Function  Recent Flowsheet Documentation  Taken 3/7/2025 0815 by Vi Spangler RN  Airway Safety Measures: all equipment/monitors on and audible  Goal:  Mechanical Ventilation Liberation  Outcome: Progressing  Intervention: Promote Extubation and Mechanical Ventilation Liberation  Recent Flowsheet Documentation  Taken 3/7/2025 0815 by Vi Spangler, RN  Medication Review/Management: medications reviewed  Goal: Optimal Nutrition Delivery  Outcome: Progressing  Goal: Absence of Device-Related Skin and Tissue Injury  Outcome: Progressing  Goal: Absence of Ventilator-Induced Lung Injury  Outcome: Progressing  Intervention: Prevent Ventilator-Associated Pneumonia  Recent Flowsheet Documentation  Taken 3/7/2025 0815 by Vi Spangler, RN  Head of Bed ([facility]) Positioning: HOB at 30 degrees     Problem: Gas Exchange Impaired  Goal: Optimal Gas Exchange  Outcome: Progressing  Intervention: Optimize Oxygenation and Ventilation  Recent Flowsheet Documentation  Taken 3/7/2025 0815 by Vi Spangler, RN  Head of Bed ([facility]) Positioning: HOB at 30 degrees

## 2025-03-08 NOTE — PLAN OF CARE
"Goal Outcome Evaluation:      Plan of Care Reviewed With: patient    Overall Patient Progress: no changeOverall Patient Progress: no change    Outcome Evaluation: vss afebrile. 02 increased to 5L NC  this am. Pt c/o breathing issues, \"felt scared\" and anxious.  Xanax given. Pt switched to simple oxymask. pt able to decrease to 4L. sats 96-98%. pt appears more comfortable in chair. robutussin for cough and mucinex tablet. iv steroids/nebs. will attempt to decrease 02 after pt finished lunch. Pt reminded to pursed lip breath when feeling scared or anxious.      "

## 2025-03-08 NOTE — PLAN OF CARE
"Goal Outcome Evaluation:      Plan of Care Reviewed With: patient    Overall Patient Progress: no changeOverall Patient Progress: no change    Outcome Evaluation: VSS on O2 @ 4 lpm nasal cannula, sats 91-92 %. continuous pulse oximetry. Dyspnea with activity, frequent cough, scheduled neb treatment, robitussin and tessalon amee given with some relief. Assisted with adl's . Denies pain. Lap sites, dry, intact. Contact isolation precautions.      Problem: Adult Inpatient Plan of Care  Goal: Plan of Care Review  Description: The Plan of Care Review/Shift note should be completed every shift.  The Outcome Evaluation is a brief statement about your assessment that the patient is improving, declining, or no change.  This information will be displayed automatically on your shift  note.  Outcome: Not Progressing  Flowsheets (Taken 3/7/2025 2038)  Outcome Evaluation: VSS on O2 @ 4 lpm nasal cannula, sats 91-92 %. continuous pulse oximetry. Dyspnea with activity, frequent cough, scheduled neb treatment, robitussin and tessalon amee given with some relief. Assisted with adl's . Denies pain. Lap sites, dry, intact. Contact isolation precautions.  Plan of Care Reviewed With: patient  Overall Patient Progress: no change  Goal: Patient-Specific Goal (Individualized)  Description: You can add care plan individualizations to a care plan. Examples of Individualization might be:  \"Parent requests to be called daily at 9am for status\", \"I have a hard time hearing out of my right ear\", or \"Do not touch me to wake me up as it startles  me\".  Outcome: Not Progressing  Goal: Absence of Hospital-Acquired Illness or Injury  Outcome: Not Progressing  Intervention: Identify and Manage Fall Risk  Recent Flowsheet Documentation  Taken 3/7/2025 1654 by Pushpa Sanchez, RN  Safety Promotion/Fall Prevention:   activity supervised   assistive device/personal items within reach   lighting adjusted   clutter free environment " maintained  Intervention: Prevent Skin Injury  Recent Flowsheet Documentation  Taken 3/7/2025 1740 by Pushpa Sanchez RN  Body Position: supine, head elevated  Taken 3/7/2025 1654 by Pushpa Sanchez RN  Body Position: supine, head elevated  Intervention: Prevent and Manage VTE (Venous Thromboembolism) Risk  Recent Flowsheet Documentation  Taken 3/7/2025 1654 by Pushpa Sanchez RN  VTE Prevention/Management: SCDs on (sequential compression devices)  Intervention: Prevent Infection  Recent Flowsheet Documentation  Taken 3/7/2025 1654 by Pushpa Sanchez RN  Infection Prevention:   rest/sleep promoted   single patient room provided   hand hygiene promoted  Goal: Optimal Comfort and Wellbeing  Outcome: Not Progressing  Intervention: Monitor Pain and Promote Comfort  Recent Flowsheet Documentation  Taken 3/7/2025 1605 by Pushpa Sanchez RN  Pain Management Interventions: care clustered  Intervention: Provide Person-Centered Care  Recent Flowsheet Documentation  Taken 3/7/2025 1654 by Pushpa Sanchez RN  Trust Relationship/Rapport:   care explained   questions answered   thoughts/feelings acknowledged   reassurance provided  Goal: Readiness for Transition of Care  Outcome: Not Progressing     Problem: Comorbidity Management  Goal: Maintenance of COPD Symptom Control  Outcome: Not Progressing  Intervention: Maintain COPD Symptom Control  Recent Flowsheet Documentation  Taken 3/7/2025 1654 by Pushpa Sanchez RN  Breathing Techniques/Airway Clearance: deep/controlled cough encouraged  Medication Review/Management: medications reviewed     Problem: Fall Injury Risk  Goal: Absence of Fall and Fall-Related Injury  Outcome: Not Progressing  Intervention: Identify and Manage Contributors  Recent Flowsheet Documentation  Taken 3/7/2025 1654 by Pushpa Sanchez RN  Self-Care Promotion:   independence encouraged   BADL personal objects within reach  Medication Review/Management: medications  reviewed  Intervention: Promote Injury-Free Environment  Recent Flowsheet Documentation  Taken 3/7/2025 1654 by Pushpa Sanchez RN  Safety Promotion/Fall Prevention:   activity supervised   assistive device/personal items within reach   lighting adjusted   clutter free environment maintained     Problem: Mechanical Ventilation Invasive  Goal: Effective Communication  Outcome: Not Progressing  Intervention: Ensure Effective Communication  Recent Flowsheet Documentation  Taken 3/7/2025 1654 by Pushpa Sanchez RN  Trust Relationship/Rapport:   care explained   questions answered   thoughts/feelings acknowledged   reassurance provided  Goal: Optimal Device Function  Outcome: Not Progressing  Intervention: Optimize Device Care and Function  Recent Flowsheet Documentation  Taken 3/7/2025 1654 by Pushpa Sanchez RN  Airway Safety Measures:   all equipment/monitors on and audible   oxygen flowmeter  Airway/Ventilation Management:   pulmonary hygiene promoted   airway patency maintained  Oral Care: oral rinse provided  Goal: Mechanical Ventilation Liberation  Outcome: Not Progressing  Intervention: Promote Extubation and Mechanical Ventilation Liberation  Recent Flowsheet Documentation  Taken 3/7/2025 1654 by Pushpa Sanchez RN  Sleep/Rest Enhancement:   noise level reduced   awakenings minimized  Medication Review/Management: medications reviewed  Goal: Optimal Nutrition Delivery  Outcome: Not Progressing  Goal: Absence of Device-Related Skin and Tissue Injury  Outcome: Not Progressing  Intervention: Maintain Skin and Tissue Health  Recent Flowsheet Documentation  Taken 3/7/2025 1654 by Pushpa Sanchez RN  Device Skin Pressure Protection: absorbent pad utilized/changed  Goal: Absence of Ventilator-Induced Lung Injury  Outcome: Not Progressing  Intervention: Prevent Ventilator-Associated Pneumonia  Recent Flowsheet Documentation  Taken 3/7/2025 1740 by Pushpa Sanchez RN  Head of Bed (HOB) Positioning: HOB  at 45 degrees  Taken 3/7/2025 1654 by Pushpa Sanchez, RN  Oral Care: oral rinse provided  Head of Bed (HOB) Positioning: HOB at 45 degrees     Problem: Gas Exchange Impaired  Goal: Optimal Gas Exchange  Outcome: Not Progressing  Intervention: Optimize Oxygenation and Ventilation  Recent Flowsheet Documentation  Taken 3/7/2025 1740 by Pushpa Sanchez, RN  Head of Bed (HOB) Positioning: HOB at 45 degrees  Taken 3/7/2025 1654 by Pushpa Sanchez, RN  Airway/Ventilation Management:   pulmonary hygiene promoted   airway patency maintained  Head of Bed (HOB) Positioning: HOB at 45 degrees

## 2025-03-09 LAB
ANION GAP SERPL CALCULATED.3IONS-SCNC: 6 MMOL/L (ref 7–15)
BUN SERPL-MCNC: 20.3 MG/DL (ref 8–23)
CALCIUM SERPL-MCNC: 8.7 MG/DL (ref 8.8–10.4)
CHLORIDE SERPL-SCNC: 94 MMOL/L (ref 98–107)
CREAT SERPL-MCNC: 0.59 MG/DL (ref 0.51–0.95)
EGFRCR SERPLBLD CKD-EPI 2021: >90 ML/MIN/1.73M2
ERYTHROCYTE [DISTWIDTH] IN BLOOD BY AUTOMATED COUNT: 12.3 % (ref 10–15)
GLUCOSE BLDC GLUCOMTR-MCNC: 111 MG/DL (ref 70–99)
GLUCOSE SERPL-MCNC: 111 MG/DL (ref 70–99)
HCO3 SERPL-SCNC: 42 MMOL/L (ref 22–29)
HCT VFR BLD AUTO: 32.7 % (ref 35–47)
HGB BLD-MCNC: 10.1 G/DL (ref 11.7–15.7)
MAGNESIUM SERPL-MCNC: 1.9 MG/DL (ref 1.7–2.3)
MCH RBC QN AUTO: 29.5 PG (ref 26.5–33)
MCHC RBC AUTO-ENTMCNC: 30.9 G/DL (ref 31.5–36.5)
MCV RBC AUTO: 96 FL (ref 78–100)
PLATELET # BLD AUTO: 280 10E3/UL (ref 150–450)
POTASSIUM SERPL-SCNC: 4.2 MMOL/L (ref 3.4–5.3)
RBC # BLD AUTO: 3.42 10E6/UL (ref 3.8–5.2)
SODIUM SERPL-SCNC: 142 MMOL/L (ref 135–145)
WBC # BLD AUTO: 13.9 10E3/UL (ref 4–11)

## 2025-03-09 PROCEDURE — 94640 AIRWAY INHALATION TREATMENT: CPT

## 2025-03-09 PROCEDURE — 85018 HEMOGLOBIN: CPT | Performed by: INTERNAL MEDICINE

## 2025-03-09 PROCEDURE — 83735 ASSAY OF MAGNESIUM: CPT | Performed by: INTERNAL MEDICINE

## 2025-03-09 PROCEDURE — 250N000013 HC RX MED GY IP 250 OP 250 PS 637: Performed by: INTERNAL MEDICINE

## 2025-03-09 PROCEDURE — 120N000001 HC R&B MED SURG/OB

## 2025-03-09 PROCEDURE — 250N000011 HC RX IP 250 OP 636: Performed by: INTERNAL MEDICINE

## 2025-03-09 PROCEDURE — 999N000157 HC STATISTIC RCP TIME EA 10 MIN

## 2025-03-09 PROCEDURE — 250N000009 HC RX 250: Performed by: INTERNAL MEDICINE

## 2025-03-09 PROCEDURE — 99232 SBSQ HOSP IP/OBS MODERATE 35: CPT | Mod: 25 | Performed by: INTERNAL MEDICINE

## 2025-03-09 PROCEDURE — 999N000156 HC STATISTIC RCP CONSULT EA 30 MIN

## 2025-03-09 PROCEDURE — 80048 BASIC METABOLIC PNL TOTAL CA: CPT | Performed by: INTERNAL MEDICINE

## 2025-03-09 PROCEDURE — 99497 ADVNCD CARE PLAN 30 MIN: CPT | Performed by: INTERNAL MEDICINE

## 2025-03-09 PROCEDURE — 36415 COLL VENOUS BLD VENIPUNCTURE: CPT | Performed by: INTERNAL MEDICINE

## 2025-03-09 PROCEDURE — 84520 ASSAY OF UREA NITROGEN: CPT | Performed by: INTERNAL MEDICINE

## 2025-03-09 PROCEDURE — 258N000003 HC RX IP 258 OP 636: Performed by: INTERNAL MEDICINE

## 2025-03-09 PROCEDURE — 94640 AIRWAY INHALATION TREATMENT: CPT | Mod: 76

## 2025-03-09 RX ORDER — SODIUM CHLORIDE FOR INHALATION 3 %
3 VIAL, NEBULIZER (ML) INHALATION 4 TIMES DAILY
Status: DISCONTINUED | OUTPATIENT
Start: 2025-03-09 | End: 2025-03-13 | Stop reason: HOSPADM

## 2025-03-09 RX ORDER — AZITHROMYCIN 250 MG/1
250 TABLET, FILM COATED ORAL DAILY
Status: COMPLETED | OUTPATIENT
Start: 2025-03-10 | End: 2025-03-13

## 2025-03-09 RX ORDER — ZOLPIDEM TARTRATE 5 MG/1
5 TABLET ORAL
Status: DISCONTINUED | OUTPATIENT
Start: 2025-03-09 | End: 2025-03-10

## 2025-03-09 RX ORDER — LEVALBUTEROL INHALATION SOLUTION 1.25 MG/3ML
1.25 SOLUTION RESPIRATORY (INHALATION) 4 TIMES DAILY
Status: DISCONTINUED | OUTPATIENT
Start: 2025-03-09 | End: 2025-03-13 | Stop reason: HOSPADM

## 2025-03-09 RX ORDER — SODIUM CHLORIDE FOR INHALATION 3 %
3 VIAL, NEBULIZER (ML) INHALATION 4 TIMES DAILY
Status: DISCONTINUED | OUTPATIENT
Start: 2025-03-09 | End: 2025-03-09

## 2025-03-09 RX ORDER — AZITHROMYCIN 500 MG/5ML
500 INJECTION, POWDER, LYOPHILIZED, FOR SOLUTION INTRAVENOUS ONCE
Status: COMPLETED | OUTPATIENT
Start: 2025-03-09 | End: 2025-03-09

## 2025-03-09 RX ADMIN — AZITHROMYCIN MONOHYDRATE 500 MG: 500 INJECTION, POWDER, LYOPHILIZED, FOR SOLUTION INTRAVENOUS at 13:17

## 2025-03-09 RX ADMIN — GUAIFENESIN AND CODEINE PHOSPHATE 5 ML: 100; 10 SOLUTION ORAL at 21:21

## 2025-03-09 RX ADMIN — BUDESONIDE 0.5 MG: 0.5 INHALANT ORAL at 07:27

## 2025-03-09 RX ADMIN — SODIUM CHLORIDE SOLN NEBU 3% 4 ML: 3 NEBU SOLN at 07:27

## 2025-03-09 RX ADMIN — IPRATROPIUM BROMIDE 0.5 MG: 0.5 SOLUTION RESPIRATORY (INHALATION) at 11:41

## 2025-03-09 RX ADMIN — ENOXAPARIN SODIUM 40 MG: 40 INJECTION SUBCUTANEOUS at 04:48

## 2025-03-09 RX ADMIN — SODIUM CHLORIDE SOLN NEBU 3% 3 ML: 3 NEBU SOLN at 15:36

## 2025-03-09 RX ADMIN — METHYLPREDNISOLONE SODIUM SUCCINATE 40 MG: 40 INJECTION, POWDER, FOR SOLUTION INTRAMUSCULAR; INTRAVENOUS at 18:11

## 2025-03-09 RX ADMIN — IPRATROPIUM BROMIDE 0.5 MG: 0.5 SOLUTION RESPIRATORY (INHALATION) at 15:36

## 2025-03-09 RX ADMIN — GUAIFENESIN 600 MG: 600 TABLET, EXTENDED RELEASE ORAL at 21:19

## 2025-03-09 RX ADMIN — IPRATROPIUM BROMIDE AND ALBUTEROL SULFATE 3 ML: .5; 3 SOLUTION RESPIRATORY (INHALATION) at 07:27

## 2025-03-09 RX ADMIN — MIRTAZAPINE 15 MG: 15 TABLET, FILM COATED ORAL at 21:19

## 2025-03-09 RX ADMIN — LEVALBUTEROL HYDROCHLORIDE 1.25 MG: 1.25 SOLUTION RESPIRATORY (INHALATION) at 11:41

## 2025-03-09 RX ADMIN — LEVALBUTEROL HYDROCHLORIDE 1.25 MG: 1.25 SOLUTION RESPIRATORY (INHALATION) at 15:36

## 2025-03-09 RX ADMIN — SODIUM CHLORIDE SOLN NEBU 3% 3 ML: 3 NEBU SOLN at 19:45

## 2025-03-09 RX ADMIN — ATORVASTATIN CALCIUM 10 MG: 10 TABLET, FILM COATED ORAL at 08:44

## 2025-03-09 RX ADMIN — LEVALBUTEROL HYDROCHLORIDE 1.25 MG: 1.25 SOLUTION RESPIRATORY (INHALATION) at 19:45

## 2025-03-09 RX ADMIN — SODIUM CHLORIDE SOLN NEBU 3% 3 ML: 3 NEBU SOLN at 11:41

## 2025-03-09 RX ADMIN — METHYLPREDNISOLONE SODIUM SUCCINATE 40 MG: 40 INJECTION, POWDER, FOR SOLUTION INTRAMUSCULAR; INTRAVENOUS at 04:48

## 2025-03-09 RX ADMIN — SERTRALINE HYDROCHLORIDE 25 MG: 25 TABLET ORAL at 08:44

## 2025-03-09 RX ADMIN — LEVOTHYROXINE SODIUM 88 MCG: 0.09 TABLET ORAL at 08:44

## 2025-03-09 RX ADMIN — GUAIFENESIN 600 MG: 600 TABLET, EXTENDED RELEASE ORAL at 08:44

## 2025-03-09 RX ADMIN — IPRATROPIUM BROMIDE 0.5 MG: 0.5 SOLUTION RESPIRATORY (INHALATION) at 19:45

## 2025-03-09 RX ADMIN — ALPRAZOLAM 0.25 MG: 0.25 TABLET ORAL at 08:43

## 2025-03-09 RX ADMIN — ALPRAZOLAM 0.25 MG: 0.25 TABLET ORAL at 18:39

## 2025-03-09 ASSESSMENT — ACTIVITIES OF DAILY LIVING (ADL)
ADLS_ACUITY_SCORE: 66
ADLS_ACUITY_SCORE: 66
ADLS_ACUITY_SCORE: 65
DEPENDENT_IADLS:: CLEANING;COOKING;LAUNDRY;SHOPPING;MEAL PREPARATION;MEDICATION MANAGEMENT;TRANSPORTATION
ADLS_ACUITY_SCORE: 66
ADLS_ACUITY_SCORE: 65
ADLS_ACUITY_SCORE: 66
ADLS_ACUITY_SCORE: 65
ADLS_ACUITY_SCORE: 65

## 2025-03-09 NOTE — PLAN OF CARE
"Goal Outcome Evaluation:      Plan of Care Reviewed With: patient    Overall Patient Progress: no changeOverall Patient Progress: no change    Outcome Evaluation: Pt titrated throughout the night between 3LPM and 2LPM of O2. LS - expiratory wheezes noted. Solu-Medrol IV every 12hrs. Alert and oriented.    Pt up with 1 assist, walker, and gait belt. Nonproductive cough. Encouraged IS use along with CADB exercises. Remote tele - SR 70s. Mg protocol - recheck this AM. Voiding adequately - utilizing purewick. SL'd. Droplet Precautions maintained. PT/OT following.      Problem: Adult Inpatient Plan of Care  Goal: Plan of Care Review  Description: The Plan of Care Review/Shift note should be completed every shift.  The Outcome Evaluation is a brief statement about your assessment that the patient is improving, declining, or no change.  This information will be displayed automatically on your shift  note.  Outcome: Not Progressing  Flowsheets (Taken 3/9/2025 0612)  Outcome Evaluation: Pt titrated throughout the night between 3LPM and 2LPM of O2. LS - expiratory wheezes noted. Solu-Medrol IV every 12hrs. Alert and oriented.  Plan of Care Reviewed With: patient  Overall Patient Progress: no change  Goal: Patient-Specific Goal (Individualized)  Description: You can add care plan individualizations to a care plan. Examples of Individualization might be:  \"Parent requests to be called daily at 9am for status\", \"I have a hard time hearing out of my right ear\", or \"Do not touch me to wake me up as it startles  me\".  Outcome: Not Progressing  Goal: Absence of Hospital-Acquired Illness or Injury  Outcome: Not Progressing  Goal: Optimal Comfort and Wellbeing  Outcome: Not Progressing  Goal: Readiness for Transition of Care  Outcome: Not Progressing     Problem: Comorbidity Management  Goal: Maintenance of COPD Symptom Control  Outcome: Not Progressing     Problem: Fall Injury Risk  Goal: Absence of Fall and Fall-Related " Injury  Outcome: Not Progressing     Problem: Gas Exchange Impaired  Goal: Optimal Gas Exchange  Outcome: Not Progressing     Problem: Infection  Goal: Absence of Infection Signs and Symptoms  Outcome: Not Progressing

## 2025-03-09 NOTE — CONSULTS
Care Management Initial Consult    General Information  Assessment completed with: VM-chart review, Children, daughter, Jordan  Type of CM/SW Visit: Initial Assessment    Primary Care Provider verified and updated as needed: Yes   Readmission within the last 30 days:        Reason for Consult: discharge planning  Advance Care Planning:            Communication Assessment  Patient's communication style: spoken language (English or Bilingual)    Hearing Difficulty or Deaf: no   Wear Glasses or Blind: yes    Cognitive  Cognitive/Neuro/Behavioral: WDL  Level of Consciousness: alert  Arousal Level: opens eyes spontaneously  Orientation: oriented x 4  Mood/Behavior: calm, cooperative  Best Language: 0 - No aphasia  Speech: clear, spontaneous, logical    Living Environment:   People in home: child(tristen), adult     Current living Arrangements: house      Able to return to prior arrangements: yes       Family/Social Support:  Care provided by: self, child(tristen)  Provides care for: no one     Support system: Children          Description of Support System: Supportive, Involved    Support Assessment: Adequate family and caregiver support    Current Resources:   Patient receiving home care services: No        Community Resources: None  Equipment currently used at home: cane, straight, walker, standard  Supplies currently used at home: Oxygen Tubing/Supplies    Employment/Financial:  Employment Status:          Financial Concerns: none   Referral to Financial Worker: No       Does the patient's insurance plan have a 3 day qualifying hospital stay waiver?  Yes     Which insurance plan 3 day waiver is available? Alternative insurance waiver    Will the waiver be used for post-acute placement? No    Lifestyle & Psychosocial Needs:  Social Drivers of Health     Food Insecurity: Low Risk  (3/6/2025)    Food Insecurity     Within the past 12 months, did you worry that your food would run out before you got money to buy more?: No      Within the past 12 months, did the food you bought just not last and you didn t have money to get more?: No   Depression: At risk (1/24/2025)    [facility]    PHQ-2     PHQ-2 TOTAL SCORE: 4   Housing Stability: Low Risk  (3/6/2025)    Housing Stability     Do you have housing? : Yes     Are you worried about losing your housing?: No   Tobacco Use: Medium Risk (2/27/2025)    Patient History     Smoking Tobacco Use: Former     Smokeless Tobacco Use: Never     Passive Exposure: Not on file   Financial Resource Strain: Low Risk  (3/6/2025)    Financial Resource Strain     Within the past 12 months, have you or your family members you live with been unable to get utilities (heat, electricity) when it was really needed?: No   Alcohol Use: Not on file   Transportation Needs: Low Risk  (3/6/2025)    Transportation Needs     Within the past 12 months, has lack of transportation kept you from medical appointments, getting your medicines, non-medical meetings or appointments, work, or from getting things that you need?: No   Physical Activity: Not on file   Interpersonal Safety: Low Risk  (3/6/2025)    Interpersonal Safety     Do you feel physically and emotionally safe where you currently live?: Yes     Within the past 12 months, have you been hit, slapped, kicked or otherwise physically hurt by someone?: No     Within the past 12 months, have you been humiliated or emotionally abused in other ways by your partner or ex-partner?: No   Stress: Not on file   Social Connections: Socially Integrated (8/5/2024)    [facility]    Social Connections     Do you often feel lonely or isolated from those around you?: 0   Health Literacy: Not on file       Functional Status:  Prior to admission patient needed assistance:   Dependent ADLs:: Ambulation-cane, Ambulation-walker, Bathing  Dependent IADLs:: Cleaning, Cooking, Laundry, Shopping, Meal  Preparation, Medication Management, Transportation  Assesssment of Functional Status: Not at baseline with mobility    Mental Health Status:  Mental Health Status: No Current Concerns       Chemical Dependency Status:  Chemical Dependency Status: No Current Concerns             Values/Beliefs:  Spiritual, Cultural Beliefs, Restoration Practices, Values that affect care: no  Description of Beliefs that Will Affect Care: Jumana            Discussed  Partnership in Safe Discharge Planning  document with patient/family: No    Additional Information:  SW consulted for discharge planning. Pt on droplet precautions and is [facility], SW called daughter, Jordan for assessment. SW discussed home care recs with Jordan.  Jodran is supportive of recs.    Pt lives with Jordan in a rambler style home.  Jordan works outside the home; however, pt sleeps while pt is at work. Jordan assists with IADLs and helping pt get in/out of tub. Pt uses a cane/walker in the home independently. Pt uses a WC for long distance in the community.      Jordan shared how she supports pt with cares and helps her keep moving her body.  Pt has a treadmill at home that Jordan gets her on 5-1 minutes and gets her out of the home.     Jordan would like home care referrals sent.  Reviewed OT recs and adding RN for monitoring vitals/health after discharge.     Jordan will transport at discharge.    Next Steps: PATRICK/AURORA will coordinate discharge plan.    TRISTIN Pollock, [facility] HospitalSW  Emergency Department/Inpatient Float  Care Coordination  [facility]        BASSEM POLLOCK

## 2025-03-09 NOTE — PROGRESS NOTES
[facility]    Hospitalist Progress Note  Name: Josephine Nicole    MRN: [id number]  Provider:  Adria Amin DO  Date of Service: 03/09/2025    Summary of Stay: Josephine Nicole is a 75 year old female with a history of COPD with chronic home oxygen dependence of 2 L continuously, hypothyroidism, anxiety/depression, hyperlipidemia admitted on 3/5/2025 with shortness of breath.  In the emergency department, the patient was found to have a blood pressure 125/103, temperature 98.6  F, heart rate 87, respiratory rate 22, SpO2 89% on 4 L nasal cannula.  Initial lab work showed magnesium 1.6, proBNP 4687, high-sensitivity troponin 13, venous pH 7.34 with a venous pCO2 of 81, hemoglobin 10.3.  COVID-19, influenza, RSV are negative.  CT chest showed small right and trace left oral effusions with adjacent compressive atelectasis, severe centrilobular emphysema.  The patient did ultimately require continuous BiPAP in the emergency department.  The patient was started on Solu-Medrol and scheduled DuoNeb treatments in the setting of acute COPD exacerbation.  The patient was transitioned off of continuous BiPAP to nasal cannula.     TODAY'S PLAN: Patient okay today.  Does have congested cough but unable to bring things up.  Continue 3% nebulized saline, DuoNebs, IV Solu-Medrol today.  Add azithromycin for suspected bacterial bronchitis.  Patient had been speaking to her family regarding CODE STATUS.  She expressed interest in changing her CODE STATUS to DNR/DNI, which certainly is reasonable in the setting of her severe COPD.  Patient does of sound mind.  POLST form filled out.  We discussed the implications of DNR and DNI meaning we would not attempt to resuscitate her and if she is having issues breathing to the point that no intervention is working, then instead of intubation we would concentrate on comfort.  She expressed understanding and was in agreement.  She was encouraged to share this information  with her daughter.  Patient was provided with a copy of the POLST form and a copy was placed in her file.  All questions answered.  Anticipate another few days in the hospital for the above.  Ambien at night prn for sleep as she is having difficulty sleeping due to the steroids.    Problem List:   Acute on Chronic Hypoxic Respiratory Failure  Acute Hypercapnic Respiratory Failure  Acute COPD Exacerbation  Coronavirus Infection (NOT covid)  Suspected Acute Bacterial Bronchitis  - Initially required continuous bipap.  Titrated off on AM of 3/6  - At baseline pt uses 2L continuously  - Resp viral panel positive for coronavirus  - Holding off on abx for now given no infiltrate on CT chest and normal WBC  - Solumedrol 40 mg IV q12h  - Duonebs QID, prn  - Add pulmicort neb daily  - Start Azithromycin for 5 days on 3/9  - Trelegy is $595 per month which is unrealistic.  Will consider adding muscarinic antagonist at discharge (spiriva vs incruse ellipta)     Small Bilateral Pleural Effusion  Suspected Mild Fluid Overload  - Echocardiogram showed EF 65-70%, G1DD, no RWMA  - No further plan for diuresis  - Telemetry  - Strict I/O  - Daily Weights     Hypomagnesemia  - Electrolyte replacement protocol    Goals of Care  - On 3/9, pt had discussed her code status with family.  She expressed she would like to change her code status to DNR/DNI.  We discussed the implications of this.  A POLST form was filled out and the patient was provided with a copy.       Chronic Medical Problems:  Hypothyroidism  Anxiety/Depression  Hyperlipidemia    I spent 47 minutes in reviewing this patient's labs, imaging, medications, medical history.  In addition time was spent interviewing the patient, communicating with family, and medical decision making.    I spent 30 minutes of time discussing goals of care and code status with the patient as well as time filling out the POLST form and discussing the implications of the decisions.    DVT  Prophylaxis: Enoxaparin (Lovenox) SQ  Code Status: No CPR- Do NOT Intubate  Diet: Regular Diet Adult    Roman Catheter: Not present    Disposition: Medically Ready for Discharge: Anticipated in 2-4 Days    Goals to discharge include: respiratory symptoms improved  Family updated today: No     Interval History   Pt seen and examined.  Pt reports still a congested cough and unable to bring it up.  Did not sleep well.    -Data reviewed today: I personally reviewed all new labs and imaging results over the last 24 hours.     Physical Exam   Temp: 97.6  F (36.4  C) Temp src: Temporal BP: (!) 150/77 Pulse: 79   Resp: 22 SpO2: (!) 90 % O2 Device: Nasal cannula with humidification Oxygen Delivery: 3 LPM  Vitals:    03/06/25 0330 03/07/25 0605 03/08/25 0544   Weight: 60.1 kg (132 lb 7.9 oz) 61.2 kg (134 lb 14.7 oz) 61.3 kg (135 lb 2.3 oz)     Vital Signs with Ranges  Temp:  [97.6  F (36.4  C)-97.9  F (36.6  C)] 97.6  F (36.4  C)  Pulse:  [74-83] 79  Resp:  [18-24] 22  BP: ()/(47-77) 150/77  SpO2:  [86 %-100 %] 90 %  I/O last 3 completed shifts:  In: 240 [P.O.:240]  Out: 200 [Urine:200]    GENERAL: No apparent distress. Awake, alert, and fully oriented.  HEENT: Normocephalic, atraumatic. Extraocular movements intact.  CARDIOVASCULAR: Regular rate and rhythm without murmurs or rubs. No S3.  PULMONARY: Mild exp wheezes  GASTROINTESTINAL: Soft, non-tender, non-distended. Bowel sounds normoactive.   EXTREMITIES: No cyanosis or clubbing. No edema.  NEUROLOGICAL: CN 2-12 grossly intact, no focal neurological deficits.  DERMATOLOGICAL: No rash, ulcer, bruising, nor jaundice.    Medications   Current Facility-Administered Medications   Medication Dose Route Frequency Provider Last Rate Last Admin    No lozenges or gum should be given while patient on BIPAP/AVAPS/AVAPS AE   Does not apply Continuous PRN Natalya Hammond MD        Patient may continue current oral medications   Does not apply Continuous PRN Natalya Hammond  MD Tom[place]         Current Facility-Administered Medications   Medication Dose Route Frequency Provider Last Rate Last Admin    atorvastatin (LIPITOR) tablet 10 mg  10 mg Oral Daily Natalya Hammond MD   10 mg at 03/09/25 0844    budesonide (PULMICORT) neb solution 0.5 mg  0.5 mg Nebulization Daily Adria Amin DO   0.5 mg at 03/09/25 0727    cyanocobalamin injection 1,000 mcg  1,000 mcg Intramuscular Q30 Days Natayla Hammond MD        diazepam (VALIUM) tablet 5 mg  5 mg Oral See Admin Instructions Natalya Hammond MD        enoxaparin ANTICOAGULANT (LOVENOX) injection 40 mg  40 mg Subcutaneous Q24H Natalya Hammond MD   40 mg at 03/09/25 0448    [Held by provider] fluticasone-vilanterol (BREO ELLIPTA) 200-25 MCG/ACT inhaler 1 puff  1 puff Inhalation Daily Natalya Hammond MD        guaiFENesin (MUCINEX) 12 hr tablet 600 mg  600 mg Oral BID Adria Amin DO   600 mg at 03/09/25 0844    ipratropium (ATROVENT) 0.02 % neb solution 0.5 mg  0.5 mg Nebulization 4x daily Adria Amin DO        levalbuterol (XOPENEX) neb solution 1.25 mg  1.25 mg Nebulization 4x Daily Adria Amin DO        levothyroxine (SYNTHROID/LEVOTHROID) tablet 88 mcg  88 mcg Oral Daily Natalya Hammond MD   88 mcg at 03/09/25 0844    methylPREDNISolone sodium succinate (SOLU-MEDROL) injection 40 mg  40 mg Intravenous Q12H Adria Amin DO   40 mg at 03/09/25 0448    mirtazapine (REMERON) tablet 15 mg  15 mg Oral At Bedtime Natalya Hammond MD   15 mg at 03/08/25 2118    sertraline (ZOLOFT) tablet 25 mg  25 mg Oral QAM Natalya Hammond MD   25 mg at 03/09/25 0844    sodium chloride (NEBUSAL) 3 % neb solution 3 mL  3 mL Nebulization 4x Daily Brennan, Adria Wai, DO         Data     Laboratory:  Recent Labs   Lab 03/09/25  0552 03/08/25  0640 03/07/25  0637   WBC 13.9* 12.4* 10.1   HGB 10.1* 10.5* 10.2*   HCT 32.7* 33.2* 31.7*   MCV 96 97 93    263 257     Recent  "Labs   Lab 03/09/25  0807 03/09/25  0552 03/08/25  1302 03/08/25  0746 03/08/25  0640 03/08/25  0158 03/07/25  0637   NA  --  142  --   --  141  --  141   POTASSIUM  --  4.2  --   --  4.2  --  3.4   CHLORIDE  --  94*  --   --  95*  --  92*   CO2  --  42*  --   --  39*  --  40*   ANIONGAP  --  6*  --   --  7  --  9   * 111* 107*   < > 133*   < > 127*   BUN  --  20.3  --   --  17.1  --  13.6   CR  --  0.59  --   --  0.57  --  0.57   GFRESTIMATED  --  >90  --   --  >90  --  >90   CHAO  --  8.7*  --   --  8.7*  --  8.9    < > = values in this interval not displayed.     No results for input(s): \"CULT\" in the last 168 hours.  No results found for: \"TROPI\"    Imaging:  No results found for this or any previous visit (from the past 24 hours).      Adria Amin DO  [facility] Hospitalist  [address] [place] [address]  Securely message with [employer] (more info)  Text page via [employer] Paging/Directory   03/09/2025   "

## 2025-03-09 NOTE — PLAN OF CARE
"Afebrile.  Easily anxious, PRN xanax, thoroughly explained POC, encouraged rest/relaxation.  Denies pain.  No nausea.  LS abnormal w/freq. prod. coughs, O2 3L, KELLY, hourly IS.  A1 belt + walker, sat up in recliner, ambulating to bathroom.  Voiding.    Goal Outcome Evaluation:    Plan of Care Reviewed With: patient, family    Overall Patient Progress: no changeOverall Patient Progress: no change    Outcome Evaluation: Easily anxious.  O2 3L, freq. prod. coughs, LS abnormal.  Droplet ISO.    Problem: Adult Inpatient Plan of Care  Goal: Plan of Care Review  Description: The Plan of Care Review/Shift note should be completed every shift.  The Outcome Evaluation is a brief statement about your assessment that the patient is improving, declining, or no change.  This information will be displayed automatically on your shift  note.  Outcome: Not Progressing  Flowsheets (Taken 3/9/2025 1511)  Outcome Evaluation: Easily anxious.  O2 3L, freq. prod. coughs, LS abnormal.  Droplet ISO.  Plan of Care Reviewed With:   patient   family  Overall Patient Progress: no change  Goal: Patient-Specific Goal (Individualized)  Description: You can add care plan individualizations to a care plan. Examples of Individualization might be:  \"Parent requests to be called daily at 9am for status\", \"I have a hard time hearing out of my right ear\", or \"Do not touch me to wake me up as it startles  me\".  Outcome: Not Progressing  Goal: Absence of Hospital-Acquired Illness or Injury  Outcome: Not Progressing  Intervention: Identify and Manage Fall Risk  Recent Flowsheet Documentation  Taken 3/9/2025 0974 by Marco Antonio Petty, RN  Safety Promotion/Fall Prevention:   activity supervised   lighting adjusted   mobility aid in reach   nonskid shoes/slippers when out of bed   room door open   room organization consistent   assistive device/personal items within reach   patient and family education   safety round/check completed   supervised activity   room near " nurse's station   increase visualization of patient   clutter free environment maintained  Intervention: Prevent Skin Injury  Recent Flowsheet Documentation  Taken 3/9/2025 0935 by Marco Antonio Petty RN  Body Position:   supine, head elevated   legs elevated  Skin Protection:   adhesive use limited   incontinence pads utilized   pulse oximeter probe site changed   tubing/devices free from skin contact  Intervention: Prevent and Manage VTE (Venous Thromboembolism) Risk  Recent Flowsheet Documentation  Taken 3/9/2025 0935 by Marco Antonio Petty RN  VTE Prevention/Management:   patient refused intervention   SCDs off (sequential compression devices)  Intervention: Prevent Infection  Recent Flowsheet Documentation  Taken 3/9/2025 0935 by Marco Antonio Petty RN  Infection Prevention:   personal protective equipment utilized   hand hygiene promoted   equipment surfaces disinfected   environmental surveillance performed   rest/sleep promoted   single patient room provided  Goal: Optimal Comfort and Wellbeing  Outcome: Not Progressing  Intervention: Monitor Pain and Promote Comfort  Recent Flowsheet Documentation  Taken 3/9/2025 0930 by Marco Antonio Petty RN  Pain Management Interventions: pain management plan reviewed with patient/caregiver  Intervention: Provide Person-Centered Care  Recent Flowsheet Documentation  Taken 3/9/2025 1130 by Marco Antonio Petty RN  Trust Relationship/Rapport:   care explained   choices provided   emotional support provided   empathic listening provided   questions answered   questions encouraged   reassurance provided   thoughts/feelings acknowledged  Taken 3/9/2025 0935 by Marco Antonio Petty RN  Trust Relationship/Rapport:   care explained   choices provided   emotional support provided   empathic listening provided   questions answered   questions encouraged   reassurance provided   thoughts/feelings acknowledged  Goal: Readiness for Transition of Care  Outcome: Not Progressing     Problem: Comorbidity  Management  Goal: Maintenance of COPD Symptom Control  Outcome: Not Progressing  Intervention: Maintain COPD Symptom Control  Recent Flowsheet Documentation  Taken 3/9/2025 0935 by Marco Antonio Petty RN  Breathing Techniques/Airway Clearance: deep/controlled cough encouraged  Medication Review/Management: medications reviewed     Problem: Fall Injury Risk  Goal: Absence of Fall and Fall-Related Injury  Outcome: Not Progressing  Intervention: Identify and Manage Contributors  Recent Flowsheet Documentation  Taken 3/9/2025 0935 by Marco Antonio Petty RN  Self-Care Promotion:   adaptive equipment use encouraged   meal set-up provided   BADL personal routines maintained   BADL personal objects within reach  Medication Review/Management: medications reviewed  Intervention: Promote Injury-Free Environment  Recent Flowsheet Documentation  Taken 3/9/2025 0935 by Marco Antonio Petty RN  Safety Promotion/Fall Prevention:   activity supervised   lighting adjusted   mobility aid in reach   nonskid shoes/slippers when out of bed   room door open   room organization consistent   assistive device/personal items within reach   patient and family education   safety round/check completed   supervised activity   room near nurse's station   increase visualization of patient   clutter free environment maintained     Problem: Gas Exchange Impaired  Goal: Optimal Gas Exchange  Outcome: Not Progressing  Intervention: Optimize Oxygenation and Ventilation  Recent Flowsheet Documentation  Taken 3/9/2025 0935 by Marco Antonio Petty RN  Airway/Ventilation Management:   pulmonary hygiene promoted   oxygen therapy provided   calming measures promoted   position adjusted  Head of Bed (HOB) Positioning: HOB at 45 degrees     Problem: Infection  Goal: Absence of Infection Signs and Symptoms  Outcome: Not Progressing  Intervention: Prevent or Manage Infection  Recent Flowsheet Documentation  Taken 3/9/2025 0935 by Marco Antonio Petty RN  Isolation Precautions: (coronavirus  infect. (not covid)) droplet precautions maintained

## 2025-03-09 NOTE — PROVIDER NOTIFICATION
03/09/25 0727   RCAT Assessment   Reason for Assessment COPD   Pulmonary Status 4   Surgical Status 0   Chest X-ray 4   Respiratory Pattern 2   Mental Status 0   Breath Sounds 2   Cough Effectiveness 1   Level of Activity 1   O2 Required for SpO2>=92% 1   Acuity Level (points) 15   Acuity Level  3   Re-eval Interval Guideline Every 3 days   Re-evaluation Date 03/12/25   $RT Consult Time Spent RCAT (30 minute increments) 1   Clinical Indications/Symptoms   Aerosol Therapy History of bronchospasm;Home regimen;Physician order;RCAT protocol;Proteinaceous secretions   Broncho-pulmonary Hygiene Productive cough;History of mucous producing disease   Volume Expansion Atelectasis;Decreased breath sounds;Prevent atelectasis   Aerosol Therapy Plan   RT Treatment Nebulizer   Aerosol Treatment Frequency Acuity Level 3: QID/PRN @noc-Mod wheezing/Hx asthma/secretion removal   Beta-Adrenergic Agonists Levalbuterol (Xopenex) solution nebulizer - Doses: 0.63mg or 1.25 mg up to three times a day (Q6-Q8)   Anticholinergics Ipratropium soln 0.5mg/2.5mL neb Max 6 doses/24h   Mucolytic   (3% Sodium Chloride)   Aerosol Therapy (SVN)   Respiratory Treatment Status (SVN) given   Patient Position HOB elevated   Broncho-Pulmonary Hygiene Plan   Broncho-Pulmonary Hygiene Treatment Coughing techniques  ([place] QID and prn)   Broncho-Pulm Hygiene Frequency Acuity Level 3: TID-Small amounts secretions/poor cough, hx of secretions   Volume Expansion Plan   Volume Expansion Treatment Incentive Spirometer   Volume Expansion Frequency Acuity Level 3: TID-At risk for developing atelectasis   Breath Sounds   Breath Sounds All Fields   All Lung Fields Breath Sounds Anterior:;Lateral:;diminished

## 2025-03-09 NOTE — PLAN OF CARE
" Goal Outcome Evaluation:      Plan of Care Reviewed With: patient    Overall Patient Progress: no changeOverall Patient Progress: no change    Outcome Evaluation: 3-4 LPM oxymask/nasal cannula, continuous pulse ox in place, tele SR 80's, denies pain, robitussin, mucinex, tessalon pearls for cough, Ax1 GB & walker, voiding ok via purewick, discharge date tbd    Problem: Adult Inpatient Plan of Care  Goal: Plan of Care Review  Description: The Plan of Care Review/Shift note should be completed every shift.  The Outcome Evaluation is a brief statement about your assessment that the patient is improving, declining, or no change.  This information will be displayed automatically on your shift  note.  Outcome: Progressing  Flowsheets (Taken 3/8/2025 2215)  Outcome Evaluation: 3-4 LPM oxymask/nasal cannula, continuous pulse ox in place, tele SR 80's, denies pain, robitussin, mucinex, tessalon pearls for cough, Ax1 GB & walker, voiding ok via purewick, discharge date tbd  Plan of Care Reviewed With: patient  Overall Patient Progress: no change  Goal: Patient-Specific Goal (Individualized)  Description: You can add care plan individualizations to a care plan. Examples of Individualization might be:  \"Parent requests to be called daily at 9am for status\", \"I have a hard time hearing out of my right ear\", or \"Do not touch me to wake me up as it startles  me\".  Outcome: Progressing  Goal: Absence of Hospital-Acquired Illness or Injury  Outcome: Progressing  Intervention: Identify and Manage Fall Risk  Recent Flowsheet Documentation  Taken 3/8/2025 1745 by Henny Barnes RN  Safety Promotion/Fall Prevention:   activity supervised   safety round/check completed  Intervention: Prevent and Manage VTE (Venous Thromboembolism) Risk  Recent Flowsheet Documentation  Taken 3/8/2025 1745 by Henny Barnes RN  VTE Prevention/Management: SCDs off (sequential compression devices)  Goal: Optimal Comfort and Wellbeing  Outcome: " Progressing  Intervention: Provide Person-Centered Care  Recent Flowsheet Documentation  Taken 3/8/2025 1745 by Henny Barnes RN  Trust Relationship/Rapport: care explained  Goal: Readiness for Transition of Care  Outcome: Progressing     Problem: Comorbidity Management  Goal: Maintenance of COPD Symptom Control  Outcome: Progressing  Intervention: Maintain COPD Symptom Control  Recent Flowsheet Documentation  Taken 3/8/2025 1745 by Henny Barnes RN  Medication Review/Management: medications reviewed     Problem: Fall Injury Risk  Goal: Absence of Fall and Fall-Related Injury  Outcome: Progressing  Intervention: Identify and Manage Contributors  Recent Flowsheet Documentation  Taken 3/8/2025 1745 by Henny Barnes RN  Medication Review/Management: medications reviewed  Intervention: Promote Injury-Free Environment  Recent Flowsheet Documentation  Taken 3/8/2025 1745 by Henny Barnes RN  Safety Promotion/Fall Prevention:   activity supervised   safety round/check completed     Problem: Mechanical Ventilation Invasive  Goal: Effective Communication  Outcome: Progressing  Intervention: Ensure Effective Communication  Recent Flowsheet Documentation  Taken 3/8/2025 1745 by Henny Barnes RN  Trust Relationship/Rapport: care explained  Goal: Optimal Device Function  Outcome: Progressing  Intervention: Optimize Device Care and Function  Recent Flowsheet Documentation  Taken 3/8/2025 1745 by Henny Barnes RN  Airway Safety Measures:   all equipment/monitors on and audible   oxygen flowmeter  Airway/Ventilation Management: calming measures promoted  Goal: Mechanical Ventilation Liberation  Outcome: Progressing  Intervention: Promote Extubation and Mechanical Ventilation Liberation  Recent Flowsheet Documentation  Taken 3/8/2025 1745 by Henny Barnes RN  Medication Review/Management: medications reviewed  Goal: Optimal Nutrition Delivery  Outcome: Progressing  Goal: Absence of  Device-Related Skin and Tissue Injury  Outcome: Progressing  Goal: Absence of Ventilator-Induced Lung Injury  Outcome: Progressing     Problem: Gas Exchange Impaired  Goal: Optimal Gas Exchange  Outcome: Progressing  Intervention: Optimize Oxygenation and Ventilation  Recent Flowsheet Documentation  Taken 3/8/2025 1745 by Henny Barnes, RN  Airway/Ventilation Management: calming measures promoted     Problem: Infection  Goal: Absence of Infection Signs and Symptoms  Outcome: Progressing

## 2025-03-10 LAB — MAGNESIUM SERPL-MCNC: 2.1 MG/DL (ref 1.7–2.3)

## 2025-03-10 PROCEDURE — 120N000001 HC R&B MED SURG/OB

## 2025-03-10 PROCEDURE — 94640 AIRWAY INHALATION TREATMENT: CPT

## 2025-03-10 PROCEDURE — 83735 ASSAY OF MAGNESIUM: CPT | Performed by: INTERNAL MEDICINE

## 2025-03-10 PROCEDURE — 36415 COLL VENOUS BLD VENIPUNCTURE: CPT | Performed by: INTERNAL MEDICINE

## 2025-03-10 PROCEDURE — 250N000011 HC RX IP 250 OP 636: Mod: JZ | Performed by: INTERNAL MEDICINE

## 2025-03-10 PROCEDURE — 94640 AIRWAY INHALATION TREATMENT: CPT | Mod: 76

## 2025-03-10 PROCEDURE — 999N000157 HC STATISTIC RCP TIME EA 10 MIN

## 2025-03-10 PROCEDURE — 99232 SBSQ HOSP IP/OBS MODERATE 35: CPT | Performed by: INTERNAL MEDICINE

## 2025-03-10 PROCEDURE — 250N000013 HC RX MED GY IP 250 OP 250 PS 637: Performed by: INTERNAL MEDICINE

## 2025-03-10 PROCEDURE — 250N000009 HC RX 250: Performed by: INTERNAL MEDICINE

## 2025-03-10 PROCEDURE — 250N000011 HC RX IP 250 OP 636: Performed by: INTERNAL MEDICINE

## 2025-03-10 RX ORDER — PREDNISONE 20 MG/1
40 TABLET ORAL DAILY
Status: DISCONTINUED | OUTPATIENT
Start: 2025-03-11 | End: 2025-03-11

## 2025-03-10 RX ADMIN — SODIUM CHLORIDE SOLN NEBU 3% 3 ML: 3 NEBU SOLN at 07:35

## 2025-03-10 RX ADMIN — SODIUM CHLORIDE SOLN NEBU 3% 3 ML: 3 NEBU SOLN at 15:28

## 2025-03-10 RX ADMIN — IPRATROPIUM BROMIDE 0.5 MG: 0.5 SOLUTION RESPIRATORY (INHALATION) at 19:45

## 2025-03-10 RX ADMIN — GUAIFENESIN 600 MG: 600 TABLET, EXTENDED RELEASE ORAL at 08:06

## 2025-03-10 RX ADMIN — METHYLPREDNISOLONE SODIUM SUCCINATE 40 MG: 40 INJECTION, POWDER, FOR SOLUTION INTRAMUSCULAR; INTRAVENOUS at 05:58

## 2025-03-10 RX ADMIN — LEVALBUTEROL HYDROCHLORIDE 1.25 MG: 1.25 SOLUTION RESPIRATORY (INHALATION) at 07:35

## 2025-03-10 RX ADMIN — BUDESONIDE 0.5 MG: 0.5 INHALANT ORAL at 07:35

## 2025-03-10 RX ADMIN — ALPRAZOLAM 0.25 MG: 0.25 TABLET ORAL at 08:09

## 2025-03-10 RX ADMIN — SODIUM CHLORIDE SOLN NEBU 3% 3 ML: 3 NEBU SOLN at 11:34

## 2025-03-10 RX ADMIN — ATORVASTATIN CALCIUM 10 MG: 10 TABLET, FILM COATED ORAL at 08:06

## 2025-03-10 RX ADMIN — LEVALBUTEROL HYDROCHLORIDE 1.25 MG: 1.25 SOLUTION RESPIRATORY (INHALATION) at 19:45

## 2025-03-10 RX ADMIN — IPRATROPIUM BROMIDE 0.5 MG: 0.5 SOLUTION RESPIRATORY (INHALATION) at 15:28

## 2025-03-10 RX ADMIN — LEVALBUTEROL HYDROCHLORIDE 1.25 MG: 1.25 SOLUTION RESPIRATORY (INHALATION) at 11:34

## 2025-03-10 RX ADMIN — LEVALBUTEROL HYDROCHLORIDE 1.25 MG: 1.25 SOLUTION RESPIRATORY (INHALATION) at 15:28

## 2025-03-10 RX ADMIN — IPRATROPIUM BROMIDE 0.5 MG: 0.5 SOLUTION RESPIRATORY (INHALATION) at 07:35

## 2025-03-10 RX ADMIN — SODIUM CHLORIDE SOLN NEBU 3% 3 ML: 3 NEBU SOLN at 19:45

## 2025-03-10 RX ADMIN — AZITHROMYCIN DIHYDRATE 250 MG: 250 TABLET ORAL at 08:06

## 2025-03-10 RX ADMIN — ALBUTEROL SULFATE 2.5 MG: 2.5 SOLUTION RESPIRATORY (INHALATION) at 14:33

## 2025-03-10 RX ADMIN — ENOXAPARIN SODIUM 40 MG: 40 INJECTION SUBCUTANEOUS at 05:10

## 2025-03-10 RX ADMIN — LEVOTHYROXINE SODIUM 88 MCG: 0.09 TABLET ORAL at 08:07

## 2025-03-10 RX ADMIN — MIRTAZAPINE 15 MG: 15 TABLET, FILM COATED ORAL at 21:15

## 2025-03-10 RX ADMIN — IPRATROPIUM BROMIDE 0.5 MG: 0.5 SOLUTION RESPIRATORY (INHALATION) at 11:34

## 2025-03-10 RX ADMIN — SERTRALINE HYDROCHLORIDE 25 MG: 25 TABLET ORAL at 08:07

## 2025-03-10 RX ADMIN — ZOLPIDEM TARTRATE 2.5 MG: 5 TABLET, FILM COATED ORAL at 22:51

## 2025-03-10 RX ADMIN — ALPRAZOLAM 0.25 MG: 0.25 TABLET ORAL at 21:15

## 2025-03-10 RX ADMIN — BENZONATATE 100 MG: 100 CAPSULE ORAL at 23:21

## 2025-03-10 RX ADMIN — GUAIFENESIN 600 MG: 600 TABLET, EXTENDED RELEASE ORAL at 21:15

## 2025-03-10 ASSESSMENT — ACTIVITIES OF DAILY LIVING (ADL)
ADLS_ACUITY_SCORE: 65
ADLS_ACUITY_SCORE: 70
ADLS_ACUITY_SCORE: 65

## 2025-03-10 NOTE — PROGRESS NOTES
[facility]    Hospitalist Progress Note  Name: Josephine Nicole    MRN: [id number]  Provider:  Adria Amin DO  Date of Service: 03/10/2025    Summary of Stay: Josephine Nicole is a 75 year old female with a history of COPD with chronic home oxygen dependence of 2 L continuously, hypothyroidism, anxiety/depression, hyperlipidemia admitted on 3/5/2025 with shortness of breath.  In the emergency department, the patient was found to have a blood pressure 125/103, temperature 98.6  F, heart rate 87, respiratory rate 22, SpO2 89% on 4 L nasal cannula.  Initial lab work showed magnesium 1.6, proBNP 4687, high-sensitivity troponin 13, venous pH 7.34 with a venous pCO2 of 81, hemoglobin 10.3.  COVID-19, influenza, RSV are negative.  CT chest showed small right and trace left oral effusions with adjacent compressive atelectasis, severe centrilobular emphysema.  The patient did ultimately require continuous BiPAP in the emergency department.  The patient was started on Solu-Medrol and scheduled DuoNeb treatments in the setting of acute COPD exacerbation.  The patient was transitioned off of continuous BiPAP to nasal cannula.  The patient's symptoms gradually improved.  She was started on azithromycin with concern for developing acute bacterial bronchitis on 3/9.    TODAY'S PLAN: Patient doing well today.  She slept last night and is quite sleepy today but is arousable and communicative.  Had not slept the previous few nights and her dose of Ambien as needed was increased yesterday evening with good result.  Transition to prednisone 40 mg starting tomorrow.  Will decrease dose of Ambien again today due to persistent somnolence today.  Started azithromycin yesterday due to persistent productive cough.  Encouraged patient to work with therapy later today.  If symptoms continue to improve, anticipate she could discharge back home with home services tomorrow.  All questions answered.    Problem List:   Acute  on Chronic Hypoxic Respiratory Failure  Acute Hypercapnic Respiratory Failure  Acute COPD Exacerbation  Coronavirus Infection (NOT covid)  Suspected Acute Bacterial Bronchitis  - Initially required continuous bipap.  Titrated off on AM of 3/6  - At baseline pt uses 2L continuously  - Resp viral panel positive for coronavirus  - Holding off on abx for now given no infiltrate on CT chest and normal WBC  - Solumedrol 40 mg IV q12h  - Duonebs QID, prn  - Add pulmicort neb daily  - Start Azithromycin for 5 days on 3/9  - Trelegy is $595 per month which is unrealistic.  Will consider adding muscarinic antagonist at discharge (spiriva vs incruse ellipta)     Small Bilateral Pleural Effusion  Suspected Mild Fluid Overload  - Echocardiogram showed EF 65-70%, G1DD, no RWMA  - No further plan for diuresis  - Telemetry  - Strict I/O  - Daily Weights     Hypomagnesemia  - Electrolyte replacement protocol     Goals of Care  - On 3/9, pt had discussed her code status with family.  She expressed she would like to change her code status to DNR/DNI.  We discussed the implications of this.  A POLST form was filled out and the patient was provided with a copy.       Chronic Medical Problems:  Hypothyroidism  Anxiety/Depression  Hyperlipidemia    I spent 46 minutes in reviewing this patient's labs, imaging, medications, medical history.  In addition time was spent interviewing the patient, communicating with family, and medical decision making.      DVT Prophylaxis: Enoxaparin (Lovenox) SQ  Code Status: No CPR- Do NOT Intubate  Diet: Regular Diet Adult    Roman Catheter: Not present    Disposition: Medically Ready for Discharge: Anticipated Tomorrow (1-2 days)    Goals to discharge include: respiratory symptoms improved, therapy evaluation complete  Family updated today: No     Interval History   Pt seen and examined.  Pt reports she is tired this morning.    -Data reviewed today: I personally reviewed all new labs and imaging results  over the last 24 hours.     Physical Exam   Temp: 97.2  F (36.2  C) Temp src: Temporal BP: (!) 151/80 Pulse: 74   Resp: 17 SpO2: 92 % O2 Device: Nasal cannula Oxygen Delivery: 3 LPM  Vitals:    03/08/25 0544 03/09/25 0930 03/10/25 0552   Weight: 61.3 kg (135 lb 2.3 oz) 61.5 kg (135 lb 9.3 oz) 60 kg (132 lb 4.4 oz)     Vital Signs with Ranges  Temp:  [97.2  F (36.2  C)-98.8  F (37.1  C)] 97.2  F (36.2  C)  Pulse:  [74-88] 74  Resp:  [16-24] 17  BP: (132-151)/(66-80) 151/80  SpO2:  [92 %-97 %] 92 %  I/O last 3 completed shifts:  In: 590 [P.O.:590]  Out: 1000 [Urine:1000]    GENERAL: No apparent distress. Awake, alert, and fully oriented.  HEENT: Normocephalic, atraumatic. Extraocular movements intact.  CARDIOVASCULAR: Regular rate and rhythm without murmurs or rubs. No S3.  PULMONARY: Clear bilaterally.  GASTROINTESTINAL: Soft, non-tender, non-distended. Bowel sounds normoactive.   EXTREMITIES: No cyanosis or clubbing. No edema.  NEUROLOGICAL: CN 2-12 grossly intact, no focal neurological deficits.  DERMATOLOGICAL: No rash, ulcer, bruising, nor jaundice.    Medications   Current Facility-Administered Medications   Medication Dose Route Frequency Provider Last Rate Last Admin    No lozenges or gum should be given while patient on BIPAP/AVAPS/AVAPS AE   Does not apply Continuous PRN Natalya Hammond MD        Patient may continue current oral medications   Does not apply Continuous PRN Natalya Hammond MD         Current Facility-Administered Medications   Medication Dose Route Frequency Provider Last Rate Last Admin    atorvastatin (LIPITOR) tablet 10 mg  10 mg Oral Daily Natalya Hammond MD   10 mg at 03/10/25 0806    azithromycin (ZITHROMAX) tablet 250 mg  250 mg Oral Daily Adria Amin DO   250 mg at 03/10/25 0806    budesonide (PULMICORT) neb solution 0.5 mg  0.5 mg Nebulization Daily Adria Amin DO   0.5 mg at 03/10/25 0735    cyanocobalamin injection 1,000 mcg  1,000 mcg  Intramuscular Q30 Days Natalya Hmamond MD        diazepam (VALIUM) tablet 5 mg  5 mg Oral See Admin Instructions Natalya Hammond MD        enoxaparin ANTICOAGULANT (LOVENOX) injection 40 mg  40 mg Subcutaneous Q24H Natalya Hammond MD   40 mg at 03/10/25 0510    [Held by provider] fluticasone-vilanterol (BREO ELLIPTA) 200-25 MCG/ACT inhaler 1 puff  1 puff Inhalation Daily Natalya Hammond MD        guaiFENesin (MUCINEX) 12 hr tablet 600 mg  600 mg Oral BID Adria Amin DO   600 mg at 03/10/25 0806    ipratropium (ATROVENT) 0.02 % neb solution 0.5 mg  0.5 mg Nebulization 4x daily Adria Amin DO   0.5 mg at 03/10/25 0735    levalbuterol (XOPENEX) neb solution 1.25 mg  1.25 mg Nebulization 4x Daily Adria Amin DO   1.25 mg at 03/10/25 0735    levothyroxine (SYNTHROID/LEVOTHROID) tablet 88 mcg  88 mcg Oral Daily Natalya Hammond MD   88 mcg at 03/10/25 0807    mirtazapine (REMERON) tablet 15 mg  15 mg Oral At Bedtime Natalya Hammond MD   15 mg at 03/09/25 2119    [START ON 3/11/2025] predniSONE (DELTASONE) tablet 40 mg  40 mg Oral Daily Adria Amin DO        sertraline (ZOLOFT) tablet 25 mg  25 mg Oral QAM Natalya Hammond MD   25 mg at 03/10/25 0807    sodium chloride (NEBUSAL) 3 % neb solution 3 mL  3 mL Nebulization 4x Daily Adria Amin DO   3 mL at 03/10/25 0735     Data     Laboratory:  Recent Labs   Lab 03/09/25  0552 03/08/25  0640 03/07/25  0637   WBC 13.9* 12.4* 10.1   HGB 10.1* 10.5* 10.2*   HCT 32.7* 33.2* 31.7*   MCV 96 97 93    263 257     Recent Labs   Lab 03/09/25  0807 03/09/25  0552 03/08/25  1302 03/08/25  0746 03/08/25  0640 03/08/25  0158 03/07/25  0637   NA  --  142  --   --  141  --  141   POTASSIUM  --  4.2  --   --  4.2  --  3.4   CHLORIDE  --  94*  --   --  95*  --  92*   CO2  --  42*  --   --  39*  --  40*   ANIONGAP  --  6*  --   --  7  --  9   * 111* 107*   < > 133*   < > 127*   BUN  --  20.3   "--   --  17.1  --  13.6   CR  --  0.59  --   --  0.57  --  0.57   GFRESTIMATED  --  >90  --   --  >90  --  >90   CHAO  --  8.7*  --   --  8.7*  --  8.9    < > = values in this interval not displayed.     No results for input(s): \"CULT\" in the last 168 hours.  No results found for: \"TROPI\"    Imaging:  No results found for this or any previous visit (from the past 24 hours).      Adria Amin DO  [facility] Hospitalist  [address] [place] [address]  Securely message with [employer] (more info)  Text page via [employer] Paging/Directory   03/10/2025   "

## 2025-03-10 NOTE — PLAN OF CARE
"Goal Outcome Evaluation:      Plan of Care Reviewed With: patient    Overall Patient Progress: improvingOverall Patient Progress: improving     Pt alert and oriented. Forgetful. On 3L o2. Up with assist of 1 gait belt and walker. Voiding well. PO abx. Denies pain. Prn xanax given x1. Frequent, productive cough. Prn neb given. LS diminished, wheezes. Up in chair most of day. Possibly home tomorrow with home care.       Problem: Adult Inpatient Plan of Care  Goal: Plan of Care Review  Description: The Plan of Care Review/Shift note should be completed every shift.  The Outcome Evaluation is a brief statement about your assessment that the patient is improving, declining, or no change.  This information will be displayed automatically on your shift  note.  3/10/2025 1401 by Daxa Banuelos RN  Outcome: Progressing  Flowsheets (Taken 3/10/2025 1401)  Plan of Care Reviewed With: patient  Overall Patient Progress: improving  3/10/2025 1358 by Daxa Banuelos RN  Outcome: Progressing  Flowsheets (Taken 3/10/2025 1358)  Plan of Care Reviewed With: patient  Overall Patient Progress: improving  3/10/2025 1240 by Daxa Banuelos RN  Outcome: Progressing  Flowsheets (Taken 3/10/2025 1240)  Plan of Care Reviewed With: patient  Overall Patient Progress: improving  Goal: Patient-Specific Goal (Individualized)  Description: You can add care plan individualizations to a care plan. Examples of Individualization might be:  \"Parent requests to be called daily at 9am for status\", \"I have a hard time hearing out of my right ear\", or \"Do not touch me to wake me up as it startles  me\".  3/10/2025 1401 by Daxa Banuelos RN  Outcome: Progressing  3/10/2025 1358 by Daxa Banuelos RN  Outcome: Progressing  3/10/2025 1240 by Daxa Banuelos RN  Outcome: Progressing  Goal: Absence of Hospital-Acquired Illness or Injury  3/10/2025 1401 by Daxa Banuelos RN  Outcome: Progressing  3/10/2025 1358 by Daxa Banuelos RN  Outcome: " Progressing  3/10/2025 1240 by Daxa Banuelos RN  Outcome: Progressing  Intervention: Identify and Manage Fall Risk  Recent Flowsheet Documentation  Taken 3/10/2025 0800 by Daxa Banuelos RN  Safety Promotion/Fall Prevention:   activity supervised   assistive device/personal items within reach   nonskid shoes/slippers when out of bed  Intervention: Prevent Skin Injury  Recent Flowsheet Documentation  Taken 3/10/2025 0800 by Daxa Banuelos RN  Body Position: supine, head elevated  Intervention: Prevent and Manage VTE (Venous Thromboembolism) Risk  Recent Flowsheet Documentation  Taken 3/10/2025 0800 by Daxa Banuelos RN  VTE Prevention/Management: SCDs off (sequential compression devices)  Intervention: Prevent Infection  Recent Flowsheet Documentation  Taken 3/10/2025 0800 by Daxa Banuelos RN  Infection Prevention:   single patient room provided   rest/sleep promoted  Goal: Optimal Comfort and Wellbeing  3/10/2025 1401 by Daxa Banuelos RN  Outcome: Progressing  3/10/2025 1358 by Daxa Banuelos RN  Outcome: Progressing  3/10/2025 1240 by Daxa Banuelos RN  Outcome: Progressing  Goal: Readiness for Transition of Care  3/10/2025 1401 by Daxa Banuelos RN  Outcome: Progressing  3/10/2025 1358 by Daxa Banuelos RN  Outcome: Progressing  3/10/2025 1240 by Daxa Banuelos RN  Outcome: Progressing     Problem: Comorbidity Management  Goal: Maintenance of COPD Symptom Control  3/10/2025 1401 by Daxa Banuelos RN  Outcome: Progressing  3/10/2025 1358 by Daxa Banuelos RN  Outcome: Progressing  3/10/2025 1240 by Daxa Banuelos RN  Outcome: Progressing  Intervention: Maintain COPD Symptom Control  Recent Flowsheet Documentation  Taken 3/10/2025 0800 by Daxa Banuelos RN  Medication Review/Management: medications reviewed     Problem: Fall Injury Risk  Goal: Absence of Fall and Fall-Related Injury  3/10/2025 1401 by Daxa Banuelos RN  Outcome: Progressing  3/10/2025 1358 by  Daxa Banuelos RN  Outcome: Progressing  3/10/2025 1240 by Daxa Banuelos RN  Outcome: Progressing  Intervention: Identify and Manage Contributors  Recent Flowsheet Documentation  Taken 3/10/2025 0800 by Daxa Banuelos RN  Medication Review/Management: medications reviewed  Intervention: Promote Injury-Free Environment  Recent Flowsheet Documentation  Taken 3/10/2025 0800 by Daxa Banuelos RN  Safety Promotion/Fall Prevention:   activity supervised   assistive device/personal items within reach   nonskid shoes/slippers when out of bed     Problem: Gas Exchange Impaired  Goal: Optimal Gas Exchange  3/10/2025 1401 by Daxa Banuelos RN  Outcome: Progressing  3/10/2025 1358 by Daxa Banuelos RN  Outcome: Progressing  3/10/2025 1240 by Daxa Banuelos RN  Outcome: Progressing  Intervention: Optimize Oxygenation and Ventilation  Recent Flowsheet Documentation  Taken 3/10/2025 0800 by Daxa Banuelos RN  Head of Bed (HOB) Positioning: HOB at 30-45 degrees     Problem: Infection  Goal: Absence of Infection Signs and Symptoms  3/10/2025 1401 by Daxa Banuelos RN  Outcome: Progressing  3/10/2025 1358 by Daxa Banuelos RN  Outcome: Progressing  3/10/2025 1240 by Daxa Banuelos RN  Outcome: Progressing  Intervention: Prevent or Manage Infection  Recent Flowsheet Documentation  Taken 3/10/2025 0800 by Daxa Banuelos RN  Isolation Precautions: droplet precautions maintained

## 2025-03-10 NOTE — PLAN OF CARE
"Goal Outcome Evaluation:      Plan of Care Reviewed With: patient    Overall Patient Progress: improvingOverall Patient Progress: improving    Outcome Evaluation: A&O but confused at times. Maintained on droplet precautions. On 3L via NC, cont. pulse on.  Ax1 GB and walker, voiding. Good congested cough. PRN robitussin given. Anxious at times, provided emotional comfort. Denied pain. Plan is for home care.      Problem: Adult Inpatient Plan of Care  Goal: Plan of Care Review  Description: The Plan of Care Review/Shift note should be completed every shift.  The Outcome Evaluation is a brief statement about your assessment that the patient is improving, declining, or no change.  This information will be displayed automatically on your shift  note.  Outcome: Progressing  Flowsheets (Taken 3/10/2025 0631)  Outcome Evaluation: A&O but confused at times. Maintained on droplet precautions. On 3L via NC, cont. pulse on.  Ax1 GB and walker, voiding. Good congested cough. PRN robitussin given. Anxious at times, provided emotional comfort. Denied pain. Plan is for home care.  Plan of Care Reviewed With: patient  Overall Patient Progress: improving  Goal: Patient-Specific Goal (Individualized)  Description: You can add care plan individualizations to a care plan. Examples of Individualization might be:  \"Parent requests to be called daily at 9am for status\", \"I have a hard time hearing out of my right ear\", or \"Do not touch me to wake me up as it startles  me\".  Outcome: Progressing  Goal: Absence of Hospital-Acquired Illness or Injury  Outcome: Progressing  Intervention: Identify and Manage Fall Risk  Recent Flowsheet Documentation  Taken 3/9/2025 2126 by Rebecca Mcmahon RN  Safety Promotion/Fall Prevention:   activity supervised   assistive device/personal items within reach   mobility aid in reach   nonskid shoes/slippers when out of bed   room door open   room near nurse's station   room organization consistent   safety " round/check completed   supervised activity  Intervention: Prevent Skin Injury  Recent Flowsheet Documentation  Taken 3/9/2025 2126 by Rebecca Mcmahon RN  Body Position: supine, head elevated  Intervention: Prevent and Manage VTE (Venous Thromboembolism) Risk  Recent Flowsheet Documentation  Taken 3/9/2025 2126 by Rebecca Mcmahon RN  VTE Prevention/Management: SCDs off (sequential compression devices)  Intervention: Prevent Infection  Recent Flowsheet Documentation  Taken 3/9/2025 2126 by Rebecca Mcmahon RN  Infection Prevention:   personal protective equipment utilized   rest/sleep promoted   single patient room provided  Goal: Optimal Comfort and Wellbeing  Outcome: Progressing  Goal: Readiness for Transition of Care  Outcome: Progressing     Problem: Comorbidity Management  Goal: Maintenance of COPD Symptom Control  Outcome: Progressing  Intervention: Maintain COPD Symptom Control  Recent Flowsheet Documentation  Taken 3/9/2025 2126 by Rebecca Mcmahon RN  Medication Review/Management: medications reviewed     Problem: Fall Injury Risk  Goal: Absence of Fall and Fall-Related Injury  Outcome: Progressing  Intervention: Identify and Manage Contributors  Recent Flowsheet Documentation  Taken 3/9/2025 2126 by Rebecca Mcmahon RN  Medication Review/Management: medications reviewed  Intervention: Promote Injury-Free Environment  Recent Flowsheet Documentation  Taken 3/9/2025 2126 by Rebecca Mcmahon RN  Safety Promotion/Fall Prevention:   activity supervised   assistive device/personal items within reach   mobility aid in reach   nonskid shoes/slippers when out of bed   room door open   room near nurse's station   room organization consistent   safety round/check completed   supervised activity     Problem: Gas Exchange Impaired  Goal: Optimal Gas Exchange  Outcome: Progressing  Intervention: Optimize Oxygenation and Ventilation  Recent Flowsheet Documentation  Taken 3/9/2025 2126 by Rebecca Mcmahon  RN  Head of Bed (HOB) Positioning: HOB at 30-45 degrees     Problem: Infection  Goal: Absence of Infection Signs and Symptoms  Outcome: Progressing  Intervention: Prevent or Manage Infection  Recent Flowsheet Documentation  Taken 3/9/2025 2126 by Rebecca Mcmahon, RN  Isolation Precautions: droplet precautions maintained

## 2025-03-10 NOTE — DISCHARGE INSTRUCTIONS
Your home care referral was sent to [facility] for OT RN  If you haven't heard from them within the next 24-48 hours,  Please call them at [phone]

## 2025-03-11 ENCOUNTER — APPOINTMENT (OUTPATIENT)
Dept: OCCUPATIONAL THERAPY | Facility: CLINIC | Age: 75
DRG: 190 | End: 2025-03-11
Payer: COMMERCIAL

## 2025-03-11 LAB
HOLD SPECIMEN: NORMAL
MAGNESIUM SERPL-MCNC: 2.1 MG/DL (ref 1.7–2.3)

## 2025-03-11 PROCEDURE — 94640 AIRWAY INHALATION TREATMENT: CPT | Mod: 76

## 2025-03-11 PROCEDURE — 36415 COLL VENOUS BLD VENIPUNCTURE: CPT | Performed by: INTERNAL MEDICINE

## 2025-03-11 PROCEDURE — 83735 ASSAY OF MAGNESIUM: CPT | Performed by: INTERNAL MEDICINE

## 2025-03-11 PROCEDURE — 97535 SELF CARE MNGMENT TRAINING: CPT | Mod: GO

## 2025-03-11 PROCEDURE — 250N000011 HC RX IP 250 OP 636: Performed by: INTERNAL MEDICINE

## 2025-03-11 PROCEDURE — 94640 AIRWAY INHALATION TREATMENT: CPT

## 2025-03-11 PROCEDURE — 99232 SBSQ HOSP IP/OBS MODERATE 35: CPT | Performed by: HOSPITALIST

## 2025-03-11 PROCEDURE — 250N000009 HC RX 250: Performed by: INTERNAL MEDICINE

## 2025-03-11 PROCEDURE — 250N000013 HC RX MED GY IP 250 OP 250 PS 637: Performed by: INTERNAL MEDICINE

## 2025-03-11 PROCEDURE — 120N000001 HC R&B MED SURG/OB

## 2025-03-11 PROCEDURE — 999N000157 HC STATISTIC RCP TIME EA 10 MIN

## 2025-03-11 PROCEDURE — 999N000147 HC STATISTIC PT IP EVAL DEFER: Performed by: PHYSICAL THERAPIST

## 2025-03-11 PROCEDURE — 250N000012 HC RX MED GY IP 250 OP 636 PS 637: Performed by: INTERNAL MEDICINE

## 2025-03-11 RX ORDER — PANTOPRAZOLE SODIUM 40 MG/1
40 TABLET, DELAYED RELEASE ORAL
Status: DISCONTINUED | OUTPATIENT
Start: 2025-03-12 | End: 2025-03-13 | Stop reason: HOSPADM

## 2025-03-11 RX ORDER — PREDNISONE 20 MG/1
40 TABLET ORAL DAILY
Status: DISCONTINUED | OUTPATIENT
Start: 2025-03-12 | End: 2025-03-13 | Stop reason: HOSPADM

## 2025-03-11 RX ORDER — PREDNISONE 20 MG/1
40 TABLET ORAL DAILY
Status: DISCONTINUED | OUTPATIENT
Start: 2025-03-11 | End: 2025-03-11

## 2025-03-11 RX ADMIN — AZITHROMYCIN DIHYDRATE 250 MG: 250 TABLET ORAL at 07:51

## 2025-03-11 RX ADMIN — SODIUM CHLORIDE SOLN NEBU 3% 3 ML: 3 NEBU SOLN at 11:21

## 2025-03-11 RX ADMIN — IPRATROPIUM BROMIDE 0.5 MG: 0.5 SOLUTION RESPIRATORY (INHALATION) at 07:19

## 2025-03-11 RX ADMIN — ATORVASTATIN CALCIUM 10 MG: 10 TABLET, FILM COATED ORAL at 07:50

## 2025-03-11 RX ADMIN — LEVALBUTEROL HYDROCHLORIDE 1.25 MG: 1.25 SOLUTION RESPIRATORY (INHALATION) at 07:19

## 2025-03-11 RX ADMIN — ENOXAPARIN SODIUM 40 MG: 40 INJECTION SUBCUTANEOUS at 04:39

## 2025-03-11 RX ADMIN — SERTRALINE HYDROCHLORIDE 25 MG: 25 TABLET ORAL at 07:51

## 2025-03-11 RX ADMIN — SODIUM CHLORIDE SOLN NEBU 3% 3 ML: 3 NEBU SOLN at 15:24

## 2025-03-11 RX ADMIN — SODIUM CHLORIDE SOLN NEBU 3% 3 ML: 3 NEBU SOLN at 19:06

## 2025-03-11 RX ADMIN — LEVOTHYROXINE SODIUM 88 MCG: 0.09 TABLET ORAL at 07:51

## 2025-03-11 RX ADMIN — IPRATROPIUM BROMIDE 0.5 MG: 0.5 SOLUTION RESPIRATORY (INHALATION) at 11:21

## 2025-03-11 RX ADMIN — ACETAMINOPHEN 650 MG: 325 TABLET, FILM COATED ORAL at 20:42

## 2025-03-11 RX ADMIN — GUAIFENESIN 600 MG: 600 TABLET, EXTENDED RELEASE ORAL at 20:42

## 2025-03-11 RX ADMIN — IPRATROPIUM BROMIDE 0.5 MG: 0.5 SOLUTION RESPIRATORY (INHALATION) at 19:06

## 2025-03-11 RX ADMIN — ALPRAZOLAM 0.25 MG: 0.25 TABLET ORAL at 08:00

## 2025-03-11 RX ADMIN — BENZONATATE 100 MG: 100 CAPSULE ORAL at 20:42

## 2025-03-11 RX ADMIN — SODIUM CHLORIDE SOLN NEBU 3% 3 ML: 3 NEBU SOLN at 07:19

## 2025-03-11 RX ADMIN — LEVALBUTEROL HYDROCHLORIDE 1.25 MG: 1.25 SOLUTION RESPIRATORY (INHALATION) at 15:24

## 2025-03-11 RX ADMIN — LEVALBUTEROL HYDROCHLORIDE 1.25 MG: 1.25 SOLUTION RESPIRATORY (INHALATION) at 19:06

## 2025-03-11 RX ADMIN — GUAIFENESIN 600 MG: 600 TABLET, EXTENDED RELEASE ORAL at 07:51

## 2025-03-11 RX ADMIN — LEVALBUTEROL HYDROCHLORIDE 1.25 MG: 1.25 SOLUTION RESPIRATORY (INHALATION) at 11:21

## 2025-03-11 RX ADMIN — MIRTAZAPINE 15 MG: 15 TABLET, FILM COATED ORAL at 20:42

## 2025-03-11 RX ADMIN — BUDESONIDE 0.5 MG: 0.5 INHALANT ORAL at 07:19

## 2025-03-11 RX ADMIN — IPRATROPIUM BROMIDE 0.5 MG: 0.5 SOLUTION RESPIRATORY (INHALATION) at 15:24

## 2025-03-11 ASSESSMENT — ACTIVITIES OF DAILY LIVING (ADL)
ADLS_ACUITY_SCORE: 70
ADLS_ACUITY_SCORE: 67
ADLS_ACUITY_SCORE: 70
ADLS_ACUITY_SCORE: 67
ADLS_ACUITY_SCORE: 67
ADLS_ACUITY_SCORE: 70
ADLS_ACUITY_SCORE: 67
ADLS_ACUITY_SCORE: 65
ADLS_ACUITY_SCORE: 70
ADLS_ACUITY_SCORE: 67
ADLS_ACUITY_SCORE: 70
ADLS_ACUITY_SCORE: 67
ADLS_ACUITY_SCORE: 70
ADLS_ACUITY_SCORE: 70
ADLS_ACUITY_SCORE: 67
ADLS_ACUITY_SCORE: 70
ADLS_ACUITY_SCORE: 67
ADLS_ACUITY_SCORE: 65

## 2025-03-11 NOTE — PROGRESS NOTES
Problem: Alteration in Thoughts and Perception  Goal: Verbalize thoughts and feelings  Description: Interventions:  - Promote a nonjudgmental and trusting relationship with the patient through active listening and therapeutic communication  - Assess patient's level of functioning, behavior and potential for risk  - Engage patient in 1 on 1 interactions  - Encourage patient to express fears, feelings, frustrations, and discuss symptoms    - [place] patient to reality, help patient recognize reality-based thinking   - Administer medications as ordered and assess for potential side effects  - Provide the patient education related to the signs and symptoms of the illness and desired effects of prescribed medications  Outcome: Progressing  Goal: Agree to be compliant with medication regime, as prescribed and report medication side effects  Description: Interventions:  - Offer appropriate PRN medication and supervise ingestion; conduct AIMS, as needed   Outcome: Progressing      "   03/26/23 1200   Appointment Info   Signing Clinician's Name / Credentials (SLP) Lara Ortiz MS CCC-SLP   General Information   Onset of Illness/Injury or Date of Surgery 03/23/23   Referring Physician Jennifer Ge MD   Patient/Family Therapy Goal Statement (SLP) diet pepsi, small amounts of purees   Pertinent History of Current Problem \"73-year-old female who is status post right hip hemiarthroplasty.  An RRT was called postoperatively due to agitation, hypoxia, hypotension, and encephalopathy after surgery.  Imaging revealed known cerebral meningioma with moderate surrounding vasogenic edema and mass effect.  She has undergone radiation therapy and stopped steroids about 1 month ago.  Since that time has had balance issues, confusion, and increased headache.  She was transferred from [facility] to [facility] for possible surgical intervention regarding her meningioma with Dr. Murphy. Transitioned to high flow O2 yesterday and required BiPAP overnight. Again switched to high flow O2 this AM. Palliative care consult placed yesterday, although they won't likely be able to see her until tomorrow.\"   General Observations Pt alert, very pleasant with family present   Type of Evaluation   Type of Evaluation Swallow Evaluation   Oral Motor   Oral Musculature generally intact   General Swallowing Observations   Past History of Dysphagia none documented or reported   Comment, General Swallowing Observations Pt currently on 40LPM HFNC. Discussed with pt, RN, and family. RN has provided excellent education on risk of aspiration given significant respiratory illness and elevated risk with HFNC. Pt and family verbalized understanding, are clear with no alternative nutrition and want to see if pt can start having small amounts of preferred foods/liquids for pleasure and getting small amounts of nutrition/hydration. Pt/daughter declined hx of any dysphagia symptoms, but reported declining " appetite and intake recently.   Respiratory Support (General Swallowing Observations)   (40LPM HFNC)   Current Diet/Method of Nutritional Intake (General Swallowing Observations, NIS) NPO  (pt/family do NOT want alternative nutrition)   Swallowing Evaluation Clinical swallow evaluation   Clinical Swallow Evaluation   Feeding Assistance dependent   Swallowing Recommendations   Diet Consistency Recommendations pureed (level 4);thin liquids (level 0)   Supervision Level for Intake 1:1 supervision needed   Mode of Delivery Recommendations bolus size, small;slow rate of intake   Swallowing Maneuver Recommendations alternate food and liquid intake   Monitoring/Assistance Required (Eating/Swallowing) stop eating activities when fatigue is present;monitor for cough or change in vocal quality with intake   Recommended Feeding/Eating Techniques (Swallow Eval) maintain upright sitting position for eating;maintain upright posture during/after eating for 30 minutes   Medication Administration Recommendations, Swallowing (SLP) crushed   General Therapy Interventions   Planned Therapy Interventions Dysphagia Treatment   Dysphagia treatment Modified diet education;Instruction of safe swallow strategies   Clinical Impression   Criteria for Skilled Therapeutic Interventions Met (SLP Eval) Yes, treatment indicated   SLP Diagnosis Dysphagia   Risks & Benefits of therapy have been explained evaluation/treatment results reviewed;care plan/treatment goals reviewed;risks/benefits reviewed;current/potential barriers reviewed;participants voiced agreement with care plan;participants included;patient;daughter;sibling   Clinical Impression Comments Pt alert, on 40LPM HFNC, able to communicate with clear speech. Oral motor exam WNL. ?thrush on lingual surface. Ice chips x5, 4oz of thin water, 2oz of thin soda, and 3 bites of applesauce trialed. Cues required to take single, straw sips and rest/breathing breaks. Given strategies and cues, no  overt s/sx of aspiration or change in respiratory status observed throughout. Pt and family agreed with starting slowly with puree and not trialing semi-solids yet. Recommend: IDDSI level 4 (puree) and thin liquids (straws ok) Small amounts at a time for pleasure/quality of life; need to closely monitor respiratory status and hold if decline/return to Bipap; 1:1 assist for small bites and sips, take rest breaks between bites; okay for family to bring in foods/liquids; essential meds crushed in puree   SLP Total Evaluation Time   Eval: oral/pharyngeal swallow function, clinical swallow Minutes (84644) 25   SLP Goals   Therapy Frequency (SLP Eval) 5 times/wk   SLP Predicted Duration/Target Date for Goal Attainment 04/16/23   SLP Goals Swallow   SLP: Safely tolerate diet without signs/symptoms of aspiration Soft & bite sized diet;Thin liquids;With use of swallow precautions;Independently   Interventions   Interventions Quick Adds Swallowing Dysfunction   Swallowing Dysfunction &/or Oral Function for Feeding   Treatment of Swallowing Dysfunction &/or Oral Function for Feeding Minutes (74907) 10   Symptoms Noted During/After Treatment None   Treatment Detail/Skilled Intervention Frequent cuing required with all PO intake to allow rest breaks, small bites and sips, and slower overall rate. Education provided and all questions answered.   SLP Discharge Planning   SLP Plan meal follow up; further assessment; trial MM; ?goals of care vs surgery plan   SLP Discharge Recommendation home with home care speech therapy   SLP Rationale for DC Rec SLP POC pending overall goals and plan   SLP Brief overview of current status  Recommend: IDDSI level 4 (puree) and thin liquids (straws ok) Small amounts at a time for pleasure/quality of life; need to closely monitor respiratory status and hold if decline/return to Bipap; 1:1 assist for small bites and sips, take rest breaks between bites; okay for family to bring in foods/liquids;  essential meds crushed in puree   Total Session Time   Total Session Time (sum of timed and untimed services) 35

## 2025-03-11 NOTE — PLAN OF CARE
"Goal Outcome Evaluation:      Plan of Care Reviewed With: patient    Overall Patient Progress: no changeOverall Patient Progress: no change    Outcome Evaluation: Pt saturating well on 3L O2, IS and flutter valve use encouraged when awake, frequent cough, droplet precautions mantained, plan for possible d/c today      Problem: Adult Inpatient Plan of Care  Goal: Plan of Care Review  Description: The Plan of Care Review/Shift note should be completed every shift.  The Outcome Evaluation is a brief statement about your assessment that the patient is improving, declining, or no change.  This information will be displayed automatically on your shift  note.  Outcome: Progressing  Flowsheets (Taken 3/11/2025 0236)  Outcome Evaluation: Pt saturating well on 3L O2, IS and flutter valve use encouraged when awake, frequent cough, droplet precautions mantained, plan for possible d/c today  Plan of Care Reviewed With: patient  Overall Patient Progress: no change  Goal: Patient-Specific Goal (Individualized)  Description: You can add care plan individualizations to a care plan. Examples of Individualization might be:  \"Parent requests to be called daily at 9am for status\", \"I have a hard time hearing out of my right ear\", or \"Do not touch me to wake me up as it startles  me\".  Outcome: Progressing  Goal: Absence of Hospital-Acquired Illness or Injury  Outcome: Progressing  Intervention: Identify and Manage Fall Risk  Recent Flowsheet Documentation  Taken 3/10/2025 2321 by Rajwinder Mann, RN  Safety Promotion/Fall Prevention:   activity supervised   assistive device/personal items within reach   clutter free environment maintained   increased rounding and observation   nonskid shoes/slippers when out of bed   safety round/check completed  Intervention: Prevent Infection  Recent Flowsheet Documentation  Taken 3/10/2025 2321 by Rajwinder Mann, RN  Infection Prevention:   environmental surveillance performed   rest/sleep promoted   " single patient room provided  Goal: Optimal Comfort and Wellbeing  Outcome: Progressing  Intervention: Monitor Pain and Promote Comfort  Recent Flowsheet Documentation  Taken 3/10/2025 2321 by Rajwinder Mann RN  Pain Management Interventions:   care clustered   cold applied   rest  Intervention: Provide Person-Centered Care  Recent Flowsheet Documentation  Taken 3/10/2025 2321 by Rajwinder Mann RN  Trust Relationship/Rapport:   care explained   choices provided   emotional support provided   empathic listening provided   questions answered   questions encouraged   reassurance provided   thoughts/feelings acknowledged  Goal: Readiness for Transition of Care  Outcome: Progressing     Problem: Comorbidity Management  Goal: Maintenance of COPD Symptom Control  Outcome: Progressing  Intervention: Maintain COPD Symptom Control  Recent Flowsheet Documentation  Taken 3/10/2025 2321 by Rajwinder Mann RN  Medication Review/Management: medications reviewed     Problem: Fall Injury Risk  Goal: Absence of Fall and Fall-Related Injury  Outcome: Progressing  Intervention: Identify and Manage Contributors  Recent Flowsheet Documentation  Taken 3/10/2025 2321 by Rajwinder Mann RN  Medication Review/Management: medications reviewed  Intervention: Promote Injury-Free Environment  Recent Flowsheet Documentation  Taken 3/10/2025 2321 by Rajwinder Mann RN  Safety Promotion/Fall Prevention:   activity supervised   assistive device/personal items within reach   clutter free environment maintained   increased rounding and observation   nonskid shoes/slippers when out of bed   safety round/check completed     Problem: Gas Exchange Impaired  Goal: Optimal Gas Exchange  Outcome: Progressing  Intervention: Optimize Oxygenation and Ventilation  Recent Flowsheet Documentation  Taken 3/10/2025 2321 by Rajwinder Mann RN  Airway/Ventilation Management:   pulmonary hygiene promoted   oxygen therapy provided   calming measures promoted    position adjusted     Problem: Infection  Goal: Absence of Infection Signs and Symptoms  Outcome: Progressing  Intervention: Prevent or Manage Infection  Recent Flowsheet Documentation  Taken 3/10/2025 2321 by Rajwinder Mann, RN  Isolation Precautions: droplet precautions maintained

## 2025-03-11 NOTE — PROGRESS NOTES
[facility]    Hospitalist Progress Note  Name: Josephine Nicole    MRN: [id number]  Provider:  Nate Amin DO MPH  Date of Service: 03/11/2025    Summary of Stay: Josephine Nicole is a 75 year old female with a history of COPD with chronic home oxygen dependence of 2 L continuously, hypothyroidism, anxiety/depression, hyperlipidemia admitted on 3/5/2025 with shortness of breath.      In the emergency department, the patient was found to have a blood pressure 125/103, temperature 98.6  F, heart rate 87, respiratory rate 22, SpO2 89% on 4 L nasal cannula.  Initial lab work showed magnesium 1.6, proBNP 4687, high-sensitivity troponin 13, venous pH 7.34 with a venous pCO2 of 81, hemoglobin 10.3.  COVID-19, influenza, RSV are negative.  CT chest showed small right and trace left oral effusions with adjacent compressive atelectasis, severe centrilobular emphysema.      The patient did ultimately require continuous BiPAP in the emergency department.  The patient was started on Solu-Medrol and scheduled DuoNeb treatments in the setting of acute COPD exacerbation.  The patient was transitioned off of continuous BiPAP to nasal cannula.  The patient's symptoms gradually improved.  She was started on azithromycin with concern for developing acute bacterial bronchitis on 3/9.     Problem List:   Acute on Chronic Hypoxic Respiratory Failure  Acute Hypercapnic Respiratory Failure  Acute COPD Exacerbation  Coronavirus Infection (NOT covid)  Suspected Acute Bacterial Bronchitis  - Initially required continuous BiPAP, titrated off on AM of 3/6  - At baseline uses 2L continuously  - Resp viral panel positive for coronavirus  - Holding off on abx for now given no infiltrate on CT chest and normal WBC  - Solumedrol 40 mg IV q12h  - Duonebs QID, prn  - Pulmicort neb daily  - Azithromycin for 5 days on 3/9  - Trelegy is $595 per month which is unrealistic.  Will consider adding muscarinic antagonist at discharge  (spiriva vs incruse ellipta)     Small Bilateral Pleural Effusion  Suspected Mild Fluid Overload  - Echocardiogram showed EF 65-70%, G1DD, no RWMA  - No further plan for diuresis  - Telemetry  - Strict I/O  - Daily Weights     Hypomagnesemia  - Electrolyte replacement protocol     Goals of Care  - On 3/9, pt had discussed her code status with family.  She expressed she would like to change her code status to DNR/DNI.  We discussed the implications of this.  A POLST form was filled out and the patient was provided with a copy.       Chronic Medical Problems:  Hypothyroidism  Anxiety/Depression  Hyperlipidemia    DVT Prophylaxis: Enoxaparin (Lovenox) SQ  Code Status: No CPR- Do NOT Intubate  Diet: Regular Diet Adult    Roman Catheter: Not present  Disposition: Expected discharge in 2-3 days to home. Goals prior to discharge include manage respiratory failure.   Incidental Findings: None.  Family updated today: No    40 MINUTES SPENT BY ME on the date of service doing chart review, history, exam, documentation & further activities per the note.      Interval History   Assumed care from previous hospitalist. The history was fully reviewed.  The patient reports doing well. No chest pain but still coughing and shortness of breath. No nausea, vomiting, diarrhea, constipation. No fevers. No other specific complaints identified.     -Data reviewed today: I personally reviewed all new labs and imaging results over the last 24 hours.     Physical Exam   Temp: 97.4  F (36.3  C) Temp src: Temporal BP: 130/77 Pulse: 83   Resp: 18 SpO2: 94 % O2 Device: Nasal cannula with humidification Oxygen Delivery: 3 LPM  Vitals:    03/09/25 0930 03/10/25 0552 03/11/25 0712   Weight: 61.5 kg (135 lb 9.3 oz) 60 kg (132 lb 4.4 oz) 65 kg (143 lb 4.8 oz)     Vital Signs with Ranges  Temp:  [96.9  F (36.1  C)-97.8  F (36.6  C)] 97.4  F (36.3  C)  Pulse:  [82-89] 83  Resp:  [16-20] 18  BP: (129-149)/(73-77) 130/77  SpO2:  [88 %-98 %] 94 %  I/O last  3 completed shifts:  In: -   Out: 300 [Urine:300]    GENERAL: No apparent distress. Awake, alert, and fully oriented.  HEENT: Normocephalic, atraumatic. Extraocular movements intact.  CARDIOVASCULAR: Regular rate and rhythm without murmurs or rubs. No S3.  PULMONARY: Wheezing bilaterally.  GASTROINTESTINAL: Soft, non-tender, non-distended. Bowel sounds normoactive.   EXTREMITIES: No cyanosis or clubbing. No edema.  NEUROLOGICAL: CN 2-12 grossly intact, no focal neurological deficits.  DERMATOLOGICAL: No rash, ulcer, bruising, nor jaundice.     Medications   Current Facility-Administered Medications   Medication Dose Route Frequency Provider Last Rate Last Admin    No lozenges or gum should be given while patient on BIPAP/AVAPS/AVAPS AE   Does not apply Continuous PRN Natalya Hammond MD        Patient may continue current oral medications   Does not apply Continuous PRN Natalya Hammond MD         Current Facility-Administered Medications   Medication Dose Route Frequency Provider Last Rate Last Admin    atorvastatin (LIPITOR) tablet 10 mg  10 mg Oral Daily Natalya Hammond MD   10 mg at 03/11/25 0750    azithromycin (ZITHROMAX) tablet 250 mg  250 mg Oral Daily Adria Amin DO   250 mg at 03/11/25 0751    budesonide (PULMICORT) neb solution 0.5 mg  0.5 mg Nebulization Daily Adria Amin DO   0.5 mg at 03/11/25 0719    cyanocobalamin injection 1,000 mcg  1,000 mcg Intramuscular Q30 Days Natalya Hammond MD        diazepam (VALIUM) tablet 5 mg  5 mg Oral See Admin Instructions Natalya Hammond MD        enoxaparin ANTICOAGULANT (LOVENOX) injection 40 mg  40 mg Subcutaneous Q24H Natalya Hammond MD   40 mg at 03/11/25 0439    [Held by provider] fluticasone-vilanterol (BREO ELLIPTA) 200-25 MCG/ACT inhaler 1 puff  1 puff Inhalation Daily Natalya Hammond MD        guaiFENesin (MUCINEX) 12 hr tablet 600 mg  600 mg Oral BID Adria Amin DO   600 mg at 03/11/25  "0751    ipratropium (ATROVENT) 0.02 % neb solution 0.5 mg  0.5 mg Nebulization 4x daily Adria Amin DO   0.5 mg at 03/11/25 1121    levalbuterol (XOPENEX) neb solution 1.25 mg  1.25 mg Nebulization 4x Daily Adria Amin DO   1.25 mg at 03/11/25 1121    levothyroxine (SYNTHROID/LEVOTHROID) tablet 88 mcg  88 mcg Oral Daily Natalya Hammond MD   88 mcg at 03/11/25 0751    mirtazapine (REMERON) tablet 15 mg  15 mg Oral At Bedtime Natalya Hammond MD   15 mg at 03/10/25 2115    [START ON 3/12/2025] predniSONE (DELTASONE) tablet 40 mg  40 mg Oral Daily Nate Amin DO        sertraline (ZOLOFT) tablet 25 mg  25 mg Oral QAM Natalya Hammond MD   25 mg at 03/11/25 0751    sodium chloride (NEBUSAL) 3 % neb solution 3 mL  3 mL Nebulization 4x Daily Adria Amin DO   3 mL at 03/11/25 1121     Data     Laboratory:  Recent Labs   Lab 03/09/25  0552 03/08/25  0640 03/07/25  0637   WBC 13.9* 12.4* 10.1   HGB 10.1* 10.5* 10.2*   HCT 32.7* 33.2* 31.7*   MCV 96 97 93    263 257     Recent Labs   Lab 03/09/25  0807 03/09/25  0552 03/08/25  1302 03/08/25  0746 03/08/25  0640 03/08/25  0158 03/07/25  0637   NA  --  142  --   --  141  --  141   POTASSIUM  --  4.2  --   --  4.2  --  3.4   CHLORIDE  --  94*  --   --  95*  --  92*   CO2  --  42*  --   --  39*  --  40*   ANIONGAP  --  6*  --   --  7  --  9   * 111* 107*   < > 133*   < > 127*   BUN  --  20.3  --   --  17.1  --  13.6   CR  --  0.59  --   --  0.57  --  0.57   GFRESTIMATED  --  >90  --   --  >90  --  >90   CHAO  --  8.7*  --   --  8.7*  --  8.9    < > = values in this interval not displayed.     No results for input(s): \"CULT\" in the last 168 hours.    Imaging:  No results found for this or any previous visit (from the past 24 hours).      Nate Amin DO MPH  [facility] Hospitalist  [address] [facility] [place] [address]  03/11/2025   "

## 2025-03-11 NOTE — PLAN OF CARE
PT: Orders received. Chart reviewed and discussed with care team, OT.  PT not indicated due to patient with limited mobility at baseline, OT reports patient likely at baseline limited by activity tolerance. OT will continued to manage IP rehab needs to prevent duplication of services.  Defer discharge recommendations to OT.  Will complete orders.

## 2025-03-11 NOTE — PLAN OF CARE
"Goal Outcome Evaluation:      Plan of Care Reviewed With: patient    Overall Patient Progress: improving       Outcome Evaluation: Pt continues with KELLY, congested and with frequent productive cough. Scheduled Mucinex given. IS usage encouraged. Lung sounds diminished, coarsed in all lung fields.  PRN Xanax given for anxiety. Voided PVR 10 ml. Saturating well on 3L via NC. A&O X4. Stable VS.      Problem: Adult Inpatient Plan of Care  Goal: Plan of Care Review  Description: The Plan of Care Review/Shift note should be completed every shift.  The Outcome Evaluation is a brief statement about your assessment that the patient is improving, declining, or no change.  This information will be displayed automatically on your shift  note.  Outcome: Progressing  Flowsheets (Taken 3/10/2025 2131)  Outcome Evaluation: Pt continues with KELLY, congested and with frequent non-productive cough. Scheduled Mucinex given. IS usage encouraged. Lung sounds diminished, coarsed in all lung fields.  Plan of Care Reviewed With: patient  Overall Patient Progress: improving  Goal: Patient-Specific Goal (Individualized)  Description: You can add care plan individualizations to a care plan. Examples of Individualization might be:  \"Parent requests to be called daily at 9am for status\", \"I have a hard time hearing out of my right ear\", or \"Do not touch me to wake me up as it startles  me\".  Outcome: Progressing  Goal: Absence of Hospital-Acquired Illness or Injury  Outcome: Progressing  Intervention: Identify and Manage Fall Risk  Recent Flowsheet Documentation  Taken 3/10/2025 1718 by Deborah Fitzgerald, RN  Safety Promotion/Fall Prevention:   activity supervised   assistive device/personal items within reach   clutter free environment maintained   increased rounding and observation   nonskid shoes/slippers when out of bed   safety round/check completed  Intervention: Prevent Skin Injury  Recent Flowsheet Documentation  Taken 3/10/2025 1718 by " Deborah Fitzgerald RN  Body Position: position changed independently  Skin Protection:   adhesive use limited   tubing/devices free from skin contact  Intervention: Prevent and Manage VTE (Venous Thromboembolism) Risk  Recent Flowsheet Documentation  Taken 3/10/2025 1718 by Deborah Fitzgerald RN  VTE Prevention/Management: SCDs off (sequential compression devices)  Intervention: Prevent Infection  Recent Flowsheet Documentation  Taken 3/10/2025 1718 by Deborah Fitzgerald RN  Infection Prevention: single patient room provided  Goal: Optimal Comfort and Wellbeing  Outcome: Progressing  Intervention: Provide Person-Centered Care  Recent Flowsheet Documentation  Taken 3/10/2025 1718 by Deborah Fitzgerald RN  Trust Relationship/Rapport:   care explained   choices provided   emotional support provided   empathic listening provided   questions answered   questions encouraged   reassurance provided   thoughts/feelings acknowledged  Goal: Readiness for Transition of Care  Outcome: Progressing     Problem: Comorbidity Management  Goal: Maintenance of COPD Symptom Control  Outcome: Progressing  Intervention: Maintain COPD Symptom Control  Recent Flowsheet Documentation  Taken 3/10/2025 1718 by Deborah Fitzgerald RN  Breathing Techniques/Airway Clearance: deep/controlled cough encouraged  M  Problem: Fall Injury Risk  Goal: Absence of Fall and Fall-Related Injury  Outcome: Progressing  Intervention: Identify and Manage Contributors  Recent Flowsheet Documentation  Taken 3/10/2025 1718 by Deborah Fitzgerald RN  Self-Care Promotion:   adaptive equipment use encouraged   meal set-up provided   BADL personal routines maintained   BADL personal objects within reach  Medication Review/Management: medications reviewed  Intervention: Promote Injury-Free Environment  Recent Flowsheet Documentation  Taken 3/10/2025 1718 by Deborah Fitzgerald RN  Safety Promotion/Fall Prevention:   activity supervised   assistive device/personal items within reach   clutter  free environment maintained   increased rounding and observation   nonskid shoes/slippers when out of bed   safety round/check completed     Problem: Gas Exchange Impaired  Goal: Optimal Gas Exchange  Outcome: Progressing  Intervention: Optimize Oxygenation and Ventilation  Recent Flowsheet Documentation  Taken 3/10/2025 1718 by Deborah Fitzgerald, RN  Airway/Ventilation Management:   pulmonary hygiene promoted   oxygen therapy provided   calming measures promoted   position adjusted  Head of Bed (HOB) Positioning: HOB at 30-45 degrees     Problem: Infection  Goal: Absence of Infection Signs and Symptoms  Outcome: Progressing  Intervention: Prevent or Manage Infection  Recent Flowsheet Documentation  Taken 3/10/2025 1718 by Deborah Fitzgerald, RN  Isolation Precautions: droplet precautions maintained

## 2025-03-11 NOTE — PLAN OF CARE
"Goal Outcome Evaluation:      Plan of Care Reviewed With: patient    Overall Patient Progress: improvingOverall Patient Progress: improving     Pt alert and oriented. On 3L 02. Up with assist of 1 gait belt and walker. Voiding via purewick. Bladder scanned for 75. Denies pain. Infrequent cough. RT following. PT/OT following. Discharge tbd.         Problem: Adult Inpatient Plan of Care  Goal: Plan of Care Review  Description: The Plan of Care Review/Shift note should be completed every shift.  The Outcome Evaluation is a brief statement about your assessment that the patient is improving, declining, or no change.  This information will be displayed automatically on your shift  note.  Outcome: Progressing  Flowsheets (Taken 3/11/2025 1542)  Plan of Care Reviewed With: patient  Overall Patient Progress: improving  Goal: Patient-Specific Goal (Individualized)  Description: You can add care plan individualizations to a care plan. Examples of Individualization might be:  \"Parent requests to be called daily at 9am for status\", \"I have a hard time hearing out of my right ear\", or \"Do not touch me to wake me up as it startles  me\".  Outcome: Progressing  Goal: Absence of Hospital-Acquired Illness or Injury  Outcome: Progressing  Intervention: Identify and Manage Fall Risk  Recent Flowsheet Documentation  Taken 3/11/2025 1000 by Daxa Banuelos RN  Safety Promotion/Fall Prevention:   activity supervised   assistive device/personal items within reach   clutter free environment maintained   increased rounding and observation   nonskid shoes/slippers when out of bed   safety round/check completed  Intervention: Prevent Skin Injury  Recent Flowsheet Documentation  Taken 3/11/2025 1000 by Daxa Banuelos RN  Body Position: position changed independently  Intervention: Prevent and Manage VTE (Venous Thromboembolism) Risk  Recent Flowsheet Documentation  Taken 3/11/2025 1000 by Daxa Banuelos RN  VTE Prevention/Management: " SCDs off (sequential compression devices)  Intervention: Prevent Infection  Recent Flowsheet Documentation  Taken 3/11/2025 1000 by Daxa Banuelos RN  Infection Prevention:   environmental surveillance performed   rest/sleep promoted   single patient room provided  Goal: Optimal Comfort and Wellbeing  Outcome: Progressing  Goal: Readiness for Transition of Care  Outcome: Progressing     Problem: Comorbidity Management  Goal: Maintenance of COPD Symptom Control  Outcome: Progressing  Intervention: Maintain COPD Symptom Control  Recent Flowsheet Documentation  Taken 3/11/2025 1000 by Daxa Banuelos RN  Medication Review/Management: medications reviewed     Problem: Fall Injury Risk  Goal: Absence of Fall and Fall-Related Injury  Outcome: Progressing  Intervention: Identify and Manage Contributors  Recent Flowsheet Documentation  Taken 3/11/2025 1000 by Daxa Banuelos RN  Medication Review/Management: medications reviewed  Intervention: Promote Injury-Free Environment  Recent Flowsheet Documentation  Taken 3/11/2025 1000 by Daxa Banuelos RN  Safety Promotion/Fall Prevention:   activity supervised   assistive device/personal items within reach   clutter free environment maintained   increased rounding and observation   nonskid shoes/slippers when out of bed   safety round/check completed     Problem: Gas Exchange Impaired  Goal: Optimal Gas Exchange  Outcome: Progressing  Intervention: Optimize Oxygenation and Ventilation  Recent Flowsheet Documentation  Taken 3/11/2025 1000 by Daxa Banuelos RN  Head of Bed (HOB) Positioning: HOB at 30-45 degrees     Problem: Infection  Goal: Absence of Infection Signs and Symptoms  Outcome: Progressing  Intervention: Prevent or Manage Infection  Recent Flowsheet Documentation  Taken 3/11/2025 1000 by Daxa Banuelos RN  Isolation Precautions: droplet precautions maintained

## 2025-03-12 LAB
CREAT SERPL-MCNC: 0.57 MG/DL (ref 0.51–0.95)
EGFRCR SERPLBLD CKD-EPI 2021: >90 ML/MIN/1.73M2
GLUCOSE BLDC GLUCOMTR-MCNC: 100 MG/DL (ref 70–99)
GLUCOSE BLDC GLUCOMTR-MCNC: 126 MG/DL (ref 70–99)
GLUCOSE BLDC GLUCOMTR-MCNC: 140 MG/DL (ref 70–99)
GLUCOSE BLDC GLUCOMTR-MCNC: 143 MG/DL (ref 70–99)
MAGNESIUM SERPL-MCNC: 2 MG/DL (ref 1.7–2.3)
PLATELET # BLD AUTO: 267 10E3/UL (ref 150–450)

## 2025-03-12 PROCEDURE — 83735 ASSAY OF MAGNESIUM: CPT | Performed by: HOSPITALIST

## 2025-03-12 PROCEDURE — 99232 SBSQ HOSP IP/OBS MODERATE 35: CPT | Performed by: HOSPITALIST

## 2025-03-12 PROCEDURE — 94640 AIRWAY INHALATION TREATMENT: CPT | Mod: 76

## 2025-03-12 PROCEDURE — 94640 AIRWAY INHALATION TREATMENT: CPT

## 2025-03-12 PROCEDURE — 250N000011 HC RX IP 250 OP 636: Performed by: INTERNAL MEDICINE

## 2025-03-12 PROCEDURE — 250N000009 HC RX 250: Performed by: INTERNAL MEDICINE

## 2025-03-12 PROCEDURE — 999N000157 HC STATISTIC RCP TIME EA 10 MIN

## 2025-03-12 PROCEDURE — 120N000001 HC R&B MED SURG/OB

## 2025-03-12 PROCEDURE — 250N000013 HC RX MED GY IP 250 OP 250 PS 637: Performed by: HOSPITALIST

## 2025-03-12 PROCEDURE — 250N000013 HC RX MED GY IP 250 OP 250 PS 637: Performed by: INTERNAL MEDICINE

## 2025-03-12 PROCEDURE — 85049 AUTOMATED PLATELET COUNT: CPT | Performed by: INTERNAL MEDICINE

## 2025-03-12 PROCEDURE — 82565 ASSAY OF CREATININE: CPT | Performed by: INTERNAL MEDICINE

## 2025-03-12 PROCEDURE — 94799 UNLISTED PULMONARY SVC/PX: CPT

## 2025-03-12 PROCEDURE — 36415 COLL VENOUS BLD VENIPUNCTURE: CPT | Performed by: INTERNAL MEDICINE

## 2025-03-12 PROCEDURE — 250N000012 HC RX MED GY IP 250 OP 636 PS 637: Performed by: HOSPITALIST

## 2025-03-12 RX ADMIN — GUAIFENESIN 600 MG: 600 TABLET, EXTENDED RELEASE ORAL at 21:17

## 2025-03-12 RX ADMIN — LEVALBUTEROL HYDROCHLORIDE 1.25 MG: 1.25 SOLUTION RESPIRATORY (INHALATION) at 16:01

## 2025-03-12 RX ADMIN — SODIUM CHLORIDE SOLN NEBU 3% 3 ML: 3 NEBU SOLN at 21:23

## 2025-03-12 RX ADMIN — IPRATROPIUM BROMIDE 0.5 MG: 0.5 SOLUTION RESPIRATORY (INHALATION) at 16:01

## 2025-03-12 RX ADMIN — ALPRAZOLAM 0.25 MG: 0.25 TABLET ORAL at 09:18

## 2025-03-12 RX ADMIN — GUAIFENESIN 600 MG: 600 TABLET, EXTENDED RELEASE ORAL at 08:59

## 2025-03-12 RX ADMIN — SODIUM CHLORIDE SOLN NEBU 3% 3 ML: 3 NEBU SOLN at 16:01

## 2025-03-12 RX ADMIN — MIRTAZAPINE 15 MG: 15 TABLET, FILM COATED ORAL at 21:17

## 2025-03-12 RX ADMIN — ZOLPIDEM TARTRATE 2.5 MG: 5 TABLET, FILM COATED ORAL at 21:18

## 2025-03-12 RX ADMIN — BUDESONIDE 0.5 MG: 0.5 INHALANT ORAL at 08:26

## 2025-03-12 RX ADMIN — AZITHROMYCIN DIHYDRATE 250 MG: 250 TABLET ORAL at 08:58

## 2025-03-12 RX ADMIN — LEVOTHYROXINE SODIUM 88 MCG: 0.09 TABLET ORAL at 08:58

## 2025-03-12 RX ADMIN — ATORVASTATIN CALCIUM 10 MG: 10 TABLET, FILM COATED ORAL at 08:58

## 2025-03-12 RX ADMIN — PREDNISONE 40 MG: 20 TABLET ORAL at 08:59

## 2025-03-12 RX ADMIN — ENOXAPARIN SODIUM 40 MG: 40 INJECTION SUBCUTANEOUS at 06:09

## 2025-03-12 RX ADMIN — IPRATROPIUM BROMIDE 0.5 MG: 0.5 SOLUTION RESPIRATORY (INHALATION) at 08:26

## 2025-03-12 RX ADMIN — LEVALBUTEROL HYDROCHLORIDE 1.25 MG: 1.25 SOLUTION RESPIRATORY (INHALATION) at 21:23

## 2025-03-12 RX ADMIN — SODIUM CHLORIDE SOLN NEBU 3% 3 ML: 3 NEBU SOLN at 08:26

## 2025-03-12 RX ADMIN — IPRATROPIUM BROMIDE 0.5 MG: 0.5 SOLUTION RESPIRATORY (INHALATION) at 21:23

## 2025-03-12 RX ADMIN — PANTOPRAZOLE SODIUM 40 MG: 40 TABLET, DELAYED RELEASE ORAL at 08:58

## 2025-03-12 RX ADMIN — SERTRALINE HYDROCHLORIDE 25 MG: 25 TABLET ORAL at 08:58

## 2025-03-12 RX ADMIN — LEVALBUTEROL HYDROCHLORIDE 1.25 MG: 1.25 SOLUTION RESPIRATORY (INHALATION) at 08:26

## 2025-03-12 ASSESSMENT — ACTIVITIES OF DAILY LIVING (ADL)
ADLS_ACUITY_SCORE: 68
ADLS_ACUITY_SCORE: 68
ADLS_ACUITY_SCORE: 70
ADLS_ACUITY_SCORE: 68
ADLS_ACUITY_SCORE: 70
ADLS_ACUITY_SCORE: 70
ADLS_ACUITY_SCORE: 68
ADLS_ACUITY_SCORE: 70
ADLS_ACUITY_SCORE: 68
ADLS_ACUITY_SCORE: 70

## 2025-03-12 NOTE — PLAN OF CARE
"Goal Outcome Evaluation:      Plan of Care Reviewed With: patient, child    Overall Patient Progress: no change     Outcome Evaluation: Pt continues with dyspnea with exertion. Desat 85% with ambulation during PT session. Congested, frequent productive cough.  Lungs with coarse, inspiratory wheezes. IS and Flutter Valve use encourage.     Tylenol given for mild headache. Voided without issues. Reported, \"mild dizziness with standing and feeling hot.\" Bedside fan provided and room temp decreased. Afebrile, with stable VS.      Problem: Adult Inpatient Plan of Care  Goal: Plan of Care Review  Description: The Plan of Care Review/Shift note should be completed every shift.  The Outcome Evaluation is a brief statement about your assessment that the patient is improving, declining, or no change.  This information will be displayed automatically on your shift  note.  Outcome: Progressing  Flowsheets (Taken 3/11/2025 2050)  Outcome Evaluation: Pt continues with dyspnea with exertion. Desat 85% with ambulation during PT session. Congested, frequent productive cough.  Plan of Care Reviewed With:   patient   child  Overall Patient Progress: no change  Goal: Patient-Specific Goal (Individualized)  Description: You can add care plan individualizations to a care plan. Examples of Individualization might be:  \"Parent requests to be called daily at 9am for status\", \"I have a hard time hearing out of my right ear\", or \"Do not touch me to wake me up as it startles  me\".  Outcome: Not Progressing  Goal: Absence of Hospital-Acquired Illness or Injury  Outcome: Progressing  Intervention: Identify and Manage Fall Risk  Recent Flowsheet Documentation  Taken 3/11/2025 2133 by Deborah Fitzgerald, RN  Safety Promotion/Fall Prevention:   activity supervised   assistive device/personal items within reach   clutter free environment maintained   increased rounding and observation   nonskid shoes/slippers when out of bed   safety round/check " completed  Intervention: Prevent Skin Injury  Recent Flowsheet Documentation  Taken 3/11/2025 1753 by Deborah Fitzgerald RN  Body Position: position changed independently  Skin Protection:   adhesive use limited   tubing/devices free from skin contact  Intervention: Prevent and Manage VTE (Venous Thromboembolism) Risk  Recent Flowsheet Documentation  Taken 3/11/2025 1753 by Deborah Fitzgerald RN  VTE Prevention/Management: SCDs off (sequential compression devices)  Intervention: Prevent Infection  Recent Flowsheet Documentation  Taken 3/11/2025 1753 by Deborah Fitzgerald RN  Infection Prevention:   environmental surveillance performed   rest/sleep promoted   single patient room provided  Goal: Optimal Comfort and Wellbeing  Outcome: Not Progressing  Intervention: Provide Person-Centered Care  Recent Flowsheet Documentation  Taken 3/11/2025 1753 by Deborah Fitzgerald RN  Trust Relationship/Rapport:   care explained   choices provided   emotional support provided   empathic listening provided   questions answered   questions encouraged   reassurance provided   thoughts/feelings acknowledged  Goal: Readiness for Transition of Care  Outcome: Not Progressing     Problem: Comorbidity Management  Goal: Maintenance of COPD Symptom Control  Outcome: Progressing  Intervention: Maintain COPD Symptom Control  Recent Flowsheet Documentation  Taken 3/11/2025 1753 by Deborah Fitzgerald RN  Breathing Techniques/Airway Clearance: deep/controlled cough encouraged  Medication Review/Management: medications reviewed     Problem: Fall Injury Risk  Goal: Absence of Fall and Fall-Related Injury  Outcome: Progressing  Intervention: Identify and Manage Contributors  Recent Flowsheet Documentation  Taken 3/11/2025 1753 by Deborah Fitzgerald RN  Self-Care Promotion:   adaptive equipment use encouraged   meal set-up provided   BADL personal routines maintained   BADL personal objects within reach  Medication Review/Management: medications  reviewed  Intervention: Promote Injury-Free Environment  Recent Flowsheet Documentation  Taken 3/11/2025 1753 by Deborah Fitzgerald, RN  Safety Promotion/Fall Prevention:   activity supervised   assistive device/personal items within reach   clutter free environment maintained   increased rounding and observation   nonskid shoes/slippers when out of bed   safety round/check completed     Problem: Gas Exchange Impaired  Goal: Optimal Gas Exchange  Outcome: Not Progressing  Intervention: Optimize Oxygenation and Ventilation  Recent Flowsheet Documentation  Taken 3/11/2025 1753 by Deborah Fitzgerald, RN  Airway/Ventilation Management:   pulmonary hygiene promoted   oxygen therapy provided   calming measures promoted   position adjusted  Head of Bed (HOB) Positioning: HOB at 30-45 degrees     Problem: Infection  Goal: Absence of Infection Signs and Symptoms  Outcome: Progressing  Intervention: Prevent or Manage Infection  Recent Flowsheet Documentation  Taken 3/11/2025 1753 by Deborah Fitzgerald, RN  Isolation Precautions: droplet precautions maintained

## 2025-03-12 NOTE — PROGRESS NOTES
"SPIRITUAL HEALTH SERVICES - Progress Note   [facility]     Referral Source: Patient responded \"Yes\" to the question in the nursing assessment, \"Do you have any spiritual or Synagogue beliefs that will affect your care?\"     Attempted to see patient 3 times but could not visit because they were sleeping.  [facility] remains available upon request.     Plan: This author and other chaplains remain available per pt/family request.     AMY Osborne   Intern    [facility] available 24/7 for emergent requests/referrals, either by paging the on-call  or by entering an ASAP/STAT consult in [facility], which will also page the on-call .         "

## 2025-03-12 NOTE — PLAN OF CARE
"Provided cares for pt from [id number]. Pt a/o x4. denied pain. Assist of 1 gait belt and walker. Ambulated to bathroom. Voiding. Congested, productive cough. On 2L nasal canula. Voiding. Continue to encourage ambulation.     /62 (BP Location: Left arm)   Pulse 87   Temp (!) 96.6  F (35.9  C) (Temporal)   Resp 20   Ht 1.575 m (5' 2\")   Wt 60.3 kg (132 lb 15 oz)   SpO2 97%   BMI 24.31 kg/m          Plan of Care Reviewed With: patient    Overall Patient Progress: improvingOverall Patient Progress: improving    Outcome Evaluation: Pt a/o x4. denied pain. Assist of 1 gait belt and walker. Congested, productive cough. On 2L nasal canula. Voiding. Continue to encourage ambulation.      Problem: Adult Inpatient Plan of Care  Goal: Plan of Care Review  Description: The Plan of Care Review/Shift note should be completed every shift.  The Outcome Evaluation is a brief statement about your assessment that the patient is improving, declining, or no change.  This information will be displayed automatically on your shift  note.  Outcome: Progressing  Flowsheets (Taken 3/12/2025 1819)  Outcome Evaluation: Pt a/o x4. denied pain. Assist of 1 gait belt and walker. Congested, productive cough. On 2L nasal canula. Voiding. Continue to encourage ambulation.  Plan of Care Reviewed With: patient  Overall Patient Progress: improving  Goal: Patient-Specific Goal (Individualized)  Description: You can add care plan individualizations to a care plan. Examples of Individualization might be:  \"Parent requests to be called daily at 9am for status\", \"I have a hard time hearing out of my right ear\", or \"Do not touch me to wake me up as it startles  me\".  Outcome: Progressing  Goal: Absence of Hospital-Acquired Illness or Injury  Outcome: Progressing  Intervention: Identify and Manage Fall Risk  Recent Flowsheet Documentation  Taken 3/12/2025 1730 by Madina Shaw RN  Safety Promotion/Fall Prevention:   activity supervised   " assistive device/personal items within reach   clutter free environment maintained   increased rounding and observation   nonskid shoes/slippers when out of bed   room door open   room near nurse's station   room organization consistent   safety round/check completed  Intervention: Prevent Skin Injury  Recent Flowsheet Documentation  Taken 3/12/2025 1730 by Madina Shaw RN  Body Position: position changed independently  Intervention: Prevent Infection  Recent Flowsheet Documentation  Taken 3/12/2025 1730 by Madina Shaw RN  Infection Prevention:   equipment surfaces disinfected   hand hygiene promoted   personal protective equipment utilized   rest/sleep promoted   single patient room provided  Goal: Optimal Comfort and Wellbeing  Outcome: Progressing  Goal: Readiness for Transition of Care  Outcome: Progressing     Problem: Comorbidity Management  Goal: Maintenance of COPD Symptom Control  Outcome: Progressing     Problem: Fall Injury Risk  Goal: Absence of Fall and Fall-Related Injury  Outcome: Progressing  Intervention: Promote Injury-Free Environment  Recent Flowsheet Documentation  Taken 3/12/2025 1730 by Madina Shaw RN  Safety Promotion/Fall Prevention:   activity supervised   assistive device/personal items within reach   clutter free environment maintained   increased rounding and observation   nonskid shoes/slippers when out of bed   room door open   room near nurse's station   room organization consistent   safety round/check completed     Problem: Gas Exchange Impaired  Goal: Optimal Gas Exchange  Outcome: Progressing     Problem: Infection  Goal: Absence of Infection Signs and Symptoms  Outcome: Progressing  Intervention: Prevent or Manage Infection  Recent Flowsheet Documentation  Taken 3/12/2025 1730 by Madina Shaw RN  Isolation Precautions: droplet precautions maintained

## 2025-03-12 NOTE — PLAN OF CARE
"Goal Outcome Evaluation:      Plan of Care Reviewed With: patient    Overall Patient Progress: no changeOverall Patient Progress: no change    Outcome Evaluation: Pt continued on 3L O2 overnight, inspiratory wheezing upon auscultation, frequent productive cough, denies pain      Problem: Adult Inpatient Plan of Care  Goal: Plan of Care Review  Description: The Plan of Care Review/Shift note should be completed every shift.  The Outcome Evaluation is a brief statement about your assessment that the patient is improving, declining, or no change.  This information will be displayed automatically on your shift  note.  Outcome: Progressing  Flowsheets (Taken 3/12/2025 0801)  Outcome Evaluation: Pt continued on 3L O2 overnight, inspiratory wheezing upon auscultation, frequent productive cough, denies pain  Plan of Care Reviewed With: patient  Overall Patient Progress: no change  Goal: Patient-Specific Goal (Individualized)  Description: You can add care plan individualizations to a care plan. Examples of Individualization might be:  \"Parent requests to be called daily at 9am for status\", \"I have a hard time hearing out of my right ear\", or \"Do not touch me to wake me up as it startles  me\".  Outcome: Progressing  Goal: Absence of Hospital-Acquired Illness or Injury  Outcome: Progressing  Intervention: Identify and Manage Fall Risk  Recent Flowsheet Documentation  Taken 3/12/2025 0307 by Rajwinder Mann, RN  Safety Promotion/Fall Prevention:   activity supervised   assistive device/personal items within reach   nonskid shoes/slippers when out of bed   room door open   room near nurse's station   room organization consistent  Intervention: Prevent Infection  Recent Flowsheet Documentation  Taken 3/12/2025 0406 by Rajwinder Mann, RN  Infection Prevention:   environmental surveillance performed   rest/sleep promoted   single patient room provided  Goal: Optimal Comfort and Wellbeing  Outcome: Progressing  Intervention: Monitor " Pain and Promote Comfort  Recent Flowsheet Documentation  Taken 3/12/2025 0307 by Rajwinder Mann RN  Pain Management Interventions:   care clustered   cold applied   rest  Intervention: Provide Person-Centered Care  Recent Flowsheet Documentation  Taken 3/12/2025 0307 by Rajwinder Mann RN  Trust Relationship/Rapport:   care explained   choices provided   emotional support provided   empathic listening provided   questions answered   questions encouraged   reassurance provided   thoughts/feelings acknowledged  Goal: Readiness for Transition of Care  Outcome: Progressing     Problem: Comorbidity Management  Goal: Maintenance of COPD Symptom Control  Outcome: Progressing  Intervention: Maintain COPD Symptom Control  Recent Flowsheet Documentation  Taken 3/12/2025 0307 by Rajwinder Mann RN  Medication Review/Management: medications reviewed     Problem: Fall Injury Risk  Goal: Absence of Fall and Fall-Related Injury  Outcome: Progressing  Intervention: Identify and Manage Contributors  Recent Flowsheet Documentation  Taken 3/12/2025 0307 by Rajwinder Mann RN  Medication Review/Management: medications reviewed  Intervention: Promote Injury-Free Environment  Recent Flowsheet Documentation  Taken 3/12/2025 0307 by Rajwinder Mann RN  Safety Promotion/Fall Prevention:   activity supervised   assistive device/personal items within reach   nonskid shoes/slippers when out of bed   room door open   room near nurse's station   room organization consistent     Problem: Gas Exchange Impaired  Goal: Optimal Gas Exchange  Outcome: Progressing  Intervention: Optimize Oxygenation and Ventilation  Recent Flowsheet Documentation  Taken 3/12/2025 0307 by Rajwinder Mann RN  Airway/Ventilation Management:   airway patency maintained   calming measures promoted   pulmonary hygiene promoted     Problem: Infection  Goal: Absence of Infection Signs and Symptoms  Outcome: Progressing

## 2025-03-12 NOTE — PROGRESS NOTES
[facility]    Hospitalist Progress Note  Name: Josephine Nicole    MRN: [id number]  Provider:  Nate Amin DO MPH  Date of Service: 03/12/2025    Summary of Stay: Josephine Nicole is a 75 year old female with a history of COPD with chronic home oxygen dependence of 2 L continuously, hypothyroidism, anxiety/depression, hyperlipidemia admitted on 3/5/2025 with shortness of breath.      In the emergency department, the patient was found to have a blood pressure 125/103, temperature 98.6  F, heart rate 87, respiratory rate 22, SpO2 89% on 4 L nasal cannula.  Initial lab work showed magnesium 1.6, proBNP 4687, high-sensitivity troponin 13, venous pH 7.34 with a venous pCO2 of 81, hemoglobin 10.3.  COVID-19, influenza, RSV are negative.  CT chest showed small right and trace left oral effusions with adjacent compressive atelectasis, severe centrilobular emphysema.      The patient did ultimately require continuous BiPAP in the emergency department.  The patient was started on Solu-Medrol and scheduled DuoNeb treatments in the setting of acute COPD exacerbation.  She was transitioned off of continuous BiPAP and onto nasal cannula.  The patient's symptoms gradually improved.  She was started on azithromycin with concern for developing acute bacterial bronchitis on 3/9.     Problem List:   Acute on Chronic Hypoxic Respiratory Failure  Acute Hypercapnic Respiratory Failure  Acute COPD Exacerbation  Coronavirus Infection (NOT covid)  Suspected Acute Bacterial Bronchitis  - Initially required continuous BiPAP, titrated off on AM of 3/6  - At baseline uses 2L continuously  - Resp viral panel positive for coronavirus  - Holding off on abx for now given no infiltrate on CT chest and normal WBC  - Solumedrol 40 mg IV q12h initially, now on prednisone 40 mg daily with extended taper recommended  - Duonebs QID, prn  - Pulmicort neb daily  - Azithromycin for 5 days on 3/9  - Trelegy is $595 per month which is  unrealistic.  Will consider adding muscarinic antagonist at discharge (spiriva vs incruse ellipta)  - Increase activity today, therapies following, likely discharge tomorrow     Small Bilateral Pleural Effusion  Suspected Mild Fluid Overload  - Echocardiogram showed EF 65-70%, G1DD, no RWMA  - No further plan for diuresis  - Stop telemetry  - Strict I/O  - Daily Weights     Hypomagnesemia  - Electrolyte replacement protocol     Goals of Care  - On 3/9, pt had discussed her code status with family.  She expressed she would like to change her code status to DNR/DNI.  We discussed the implications of this.  A POLST form was filled out and the patient was provided with a copy.       Chronic Medical Problems:  Hypothyroidism  Anxiety/Depression  Hyperlipidemia    DVT Prophylaxis: Enoxaparin (Lovenox) SQ  Code Status: No CPR- Do NOT Intubate  Diet: Regular Diet Adult    Roman Catheter: Not present  Disposition: Expected discharge in 1-2 days to home. Goals prior to discharge include manage respiratory failure.   Incidental Findings: None.  Family updated today: No, I offered but she will update her daughter herself.    40 MINUTES SPENT BY ME on the date of service doing chart review, history, exam, documentation & further activities per the note.      Interval History   The patient reports doing well. No chest pain but still coughing and shortness of breath although quite a bit better. Will work on ambulating today. No nausea, vomiting, diarrhea, constipation. No fevers. No other specific complaints identified.     -Data reviewed today: I personally reviewed all new labs and imaging results over the last 24 hours.     Physical Exam   Temp: 97.2  F (36.2  C) Temp src: Temporal BP: 125/77 Pulse: 85   Resp: 24 SpO2: 95 % O2 Device: Nasal cannula Oxygen Delivery: 3 LPM  Vitals:    03/10/25 0552 03/11/25 0712 03/12/25 0608   Weight: 60 kg (132 lb 4.4 oz) 65 kg (143 lb 4.8 oz) 60.3 kg (132 lb 15 oz)     Vital Signs with  Ranges  Temp:  [97.2  F (36.2  C)-98.3  F (36.8  C)] 97.2  F (36.2  C)  Pulse:  [76-91] 85  Resp:  [16-24] 24  BP: (114-144)/(56-77) 125/77  SpO2:  [94 %-99 %] 95 %  I/O last 3 completed shifts:  In: 240 [P.O.:240]  Out: 2300 [Urine:2300]    GENERAL: No apparent distress. Awake, alert, and fully oriented.  HEENT: Normocephalic, atraumatic. Extraocular movements intact.  CARDIOVASCULAR: Regular rate and rhythm without murmurs or rubs. No S3.  PULMONARY: Wheezing bilaterally.  GASTROINTESTINAL: Soft, non-tender, non-distended. Bowel sounds normoactive.   EXTREMITIES: No cyanosis or clubbing. No edema.  NEUROLOGICAL: CN 2-12 grossly intact, no focal neurological deficits.  DERMATOLOGICAL: No rash, ulcer, bruising, nor jaundice.     Medications   Current Facility-Administered Medications   Medication Dose Route Frequency Provider Last Rate Last Admin    No lozenges or gum should be given while patient on BIPAP/AVAPS/AVAPS AE   Does not apply Continuous PRN Natalya Hammond MD        Patient may continue current oral medications   Does not apply Continuous PRN Natalya Hammond MD         Current Facility-Administered Medications   Medication Dose Route Frequency Provider Last Rate Last Admin    atorvastatin (LIPITOR) tablet 10 mg  10 mg Oral Daily Natalya Hammond MD   10 mg at 03/12/25 0858    azithromycin (ZITHROMAX) tablet 250 mg  250 mg Oral Daily Adria Amin DO   250 mg at 03/12/25 0858    budesonide (PULMICORT) neb solution 0.5 mg  0.5 mg Nebulization Daily Adria Amin DO   0.5 mg at 03/12/25 0826    cyanocobalamin injection 1,000 mcg  1,000 mcg Intramuscular Q30 Days Natalya Hammond MD        diazepam (VALIUM) tablet 5 mg  5 mg Oral See Admin Instructions Natalya Hammond MD        enoxaparin ANTICOAGULANT (LOVENOX) injection 40 mg  40 mg Subcutaneous Q24H Natalya Hammond MD   40 mg at 03/12/25 0609    [Held by provider] fluticasone-vilanterol (BREO ELLIPTA)  200-25 MCG/ACT inhaler 1 puff  1 puff Inhalation Daily Natalya Hammond MD        guaiFENesin (MUCINEX) 12 hr tablet 600 mg  600 mg Oral BID Adria Amin DO   600 mg at 03/12/25 0859    ipratropium (ATROVENT) 0.02 % neb solution 0.5 mg  0.5 mg Nebulization 4x daily Adria Amin DO   0.5 mg at 03/12/25 0826    levalbuterol (XOPENEX) neb solution 1.25 mg  1.25 mg Nebulization 4x Daily Adria Amin DO   1.25 mg at 03/12/25 0826    levothyroxine (SYNTHROID/LEVOTHROID) tablet 88 mcg  88 mcg Oral Daily Natalya Hammond MD   88 mcg at 03/12/25 0858    mirtazapine (REMERON) tablet 15 mg  15 mg Oral At Bedtime Natalya Hammond MD   15 mg at 03/11/25 2042    pantoprazole (PROTONIX) EC tablet 40 mg  40 mg Oral QAM AC Nate Amin DO   40 mg at 03/12/25 0858    predniSONE (DELTASONE) tablet 40 mg  40 mg Oral Daily Nate Amin DO   40 mg at 03/12/25 0859    sertraline (ZOLOFT) tablet 25 mg  25 mg Oral QAM Natalya Hammond MD   25 mg at 03/12/25 0858    sodium chloride (NEBUSAL) 3 % neb solution 3 mL  3 mL Nebulization 4x Daily Adria Amin DO   3 mL at 03/12/25 0826     Data     Laboratory:  Recent Labs   Lab 03/12/25  0604 03/09/25  0552 03/08/25  0640 03/07/25  0637   WBC  --  13.9* 12.4* 10.1   HGB  --  10.1* 10.5* 10.2*   HCT  --  32.7* 33.2* 31.7*   MCV  --  96 97 93    280 263 257     Recent Labs   Lab 03/12/25  0843 03/12/25  0604 03/09/25  0807 03/09/25  0552 03/08/25  0746 03/08/25  0640 03/08/25  0158 03/07/25  0637   NA  --   --   --  142  --  141  --  141   POTASSIUM  --   --   --  4.2  --  4.2  --  3.4   CHLORIDE  --   --   --  94*  --  95*  --  92*   CO2  --   --   --  42*  --  39*  --  40*   ANIONGAP  --   --   --  6*  --  7  --  9   *  --  111* 111*   < > 133*   < > 127*   BUN  --   --   --  20.3  --  17.1  --  13.6   CR  --  0.57  --  0.59  --  0.57  --  0.57   GFRESTIMATED  --  >90  --  >90  --  >90  --  >90   CHAO  --   --   --  " 8.7*  --  8.7*  --  8.9    < > = values in this interval not displayed.     No results for input(s): \"CULT\" in the last 168 hours.    Imaging:  No results found for this or any previous visit (from the past 24 hours).      Nate Amin DO [facility]  [address] [place] [address]  03/12/2025   "

## 2025-03-12 NOTE — PLAN OF CARE
"Goal Outcome Evaluation:      Plan of Care Reviewed With: patient    Overall Patient Progress: improvingOverall Patient Progress: improving    Outcome Evaluation: 4.0L O2, productive cough, no pain. Scheduled nebs. PO abx. Plan to discharge home when ready.    Pt able to take good nap today. Reports feeling better. Continues on 4.0L O2 via NC. Voiding via PW. A1 with WGB. PIV SL. Denies pain. Scheduled nebs. Encouraged IS, flutter valve. Lovenox. Will discharge home with assist of daughter when ready.    Problem: Adult Inpatient Plan of Care  Goal: Plan of Care Review  Description: The Plan of Care Review/Shift note should be completed every shift.  The Outcome Evaluation is a brief statement about your assessment that the patient is improving, declining, or no change.  This information will be displayed automatically on your shift  note.  Outcome: Progressing  Flowsheets (Taken 3/12/2025 [id number])  Outcome Evaluation: 4.0L O2, productive cough, no pain. Scheduled nebs. PO abx. Plan to discharge home when ready.  Plan of Care Reviewed With: patient  Overall Patient Progress: improving  Goal: Patient-Specific Goal (Individualized)  Description: You can add care plan individualizations to a care plan. Examples of Individualization might be:  \"Parent requests to be called daily at 9am for status\", \"I have a hard time hearing out of my right ear\", or \"Do not touch me to wake me up as it startles  me\".  Outcome: Progressing  Goal: Absence of Hospital-Acquired Illness or Injury  Outcome: Progressing  Intervention: Identify and Manage Fall Risk  Recent Flowsheet Documentation  Taken 3/12/2025 0922 by Tiarra Almendarez RN  Safety Promotion/Fall Prevention:   activity supervised   assistive device/personal items within reach   nonskid shoes/slippers when out of bed   room door open   room near nurse's station   room organization consistent  Intervention: Prevent Skin Injury  Recent Flowsheet Documentation  Taken 3/12/2025 0922 by " Tiarra Almendarez RN  Body Position: supine, head elevated  Taken 3/12/2025 0911 by Tiarra Almendarez RN  Body Position: supine, head elevated  Intervention: Prevent Infection  Recent Flowsheet Documentation  Taken 3/12/2025 0922 by Tiarra Almendarez RN  Infection Prevention:   environmental surveillance performed   rest/sleep promoted   single patient room provided  Goal: Optimal Comfort and Wellbeing  Outcome: Progressing  Intervention: Provide Person-Centered Care  Recent Flowsheet Documentation  Taken 3/12/2025 0922 by Tiarra Almendarez RN  Trust Relationship/Rapport:   care explained   choices provided   emotional support provided   empathic listening provided   questions answered   questions encouraged   reassurance provided   thoughts/feelings acknowledged  Goal: Readiness for Transition of Care  Outcome: Progressing     Problem: Comorbidity Management  Goal: Maintenance of COPD Symptom Control  Outcome: Progressing  Intervention: Maintain COPD Symptom Control  Recent Flowsheet Documentation  Taken 3/12/2025 0922 by Tiarra Almendarez RN  Breathing Techniques/Airway Clearance: deep/controlled cough encouraged  Medication Review/Management: medications reviewed     Problem: Fall Injury Risk  Goal: Absence of Fall and Fall-Related Injury  Outcome: Progressing  Intervention: Identify and Manage Contributors  Recent Flowsheet Documentation  Taken 3/12/2025 0922 by Tiarra Almendarez RN  Medication Review/Management: medications reviewed  Intervention: Promote Injury-Free Environment  Recent Flowsheet Documentation  Taken 3/12/2025 0922 by Tiarra Almendarez RN  Safety Promotion/Fall Prevention:   activity supervised   assistive device/personal items within reach   nonskid shoes/slippers when out of bed   room door open   room near nurse's station   room organization consistent     Problem: Gas Exchange Impaired  Goal: Optimal Gas Exchange  Outcome: Progressing  Intervention: Optimize Oxygenation and Ventilation  Recent  Flowsheet Documentation  Taken 3/12/2025 0922 by Tiarra Almendarez RN  Airway/Ventilation Management:   airway patency maintained   calming measures promoted   pulmonary hygiene promoted  Head of Bed (HOB) Positioning: HOB at 30 degrees  Taken 3/12/2025 0911 by Tiarra Almendarez RN  Head of Bed (HOB) Positioning: HOB at 30-45 degrees     Problem: Infection  Goal: Absence of Infection Signs and Symptoms  Outcome: Progressing  Intervention: Prevent or Manage Infection  Recent Flowsheet Documentation  Taken 3/12/2025 0922 by Tiarra Almendraez RN  Isolation Precautions: droplet precautions maintained

## 2025-03-13 VITALS
HEART RATE: 81 BPM | BODY MASS INDEX: 24.46 KG/M2 | OXYGEN SATURATION: 89 % | HEIGHT: 62 IN | SYSTOLIC BLOOD PRESSURE: 143 MMHG | RESPIRATION RATE: 20 BRPM | TEMPERATURE: 97.7 F | WEIGHT: 132.94 LBS | DIASTOLIC BLOOD PRESSURE: 72 MMHG

## 2025-03-13 LAB
GLUCOSE BLDC GLUCOMTR-MCNC: 83 MG/DL (ref 70–99)
HOLD SPECIMEN: NORMAL
MAGNESIUM SERPL-MCNC: 1.8 MG/DL (ref 1.7–2.3)

## 2025-03-13 PROCEDURE — 94640 AIRWAY INHALATION TREATMENT: CPT | Mod: 76

## 2025-03-13 PROCEDURE — 36415 COLL VENOUS BLD VENIPUNCTURE: CPT | Performed by: HOSPITALIST

## 2025-03-13 PROCEDURE — 250N000011 HC RX IP 250 OP 636: Performed by: INTERNAL MEDICINE

## 2025-03-13 PROCEDURE — 250N000013 HC RX MED GY IP 250 OP 250 PS 637: Performed by: HOSPITALIST

## 2025-03-13 PROCEDURE — 250N000009 HC RX 250: Performed by: INTERNAL MEDICINE

## 2025-03-13 PROCEDURE — 999N000157 HC STATISTIC RCP TIME EA 10 MIN

## 2025-03-13 PROCEDURE — 250N000012 HC RX MED GY IP 250 OP 636 PS 637: Performed by: HOSPITALIST

## 2025-03-13 PROCEDURE — 94640 AIRWAY INHALATION TREATMENT: CPT

## 2025-03-13 PROCEDURE — 250N000013 HC RX MED GY IP 250 OP 250 PS 637: Performed by: INTERNAL MEDICINE

## 2025-03-13 PROCEDURE — 99239 HOSP IP/OBS DSCHRG MGMT >30: CPT | Performed by: HOSPITALIST

## 2025-03-13 PROCEDURE — 83735 ASSAY OF MAGNESIUM: CPT | Performed by: HOSPITALIST

## 2025-03-13 RX ORDER — PREDNISONE 20 MG/1
TABLET ORAL
Qty: 20 TABLET | Refills: 0 | Status: SHIPPED | OUTPATIENT
Start: 2025-03-13

## 2025-03-13 RX ORDER — GUAIFENESIN 600 MG/1
600 TABLET, EXTENDED RELEASE ORAL 2 TIMES DAILY
Qty: 10 TABLET | Refills: 0 | Status: SHIPPED | OUTPATIENT
Start: 2025-03-13

## 2025-03-13 RX ORDER — BENZONATATE 100 MG/1
100 CAPSULE ORAL 3 TIMES DAILY PRN
Qty: 30 CAPSULE | Refills: 0 | Status: SHIPPED | OUTPATIENT
Start: 2025-03-13

## 2025-03-13 RX ORDER — PANTOPRAZOLE SODIUM 40 MG/1
40 TABLET, DELAYED RELEASE ORAL
Qty: 30 TABLET | Refills: 0 | Status: SHIPPED | OUTPATIENT
Start: 2025-03-14

## 2025-03-13 RX ADMIN — IPRATROPIUM BROMIDE 0.5 MG: 0.5 SOLUTION RESPIRATORY (INHALATION) at 08:13

## 2025-03-13 RX ADMIN — IPRATROPIUM BROMIDE 0.5 MG: 0.5 SOLUTION RESPIRATORY (INHALATION) at 15:00

## 2025-03-13 RX ADMIN — SERTRALINE HYDROCHLORIDE 25 MG: 25 TABLET ORAL at 07:55

## 2025-03-13 RX ADMIN — ENOXAPARIN SODIUM 40 MG: 40 INJECTION SUBCUTANEOUS at 05:10

## 2025-03-13 RX ADMIN — LEVOTHYROXINE SODIUM 88 MCG: 0.09 TABLET ORAL at 07:55

## 2025-03-13 RX ADMIN — LEVALBUTEROL HYDROCHLORIDE 1.25 MG: 1.25 SOLUTION RESPIRATORY (INHALATION) at 08:12

## 2025-03-13 RX ADMIN — GUAIFENESIN 600 MG: 600 TABLET, EXTENDED RELEASE ORAL at 07:55

## 2025-03-13 RX ADMIN — ATORVASTATIN CALCIUM 10 MG: 10 TABLET, FILM COATED ORAL at 07:55

## 2025-03-13 RX ADMIN — PREDNISONE 40 MG: 20 TABLET ORAL at 07:54

## 2025-03-13 RX ADMIN — PANTOPRAZOLE SODIUM 40 MG: 40 TABLET, DELAYED RELEASE ORAL at 06:51

## 2025-03-13 RX ADMIN — AZITHROMYCIN DIHYDRATE 250 MG: 250 TABLET ORAL at 07:55

## 2025-03-13 RX ADMIN — ALPRAZOLAM 0.25 MG: 0.25 TABLET ORAL at 08:09

## 2025-03-13 RX ADMIN — SODIUM CHLORIDE SOLN NEBU 3% 3 ML: 3 NEBU SOLN at 08:12

## 2025-03-13 RX ADMIN — BUDESONIDE 0.5 MG: 0.5 INHALANT ORAL at 08:13

## 2025-03-13 RX ADMIN — SODIUM CHLORIDE SOLN NEBU 3% 3 ML: 3 NEBU SOLN at 15:00

## 2025-03-13 RX ADMIN — LEVALBUTEROL HYDROCHLORIDE 1.25 MG: 1.25 SOLUTION RESPIRATORY (INHALATION) at 15:00

## 2025-03-13 ASSESSMENT — ACTIVITIES OF DAILY LIVING (ADL)
ADLS_ACUITY_SCORE: 66
ADLS_ACUITY_SCORE: 68
ADLS_ACUITY_SCORE: 66
ADLS_ACUITY_SCORE: 68
ADLS_ACUITY_SCORE: 66
ADLS_ACUITY_SCORE: 68
ADLS_ACUITY_SCORE: 66
ADLS_ACUITY_SCORE: 68

## 2025-03-13 NOTE — DISCHARGE SUMMARY
Hospitalist Discharge Summary  [facility]    Josephine Nicole MRN# [id number]   YOB: 1950 Age: 75 year old     Date of Admission:  3/5/2025  Date of Discharge:  3/13/2025  Admitting Physician:  Natalya Hammond MD  Discharge Physician:  Nate Amin DO  Discharging Service:  Hospitalist     Primary Provider: Divina Turner          Discharge Diagnosis:     Acute on Chronic Hypoxic Respiratory Failure  Acute Hypercapnic Respiratory Failure  Acute COPD Exacerbation  Coronavirus Infection (NOT covid)  Suspected Acute Bacterial Bronchitis  Suspected Mild Fluid Overload  Hypomagnesemia   Hypothyroidism  Anxiety/Depression  Hyperlipidemia             Discharge Disposition:     Discharged to home           Allergies:     Allergies   Allergen Reactions    Bupropion Anaphylaxis, Hives and Shortness Of Breath              Discharge Medications:     Current Discharge Medication List        START taking these medications    Details   benzonatate (TESSALON) 100 MG capsule Take 1 capsule (100 mg) by mouth 3 times daily as needed for cough.  Qty: 30 capsule, Refills: 0    Associated Diagnoses: COPD with acute exacerbation (H)      guaiFENesin (MUCINEX) 600 MG 12 hr tablet Take 1 tablet (600 mg) by mouth 2 times daily.  Qty: 10 tablet, Refills: 0    Associated Diagnoses: COPD with acute exacerbation (H)      pantoprazole (PROTONIX) 40 MG EC tablet Take 1 tablet (40 mg) by mouth every morning (before breakfast).  Qty: 30 tablet, Refills: 0    Associated Diagnoses: COPD with acute exacerbation (H)      predniSONE (DELTASONE) 20 MG tablet Take 3 tabs by mouth daily x 3 days, then 2 tabs daily x 3 days, then 1 tab daily x 3 days, then 1/2 tab daily x 3 days.  Qty: 20 tablet, Refills: 0    Associated Diagnoses: COPD with acute exacerbation (H)           CONTINUE these medications which have NOT CHANGED    Details   acetaminophen (TYLENOL) 500 MG tablet Take 1.5 tablets (750 mg) by mouth  every 6 hours as needed for mild pain.    Associated Diagnoses: S/P resection of meningioma; Closed displaced fracture of right femoral neck (H)      albuterol (PROVENTIL) (2.5 MG/3ML) 0.083% neb solution Take 2.5 mg by nebulization 3 times daily as needed for shortness of breath, wheezing or cough      alendronate (FOSAMAX) 70 MG tablet Take 70 mg by mouth every 7 days      ALPRAZolam (XANAX) 0.25 MG tablet Take 0.25 mg by mouth daily as needed for anxiety.      atorvastatin (LIPITOR) 10 MG tablet Take 10 mg by mouth daily      cyanocobalamin (CYANOCOBALAMIN) 1000 MCG/ML injection Inject 1 mL into the muscle every 30 days      diazepam (VALIUM) 5 MG tablet Take 1 tablet (5 mg) by mouth See Admin Instructions Take 5mg 30 minutes prior to MRI, then may take an additional 5mg at time of exam if needed. MUST have a .  Qty: 2 tablet, Refills: 0    Associated Diagnoses: Claustrophobia      fluticasone-salmeterol (ADVAIR) 500-50 MCG/DOSE inhaler Inhale 1 puff into the lungs 2 times daily      ibuprofen (ADVIL/MOTRIN) 200 MG tablet Take 3 tablets (600 mg) by mouth every 6 hours as needed for inflammatory pain.    Associated Diagnoses: Closed displaced fracture of right femoral neck (H)      ipratropium - albuterol 0.5 mg/2.5 mg/3 mL (DUONEB) 0.5-2.5 (3) MG/3ML neb solution Take 1 vial by nebulization every 6 hours as needed for shortness of breath, wheezing or cough      levalbuterol (XOPENEX HFA) 45 MCG/ACT inhaler Inhale 2 puffs into the lungs every 4 hours as needed for shortness of breath or wheezing      levothyroxine (SYNTHROID/LEVOTHROID) 88 MCG tablet Take 88 mcg by mouth daily      mirtazapine (REMERON) 15 MG tablet Take 1 tablet (15 mg) by mouth At Bedtime  Qty: 30 tablet, Refills: 0    Associated Diagnoses: S/P resection of meningioma      sertraline (ZOLOFT) 25 MG tablet Take 25 mg by mouth every morning      zolpidem (AMBIEN) 2.5 mg TABS half-tab Take 2.5 mg by mouth nightly as needed for sleep.           "          Condition on Discharge:     Discharge condition: Stable   Discharge vitals: Blood pressure 119/65, pulse 80, temperature 97.3  F (36.3  C), temperature source Temporal, resp. rate 16, height 1.575 m (5' 2\"), weight 60.3 kg (132 lb 15 oz), SpO2 95%, not currently breastfeeding.   Code status on discharge: DNR / DNI      BASIC PHYSICAL EXAMINATION:  GENERAL: No apparent distress.  CARDIOVASCULAR: Regular rate and rhythm without murmurs.  PULMONARY: Clear to auscultation bilaterally.   GASTROINTESTINAL: Abdomen soft, non-tender.  EXTREMITIES: No edema, pulses intact.  NEUROLOGIC: No focal deficits.            History of Illness:   See detailed admission note for full details.               Procedures excluding imaging which is summarized below:     Please see details in the electronic medical record.           Consultations:     RESPIRATORY CARE IP CONSULT  RESPIRATORY CARE IP CONSULT  PHARMACY LIAISON FOR MEDICATION COVERAGE CONSULT  PHYSICAL THERAPY ADULT IP CONSULT  OCCUPATIONAL THERAPY ADULT IP CONSULT  CARE MANAGEMENT / SOCIAL WORK IP CONSULT  PHYSICAL THERAPY ADULT IP CONSULT  OCCUPATIONAL THERAPY ADULT IP CONSULT          Significant Results:     Results for orders placed or performed during the hospital encounter of 03/05/25   CT Chest Pulmonary Embolism w Contrast    Narrative    EXAM: CT CHEST PULMONARY EMBOLISM W CONTRAST  LOCATION: [facility]  DATE: 3/6/2025    INDICATION: Worsening shortness of breath. Recent gallbladder surgery. Evaluate for pulmonary embolism.  COMPARISON: None.  TECHNIQUE: CT chest pulmonary angiogram during arterial phase injection of IV contrast. Multiplanar reformats and MIP reconstructions were performed. Dose reduction techniques were used.   CONTRAST: 54mL Isovue 370    FINDINGS:  ANGIOGRAM CHEST: Pulmonary arteries are normal caliber and negative for pulmonary emboli. Mild aortic calcifications. Thoracic aorta is negative for dissection. No CT " evidence of right heart strain.    LUNGS AND PLEURA: Small right and trace left pleural effusions. Compressive atelectasis in the dependent right lung. No focal airspace disease. Severe upper lobe predominant centrilobular emphysema. No pneumothorax.    MEDIASTINUM/AXILLAE: No lymphadenopathy. No pericardial effusion.    CORONARY ARTERY CALCIFICATION: Mild.    UPPER ABDOMEN: Status post cholecystectomy. Trace fluid in the gallbladder fossa.    MUSCULOSKELETAL: Multilevel degenerative changes of the spine. Chronic appearing deformity of the right proximal humerus.      Impression    IMPRESSION:  1.  No acute pulmonary embolism.    2.  Small right and trace left pleural effusions, with adjacent compressive atelectasis.    3.  Severe centrilobular emphysema.   Echocardiogram Complete     Value    LVEF  65-70%    Narrative    [id number]  [id number]  [id number]  [id number]^ROBERTO^KRYSTA^LOREN     [facility]  Echocardiography Laboratory  [address] [place] [address]     Name: ANUJ JENNINGS  MRN: [id number]  : 1950  Study Date: 2025 09:13 AM  Age: 75 yrs  Gender: Female  Patient Location: [facility]  Reason For Study: KELLY  Ordering Physician: KRYSTA MEJIA  Performed By: Luis Pena RDCS     BSA: 1.6 m2  Height: 62 in  Weight: 132 lb  HR: 95  BP: 114/79 mmHg  ______________________________________________________________________________  Procedure  Echocardiogram with two-dimensional, color and spectral Doppler. Optison (NDC  #[id number]) given intravenously. Technically difficult study.  ______________________________________________________________________________  Interpretation Summary     Technically difficult imaging  Left ventricular systolic function is normal.  The visual ejection fraction is 65-70%.  The left ventricle is normal in size.  The right ventricle is normal in structure, function and size.  Doppler interrogation does not demonstrate signficant stenosis  or  insufficiency involving cardiac valves.     No change since 3/27/2023  ______________________________________________________________________________  Left Ventricle  The left ventricle is normal in size. There is normal left ventricular wall  thickness. Left ventricular systolic function is normal. The visual ejection  fraction is 65-70%. Grade I or early diastolic dysfunction. No regional wall  motion abnormalities noted. There is no thrombus seen in the left ventricle.     Right Ventricle  The right ventricle is normal in structure, function and size. There is no  mass or thrombus in the right ventricle.     Atria  Normal left atrial size. Right atrial size is normal. There is no atrial shunt  seen. The left atrial appendage is not well visualized.     Mitral Valve  The mitral valve leaflets appear normal. There is no evidence of stenosis,  fluttering, or prolapse. There is no mitral regurgitation noted. There is no  mitral valve stenosis.     Tricuspid Valve  Normal tricuspid valve. No tricuspid regurgitation. Right ventricular systolic  pressure could not be approximated due to inadequate tricuspid regurgitation.  There is no tricuspid stenosis.     Aortic Valve  The aortic valve is trileaflet. No aortic regurgitation is present. No aortic  stenosis is present.     Pulmonic Valve  The pulmonic valve is not well visualized. There is no pulmonic valvular  regurgitation. There is no pulmonic valvular stenosis.     Vessels  The aortic root is normal size. Normal size ascending aorta. The inferior vena  cava is normal. The pulmonary artery is normal size.     Pericardium  The pericardium appears normal. There is no pleural effusion.     Rhythm  Sinus rhythm was noted.  ______________________________________________________________________________  MMode/2D Measurements & Calculations     IVSd: 1.1 cm  LVIDd: 4.1 cm  LVIDs: 2.3 cm  LVPWd: 0.69 cm  IVC diam: 1.5 cm  FS: 42.5 %  LV mass(C)d: 109.0 grams  LV  mass(C)dI: 68.1 grams/m2  Ao root diam: 3.1 cm  LVOT diam: 1.8 cm  LVOT area: 2.7 cm2  Ao root diam index Ht(cm/m): 2.0  Ao root diam index BSA (cm/m2): 2.0  RV Base: 3.5 cm  RWT: 0.34  TAPSE: 2.4 cm     Time Measurements  Aortic HR: 93.0 BPM     Doppler Measurements & Calculations  MV E max josue: 59.2 cm/sec  MV A max josue: 102.0 cm/sec  MV E/A: 0.58  MV max P.6 mmHg  MV mean P.4 mmHg  MV V2 VTI: 15.7 cm  MVA(VTI): 2.5 cm2  MV dec time: 0.14 sec  LV V1 max P.1 mmHg  LV V1 max: 101.0 cm/sec  LV V1 VTI: 14.8 cm  CO(LVOT): 3.7 l/min  CI(LVOT): 2.3 l/min/m2  SV(LVOT): 39.4 ml  SI(LVOT): 24.6 ml/m2  PA acc time: 0.10 sec  E/E' av.2  Lateral E/e': 7.5  Medial E/e': 8.9  RV S Josue: 13.6 cm/sec     ______________________________________________________________________________  Report approved by: Dr. Francisco Zaragoza on 2025 03:06 PM             Transthoracic Echocardiogram Results:  No results found for this or any previous visit (from the past 4320 hours).             Pending Results:     Unresulted Labs Ordered in the Past 30 Days of this Admission       No orders found from 2/3/2025 to 3/6/2025.                        Discharge Instructions and Follow-Up:     Discharge instructions and follow-up:   Discharge Procedure Orders   Home Care Referral   Referral Priority: Routine: Next available opening Referral Type: Home Health Therapies & Aides   Number of Visits Requested: 1     Adult Pulmonary Medicine  Referral   Standing Status: Future   Referral Priority: Routine: Next available opening Referral Type: Consultation   Requested Specialty: Pulmonary Disease   Number of Visits Requested: 1     Reason for your hospital stay   Order Comments: COPD.     Follow-up and recommended labs and tests    Order Comments: Follow up with primary care provider, Divina Turner, within 7 days to evaluate medication change and for hospital follow- up.  No follow up labs or test are needed.  Consider Vit D  levels and replacement.  Follow-up with pulmonology.     Activity   Order Comments: Your activity upon discharge: activity as tolerated     Order Specific Question Answer Comments   Is discharge order? Yes      No CPR- Do NOT Intubate     Order Specific Question Answer Comments   Code status determined by: Discussion with patient/ legal decision maker      Diet   Order Comments: Follow this diet upon discharge: Current Diet:Orders Placed This Encounter      Regular Diet Adult     Order Specific Question Answer Comments   Is discharge order? Yes              Hospital Course:     Josephine Nicole is a 75 year old female with a history of COPD with chronic home oxygen dependence of 2 L continuously, hypothyroidism, anxiety/depression, hyperlipidemia admitted on 3/5/2025 with shortness of breath.       In the emergency department, the patient was found to have a blood pressure 125/103, temperature 98.6  F, heart rate 87, respiratory rate 22, SpO2 89% on 4 L nasal cannula.  Initial lab work showed magnesium 1.6, proBNP 4687, high-sensitivity troponin 13, venous pH 7.34 with a venous pCO2 of 81, hemoglobin 10.3.  COVID-19, influenza, RSV are negative.  CT chest showed small right and trace left oral effusions with adjacent compressive atelectasis, severe centrilobular emphysema.       The patient did ultimately require continuous BiPAP in the emergency department.  The patient was started on Solu-Medrol and scheduled DuoNeb treatments in the setting of acute COPD exacerbation.  She was transitioned off of continuous BiPAP and onto nasal cannula.  The patient's symptoms gradually improved.  She was started on azithromycin with concern for developing acute bacterial bronchitis on 3/9.    She was slow to improve on aggressive medical management and pulmonary hygiene.  She has remained largely stable the past few days on her baseline oxygen requirements.  She has completed the azithromycin and has tolerated the transition to  oral prednisone.  She will undergo a steroid taper upon discharge.  She reports having oxygen and nebulizer with supplies at home and does not need prescriptions.  I will discharge her with Mucinex, tessalon pearls, and Protonix for GI prophylaxis while on steroids.  I recommended she follow-up with pulmonology as an outpatient for management of her advanced COPD and chronic respiratory failure.     On 3/9, pt had discussed her code status with family.  She expressed she would like to change her code status to DNR/DNI.  We discussed the implications of this.  A POLST form was filled out and the patient was provided with a copy.  She lives with her daughter and indicates she feels safe returning home, which is what she wants to do.  The patient was seen, examined, and counseled on this day. The hospitalization and plan of care was reviewed with the patient extensively. All questions were addressed and the patient agreed to follow-up as noted above.      Total time spent in face to face contact with the patient and coordinating discharge was:  35 Minutes    Nate Amin DO, MPH  [facility] Hospitalist  [address] [place] [address]  03/13/2025

## 2025-03-13 NOTE — PLAN OF CARE
"Goal Outcome Evaluation:      Plan of Care Reviewed With: patient    Overall Patient Progress: improvingOverall Patient Progress: improving    Outcome Evaluation: Pt requiring 1-4L O2, feels she is at her baseline respiratory status. Discharge home today at 6:30pm with daughter transporting.    VSS on 1-4.0L O2. Nap today. Reports feeling better. No pain. PRN Xanex for anxiety. Productive cough. Voiding via PW. A1 with WGB. PIV SL. Denies pain. Scheduled nebs. Encouraged IS, flutter valve. Lovenox. PO abx complete. Will discharge today at 6:30 pm with daughter providing transport. Pt has home oxygen and nebulizer.    Problem: Adult Inpatient Plan of Care  Goal: Plan of Care Review  Description: The Plan of Care Review/Shift note should be completed every shift.  The Outcome Evaluation is a brief statement about your assessment that the patient is improving, declining, or no change.  This information will be displayed automatically on your shift  note.  Outcome: Progressing  Flowsheets (Taken 3/13/2025 6991)  Outcome Evaluation: Pt requiring 1-4L O2, feels she is at her baseline respiratory status. Discharge home today at 6:30pm with daughter transporting.  Plan of Care Reviewed With: patient  Overall Patient Progress: improving  Goal: Patient-Specific Goal (Individualized)  Description: You can add care plan individualizations to a care plan. Examples of Individualization might be:  \"Parent requests to be called daily at 9am for status\", \"I have a hard time hearing out of my right ear\", or \"Do not touch me to wake me up as it startles  me\".  Outcome: Progressing  Goal: Absence of Hospital-Acquired Illness or Injury  Outcome: Progressing  Intervention: Identify and Manage Fall Risk  Recent Flowsheet Documentation  Taken 3/13/2025 1300 by Tiarra Almendarez, RN  Safety Promotion/Fall Prevention:   activity supervised   assistive device/personal items within reach   clutter free environment maintained   increased rounding " and observation   nonskid shoes/slippers when out of bed   room door open   room near nurse's station   room organization consistent   safety round/check completed  Intervention: Prevent Skin Injury  Recent Flowsheet Documentation  Taken 3/13/2025 1300 by Tiarra Almendarez RN  Body Position: position changed independently  Intervention: Prevent and Manage VTE (Venous Thromboembolism) Risk  Recent Flowsheet Documentation  Taken 3/13/2025 1300 by Tiarra Almendarez RN  VTE Prevention/Management: SCDs off (sequential compression devices)  Intervention: Prevent Infection  Recent Flowsheet Documentation  Taken 3/13/2025 1300 by Tiarra Almendarez RN  Infection Prevention:   equipment surfaces disinfected   hand hygiene promoted   personal protective equipment utilized   rest/sleep promoted   single patient room provided  Goal: Optimal Comfort and Wellbeing  Outcome: Progressing  Intervention: Provide Person-Centered Care  Recent Flowsheet Documentation  Taken 3/13/2025 1300 by Tiarra Almendarez RN  Trust Relationship/Rapport:   care explained   choices provided   empathic listening provided   questions answered   questions encouraged   reassurance provided   thoughts/feelings acknowledged  Goal: Readiness for Transition of Care  Outcome: Progressing     Problem: Comorbidity Management  Goal: Maintenance of COPD Symptom Control  Outcome: Progressing  Intervention: Maintain COPD Symptom Control  Recent Flowsheet Documentation  Taken 3/13/2025 1300 by Tiarra Almendarez RN  Breathing Techniques/Airway Clearance: deep/controlled cough encouraged  Medication Review/Management: medications reviewed     Problem: Fall Injury Risk  Goal: Absence of Fall and Fall-Related Injury  Outcome: Progressing  Intervention: Identify and Manage Contributors  Recent Flowsheet Documentation  Taken 3/13/2025 1300 by Tiarra Almendarez RN  Medication Review/Management: medications reviewed  Intervention: Promote Injury-Free Environment  Recent Flowsheet  Documentation  Taken 3/13/2025 1300 by Tiarra Almendarez RN  Safety Promotion/Fall Prevention:   activity supervised   assistive device/personal items within reach   clutter free environment maintained   increased rounding and observation   nonskid shoes/slippers when out of bed   room door open   room near nurse's station   room organization consistent   safety round/check completed     Problem: Gas Exchange Impaired  Goal: Optimal Gas Exchange  Outcome: Progressing  Intervention: Optimize Oxygenation and Ventilation  Recent Flowsheet Documentation  Taken 3/13/2025 1300 by Tiarra Almendarez RN  Airway/Ventilation Management:   airway patency maintained   oxygen therapy provided   humidification applied  Head of Bed (HOB) Positioning: HOB at 30 degrees     Problem: Infection  Goal: Absence of Infection Signs and Symptoms  Outcome: Progressing  Intervention: Prevent or Manage Infection  Recent Flowsheet Documentation  Taken 3/13/2025 1300 by Tiarra Almendarez RN  Infection Management: aseptic technique maintained  Isolation Precautions: droplet precautions maintained

## 2025-03-13 NOTE — CONSULTS
CLINICAL NUTRITION SERVICES - ASSESSMENT NOTE    Malnutrition Status:    % Intake: Decreased intake does not meet criteria  % Weight Loss: None noted  Subcutaneous Fat Loss: Triceps: Mild --> not using as indicator as only present in 1 region  Muscle Loss: Temples (temporalis muscle): Mild, Clavicles (pectoralis and deltoids): Mild, and Shoulders (deltoids): Mild  Fluid Accumulation/Edema: None noted or documented    Malnutrition Diagnosis: Patient does not meet two of the established criteria necessary for diagnosing malnutrition  Malnutrition Present on Admission: No     Registered Dietitian Interventions:  - Continue diet as ordered, continue use of snacks/food from home as needed.  Encouraged consistent meals w/ protein sources.       REASON FOR ASSESSMENT  Length of stay (LOS)     PMH of: Cerebral meningioma s/p radiation and resection, COPD on chronic O2 (2 L), hypothyroidism, HLD, osteoporosis, depression, anxiety, acute cholecystitis.    Admit 2/2: Acute on chronic respiratory failure, COPD exacerbation, PE, concern for fluid overload.    SUBJECTIVE INFORMATION  Assessed patient in room.    NUTRITION HISTORY  - Refer to RD brief note from 3/06 - patient/daughter had reported intakes were at baseline PTA.   - Diet at home: Confirmed nutrition hx again w/ Josephine today.  She reports consistent meals at home without appetite concerns PTA.  - Use of oral supplements: None.  - Allergies: NKFA.    CURRENT NUTRITION ORDERS AND INTAKE/TOLERANCE  Diet: Regular    % intakes per flowsheets.  Meals being ordered BID-TID.     Josephine describes she feels like she's eating fine.  She does have snacks/drinks in room from family that she is using as well.      LABS  Nutrition-relevant labs: Reviewed.     MEDICATIONS  Nutrition-relevant medications: Reviewed.   - Remeron at HS  - Prednisone    SYSTEM FINDINGS    - Skin: No current documentation of PI.   - GI symptoms: Stooling patterns noted.  - Currently requiring 1 L  "NC    ANTHROPOMETRICS  Height: 157.5 cm (5' 2\")  Most Recent Weight: 60.3 kg (132 lb 15 oz)  Body mass index is 24.31 kg/m .:   Normal BMI  Wt Readings from Last 10 Encounters:   03/12/25 60.3 kg (132 lb 15 oz)   02/27/25 64 kg (141 lb 1.5 oz)   02/26/25 56.2 kg (124 lb)   04/28/23 59.9 kg (132 lb)   04/02/23 69.6 kg (153 lb 7 oz)   03/22/23 66.9 kg (147 lb 7.8 oz)   05/24/22 65.8 kg (145 lb)   04/18/22 66.7 kg (147 lb)   02/06/21 64.4 kg (142 lb)     - No current documentation of edema.    - Josephine denies wt loss PTA.  She reports usual weight is around 130#.      ASSESSED NUTRITION NEEDS  Dosing Weight: 60 kg, based on bed scale wts  Estimated Energy Needs: >/=25 kcal/kg  Justification: Maintenance  Estimated Protein Needs: >/=1.2 grams of pro/kg  Justification: Preservation of lean body mass, age  Estimated Fluid Needs: per provider    NUTRITION DIAGNOSIS  Predicted inadequate nutrient intake (energy/protein)  related to potential for decline in PO intakes pending appetite and LOS.    INTERVENTIONS  None at this time.    Goals  Patient to consume % of nutritionally adequate meal trays TID, or the equivalent with supplements/snacks.     Monitoring/Evaluation  Progress toward goals will be monitored and evaluated per policy.      Ketty Hill RDN, , LD  Clinical Dietitian  [facility] Message Group: \"Dietitian [Lady]\"  Office: [phone]  Pagers:  3rd floor/ICU: [phone]  All other floors: [phone]  Weekend/holiday: [phone]    "

## 2025-03-13 NOTE — PROGRESS NOTES
Care Management Discharge Note    Discharge Date: 03/13/2025       Discharge Disposition: Home Care    Discharge Services:      Discharge DME:      Discharge Transportation: family or friend will provide    Private pay costs discussed: Not applicable    Does the patient's insurance plan have a 3 day qualifying hospital stay waiver?  No    PAS Confirmation Code:    Patient/family educated on Medicare website which has current facility and service quality ratings: yes    Education Provided on the Discharge Plan:    Persons Notified of Discharge Plans: HC agency  Patient/Family in Agreement with the Plan: yes    Handoff Referral Completed: No, handoff not indicated or clinically appropriate    Additional Information:  Patient discharging today to home via family with [facility] for RN PT. Orders were faxed to HC agency and they were informed of discharge.    Bea Hardy, RN, BSN, CM  Inpatient Care Coordination  [facility]  [phone]

## 2025-03-13 NOTE — PLAN OF CARE
"Patient is A&Ox4. VSS on baseline 2L NC. Ambulated 100ft in hallway, one assist w/ RW and gait belt, KELLY oxygen increased to 3L NC w/ ambulation. Congested productive cough noted. Demonstrates proper technique w/ incentive spirometer. Denies pain or discomfort. Tolerating fluids and meals. Voiding/ BM this shift.     Goal Outcome Evaluation:      Plan of Care Reviewed With: patient    Overall Patient Progress: improvingOverall Patient Progress: improving    Outcome Evaluation: Respiratory status monitored. Ambulating one assist w/ RW and gait beld. Congested, productive cough. O2 sats WNL on baseline 2L NC.    Problem: Adult Inpatient Plan of Care  Goal: Plan of Care Review  Description: The Plan of Care Review/Shift note should be completed every shift.  The Outcome Evaluation is a brief statement about your assessment that the patient is improving, declining, or no change.  This information will be displayed automatically on your shift  note.  Outcome: Progressing  Flowsheets (Taken 3/12/2025 2158)  Outcome Evaluation: Respiratory status monitored. Ambulating one assist w/ RW and gait beld. Congested, productive cough. O2 sats WNL on baseline 2L NC.  Plan of Care Reviewed With: patient  Overall Patient Progress: improving  Goal: Patient-Specific Goal (Individualized)  Description: You can add care plan individualizations to a care plan. Examples of Individualization might be:  \"Parent requests to be called daily at 9am for status\", \"I have a hard time hearing out of my right ear\", or \"Do not touch me to wake me up as it startles  me\".  Outcome: Progressing  Goal: Absence of Hospital-Acquired Illness or Injury  Outcome: Progressing  Intervention: Identify and Manage Fall Risk  Recent Flowsheet Documentation  Taken 3/12/2025 1951 by Karen Hitchcock, RN  Safety Promotion/Fall Prevention:   activity supervised   assistive device/personal items within reach   clutter free environment maintained   increased rounding and " observation   nonskid shoes/slippers when out of bed   room door open   room near nurse's station   room organization consistent   safety round/check completed  Intervention: Prevent Skin Injury  Recent Flowsheet Documentation  Taken 3/12/2025 1951 by Karen Hitchcock RN  Body Position: position changed independently  Intervention: Prevent Infection  Recent Flowsheet Documentation  Taken 3/12/2025 1951 by Karen Hitchcock RN  Infection Prevention:   equipment surfaces disinfected   hand hygiene promoted   personal protective equipment utilized   rest/sleep promoted   single patient room provided  Goal: Optimal Comfort and Wellbeing  Outcome: Progressing  Goal: Readiness for Transition of Care  Outcome: Progressing     Problem: Comorbidity Management  Goal: Maintenance of COPD Symptom Control  Outcome: Progressing     Problem: Fall Injury Risk  Goal: Absence of Fall and Fall-Related Injury  Outcome: Progressing  Intervention: Promote Injury-Free Environment  Recent Flowsheet Documentation  Taken 3/12/2025 1951 by Karen Hitchcock RN  Safety Promotion/Fall Prevention:   activity supervised   assistive device/personal items within reach   clutter free environment maintained   increased rounding and observation   nonskid shoes/slippers when out of bed   room door open   room near nurse's station   room organization consistent   safety round/check completed     Problem: Gas Exchange Impaired  Goal: Optimal Gas Exchange  Outcome: Progressing  Intervention: Optimize Oxygenation and Ventilation  Recent Flowsheet Documentation  Taken 3/12/2025 1951 by Karen Hitchcock RN  Head of Bed (HOB) Positioning: HOB at 30 degrees     Problem: Infection  Goal: Absence of Infection Signs and Symptoms  Outcome: Progressing  Intervention: Prevent or Manage Infection  Recent Flowsheet Documentation  Taken 3/12/2025 1951 by Karen Hitchcock RN  Isolation Precautions: droplet precautions maintained

## 2025-03-14 NOTE — PLAN OF CARE
Occupational Therapy Discharge Summary    Reason for therapy discharge:    Discharged to home with home therapy.    Progress towards therapy goal(s). See goals on Care Plan in [facility] electronic health record for goal details.  Goals partially met.  Barriers to achieving goals:   discharge from facility.    Therapy recommendation(s):    Continued therapy is recommended.  Rationale/Recommendations:  HHOT for safety and IND w/ ADL at home.

## 2025-03-14 NOTE — PLAN OF CARE
Goal Outcome Evaluation:      Plan of Care Reviewed With: patient, child    Overall Patient Progress: improvingOverall Patient Progress: improving    Patient discharged to home with daughter, discharge instructions gone through and questions answered. IV taken out, discharge medication given to her. Escorted off the unit on a wheelchair by staff.

## 2025-03-17 ENCOUNTER — DOCUMENTATION ONLY (OUTPATIENT)
Dept: OTHER | Facility: CLINIC | Age: 75
End: 2025-03-17
Payer: COMMERCIAL

## (undated) DEVICE — SU ETHILON 3-0 PS-1 18" 1663G

## (undated) DEVICE — MANIFOLD NEPTUNE 4 PORT 700-20

## (undated) DEVICE — GLOVE BIOGEL PI SZ 7.5 40875

## (undated) DEVICE — SUCTION CANISTER BEMIS HI FLOW 006772-901

## (undated) DEVICE — LINEN TOWEL PACK X5 5464

## (undated) DEVICE — RX SURGIFLO HEMOSTATIC MATRIX W/THROMBIN 8ML 2994

## (undated) DEVICE — ESU PENCIL W/SMOKE EVAC CVPLP2000

## (undated) DEVICE — SOL NACL 0.9% IRRIG 1000ML BOTTLE 07138-09

## (undated) DEVICE — SOL NACL 0.9% INJ 250ML BAG 2B1322Q

## (undated) DEVICE — SYR 50ML LL W/O NDL 309653

## (undated) DEVICE — IMM PILLOW ABDUCT HIP MED M60-025-M

## (undated) DEVICE — GOWN XXL 9575

## (undated) DEVICE — TUBING SUCTION 12"X1/4" N612

## (undated) DEVICE — SU VICRYL 2-0 CT-1 18' J739D

## (undated) DEVICE — SU SILK 0 SH 30" K834H

## (undated) DEVICE — PREP CHLORAPREP 26ML TINTED HI-LITE ORANGE 930815

## (undated) DEVICE — GLOVE BIOGEL PI MICRO INDICATOR UNDERGLOVE SZ 7.5 48975

## (undated) DEVICE — DRAPE POUCH IRR 1016

## (undated) DEVICE — GLOVE BIOGEL PI MICRO SZ 6.5 48565

## (undated) DEVICE — DEVICE RETRIEVER HEWSON 71111579

## (undated) DEVICE — SU DERMABOND ADVANCED .7ML DNX12

## (undated) DEVICE — SU ETHIBOND 1 CT-1 30" X425H

## (undated) DEVICE — SU NUROLON 4-0 TF CR 8X18" C584D

## (undated) DEVICE — SOL NACL 0.9% INJ 1000ML BAG 07983-09

## (undated) DEVICE — DRAPE POUCH INSTRUMENT 1018

## (undated) DEVICE — SPONGE COTTONOID 1/2X1/2" 80-1400

## (undated) DEVICE — GLOVE BIOGEL PI SZ 8.0 40880

## (undated) DEVICE — ESU GROUND PAD UNIVERSAL W/O CORD

## (undated) DEVICE — SOL NACL 0.9% IRRIG 3000ML BAG 2B7477

## (undated) DEVICE — SU ETHIBOND 5 V-37 4X30" MB66G

## (undated) DEVICE — SUCTION IRR STRYKERFLOW II W/TIP 250-070-520

## (undated) DEVICE — SU VICRYL+ 0 27 UR6 VLT VCP603H

## (undated) DEVICE — DRAPE U SPLIT 74X120" 29440

## (undated) DEVICE — PACK UNIVERSAL SPLIT 29131

## (undated) DEVICE — SPONGE COTTONOID 1X3" 80-1408

## (undated) DEVICE — GLOVE BIOGEL PI MICRO INDICATOR UNDERGLOVE SZ 8.0 48980

## (undated) DEVICE — Device

## (undated) DEVICE — SU VICRYL 4-0 PS-2 18" UND J496H

## (undated) DEVICE — BRUSH FEMORAL CANAL 210-4 0210004000

## (undated) DEVICE — DRSG TELFA 3X8" 1238

## (undated) DEVICE — SUTURE MONOCRYL+ 3-0 PS-1 27" UNDYED MCP936H

## (undated) DEVICE — ENDO TROCAR SLEEVE KII Z-THREADED 05X100MM CTS02

## (undated) DEVICE — BAG CLEAR TRASH 1.3M 39X33" P4040C

## (undated) DEVICE — LINEN ORTHO ACL PACK 5447

## (undated) DEVICE — TOOL DISSECT MIDAS MR8 F2/7CM TAPER 2.3MM DI MR8-F2/7TA23

## (undated) DEVICE — SU VICRYL 0 CT-2 18" ANTIBACTERIAL VCP727H

## (undated) DEVICE — TIP ASPIRATOR SONOPET IQ LARGE 5500-25S-308

## (undated) DEVICE — DRAPE CONVERTORS U-DRAPE 60X72" 8476

## (undated) DEVICE — PIN SKULL MAYFIELD ADULT TITANIUM 3/PK A1120

## (undated) DEVICE — RETR ELASTIC STAYS LONE STAR BLUNT DUAL LEAD 3550-1G

## (undated) DEVICE — LINEN POUCH DBL 5427

## (undated) DEVICE — APPLICATOR ENDOSCOPIC 5 SURGICEL POWDER 3123SPEA

## (undated) DEVICE — ENDO TROCAR BLUNT TIP KII BALLOON 12X100MM C0R47

## (undated) DEVICE — SURGICEL HEMOSTAT 3X4" NUKNIT 1943

## (undated) DEVICE — ESU ELEC BLADE 2.75" COATED/INSULATED E1455

## (undated) DEVICE — ENDO TROCAR FIRST ENTRY KII FIOS Z-THRD 05X100MM CTF03

## (undated) DEVICE — SU NDL MAYO 1824-4

## (undated) DEVICE — PERFORATOR 14MM CODMAN

## (undated) DEVICE — GLOVE BIOGEL PI MICRO INDICATOR UNDERGLOVE SZ 8.5 48985

## (undated) DEVICE — HOOD FLYTE W/PEELAWAY 408-800-100

## (undated) DEVICE — SU VICRYL 0 CT-1 CR 8X18" J740D

## (undated) DEVICE — LINEN HALF SHEET 5512

## (undated) DEVICE — PREP SKIN SCRUB TRAY 4461A

## (undated) DEVICE — PACK NEURO SNE15SNFSA

## (undated) DEVICE — SU VICRYL 2-0 CT-2 CR 8X18" J726D

## (undated) DEVICE — GLOVE BIOGEL PI MICRO INDICATOR UNDERGLOVE SZ 6.5 48965

## (undated) DEVICE — SUCTION MANIFOLD NEPTUNE 2 SYS 4 PORT 0702-020-000

## (undated) DEVICE — ESU CORD MONOPOLAR 10'  E0510

## (undated) DEVICE — PACK TOTAL HIP RIDGES LATEX PO15HIFSG

## (undated) DEVICE — SOL WATER IRRIG 1000ML BOTTLE 2F7114

## (undated) DEVICE — SOL NACL 0.9% IRRIG 1000ML BOTTLE 2F7124

## (undated) DEVICE — GLOVE BIOGEL PI MICRO SZ 7.5 48575

## (undated) DEVICE — MARKER SPHERES PASSIVE MEDT PACK 5 8801075

## (undated) DEVICE — LINEN DRAPE 54X72" 5467

## (undated) DEVICE — LINEN TOWEL PACK X10 5473

## (undated) DEVICE — TUBING SET IRR MALIS BIPOLAR CORD INTEGRATE 6790-100-003

## (undated) DEVICE — SU ETHILON 3-0 FS-1 18" 669H

## (undated) DEVICE — SPONGE SURGIFOAM 100 1974

## (undated) DEVICE — NDL BLUNT 17GA 1.5" 8881202330

## (undated) DEVICE — LINEN FULL SHEET 5511

## (undated) DEVICE — SURGICEL POWDER ABSORBABLE HEMOSTAT 3GM 3013SP

## (undated) DEVICE — DRAPE STERI TOWEL LG 1010

## (undated) DEVICE — DRAPE MICROSCOPE LEICA 54X150" AR8033650

## (undated) DEVICE — ESU GROUND PAD ADULT W/CORD E7507

## (undated) RX ORDER — PROPOFOL 10 MG/ML
INJECTION, EMULSION INTRAVENOUS
Status: DISPENSED
Start: 2023-03-30

## (undated) RX ORDER — FENTANYL CITRATE 50 UG/ML
INJECTION, SOLUTION INTRAMUSCULAR; INTRAVENOUS
Status: DISPENSED
Start: 2023-03-30

## (undated) RX ORDER — PROPOFOL 10 MG/ML
INJECTION, EMULSION INTRAVENOUS
Status: DISPENSED
Start: 2025-02-27

## (undated) RX ORDER — ONDANSETRON 2 MG/ML
INJECTION INTRAMUSCULAR; INTRAVENOUS
Status: DISPENSED
Start: 2023-03-20

## (undated) RX ORDER — ACETAMINOPHEN 325 MG/1
TABLET ORAL
Status: DISPENSED
Start: 2023-03-20

## (undated) RX ORDER — BUPIVACAINE HYDROCHLORIDE 5 MG/ML
INJECTION, SOLUTION EPIDURAL; INTRACAUDAL
Status: DISPENSED
Start: 2025-02-27

## (undated) RX ORDER — PROPOFOL 10 MG/ML
INJECTION, EMULSION INTRAVENOUS
Status: DISPENSED
Start: 2023-03-20

## (undated) RX ORDER — GLYCOPYRROLATE 0.2 MG/ML
INJECTION INTRAMUSCULAR; INTRAVENOUS
Status: DISPENSED
Start: 2023-03-20

## (undated) RX ORDER — METHADONE HYDROCHLORIDE 10 MG/ML
INJECTION, SOLUTION INTRAMUSCULAR; INTRAVENOUS; SUBCUTANEOUS
Status: DISPENSED
Start: 2023-03-20

## (undated) RX ORDER — REMIFENTANIL HYDROCHLORIDE 1 MG/ML
INJECTION, POWDER, LYOPHILIZED, FOR SOLUTION INTRAVENOUS
Status: DISPENSED
Start: 2023-03-30

## (undated) RX ORDER — NEOSTIGMINE METHYLSULFATE 1 MG/ML
VIAL (ML) INJECTION
Status: DISPENSED
Start: 2023-03-20

## (undated) RX ORDER — CEFAZOLIN SODIUM/WATER 2 G/20 ML
SYRINGE (ML) INTRAVENOUS
Status: DISPENSED
Start: 2023-03-20

## (undated) RX ORDER — ALBUTEROL SULFATE 90 UG/1
AEROSOL, METERED RESPIRATORY (INHALATION)
Status: DISPENSED
Start: 2023-03-30

## (undated) RX ORDER — DEXAMETHASONE SODIUM PHOSPHATE 4 MG/ML
INJECTION, SOLUTION INTRA-ARTICULAR; INTRALESIONAL; INTRAMUSCULAR; INTRAVENOUS; SOFT TISSUE
Status: DISPENSED
Start: 2023-03-30

## (undated) RX ORDER — EPHEDRINE SULFATE 50 MG/ML
INJECTION, SOLUTION INTRAMUSCULAR; INTRAVENOUS; SUBCUTANEOUS
Status: DISPENSED
Start: 2023-03-20

## (undated) RX ORDER — FENTANYL CITRATE 50 UG/ML
INJECTION, SOLUTION INTRAMUSCULAR; INTRAVENOUS
Status: DISPENSED
Start: 2025-02-27

## (undated) RX ORDER — IPRATROPIUM BROMIDE AND ALBUTEROL SULFATE 2.5; .5 MG/3ML; MG/3ML
SOLUTION RESPIRATORY (INHALATION)
Status: DISPENSED
Start: 2025-02-27

## (undated) RX ORDER — FENTANYL CITRATE 0.05 MG/ML
INJECTION, SOLUTION INTRAMUSCULAR; INTRAVENOUS
Status: DISPENSED
Start: 2023-03-30

## (undated) RX ORDER — LIDOCAINE HYDROCHLORIDE 10 MG/ML
INJECTION, SOLUTION EPIDURAL; INFILTRATION; INTRACAUDAL; PERINEURAL
Status: DISPENSED
Start: 2023-03-20

## (undated) RX ORDER — OXYCODONE HYDROCHLORIDE 5 MG/1
TABLET ORAL
Status: DISPENSED
Start: 2025-02-27

## (undated) RX ORDER — FENTANYL CITRATE 50 UG/ML
INJECTION, SOLUTION INTRAMUSCULAR; INTRAVENOUS
Status: DISPENSED
Start: 2023-03-20

## (undated) RX ORDER — EPHEDRINE SULFATE 50 MG/ML
INJECTION, SOLUTION INTRAMUSCULAR; INTRAVENOUS; SUBCUTANEOUS
Status: DISPENSED
Start: 2023-03-30

## (undated) RX ORDER — INDOCYANINE GREEN AND WATER 25 MG
KIT INJECTION
Status: DISPENSED
Start: 2025-02-27

## (undated) RX ORDER — ONDANSETRON 2 MG/ML
INJECTION INTRAMUSCULAR; INTRAVENOUS
Status: DISPENSED
Start: 2023-03-30

## (undated) RX ORDER — DEXAMETHASONE SODIUM PHOSPHATE 4 MG/ML
INJECTION, SOLUTION INTRA-ARTICULAR; INTRALESIONAL; INTRAMUSCULAR; INTRAVENOUS; SOFT TISSUE
Status: DISPENSED
Start: 2023-03-20

## (undated) RX ORDER — TRANEXAMIC ACID 10 MG/ML
INJECTION, SOLUTION INTRAVENOUS
Status: DISPENSED
Start: 2023-03-20

## (undated) RX ORDER — IPRATROPIUM BROMIDE AND ALBUTEROL SULFATE 2.5; .5 MG/3ML; MG/3ML
SOLUTION RESPIRATORY (INHALATION)
Status: DISPENSED
Start: 2023-03-20

## (undated) RX ORDER — MAGNESIUM SULFATE HEPTAHYDRATE 40 MG/ML
INJECTION, SOLUTION INTRAVENOUS
Status: DISPENSED
Start: 2023-03-20

## (undated) RX ORDER — DEXMEDETOMIDINE HYDROCHLORIDE 4 UG/ML
INJECTION, SOLUTION INTRAVENOUS
Status: DISPENSED
Start: 2023-03-20

## (undated) RX ORDER — ESMOLOL HYDROCHLORIDE 10 MG/ML
INJECTION INTRAVENOUS
Status: DISPENSED
Start: 2023-03-30

## (undated) RX ORDER — CEFAZOLIN SODIUM/WATER 2 G/20 ML
SYRINGE (ML) INTRAVENOUS
Status: DISPENSED
Start: 2025-02-27

## (undated) RX ORDER — ACETAMINOPHEN 325 MG/1
TABLET ORAL
Status: DISPENSED
Start: 2025-02-27

## (undated) RX ORDER — BUPIVACAINE HYDROCHLORIDE AND EPINEPHRINE 5; 5 MG/ML; UG/ML
INJECTION, SOLUTION EPIDURAL; INTRACAUDAL; PERINEURAL
Status: DISPENSED
Start: 2023-03-30

## (undated) RX ORDER — FENTANYL CITRATE-0.9 % NACL/PF 10 MCG/ML
PLASTIC BAG, INJECTION (ML) INTRAVENOUS
Status: DISPENSED
Start: 2023-03-20

## (undated) RX ORDER — VANCOMYCIN HYDROCHLORIDE 1 G/20ML
INJECTION, POWDER, LYOPHILIZED, FOR SOLUTION INTRAVENOUS
Status: DISPENSED
Start: 2023-03-20